# Patient Record
Sex: MALE | Race: BLACK OR AFRICAN AMERICAN | NOT HISPANIC OR LATINO | Employment: OTHER | URBAN - METROPOLITAN AREA
[De-identification: names, ages, dates, MRNs, and addresses within clinical notes are randomized per-mention and may not be internally consistent; named-entity substitution may affect disease eponyms.]

---

## 2018-01-02 ENCOUNTER — OFFICE VISIT CONVERTED (OUTPATIENT)
Dept: SURGERY | Facility: CLINIC | Age: 63
End: 2018-01-02
Attending: UROLOGY

## 2018-01-12 ENCOUNTER — OFFICE VISIT CONVERTED (OUTPATIENT)
Dept: SURGERY | Facility: CLINIC | Age: 63
End: 2018-01-12
Attending: UROLOGY

## 2018-01-30 ENCOUNTER — OFFICE VISIT CONVERTED (OUTPATIENT)
Dept: FAMILY MEDICINE CLINIC | Facility: CLINIC | Age: 63
End: 2018-01-30
Attending: FAMILY MEDICINE

## 2018-02-05 ENCOUNTER — OFFICE VISIT CONVERTED (OUTPATIENT)
Dept: SURGERY | Facility: CLINIC | Age: 63
End: 2018-02-05
Attending: UROLOGY

## 2018-04-10 ENCOUNTER — OFFICE VISIT CONVERTED (OUTPATIENT)
Dept: SURGERY | Facility: CLINIC | Age: 63
End: 2018-04-10
Attending: UROLOGY

## 2018-04-10 ENCOUNTER — CONVERSION ENCOUNTER (OUTPATIENT)
Dept: SURGERY | Facility: CLINIC | Age: 63
End: 2018-04-10

## 2018-07-10 ENCOUNTER — OFFICE VISIT CONVERTED (OUTPATIENT)
Dept: SURGERY | Facility: CLINIC | Age: 63
End: 2018-07-10
Attending: UROLOGY

## 2018-10-29 ENCOUNTER — CONVERSION ENCOUNTER (OUTPATIENT)
Dept: SURGERY | Facility: CLINIC | Age: 63
End: 2018-10-29

## 2018-10-29 ENCOUNTER — OFFICE VISIT CONVERTED (OUTPATIENT)
Dept: SURGERY | Facility: CLINIC | Age: 63
End: 2018-10-29
Attending: UROLOGY

## 2019-01-31 ENCOUNTER — HOSPITAL ENCOUNTER (OUTPATIENT)
Dept: OTHER | Facility: HOSPITAL | Age: 64
Discharge: HOME | End: 2019-01-31

## 2019-01-31 LAB — PSA SERPL-MCNC: 0.4 NG/ML (ref 0–4)

## 2019-02-05 ENCOUNTER — OFFICE VISIT CONVERTED (OUTPATIENT)
Dept: SURGERY | Facility: CLINIC | Age: 64
End: 2019-02-05
Attending: UROLOGY

## 2019-05-06 ENCOUNTER — HOSPITAL ENCOUNTER (OUTPATIENT)
Dept: OTHER | Facility: HOSPITAL | Age: 64
Discharge: HOME | End: 2019-05-06

## 2019-05-06 LAB — PSA SERPL-MCNC: 0.35 NG/ML (ref 0–4)

## 2019-05-07 ENCOUNTER — OFFICE VISIT CONVERTED (OUTPATIENT)
Dept: SURGERY | Facility: CLINIC | Age: 64
End: 2019-05-07
Attending: UROLOGY

## 2019-05-16 ENCOUNTER — OFFICE VISIT CONVERTED (OUTPATIENT)
Dept: FAMILY MEDICINE CLINIC | Facility: CLINIC | Age: 64
End: 2019-05-16
Attending: NURSE PRACTITIONER

## 2019-05-16 ENCOUNTER — HOSPITAL ENCOUNTER (OUTPATIENT)
Dept: CARDIOLOGY | Facility: HOSPITAL | Age: 64
Discharge: HOME | End: 2019-05-16
Attending: FAMILY MEDICINE

## 2019-05-16 LAB
ALBUMIN SERPL-MCNC: 3.8 G/DL (ref 3.5–5)
ALBUMIN/GLOB SERPL: 1 {RATIO} (ref 1.4–2.6)
ALP SERPL-CCNC: 63 U/L (ref 56–155)
ALT SERPL-CCNC: 10 U/L (ref 10–40)
ANION GAP SERPL CALC-SCNC: 16 MMOL/L (ref 8–19)
AST SERPL-CCNC: 21 U/L (ref 15–50)
BASOPHILS # BLD AUTO: 0.05 10*3/UL (ref 0–0.2)
BASOPHILS NFR BLD AUTO: 0.9 % (ref 0–3)
BILIRUB SERPL-MCNC: 0.22 MG/DL (ref 0.2–1.3)
BUN SERPL-MCNC: 10 MG/DL (ref 5–25)
BUN/CREAT SERPL: 11 {RATIO} (ref 6–20)
CALCIUM SERPL-MCNC: 8.9 MG/DL (ref 8.7–10.4)
CHLORIDE SERPL-SCNC: 103 MMOL/L (ref 99–111)
CHOLEST SERPL-MCNC: 137 MG/DL (ref 107–200)
CHOLEST/HDLC SERPL: 2 {RATIO} (ref 3–6)
CONV ABS IMM GRAN: 0.01 10*3/UL (ref 0–0.2)
CONV CO2: 24 MMOL/L (ref 22–32)
CONV IMMATURE GRAN: 0.2 % (ref 0–1.8)
CONV TOTAL PROTEIN: 7.6 G/DL (ref 6.3–8.2)
CREAT UR-MCNC: 0.89 MG/DL (ref 0.7–1.2)
DEPRECATED RDW RBC AUTO: 45.3 FL (ref 35.1–43.9)
EOSINOPHIL # BLD AUTO: 0.19 10*3/UL (ref 0–0.7)
EOSINOPHIL # BLD AUTO: 3.4 % (ref 0–7)
ERYTHROCYTE [DISTWIDTH] IN BLOOD BY AUTOMATED COUNT: 20 % (ref 11.6–14.4)
FOLATE SERPL-MCNC: 9.4 NG/ML (ref 4.8–20)
GFR SERPLBLD BASED ON 1.73 SQ M-ARVRAT: >60 ML/MIN/{1.73_M2}
GLOBULIN UR ELPH-MCNC: 3.8 G/DL (ref 2–3.5)
GLUCOSE SERPL-MCNC: 78 MG/DL (ref 70–99)
HBA1C MFR BLD: 8.2 G/DL (ref 14–18)
HCT VFR BLD AUTO: 27.1 % (ref 42–52)
HCYS SERPL-SCNC: 16.2 UMOL/L (ref 3.7–13.9)
HDLC SERPL-MCNC: 70 MG/DL (ref 40–60)
LDLC SERPL CALC-MCNC: 50 MG/DL (ref 70–100)
LYMPHOCYTES # BLD AUTO: 2.11 10*3/UL (ref 1–5)
MCH RBC QN AUTO: 19.4 PG (ref 27–31)
MCHC RBC AUTO-ENTMCNC: 30.3 G/DL (ref 33–37)
MCV RBC AUTO: 64.1 FL (ref 80–96)
MONOCYTES # BLD AUTO: 0.53 10*3/UL (ref 0.2–1.2)
MONOCYTES NFR BLD AUTO: 9.5 % (ref 3–10)
NEUTROPHILS # BLD AUTO: 2.67 10*3/UL (ref 2–8)
NEUTROPHILS NFR BLD AUTO: 48.1 % (ref 30–85)
NRBC CBCN: 0 % (ref 0–0.7)
OSMOLALITY SERPL CALC.SUM OF ELEC: 286 MOSM/KG (ref 273–304)
PLATELET # BLD AUTO: 442 10*3/UL (ref 130–400)
PMV BLD AUTO: 8.9 FL (ref 9.4–12.4)
POTASSIUM SERPL-SCNC: 4 MMOL/L (ref 3.5–5.3)
RBC # BLD AUTO: 4.23 10*6/UL (ref 4.7–6.1)
SODIUM SERPL-SCNC: 139 MMOL/L (ref 135–147)
TRIGL SERPL-MCNC: 87 MG/DL (ref 40–150)
TSH SERPL-ACNC: 1.35 M[IU]/L (ref 0.27–4.2)
VARIANT LYMPHS NFR BLD MANUAL: 37.9 % (ref 20–45)
VIT B12 SERPL-MCNC: 412 PG/ML (ref 211–911)
VLDLC SERPL-MCNC: 17 MG/DL (ref 5–37)
WBC # BLD AUTO: 5.56 10*3/UL (ref 4.8–10.8)

## 2019-05-18 LAB
CONV HEPATITIS C TEST INFO: NORMAL
FERRITIN SERPL-MCNC: 10 NG/ML (ref 30–300)
HCV RNA SERPL NAA+PROBE-ACNC: NORMAL IU/ML
HCV RNA SERPL NAA+PROBE-LOG IU: 4.42 LOG10 IU/ML
IRON SATN MFR SERPL: 4 % (ref 20–55)
IRON SERPL-MCNC: 18 UG/DL (ref 70–180)
TIBC SERPL-MCNC: 489 UG/DL (ref 245–450)
TRANSFERRIN SERPL-MCNC: 342 MG/DL (ref 215–365)

## 2019-05-21 LAB — METHYLMALONATE SERPL-SCNC: 223 NMOL/L (ref 0–378)

## 2019-06-06 ENCOUNTER — HOSPITAL ENCOUNTER (OUTPATIENT)
Dept: FAMILY MEDICINE CLINIC | Facility: CLINIC | Age: 64
Discharge: HOME | End: 2019-06-06
Attending: FAMILY MEDICINE

## 2019-06-06 LAB
BASOPHILS # BLD AUTO: 0.04 10*3/UL (ref 0–0.2)
BASOPHILS NFR BLD AUTO: 0.9 % (ref 0–3)
CONV ABS IMM GRAN: 0.02 10*3/UL (ref 0–0.2)
CONV IMMATURE GRAN: 0.4 % (ref 0–1.8)
DEPRECATED RDW RBC AUTO: 48.5 FL (ref 35.1–43.9)
EOSINOPHIL # BLD AUTO: 0.13 10*3/UL (ref 0–0.7)
EOSINOPHIL # BLD AUTO: 2.9 % (ref 0–7)
ERYTHROCYTE [DISTWIDTH] IN BLOOD BY AUTOMATED COUNT: 21.9 % (ref 11.6–14.4)
HBA1C MFR BLD: 8.5 G/DL (ref 14–18)
HCT VFR BLD AUTO: 28.4 % (ref 42–52)
LYMPHOCYTES # BLD AUTO: 1.52 10*3/UL (ref 1–5)
MCH RBC QN AUTO: 19.4 PG (ref 27–31)
MCHC RBC AUTO-ENTMCNC: 29.9 G/DL (ref 33–37)
MCV RBC AUTO: 64.7 FL (ref 80–96)
MONOCYTES # BLD AUTO: 0.53 10*3/UL (ref 0.2–1.2)
MONOCYTES NFR BLD AUTO: 11.7 % (ref 3–10)
NEUTROPHILS # BLD AUTO: 2.3 10*3/UL (ref 2–8)
NEUTROPHILS NFR BLD AUTO: 50.6 % (ref 30–85)
NRBC CBCN: 0 % (ref 0–0.7)
PLATELET # BLD AUTO: 383 10*3/UL (ref 130–400)
PMV BLD AUTO: 9.6 FL (ref 9.4–12.4)
RBC # BLD AUTO: 4.39 10*6/UL (ref 4.7–6.1)
VARIANT LYMPHS NFR BLD MANUAL: 33.5 % (ref 20–45)
WBC # BLD AUTO: 4.54 10*3/UL (ref 4.8–10.8)

## 2019-06-07 ENCOUNTER — OFFICE VISIT CONVERTED (OUTPATIENT)
Dept: ONCOLOGY | Facility: HOSPITAL | Age: 64
End: 2019-06-07
Attending: INTERNAL MEDICINE

## 2019-06-07 ENCOUNTER — HOSPITAL ENCOUNTER (OUTPATIENT)
Dept: ONCOLOGY | Facility: HOSPITAL | Age: 64
Discharge: HOME | End: 2019-06-07
Attending: INTERNAL MEDICINE

## 2019-07-12 ENCOUNTER — HOSPITAL ENCOUNTER (OUTPATIENT)
Dept: GASTROENTEROLOGY | Facility: HOSPITAL | Age: 64
Discharge: HOME | End: 2019-07-12
Attending: NURSE PRACTITIONER

## 2019-07-12 ENCOUNTER — OFFICE VISIT CONVERTED (OUTPATIENT)
Dept: ONCOLOGY | Facility: HOSPITAL | Age: 64
End: 2019-07-12
Attending: NURSE PRACTITIONER

## 2019-07-19 ENCOUNTER — HOSPITAL ENCOUNTER (OUTPATIENT)
Dept: OTHER | Facility: HOSPITAL | Age: 64
Discharge: HOME | End: 2019-07-19
Attending: NURSE PRACTITIONER

## 2019-07-19 LAB
ALBUMIN SERPL-MCNC: 3.7 G/DL (ref 3.5–5)
ALBUMIN/GLOB SERPL: 1 {RATIO} (ref 1.4–2.6)
ALP SERPL-CCNC: 54 U/L (ref 56–155)
ALT SERPL-CCNC: 17 U/L (ref 10–40)
ANION GAP SERPL CALC-SCNC: 20 MMOL/L (ref 8–19)
AST SERPL-CCNC: 35 U/L (ref 15–50)
BASOPHILS # BLD AUTO: 0.02 10*3/UL (ref 0–0.2)
BASOPHILS NFR BLD AUTO: 0.5 % (ref 0–3)
BILIRUB SERPL-MCNC: 0.22 MG/DL (ref 0.2–1.3)
BUN SERPL-MCNC: 6 MG/DL (ref 5–25)
BUN/CREAT SERPL: 7 {RATIO} (ref 6–20)
CALCIUM SERPL-MCNC: 9.1 MG/DL (ref 8.7–10.4)
CHLORIDE SERPL-SCNC: 104 MMOL/L (ref 99–111)
CONV ABS IMM GRAN: 0.01 10*3/UL (ref 0–0.2)
CONV CO2: 23 MMOL/L (ref 22–32)
CONV IMMATURE GRAN: 0.2 % (ref 0–1.8)
CONV TOTAL PROTEIN: 7.5 G/DL (ref 6.3–8.2)
CREAT UR-MCNC: 0.9 MG/DL (ref 0.7–1.2)
DEPRECATED RDW RBC AUTO: 53.9 FL (ref 35.1–43.9)
EOSINOPHIL # BLD AUTO: 0.1 10*3/UL (ref 0–0.7)
EOSINOPHIL # BLD AUTO: 2.4 % (ref 0–7)
ERYTHROCYTE [DISTWIDTH] IN BLOOD BY AUTOMATED COUNT: 23.9 % (ref 11.6–14.4)
ETHANOL BLD-MCNC: <10 MG/DL
GFR SERPLBLD BASED ON 1.73 SQ M-ARVRAT: >60 ML/MIN/{1.73_M2}
GLOBULIN UR ELPH-MCNC: 3.8 G/DL (ref 2–3.5)
GLUCOSE SERPL-MCNC: 90 MG/DL (ref 70–99)
HBA1C MFR BLD: 9.5 G/DL (ref 14–18)
HCT VFR BLD AUTO: 30.5 % (ref 42–52)
INR PPP: 1.03 (ref 2–3)
LYMPHOCYTES # BLD AUTO: 1.6 10*3/UL (ref 1–5)
MCH RBC QN AUTO: 20.7 PG (ref 27–31)
MCHC RBC AUTO-ENTMCNC: 31.1 G/DL (ref 33–37)
MCV RBC AUTO: 66.6 FL (ref 80–96)
MONOCYTES # BLD AUTO: 0.51 10*3/UL (ref 0.2–1.2)
MONOCYTES NFR BLD AUTO: 12.4 % (ref 3–10)
NEUTROPHILS # BLD AUTO: 1.88 10*3/UL (ref 2–8)
NEUTROPHILS NFR BLD AUTO: 45.7 % (ref 30–85)
NRBC CBCN: 0 % (ref 0–0.7)
OSMOLALITY SERPL CALC.SUM OF ELEC: 291 MOSM/KG (ref 273–304)
PLATELET # BLD AUTO: 520 10*3/UL (ref 130–400)
PMV BLD AUTO: 8.8 FL (ref 9.4–12.4)
POTASSIUM SERPL-SCNC: 4.6 MMOL/L (ref 3.5–5.3)
PROTHROMBIN TIME: 10.6 S (ref 9.4–12)
RBC # BLD AUTO: 4.58 10*6/UL (ref 4.7–6.1)
SODIUM SERPL-SCNC: 142 MMOL/L (ref 135–147)
VARIANT LYMPHS NFR BLD MANUAL: 38.8 % (ref 20–45)
WBC # BLD AUTO: 4.12 10*3/UL (ref 4.8–10.8)

## 2019-07-20 ENCOUNTER — HOSPITAL ENCOUNTER (OUTPATIENT)
Dept: ULTRASOUND IMAGING | Facility: HOSPITAL | Age: 64
Discharge: HOME | End: 2019-07-20
Attending: NURSE PRACTITIONER

## 2019-07-31 ENCOUNTER — HOSPITAL ENCOUNTER (OUTPATIENT)
Dept: GASTROENTEROLOGY | Facility: HOSPITAL | Age: 64
Setting detail: HOSPITAL OUTPATIENT SURGERY
Discharge: HOME | End: 2019-07-31
Attending: INTERNAL MEDICINE

## 2019-08-05 ENCOUNTER — HOSPITAL ENCOUNTER (OUTPATIENT)
Dept: OTHER | Facility: HOSPITAL | Age: 64
Discharge: HOME | End: 2019-08-05

## 2019-08-05 LAB — PSA SERPL-MCNC: 0.46 NG/ML (ref 0–4)

## 2019-08-06 ENCOUNTER — OFFICE VISIT CONVERTED (OUTPATIENT)
Dept: SURGERY | Facility: CLINIC | Age: 64
End: 2019-08-06
Attending: UROLOGY

## 2019-08-23 ENCOUNTER — OFFICE VISIT CONVERTED (OUTPATIENT)
Dept: ONCOLOGY | Facility: HOSPITAL | Age: 64
End: 2019-08-23
Attending: NURSE PRACTITIONER

## 2019-08-23 ENCOUNTER — HOSPITAL ENCOUNTER (OUTPATIENT)
Dept: GASTROENTEROLOGY | Facility: HOSPITAL | Age: 64
Discharge: HOME | End: 2019-08-23
Attending: NURSE PRACTITIONER

## 2019-08-23 LAB
ALBUMIN SERPL-MCNC: 3.9 G/DL (ref 3.5–5)
ALBUMIN/GLOB SERPL: 1.1 {RATIO} (ref 1.4–2.6)
ALP SERPL-CCNC: 57 U/L (ref 56–155)
ALT SERPL-CCNC: 15 U/L (ref 10–40)
ANION GAP SERPL CALC-SCNC: 18 MMOL/L (ref 8–19)
AST SERPL-CCNC: 31 U/L (ref 15–50)
BASOPHILS # BLD AUTO: 0.03 10*3/UL (ref 0–0.2)
BASOPHILS NFR BLD AUTO: 0.7 % (ref 0–3)
BILIRUB SERPL-MCNC: 0.18 MG/DL (ref 0.2–1.3)
BUN SERPL-MCNC: 9 MG/DL (ref 5–25)
BUN/CREAT SERPL: 10 {RATIO} (ref 6–20)
CALCIUM SERPL-MCNC: 8.7 MG/DL (ref 8.7–10.4)
CHLORIDE SERPL-SCNC: 104 MMOL/L (ref 99–111)
CONV ABS IMM GRAN: 0.01 10*3/UL (ref 0–0.2)
CONV CO2: 21 MMOL/L (ref 22–32)
CONV IMMATURE GRAN: 0.2 % (ref 0–1.8)
CONV TOTAL PROTEIN: 7.5 G/DL (ref 6.3–8.2)
CREAT UR-MCNC: 0.93 MG/DL (ref 0.7–1.2)
DEPRECATED RDW RBC AUTO: 57.5 FL (ref 35.1–43.9)
EOSINOPHIL # BLD AUTO: 0.11 10*3/UL (ref 0–0.7)
EOSINOPHIL # BLD AUTO: 2.6 % (ref 0–7)
ERYTHROCYTE [DISTWIDTH] IN BLOOD BY AUTOMATED COUNT: 23.4 % (ref 11.6–14.4)
GFR SERPLBLD BASED ON 1.73 SQ M-ARVRAT: >60 ML/MIN/{1.73_M2}
GLOBULIN UR ELPH-MCNC: 3.6 G/DL (ref 2–3.5)
GLUCOSE SERPL-MCNC: 97 MG/DL (ref 70–99)
HCT VFR BLD AUTO: 31.5 % (ref 42–52)
HGB BLD-MCNC: 9.9 G/DL (ref 14–18)
INR PPP: 0.97 (ref 2–3)
LYMPHOCYTES # BLD AUTO: 1.77 10*3/UL (ref 1–5)
LYMPHOCYTES NFR BLD AUTO: 41.2 % (ref 20–45)
MCH RBC QN AUTO: 22 PG (ref 27–31)
MCHC RBC AUTO-ENTMCNC: 31.4 G/DL (ref 33–37)
MCV RBC AUTO: 70 FL (ref 80–96)
MONOCYTES # BLD AUTO: 0.33 10*3/UL (ref 0.2–1.2)
MONOCYTES NFR BLD AUTO: 7.7 % (ref 3–10)
NEUTROPHILS # BLD AUTO: 2.05 10*3/UL (ref 2–8)
NEUTROPHILS NFR BLD AUTO: 47.6 % (ref 30–85)
NRBC CBCN: 0 % (ref 0–0.7)
OSMOLALITY SERPL CALC.SUM OF ELEC: 287 MOSM/KG (ref 273–304)
PLATELET # BLD AUTO: 439 10*3/UL (ref 130–400)
PMV BLD AUTO: 8.6 FL (ref 9.4–12.4)
POTASSIUM SERPL-SCNC: 4.4 MMOL/L (ref 3.5–5.3)
PROTHROMBIN TIME: 10.1 S (ref 9.4–12)
RBC # BLD AUTO: 4.5 10*6/UL (ref 4.7–6.1)
SODIUM SERPL-SCNC: 139 MMOL/L (ref 135–147)
WBC # BLD AUTO: 4.3 10*3/UL (ref 4.8–10.8)

## 2019-08-25 LAB
CONV HEPATITIS C TEST INFO: NORMAL
HCV RNA SERPL NAA+PROBE-ACNC: 8870 IU/ML
HCV RNA SERPL NAA+PROBE-LOG IU: 3.95 LOG10 IU/ML

## 2019-09-03 LAB
CONV HEPATITIS C GENOTYPE NOTE: NORMAL
HCV GENTYP SERPL NAA+PROBE: NORMAL

## 2019-09-24 ENCOUNTER — HOSPITAL ENCOUNTER (OUTPATIENT)
Dept: GASTROENTEROLOGY | Facility: HOSPITAL | Age: 64
Setting detail: HOSPITAL OUTPATIENT SURGERY
Discharge: HOME | End: 2019-09-24
Attending: INTERNAL MEDICINE

## 2019-10-01 ENCOUNTER — HOSPITAL ENCOUNTER (OUTPATIENT)
Dept: FAMILY MEDICINE CLINIC | Facility: CLINIC | Age: 64
Discharge: HOME | End: 2019-10-01
Attending: FAMILY MEDICINE

## 2019-10-01 ENCOUNTER — OFFICE VISIT CONVERTED (OUTPATIENT)
Dept: FAMILY MEDICINE CLINIC | Facility: CLINIC | Age: 64
End: 2019-10-01
Attending: FAMILY MEDICINE

## 2019-10-01 LAB
BASOPHILS # BLD AUTO: 0.02 10*3/UL (ref 0–0.2)
BASOPHILS NFR BLD AUTO: 0.3 % (ref 0–3)
CONV ABS IMM GRAN: 0.03 10*3/UL (ref 0–0.2)
CONV IMMATURE GRAN: 0.5 % (ref 0–1.8)
DEPRECATED RDW RBC AUTO: 58 FL (ref 35.1–43.9)
EOSINOPHIL # BLD AUTO: 0.17 10*3/UL (ref 0–0.7)
EOSINOPHIL # BLD AUTO: 2.7 % (ref 0–7)
ERYTHROCYTE [DISTWIDTH] IN BLOOD BY AUTOMATED COUNT: 22.3 % (ref 11.6–14.4)
HCT VFR BLD AUTO: 32 % (ref 42–52)
HGB BLD-MCNC: 10 G/DL (ref 14–18)
LYMPHOCYTES # BLD AUTO: 2.22 10*3/UL (ref 1–5)
LYMPHOCYTES NFR BLD AUTO: 35 % (ref 20–45)
MCH RBC QN AUTO: 22.9 PG (ref 27–31)
MCHC RBC AUTO-ENTMCNC: 31.3 G/DL (ref 33–37)
MCV RBC AUTO: 73.2 FL (ref 80–96)
MONOCYTES # BLD AUTO: 0.63 10*3/UL (ref 0.2–1.2)
MONOCYTES NFR BLD AUTO: 9.9 % (ref 3–10)
NEUTROPHILS # BLD AUTO: 3.27 10*3/UL (ref 2–8)
NEUTROPHILS NFR BLD AUTO: 51.6 % (ref 30–85)
NRBC CBCN: 0 % (ref 0–0.7)
PLATELET # BLD AUTO: 478 10*3/UL (ref 130–400)
PMV BLD AUTO: 9.5 FL (ref 9.4–12.4)
RBC # BLD AUTO: 4.37 10*6/UL (ref 4.7–6.1)
WBC # BLD AUTO: 6.34 10*3/UL (ref 4.8–10.8)

## 2019-10-22 ENCOUNTER — OFFICE VISIT CONVERTED (OUTPATIENT)
Dept: GASTROENTEROLOGY | Facility: CLINIC | Age: 64
End: 2019-10-22
Attending: INTERNAL MEDICINE

## 2019-11-08 ENCOUNTER — HOSPITAL ENCOUNTER (OUTPATIENT)
Dept: OTHER | Facility: HOSPITAL | Age: 64
Discharge: HOME | End: 2019-11-08

## 2019-11-08 LAB — PSA SERPL-MCNC: 2.55 NG/ML (ref 0–4)

## 2019-11-12 ENCOUNTER — CONVERSION ENCOUNTER (OUTPATIENT)
Dept: SURGERY | Facility: CLINIC | Age: 64
End: 2019-11-12

## 2019-11-12 ENCOUNTER — OFFICE VISIT CONVERTED (OUTPATIENT)
Dept: SURGERY | Facility: CLINIC | Age: 64
End: 2019-11-12
Attending: UROLOGY

## 2019-11-22 ENCOUNTER — HOSPITAL ENCOUNTER (OUTPATIENT)
Dept: CT IMAGING | Facility: HOSPITAL | Age: 64
Discharge: HOME | End: 2019-11-22

## 2019-11-22 LAB
CREAT BLD-MCNC: 1.1 MG/DL (ref 0.6–1.4)
GFR SERPLBLD BASED ON 1.73 SQ M-ARVRAT: >60 ML/MIN/{1.73_M2}

## 2019-11-26 ENCOUNTER — OFFICE VISIT CONVERTED (OUTPATIENT)
Dept: SURGERY | Facility: CLINIC | Age: 64
End: 2019-11-26
Attending: UROLOGY

## 2019-12-05 ENCOUNTER — HOSPITAL ENCOUNTER (OUTPATIENT)
Dept: FAMILY MEDICINE CLINIC | Facility: CLINIC | Age: 64
Discharge: HOME | End: 2019-12-05
Attending: FAMILY MEDICINE

## 2019-12-05 LAB
BASOPHILS # BLD AUTO: 0.02 10*3/UL (ref 0–0.2)
BASOPHILS NFR BLD AUTO: 0.4 % (ref 0–3)
CONV ABS IMM GRAN: 0.02 10*3/UL (ref 0–0.2)
CONV IMMATURE GRAN: 0.4 % (ref 0–1.8)
DEPRECATED RDW RBC AUTO: 58.3 FL (ref 35.1–43.9)
EOSINOPHIL # BLD AUTO: 0.12 10*3/UL (ref 0–0.7)
EOSINOPHIL # BLD AUTO: 2.6 % (ref 0–7)
ERYTHROCYTE [DISTWIDTH] IN BLOOD BY AUTOMATED COUNT: 20.3 % (ref 11.6–14.4)
HCT VFR BLD AUTO: 34.6 % (ref 42–52)
HGB BLD-MCNC: 11.1 G/DL (ref 14–18)
IRON SATN MFR SERPL: 24 % (ref 20–55)
IRON SERPL-MCNC: 108 UG/DL (ref 70–180)
LYMPHOCYTES # BLD AUTO: 1.98 10*3/UL (ref 1–5)
LYMPHOCYTES NFR BLD AUTO: 42.2 % (ref 20–45)
MCH RBC QN AUTO: 25.3 PG (ref 27–31)
MCHC RBC AUTO-ENTMCNC: 32.1 G/DL (ref 33–37)
MCV RBC AUTO: 78.8 FL (ref 80–96)
MONOCYTES # BLD AUTO: 0.49 10*3/UL (ref 0.2–1.2)
MONOCYTES NFR BLD AUTO: 10.4 % (ref 3–10)
NEUTROPHILS # BLD AUTO: 2.06 10*3/UL (ref 2–8)
NEUTROPHILS NFR BLD AUTO: 44 % (ref 30–85)
NRBC CBCN: 0 % (ref 0–0.7)
PLATELET # BLD AUTO: 367 10*3/UL (ref 130–400)
PMV BLD AUTO: 9.5 FL (ref 9.4–12.4)
RBC # BLD AUTO: 4.39 10*6/UL (ref 4.7–6.1)
TIBC SERPL-MCNC: 453 UG/DL (ref 245–450)
TRANSFERRIN SERPL-MCNC: 317 MG/DL (ref 215–365)
WBC # BLD AUTO: 4.69 10*3/UL (ref 4.8–10.8)

## 2020-01-20 ENCOUNTER — HOSPITAL ENCOUNTER (OUTPATIENT)
Dept: OTHER | Facility: HOSPITAL | Age: 65
Discharge: HOME | End: 2020-01-20
Attending: NURSE PRACTITIONER

## 2020-01-20 LAB
ALBUMIN SERPL-MCNC: 3.9 G/DL (ref 3.5–5)
ALBUMIN/GLOB SERPL: 0.9 {RATIO} (ref 1.4–2.6)
ALP SERPL-CCNC: 59 U/L (ref 56–155)
ALT SERPL-CCNC: 7 U/L (ref 10–40)
ANION GAP SERPL CALC-SCNC: 20 MMOL/L (ref 8–19)
AST SERPL-CCNC: 18 U/L (ref 15–50)
BASOPHILS # BLD AUTO: 0.04 10*3/UL (ref 0–0.2)
BASOPHILS NFR BLD AUTO: 0.7 % (ref 0–3)
BILIRUB SERPL-MCNC: 0.26 MG/DL (ref 0.2–1.3)
BUN SERPL-MCNC: 9 MG/DL (ref 5–25)
BUN/CREAT SERPL: 7 {RATIO} (ref 6–20)
CALCIUM SERPL-MCNC: 9.6 MG/DL (ref 8.7–10.4)
CHLORIDE SERPL-SCNC: 103 MMOL/L (ref 99–111)
CONV ABS IMM GRAN: 0.01 10*3/UL (ref 0–0.2)
CONV CO2: 21 MMOL/L (ref 22–32)
CONV IMMATURE GRAN: 0.2 % (ref 0–1.8)
CONV TOTAL PROTEIN: 8.2 G/DL (ref 6.3–8.2)
CREAT UR-MCNC: 1.22 MG/DL (ref 0.7–1.2)
DEPRECATED RDW RBC AUTO: 48.2 FL (ref 35.1–43.9)
EOSINOPHIL # BLD AUTO: 0.17 10*3/UL (ref 0–0.7)
EOSINOPHIL # BLD AUTO: 2.9 % (ref 0–7)
ERYTHROCYTE [DISTWIDTH] IN BLOOD BY AUTOMATED COUNT: 16.5 % (ref 11.6–14.4)
GFR SERPLBLD BASED ON 1.73 SQ M-ARVRAT: >60 ML/MIN/{1.73_M2}
GLOBULIN UR ELPH-MCNC: 4.3 G/DL (ref 2–3.5)
GLUCOSE SERPL-MCNC: 97 MG/DL (ref 70–99)
HCT VFR BLD AUTO: 38 % (ref 42–52)
HGB BLD-MCNC: 12.1 G/DL (ref 14–18)
INR PPP: 1.02 (ref 2–3)
LYMPHOCYTES # BLD AUTO: 2.03 10*3/UL (ref 1–5)
LYMPHOCYTES NFR BLD AUTO: 34.6 % (ref 20–45)
MCH RBC QN AUTO: 25.6 PG (ref 27–31)
MCHC RBC AUTO-ENTMCNC: 31.8 G/DL (ref 33–37)
MCV RBC AUTO: 80.5 FL (ref 80–96)
MONOCYTES # BLD AUTO: 0.52 10*3/UL (ref 0.2–1.2)
MONOCYTES NFR BLD AUTO: 8.9 % (ref 3–10)
NEUTROPHILS # BLD AUTO: 3.09 10*3/UL (ref 2–8)
NEUTROPHILS NFR BLD AUTO: 52.7 % (ref 30–85)
NRBC CBCN: 0 % (ref 0–0.7)
OSMOLALITY SERPL CALC.SUM OF ELEC: 289 MOSM/KG (ref 273–304)
PLATELET # BLD AUTO: 390 10*3/UL (ref 130–400)
PMV BLD AUTO: 9.9 FL (ref 9.4–12.4)
POTASSIUM SERPL-SCNC: 4.3 MMOL/L (ref 3.5–5.3)
PROTHROMBIN TIME: 11 S (ref 9.4–12)
RBC # BLD AUTO: 4.72 10*6/UL (ref 4.7–6.1)
SODIUM SERPL-SCNC: 140 MMOL/L (ref 135–147)
WBC # BLD AUTO: 5.86 10*3/UL (ref 4.8–10.8)

## 2020-01-22 LAB
CONV HEPATITIS C TEST INFO: NORMAL
HCV RNA SERPL NAA+PROBE-ACNC: NORMAL IU/ML

## 2020-01-24 ENCOUNTER — OFFICE VISIT CONVERTED (OUTPATIENT)
Dept: ONCOLOGY | Facility: HOSPITAL | Age: 65
End: 2020-01-24
Attending: NURSE PRACTITIONER

## 2020-01-24 ENCOUNTER — HOSPITAL ENCOUNTER (OUTPATIENT)
Dept: GASTROENTEROLOGY | Facility: HOSPITAL | Age: 65
Discharge: HOME | End: 2020-01-24
Attending: NURSE PRACTITIONER

## 2020-02-21 ENCOUNTER — HOSPITAL ENCOUNTER (OUTPATIENT)
Dept: GASTROENTEROLOGY | Facility: HOSPITAL | Age: 65
Discharge: HOME | End: 2020-02-21
Attending: NURSE PRACTITIONER

## 2020-02-21 ENCOUNTER — OFFICE VISIT CONVERTED (OUTPATIENT)
Dept: ONCOLOGY | Facility: HOSPITAL | Age: 65
End: 2020-02-21
Attending: NURSE PRACTITIONER

## 2020-02-21 LAB
ALBUMIN SERPL-MCNC: 3.9 G/DL (ref 3.5–5)
ALBUMIN/GLOB SERPL: 1.1 {RATIO} (ref 1.4–2.6)
ALP SERPL-CCNC: 52 U/L (ref 56–155)
ALT SERPL-CCNC: 6 U/L (ref 10–40)
ANION GAP SERPL CALC-SCNC: 14 MMOL/L (ref 8–19)
AST SERPL-CCNC: 17 U/L (ref 15–50)
BASOPHILS # BLD AUTO: 0.03 10*3/UL (ref 0–0.2)
BASOPHILS NFR BLD AUTO: 0.6 % (ref 0–3)
BILIRUB SERPL-MCNC: 0.3 MG/DL (ref 0.2–1.3)
BUN SERPL-MCNC: 10 MG/DL (ref 5–25)
BUN/CREAT SERPL: 11 {RATIO} (ref 6–20)
CALCIUM SERPL-MCNC: 9.1 MG/DL (ref 8.7–10.4)
CHLORIDE SERPL-SCNC: 104 MMOL/L (ref 99–111)
CONV ABS IMM GRAN: 0.01 10*3/UL (ref 0–0.2)
CONV CO2: 24 MMOL/L (ref 22–32)
CONV IMMATURE GRAN: 0.2 % (ref 0–1.8)
CONV TOTAL PROTEIN: 7.3 G/DL (ref 6.3–8.2)
CREAT UR-MCNC: 0.88 MG/DL (ref 0.7–1.2)
DEPRECATED RDW RBC AUTO: 49.2 FL (ref 35.1–43.9)
EOSINOPHIL # BLD AUTO: 0.11 10*3/UL (ref 0–0.7)
EOSINOPHIL # BLD AUTO: 2.1 % (ref 0–7)
ERYTHROCYTE [DISTWIDTH] IN BLOOD BY AUTOMATED COUNT: 16.8 % (ref 11.6–14.4)
GFR SERPLBLD BASED ON 1.73 SQ M-ARVRAT: >60 ML/MIN/{1.73_M2}
GLOBULIN UR ELPH-MCNC: 3.4 G/DL (ref 2–3.5)
GLUCOSE SERPL-MCNC: 78 MG/DL (ref 70–99)
HCT VFR BLD AUTO: 35 % (ref 42–52)
HGB BLD-MCNC: 11.6 G/DL (ref 14–18)
INR PPP: 0.99 (ref 2–3)
LYMPHOCYTES # BLD AUTO: 1.99 10*3/UL (ref 1–5)
LYMPHOCYTES NFR BLD AUTO: 37.5 % (ref 20–45)
MCH RBC QN AUTO: 26.7 PG (ref 27–31)
MCHC RBC AUTO-ENTMCNC: 33.1 G/DL (ref 33–37)
MCV RBC AUTO: 80.6 FL (ref 80–96)
MONOCYTES # BLD AUTO: 0.3 10*3/UL (ref 0.2–1.2)
MONOCYTES NFR BLD AUTO: 5.7 % (ref 3–10)
NEUTROPHILS # BLD AUTO: 2.86 10*3/UL (ref 2–8)
NEUTROPHILS NFR BLD AUTO: 53.9 % (ref 30–85)
NRBC CBCN: 0 % (ref 0–0.7)
OSMOLALITY SERPL CALC.SUM OF ELEC: 284 MOSM/KG (ref 273–304)
PLATELET # BLD AUTO: 299 10*3/UL (ref 130–400)
PMV BLD AUTO: 9.9 FL (ref 9.4–12.4)
POTASSIUM SERPL-SCNC: 4.1 MMOL/L (ref 3.5–5.3)
PROTHROMBIN TIME: 10.7 S (ref 9.4–12)
RBC # BLD AUTO: 4.34 10*6/UL (ref 4.7–6.1)
SODIUM SERPL-SCNC: 138 MMOL/L (ref 135–147)
WBC # BLD AUTO: 5.3 10*3/UL (ref 4.8–10.8)

## 2020-02-22 LAB
CONV HEPATITIS C TEST INFO: NORMAL
HCV RNA SERPL NAA+PROBE-ACNC: NORMAL IU/ML

## 2020-03-10 ENCOUNTER — HOSPITAL ENCOUNTER (OUTPATIENT)
Dept: OTHER | Facility: HOSPITAL | Age: 65
Discharge: HOME | End: 2020-03-10

## 2020-03-10 ENCOUNTER — OFFICE VISIT CONVERTED (OUTPATIENT)
Dept: UROLOGY | Facility: CLINIC | Age: 65
End: 2020-03-10
Attending: UROLOGY

## 2020-03-10 LAB — PSA SERPL-MCNC: 4.54 NG/ML (ref 0–4)

## 2020-03-20 ENCOUNTER — OFFICE VISIT CONVERTED (OUTPATIENT)
Dept: UROLOGY | Facility: CLINIC | Age: 65
End: 2020-03-20
Attending: UROLOGY

## 2020-03-24 ENCOUNTER — OFFICE VISIT CONVERTED (OUTPATIENT)
Dept: FAMILY MEDICINE CLINIC | Facility: CLINIC | Age: 65
End: 2020-03-24
Attending: NURSE PRACTITIONER

## 2020-03-24 ENCOUNTER — HOSPITAL ENCOUNTER (OUTPATIENT)
Dept: FAMILY MEDICINE CLINIC | Facility: CLINIC | Age: 65
Discharge: HOME | End: 2020-03-24
Attending: NURSE PRACTITIONER

## 2020-03-30 ENCOUNTER — TELEMEDICINE CONVERTED (OUTPATIENT)
Dept: GASTROENTEROLOGY | Facility: CLINIC | Age: 65
End: 2020-03-30
Attending: INTERNAL MEDICINE

## 2020-04-28 ENCOUNTER — TELEPHONE CONVERTED (OUTPATIENT)
Dept: FAMILY MEDICINE CLINIC | Facility: CLINIC | Age: 65
End: 2020-04-28
Attending: FAMILY MEDICINE

## 2020-06-12 ENCOUNTER — OFFICE VISIT CONVERTED (OUTPATIENT)
Dept: ONCOLOGY | Facility: HOSPITAL | Age: 65
End: 2020-06-12
Attending: NURSE PRACTITIONER

## 2020-06-19 ENCOUNTER — HOSPITAL ENCOUNTER (OUTPATIENT)
Dept: OTHER | Facility: HOSPITAL | Age: 65
Discharge: HOME | End: 2020-06-19
Attending: UROLOGY

## 2020-06-19 LAB — PSA SERPL-MCNC: 11.45 NG/ML (ref 0–4)

## 2020-06-22 ENCOUNTER — OFFICE VISIT CONVERTED (OUTPATIENT)
Dept: UROLOGY | Facility: CLINIC | Age: 65
End: 2020-06-22
Attending: UROLOGY

## 2020-06-26 ENCOUNTER — HOSPITAL ENCOUNTER (OUTPATIENT)
Dept: OTHER | Facility: HOSPITAL | Age: 65
Discharge: HOME | End: 2020-06-26
Attending: UROLOGY

## 2020-06-26 LAB
PSA SERPL-MCNC: 12.13 NG/ML (ref 0–4)
TESTOST SERPL-MCNC: 3 NG/DL (ref 193–740)

## 2020-06-30 ENCOUNTER — OFFICE VISIT CONVERTED (OUTPATIENT)
Dept: ONCOLOGY | Facility: HOSPITAL | Age: 65
End: 2020-06-30
Attending: INTERNAL MEDICINE

## 2020-06-30 ENCOUNTER — HOSPITAL ENCOUNTER (OUTPATIENT)
Dept: ONCOLOGY | Facility: HOSPITAL | Age: 65
Discharge: HOME | End: 2020-06-30
Attending: INTERNAL MEDICINE

## 2020-07-07 ENCOUNTER — HOSPITAL ENCOUNTER (OUTPATIENT)
Dept: GENERAL RADIOLOGY | Facility: HOSPITAL | Age: 65
Discharge: HOME | End: 2020-07-07
Attending: INTERNAL MEDICINE

## 2020-07-07 LAB
CREAT BLD-MCNC: 1 MG/DL (ref 0.6–1.4)
GFR SERPLBLD BASED ON 1.73 SQ M-ARVRAT: >60 ML/MIN/{1.73_M2}

## 2020-07-17 ENCOUNTER — OFFICE VISIT CONVERTED (OUTPATIENT)
Dept: ONCOLOGY | Facility: HOSPITAL | Age: 65
End: 2020-07-17
Attending: INTERNAL MEDICINE

## 2020-07-17 ENCOUNTER — HOSPITAL ENCOUNTER (OUTPATIENT)
Dept: OTHER | Facility: HOSPITAL | Age: 65
Discharge: HOME | End: 2020-07-17
Attending: INTERNAL MEDICINE

## 2020-07-28 ENCOUNTER — HOSPITAL ENCOUNTER (OUTPATIENT)
Dept: CT IMAGING | Facility: HOSPITAL | Age: 65
Discharge: HOME | End: 2020-07-28
Attending: INTERNAL MEDICINE

## 2020-08-31 ENCOUNTER — OFFICE VISIT CONVERTED (OUTPATIENT)
Dept: ONCOLOGY | Facility: HOSPITAL | Age: 65
End: 2020-08-31
Attending: INTERNAL MEDICINE

## 2020-08-31 ENCOUNTER — HOSPITAL ENCOUNTER (OUTPATIENT)
Dept: OTHER | Facility: HOSPITAL | Age: 65
Discharge: HOME | End: 2020-08-31
Attending: INTERNAL MEDICINE

## 2020-08-31 LAB
ALBUMIN SERPL-MCNC: 3.8 G/DL (ref 3.5–5)
ALBUMIN/GLOB SERPL: 1.2 {RATIO} (ref 1.4–2.6)
ALP SERPL-CCNC: 64 U/L (ref 56–155)
ALT SERPL-CCNC: 5 U/L (ref 10–40)
ANION GAP SERPL CALC-SCNC: 14 MMOL/L (ref 8–19)
AST SERPL-CCNC: 13 U/L (ref 15–50)
BASOPHILS # BLD AUTO: 0 10*3/UL (ref 0–0.2)
BASOPHILS NFR BLD AUTO: 0 % (ref 0–3)
BILIRUB SERPL-MCNC: 0.32 MG/DL (ref 0.2–1.3)
BUN SERPL-MCNC: 10 MG/DL (ref 5–25)
BUN/CREAT SERPL: 11 {RATIO} (ref 6–20)
CALCIUM SERPL-MCNC: 9.1 MG/DL (ref 8.7–10.4)
CHLORIDE SERPL-SCNC: 104 MMOL/L (ref 99–111)
CONV ABS IMM GRAN: 0.02 10*3/UL (ref 0–0.54)
CONV CO2: 27 MMOL/L (ref 22–32)
CONV EOSINOPHILS PERCENT BY MANUAL COUNT: 1.5 % (ref 0–7)
CONV IMMATURE GRAN: 0.3 % (ref 0–0.4)
CONV TOTAL PROTEIN: 7 G/DL (ref 6.3–8.2)
CREAT UR-MCNC: 0.92 MG/DL (ref 0.7–1.2)
EOSINOPHIL # BLD MANUAL: 0.11 10*3/UL (ref 0–0.7)
ERYTHROCYTE [DISTWIDTH] IN BLOOD BY AUTOMATED COUNT: 15.2 % (ref 11.5–14.5)
ERYTHROCYTE [DISTWIDTH] IN BLOOD BY AUTOMATED COUNT: 45.2 FL
GFR SERPLBLD BASED ON 1.73 SQ M-ARVRAT: >60 ML/MIN/{1.73_M2}
GLOBULIN UR ELPH-MCNC: 3.2 G/DL (ref 2–3.5)
GLUCOSE SERPL-MCNC: 93 MG/DL (ref 70–99)
HBA1C MFR BLD: 11.8 G/DL (ref 14–18)
HCT VFR BLD AUTO: 35.5 % (ref 42–52)
LYMPHOCYTES # BLD AUTO: 1.67 10*3/UL (ref 1–5)
LYMPHOCYTES NFR BLD AUTO: 23.1 % (ref 20–45)
MCH RBC QN AUTO: 26.7 PG (ref 27–31)
MCHC RBC AUTO-ENTMCNC: 33.2 G/DL (ref 33–37)
MCV RBC AUTO: 80.3 FL (ref 80–96)
MONOCYTES # BLD AUTO: 0.49 10*3/UL (ref 0.2–1.2)
MONOCYTES NFR BLD MANUAL: 6.8 % (ref 3–10)
NEUTROPHILS # BLD AUTO: 4.95 10*3/UL (ref 2–8)
NEUTROPHILS NFR BLD MANUAL: 68.3 % (ref 30–85)
OSMOLALITY SERPL CALC.SUM OF ELEC: 291 MOSM/KG (ref 273–304)
PLATELET # BLD AUTO: 434 10*3/UL (ref 130–400)
PMV BLD AUTO: 9.3 FL (ref 7.4–10.4)
POTASSIUM SERPL-SCNC: 3.8 MMOL/L (ref 3.5–5.3)
PSA SERPL-MCNC: 17.95 NG/ML (ref 0–4)
RBC MORPH BLD: 4.42 10*6/UL (ref 4.7–6.1)
SODIUM SERPL-SCNC: 141 MMOL/L (ref 135–147)
WBC # BLD AUTO: 7.24 10*3/UL (ref 4.8–10.8)

## 2020-10-01 ENCOUNTER — OFFICE VISIT CONVERTED (OUTPATIENT)
Dept: ONCOLOGY | Facility: HOSPITAL | Age: 65
End: 2020-10-01
Attending: INTERNAL MEDICINE

## 2020-11-05 ENCOUNTER — HOSPITAL ENCOUNTER (OUTPATIENT)
Dept: ONCOLOGY | Facility: HOSPITAL | Age: 65
Discharge: HOME | End: 2020-11-05
Attending: INTERNAL MEDICINE

## 2020-11-05 LAB
ALBUMIN SERPL-MCNC: 3.6 G/DL (ref 3.5–5)
ALBUMIN/GLOB SERPL: 1.1 {RATIO} (ref 1.4–2.6)
ALP SERPL-CCNC: 68 U/L (ref 56–155)
ALT SERPL-CCNC: 8 U/L (ref 10–40)
ANION GAP SERPL CALC-SCNC: 21 MMOL/L (ref 8–19)
AST SERPL-CCNC: 20 U/L (ref 15–50)
BASOPHILS # BLD AUTO: 0.02 10*3/UL (ref 0–0.2)
BASOPHILS NFR BLD AUTO: 0.3 % (ref 0–3)
BILIRUB SERPL-MCNC: 0.61 MG/DL (ref 0.2–1.3)
BUN SERPL-MCNC: 10 MG/DL (ref 5–25)
BUN/CREAT SERPL: 10 {RATIO} (ref 6–20)
CALCIUM SERPL-MCNC: 9.4 MG/DL (ref 8.7–10.4)
CHLORIDE SERPL-SCNC: 105 MMOL/L (ref 99–111)
CONV ABS IMM GRAN: 0.01 10*3/UL (ref 0–0.2)
CONV CO2: 19 MMOL/L (ref 22–32)
CONV IMMATURE GRAN: 0.2 % (ref 0–1.8)
CONV TOTAL PROTEIN: 7 G/DL (ref 6.3–8.2)
CREAT UR-MCNC: 1.05 MG/DL (ref 0.7–1.2)
DEPRECATED RDW RBC AUTO: 42.1 FL (ref 35.1–43.9)
EOSINOPHIL # BLD AUTO: 0.1 10*3/UL (ref 0–0.7)
EOSINOPHIL # BLD AUTO: 1.7 % (ref 0–7)
ERYTHROCYTE [DISTWIDTH] IN BLOOD BY AUTOMATED COUNT: 15 % (ref 11.6–14.4)
GFR SERPLBLD BASED ON 1.73 SQ M-ARVRAT: >60 ML/MIN/{1.73_M2}
GLOBULIN UR ELPH-MCNC: 3.4 G/DL (ref 2–3.5)
GLUCOSE SERPL-MCNC: 106 MG/DL (ref 70–99)
HCT VFR BLD AUTO: 34 % (ref 42–52)
HGB BLD-MCNC: 11.2 G/DL (ref 14–18)
LYMPHOCYTES # BLD AUTO: 1.45 10*3/UL (ref 1–5)
LYMPHOCYTES NFR BLD AUTO: 24.8 % (ref 20–45)
MCH RBC QN AUTO: 25.7 PG (ref 27–31)
MCHC RBC AUTO-ENTMCNC: 32.9 G/DL (ref 33–37)
MCV RBC AUTO: 78 FL (ref 80–96)
MONOCYTES # BLD AUTO: 0.46 10*3/UL (ref 0.2–1.2)
MONOCYTES NFR BLD AUTO: 7.9 % (ref 3–10)
NEUTROPHILS # BLD AUTO: 3.81 10*3/UL (ref 2–8)
NEUTROPHILS NFR BLD AUTO: 65.1 % (ref 30–85)
NRBC CBCN: 0 % (ref 0–0.7)
OSMOLALITY SERPL CALC.SUM OF ELEC: 291 MOSM/KG (ref 273–304)
PLATELET # BLD AUTO: 356 10*3/UL (ref 130–400)
PMV BLD AUTO: 10 FL (ref 9.4–12.4)
POTASSIUM SERPL-SCNC: 4.3 MMOL/L (ref 3.5–5.3)
PSA SERPL-MCNC: 7.17 NG/ML (ref 0–4)
RBC # BLD AUTO: 4.36 10*6/UL (ref 4.7–6.1)
SODIUM SERPL-SCNC: 141 MMOL/L (ref 135–147)
WBC # BLD AUTO: 5.85 10*3/UL (ref 4.8–10.8)

## 2020-11-06 ENCOUNTER — HOSPITAL ENCOUNTER (OUTPATIENT)
Dept: GASTROENTEROLOGY | Facility: HOSPITAL | Age: 65
Discharge: HOME | End: 2020-11-06
Attending: NURSE PRACTITIONER

## 2020-11-06 ENCOUNTER — OFFICE VISIT CONVERTED (OUTPATIENT)
Dept: ONCOLOGY | Facility: HOSPITAL | Age: 65
End: 2020-11-06
Attending: INTERNAL MEDICINE

## 2020-11-06 LAB
ALBUMIN SERPL-MCNC: 3.6 G/DL (ref 3.5–5)
ALBUMIN/GLOB SERPL: 1.1 {RATIO} (ref 1.4–2.6)
ALP SERPL-CCNC: 71 U/L (ref 56–155)
ALT SERPL-CCNC: 9 U/L (ref 10–40)
ANION GAP SERPL CALC-SCNC: 13 MMOL/L (ref 8–19)
AST SERPL-CCNC: 20 U/L (ref 15–50)
BASOPHILS # BLD AUTO: 0.04 10*3/UL (ref 0–0.2)
BASOPHILS NFR BLD AUTO: 0.6 % (ref 0–3)
BILIRUB SERPL-MCNC: 0.31 MG/DL (ref 0.2–1.3)
BUN SERPL-MCNC: 8 MG/DL (ref 5–25)
BUN/CREAT SERPL: 8 {RATIO} (ref 6–20)
CALCIUM SERPL-MCNC: 9.1 MG/DL (ref 8.7–10.4)
CHLORIDE SERPL-SCNC: 105 MMOL/L (ref 99–111)
CONV ABS IMM GRAN: 0.03 10*3/UL (ref 0–0.2)
CONV CO2: 25 MMOL/L (ref 22–32)
CONV IMMATURE GRAN: 0.5 % (ref 0–1.8)
CONV TOTAL PROTEIN: 6.8 G/DL (ref 6.3–8.2)
CREAT UR-MCNC: 0.96 MG/DL (ref 0.7–1.2)
DEPRECATED RDW RBC AUTO: 42.2 FL (ref 35.1–43.9)
EOSINOPHIL # BLD AUTO: 0.14 10*3/UL (ref 0–0.7)
EOSINOPHIL # BLD AUTO: 2.2 % (ref 0–7)
ERYTHROCYTE [DISTWIDTH] IN BLOOD BY AUTOMATED COUNT: 15 % (ref 11.6–14.4)
GFR SERPLBLD BASED ON 1.73 SQ M-ARVRAT: >60 ML/MIN/{1.73_M2}
GLOBULIN UR ELPH-MCNC: 3.2 G/DL (ref 2–3.5)
GLUCOSE SERPL-MCNC: 101 MG/DL (ref 70–99)
HCT VFR BLD AUTO: 33.7 % (ref 42–52)
HGB BLD-MCNC: 10.8 G/DL (ref 14–18)
INR PPP: 0.92 (ref 2–3)
LYMPHOCYTES # BLD AUTO: 1.88 10*3/UL (ref 1–5)
LYMPHOCYTES NFR BLD AUTO: 29.8 % (ref 20–45)
MCH RBC QN AUTO: 25.1 PG (ref 27–31)
MCHC RBC AUTO-ENTMCNC: 32 G/DL (ref 33–37)
MCV RBC AUTO: 78.4 FL (ref 80–96)
MONOCYTES # BLD AUTO: 0.45 10*3/UL (ref 0.2–1.2)
MONOCYTES NFR BLD AUTO: 7.1 % (ref 3–10)
NEUTROPHILS # BLD AUTO: 3.76 10*3/UL (ref 2–8)
NEUTROPHILS NFR BLD AUTO: 59.8 % (ref 30–85)
NRBC CBCN: 0 % (ref 0–0.7)
OSMOLALITY SERPL CALC.SUM OF ELEC: 286 MOSM/KG (ref 273–304)
PLATELET # BLD AUTO: 412 10*3/UL (ref 130–400)
PMV BLD AUTO: 9.1 FL (ref 9.4–12.4)
POTASSIUM SERPL-SCNC: 4 MMOL/L (ref 3.5–5.3)
PROTHROMBIN TIME: 10.1 S (ref 9.4–12)
RBC # BLD AUTO: 4.3 10*6/UL (ref 4.7–6.1)
SODIUM SERPL-SCNC: 139 MMOL/L (ref 135–147)
WBC # BLD AUTO: 6.3 10*3/UL (ref 4.8–10.8)

## 2020-11-07 LAB
CONV HEPATITIS C TEST INFO: NORMAL
HCV RNA SERPL NAA+PROBE-ACNC: NORMAL IU/ML

## 2020-11-23 ENCOUNTER — OFFICE VISIT CONVERTED (OUTPATIENT)
Dept: GASTROENTEROLOGY | Facility: CLINIC | Age: 65
End: 2020-11-23
Attending: INTERNAL MEDICINE

## 2020-11-23 ENCOUNTER — CONVERSION ENCOUNTER (OUTPATIENT)
Dept: GASTROENTEROLOGY | Facility: CLINIC | Age: 65
End: 2020-11-23

## 2020-12-11 ENCOUNTER — HOSPITAL ENCOUNTER (OUTPATIENT)
Dept: ONCOLOGY | Facility: HOSPITAL | Age: 65
Discharge: HOME | End: 2020-12-11
Attending: INTERNAL MEDICINE

## 2020-12-11 ENCOUNTER — OFFICE VISIT CONVERTED (OUTPATIENT)
Dept: ONCOLOGY | Facility: HOSPITAL | Age: 65
End: 2020-12-11
Attending: INTERNAL MEDICINE

## 2020-12-11 LAB
BASOPHILS # BLD AUTO: 0.01 10*3/UL (ref 0–0.2)
BASOPHILS NFR BLD AUTO: 0.2 % (ref 0–3)
CONV ABS IMM GRAN: 0.01 10*3/UL (ref 0–0.54)
CONV EOSINOPHILS PERCENT BY MANUAL COUNT: 1.2 % (ref 0–7)
CONV IMMATURE GRAN: 0.2 % (ref 0–0.4)
EOSINOPHIL # BLD MANUAL: 0.08 10*3/UL (ref 0–0.7)
ERYTHROCYTE [DISTWIDTH] IN BLOOD BY AUTOMATED COUNT: 16 % (ref 11.5–14.5)
ERYTHROCYTE [DISTWIDTH] IN BLOOD BY AUTOMATED COUNT: 45.2 FL
HBA1C MFR BLD: 11 G/DL (ref 14–18)
HCT VFR BLD AUTO: 33.9 % (ref 42–52)
LYMPHOCYTES # BLD AUTO: 1.43 10*3/UL (ref 1–5)
LYMPHOCYTES NFR BLD AUTO: 21.6 % (ref 20–45)
MCH RBC QN AUTO: 25.2 PG (ref 27–31)
MCHC RBC AUTO-ENTMCNC: 32.4 G/DL (ref 33–37)
MCV RBC AUTO: 77.8 FL (ref 80–96)
MONOCYTES # BLD AUTO: 0.59 10*3/UL (ref 0.2–1.2)
MONOCYTES NFR BLD MANUAL: 8.9 % (ref 3–10)
NEUTROPHILS # BLD AUTO: 4.49 10*3/UL (ref 2–8)
NEUTROPHILS NFR BLD MANUAL: 67.9 % (ref 30–85)
PLATELET # BLD AUTO: 442 10*3/UL (ref 130–400)
PMV BLD AUTO: 8.7 FL (ref 7.4–10.4)
PSA SERPL-MCNC: 6.81 NG/ML (ref 0–4)
RBC MORPH BLD: 4.36 10*6/UL (ref 4.7–6.1)
WBC # BLD AUTO: 6.61 10*3/UL (ref 4.8–10.8)

## 2021-01-21 ENCOUNTER — HOSPITAL ENCOUNTER (OUTPATIENT)
Dept: PREADMISSION TESTING | Facility: HOSPITAL | Age: 66
Discharge: HOME | End: 2021-01-21
Attending: INTERNAL MEDICINE

## 2021-01-22 LAB — SARS-COV-2 RNA SPEC QL NAA+PROBE: NOT DETECTED

## 2021-01-26 ENCOUNTER — HOSPITAL ENCOUNTER (OUTPATIENT)
Dept: GASTROENTEROLOGY | Facility: HOSPITAL | Age: 66
Setting detail: HOSPITAL OUTPATIENT SURGERY
Discharge: HOME | End: 2021-01-26
Attending: INTERNAL MEDICINE

## 2021-03-05 ENCOUNTER — HOSPITAL ENCOUNTER (OUTPATIENT)
Dept: ONCOLOGY | Facility: HOSPITAL | Age: 66
Discharge: HOME | End: 2021-03-05
Attending: INTERNAL MEDICINE

## 2021-03-05 ENCOUNTER — OFFICE VISIT CONVERTED (OUTPATIENT)
Dept: ONCOLOGY | Facility: HOSPITAL | Age: 66
End: 2021-03-05
Attending: INTERNAL MEDICINE

## 2021-03-05 LAB
ALBUMIN SERPL-MCNC: 3.6 G/DL (ref 3.5–5)
ALBUMIN/GLOB SERPL: 1.2 {RATIO} (ref 1.4–2.6)
ALP SERPL-CCNC: 56 U/L (ref 56–155)
ALT SERPL-CCNC: 6 U/L (ref 10–40)
ANION GAP SERPL CALC-SCNC: 17 MMOL/L (ref 8–19)
AST SERPL-CCNC: 14 U/L (ref 15–50)
BASOPHILS # BLD AUTO: 0.04 10*3/UL (ref 0–0.2)
BASOPHILS NFR BLD AUTO: 0.7 % (ref 0–3)
BILIRUB SERPL-MCNC: 0.21 MG/DL (ref 0.2–1.3)
BUN SERPL-MCNC: 10 MG/DL (ref 5–25)
BUN/CREAT SERPL: 11 {RATIO} (ref 6–20)
CALCIUM SERPL-MCNC: 9.4 MG/DL (ref 8.7–10.4)
CHLORIDE SERPL-SCNC: 106 MMOL/L (ref 99–111)
CONV ABS IMM GRAN: 0.05 10*3/UL (ref 0–0.2)
CONV CO2: 23 MMOL/L (ref 22–32)
CONV IMMATURE GRAN: 0.8 % (ref 0–1.8)
CONV TOTAL PROTEIN: 6.5 G/DL (ref 6.3–8.2)
CREAT UR-MCNC: 0.88 MG/DL (ref 0.7–1.2)
DEPRECATED RDW RBC AUTO: 46.4 FL (ref 35.1–43.9)
EOSINOPHIL # BLD AUTO: 0.15 10*3/UL (ref 0–0.7)
EOSINOPHIL # BLD AUTO: 2.4 % (ref 0–7)
ERYTHROCYTE [DISTWIDTH] IN BLOOD BY AUTOMATED COUNT: 17.2 % (ref 11.6–14.4)
FERRITIN SERPL-MCNC: 10 NG/ML (ref 30–300)
GFR SERPLBLD BASED ON 1.73 SQ M-ARVRAT: >60 ML/MIN/{1.73_M2}
GLOBULIN UR ELPH-MCNC: 2.9 G/DL (ref 2–3.5)
GLUCOSE SERPL-MCNC: 87 MG/DL (ref 70–99)
HCT VFR BLD AUTO: 32.2 % (ref 42–52)
HGB BLD-MCNC: 10.1 G/DL (ref 14–18)
IRON SATN MFR SERPL: 4 % (ref 20–55)
IRON SERPL-MCNC: 19 UG/DL (ref 70–180)
LYMPHOCYTES # BLD AUTO: 1.87 10*3/UL (ref 1–5)
LYMPHOCYTES NFR BLD AUTO: 30.4 % (ref 20–45)
MCH RBC QN AUTO: 23.7 PG (ref 27–31)
MCHC RBC AUTO-ENTMCNC: 31.4 G/DL (ref 33–37)
MCV RBC AUTO: 75.6 FL (ref 80–96)
MONOCYTES # BLD AUTO: 0.68 10*3/UL (ref 0.2–1.2)
MONOCYTES NFR BLD AUTO: 11.1 % (ref 3–10)
NEUTROPHILS # BLD AUTO: 3.36 10*3/UL (ref 2–8)
NEUTROPHILS NFR BLD AUTO: 54.6 % (ref 30–85)
NRBC CBCN: 0 % (ref 0–0.7)
OSMOLALITY SERPL CALC.SUM OF ELEC: 292 MOSM/KG (ref 273–304)
PLATELET # BLD AUTO: 469 10*3/UL (ref 130–400)
PMV BLD AUTO: 9.3 FL (ref 9.4–12.4)
POTASSIUM SERPL-SCNC: 4.4 MMOL/L (ref 3.5–5.3)
PSA SERPL-MCNC: 2.25 NG/ML (ref 0–4)
RBC # BLD AUTO: 4.26 10*6/UL (ref 4.7–6.1)
SODIUM SERPL-SCNC: 142 MMOL/L (ref 135–147)
TIBC SERPL-MCNC: 433 UG/DL (ref 245–450)
TRANSFERRIN SERPL-MCNC: 303 MG/DL (ref 215–365)
WBC # BLD AUTO: 6.15 10*3/UL (ref 4.8–10.8)

## 2021-03-23 ENCOUNTER — HOSPITAL ENCOUNTER (OUTPATIENT)
Dept: ONCOLOGY | Facility: HOSPITAL | Age: 66
Discharge: HOME | End: 2021-03-23
Attending: INTERNAL MEDICINE

## 2021-03-23 LAB
CALCIUM SERPL-MCNC: 9.1 MG/DL (ref 8.7–10.4)
CREAT UR-MCNC: 1.02 MG/DL (ref 0.7–1.2)
MAGNESIUM SERPL-MCNC: 2 MG/DL (ref 1.6–2.3)
PHOSPHATE SERPL-MCNC: 4 MG/DL (ref 2.4–4.5)

## 2021-03-31 ENCOUNTER — HOSPITAL ENCOUNTER (OUTPATIENT)
Dept: OTHER | Facility: HOSPITAL | Age: 66
Setting detail: RECURRING SERIES
Discharge: STILL ADMITTED OR ADMINISTRATIVE | End: 2021-04-26
Attending: INTERNAL MEDICINE

## 2021-04-20 ENCOUNTER — HOSPITAL ENCOUNTER (OUTPATIENT)
Dept: ONCOLOGY | Facility: HOSPITAL | Age: 66
Discharge: HOME | End: 2021-04-20
Attending: INTERNAL MEDICINE

## 2021-04-20 LAB
CALCIUM SERPL-MCNC: 7.3 MG/DL (ref 8.7–10.4)
CREAT UR-MCNC: 0.86 MG/DL (ref 0.7–1.2)
MAGNESIUM SERPL-MCNC: 1.87 MG/DL (ref 1.6–2.3)
PHOSPHATE SERPL-MCNC: 0.8 MG/DL (ref 2.4–4.5)

## 2021-04-23 LAB
ALBUMIN SERPL-MCNC: 3.8 G/DL (ref 3.5–5)
ALBUMIN/GLOB SERPL: 1.2 {RATIO} (ref 1.4–2.6)
ALP SERPL-CCNC: 52 U/L (ref 56–155)
ALT SERPL-CCNC: 6 U/L (ref 10–40)
ANION GAP SERPL CALC-SCNC: 14 MMOL/L (ref 8–19)
AST SERPL-CCNC: 15 U/L (ref 15–50)
BILIRUB SERPL-MCNC: 0.25 MG/DL (ref 0.2–1.3)
BUN SERPL-MCNC: 7 MG/DL (ref 5–25)
BUN/CREAT SERPL: 7 {RATIO} (ref 6–20)
CALCIUM SERPL-MCNC: 7.2 MG/DL (ref 8.7–10.4)
CHLORIDE SERPL-SCNC: 111 MMOL/L (ref 99–111)
CONV CO2: 20 MMOL/L (ref 22–32)
CONV TOTAL PROTEIN: 7.1 G/DL (ref 6.3–8.2)
CREAT UR-MCNC: 1.02 MG/DL (ref 0.7–1.2)
GFR SERPLBLD BASED ON 1.73 SQ M-ARVRAT: >60 ML/MIN/{1.73_M2}
GLOBULIN UR ELPH-MCNC: 3.3 G/DL (ref 2–3.5)
GLUCOSE SERPL-MCNC: 76 MG/DL (ref 70–99)
OSMOLALITY SERPL CALC.SUM OF ELEC: 289 MOSM/KG (ref 273–304)
PHOSPHATE SERPL-MCNC: 0.8 MG/DL (ref 2.4–4.5)
POTASSIUM SERPL-SCNC: 4.3 MMOL/L (ref 3.5–5.3)
SODIUM SERPL-SCNC: 141 MMOL/L (ref 135–147)

## 2021-04-24 LAB — CONV CALCIUM IONIZED: 4.1 MG/DL (ref 4.5–5.6)

## 2021-04-27 LAB — 1,25(OH)2D3 SERPL-MCNC: 104 PG/ML (ref 19.9–79.3)

## 2021-05-05 ENCOUNTER — HOSPITAL ENCOUNTER (OUTPATIENT)
Dept: ONCOLOGY | Facility: HOSPITAL | Age: 66
Setting detail: RECURRING SERIES
Discharge: HOME | End: 2021-05-05
Attending: INTERNAL MEDICINE

## 2021-05-05 LAB
ANION GAP SERPL CALC-SCNC: 13 MMOL/L (ref 8–19)
BUN SERPL-MCNC: 6 MG/DL (ref 5–25)
BUN/CREAT SERPL: 7 {RATIO} (ref 6–20)
CALCIUM SERPL-MCNC: 7.2 MG/DL (ref 8.7–10.4)
CALCIUM SERPL-MCNC: 7.2 MG/DL (ref 8.7–10.4)
CHLORIDE SERPL-SCNC: 112 MMOL/L (ref 99–111)
CONV CO2: 18 MMOL/L (ref 22–32)
CREAT UR-MCNC: 0.84 MG/DL (ref 0.7–1.2)
CREAT UR-MCNC: 0.88 MG/DL (ref 0.7–1.2)
GFR SERPLBLD BASED ON 1.73 SQ M-ARVRAT: >60 ML/MIN/{1.73_M2}
GLUCOSE SERPL-MCNC: 99 MG/DL (ref 70–99)
MAGNESIUM SERPL-MCNC: 1.72 MG/DL (ref 1.6–2.3)
OSMOLALITY SERPL CALC.SUM OF ELEC: 286 MOSM/KG (ref 273–304)
PHOSPHATE SERPL-MCNC: 0.8 MG/DL (ref 2.4–4.5)
POTASSIUM SERPL-SCNC: 4 MMOL/L (ref 3.5–5.3)
SODIUM SERPL-SCNC: 139 MMOL/L (ref 135–147)

## 2021-05-07 ENCOUNTER — HOSPITAL ENCOUNTER (OUTPATIENT)
Dept: OTHER | Facility: HOSPITAL | Age: 66
Discharge: HOME | End: 2021-05-07
Attending: INTERNAL MEDICINE

## 2021-05-07 LAB
ANION GAP SERPL CALC-SCNC: 13 MMOL/L (ref 8–19)
BUN SERPL-MCNC: 7 MG/DL (ref 5–25)
BUN/CREAT SERPL: 8 {RATIO} (ref 6–20)
CALCIUM SERPL-MCNC: 6.8 MG/DL (ref 8.7–10.4)
CHLORIDE SERPL-SCNC: 109 MMOL/L (ref 99–111)
CK SERPL-CCNC: 84 U/L (ref 57–374)
CONV CO2: 22 MMOL/L (ref 22–32)
CREAT UR-MCNC: 0.89 MG/DL (ref 0.7–1.2)
GFR SERPLBLD BASED ON 1.73 SQ M-ARVRAT: >60 ML/MIN/{1.73_M2}
GLUCOSE SERPL-MCNC: 79 MG/DL (ref 70–99)
OSMOLALITY SERPL CALC.SUM OF ELEC: 287 MOSM/KG (ref 273–304)
PHOSPHATE SERPL-MCNC: 2.3 MG/DL (ref 2.4–4.5)
POTASSIUM SERPL-SCNC: 4 MMOL/L (ref 3.5–5.3)
SODIUM SERPL-SCNC: 140 MMOL/L (ref 135–147)

## 2021-05-12 NOTE — PROGRESS NOTES
Quick Note      Patient Name: Semaj Medina   Patient ID: [id number]   Sex: Male   YOB: 1955    Primary Care Provider: Maryse Monae MD   Referring Provider: Tiburcio Pastrana MD    Visit Date: March 30, 2020    Provider: Nirmala Moseley MD   Location: [facility]   Location Address: [address] [place] [address], [address]  [place], [place]  [id number]   Location Phone: [phone]          History Of Present Illness  TELEHEALTH VISIT  Chief Complaint: anemia   Semaj Medina is a 64 year old /Black male who is presenting for evaluation via telehealth visit. Verbal consent obtained before beginning visit.   Provider spent 13 minutes with the patient during telehealth visit.   The following staff were present during this visit: Nirmala Moseley MD; Vivian Acevedo MA   Past Medical History/Overview of Patient Symptoms     Mr. Medina is a 64 year old male with h/o chronic HCV who presents for Telehealth f/u of anemia.  In 2019 had a large gastric polyp (hyperplastic) removed that resulted in significant post-procedure pain that required hospitalization for pain control.  Pt is being treated for his HCV [facility].  He reports he has 8 pills left of Epclusa.  He has been tolerating treatment well.  Most recently, he reports upper respiratory symptoms for the last 3 weeks.  He reports recent evaluation by his PCP.    Labs (2/21/20): WBC 5.3, Hgb 11.6, Plt 299, AST, ALT 6, alk phos 52, TB 0.3.  Labs (12/5/19): Tsat 24%.    EGD (7/31/2019): Normal esophagus, small hiatal hernia, many gastric polyps ranging in size from 5 mm to 15 mm biopsy this is hyperplastic polyp), normal duodenum.    Colonoscopy (7/31/2019): Severe pandiverticulosis, single angioectasia in the sigmoid colon treated with APC, 5 mm ascending colon polyp removed (not retrieved), grade 2 internal hemorrhoids.    EGD (9/24/2019): Normal esophagus, 3 cm pedunculated polyp with multiple lobes along  "the greater curve of the gastric body was removed in piecemeal fashion after injection of Eleview and epinephrine mixture, 9 endoclips placed, duodenitis in first portion of duodenum, normal second portion of duodenum.  Antrum biopsy with mild chronic inflammation.  Gastric polyp biopsy returned as combined hyperplastic and fundic gland features with superficial erosion.       Vitals  Date Time BP Position Site L\R Cuff Size HR RR TEMP (F) WT  HT  BMI kg/m2 BSA m2 O2 Sat HC       03/30/2020 10:32 AM         137lbs 0oz 5'  9\" 20.23 1.74               Assessment  · Anemia     285.9/D64.9  · Gastric polyps     211.1/K31.7  · AVM (arteriovenous malformation) of colon     569.84/K55.20    Problems Reconciled  Plan  · Orders  o CBC with Auto Diff [facility] (46295) - - 03/30/2020  o Physician Telephone Evaluation, 11-20 minutes (19724) - - 03/31/2020  o Iron + TIBC ser/plas (26526, 33784) - - 03/30/2020  · Medications  o Medications have been Reconciled  o Transition of Care or Provider Policy  · Instructions  o Plan Of Care:   o Anemia improving. If persists, may need to consider capsule endoscopy. Another consideration is iron loss from multiple gastric polyps. Will plan for 1 year recall from last EGD to remove more gastric polyps.  o Recall EGD 9/2020.  o Recommend repeat colonoscopy 7/2022 for colon polyp surveillance. Recommend 3 year f/u given polyp unable to be retrieved and quality of bowel prep.  · Disposition  o Follow up in 6 months.            Electronically Signed by: Nirmala Moseley MD -Author on March 31, 2020 07:59:05 AM  "

## 2021-05-12 NOTE — PROGRESS NOTES
Quick Note      Patient Name: Semaj Medina   Patient ID: [id number]   Sex: Male   YOB: 1955    Primary Care Provider: Maryse Monae MD   Referring Provider: Tiburcio Pastrana MD    Visit Date: April 28, 2020    Provider: Maryse Monae MD   Location: [facility]   Location Address: [address] [place] [address], [address]  [id number]   Location Phone: [phone]          History Of Present Illness  TELEHEALTH TELEPHONE VISIT  Chief Complaint: Foot Pain   Semaj Medina is a 64 year old /Black male who is presenting for evaluation via telehealth telephone visit. Verbal consent obtained before beginning visit.   Provider spent 25 minutes with the patient during telehealth visit.   The following staff were present during this visit: Pauline Hou   Past Medical History/Overview of Patient Symptoms     Pt reports he came in a month ago and got a virus test and never got the results.  Test was not ran due to wrong NP sawb    Pt also reports that his ankles are hurting him and when he gets up in the morning he can barely walk. Pt reports he hasn't worked for about a month.     Pt reports that his heart started beating fast and he was short of breath and his girlfriend told him that it was anxiety. Pt reports it was the first time it ever happened. Pt did not have any chest pain.           Assessment  · Osteoarthritis     715.90/M19.90  · Anxiety attack     300.01/F41.0      Plan  · Orders  o Physician Telephone Evaluation, 21-30 minutes (91201) - 715.90/M19.90, 300.01/F41.0 - 04/28/2020  · Medications  o Voltaren 1 % topical gel   SIG: apply 2 grams to the affected area(s) by topical route 4 times per day for 30 days   DISP: (1) 100 gm tube with 0 refills  Prescribed on 04/28/2020     o meloxicam 7.5 mg oral tablet   SIG: take 1 tablet (7.5 mg) by oral route once daily for 90 days   DISP: (90) tablets with 1 refills  Prescribed on 04/28/2020     o Medications have been  Reconciled  o Transition of Care or Provider Policy  · Instructions  o Plan Of Care:   o Chronic conditions reviewed and taken into consideration for today's treatment plan.  o Discussed Covid-19 precautions including, but not limited to, social distancing, avoid touching your face, and hand washing.   · Disposition  o Call or Return if symptoms worsen or persist.            Electronically Signed by: Maryse Monae MD -Author on April 28, 2020 03:13:36 PM

## 2021-05-13 NOTE — PROGRESS NOTES
Progress Note      Patient Name: Semaj Medina   Patient ID: [id number]   Sex: Male   YOB: 1955    Primary Care Provider: Maryse Monae MD   Referring Provider: Tiburcio Pastrana MD    Visit Date: June 22, 2020    Provider: Ron Lepe MD   Location: [facility]   Location Address: [address] [place] [address]  [id number]   Location Phone: [phone]          Chief Complaint  · pt here for urologic issues      History Of Present Illness     65yo AA male with high risk prostate cancer s/p RALP and XRT. He had a rising PSA and was started on ADT.   Also with some chronic intermittent gross hematuria    Doing okay, still having some hot flashes.  Mood is okay.  Some fatigue    Voiding without issue.  Not bothered.  No gross hematuria    F/u Today with a PSA    Previous    Has had intermittent gross hematuria in the last few years since radiation.  None recently.  No burning/dysuria    No cardiopulmonary history.   smoker.  Patient does not use blood thinner. Hep C    1/18 TURBT  Pathologychronic cystitis    Prostate CA    6/20   11.45  3/20   4.54    11/26/2019 22.5 Lupron injection  11/19 CT pelvis with and withoutnegative, mild bladder wall thickening.  11/19 bone scannegative    11/19  2.55  8/19  0.4 -patient did not take a shot in August.  1/19  0.4  4/18  2.89  12/17  26.3  1/17   0.2  7/16   0.7    2015  XRT prostate  2015 RALPw LND  [place] 4+3  [id number] R1  Positive pN1, positive margins at BN and SVI  3/15  79.8                     Past Medical History  Anemia, unspecified type; Anxiety; Blood disease; Colon Polyps; Depression; Diverticulitis; Forgetfulness; Hepatitis C; Night sweats; Numbness and tingling of left lower extremity; Prostate cancer         Past Surgical History  Prostate Biopsy; Robotic-assisted prostatectomy with pelvic lymph node dissection; TURBT         Medication List  Epclusa 400-100 mg oral tablet; ferrous sulfate 325 mg (65 mg iron) oral tablet;  "meloxicam 7.5 mg oral tablet; Voltaren 1 % topical gel         Allergy List  NO KNOWN DRUG ALLERGIES       Allergies Reconciled  Family Medical History  *No Known Family History         Social History  Alcohol (Never); Tobacco (Current some day); ; Working         Immunizations  Name Date Admin   Influenza          Review of Systems  · Constitutional  o Denies  o : chills  · Gastrointestinal  o Denies  o : nausea      Vitals  Date Time BP Position Site L\R Cuff Size HR RR TEMP (F) WT  HT  BMI kg/m2 BSA m2 O2 Sat        06/22/2020 02:59 PM       16  137lbs 0oz 5'  9\" 20.23 1.74           Physical Examination  · Constitutional  o Appearance  o : Well-appearing, well-developed, in no acute distress  · Respiratory  o Respiratory Effort  o : Unlabored breathing  · Cardiovascular  o Heart  o :   § Auscultation of Heart  § : Regular rate and rhythm, no murmurs  · Gastrointestinal  o Abdominal Examination  o : Nontender, nondistended, no rigidity or guarding, no hepatosplenomegaly  · Neurologic  o Mental Status Examination  o :   § Orientation  § : Grossly oriented to person, place and time, judgment and insight intact, normal mood and affect          Assessment  · Prostate cancer     185/C61  · Gross hematuria     599.71/R31.0    Problems Reconciled  Plan  · Orders  o PSA ultrasensitive DIAGNOSTIC [facility] (89981) - 185/C61 - 06/22/2020  o Total testosterone (00335) - 185/C61 - 06/22/2020  o PSA ultrasensitive DIAGNOSTIC [facility] (64278) - 185/C61 - 12/22/2020  o Lupron 45 mg [facility] (-3) - 185/C61 - 06/22/2020   Injection - Lupron 45 mg; Dose: 45mg; Site: Right Gluteus; Route: intramuscular; Date: 06/22/2020 15:27:38; Exp: 08/11/2022; Lot: [id number]; Mfg: N/A; TradeName: Lupron Dep[place] (6 Month); Location: [facility]; Administered By: Vera Gilbert RN; Comment: Pt tolerated well. 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    I will go ahead and get a total testosterone and repeat PSA.  Will  hold on further staging imaging until we get these numbers back    Refer to medical oncology    45, 6-month Lupron injection today    Follow-up in 6 months with a PSA before.  I did discuss with patient if he wants to start getting his Lupron injections through medical oncology as they will likely be prescribing other treatment for prostate cancer I have no problems with that.    Greater than 15 minutes was used in counseling and coordination of care, with greater than 51% of this in face-to-face counseling             Electronically Signed by: Ron Lepe MD -Author on June 22, 2020 04:44:32 PM

## 2021-05-13 NOTE — PROGRESS NOTES
Progress Note      Patient Name: Semaj Medina   Patient ID: [id number]   Sex: Male   YOB: 1955    Primary Care Provider: Maryse Monae MD   Referring Provider: Tiburcio Pastrana MD    Visit Date: November 23, 2020    Provider: Nirmala Moseley MD   Location: [facility]   Location Address: [address] [place] [address], [address]  [place], [place]  [id number]   Location Phone: [phone]          Chief Complaint  · Follow Up Anemia      History Of Present Illness     Mr. Medina is a 65 year old male with h/o chronic HCV who presents for f/u of anemia.  In 2019 had a large gastric polyp (hyperplastic) removed that resulted in significant post-procedure pain that required hospitalization for pain control.  Pt completed HCV treatment with Epclusa [facility].  On medication for prostate cancer per Dr. Jack; he reports associated nausea, diarrhea, and hand pain.  No melena, hematochezia.     Labs (2/21/20): WBC 5.3, Hgb 11.6, Plt 299, AST, ALT 6, alk phos 52, TB 0.3.  Labs (12/5/19): Tsat 24%.    EGD (7/31/2019): Normal esophagus, small hiatal hernia, many gastric polyps ranging in size from 5 mm to 15 mm biopsy this is hyperplastic polyp), normal duodenum.    Colonoscopy (7/31/2019): Severe pandiverticulosis, single angioectasia in the sigmoid colon treated with APC, 5 mm ascending colon polyp removed (not retrieved), grade 2 internal hemorrhoids.    EGD (9/24/2019): Normal esophagus, 3 cm pedunculated polyp with multiple lobes along the greater curve of the gastric body was removed in piecemeal fashion after injection of Eleview and epinephrine mixture, 9 endoclips placed, duodenitis in first portion of duodenum, normal second portion of duodenum.  Antrum biopsy with mild chronic inflammation.  Gastric polyp biopsy returned as combined hyperplastic and fundic gland features with superficial erosion.         Past Medical History  Anemia, unspecified type;  "Anxiety; Blood disease; Colon Polyps; Depression; Diverticulitis; Forgetfulness; Hepatitis C; Night sweats; Numbness and tingling of left lower extremity; Prostate Cancer         Past Surgical History  Prostate Biopsy; Robotic-assisted prostatectomy with pelvic lymph node dissection; TURBT         Medication List  abiraterone 250 mg oral tablet; dronabinol 5 mg oral capsule; Epclusa 400-100 mg oral tablet; ferrous sulfate 325 mg (65 mg iron) oral tablet; lorazepam 0.5 mg oral tablet; Pexeva 20 mg oral tablet; Voltaren 1 % topical gel         Allergy List  NO KNOWN DRUG ALLERGIES       Allergies Reconciled  Family Medical History  *No Known Family History         Social History  Alcohol (Never); Tobacco (Current some day); ; Working         Review of Systems  · Constitutional  o Denies  o : chills, fever  · Cardiovascular  o Denies  o : chest pain  · Respiratory  o Denies  o : shortness of breath  · Gastrointestinal  o Admits  o : see HPI   · Endocrine  o Denies  o : weight gain, weight loss      Vitals  Date Time BP Position Site L\R Cuff Size HR RR TEMP (F) WT  HT  BMI kg/m2 BSA m2 O2 Sat FR L/min FiO2 HC       11/23/2020 10:18 /78 Sitting    65 - R  97.8 139lbs 6oz 5'  9.5\" 20.29 1.76             Physical Examination  · Constitutional  o Appearance  o : Healthy-appearing, awake and alert in no acute distress  · Head and Face  o Head  o : Normocephalic with no worriesome skin lesions  · Eyes  o Vision  o :   § Visual Fields  § : eyes move symmetrical in all directions  o Sclerae  o : sclerae anicteric  · Neck  o Inspection/Palpation  o : Trachea is midline, no adenopathy  · Respiratory  o Respiratory Effort  o : Breathing is unlabored.  o Inspection of Chest  o : normal appearance  o Auscultation of Lungs  o : Chest is clear to auscultation bilaterally.  · Cardiovascular  o Heart  o :   § Auscultation of Heart  § : no murmurs, rubs, or gallops  o Peripheral Vascular System  o :   § Extremities  § : no " cyanosis, clubbing or edema;   · Gastrointestinal  o Abdominal Examination  o : Abdomen is soft, nontender to palpation, with normal active bowel sounds, no appreciable hepatosplenomegaly.          Assessment  · Anemia     285.9/D64.9  · Gastric polyps     211.1/K31.7    Problems Reconciled  Plan  · Orders  o Consent for Esophagogastrodudodenoscopy (EGD) - Possible risk/complications, benefits, and alternatives to surgical or invasive procedure have been explained to patient and/or legal guardian. -Patient has been evaluated and can tolerate anesthesia and/or sedation. Risk, benefits, and alternatives to anesthesia and sedation have been explained to patient and/or legal guardian. (66755) - - 11/23/2020  · Medications  o Medications have been Reconciled  o Transition of Care or Provider Policy  · Instructions  o Handouts provided: Pre-procedure instructions including date and time and location of procedure.  o PLAN: Proceed with procedure. Patient understands risks and benefits and is willing to proceed. Understands the risks include, but are not limited to, bleeding and/or perforation.  o Gastric polyps: due for 1 year repeat EGD. Will schedule.  o Recommend repeat colonoscopy 7/2022 for colon polyp surveillance. Recommend 3 year f/u given polyp unable to be retrieved and quality of bowel prep.   o Electronically Identified Patient Education Materials Provided Electronically  · Disposition  o Follow up after procedure.            Electronically Signed by: Nirmala Moseley MD -Author on November 23, 2020 04:03:24 PM

## 2021-05-14 VITALS
HEIGHT: 69 IN | HEART RATE: 65 BPM | DIASTOLIC BLOOD PRESSURE: 78 MMHG | BODY MASS INDEX: 20.64 KG/M2 | WEIGHT: 139.37 LBS | TEMPERATURE: 97.8 F | SYSTOLIC BLOOD PRESSURE: 158 MMHG

## 2021-05-15 VITALS — BODY MASS INDEX: 20.29 KG/M2 | WEIGHT: 137 LBS | HEIGHT: 69 IN

## 2021-05-15 VITALS — WEIGHT: 137 LBS | RESPIRATION RATE: 16 BRPM | HEIGHT: 69 IN | BODY MASS INDEX: 20.29 KG/M2

## 2021-05-15 VITALS
DIASTOLIC BLOOD PRESSURE: 64 MMHG | TEMPERATURE: 99.1 F | HEIGHT: 69 IN | HEART RATE: 64 BPM | WEIGHT: 133 LBS | OXYGEN SATURATION: 94 % | SYSTOLIC BLOOD PRESSURE: 120 MMHG | BODY MASS INDEX: 19.7 KG/M2

## 2021-05-15 VITALS — WEIGHT: 129 LBS | HEIGHT: 69 IN | BODY MASS INDEX: 19.11 KG/M2 | RESPIRATION RATE: 16 BRPM

## 2021-05-15 VITALS
DIASTOLIC BLOOD PRESSURE: 90 MMHG | WEIGHT: 129.5 LBS | BODY MASS INDEX: 19.18 KG/M2 | SYSTOLIC BLOOD PRESSURE: 137 MMHG | HEIGHT: 69 IN

## 2021-05-15 VITALS
DIASTOLIC BLOOD PRESSURE: 78 MMHG | HEART RATE: 57 BPM | BODY MASS INDEX: 19.76 KG/M2 | SYSTOLIC BLOOD PRESSURE: 152 MMHG | TEMPERATURE: 98.1 F | OXYGEN SATURATION: 100 % | WEIGHT: 133.37 LBS | HEIGHT: 69 IN

## 2021-05-15 VITALS
RESPIRATION RATE: 16 BRPM | DIASTOLIC BLOOD PRESSURE: 86 MMHG | HEIGHT: 69 IN | HEART RATE: 73 BPM | SYSTOLIC BLOOD PRESSURE: 138 MMHG | OXYGEN SATURATION: 99 % | TEMPERATURE: 97.6 F | BODY MASS INDEX: 20.34 KG/M2 | WEIGHT: 137.31 LBS

## 2021-05-15 VITALS — WEIGHT: 134 LBS | RESPIRATION RATE: 17 BRPM | BODY MASS INDEX: 19.85 KG/M2 | HEIGHT: 69 IN

## 2021-05-15 VITALS — RESPIRATION RATE: 14 BRPM | HEIGHT: 69 IN | WEIGHT: 133 LBS | BODY MASS INDEX: 19.7 KG/M2

## 2021-05-15 VITALS — RESPIRATION RATE: 16 BRPM | BODY MASS INDEX: 20.29 KG/M2 | HEIGHT: 69 IN | WEIGHT: 137 LBS

## 2021-05-15 VITALS — WEIGHT: 129.37 LBS | BODY MASS INDEX: 20.79 KG/M2 | RESPIRATION RATE: 17 BRPM | HEIGHT: 66 IN

## 2021-05-15 VITALS — WEIGHT: 135 LBS | RESPIRATION RATE: 14 BRPM | HEIGHT: 69 IN | BODY MASS INDEX: 19.99 KG/M2

## 2021-05-16 VITALS
HEART RATE: 65 BPM | TEMPERATURE: 98.7 F | RESPIRATION RATE: 16 BRPM | SYSTOLIC BLOOD PRESSURE: 158 MMHG | DIASTOLIC BLOOD PRESSURE: 101 MMHG | WEIGHT: 136 LBS | HEIGHT: 69 IN | OXYGEN SATURATION: 100 % | BODY MASS INDEX: 20.14 KG/M2

## 2021-05-16 VITALS — HEIGHT: 69 IN | RESPIRATION RATE: 14 BRPM | BODY MASS INDEX: 19.99 KG/M2 | WEIGHT: 135 LBS

## 2021-05-16 VITALS — WEIGHT: 152 LBS | RESPIRATION RATE: 12 BRPM | HEIGHT: 69 IN | BODY MASS INDEX: 22.51 KG/M2

## 2021-05-16 VITALS — HEIGHT: 69 IN | BODY MASS INDEX: 19.99 KG/M2 | RESPIRATION RATE: 12 BRPM | WEIGHT: 135 LBS

## 2021-05-16 VITALS — RESPIRATION RATE: 16 BRPM | BODY MASS INDEX: 22.51 KG/M2 | HEIGHT: 69 IN | WEIGHT: 152 LBS

## 2021-05-16 VITALS — BODY MASS INDEX: 20.14 KG/M2 | RESPIRATION RATE: 16 BRPM | HEIGHT: 69 IN | WEIGHT: 136 LBS

## 2021-05-16 VITALS — OXYGEN SATURATION: 99 % | HEIGHT: 69 IN | HEART RATE: 62 BPM | BODY MASS INDEX: 20.29 KG/M2 | WEIGHT: 137 LBS

## 2021-05-16 VITALS — RESPIRATION RATE: 16 BRPM | HEIGHT: 69 IN | WEIGHT: 135 LBS | BODY MASS INDEX: 19.99 KG/M2

## 2021-05-19 VITALS — HEIGHT: 69 IN | BODY MASS INDEX: 21.03 KG/M2 | WEIGHT: 141.98 LBS

## 2021-05-19 PROBLEM — C61 PROSTATE CANCER METASTATIC TO BONE: Status: ACTIVE | Noted: 2021-05-19

## 2021-05-19 PROBLEM — C79.51 PROSTATE CANCER METASTATIC TO BONE: Status: ACTIVE | Noted: 2021-05-19

## 2021-05-28 VITALS
DIASTOLIC BLOOD PRESSURE: 86 MMHG | SYSTOLIC BLOOD PRESSURE: 153 MMHG | WEIGHT: 130 LBS | HEIGHT: 69 IN | BODY MASS INDEX: 19.26 KG/M2

## 2021-05-28 VITALS
WEIGHT: 132.28 LBS | TEMPERATURE: 99 F | OXYGEN SATURATION: 96 % | SYSTOLIC BLOOD PRESSURE: 170 MMHG | HEIGHT: 69 IN | RESPIRATION RATE: 18 BRPM | RESPIRATION RATE: 18 BRPM | BODY MASS INDEX: 19.59 KG/M2 | BODY MASS INDEX: 19.3 KG/M2 | HEART RATE: 70 BPM | TEMPERATURE: 99.5 F | SYSTOLIC BLOOD PRESSURE: 154 MMHG | WEIGHT: 130.29 LBS | SYSTOLIC BLOOD PRESSURE: 151 MMHG | BODY MASS INDEX: 18.81 KG/M2 | HEART RATE: 71 BPM | OXYGEN SATURATION: 100 % | DIASTOLIC BLOOD PRESSURE: 101 MMHG | OXYGEN SATURATION: 99 % | WEIGHT: 126.98 LBS | RESPIRATION RATE: 16 BRPM | TEMPERATURE: 97.4 F | DIASTOLIC BLOOD PRESSURE: 93 MMHG | DIASTOLIC BLOOD PRESSURE: 95 MMHG | HEART RATE: 63 BPM

## 2021-05-28 VITALS
BODY MASS INDEX: 19.55 KG/M2 | WEIGHT: 132 LBS | BODY MASS INDEX: 19.55 KG/M2 | HEIGHT: 69 IN | WEIGHT: 132 LBS | SYSTOLIC BLOOD PRESSURE: 129 MMHG | HEIGHT: 69 IN | DIASTOLIC BLOOD PRESSURE: 89 MMHG | BODY MASS INDEX: 19.55 KG/M2 | WEIGHT: 132 LBS | DIASTOLIC BLOOD PRESSURE: 82 MMHG | HEIGHT: 69 IN | SYSTOLIC BLOOD PRESSURE: 149 MMHG | BODY MASS INDEX: 19.55 KG/M2 | RESPIRATION RATE: 14 BRPM | SYSTOLIC BLOOD PRESSURE: 159 MMHG | HEIGHT: 69 IN | WEIGHT: 132 LBS | DIASTOLIC BLOOD PRESSURE: 80 MMHG

## 2021-05-28 VITALS
RESPIRATION RATE: 18 BRPM | SYSTOLIC BLOOD PRESSURE: 160 MMHG | HEART RATE: 76 BPM | OXYGEN SATURATION: 100 % | RESPIRATION RATE: 18 BRPM | WEIGHT: 139.99 LBS | TEMPERATURE: 98.5 F | HEART RATE: 72 BPM | BODY MASS INDEX: 21.03 KG/M2 | OXYGEN SATURATION: 100 % | DIASTOLIC BLOOD PRESSURE: 97 MMHG | SYSTOLIC BLOOD PRESSURE: 140 MMHG | WEIGHT: 142.42 LBS | BODY MASS INDEX: 20.67 KG/M2 | DIASTOLIC BLOOD PRESSURE: 86 MMHG | TEMPERATURE: 97.5 F

## 2021-05-28 VITALS
DIASTOLIC BLOOD PRESSURE: 80 MMHG | HEIGHT: 68 IN | BODY MASS INDEX: 20.05 KG/M2 | WEIGHT: 132.28 LBS | TEMPERATURE: 99 F | SYSTOLIC BLOOD PRESSURE: 160 MMHG | OXYGEN SATURATION: 100 % | HEART RATE: 69 BPM

## 2021-05-28 NOTE — PROGRESS NOTES
Patient: ISRAEL DAVIS     Acct: [id number]     Report: #[id number]  UNIT #: [id number]     : 1955    Encounter Date:2019  PRIMARY CARE: CARLEEN HAWKINS  ***Signed***  --------------------------------------------------------------------------------------------------------------------  DATE: 19      Chief Complaint      HEPATITIS C W/O HEPATIC COMA            Allergies      Coded Allergies:             NO KNOWN DRUG ALLERGIES (Unverified  Allergy, Unknown, 19)            Medications      Last Reconciled on 19 16:41 by SERGEY PEREZ      Multivitamins (Multi-Vitamin) 1 Each Tablet      1 TAB PO QDAY, #30 TAB 0 Refills         Reported         19       Ferrous Sulfate (Ferrous Sulfate*) 325 Mg Tablet      325 MG PO BID, #60 TAB 0 Refills         Reported         19            Vitals      Height 5 ft 9.00 in / 175.26 cm      Weight 131 lbs 15.972 oz / 59.874 kg      BSA 1.73 m2      BMI 19.5 kg/m2      Respirations 14            Yes: Hx Bowel Surgery (COLONOSCOPY), Hx Surgeries (PROSTATE REMOVAL)      Social History:  Tobacco Use (.5 PPD), Alcohol Use (6 PACK A WEEK)      Smoking status:  Current every day smoker, Light tobacco smoker      Smoking packs/day:  0.5      Substance use:  Denies use, Marijuana      Medical History:  Yes: HX CANCER (PROSTATE); No: Hx Asthma, Hx Congestive Heart     Failu, Hx COPD, Hx Diabetes, Hx Heart Attack, Hx Hypertension, Hx Sleep Apnea,     Hx Medical Other            PREVENTION      Hx Influenza Vaccination:  Yes      Date Influenza Vaccine Given:  2018      Influenza Vaccine Declined:  No      2 or More Falls Past Year?:  No      Fall Past Year with Injury?:  No      Hx Pneumococcal Vaccination:  No      Encouraged to follow-up with:  PCP regarding preventative exams.            General:  No Fatigue, No Weight Loss      HEENT:  No Dysphagia, No Visual Changes      Respiratory:  No Cough, No Dyspnea       Cardiology:  No Chest Pain, No Palpitations      Gastrointestinal:  No Diarrhea, No Constipation      Genitourinary:  No Dysuria, No Frequency      Musculoskeletal:  No Joint Tenderness, No Joint Stiffness      Endocrine:  No Cold Intolerance, No Fatigue      Hematologic:  No Bleeding, No Bruising      Psychologic:  No Anxiety, No Depression      Neurologic:  No Confusion, No Weakness      Skin:  No Rash, No Open Wounds            Mr. Medina is a 64 y/o male who presents for f/u of chronic hepatitis C and     anemia.  Primary medical history includes prostate cancer with prostate removal.     Recent EGD/Colonoscopy - angioectasia treated with APC.  Several HP polyps in     stomach.  Scheduled for repeat to remove them.  He had labs drawn at an outside     facility and not all needed labs were obtained.            HEENT:  Atraumatic; No Scleral Icterus      Lungs:  CTAB, Breathing is unlabored      Abdomen:  Normal BS all 4 Quadrants, Soft      Cardiovascular:  Regular Rate and Rhythm; No Murmur      Constitutional:  Healthy appearing; No Acute Distress      Neurological:  Mental Status WNL, Alert+Ox3      Musculoskeletal:  Normal Bulk Strength, Normal Tone      Skin:  No Rash, No Swelling      Rectal:  Deferred            Lab Results      7/19/2019 CBC: Hemoglobin 9.5, hematocrit 30.5, platelets 520.      CMP: Alk phos 57, AST 31, ALT 15, total bilirubin 0.18.      INR-0.97.            Radiology Impressions      7/31/2019 EGD-colonoscopy-normal esophagus.  Small hiatal hernia.  Many polyps     (5-15 mm) found in the whole stomach, pathology c/w hyperplastic polyps.      Negative H.pylori.  Normal mucosa in the stomach and duodenum.  Severe     diverticulosis in the whole colon.  angioectasia in the sigmoid colon.      Hemostasis performed with APC.  5 mm polyp in the ascending colon, completely     removed but unable to retrieve.  Grade 2 internal hemorrhoids.  Recommend repeat    colonoscopy in 2 years given  quality of bowel prep.            7/20/2019 right upper quadrant ultrasound-normal hepatic echogenicity and     echotexture.  No abnormalities noted.            Tricia Stiffness Consistent with:  F0-F1      CAP Score:  Normal/Mild Liver Fat            Current Plan      Obtain labs today to determine treatment plan.      Chronic hepatitis C         Chronic hepatitis C without hepatic coma         Hepatic coma status: without hepatic coma            Notes      New Diagnostics      * CBC, Stat         Dx: Chronic hepatitis C - B18.2      * Comp Metabolic Panel, Stat         Dx: Chronic hepatitis C - B18.2      * HCV RNA QUANTITATIVE HCPCR, Stat         Dx: Chronic hepatitis C - B18.2      * PT / INR, Stat         Dx: Chronic hepatitis C - B18.2      * HEPATITIS C GENOTYPE PCR HEPCT, Stat         Dx: Chronic hepatitis C - B18.2      Patient Education Provided:  Yes      Patient Instructions:  Avoid Alcohol, Avoid Illicit Drug Use, Importance of     keeping appointments      Disposition:  To be determined based on tx plan                 Disclaimer: Converted document may not contain table formatting or lab diagrams. Please see [facility] for the authenticated document.

## 2021-05-28 NOTE — PROGRESS NOTES
Patient: ISRAEL DAVIS     Acct: [id number]     Report: #[id number]  UNIT #: [id number]     : 1955    Encounter Date:2020  PRIMARY CARE: CARLEEN HAWKINS  ***Signed***  --------------------------------------------------------------------------------------------------------------------  DATE: 20      Chief Complaint      HEPATITIS C W/O HEPATIC COMA            Allergies      Coded Allergies:             NO KNOWN DRUG ALLERGIES (Unverified  Allergy, Unknown, 19)            Medications      Last Reconciled on 20 12:29 by SERGEY PEREZ      Sofosbuvir/Velpatasvir (Epclusa 400 mg-100 mg Tablet) 1 Each Tablet      1 TAB PO QDAY, #28 TAB 2 Refills         Prov: Sergey Villatoro cpx         12/10/19       HYDROcodone-Acetaminophen 7.5-325 Mg (HYDROcodone-Acetaminophen 7.5-325 Mg) 1     Tab Tablet      1 TAB PO Q6H PRN for PAIN for 3 Days, #12 TAB 0 Refills         Prov: MARGE SKAGGS         19       Sucralfate (Carafate) 1 Gm Tab      1 GM PO ACHS, #120 TAB 0 Refills         Prov: MARGE SKAGGS         19       Pantoprazole (Protonix*) 40 Mg Tablet.      40 MG PO BIDAC, #60 TAB 0 Refills         Prov: MARGE SKAGGS         19       Multivitamins (Multi-Vitamin) 1 Each Tablet      1 TAB PO QDAY, #30 TAB 0 Refills         Reported         19       Ferrous Sulfate (Ferrous Sulfate*) 325 Mg Tablet      325 MG PO BID, #60 TAB 0 Refills         Reported         19            Vitals      Height 5 ft 9.12 in / 175.56 cm      Weight 131 lbs 15.972 oz / 59.874 kg      BSA 1.73 m2      BMI 19.4 kg/m2      Blood Pressure 129/80            Yes: Hx Bowel Surgery (COLONOSCOPY), Hx Surgeries (PROSTATE REMOVAL); No: Hx     Abdominal Surgery, Hx Appendectomy, Hx Bladder Surgery, Hx CABG, Hx     Cholecystectomy, Hx Vascular Surgery      Social History:  Tobacco Use (.5 PPD), Alcohol Use (6 PACK A WEEK)      Smoking status:  Current every day  smoker, Light tobacco smoker      Smoking packs/day:  0.5      Substance use:  Denies use, Marijuana      Medical History:  Yes: Hx Arthritis (UNDIAGNOSED BUT CURRENTLY UNDER     OBSERVATION), HX CANCER (PROSTATE CANCER ), Hx Hypertension (SLIGHTLY ELEVATED      -NO MEDICATION REQUIRED CURRENTLY ), Hx Medical Other (HEPATITIS C ); No: Hx     Asthma, Hx Congestive Heart Failu, Hx COPD, Hx Diabetes, Hx Heart Attack, Hx     Seizures, Hx Sleep Apnea            PREVENTION      Hx Influenza Vaccination:  Yes      Date Influenza Vaccine Given:  Nov 14, 2018      Influenza Vaccine Declined:  No      2 or More Falls Past Year?:  No      Fall Past Year with Injury?:  No      Hx Pneumococcal Vaccination:  No      Encouraged to follow-up with:  PCP regarding preventative exams.            General:  No Fatigue, No Weight Loss      HEENT:  No Dysphagia, No Visual Changes      Respiratory:  No Cough, No Dyspnea      Cardiology:  No Chest Pain, No Palpitations      Gastrointestinal:  No Diarrhea, No Constipation      Genitourinary:  No Dysuria, No Frequency      Musculoskeletal:  No Joint Tenderness, No Joint Stiffness      Endocrine:  No Cold Intolerance, No Fatigue      Hematologic:  No Bleeding, No Bruising      Psychologic:  No Anxiety, No Depression      Neurologic:  No Confusion, No Weakness      Skin:  No Rash, No Open Wounds            Mr. Medina is a 64-year-old male who presents for follow-up of chronic hepatitis     C.  Can treatment with Epclusa on January 3.  He states that he is doing well.      Denies any side effects.            HEENT:  Atraumatic; No Scleral Icterus      Lungs:  CTAB, Breathing is unlabored      Abdomen:  Normal BS all 4 Quadrants, Soft      Cardiovascular:  Regular Rate and Rhythm; No Murmur      Constitutional:  Healthy appearing; No Acute Distress      Neurological:  Mental Status WNL, Alert+Ox3      Musculoskeletal:  Normal Bulk Strength, Normal Tone      Skin:  No Rash, No Swelling      Rectal:   Deferred            Lab Results      1/20/2020 CBC: Hemoglobin 12.1, hematocrit 38, platelets 390.      CMP: Alk phos 59, AST 18, ALT 7, total bilirubin 0.26.  INR 1.02.      HCV quant-not detected.      8/23/2019 HCV quant 8870.  Hepatitis C genotype-unable to determine.            Tricia Stiffness Consistent with:  F0-F1      CAP Score:  Normal/Mild Liver Fat            Current Plan      Continue Epclusa for a total of 12 weeks.      Chronic hepatitis C         Chronic hepatitis C without hepatic coma         Hepatic coma status: without hepatic coma      Patient Education Provided:  Yes      Patient Instructions:  Avoid Alcohol, Avoid Illicit Drug Use, Importance of     keeping appointments      Disposition:  F/U 4 weeks            Electronically signed by Yojana Villatoro  01/24/2020 12:29       Disclaimer: Converted document may not contain table formatting or lab diagrams. Please see [employer] [facility] for the authenticated document.

## 2021-05-28 NOTE — PROGRESS NOTES
Patient: ISRAEL DAVIS     Acct: [id number]     Report: #[id number]  UNIT #: [id number]     : 1955    Encounter Date:2019  PRIMARY CARE: CARLEEN HAWKINS  ***Signed***  --------------------------------------------------------------------------------------------------------------------  DATE: 19      Chief Complaint      HEPATITIS C W/O HEPATIC COMA            Allergies      Coded Allergies:             NO KNOWN DRUG ALLERGIES (Unverified  Allergy, Unknown, 19)            Medications      Last Reconciled on 19 11:59 by SERGEY PEREZ      Multivitamins (Multi-Vitamin) 1 Each Tablet      1 TAB PO QDAY, #30 TAB 0 Refills         Reported         19       Ferrous Sulfate (Ferrous Sulfate*) 325 Mg Tablet      325 MG PO BID, #60 TAB 0 Refills         Reported         19            Vitals      Height 5 ft 9 in / 175.26 cm      Weight 132 lbs  / 59.626907 kg      BSA 1.73 m2      BMI 19.5 kg/m2      Blood Pressure 159/89            Yes: Hx Bowel Surgery (COLONOSCOPY), Hx Surgeries (PROSTATE REMOVAL)      Social History:  Tobacco Use (.5 PPD), Alcohol Use (6 PACK A WEEK)      Smoking status:  Current every day smoker, Light tobacco smoker      Smoking packs/day:  0.5      Substance use:  Denies use, Marijuana      Medical History:  Yes: HX CANCER (PROSTATE); No: Hx Asthma, Hx Congestive Heart     Failu, Hx COPD, Hx Diabetes, Hx Heart Attack, Hx Hypertension, Hx Sleep Apnea,     Hx Medical Other            PREVENTION      Hx Influenza Vaccination:  Yes      Date Influenza Vaccine Given:  2018      Influenza Vaccine Declined:  No      2 or More Falls Past Year?:  No      Fall Past Year with Injury?:  No      Hx Pneumococcal Vaccination:  No      Encouraged to follow-up with:  PCP regarding preventative exams.            General:  No Fatigue, No Weight Loss      HEENT:  No Dysphagia, No Visual Changes      Respiratory:  No Cough, No Dyspnea       Cardiology:  No Chest Pain, No Palpitations      Gastrointestinal:  No Diarrhea, No Constipation      Genitourinary:  No Dysuria, No Frequency      Musculoskeletal:  No Joint Tenderness, No Joint Stiffness      Endocrine:  No Cold Intolerance, No Fatigue      Hematologic:  No Bleeding, No Bruising      Psychologic:  No Anxiety, No Depression      Neurologic:  No Confusion, No Weakness      Skin:  No Rash, No Open Wounds            He presents for evaluation and treatment of chronic hepatitis C.  He reports     being diagnosed in 2009.  He denies previous treatment for hepatitis C     infection.  Denies previous illicit drug use, unprofessional tattoos and blood     transfusions.  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  He admits alcohol use.     He currently drinks a 12 pack of beer per week.  He denies any current right     upper quadrant pain.  Primary medical history includes prostate cancer with     prostate removal.  Recently diagnosed with iron deficiency anemia.  Previous     colonoscopy approximately 10 years ago in [place].  He reports having a few     polyps removed however report not available at time of visit.  He denies     hematochezia and melena.            HEENT:  Atraumatic; No Scleral Icterus      Lungs:  CTAB, Breathing is unlabored      Abdomen:  Normal BS all 4 Quadrants, Soft      Cardiovascular:  Regular Rate and Rhythm; No Murmur      Constitutional:  Healthy appearing; No Acute Distress      Neurological:  Mental Status WNL, Alert+Ox3      Musculoskeletal:  Normal Bulk Strength, Normal Tone      Skin:  No Rash, No Swelling      Rectal:  Deferred            Lab Results      6/6/2019 CBC: Hemoglobin 8.5, hematocrit 28.4, platelets 383.      5/16/2019 CMP: GFR greater than 60, alk phos 63, AST 21, ALT 10, total bilirubin    0.22.      Iron profile: Iron saturation 4, iron 18, ferritin 10      10/19/2015  hepatitis A IgM antibody-negative, hepatitis B surface antigen-    negative, hepatitis B core IgM antibody-negative, hepatitis C antibody-reactive.     HCV quant 11,180.      5/16/2019 HCV quant 26,200            Tricia Stiffness Consistent with:  F0-F1      CAP Score:  Normal/Mild Liver Fat            Current Plan      Obtain labs today to determine treatment plan.  Schedule for EGD/Colon to     further evaluate iron deficiency anemia.      Chronic hepatitis C         Chronic hepatitis C without hepatic coma - B18.2         Hepatic coma status: without hepatic coma            Anemia         Iron deficiency anemia due to chronic blood loss - D50.0         Anemia type: iron deficiency         Iron deficiency anemia type: chronic blood loss            Notes      New Medications      * MULTIVITAMINS (Multi-Vitamin) 1 EACH TABLET: 1 TAB PO QDAY #30      New Diagnostics      * HCV RNA QUANTITATIVE HCPCR, Stat         Dx: Chronic hepatitis C - B18.2      * HEPATITIS C GENOTYPE PCR HEPCT, Stat         Dx: Chronic hepatitis C - B18.2      * Hepatitis B Core Ant, Stat         Dx: Chronic hepatitis C - B18.2      * Alcohol Blood/Ethano, Stat         Dx: Chronic hepatitis C - B18.2      * CBC, Stat         Dx: Chronic hepatitis C - B18.2      * Comp Metabolic Panel, Stat         Dx: Chronic hepatitis C - B18.2      * Fibrosure Hcv, Stat         Dx: Chronic hepatitis C - B18.2      * Hiv 1 By Eia W/West , Stat         Dx: Chronic hepatitis C - B18.2      * Drug Screen Serum (9, Stat         Dx: Chronic hepatitis C - B18.2      * PT / INR, Stat         Dx: Chronic hepatitis C - B18.2      * US ABDOMEN LIMITED, SCHEDULED PROCEDURE         Dx: Chronic hepatitis C - B18.2      Patient Education Provided:  Yes      Patient Instructions:  Avoid Alcohol, Avoid Illicit Drug Use, Importance of     keeping appointments      Disposition:  F/U 4 weeks            Consent for Colonscopy with Possible Biopsy      Possible risks/complications,  benefits, and alternatives to surgical or invasive      procedure have been explained to patient and/or legal guardian.      Patient has been evaluated and can tolerate anesthesia and/or sedation. Risks,     benefits, and alternatives to anesthesia and sedation have been explained to     patient and/or legal guardian.            Consent for Esophagogastroduodenoscopy (EGD)      Possible risks/complications, benefits, and alternatives to surgical or invasive      procedure have been explained to patient and/or legal guardian.      Patient has been evaluated and can tolerate anesthesia and/or sedation. Risks,     benefits, and alternatives to anesthesia and sedation have been explained to     patient and/or legal guardian.                 Disclaimer: Converted document may not contain table formatting or lab diagrams. Please see [employer] [facility] for the authenticated document.

## 2021-05-28 NOTE — PROGRESS NOTES
Patient: ISRAEL DAVIS     Acct: [id number]     Report: #[id number]  UNIT #: [id number]     : 1955    Encounter Date:2020  PRIMARY CARE: CARLEEN HAWKINS  ***Signed***  --------------------------------------------------------------------------------------------------------------------  NURSE INTAKE      Visit Type      New Patient Visit            Chief Complaint      PROSTATE CANCER            Referring Provider/Copies To      Referring Provider:  Ron Lepe      Primary Care Provider:  CARLEEN HAWKINS      Copies To:   CARLEEN HAWKINS            History and Present Illness      Past Oncology Illness History      Mr. Cha is a 64-year-old -American man who was referred to me by Dr. Lepe for evaluation and treatment of prostate cancer.            Patient was initially diagnosed with prostate cancer in .  On 2015 he     underwent radical prostatectomy and LN dissection which showed a Melrose 4+3 = 7    prostate cancer.  There were several high risk features such as extraprostatic     extension, seminal vesicle invasion, positive margins at the bladder neck and     right and left seminal vesicle.  Lymph vascular invasion was indeterminate.      Perineural invasion was not commented on. Final pathology [id number] R1.  He was    treated with adjuvant radiation by Dr. Stanton.            According to records the patient likely started Lupron in 2017 when his    PSA started to rise.  The patient took a break in 2019.  He states he was    not aware he was to continue.  He restarted Lupron on 2019 after it was     noted that his PSA chago from 0.46 up to 2.55 on 2019.  Unfortunately his     PSA continued to rise after that to 4.54 on 3/10/2020.            PSA summary:      20 PSA 2.55 (restarted Lupron on )      3/10/20 PSA 4.54      20 PSA 11.45      20 PSA 12.13            CT of the pelvis on  11/20/2019 showed the previous prostatectomy but was     negative for local recurrence or metastatic disease.      Bone scan on 11/20/2019 shows a chronic focus of increased activity in the left     supraorbital region.  Metastatic disease was considered unlikely.            HPI - Oncology Interim      Patient comes in today to discuss the plan of care given that his PSA continues     to rise.  Patient seemed unaware of the significance of this rise.  He thought     he could stay on Lupron for some time longer.  I discussed the fact that his     disease is advancing quickly according to the PSA.  He also tells me that he is     feeling significant fatigue.  He also has a decreased appetite and weight loss     of 7 to 8 pounds in the last 3 months.  He feels that he has night sweats and     recognizes significant anxiety.  He is also had a cough for about 3 months that     is nonproductive.  He denies significant shortness of breath.            ECOG Performance Status      1            PAST, FAMILY   Past Medical History      Past Medical History:  Hepatitis (HCV)      Hematology/Oncology (M):  Anemia, Prostate Cancer (2015)      Genetic/Metabolic:  None            Past Surgical History      None            Social History      Marital Status:        Lives independently:  No      Other Social History:        Mother and father both had cancer the patient is unsure of what type            Tobacco Use      Tobacco status:  Current every day smoker, Light tobacco smoker      Smoking packs/day:  0.5      Quit status:  Considering quitting            Alcohol Use      Alcohol intake:  0-2 drinks per day (beer)            Substance Use      Substance use:  Denies use, Marijuana            REVIEW OF SYSTEMS      General:  Admits: Appetite Change (LOSS OF APPETITE), Fatigue, Night Sweats,     Weight Loss;          Denies: Fever, Weight Gain      Eye:  Denies Blurred Vision, Denies Corrective Lenses, Denies Diplopia,  Denies     Vision Changes      ENT:  Denies Headache, Denies Hearing Loss, Denies Hoarseness, Denies Sore     Throat      Cardiovascular:  Denies Chest Pain, Denies Palpitations      Respiratory:  Admits: Cough;          Denies: Coughing Blood, Productive Cough, Shortness of Air, Wheezing      Gastrointestinal:  Denies Bloody Stools, Denies Constipation, Denies Diarrhea,     Denies Nausea/Vomiting, Denies Problem Swallowing, Denies Unable to Control     Bowels      Genitourinary:  Denies Blood in Urine, Denies Incontinence, Denies Painful     Urination      Musculoskeletal:  Denies Back Pain, Denies Muscle Pain, Denies Painful Joints      Integumentary:  Denies Itching, Denies Lesions, Denies Rash      Neurologic:  Denies Dizziness, Denies Numbness\Tingling, Denies Seizures      Psychiatric:  Admits Anxiety; Denies Depression      Endocrine:  Denies Cold Intolerance, Denies Heat Intolerance      Hematologic/Lymphatic:  Denies Bruising, Denies Bleeding, Denies Enlarged Lymph     Nodes            VITAL SIGNS AND SCORES      Vitals      Height 5 ft 8.9 in / 175 cm      Weight 130 lbs 4.670 oz / 59.1 kg      BSA 1.72 m2      BMI 19.3 kg/m2      Temperature 97.4 F / 36.33 C - Temporal      Pulse 63      Respirations 18      Blood Pressure 151/93 Sitting, Left Arm      Pulse Oximetry 100%, ROOM AIR            Pain Score      Experiencing any pain?:  No      Pain Scale Used:  Numerical      Pain Intensity:  0            Fatigue Score      Experiencing any fatigue?:  Yes      Fatigue (0-10 scale):  4            EXAM      General: Alert, cooperative, no acute distress      Eyes: Anicteric sclera, PERRLA      HEENT: Oropharynx clear, no exudates      Respiratory: CTAB, normal respiratory effort      Abdomen: Normal active bowel sounds, no tenderness, no distention      Cardiovascular: RRR, no murmur, no peripheral edema      Skin: Normal tone, no rash, no lesions      Psychiatric: Appropriate affect, intact judgment       Neurologic: No focal sensory or motor deficits, no weakness, numbness, dizziness      Musculoskeletal: Normal muscle strength, normal muscle tone      Extremities: No clubbing, cyanosis, or deformities            PREVENTION      Hx Influenza Vaccination:  Yes      Date Influenza Vaccine Given:  Oct 1, 2019      Influenza Vaccine Declined:  No      2 or More Falls Past Year?:  No      Fall Past Year with Injury?:  No      Hx Pneumococcal Vaccination:  No      Encouraged to follow-up with:  PCP regarding preventative exams.      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      * Sucralfate (Carafate) 1 GM TAB: 1 GM PO ACHS #120      * HYDROcodone-Acetaminophen 7.5-325 Mg 1 TAB TABLET: 1 TAB PO Q6H PRN PAIN 3       Days #12      New Diagnostics      * Chest W/ Cont CT, As Soon As Possible         Dx: Chronic hepatitis C - B18.2            Plan      Castration resistant prostate cancer: Patient was initially diagnosed in 2015     with high risk prostate cancer.  He underwent radical prostatectomy but had     positive margins.  He was then treated with local radiation and  started on     Lupron.  Unfortunately the patient stopped the Lupron for a few months and late     2019.  Since that time his PSA has continued to rise despite restarting Lupron.      His most recent scans were in 2019.  I will start by getting a CT of the chest     given the patient's symptoms of persistent cough and weight loss with a history     of smoking.  We discussed treatment with daralutamide.  I will follow-up with     the patient after his CT of the chest so that we can make a final plan for     treatment.            Patient Education      Patient Education Provided:  Yes            Electronically signed by HARSHAL BERNSTEIN  07/12/2020 23:50       Disclaimer: Converted document may not contain table formatting or lab diagrams. Please see [employer] [facility] for the authenticated document.

## 2021-05-28 NOTE — PROGRESS NOTES
Patient: SEMAJ MEDINA     Acct: [id number]     Report: #[id number]  UNIT #: [id number]     : 1955    Encounter Date:2020  PRIMARY CARE: CARLEEN HAWKINS  ***Signed***  --------------------------------------------------------------------------------------------------------------------  TELEHEALTH NOTE      History of Present Illness            Chief Complaint: f/u chronic HCV.              Semaj Medina is presenting for evaluation via Telehealth visit by phone.     Verbal consent obtained before beginning visit.            Provider spent 5 minutes with the patient during telehealth visit.            The following staff were present during the visit: Vivian Acevedo MA                         Past Med History      Yes: Hx Arthritis (UNDIAGNOSED BUT CURRENTLY UNDER OBSERVATION), HX CANCER     (PROSTATE CANCER ), Hx Hypertension (SLIGHTLY ELEVATED  -NO MEDICATION REQUIRED     CURRENTLY ), Hx Medical Other (HEPATITIS C ); No: Hx Asthma, Hx Congestive Heart    Failu, Hx COPD, Hx Diabetes, Hx Heart Attack, Hx Seizures, Hx Sleep Apnea      Overview of Symptoms      Mr. Medina is a 64-year-old male who presents for follow-up of chronic hepatitis     C.  He has completed treatment with Epclusa and presents for 3 mo f/u.  He     reports that he is doing well.            Most Recent Lab Findings      2020 CBC: Hemoglobin 11.6, hematocrit 35, platelets 299.      CMP: Alk phos 52, AST 17, ALT 6, total bilirubin 0.3.      INR 0.99.      HCV quant-not detected.            Allergies/Medications      Allergies:        Coded Allergies:             NO KNOWN DRUG ALLERGIES (Unverified  Allergy, Unknown, 19)      Medications    Last Reconciled on 20 13:12 by SERGEY WHEELER APRN      HYDROcodone-Acetaminophen 7.5-325 Mg (HYDROcodone-Acetaminophen 7.5-325 Mg) 1     Tab Tablet      1 TAB PO Q6H PRN for PAIN for 3 Days, #12 TAB 0 Refills         Prov: MARGE SKAGGS         19        Sucralfate (Carafate) 1 Gm Tab      1 GM PO ACHS, #120 TAB 0 Refills         Prov: MARGE SKAGGS         9/26/19       Pantoprazole (Protonix) 40 Mg Tablet.dr      40 MG PO BIDAC, #60 TAB 0 Refills         Prov: MARGE SKAGGS         9/26/19       Multivitamins (Multi-Vitamin) 1 Each Tablet      1 TAB PO QDAY, #30 TAB 0 Refills         Reported         7/12/19       Ferrous Sulfate (Ferrous Sulfate*) 325 Mg Tablet      325 MG PO BID, #60 TAB 0 Refills         Reported         6/7/19            Plan/Instructions      Ambulatory Assessment/Plan:        Chronic hepatitis C         Chronic hepatitis C without hepatic coma - B18.2         Hepatic coma status: without hepatic coma            Notes      Discontinued Medications      * SOFOSBUVIR/VELPATASVIR (Epclusa 400 mg-100 mg Tablet) 1 EACH TABLET: 1 TAB PO       QDAY #28      New Diagnostics      * CBC, 06/12/20         Dx: Chronic hepatitis C - B18.2      * Comp Metabolic Panel, 06/12/20         Dx: Chronic hepatitis C - B18.2      * HCV RNA QUANTITATIVE HCPCR, 06/12/20         Dx: Chronic hepatitis C - B18.2      * PT / INR, 06/12/20         Dx: Chronic hepatitis C - B18.2      Plan/Instructions            * Plan Of Care: Labs to ensure SVR.  F/u 12 weeks.            * Chronic conditions reviewed and taken into consideration for today's treatment      plan.      * Patient instructed to seek medical attention urgently for new or worsening       symptoms.      * Patient was educated/instructed on their diagnosis, treatment and medications       prior to discharge from the clinic today.      Codes:  Phone Eval 5-10 min [id number]            Electronically signed by Yojana Villatoro  06/12/2020 12:20       Disclaimer: Converted document may not contain table formatting or lab diagrams. Please see [employer] [facility] for the authenticated document.

## 2021-05-28 NOTE — PROGRESS NOTES
Patient: ISRAEL DAVIS     Acct: [id number]     Report: #[id number]  UNIT #: [id number]     : 1955    Encounter Date:2020  PRIMARY CARE: CARLEEN HAWKINS  ***Signed***  --------------------------------------------------------------------------------------------------------------------  NURSE INTAKE      Visit Type      Established Patient Visit            Chief Complaint      PROSTATE CA FOLLOW UP            Referring Provider/Copies To      Referring Provider:  Ron Lepe      Primary Care Provider:  CARLEEN HAWKINS      Copies To:   CARLEEN HAWKINS            History and Present Illness      Past Oncology Illness History      Mr. Cha is a 64-year-old -American man who was referred to me by Dr. Lepe for evaluation and treatment of prostate cancer.            Patient was initially diagnosed with prostate cancer in .  On 2015 he     underwent radical prostatectomy and LN dissection which showed a [place] 4+3 = 7    prostate cancer.  There were several high risk features such as extraprostatic     extension, seminal vesicle invasion, positive margins at the bladder neck and     right and left seminal vesicle.  Lymph vascular invasion was indeterminate.      Perineural invasion was not commented on. Final pathology [id number].  He was    treated with adjuvant radiation by Dr. Stanton.            According to records the patient likely started Lupron in 2017 when his    PSA started to rise.  The patient took a break in 2019.  He states he was    not aware he was to continue.  He restarted Lupron on 2019 after it was     noted that his PSA chago from 0.46 up to 2.55 on 2019.  Unfortunately his     PSA continued to rise after that to 4.54 on 3/10/2020.            PSA summary:      20 PSA 2.55 (restarted Lupron on )      3/10/20 PSA 4.54      20 PSA 11.45      20 PSA 12.13            CT of  the pelvis on 11/20/2019 showed the previous prostatectomy but was     negative for local recurrence or metastatic disease.      Bone scan on 11/20/2019 shows a chronic focus of increased activity in the left     supraorbital region.  Metastatic disease was considered unlikely.            CT of the chest with contrast on 7/7/2024 cough and weight loss.  This     unfortunately showed multiple sclerotic thoracic vertebral body lesions     suspicious for bony metastatic disease.  The largest lesion in the T10 vertebral    body measures 2.3 cm.  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            [facility] - Oncology Interim      Patient came in today to discuss the results of his recent CT scan.      Unfortunately the scan shows that he has bony metastatic disease that is     consistent with metastatic prostate cancer.  He has no evidence of a primary     lung cancer.  He says he continues to lose weight because he has a poor     appetite.  We discussed getting a CT of the abdomen pelvis to complete staging.     He would also like a medication to help with his appetite.  He would also like     to transfer his Lupron injections over to this clinic so he does not have to go     to a separate location.  We also discussed the risks and benefits to bone     strengthening agent such as Zometa.  I would like to get him approved for that     and start when he returns to clinic.  Finally we discussed the risks and     benefits of Zytiga.  This is another medication that can further suppress     androgen activity and control the prostate cancer.  The patient is agreeable to     this.            ECOG Performance Status      1            PAST, FAMILY   Past Medical History      Past Medical History:  Hepatitis (HCV)      Hematology/Oncology (M):  Anemia, Prostate Cancer (2015)      Genetic/Metabolic:  None            Past Surgical History            PROSTATECTOMY            Family History      Family History:   No Family History            Social History      Marital Status:        Lives independently:  No      Other Social History:        Mother and father both had cancer the patient is unsure of what type            Tobacco Use      Tobacco status:  Current every day smoker, Light tobacco smoker      Smoking packs/day:  0.5      Quit status:  Considering quitting            Alcohol Use      Alcohol intake:  0-2 drinks per day (beer)            Substance Use      Substance use:  Denies use, Marijuana            REVIEW OF SYSTEMS      General:  Denies: Appetite Change, Fatigue, Fever, Night Sweats, Weight Gain,     Weight Loss      Eye:  Denies Blurred Vision, Denies Corrective Lenses, Denies Diplopia, Denies     Vision Changes      ENT:  Denies Headache, Denies Hearing Loss, Denies Hoarseness, Denies Sore     Throat      Cardiovascular:  Denies Chest Pain, Denies Palpitations      Respiratory:  Denies: Cough, Coughing Blood, Productive Cough, Shortness of Air,    Wheezing      Gastrointestinal:  Denies Bloody Stools, Denies Constipation, Denies Diarrhea,     Denies Nausea/Vomiting, Denies Problem Swallowing, Denies Unable to Control     Bowels      Genitourinary:  Denies Blood in Urine, Denies Incontinence, Denies Painful     Urination      Musculoskeletal:  Denies Back Pain, Denies Muscle Pain, Denies Painful Joints      Integumentary:  Denies Itching, Denies Lesions, Denies Rash      Neurologic:  Denies Dizziness, Denies Numbness\Tingling, Denies Seizures      Psychiatric:  Denies Anxiety, Denies Depression      Endocrine:  Denies Cold Intolerance, Denies Heat Intolerance      Hematologic/Lymphatic:  Denies Bruising, Denies Bleeding, Denies Enlarged Lymph     Nodes            VITAL SIGNS AND SCORES      Vitals      Weight 126 lbs 15.759 oz / 57.6 kg      Temperature 99 F / 37.22 C - Temporal      Pulse 70      Respirations 16      Blood Pressure 154/95 Sitting, Left Arm      Pulse Oximetry 99%, RM AIR             Pain Score      Experiencing any pain?:  No      Pain Scale Used:  Numerical      Pain Intensity:  0            Fatigue Score      Experiencing any fatigue?:  No            EXAM      General: Alert, cooperative, no acute distress, thin but well-appearing      Eyes: Anicteric sclera, PERRLA      HEENT: Oropharynx clear, no exudates      Respiratory: CTAB, normal respiratory effort      Abdomen: Normal active bowel sounds, no tenderness, no distention      Cardiovascular: RRR, no murmur, no peripheral edema      Skin: Normal tone, no rash, no lesions      Psychiatric: Appropriate affect, intact judgment      Neurologic: No focal sensory or motor deficits, no weakness, numbness, dizziness      Musculoskeletal: Normal muscle strength, normal muscle tone      Extremities: No clubbing, cyanosis, or deformities            PREVENTION      Hx Influenza Vaccination:  Yes      Date Influenza Vaccine Given:  Oct 1, 2019      Influenza Vaccine Declined:  No      2 or More Falls in Past Year?:  No      Fall Past Year with Injury?:  No      Hx Pneumococcal Vaccination:  No      Encouraged to follow-up with:  PCP regarding preventative exams.      Chart initiated by      MATTHEW FISCHER MA            ALLERGY/MEDS      Allergies      Coded Allergies:             NO KNOWN DRUG ALLERGIES (Unverified  Allergy, Unknown, 7/17/20)            Medications      Last Reconciled on 7/17/20 17:10 by HARSHAL BERNSTEIN      Dronabinol (Dronabinol) 5 Mg Capsule      5 MG PO BID, #60 CAP 3 Refills         Reported         7/17/20       (Injection)   No Conflict Check               Reported         6/30/20       Ferrous Sulfate (Ferrous Sulfate*) 325 Mg Tablet      325 MG PO BID, #60 TAB 0 Refills         Reported         6/7/19      Medications Reviewed:  No Changes made to meds            IMPRESSION/PLAN      Impression      64-year-old male with a new diagnosis of metastatic castrate resistant prostate     cancer.            Diagnosis      Prostate  cancer - C61            Notes      New Medications      * Dronabinol 5 MG CAPSULE: 5 MG PO BID #60         Instructions: 1 CAP         Dx: Prostate cancer - C61      New Diagnostics      * Abd/Pel W/ Cont CT, SCHEDULED PROCEDURE         Dx: Prostate cancer - C61            Plan      Metastatic castrate resistant prostate cancer: Patient's last dose of Lupron was     on 6/22/2020.  This was a 6-month shot and he would like to transfer these     injections over to this clinic.  He is agreeable to get a CT scan of the abdomen     pelvis to complete staging.  There is no need for a bone scan since the bone     lesion show up well on CT.  He is also agreeable to Zometa which we will start     when he follows up for his next appointment.  He also agrees to start Zytiga.      This will be mailed to him and he will start the medication as soon as it     arrives.  I will plan to follow-up with him in 6 weeks to discuss how he is     doing on the new medication and to discuss the CT results.  I will plan to get     repeat CT of the chest in 2 to 3 months and biopsy if the lesions worsen.            Weight loss: Patient is complaining of suppressed appetite and weight loss.      This is likely secondary to his underlying disease.  I will start the patient on     Marinol and consider increasing the dose or switching to mirtazapine if this is     not sufficient.            Current smoker: I encouraged the patient to stop smoking but is not ready to at     this time.            Genetics: I did not address genetic testing with the patient today.  However     based on NCCN guidelines he should have genetic testing for metastatic prostate     cancer.  I will discuss this at his follow-up appointment.            Patient Education      Patient Education Provided:  Yes            Electronically signed by HARSHAL BERNSTEIN  07/17/2020 17:10       Disclaimer: Converted document may not contain table formatting or lab diagrams. Please see  [employer] for the authenticated document.

## 2021-05-28 NOTE — PROGRESS NOTES
Patient: ISRAEL DAVIS     Acct: [id number]     Report: #[id number]  UNIT #: [id number]     : 1955    Encounter Date:2020  PRIMARY CARE: CARLEEN HAWKINS  ***Signed***  --------------------------------------------------------------------------------------------------------------------  NURSE INTAKE      Visit Type      Established Patient Visit            Chief Complaint      PROSTATE CA HERE FOR F/U            Referring Provider/Copies To      Referring Provider:  Ron Lepe      Primary Care Provider:  CARLEEN HAWKINS            History and Present Illness      Past Oncology Illness History      Mr. Cha is a 64-year-old -American man who was referred to me by Dr. Lepe for evaluation and treatment of prostate cancer.            Patient was initially diagnosed with prostate cancer in .  On 2015 he     underwent radical prostatectomy and LN dissection which showed a [place] 4+3 = 7    prostate cancer.  There were several high risk features such as extraprostatic     extension, seminal vesicle invasion, positive margins at the bladder neck and     right and left seminal vesicle.  Lymph vascular invasion was indeterminate.      Perineural invasion was not commented on. Final pathology [id number] R1.  He was    treated with adjuvant radiation by Dr. Stanton.            According to records the patient likely started Lupron in 2017 when his    PSA started to rise.  The patient took a break in 2019.  He states he was    not aware he was to continue.  He restarted Lupron on 2019 after it was     noted that his PSA chago from 0.46 up to 2.55 on 2019.  Unfortunately his     PSA continued to rise after that to 4.54 on 3/10/2020.            PSA summary:      20 PSA 2.55 (restarted Lupron on )      3/10/20 PSA 4.54      20 PSA 11.45      20 PSA 12.13            CT of the pelvis on 2019 showed the  previous prostatectomy but was     negative for local recurrence or metastatic disease.      Bone scan on 11/20/2019 shows a chronic focus of increased activity in the left     supraorbital region.  Metastatic disease was considered unlikely.            CT of the chest with contrast on 7/7/2024 cough and weight loss.  This     unfortunately showed multiple sclerotic thoracic vertebral body lesions     suspicious for bony metastatic disease.  The largest lesion in the T10 vertebral    body measures 2.3 cm.  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            Started Zytiga on 8/27/20.            HPI - Oncology Interim      The patient started Zytiga and prednisone last week. His only complaint so far     is anxiety and irritabilty. He is snapping at people he cares about.  However,     he is eating better with Marinol and wants a refill.            ECOG Performance Status      1            PAST, FAMILY   Past Medical History      Past Medical History:  Hepatitis (HCV)      Hematology/Oncology (M):  Anemia, Prostate Cancer (2015)      Genetic/Metabolic:  None            Past Surgical History            PROSTATECTOMY            Family History      Family History:  No Family History            Social History      Marital Status:        Lives independently:  No      Other Social History:        Mother and father both had cancer the patient is unsure of what type            Tobacco Use      Tobacco status:  Current every day smoker, Light tobacco smoker      Smoking packs/day:  0.5      Quit status:  Considering quitting            Alcohol Use      Alcohol intake:  0-2 drinks per day (beer)            Substance Use      Substance use:  Denies use, Marijuana            REVIEW OF SYSTEMS      General:  Denies: Appetite Change, Fatigue, Fever, Night Sweats, Weight Gain,     Weight Loss      Eye:  Denies Blurred Vision, Denies Corrective Lenses, Denies Diplopia, Denies     Vision Changes       ENT:  Denies Headache, Denies Hearing Loss, Denies Hoarseness, Denies Sore     Throat      Cardiovascular:  Denies Chest Pain, Denies Palpitations      Respiratory:  Denies: Cough, Coughing Blood, Productive Cough, Shortness of Air,    Wheezing      Gastrointestinal:  Denies Bloody Stools, Denies Constipation, Denies Diarrhea,     Denies Nausea/Vomiting, Denies Problem Swallowing, Denies Unable to Control     Bowels      Genitourinary:  Denies Blood in Urine, Denies Incontinence, Denies Painful     Urination      Musculoskeletal:  Denies Back Pain, Denies Muscle Pain, Denies Painful Joints      Integumentary:  Denies Itching, Denies Lesions, Denies Rash      Neurologic:  Denies Dizziness, Denies Numbness\Tingling, Denies Seizures      Psychiatric:  Admits Anxiety; Denies Depression      Endocrine:  Denies Cold Intolerance, Denies Heat Intolerance      Hematologic/Lymphatic:  Denies Bruising, Denies Bleeding, Denies Enlarged Lymph     Nodes            VITAL SIGNS AND SCORES      Vitals      Height 5747 ft 0.43 in / 313793 cm      Weight 132 lbs 4.416 oz / 60 kg      .17 m2      BMI 0.0 kg/m2      Temperature 99.5 F / 37.5 C - Temporal      Pulse 71      Respirations 18      Blood Pressure 170/101 Sitting, Left Arm      Pulse Oximetry 96%, ROOM AIR            Pain Score      Experiencing any pain?:  No      Pain Scale Used:  Numerical      Pain Intensity:  0            Fatigue Score      Experiencing any fatigue?:  No      Fatigue (0-10 scale):  0 (none)            EXAM      General: Alert, cooperative, no acute distress, very thin      Eyes: Anicteric sclera, PERRLA      HEENT: Oropharynx clear, no exudates      Respiratory: CTAB, normal respiratory effort      Abdomen: Normal active bowel sounds, no tenderness, no distention      Cardiovascular: RRR, no murmur, no peripheral edema      Skin: Normal tone, no rash, no lesions      Psychiatric: Appropriate affect, intact judgment      Neurologic: No focal  sensory or motor deficits, no weakness, numbness, dizziness      Musculoskeletal: Normal muscle strength, normal muscle tone      Extremities: No clubbing, cyanosis, or deformities            PREVENTION      Hx Influenza Vaccination:  Yes      Date Influenza Vaccine Given:  Oct 1, 2019      Influenza Vaccine Declined:  No      2 or More Falls in Past Year?:  No      Fall Past Year with Injury?:  No      Hx Pneumococcal Vaccination:  No      Encouraged to follow-up with:  PCP regarding preventative exams.      Chart initiated by      DWIGHT VALDEZ            ALLERGY/MEDS      Allergies      Coded Allergies:             NO KNOWN DRUG ALLERGIES (Verified  Allergy, Unknown, 7/17/20)            Medications      Last Reconciled on 9/3/20 22:30 by HARSHAL BERNSTEIN      Dronabinol (Dronabinol) 5 Mg Capsule      5 MG PO BID, #60 CAP 3 Refills         Prov: HARSHAL BERNSTEIN         8/31/20       PARoxetine HCl (PARoxetine HCl) 20 Mg Tablet      20 MG PO QDAY, #30 TAB 3 Refills         Prov: HARSHAL BERNSTEIN         8/31/20       LORazepam (LORazepam) 0.5 Mg Tablet      0.25 MG PO QDAY, #10 TAB         Prov: HARSHAL BERNSTEIN         8/31/20       Dronabinol (Dronabinol) 5 Mg Capsule      5 MG PO BID, #60 CAP 3 Refills         Reported         7/17/20       (Injection)   No Conflict Check               Reported         6/30/20       Ferrous Sulfate (Ferrous Sulfate*) 325 Mg Tablet      325 MG PO BID, #60 TAB 0 Refills         Reported         6/7/19      Medications Reviewed:  No Changes made to meds            IMPRESSION/PLAN      Impression      64-year-old man with metastatic castrate resistant prostate cancer            Diagnosis      Prostate cancer - C61            Notes      New Medications      * LORazepam 0.5 MG TABLET: 0.25 MG PO QDAY #10      * PARoxetine HCl 20 MG TABLET: 20 MG PO QDAY #30      * Dronabinol 5 MG CAPSULE: 5 MG PO BID #60         Instructions: 1 CAP      New Diagnostics      * Psa Screening, Routine          Dx: Prostate cancer - C61      * CBC With Auto Diff, Routine         Dx: Prostate cancer - C61      * CMP Comp Metabolic Panel, Routine         Dx: Prostate cancer - C61            Plan      Metastatic castrate resistant prostate cancer: Patient has been on Lupron and     then started Zytiga last week.  So far he is having difficulty with increased     anxiety and irritability due to the prednisone.  I will start him on a small     dose of Ativan, 0.25 mg 1-2 times a day to help with anxiety in the next couple     of weeks.  I will also start paroxetine.  He will follow-up with me in 4 weeks.            Malnutrition weight loss: Patient's weight is been stable and appetite is     improved since starting Marinol.  I will refill the medication today.            Patient Education      Patient Education Provided:  Yes            Electronically signed by HARSHAL BERNSTEIN  09/03/2020 22:30       Disclaimer: Converted document may not contain table formatting or lab diagrams. Please see [employer] for the authenticated document.

## 2021-05-28 NOTE — PROGRESS NOTES
Patient: ISRAEL DAVIS     Acct: [id number]     Report: #[id number]  UNIT #: [id number]     : 1955    Encounter Date:2020  PRIMARY CARE: CARLEEN HAWKINS  ***Signed***  --------------------------------------------------------------------------------------------------------------------  NURSE INTAKE      Visit Type      Established Patient Visit            Chief Complaint      PROSTATE CA            Referring Provider/Copies To      Primary Care Provider:  CARLEEN HAWKINS      Copies To:   CARLEEN HAWKINS            History and Present Illness      Past Oncology Illness History      Mr. Cha is a 65-year-old -American man who was referred to me by Dr. Lepe for evaluation and treatment of prostate cancer.            Patient was initially diagnosed with prostate cancer in .  On 2015 he     underwent radical prostatectomy and LN dissection which showed a [place] 4+3 = 7    prostate cancer.  There were several high risk features such as extraprostatic     extension, seminal vesicle invasion, positive margins at the bladder neck and     right and left seminal vesicle.  Lymph vascular invasion was indeterminate.      Perineural invasion was not commented on. Final pathology [id number] R1.  He was    treated with adjuvant radiation by Dr. Stanton.            According to records the patient likely started Lupron in 2017 when his    PSA started to rise.  The patient took a break in 2019.  He states he was    not aware he was to continue.  He restarted Lupron on 2019 after it was     noted that his PSA chago from 0.46 up to 2.55 on 2019.  Unfortunately his     PSA continued to rise after that to 4.54 on 3/10/2020.            PSA summary:      20 PSA 2.55 (restarted Lupron on )      3/10/20 PSA 4.54      20 PSA 11.45      20 PSA 12.13      2020 PSA 17.95      2020 PSA 7.17*stopped Zytiga due  to diarrhea nausea            CT of the pelvis on 11/20/2019 showed the previous prostatectomy but was     negative for local recurrence or metastatic disease.      Bone scan on 11/20/2019 shows a chronic focus of increased activity in the left     supraorbital region.  Metastatic disease was considered unlikely.            CT of the chest with contrast on 7/7/2024 cough and weight loss.  This     unfortunately showed multiple sclerotic thoracic vertebral body lesions     suspicious for bony metastatic disease.  The largest lesion in the T10 vertebral    body measures 2.3 cm.  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            Started Zytiga on 8/27/20.  Stopped in November 2020 due to diarrhea and nausea.            [facility] - Oncology Interim      Patient comes in today for follow-up.  He was previously taking Zytiga but I     decrease the dose of 500 mg a day due to hand cramping and low back pain.  Then     he says he developed severe diarrhea every day after taking the medication.  He     also had nausea.  For this reason he stopped the medication and is feeling much     better.  He says he is not good at taking medication but realizes he has to take    something since this is lifesaving.  He is agreeable try Xtandi and will follow     up with me in a month.  He also asks for previous medication he was given help     with his mood.  He said he had become quite irritable with his family but the     medication has been helping.  We also discussed the need for a bone     strengthening medication.  He is agreeable 2 Xgeva which we can start later when    it is approved.  For today he will get his Lupron injection.  His weight is     stable, he is eating well, and he has no new sites of pain.            ECOG Performance Status      1            PAST, FAMILY   Past Medical History      Past Medical History:  Hepatitis      Hematology/Oncology (M):  Anemia, Prostate Cancer      Genetic/Metabolic:   None            Past Surgical History            PROSTATECTOMY            Family History      Family History:  No Family History            Social History      Lives independently:  No      Other Social History:        Mother and father both had cancer the patient is unsure of what type            Tobacco Use      Tobacco status:  Current every day smoker, Light tobacco smoker      Smoking packs/day:  0.5            Substance Use      Substance use:  Denies use, Marijuana            REVIEW OF SYSTEMS      General:  Denies: Appetite Change, Fatigue, Fever, Night Sweats, Weight Gain,     Weight Loss      Eye:  Admits Corrective Lenses; Denies Blurred Vision, Denies Diplopia, Denies     Vision Changes      ENT:  Denies Headache, Denies Hearing Loss, Denies Hoarseness, Denies Sore     Throat      Cardiovascular:  Denies Chest Pain, Denies Palpitations      Respiratory:  Denies: Cough, Coughing Blood, Productive Cough, Shortness of Air,    Wheezing      Gastrointestinal:  Admits Diarrhea (BUT STOPPED TAKING MEDICATIONS DUE TO THIS);    Denies Bloody Stools, Denies Constipation, Denies Nausea/Vomiting, Denies     Problem Swallowing, Denies Unable to Control Bowels      Genitourinary:  Denies Blood in Urine, Denies Incontinence, Denies Painful Urina    tion      Musculoskeletal:  Denies Back Pain, Denies Muscle Pain, Denies Painful Joints      Integumentary:  Denies Itching, Denies Lesions, Denies Rash      Neurologic:  Denies Dizziness, Denies Numbness\Tingling, Denies Seizures      Psychiatric:  Denies Anxiety, Denies Depression      Endocrine:  Denies Cold Intolerance, Denies Heat Intolerance      Hematologic/Lymphatic:  Denies Bruising, Denies Bleeding, Denies Enlarged Lymph     Nodes            VITAL SIGNS AND SCORES      Vitals      Weight 139 lbs 15.874 oz / 63.5 kg      Temperature 98.5 F / 36.94 C - Temporal      Pulse 76      Respirations 18      Blood Pressure 140/86 Sitting, Left Arm      Pulse Oximetry 100%,  RM AIR            Pain Score      Experiencing any pain?:  No      Pain Scale Used:  Numerical      Pain Intensity:  0            Fatigue Score      Experiencing any fatigue?:  No      Fatigue (0-10 scale):  0 (none)            EXAM      General: Alert, cooperative, no acute distress      Eyes: Anicteric sclera, PERRLA      Respiratory: CTAB, normal respiratory effort      Abdomen: Normal active bowel sounds, no tenderness, no distention      Cardiovascular: RRR, no murmur, no peripheral edema      Skin: Normal tone, no rash, no lesions      Psychiatric: Appropriate affect, intact judgment      Neurologic: No focal sensory or motor deficits, no weakness, numbness, dizziness      Musculoskeletal: Normal muscle strength, normal muscle tone      Extremities: No clubbing, cyanosis, or deformities            PREVENTION      Hx Influenza Vaccination:  No      Date Influenza Vaccine Given:  Oct 1, 2019      Influenza Vaccine Declined:  No      2 or More Falls in Past Year?:  No      Fall Past Year with Injury?:  No      Hx Pneumococcal Vaccination:  No      Encouraged to follow-up with:  PCP regarding preventative exams.      Chart initiated by      ALEJANDRA JONES MA            ALLERGY/MEDS      Allergies      Coded Allergies:             NO KNOWN DRUG ALLERGIES (Verified  Allergy, Unknown, 12/11/20)            Medications      Last Reconciled on 12/11/20 14:31 by HARSHAL BERNSTEIN      Ferrous Sulfate (Ferrous Sulfate*) 325 Mg Tablet      325 MG PO QDAY, #30 TAB 0 Refills         Reported         12/11/20      Medications Reviewed:  Changes made to meds            IMPRESSION/PLAN      Diagnosis      Notes      New Medications      * FERROUS SULFATE (Ferrous Sulfate*) 325 MG TABLET: 325 MG PO QDAY #30      Discontinued Medications      * LORazepam 0.5 MG TABLET: 0.25 MG PO QDAY #10      * PARoxetine HCl 20 MG TABLET: 20 MG PO QDAY #30      * Dronabinol 5 MG CAPSULE: 5 MG PO BID #60         Instructions: 1 CAP            Plan       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  He was taking Zytiga which was dosed reduced due to body pain.      Then he stopped the medication due to diarrhea and nausea.  His PSA had been     improving.  We will recheck CBC, CMP, and PSA today.  I will then transition him    over 2 Xtandi.  He is already stopped his steroid medication.  He will follow-up    with me in 4 6 weeks discuss any side effects with the new medication.  He is     getting his Lupron injection today.            Bone metastases: Patient is agreeable do start Xgeva which we will do the next     appointment.            Depressed/irritable mood: We will restart the patient's paroxetine which she     states was previously helping.            Nausea: Medication induced.  We will send a prescription for Zofran.            Patient Education      Patient Education Provided:  Yes            Electronically signed by HARSHAL BERNSTEIN  12/11/2020 14:31       Disclaimer: Converted document may not contain table formatting or lab diagrams. Please see [employer] [facility] for the authenticated document.

## 2021-05-28 NOTE — PROGRESS NOTES
Patient: ISRAEL DAVIS     Acct: [id number]     Report: #[id number]  UNIT #: [id number]     : 1955    Encounter Date:2021  PRIMARY CARE: CARLEEN HAWKINS  ***Signed***  --------------------------------------------------------------------------------------------------------------------  NURSE INTAKE      Visit Type      Established Patient Visit            Chief Complaint      PROSTATE CA            Referring Provider/Copies To      Primary Care Provider:  CARLEEN HAWKINS      Copies To:   CARLEEN HAWKINS            History and Present Illness      Past Oncology Illness History      Mr. Cha is a 65-year-old -American man who was referred to me by Dr. Lepe for evaluation and treatment of prostate cancer.            Patient was initially diagnosed with prostate cancer in .  On 2015 he     underwent radical prostatectomy and LN dissection which showed a [place] 4+3 = 7    prostate cancer.  There were several high risk features such as extraprostatic     extension, seminal vesicle invasion, positive margins at the bladder neck and     right and left seminal vesicle.  Lymph vascular invasion was indeterminate.      Perineural invasion was not commented on. Final pathology [id number] R1.  He was    treated with adjuvant radiation by Dr. Stanton.            According to records the patient likely started Lupron in 2017 when his    PSA started to rise.  The patient took a break in 2019.  He states he was    not aware he was to continue.  He restarted Lupron on 2019 after it was     noted that his PSA chago from 0.46 up to 2.55 on 2019.  Unfortunately his     PSA continued to rise after that to 4.54 on 3/10/2020.            PSA summary:      20 PSA 2.55 (restarted Lupron on )      3/10/20 PSA 4.54      20 PSA 11.45      20 PSA 12.13      2020 PSA 17.95      2020 PSA 7.17*stopped Zytiga due  to diarrhea nausea            CT of the pelvis on 11/20/2019 showed the previous prostatectomy but was     negative for local recurrence or metastatic disease.      Bone scan on 11/20/2019 shows a chronic focus of increased activity in the left     supraorbital region.  Metastatic disease was considered unlikely.            CT of the chest with contrast on 7/7/2024 cough and weight loss.  This     unfortunately showed multiple sclerotic thoracic vertebral body lesions     suspicious for bony metastatic disease.  The largest lesion in the T10 vertebral    body measures 2.3 cm.  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            Started Zytiga on 8/27/20.  Stopped in November 2020 due to diarrhea and nausea.            HPI - Oncology Interim      Patient comes in today with his wife to discuss how he is doing on Xtandi.  At     the last appointment we switch from Xgeva due to side effects including severe     fatigue.  He says he continues to have nausea vomiting and diarrhea if he takes     the medication empty stomach.  However as long as he takes it with food he is     tolerating it well.  I explained to him that I reviewed his previous labs and     noted that he has microcytic anemia.  I will send labs today to evaluate his     anemia and contact him afterwards to follow-up on the plan.            ECOG Performance Status      1            PAST, FAMILY   Past Medical History      Past Medical History:  Hepatitis      Hematology/Oncology (M):  Anemia, Prostate Cancer      Genetic/Metabolic:  None            Past Surgical History            PROSTATECTOMY            Family History      Family History:  No Family History            Social History      Lives independently:  No      Other Social History:        Mother and father both had cancer the patient is unsure of what type            Tobacco Use      Tobacco status:  Current every day smoker, Light tobacco smoker      Smoking packs/day:   0.5            Substance Use      Substance use:  Denies use, Marijuana            REVIEW OF SYSTEMS      General:  Denies: Appetite Change, Fatigue, Fever, Night Sweats, Weight Gain,     Weight Loss      Eye:  Admits Corrective Lenses; Denies Blurred Vision, Denies Diplopia, Denies     Vision Changes      ENT:  Denies Headache, Denies Hearing Loss, Denies Hoarseness, Denies Sore     Throat      Cardiovascular:  Denies Chest Pain, Denies Palpitations      Respiratory:  Denies: Cough, Coughing Blood, Productive Cough, Shortness of Air,    Wheezing      Gastrointestinal:  Denies Bloody Stools, Denies Constipation, Denies Diarrhea,     Denies Nausea/Vomiting, Denies Problem Swallowing, Denies Unable to Control     Bowels      Genitourinary:  Denies Blood in Urine, Denies Incontinence, Denies Painful     Urination      Musculoskeletal:  Denies Back Pain, Denies Muscle Pain, Denies Painful Joints      Other      PAIN IN HANDS      Integumentary:  Denies Itching, Denies Lesions, Denies Rash      Neurologic:  Denies Dizziness, Denies Numbness\Tingling, Denies Seizures      Psychiatric:  Denies Anxiety, Denies Depression      Endocrine:  Denies Cold Intolerance, Denies Heat Intolerance      Hematologic/Lymphatic:  Denies Bruising, Denies Bleeding, Denies Enlarged Lymph     Nodes            VITAL SIGNS AND SCORES      Vitals      Weight 142 lbs 6.675 oz / 64.6 kg      Temperature 97.5 F / 36.39 C - Temporal      Pulse 72      Respirations 18      Blood Pressure 160/97 Sitting, Right Arm      Pulse Oximetry 100%, RM AIR            Pain Score      Experiencing any pain?:  Yes      Pain Scale Used:  Numerical      Pain Intensity:  7      Pain Comment:        VARIES DAY TO DAY, IN HANDS            Fatigue Score      Experiencing any fatigue?:  No      Fatigue (0-10 scale):  0 (none)            EXAM      General: Alert, cooperative, no acute distress, well appearing although thin      Eyes: Anicteric sclera, PERRLA       Respiratory: CTAB, normal respiratory effort      Abdomen: Normal active bowel sounds, no tenderness, no distention      Cardiovascular: RRR, no murmur, no lower extremity edema      Skin: Normal tone, no rash, no lesions      Psychiatric: Appropriate affect, intact judgment      Neurologic: No focal sensory or motor deficits, no weakness, numbness, dizziness      Musculoskeletal: Normal muscle strength and tone      Extremities: No clubbing, cyanosis, or deformities            PREVENTION      Hx Influenza Vaccination:  No      Date Influenza Vaccine Given:  Oct 1, 2019      Influenza Vaccine Declined:  Yes      2 or More Falls in Past Year?:  No      Fall Past Year with Injury?:  No      Hx Pneumococcal Vaccination:  No      Encouraged to follow-up with:  PCP regarding preventative exams.      Chart initiated by      ALEJANDRA JONES MA            ALLERGY/MEDS      Allergies      Coded Allergies:             NO KNOWN DRUG ALLERGIES (Verified  Allergy, Unknown, 3/5/21)            Medications      Last Reconciled on 3/14/21 21:03 by HARSHAL BERNSTEIN      Enzalutamide (XTANDI) 40 Mg Capsule      160 MG PO QDAY for 30 Days, #120 CAP         Reported         1/25/21       PARoxetine HCl (PARoxetine HCl) 20 Mg Tablet      20 MG PO QDAY, #30 TAB 3 Refills         Prov: HARSHAL BERNSTEIN         12/11/20       Ferrous Sulfate (Ferrous Sulfate*) 325 Mg Tablet      325 MG PO QDAY, #30 TAB 0 Refills         Reported         12/11/20      Medications Reviewed:  No Changes made to meds            IMPRESSION/PLAN      Diagnosis      Prostate CA - C61            Anemia - D64.9            Notes      New Diagnostics      * CBC, Routine         Dx: Prostate CA - C61      * Ferritin Level, Routine         Dx: Prostate CA - C61      * Iron Profile, Routine         Dx: Prostate CA - C61      * CMP Comp Metabolic Panel, Routine         Dx: Prostate CA - C61      * PSA Diagnostic, Routine         Dx: Prostate CA - C61      * CBC, 3 Months          Dx: Prostate CA - C61      * PSA Diagnostic, 3 Months         Dx: Prostate CA - C61      * CMP Comp Metabolic Panel, 3 Months         Dx: Prostate CA - C61            Plan      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  He had severe fatigue with Zytiga and switched to full dose (160mg)    Xtandi. He is tolerating it much better and his PSA is improving.  We will     recheck CBC, CMP, Mg, Phos and PSA today. On Dec 11th he had his last shot of     Lupron (6 mo). Will see him in 3 months with repeat PSA.             Bone metastases: Will start Xgeva today.            Depressed/irritable mood: Restarted paroxetine which is helping.            Nausea: Medication induced.  Continue Zofran PRN.            Iron Deficiency Anemia: Noted microcytic anemia so I checked iron studies.      Reviewed later in the day, Tsat 4% and ferritin 10.  Will call the patient and     have him start PO iron. I will follow-up in 1 month by phone to see how he is     tolerating it.  I will send him for EGD and colonscopy also at the next     appointment.            Patient Education      Patient Education Provided:  Yes            Electronically signed by HARSHAL BERNSTEIN  03/14/2021 21:03       Disclaimer: Converted document may not contain table formatting or lab diagrams. Please see [facility] for the authenticated document.

## 2021-05-28 NOTE — PROGRESS NOTES
Patient: ISRAEL DAVIS     Acct: [id number]     Report: #[id number]  UNIT #: [id number]     : 1955    Encounter Date:10/01/2020  PRIMARY CARE: CARLEEN HAWKINS  ***Signed***  --------------------------------------------------------------------------------------------------------------------  TELEHEALTH NOTE      Past Oncology Illness History      Mr. Cha is a 64-year-old -American man who was referred to me by Dr. Lepe for evaluation and treatment of prostate cancer.            Patient was initially diagnosed with prostate cancer in .  On 2015 he     underwent radical prostatectomy and LN dissection which showed a [place] 4+3 = 7    prostate cancer.  There were several high risk features such as extraprostatic     extension, seminal vesicle invasion, positive margins at the bladder neck and     right and left seminal vesicle.  Lymph vascular invasion was indeterminate.      Perineural invasion was not commented on. Final pathology [id number] R1.  He was    treated with adjuvant radiation by Dr. Stanton.            According to records the patient likely started Lupron in 2017 when his    PSA started to rise.  The patient took a break in 2019.  He states he was    not aware he was to continue.  He restarted Lupron on 2019 after it was     noted that his PSA chago from 0.46 up to 2.55 on 2019.  Unfortunately his     PSA continued to rise after that to 4.54 on 3/10/2020.            PSA summary:      20 PSA 2.55 (restarted Lupron on )      3/10/20 PSA 4.54      20 PSA 11.45      20 PSA 12.13            CT of the pelvis on 2019 showed the previous prostatectomy but was     negative for local recurrence or metastatic disease.      Bone scan on 2019 shows a chronic focus of increased activity in the left     supraorbital region.  Metastatic disease was considered unlikely.            CT of the chest with contrast  on 7/7/2024 cough and weight loss.  This     unfortunately showed multiple sclerotic thoracic vertebral body lesions     suspicious for bony metastatic disease.  The largest lesion in the T10 vertebral    body measures 2.3 cm.  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            Started Zytiga on 8/27/20.            History of Present Illness            Chief Complaint: PROSTATE CA            Semaj Medina is presenting for evaluation via Telehealth visit by phone.     Verbal consent obtained before beginning visit.            Provider spent (7) minutes with the patient during telehealth visit.            The following staff were present during the visit: (Lefty Coleman, RN)                         Overview of Symptoms      I contacted patient today regarding his Zytiga.  He says he has had pain in his     hands, feet, and low back pain since starting the medication.  He is taking     Motrin but it is not helping.  He is taking prednisone along with it and has no     other side effects.            Allergies/Medications      Allergies:        Coded Allergies:             NO KNOWN DRUG ALLERGIES (Verified  Allergy, Unknown, 8/31/20)      Medications    Last Reconciled on 10/7/20 20:33 by HARSHAL BERNSTEIN      Dronabinol (Dronabinol) 5 Mg Capsule      5 MG PO BID, #60 CAP 3 Refills         Prov: HARSHAL BERNSTEIN         8/31/20       PARoxetine HCl (PARoxetine HCl) 20 Mg Tablet      20 MG PO QDAY, #30 TAB 3 Refills         Prov: HARSHAL BERNSTEIN         8/31/20       LORazepam (LORazepam) 0.5 Mg Tablet      0.25 MG PO QDAY, #10 TAB         Prov: HARSHAL BERNSTEIN         8/31/20       Dronabinol (Dronabinol) 5 Mg Capsule      5 MG PO BID, #60 CAP 3 Refills         Reported         7/17/20       (Injection)   No Conflict Check               Reported         6/30/20       Ferrous Sulfate (Ferrous Sulfate*) 325 Mg Tablet      325 MG PO BID, #60 TAB 0 Refills         Reported         6/7/19             Plan/Instructions            * Plan Of Care: (Metastatic castrate resistant prostate cancer)      * Patient has been on Lupron and started Zytiga in mid August 2020.  He       initially difficulty with anxiety and irritability on the medication.  Now he       has developed pain in his hands, feet, and low back.  I instructed the patient      to hold the medication for 1 week then restarted at half the dose.  I will       follow-up with him in 4 weeks to see how he is tolerating the new dose.  If he      continues to have symptoms then he may switch to enzalutamide.            * Chronic conditions reviewed and taken into consideration for today's treatment      plan.      * Patient instructed to seek medical attention urgently for new or worsening       symptoms.      * Patient was educated/instructed on their diagnosis, treatment and medications       prior to discharge from the clinic today.      Codes:  Phone Eval 5-10 min [id number]            Electronically signed by HARSHAL BERNSTEIN  10/07/2020 20:33       Disclaimer: Converted document may not contain table formatting or lab diagrams. Please see [employer] [facility] for the authenticated document.

## 2021-05-28 NOTE — PROGRESS NOTES
Patient: SEMAJ DAVIS     Acct: [id number]     Report: #[id number]  UNIT #: [id number]     : 1955    Encounter Date:2019  PRIMARY CARE: EDWIGE HAWKINS  ***Signed***  --------------------------------------------------------------------------------------------------------------------  NURSE INTAKE      Visit Type      New Patient Visit            Chief Complaint      abnormal cbc            Referring Provider/Copies To      PCP Not Found in Lookup:  edwige hawkins            History and Present Illness      Past History      Semaj is a 63-year-old -American gentleman referred for evaluation of     anemia.      He complains of left lower extremity pain for the past few months and an     ultrasound was performed 2 weeks ago that was negative for DVT.  He states that     his last colonoscopy was approximately 15 years ago and showed benign polyps and    he was advised to repeat it in 5 years but has been putting it off since.  A    pparently he is scheduled for repeat colonoscopy in 2016 with Dr. Moseley     who will also be seeing him for his newly diagnosed hepatitis C status.            Semaj states that he has had loss of appetite for the past 6 months but has no     major weight loss.  He smokes weed to stimulate his appetite.  He was started on    iron sulfate twice a day 2 weeks ago and is tolerating it well by his primary     care provider.  He denies night sweats, fevers, weight loss, change in bowel or     bladder habits.            I went over his laboratory investigations from 2019 and they are as     follows:      WBC 4.54 hemoglobin 8.5 hematocrit 28.4 platelet count 383,000      HIV negative, TSH normal, -normal, homocystine 16.2 elevated, B12 412     normal, folate 9.4 normal      Ferritin 10 low, iron 18, TIBC 489 iron saturation 4% transferrin 342            Most Recent Lab Findings      Laboratory Tests      19 11:00            PAST,  FAMILY   Past Medical History      Past Medical History:  Hepatitis      Other PMH      History of prostate carcinoma diagnosed in 2015 patient is status post TURP     followed by radiation therapy and Lupron every 3 months; as PSA has been norm    alizing his urologist is suggested that he come off the Lupron and monitor PSA     levels and Lupron was discontinued in 4/2019      Patient is status post TURBT in 2018, hepatitis C, history of anxiety      Hematology/Oncology (M):  Anemia, Prostate Cancer (2015)            Past Surgical History      prostate removed            Family History      Mother and father both had cancers patient does not know what type      Patient has 9:30 siblings and 2 full sisters all of who are reportedly healthy      Patient has 2 daughters and his son were reported to be healthy            Social History      Marital Status:        Lives independently:  No            Tobacco Use      Tobacco status:  Current every day smoker, Light tobacco smoker      Smoking packs/day:  0.5      Quit status:  Considering quitting            Alcohol Use      Alcohol intake:  0-2 drinks per day (beer)            Substance Use      Substance use:  Denies use            REVIEW OF SYSTEMS      General:  Admits: Fatigue;          Denies: Appetite Change, Fever, Night Sweats, Weight Gain, Weight Loss      Eye:  Denies: Blurred Vision, Corrective Lenses, Diplopia, Eye Irritation, Eye     Pain, Eye Redness, Spots in Vision, Vision Loss      ENT:  Denies: Headache, Hearing Loss, Hoarseness, Seizures, Sinus Congestion,     Sore Throat      Cardiovascular:  Denies: Chest Pain, Edema Ankles, Edema Legs, Irregular     Heartbeat, Palpitations      Respiratory:  Denies: Coughing Blood, Productive Cough, Shortness of Air, Wheez    ing      Gastrointestinal:  Denies: Bloody Stools, Constipation, Diarrhea, Frequent     Heartburn, Nausea, Problem Swallowing, Tarry Stools, Unable to Control Bowels,     Vomiting       Genitourinary (male):  Denies: Blood in Urine, Decrease Urine Stream, Frequent     Urination, Incontinence, Painful Urination      Musculoskeletal:  Admits: Leg Cramps, Muscle Pain, Painful Joints;          Denies: Back Pain, Muscle Weakness, Swollen Joints      Integumentary:  Denies: Bleeds Easily, Bruises Easily, Hair Changes, Jaundice,     Lesions, Mole Changes, Nail Changes, Pigment Changes, Rash, Skin Discoloration      Neurologic:  Denies: Dizziness, Fainting, Numbness\Tingling, Paralysis, Seizures      Psychiatric:  Denies: Anxiety, Confused, Depression, Disoriented, Memory Loss      Endocrine:  Denies: Cold Intolerance, Diabetes, Excessive Sweating, Excessive     Thirst, Excessive Urination, Heat Intolerance, Flushing, Hyperthyroidism,     Hypothyroidism      Hematologic/Lymphatic:  Denies: Bruising, Bleeding, Enlarged Lymph Nodes,     Recurrent Infections, Transfusions            VITAL SIGNS AND SCORES      Vitals      Height 5 ft 8.31 in / 173.5 cm      Weight 132 lbs 4.416 oz / 60.0 kg      BSA 1.72 m2      BMI 19.9 kg/m2      Temperature 99.0 F / 37.22 C - Temporal      Pulse 69      Blood Pressure 160/80 Sitting, Left Arm      Pulse Oximetry 100%, room air            Pain Score      Experiencing any pain?:  Yes      Pain Scale Used:  Numerical      Pain Intensity:  2            Fatigue Score      Experiencing any fatigue?:  Yes      Fatigue (0-10 scale):  5            EXAM      Other      General appearance:  in no apparent distress, cooperative, appears stated age.      HEENT: + pallor, no icterus, oral mucosa moist      Neck: Supple, trachea central-not deviated      Lymph nodes: none palpable peripherally      Cardiovascular: S1-S2 heard, no murmurs, no rubs, no gallops.      Respiratory: Clear to auscultation bilaterally, no adventitious sounds      Abdomen/gastro: Soft, nontender, no palpable hepatosplenomegaly, bowel sounds     heard      Skin: No lesions, no rashes, no petechiae.       Extremities: No pedal edema, peripheral pulses felt, no clubbing            PREVENTION      Hx Influenza Vaccination:  Yes      Date Influenza Vaccine Given:  Sep 1, 2018      Influenza Vaccine Declined:  No      2 or More Falls Past Year?:  No      Fall Past Year with Injury?:  No      Hx Pneumococcal Vaccination:  No      Encouraged to follow-up with:  PCP regarding preventative exams.      Chart initiated by      hermelinda burgos [facility]            ALLERGY/MEDS      Allergies      Coded Allergies:             NO KNOWN DRUG ALLERGIES (Unverified  Allergy, Unknown, 6/7/19)            Medications            Ferrous Sulfate (Ferrous Sulfate*) 325 Mg Tablet      325 MG PO BID, #60 TAB 0 Refills         Reported         6/7/19            IMPRESSION/PLAN      Diagnosis      LUCRETIA (iron deficiency anemia) - D50.9            Notes      1.  Iron deficiency anemia-continue ferrous sulfate 325 mg p.o. twice daily      Follow-up with Dr. Moseley for colonoscopy to rule out underlying sinister     process in the colon.            Patient will follow-up with us in July 2019 to go over the results of what     happened with a colonoscopy and also to recheck his CBC with differential, iron     profile and ferritin.      If all is well on the next visit, patient will be discharged back to his primary    care provider.      You very much for services.  Please do not hesitate to reach out for any     clarifications.      New Medications      * Ferrous Sulfate (Ferrous Sulfate*) 325 MG TABLET: 325 MG PO BID #60      New Diagnostics      * CBC With Auto Diff, 2 Months         Dx: LUCRETIA (iron deficiency anemia) - D50.9      * Ferritin Level, 2 Months         Dx: LUCRETIA (iron deficiency anemia) - D50.9      * Iron Profile, 2 Months         Dx: LUCRETIA (iron deficiency anemia) - D50.9                 Disclaimer: Converted document may not contain table formatting or lab diagrams. Please see [email or web address] for the authenticated  document.

## 2021-05-28 NOTE — PROGRESS NOTES
Patient: ISRAEL DAVIS     Acct: [id number]     Report: #[id number]  UNIT #: [id number]     : 1955    Encounter Date:2020  PRIMARY CARE: CARLEEN HAWKINS  ***Signed***  --------------------------------------------------------------------------------------------------------------------  TELEHEALTH NOTE      Past Oncology Illness History      Mr. Cha is a 65-year-old -American man who was referred to me by Dr. Lepe for evaluation and treatment of prostate cancer.            Patient was initially diagnosed with prostate cancer in .  On 2015 he     underwent radical prostatectomy and LN dissection which showed a Zach 4+3 = 7    prostate cancer.  There were several high risk features such as extraprostatic     extension, seminal vesicle invasion, positive margins at the bladder neck and     right and left seminal vesicle.  Lymph vascular invasion was indeterminate.      Perineural invasion was not commented on. Final pathology [id number] R1.  He was    treated with adjuvant radiation by Dr. Stanton.            According to records the patient likely started Lupron in 2017 when his    PSA started to rise.  The patient took a break in 2019.  He states he was    not aware he was to continue.  He restarted Lupron on 2019 after it was     noted that his PSA chago from 0.46 up to 2.55 on 2019.  Unfortunately his     PSA continued to rise after that to 4.54 on 3/10/2020.            PSA summary:      20 PSA 2.55 (restarted Lupron on )      3/10/20 PSA 4.54      20 PSA 11.45      20 PSA 12.13      2020 PSA 17.95      2020 PSA 7.17            CT of the pelvis on 2019 showed the previous prostatectomy but was     negative for local recurrence or metastatic disease.      Bone scan on 2019 shows a chronic focus of increased activity in the left     supraorbital region.  Metastatic disease was considered  unlikely.            CT of the chest with contrast on 7/7/2024 cough and weight loss.  This     unfortunately showed multiple sclerotic thoracic vertebral body lesions     suspicious for bony metastatic disease.  The largest lesion in the T10 vertebral    body measures 2.3 cm.  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            Started Zytiga on 8/27/20.            History of Present Illness            Chief Complaint: PROSTATE CA            Semaj Medina is presenting for evaluation via Telehealth visit by phone.     Verbal consent obtained before beginning visit.            Provider spent (7) minutes with the patient during telehealth visit.            The following staff were present during the visit: (Sara Zapien, RN)                         Overview of Symptoms      I contacted the patient today to follow-up on his recent PSA test and to see how    he is doing on his Zytiga.  He took a break off the medication due to anxiety,     irritability, and pain in his feet hands and low back.  He since restarted the     medication at 500 mg a day.  He takes 2 tablets at 250 mg each which is half the    recommended dose.  However, with this lower dose he does have less side effects.     He does have some cramping in his hands and persistent low back pain but these     are tolerable.            The patient did state that he has had some issue with the medication not being     refilled.  He was unclear what the issue was.  He did recently lose his job but     currently has Medicare and Medicaid.  He states that we do have his up-to-date     insurance.  He will try to contact the pharmacy and find out what the issue is.            Most Recent Lab Findings      Laboratory Tests      11/5/20 14:06            Allergies/Medications      Allergies:        Coded Allergies:             NO KNOWN DRUG ALLERGIES (Verified  Allergy, Unknown, 8/31/20)      Medications    Last Reconciled on 11/6/20 17:03 by  HARSHAL BERNSTEIN      Dronabinol (Dronabinol) 5 Mg Capsule      5 MG PO BID, #60 CAP 3 Refills         Prov: HARSHAL BERNSTEIN         8/31/20       PARoxetine HCl (PARoxetine HCl) 20 Mg Tablet      20 MG PO QDAY, #30 TAB 3 Refills         Prov: HARSHAL BERNSTEIN         8/31/20       LORazepam (LORazepam) 0.5 Mg Tablet      0.25 MG PO QDAY, #10 TAB         Prov: HARSHAL BERNSTEIN         8/31/20       Dronabinol (Dronabinol) 5 Mg Capsule      5 MG PO BID, #60 CAP 3 Refills         Reported         7/17/20       (Injection)   No Conflict Check               Reported         6/30/20       Ferrous Sulfate (Ferrous Sulfate*) 325 Mg Tablet      325 MG PO BID, #60 TAB 0 Refills         Reported         6/7/19            Plan/Instructions            * 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  He is currently on Zytiga at 500       mg/day due to hand cramping and low back pain.  His PSA has significantly       improved.  We will discuss increasing the dose of the medication at his next       follow-up appointment.  He needs to be restarted on Lupron as he was       previously getting it at his urologist office.  I will also discuss starting       Xgeva when the patient comes in for his next visit.  We did not get the       opportunity to talk about that today but this could help to alleviate his back      pain if it is related to the underlying disease.            * Chronic conditions reviewed and taken into consideration for today's treatment      plan.      * Patient instructed to seek medical attention urgently for new or worsening       symptoms.      * Patient was educated/instructed on their diagnosis, treatment and medications       prior to discharge from the clinic today.      Codes:  Phone Eval 5-10 min [id number]            Electronically signed by HARSHAL BERNSTEIN  11/06/2020 17:03       Disclaimer: Converted document may not contain table formatting or lab  diagrams. Please see [employer] for the authenticated document.

## 2021-05-28 NOTE — PROGRESS NOTES
Patient: ISRAEL DAVIS     Acct: [id number]     Report: #[id number]  UNIT #: [id number]     : 1955    Encounter Date:2020  PRIMARY CARE: CARLEEN HAWKINS  ***Signed***  --------------------------------------------------------------------------------------------------------------------  DATE: 20      Chief Complaint      HEPATITIS C W/O HEPATIC COMA            Allergies      Coded Allergies:             NO KNOWN DRUG ALLERGIES (Unverified  Allergy, Unknown, 19)            Medications      Last Reconciled on 20 13:12 by SERGEY PEREZ      Sofosbuvir/Velpatasvir (Epclusa 400 mg-100 mg Tablet) 1 Each Tablet      1 TAB PO QDAY, #28 TAB 2 Refills         Prov: Sergey Villatoro cpx         12/10/19       HYDROcodone-Acetaminophen 7.5-325 Mg (HYDROcodone-Acetaminophen 7.5-325 Mg) 1     Tab Tablet      1 TAB PO Q6H PRN for PAIN for 3 Days, #12 TAB 0 Refills         Prov: MARGE SKAGGS         19       Sucralfate (Carafate) 1 Gm Tab      1 GM PO ACHS, #120 TAB 0 Refills         Prov: MARGE SKAGGS         19       Pantoprazole (Protonix) 40 Mg Tablet.dr      40 MG PO BIDAC, #60 TAB 0 Refills         Prov: MARGE SKAGGS         19       Multivitamins (Multi-Vitamin) 1 Each Tablet      1 TAB PO QDAY, #30 TAB 0 Refills         Reported         19       Ferrous Sulfate (Ferrous Sulfate*) 325 Mg Tablet      325 MG PO BID, #60 TAB 0 Refills         Reported         19            Vitals      Height 5 ft 9.12 in / 175.56 cm      Weight 131 lbs 15.972 oz / 59.874 kg      BSA 1.73 m2      BMI 19.4 kg/m2      Blood Pressure 149/82            Yes: Hx Bowel Surgery (COLONOSCOPY), Hx Surgeries (PROSTATE REMOVAL); No: Hx     Abdominal Surgery, Hx Appendectomy, Hx Bladder Surgery, Hx CABG, Hx     Cholecystectomy, Hx Vascular Surgery      Social History:  Tobacco Use (.5 PPD), Alcohol Use (6 PACK A WEEK)      Smoking status:  Current every day smoker,  Light tobacco smoker      Smoking packs/day:  0.5      Substance use:  Denies use, Marijuana      Medical History:  Yes: Hx Arthritis (UNDIAGNOSED BUT CURRENTLY UNDER     OBSERVATION), HX CANCER (PROSTATE CANCER ), Hx Hypertension (SLIGHTLY ELEVATED      -NO MEDICATION REQUIRED CURRENTLY ), Hx Medical Other (HEPATITIS C ); No: Hx     Asthma, Hx Congestive Heart Failu, Hx COPD, Hx Diabetes, Hx Heart Attack, Hx S    eizures, Hx Sleep Apnea            PREVENTION      Hx Influenza Vaccination:  Yes      Date Influenza Vaccine Given:  Nov 14, 2018      Influenza Vaccine Declined:  No      Hx Pneumococcal Vaccination:  No      Encouraged to follow-up with:  PCP regarding preventative exams.            General:  No Fatigue, No Weight Loss      HEENT:  No Dysphagia, No Visual Changes      Respiratory:  No Cough, No Dyspnea      Cardiology:  No Chest Pain, No Palpitations      Gastrointestinal:  No Diarrhea, No Constipation      Genitourinary:  No Dysuria, No Frequency      Musculoskeletal:  No Joint Tenderness, No Joint Stiffness      Endocrine:  No Cold Intolerance, No Fatigue      Hematologic:  No Bleeding, No Bruising      Psychologic:  No Anxiety, No Depression      Neurologic:  No Confusion, No Weakness      Skin:  No Rash, No Open Wounds            Mr. Medina is a 64-year-old male who presents for follow-up of chronic hepatitis     C.  He has completed 8 weeks of treatment with Epclusa and he reports that he is    doing well.  Denies any side effects.            HEENT:  Atraumatic; No Scleral Icterus      Lungs:  CTAB, Breathing is unlabored      Abdomen:  Normal BS all 4 Quadrants, Soft      Cardiovascular:  Regular Rate and Rhythm; No Murmur      Constitutional:  Healthy appearing; No Acute Distress      Neurological:  Mental Status WNL, Alert+Ox3      Musculoskeletal:  Normal Bulk Strength, Normal Tone      Skin:  No Rash, No Swelling      Rectal:  Deferred            Lab Results      1/20/2020 CBC: Hemoglobin 12.1,  hematocrit 38, platelets 390.      CMP: Alk phos 59, AST 18, ALT 7, total bilirubin 0.26.      INR 1.02.      HCV quant-not detected.            Tricia Stiffness Consistent with:  F0-F1      CAP Score:  Normal/Mild Liver Fat            Current Plan      Continue Epclusa for a total of 12 weeks.      Chronic hepatitis C         Chronic hepatitis C without hepatic coma         Hepatic coma status: without hepatic coma            Notes      New Diagnostics      * CBC, Stat         Dx: Chronic hepatitis C - B18.2      * Comp Metabolic Panel, Stat         Dx: Chronic hepatitis C - B18.2      * HCV RNA QUANTITATIVE HCPCR, Stat         Dx: Chronic hepatitis C - B18.2      * PT / INR, Stat         Dx: Chronic hepatitis C - B18.2      Patient Education Provided:  Yes      Patient Instructions:  Avoid Alcohol, Avoid Illicit Drug Use, Importance of     keeping appointments      Disposition:  F/U 4 weeks            Electronically signed by Yojana Villatoro  02/21/2020 13:12       Disclaimer: Converted document may not contain table formatting or lab diagrams. Please see [employer] for the authenticated document.

## 2021-05-28 NOTE — PROGRESS NOTES
Patient: ISRAEL DAVIS     Acct: [id number]     Report: #[id number]  UNIT #: [id number]     : 1955    Encounter Date:2020  PRIMARY CARE: CARLEEN HAWKINS  ***Signed***  --------------------------------------------------------------------------------------------------------------------  DATE: 20      Chief Complaint      HEPATITIS C W/O HEPATIC COMA            Allergies      Coded Allergies:             NO KNOWN DRUG ALLERGIES (Verified  Allergy, Unknown, 20)            Medications      Last Reconciled on 20 13:09 by SERGEY PEREZ      Dronabinol (Dronabinol) 5 Mg Capsule      5 MG PO BID, #60 CAP 3 Refills         Prov: HARSHAL BERNSTEIN         20       PARoxetine HCl (PARoxetine HCl) 20 Mg Tablet      20 MG PO QDAY, #30 TAB 3 Refills         Prov: HARSHAL BERNSTEIN         20       LORazepam (LORazepam) 0.5 Mg Tablet      0.25 MG PO QDAY, #10 TAB         Prov: HARSHAL BERNSTEIN         20       Dronabinol (Dronabinol) 5 Mg Capsule      5 MG PO BID, #60 CAP 3 Refills         Reported         20       (Injection)   No Conflict Check               Reported         20       Ferrous Sulfate (Ferrous Sulfate*) 325 Mg Tablet      325 MG PO BID, #60 TAB 0 Refills         Reported         19            Vitals      Height 5 ft 9 in / 175.26 cm      Weight 130 lbs  / 58.256551 kg      BSA 1.72 m2      BMI 19.2 kg/m2      Blood Pressure 153/86            Yes: Hx Bowel Surgery (COLONOSCOPY), Hx Surgeries (PROSTATE REMOVAL); No: Hx A    bdominal Surgery, Hx Appendectomy, Hx Bladder Surgery, Hx CABG, Hx     Cholecystectomy, Hx Vascular Surgery      Social History:  Tobacco Use, Alcohol Use      Smoking status:  Current every day smoker, Light tobacco smoker      Smoking packs/day:  0.5      Substance use:  Denies use, Marijuana      Medical History:  Yes: Hx Arthritis (UNDIAGNOSED BUT CURRENTLY UNDER     OBSERVATION), HX CANCER (PROSTATE CANCER ), Hx  Hepatitis (HEPATITIS C), Hx     Hypertension (SLIGHTLY ELEVATED  -NO MEDICATION REQUIRED CURRENTLY ), Hx Medical     Other (HEPATITIS C ); No: Hx Asthma, Hx Congestive Heart Failu, Hx COPD, Hx     Diabetes, Hx Heart Attack, Hx Seizures, Hx Sleep Apnea            PREVENTION      Date Influenza Vaccine Given:  Oct 1, 2019      Influenza Vaccine Declined:  No      Encouraged to follow-up with:  PCP regarding preventative exams.            General:  No Fatigue, No Weight Loss      HEENT:  No Dysphagia, No Visual Changes      Respiratory:  No Cough, No Dyspnea      Cardiology:  No Chest Pain, No Palpitations      Gastrointestinal:  No Diarrhea, No Constipation      Genitourinary:  No Dysuria, No Frequency      Musculoskeletal:  No Joint Tenderness, No Joint Stiffness      Endocrine:  No Cold Intolerance, No Fatigue      Hematologic:  No Bleeding, No Bruising      Psychologic:  No Anxiety, No Depression      Neurologic:  No Confusion, No Weakness      Skin:  No Rash, No Open Wounds            Mr. Medina is a 66 y/o male with PMH of chronic HCV with undetermined genotype.      He completed treatment with Epclusa and presents for 6 month f/u.  He reports     that he's doing well.  Denies any current complaints.            HEENT:  Atraumatic; No Scleral Icterus      Lungs:  CTAB, Breathing is unlabored      Abdomen:  Normal BS all 4 Quadrants, Soft      Cardiovascular:  Regular Rate and Rhythm; No Murmur      Constitutional:  Healthy appearing; No Acute Distress      Neurological:  Mental Status WNL, Alert+Ox3      Musculoskeletal:  Normal Bulk Strength, Normal Tone      Skin:  No Rash, No Swelling      Rectal:  Deferred            Lab Results      8/31/2020 CBC: Hemoglobin 11.8, hematocrit 35.5, platelets 434.      CMP: Creatinine 0.92, alk phos 64, AST 13, ALT 5, total bilirubin 0.32.      HCV quant-not detected.            Tricia Stiffness Consistent with:  F0-F1      CAP Score:  Normal/Mild Liver Fat            Current Plan       Labs today to ensure SVR.      Chronic hepatitis C         Chronic hepatitis C without hepatic coma         Hepatic coma status: without hepatic coma            Notes      New Diagnostics      * CBC, Stat         Dx: Chronic hepatitis C - B18.2      * Comp Metabolic Panel, Stat         Dx: Chronic hepatitis C - B18.2      * HCV RNA QUANTITATIVE HCPCR, Stat         Dx: Chronic hepatitis C - B18.2      * PT / INR, Stat         Dx: Chronic hepatitis C - B18.2      Patient Education Provided:  Yes      Patient Instructions:  Avoid Alcohol, Avoid Illicit Drug Use, Importance of     keeping appointments      Disposition:  F/U PRN            Electronically signed by Yojana Villatoro  11/06/2020 13:09       Disclaimer: Converted document may not contain table formatting or lab diagrams. Please see [employer] for the authenticated document.

## 2021-06-08 DIAGNOSIS — D64.9 ANEMIA, UNSPECIFIED TYPE: ICD-10-CM

## 2021-06-08 DIAGNOSIS — C61 PROSTATE CANCER (HCC): Primary | ICD-10-CM

## 2021-06-11 ENCOUNTER — APPOINTMENT (OUTPATIENT)
Dept: ONCOLOGY | Facility: HOSPITAL | Age: 66
End: 2021-06-11

## 2021-06-16 PROBLEM — R61 NIGHT SWEATS: Status: ACTIVE | Noted: 2021-06-16

## 2021-06-16 PROBLEM — K63.5 COLON POLYPS: Status: ACTIVE | Noted: 2021-06-16

## 2021-06-16 PROBLEM — R20.0 NUMBNESS AND TINGLING OF LEFT LOWER EXTREMITY: Status: ACTIVE | Noted: 2019-05-17

## 2021-06-16 PROBLEM — D75.9 BLOOD DISEASE: Status: ACTIVE | Noted: 2021-06-16

## 2021-06-16 PROBLEM — C61 PROSTATE CANCER: Status: ACTIVE | Noted: 2021-06-16

## 2021-06-16 PROBLEM — D64.9 ANEMIA: Status: ACTIVE | Noted: 2019-05-17

## 2021-06-16 PROBLEM — R20.2 NUMBNESS AND TINGLING OF LEFT LOWER EXTREMITY: Status: ACTIVE | Noted: 2019-05-17

## 2021-06-16 PROBLEM — F41.9 ANXIETY: Status: ACTIVE | Noted: 2021-06-16

## 2021-06-16 PROBLEM — K57.92 DIVERTICULITIS: Status: ACTIVE | Noted: 2021-06-16

## 2021-06-16 PROBLEM — B19.20 HEPATITIS C: Status: ACTIVE | Noted: 2021-06-16

## 2021-06-16 PROBLEM — R68.89 FORGETFULNESS: Status: ACTIVE | Noted: 2021-06-16

## 2021-06-16 PROBLEM — F32.A DEPRESSION: Status: ACTIVE | Noted: 2021-06-16

## 2021-06-16 RX ORDER — POTASSIUM & SODIUM PHOSPHATES POWDER PACK 280-160-250 MG 280-160-250 MG
PACK ORAL
COMMUNITY
Start: 2021-04-25 | End: 2021-09-16

## 2021-06-16 RX ORDER — SUCRALFATE 1 G/1
TABLET ORAL
COMMUNITY
End: 2021-09-16

## 2021-06-16 RX ORDER — HYDROCODONE BITARTRATE AND ACETAMINOPHEN 7.5; 325 MG/1; MG/1
TABLET ORAL
COMMUNITY
End: 2021-10-21

## 2021-06-16 RX ORDER — LEUPROLIDE ACETATE 22.5 MG
22.5 KIT INTRAMUSCULAR
COMMUNITY

## 2021-06-16 RX ORDER — ABIRATERONE ACETATE 250 MG/1
TABLET ORAL
COMMUNITY
End: 2022-01-18

## 2021-06-16 RX ORDER — ERGOCALCIFEROL 1.25 MG/1
CAPSULE ORAL
COMMUNITY
Start: 2021-03-23 | End: 2021-09-16

## 2021-06-16 RX ORDER — DRONABINOL 5 MG/1
5 CAPSULE ORAL
COMMUNITY
End: 2021-09-16

## 2021-06-16 RX ORDER — LISINOPRIL 20 MG/1
TABLET ORAL
COMMUNITY
Start: 2021-03-23 | End: 2021-10-21

## 2021-06-16 RX ORDER — VELPATASVIR AND SOFOSBUVIR 100; 400 MG/1; MG/1
TABLET, FILM COATED ORAL
COMMUNITY
End: 2021-09-16

## 2021-06-16 RX ORDER — LORAZEPAM 0.5 MG/1
TABLET ORAL
COMMUNITY
End: 2021-10-21

## 2021-06-17 ENCOUNTER — OFFICE VISIT (OUTPATIENT)
Dept: ONCOLOGY | Facility: HOSPITAL | Age: 66
End: 2021-06-17

## 2021-06-17 ENCOUNTER — CLINICAL SUPPORT (OUTPATIENT)
Dept: ONCOLOGY | Facility: HOSPITAL | Age: 66
End: 2021-06-17

## 2021-06-17 ENCOUNTER — HOSPITAL ENCOUNTER (OUTPATIENT)
Dept: ONCOLOGY | Facility: HOSPITAL | Age: 66
Setting detail: INFUSION SERIES
Discharge: HOME | End: 2021-06-17

## 2021-06-17 VITALS
HEART RATE: 60 BPM | OXYGEN SATURATION: 100 % | DIASTOLIC BLOOD PRESSURE: 84 MMHG | BODY MASS INDEX: 20.02 KG/M2 | SYSTOLIC BLOOD PRESSURE: 147 MMHG | WEIGHT: 135.58 LBS | RESPIRATION RATE: 20 BRPM | TEMPERATURE: 97.8 F

## 2021-06-17 DIAGNOSIS — C79.51 PROSTATE CANCER METASTATIC TO BONE (HCC): Primary | ICD-10-CM

## 2021-06-17 DIAGNOSIS — R31.9 HEMATURIA, UNSPECIFIED TYPE: ICD-10-CM

## 2021-06-17 DIAGNOSIS — C61 PROSTATE CANCER METASTATIC TO BONE (HCC): ICD-10-CM

## 2021-06-17 DIAGNOSIS — C61 PROSTATE CANCER METASTATIC TO BONE (HCC): Primary | ICD-10-CM

## 2021-06-17 DIAGNOSIS — C61 PROSTATE CANCER (HCC): ICD-10-CM

## 2021-06-17 DIAGNOSIS — C79.51 PROSTATE CANCER METASTATIC TO BONE (HCC): ICD-10-CM

## 2021-06-17 DIAGNOSIS — D64.9 ANEMIA, UNSPECIFIED TYPE: ICD-10-CM

## 2021-06-17 DIAGNOSIS — D50.9 IRON DEFICIENCY ANEMIA, UNSPECIFIED IRON DEFICIENCY ANEMIA TYPE: ICD-10-CM

## 2021-06-17 LAB
ALBUMIN SERPL-MCNC: 3.78 G/DL (ref 3.5–5.2)
ALBUMIN/GLOB SERPL: 1.4 G/DL
ALP SERPL-CCNC: 57 U/L (ref 39–117)
ALT SERPL W P-5'-P-CCNC: 5 U/L (ref 1–41)
ANION GAP SERPL CALCULATED.3IONS-SCNC: 7.9 MMOL/L (ref 5–15)
AST SERPL-CCNC: 13 U/L (ref 1–40)
BASOPHILS # BLD AUTO: 0.01 10*3/MM3 (ref 0–0.2)
BASOPHILS NFR BLD AUTO: 0.2 % (ref 0–1.5)
BILIRUB SERPL-MCNC: 0.3 MG/DL (ref 0–1.2)
BUN SERPL-MCNC: 11 MG/DL (ref 8–23)
BUN/CREAT SERPL: 11.8 (ref 7–25)
CALCIUM SPEC-SCNC: 8.8 MG/DL (ref 8.6–10.5)
CHLORIDE SERPL-SCNC: 104 MMOL/L (ref 98–107)
CO2 SERPL-SCNC: 25.1 MMOL/L (ref 22–29)
CREAT SERPL-MCNC: 0.93 MG/DL (ref 0.76–1.27)
DEPRECATED RDW RBC AUTO: 61.8 FL (ref 37–54)
EOSINOPHIL # BLD AUTO: 0.14 10*3/MM3 (ref 0–0.4)
EOSINOPHIL NFR BLD AUTO: 2.6 % (ref 0.3–6.2)
ERYTHROCYTE [DISTWIDTH] IN BLOOD BY AUTOMATED COUNT: 20.5 % (ref 12.3–15.4)
FERRITIN SERPL-MCNC: 151.7 NG/ML (ref 30–400)
GFR SERPL CREATININE-BSD FRML MDRD: 99 ML/MIN/1.73
GLOBULIN UR ELPH-MCNC: 2.7 GM/DL
GLUCOSE SERPL-MCNC: 95 MG/DL (ref 65–99)
HCT VFR BLD AUTO: 39.4 % (ref 37.5–51)
HGB BLD-MCNC: 13.5 G/DL (ref 13–17.7)
IMM GRANULOCYTES # BLD AUTO: 0.01 10*3/MM3 (ref 0–0.05)
IMM GRANULOCYTES NFR BLD AUTO: 0.2 % (ref 0–0.5)
IRON 24H UR-MRATE: 82 MCG/DL (ref 59–158)
IRON SATN MFR SERPL: 31 % (ref 20–50)
LYMPHOCYTES # BLD AUTO: 1.75 10*3/MM3 (ref 0.7–3.1)
LYMPHOCYTES NFR BLD AUTO: 32.8 % (ref 19.6–45.3)
MAGNESIUM SERPL-MCNC: 1.6 MG/DL (ref 1.6–2.4)
MCH RBC QN AUTO: 28.7 PG (ref 26.6–33)
MCHC RBC AUTO-ENTMCNC: 34.3 G/DL (ref 31.5–35.7)
MCV RBC AUTO: 83.8 FL (ref 79–97)
MONOCYTES # BLD AUTO: 0.32 10*3/MM3 (ref 0.1–0.9)
MONOCYTES NFR BLD AUTO: 6 % (ref 5–12)
NEUTROPHILS NFR BLD AUTO: 3.1 10*3/MM3 (ref 1.7–7)
NEUTROPHILS NFR BLD AUTO: 58.2 % (ref 42.7–76)
PHOSPHATE SERPL-MCNC: 6.1 MG/DL (ref 2.5–4.5)
PLATELET # BLD AUTO: 332 10*3/MM3 (ref 140–450)
PMV BLD AUTO: 8.7 FL (ref 6–12)
POTASSIUM SERPL-SCNC: 4.4 MMOL/L (ref 3.5–5.2)
PROT SERPL-MCNC: 6.5 G/DL (ref 6–8.5)
PSA SERPL-MCNC: 5.2 NG/ML (ref 0–4)
RBC # BLD AUTO: 4.7 10*6/MM3 (ref 4.14–5.8)
SODIUM SERPL-SCNC: 137 MMOL/L (ref 136–145)
TIBC SERPL-MCNC: 264 MCG/DL (ref 298–536)
TRANSFERRIN SERPL-MCNC: 177 MG/DL (ref 200–360)
WBC # BLD AUTO: 5.33 10*3/MM3 (ref 3.4–10.8)

## 2021-06-17 PROCEDURE — 82728 ASSAY OF FERRITIN: CPT | Performed by: INTERNAL MEDICINE

## 2021-06-17 PROCEDURE — 96402 CHEMO HORMON ANTINEOPL SQ/IM: CPT

## 2021-06-17 PROCEDURE — 83735 ASSAY OF MAGNESIUM: CPT | Performed by: INTERNAL MEDICINE

## 2021-06-17 PROCEDURE — 99214 OFFICE O/P EST MOD 30 MIN: CPT | Performed by: INTERNAL MEDICINE

## 2021-06-17 PROCEDURE — 36415 COLL VENOUS BLD VENIPUNCTURE: CPT

## 2021-06-17 PROCEDURE — 84153 ASSAY OF PSA TOTAL: CPT | Performed by: INTERNAL MEDICINE

## 2021-06-17 PROCEDURE — 85025 COMPLETE CBC W/AUTO DIFF WBC: CPT | Performed by: INTERNAL MEDICINE

## 2021-06-17 PROCEDURE — 96372 THER/PROPH/DIAG INJ SC/IM: CPT

## 2021-06-17 PROCEDURE — 80053 COMPREHEN METABOLIC PANEL: CPT | Performed by: INTERNAL MEDICINE

## 2021-06-17 PROCEDURE — 84100 ASSAY OF PHOSPHORUS: CPT | Performed by: INTERNAL MEDICINE

## 2021-06-17 PROCEDURE — G0463 HOSPITAL OUTPT CLINIC VISIT: HCPCS | Performed by: INTERNAL MEDICINE

## 2021-06-17 PROCEDURE — 84466 ASSAY OF TRANSFERRIN: CPT | Performed by: INTERNAL MEDICINE

## 2021-06-17 PROCEDURE — 25010000002 DENOSUMAB 120 MG/1.7ML SOLUTION: Performed by: INTERNAL MEDICINE

## 2021-06-17 PROCEDURE — 25010000002 LEUPROLIDE 45 MG KIT: Performed by: INTERNAL MEDICINE

## 2021-06-17 PROCEDURE — 83540 ASSAY OF IRON: CPT | Performed by: INTERNAL MEDICINE

## 2021-06-17 RX ORDER — LISINOPRIL 40 MG/1
20 TABLET ORAL DAILY
COMMUNITY
Start: 2021-06-14 | End: 2022-10-13 | Stop reason: SDUPTHER

## 2021-06-17 RX ADMIN — LEUPROLIDE ACETATE 45 MG: KIT at 15:15

## 2021-06-17 RX ADMIN — DENOSUMAB 120 MG: 120 INJECTION SUBCUTANEOUS at 15:10

## 2021-06-17 NOTE — PROGRESS NOTES
Chief Complaint  Follow-up and Prostate Cancer    Referring Provider: Natalya Jack MD PhD  PCP: Maryse Monae MD    Subjective          Oncology/Hematology History Overview Note   Mr. Cha is a 65-year-old -American man who was referred to me by Dr. Lepe for evaluation and treatment of prostate cancer.    Patient was initially diagnosed with prostate cancer in 2015.  On 6/4/2015 he underwent radical prostatectomy and LN dissection which showed a Zach 4+3 = 7 prostate cancer.  There were several high risk features such as extraprostatic extension, seminal vesicle invasion, positive margins at the bladder neck and right and left seminal vesicle.  Lymph vascular invasion was indeterminate.  Perineural invasion was not commented on. Final pathology [id number] R1.  He was treated with adjuvant radiation by Dr. Stanton.    According to records the patient likely started Lupron in December 2017 when his PSA started to rise.  The patient took a break in August 2019.  He states he was not aware he was to continue.  He restarted Lupron on 11/26/2019 after it was noted that his PSA chago from 0.46 up to 2.55 on 11/8/2019.  Unfortunately his PSA continued to rise after that to 4.54 on 3/10/2020.    PSA summary:  11/8/20 PSA 2.55 (restarted Lupron on 11/26)  8/31/2020 PSA 17.95 (started Zytiga)    CT of the chest with contrast on 7/7/2020 cough and weight loss.  This unfortunately showed multiple sclerotic thoracic vertebral body lesions suspicious for bony metastatic disease.  The largest lesion in the T10 vertebral body measures 2.3 cm.  He also has moderate to severe upper lung pulmonary emphysema.    Started Zytiga on 8/27/20.  Unable to tolerate and swithced to Xtandi in May 2021.         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  History of Present Illness   Patient comes in today for follow-up after starting Xtandi and stopping Zytiga.  He is tolerating this medication much better.  His fatigue is not nearly as severe.  He does admit to missing 2 weeks of the medication.  He went out of town and forgot the medication at home.  He restarted it this past Monday.  He is also taking oral iron.  His hemoglobin has come up from 10.1 up to 13.5 today.  He does admit to having some hematuria on a couple of occasions over the past couple days.  He has not seen his urologist in more than a year.  He did have an EGD 2 months ago to evaluate for signs of occult bleeding but this was negative.  His only new complaint is that he has some pain in his neck or upper back over the past 2 to 3 weeks.  He is unsure if he is injured it but is of course concerned that this could be progression of disease.         Review of Systems   Constitutional: Positive for fatigue. Negative for appetite change, diaphoresis, fever, unexpected weight gain and unexpected weight loss.   HENT: Negative for hearing loss, mouth sores, sore throat, swollen glands, trouble swallowing and voice change.    Eyes: Negative for blurred vision.   Respiratory: Negative for cough, shortness of breath and wheezing.    Cardiovascular: Negative for chest pain and palpitations.   Gastrointestinal: Negative for abdominal pain, blood in stool, constipation, diarrhea, nausea and vomiting.   Endocrine: Negative for cold intolerance and heat intolerance.   Genitourinary: Positive for hematuria. Negative for difficulty urinating, dysuria, frequency and urinary incontinence.   Musculoskeletal: Positive for neck pain. Negative for arthralgias, back pain and myalgias.   Skin:  Negative for rash, skin lesions and bruise.   Neurological: Negative for dizziness, seizures, weakness, numbness and headache.   Hematological: Does not bruise/bleed easily.   Psychiatric/Behavioral: Negative for depressed mood. The patient is not nervous/anxious.        Past Medical History:   Diagnosis Date   • Anemia    • Anxiety    • Blood disease    • Colon polyps    • Depression    • Diverticulitis    • Forgetfulness    • Hepatitis C    • Night sweats    • Numbness and tingling of left lower extremity    • Prostate cancer (CMS/HCC)      Past Surgical History:   Procedure Laterality Date   • PROSTATE BIOPSY     • PROSTATECTOMY      ROBOT ASSISTED W PELVIC LYMPH NODE DISSECTION   • TRANSURETHRAL RESECTION OF BLADDER TUMOR       Social History     Socioeconomic History   • Marital status:      Spouse name: Not on file   • Number of children: Not on file   • Years of education: Not on file   • Highest education level: Not on file   Tobacco Use   • Smoking status: Current Some Day Smoker   Substance and Sexual Activity   • Alcohol use: Never     History reviewed. No pertinent family history.    Objective   Physical Exam   General: Alert, cooperative, no acute distress, well appearing  Eyes: Anicteric sclera, PERRLA  Respiratory: CTAB, normal respiratory effort  Abdomen: Normal active bowel sounds, no tenderness, no distention  Cardiovascular: RRR, no murmur, no lower extremity edema  Skin: Normal tone, no rash, no lesions  Psychiatric: Appropriate affect, intact judgment  Neurologic: No focal sensory or motor deficits, no weakness, numbness, dizziness  Musculoskeletal: Normal muscle strength and tone  Extremities: No clubbing, cyanosis, or deformities      Vitals:    06/17/21 1403   BP: 147/84   Pulse: 60   Resp: 20   Temp: 97.8 °F (36.6 °C)   TempSrc: Temporal   SpO2: 100%   Weight: 61.5 kg (135 lb 9.3 oz)   PainSc: 0-No pain     ECOG score: 0         PHQ-9 Total Score: 0                  Result Review :    The following data was reviewed by: Natalya Jack MD PhD on 06/17/2021:  Lab Results   Component Value Date    HGB 13.5 06/17/2021    HCT 39.4 06/17/2021    MCV 83.8 06/17/2021     06/17/2021    WBC 5.33 06/17/2021    NEUTROABS 3.10 06/17/2021    LYMPHSABS 1.75 06/17/2021    MONOSABS 0.32 06/17/2021    EOSABS 0.14 06/17/2021    BASOSABS 0.01 06/17/2021     Lab Results   Component Value Date    GLUCOSE 95 06/17/2021    BUN 11 06/17/2021    CREATININE 0.93 06/17/2021     06/17/2021    K 4.4 06/17/2021     06/17/2021    CO2 25.1 06/17/2021    CALCIUM 8.8 06/17/2021    PROTEINTOT 6.5 06/17/2021    ALBUMIN 3.78 06/17/2021    BILITOT 0.3 06/17/2021    ALKPHOS 57 06/17/2021    AST 13 06/17/2021    ALT 5 06/17/2021          Assessment and Plan    Diagnoses and all orders for this visit:    1. 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 Hematuria, unspecified type  -     Ambulatory Referral to Urology    3. 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  He had severe fatigue with the lowest dose of Zytiga and switched to full dose (160mg) Xtandi. He is tolerating it much better and his PSA is improving.  CBC, CMP, Mg, Phos are essentially normal today.  His PSA is pending.     Neck Pain: musculoskeletal vs disease related.  Sara will contact the pt with his PSA when it results.  If it is elevated we will repeat CT and bone scan.  Otherwise, the patient will contact me if the pain worsens.          Bone metastases: Continue Xgeva today.             Depressed/irritable mood: On paroxetine which is helping.            Nausea: improved after switching Medication.  Continue Zofran PRN.            Iron Deficiency Anemia: Tolerating oral iron with improved Hgb.  EGD and colonscopy reportedly negative. Will add on iron studies to  today's labs.            Patient was given instructions and counseling regarding his condition or for health maintenance advice. Please see specific information pulled into the AVS if appropriate.     Natalya Jack MD PhD    6/17/2021

## 2021-06-18 ENCOUNTER — APPOINTMENT (OUTPATIENT)
Dept: ONCOLOGY | Facility: HOSPITAL | Age: 66
End: 2021-06-18

## 2021-06-21 ENCOUNTER — TELEPHONE (OUTPATIENT)
Dept: UROLOGY | Facility: CLINIC | Age: 66
End: 2021-06-21

## 2021-08-16 ENCOUNTER — TELEPHONE (OUTPATIENT)
Dept: ONCOLOGY | Facility: HOSPITAL | Age: 66
End: 2021-08-16

## 2021-08-16 NOTE — TELEPHONE ENCOUNTER
remberto     Caller: Semaj Medina Osborne    Relationship to patient: Self    Best call back number: [phone]    Chief complaint: PATIENT NEEDS TO RESCHEDULE APPTS. FROM 8/12/21    Type of visit: INJECTION/LAB    Requested date: Thursday/FRIDAY OF THIS WEEK    If rescheduling, when is the original appointment: 8/12/21    Additional notes: PLEASE CONTACT PATIENT TO ADVISE.  LEAVE VM IF NO ANSWER.

## 2021-08-19 ENCOUNTER — LAB (OUTPATIENT)
Dept: ONCOLOGY | Facility: HOSPITAL | Age: 66
End: 2021-08-19

## 2021-08-19 ENCOUNTER — HOSPITAL ENCOUNTER (OUTPATIENT)
Dept: ONCOLOGY | Facility: HOSPITAL | Age: 66
Setting detail: INFUSION SERIES
Discharge: HOME | End: 2021-08-19

## 2021-08-19 VITALS
RESPIRATION RATE: 18 BRPM | DIASTOLIC BLOOD PRESSURE: 89 MMHG | HEART RATE: 63 BPM | SYSTOLIC BLOOD PRESSURE: 153 MMHG | TEMPERATURE: 97.8 F

## 2021-08-19 DIAGNOSIS — C61 PROSTATE CANCER METASTATIC TO BONE (HCC): ICD-10-CM

## 2021-08-19 DIAGNOSIS — C79.51 PROSTATE CANCER METASTATIC TO BONE (HCC): ICD-10-CM

## 2021-08-19 DIAGNOSIS — C61 PROSTATE CANCER METASTATIC TO BONE (HCC): Primary | ICD-10-CM

## 2021-08-19 DIAGNOSIS — C79.51 PROSTATE CANCER METASTATIC TO BONE (HCC): Primary | ICD-10-CM

## 2021-08-19 LAB
ALBUMIN SERPL-MCNC: 3.53 G/DL (ref 3.5–5.2)
ALBUMIN/GLOB SERPL: 1.3 G/DL
ALP SERPL-CCNC: 48 U/L (ref 39–117)
ALT SERPL W P-5'-P-CCNC: 6 U/L (ref 1–41)
ANION GAP SERPL CALCULATED.3IONS-SCNC: 8.6 MMOL/L (ref 5–15)
AST SERPL-CCNC: 14 U/L (ref 1–40)
BASOPHILS # BLD AUTO: 0 10*3/MM3 (ref 0–0.2)
BASOPHILS NFR BLD AUTO: 0 % (ref 0–1.5)
BILIRUB SERPL-MCNC: 0.2 MG/DL (ref 0–1.2)
BUN SERPL-MCNC: 8 MG/DL (ref 8–23)
BUN/CREAT SERPL: 8.8 (ref 7–25)
CALCIUM SPEC-SCNC: 8.9 MG/DL (ref 8.6–10.5)
CHLORIDE SERPL-SCNC: 108 MMOL/L (ref 98–107)
CO2 SERPL-SCNC: 24.4 MMOL/L (ref 22–29)
CREAT SERPL-MCNC: 0.91 MG/DL (ref 0.76–1.27)
DEPRECATED RDW RBC AUTO: 46.7 FL (ref 37–54)
EOSINOPHIL # BLD AUTO: 0.1 10*3/MM3 (ref 0–0.4)
EOSINOPHIL NFR BLD AUTO: 2.4 % (ref 0.3–6.2)
ERYTHROCYTE [DISTWIDTH] IN BLOOD BY AUTOMATED COUNT: 14.5 % (ref 12.3–15.4)
GFR SERPL CREATININE-BSD FRML MDRD: 101 ML/MIN/1.73
GLOBULIN UR ELPH-MCNC: 2.7 GM/DL
GLUCOSE SERPL-MCNC: 95 MG/DL (ref 65–99)
HCT VFR BLD AUTO: 39.9 % (ref 37.5–51)
HGB BLD-MCNC: 13.5 G/DL (ref 13–17.7)
IMM GRANULOCYTES # BLD AUTO: 0.02 10*3/MM3 (ref 0–0.05)
IMM GRANULOCYTES NFR BLD AUTO: 0.5 % (ref 0–0.5)
LYMPHOCYTES # BLD AUTO: 1.77 10*3/MM3 (ref 0.7–3.1)
LYMPHOCYTES NFR BLD AUTO: 42.7 % (ref 19.6–45.3)
MAGNESIUM SERPL-MCNC: 1.7 MG/DL (ref 1.6–2.4)
MCH RBC QN AUTO: 29.9 PG (ref 26.6–33)
MCHC RBC AUTO-ENTMCNC: 33.8 G/DL (ref 31.5–35.7)
MCV RBC AUTO: 88.5 FL (ref 79–97)
MONOCYTES # BLD AUTO: 0.34 10*3/MM3 (ref 0.1–0.9)
MONOCYTES NFR BLD AUTO: 8.2 % (ref 5–12)
NEUTROPHILS NFR BLD AUTO: 1.92 10*3/MM3 (ref 1.7–7)
NEUTROPHILS NFR BLD AUTO: 46.2 % (ref 42.7–76)
PHOSPHATE SERPL-MCNC: 4.4 MG/DL (ref 2.5–4.5)
PLATELET # BLD AUTO: 341 10*3/MM3 (ref 140–450)
PMV BLD AUTO: 8.6 FL (ref 6–12)
POTASSIUM SERPL-SCNC: 3.8 MMOL/L (ref 3.5–5.2)
PROT SERPL-MCNC: 6.2 G/DL (ref 6–8.5)
RBC # BLD AUTO: 4.51 10*6/MM3 (ref 4.14–5.8)
SODIUM SERPL-SCNC: 141 MMOL/L (ref 136–145)
WBC # BLD AUTO: 4.15 10*3/MM3 (ref 3.4–10.8)

## 2021-08-19 PROCEDURE — 83735 ASSAY OF MAGNESIUM: CPT

## 2021-08-19 PROCEDURE — 36415 COLL VENOUS BLD VENIPUNCTURE: CPT

## 2021-08-19 PROCEDURE — 96372 THER/PROPH/DIAG INJ SC/IM: CPT

## 2021-08-19 PROCEDURE — 85025 COMPLETE CBC W/AUTO DIFF WBC: CPT

## 2021-08-19 PROCEDURE — 80053 COMPREHEN METABOLIC PANEL: CPT

## 2021-08-19 PROCEDURE — 25010000002 DENOSUMAB 120 MG/1.7ML SOLUTION: Performed by: INTERNAL MEDICINE

## 2021-08-19 PROCEDURE — 84100 ASSAY OF PHOSPHORUS: CPT

## 2021-08-19 RX ADMIN — DENOSUMAB 120 MG: 120 INJECTION SUBCUTANEOUS at 12:26

## 2021-09-16 ENCOUNTER — OFFICE VISIT (OUTPATIENT)
Dept: ONCOLOGY | Facility: HOSPITAL | Age: 66
End: 2021-09-16

## 2021-09-16 ENCOUNTER — HOSPITAL ENCOUNTER (OUTPATIENT)
Dept: ONCOLOGY | Facility: HOSPITAL | Age: 66
Setting detail: INFUSION SERIES
Discharge: HOME | End: 2021-09-16

## 2021-09-16 ENCOUNTER — LAB (OUTPATIENT)
Dept: ONCOLOGY | Facility: HOSPITAL | Age: 66
End: 2021-09-16

## 2021-09-16 VITALS
BODY MASS INDEX: 19.86 KG/M2 | HEART RATE: 65 BPM | TEMPERATURE: 97 F | DIASTOLIC BLOOD PRESSURE: 94 MMHG | RESPIRATION RATE: 16 BRPM | WEIGHT: 134.48 LBS | SYSTOLIC BLOOD PRESSURE: 135 MMHG | OXYGEN SATURATION: 100 %

## 2021-09-16 VITALS
SYSTOLIC BLOOD PRESSURE: 135 MMHG | RESPIRATION RATE: 18 BRPM | HEART RATE: 60 BPM | TEMPERATURE: 97.8 F | DIASTOLIC BLOOD PRESSURE: 83 MMHG

## 2021-09-16 DIAGNOSIS — D64.9 ANEMIA, UNSPECIFIED TYPE: ICD-10-CM

## 2021-09-16 DIAGNOSIS — C61 PROSTATE CANCER METASTATIC TO BONE (HCC): Primary | ICD-10-CM

## 2021-09-16 DIAGNOSIS — C79.51 PROSTATE CANCER METASTATIC TO BONE (HCC): Primary | ICD-10-CM

## 2021-09-16 DIAGNOSIS — C61 PROSTATE CANCER METASTATIC TO BONE (HCC): ICD-10-CM

## 2021-09-16 DIAGNOSIS — C79.51 PROSTATE CANCER METASTATIC TO BONE (HCC): ICD-10-CM

## 2021-09-16 LAB
ALBUMIN SERPL-MCNC: 3.71 G/DL (ref 3.5–5.2)
ALBUMIN/GLOB SERPL: 1.2 G/DL
ALP SERPL-CCNC: 55 U/L (ref 39–117)
ALT SERPL W P-5'-P-CCNC: 6 U/L (ref 1–41)
ANION GAP SERPL CALCULATED.3IONS-SCNC: 8.9 MMOL/L (ref 5–15)
AST SERPL-CCNC: 16 U/L (ref 1–40)
BASOPHILS # BLD AUTO: 0.01 10*3/MM3 (ref 0–0.2)
BASOPHILS NFR BLD AUTO: 0.2 % (ref 0–1.5)
BILIRUB SERPL-MCNC: 0.2 MG/DL (ref 0–1.2)
BUN SERPL-MCNC: 7 MG/DL (ref 8–23)
BUN/CREAT SERPL: 7.2 (ref 7–25)
CALCIUM SPEC-SCNC: 8.3 MG/DL (ref 8.6–10.5)
CHLORIDE SERPL-SCNC: 106 MMOL/L (ref 98–107)
CO2 SERPL-SCNC: 25.1 MMOL/L (ref 22–29)
CREAT SERPL-MCNC: 0.97 MG/DL (ref 0.76–1.27)
DEPRECATED RDW RBC AUTO: 47.4 FL (ref 37–54)
EOSINOPHIL # BLD AUTO: 0.11 10*3/MM3 (ref 0–0.4)
EOSINOPHIL NFR BLD AUTO: 1.8 % (ref 0.3–6.2)
ERYTHROCYTE [DISTWIDTH] IN BLOOD BY AUTOMATED COUNT: 14 % (ref 12.3–15.4)
GFR SERPL CREATININE-BSD FRML MDRD: 94 ML/MIN/1.73
GLOBULIN UR ELPH-MCNC: 3 GM/DL
GLUCOSE SERPL-MCNC: 101 MG/DL (ref 65–99)
HCT VFR BLD AUTO: 44.1 % (ref 37.5–51)
HGB BLD-MCNC: 15 G/DL (ref 13–17.7)
IMM GRANULOCYTES # BLD AUTO: 0.01 10*3/MM3 (ref 0–0.05)
IMM GRANULOCYTES NFR BLD AUTO: 0.2 % (ref 0–0.5)
LYMPHOCYTES # BLD AUTO: 2.27 10*3/MM3 (ref 0.7–3.1)
LYMPHOCYTES NFR BLD AUTO: 37.6 % (ref 19.6–45.3)
MAGNESIUM SERPL-MCNC: 2 MG/DL (ref 1.6–2.4)
MCH RBC QN AUTO: 31.1 PG (ref 26.6–33)
MCHC RBC AUTO-ENTMCNC: 34 G/DL (ref 31.5–35.7)
MCV RBC AUTO: 91.3 FL (ref 79–97)
MONOCYTES # BLD AUTO: 0.43 10*3/MM3 (ref 0.1–0.9)
MONOCYTES NFR BLD AUTO: 7.1 % (ref 5–12)
NEUTROPHILS NFR BLD AUTO: 3.21 10*3/MM3 (ref 1.7–7)
NEUTROPHILS NFR BLD AUTO: 53.1 % (ref 42.7–76)
PHOSPHATE SERPL-MCNC: 4.9 MG/DL (ref 2.5–4.5)
PLATELET # BLD AUTO: 355 10*3/MM3 (ref 140–450)
PMV BLD AUTO: 9 FL (ref 6–12)
POTASSIUM SERPL-SCNC: 4.4 MMOL/L (ref 3.5–5.2)
PROT SERPL-MCNC: 6.7 G/DL (ref 6–8.5)
RBC # BLD AUTO: 4.83 10*6/MM3 (ref 4.14–5.8)
SODIUM SERPL-SCNC: 140 MMOL/L (ref 136–145)
WBC # BLD AUTO: 6.04 10*3/MM3 (ref 3.4–10.8)

## 2021-09-16 PROCEDURE — 25010000002 DENOSUMAB 120 MG/1.7ML SOLUTION: Performed by: INTERNAL MEDICINE

## 2021-09-16 PROCEDURE — 99214 OFFICE O/P EST MOD 30 MIN: CPT | Performed by: INTERNAL MEDICINE

## 2021-09-16 PROCEDURE — 84153 ASSAY OF PSA TOTAL: CPT

## 2021-09-16 PROCEDURE — 84100 ASSAY OF PHOSPHORUS: CPT

## 2021-09-16 PROCEDURE — 80053 COMPREHEN METABOLIC PANEL: CPT

## 2021-09-16 PROCEDURE — 85025 COMPLETE CBC W/AUTO DIFF WBC: CPT

## 2021-09-16 PROCEDURE — 96372 THER/PROPH/DIAG INJ SC/IM: CPT

## 2021-09-16 PROCEDURE — 83735 ASSAY OF MAGNESIUM: CPT

## 2021-09-16 PROCEDURE — 36415 COLL VENOUS BLD VENIPUNCTURE: CPT

## 2021-09-16 RX ORDER — AMOXICILLIN 500 MG/1
CAPSULE ORAL
COMMUNITY
Start: 2021-09-14 | End: 2021-11-30

## 2021-09-16 RX ADMIN — DENOSUMAB 120 MG: 120 INJECTION SUBCUTANEOUS at 09:22
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
## 2021-09-17 LAB — PSA SERPL-MCNC: 10.2 NG/ML (ref 0–4)

## 2021-09-30 DIAGNOSIS — C61 PROSTATE CANCER (HCC): Primary | ICD-10-CM

## 2021-10-13 ENCOUNTER — HOSPITAL ENCOUNTER (OUTPATIENT)
Dept: NUCLEAR MEDICINE | Facility: HOSPITAL | Age: 66
Discharge: HOME | End: 2021-10-13

## 2021-10-13 ENCOUNTER — HOSPITAL ENCOUNTER (OUTPATIENT)
Dept: CT IMAGING | Facility: HOSPITAL | Age: 66
Discharge: HOME | End: 2021-10-13

## 2021-10-13 DIAGNOSIS — C61 PROSTATE CANCER (HCC): ICD-10-CM

## 2021-10-13 PROCEDURE — 71260 CT THORAX DX C+: CPT

## 2021-10-13 PROCEDURE — A9503 TC99M MEDRONATE: HCPCS | Performed by: INTERNAL MEDICINE

## 2021-10-13 PROCEDURE — 0 TECHNETIUM MEDRONATE KIT: Performed by: INTERNAL MEDICINE

## 2021-10-13 PROCEDURE — 74177 CT ABD & PELVIS W/CONTRAST: CPT

## 2021-10-13 PROCEDURE — 78306 BONE IMAGING WHOLE BODY: CPT

## 2021-10-13 PROCEDURE — 0 IOPAMIDOL PER 1 ML: Performed by: INTERNAL MEDICINE

## 2021-10-13 RX ORDER — TC 99M MEDRONATE 20 MG/10ML
20.7 INJECTION, POWDER, LYOPHILIZED, FOR SOLUTION INTRAVENOUS
Status: COMPLETED | OUTPATIENT
Start: 2021-10-13 | End: 2021-10-13

## 2021-10-13 RX ADMIN — TC 99M MEDRONATE 20.7 MILLICURIE: 20 INJECTION, POWDER, LYOPHILIZED, FOR SOLUTION INTRAVENOUS at 13:40

## 2021-10-13 RX ADMIN — IOPAMIDOL 100 ML: 755 INJECTION, SOLUTION INTRAVENOUS at 14:04

## 2021-10-14 ENCOUNTER — OFFICE VISIT (OUTPATIENT)
Dept: ONCOLOGY | Facility: HOSPITAL | Age: 66
End: 2021-10-14

## 2021-10-14 ENCOUNTER — LAB (OUTPATIENT)
Dept: ONCOLOGY | Facility: HOSPITAL | Age: 66
End: 2021-10-14

## 2021-10-14 ENCOUNTER — HOSPITAL ENCOUNTER (OUTPATIENT)
Dept: ONCOLOGY | Facility: HOSPITAL | Age: 66
Setting detail: INFUSION SERIES
Discharge: HOME | End: 2021-10-14

## 2021-10-14 VITALS
WEIGHT: 133.16 LBS | OXYGEN SATURATION: 100 % | BODY MASS INDEX: 19.66 KG/M2 | TEMPERATURE: 98 F | SYSTOLIC BLOOD PRESSURE: 142 MMHG | DIASTOLIC BLOOD PRESSURE: 96 MMHG | HEART RATE: 66 BPM | RESPIRATION RATE: 18 BRPM

## 2021-10-14 DIAGNOSIS — C61 PROSTATE CANCER (HCC): ICD-10-CM

## 2021-10-14 DIAGNOSIS — C61 PROSTATE CANCER METASTATIC TO BONE (HCC): ICD-10-CM

## 2021-10-14 DIAGNOSIS — C79.51 PROSTATE CANCER METASTATIC TO BONE (HCC): Primary | ICD-10-CM

## 2021-10-14 DIAGNOSIS — C61 PROSTATE CANCER METASTATIC TO BONE (HCC): Primary | ICD-10-CM

## 2021-10-14 DIAGNOSIS — C79.51 PROSTATE CANCER METASTATIC TO BONE (HCC): ICD-10-CM

## 2021-10-14 LAB
ALBUMIN SERPL-MCNC: 3.46 G/DL (ref 3.5–5.2)
ALBUMIN/GLOB SERPL: 1.3 G/DL
ALP SERPL-CCNC: 48 U/L (ref 39–117)
ALT SERPL W P-5'-P-CCNC: 5 U/L (ref 1–41)
ANION GAP SERPL CALCULATED.3IONS-SCNC: 7.6 MMOL/L (ref 5–15)
AST SERPL-CCNC: 13 U/L (ref 1–40)
BASOPHILS # BLD AUTO: 0.02 10*3/MM3 (ref 0–0.2)
BASOPHILS NFR BLD AUTO: 0.4 % (ref 0–1.5)
BILIRUB SERPL-MCNC: 0.3 MG/DL (ref 0–1.2)
BUN SERPL-MCNC: 7 MG/DL (ref 8–23)
BUN/CREAT SERPL: 7.5 (ref 7–25)
CALCIUM SPEC-SCNC: 9.2 MG/DL (ref 8.6–10.5)
CHLORIDE SERPL-SCNC: 107 MMOL/L (ref 98–107)
CO2 SERPL-SCNC: 23.4 MMOL/L (ref 22–29)
CREAT SERPL-MCNC: 0.93 MG/DL (ref 0.76–1.27)
DEPRECATED RDW RBC AUTO: 43.8 FL (ref 37–54)
EOSINOPHIL # BLD AUTO: 0.11 10*3/MM3 (ref 0–0.4)
EOSINOPHIL NFR BLD AUTO: 2.2 % (ref 0.3–6.2)
ERYTHROCYTE [DISTWIDTH] IN BLOOD BY AUTOMATED COUNT: 13.4 % (ref 12.3–15.4)
GFR SERPL CREATININE-BSD FRML MDRD: 99 ML/MIN/1.73
GLOBULIN UR ELPH-MCNC: 2.7 GM/DL
GLUCOSE SERPL-MCNC: 105 MG/DL (ref 65–99)
HCT VFR BLD AUTO: 41.1 % (ref 37.5–51)
HGB BLD-MCNC: 14.1 G/DL (ref 13–17.7)
IMM GRANULOCYTES # BLD AUTO: 0.01 10*3/MM3 (ref 0–0.05)
IMM GRANULOCYTES NFR BLD AUTO: 0.2 % (ref 0–0.5)
LYMPHOCYTES # BLD AUTO: 1.68 10*3/MM3 (ref 0.7–3.1)
LYMPHOCYTES NFR BLD AUTO: 33.7 % (ref 19.6–45.3)
MAGNESIUM SERPL-MCNC: 1.8 MG/DL (ref 1.6–2.4)
MCH RBC QN AUTO: 30.5 PG (ref 26.6–33)
MCHC RBC AUTO-ENTMCNC: 34.3 G/DL (ref 31.5–35.7)
MCV RBC AUTO: 88.8 FL (ref 79–97)
MONOCYTES # BLD AUTO: 0.37 10*3/MM3 (ref 0.1–0.9)
MONOCYTES NFR BLD AUTO: 7.4 % (ref 5–12)
NEUTROPHILS NFR BLD AUTO: 2.79 10*3/MM3 (ref 1.7–7)
NEUTROPHILS NFR BLD AUTO: 56.1 % (ref 42.7–76)
PHOSPHATE SERPL-MCNC: 4.2 MG/DL (ref 2.5–4.5)
PLATELET # BLD AUTO: 341 10*3/MM3 (ref 140–450)
PMV BLD AUTO: 9.1 FL (ref 6–12)
POTASSIUM SERPL-SCNC: 3.8 MMOL/L (ref 3.5–5.2)
PROT SERPL-MCNC: 6.2 G/DL (ref 6–8.5)
PSA SERPL-MCNC: 11.9 NG/ML (ref 0–4)
RBC # BLD AUTO: 4.63 10*6/MM3 (ref 4.14–5.8)
SODIUM SERPL-SCNC: 138 MMOL/L (ref 136–145)
WBC # BLD AUTO: 4.98 10*3/MM3 (ref 3.4–10.8)

## 2021-10-14 PROCEDURE — 80053 COMPREHEN METABOLIC PANEL: CPT

## 2021-10-14 PROCEDURE — 84403 ASSAY OF TOTAL TESTOSTERONE: CPT

## 2021-10-14 PROCEDURE — 25010000002 DENOSUMAB 120 MG/1.7ML SOLUTION: Performed by: INTERNAL MEDICINE

## 2021-10-14 PROCEDURE — 36415 COLL VENOUS BLD VENIPUNCTURE: CPT

## 2021-10-14 PROCEDURE — 96372 THER/PROPH/DIAG INJ SC/IM: CPT

## 2021-10-14 PROCEDURE — 84402 ASSAY OF FREE TESTOSTERONE: CPT

## 2021-10-14 PROCEDURE — 99215 OFFICE O/P EST HI 40 MIN: CPT | Performed by: INTERNAL MEDICINE

## 2021-10-14 PROCEDURE — 83735 ASSAY OF MAGNESIUM: CPT

## 2021-10-14 PROCEDURE — 85025 COMPLETE CBC W/AUTO DIFF WBC: CPT

## 2021-10-14 PROCEDURE — 84153 ASSAY OF PSA TOTAL: CPT

## 2021-10-14 PROCEDURE — 84100 ASSAY OF PHOSPHORUS: CPT

## 2021-10-14 RX ADMIN — DENOSUMAB 120 MG: 120 INJECTION SUBCUTANEOUS at 10:23

## 2021-10-14 NOTE — PROGRESS NOTES
Patient  Semaj Osborne [place]    Location  [facility] HEMATOLOGY & ONCOLOGY    Chief Complaint  Prostate Cancer (-F1)    Referring Provider: Natalya Jack MD PhD  PCP: Maryse Monae MD    Subjective          Oncology/Hematology History Overview Note   Mr. Cha is a 65-year-old -American man who was referred to me by Dr. Lepe for evaluation and treatment of prostate cancer.    Patient was initially diagnosed with prostate cancer in 2015.  On 6/4/2015 he underwent radical prostatectomy and LN dissection which showed a [place] 4+3 = 7 prostate cancer.  There were several high risk features such as extraprostatic extension, seminal vesicle invasion, positive margins at the bladder neck and right and left seminal vesicle.  Lymph vascular invasion was indeterminate.  Perineural invasion was not commented on. Final pathology [id number] R1.  He was treated with adjuvant radiation by Dr. Stanton.    According to records the patient likely started Lupron in December 2017 when his PSA started to rise.  The patient took a break in August 2019.  He states he was not aware he was to continue.  He restarted Lupron on 11/26/2019 after it was noted that his PSA chago from 0.46 up to 2.55 on 11/8/2019.  Unfortunately his PSA continued to rise after that to 4.54 on 3/10/2020.    PSA summary:  11/8/19 PSA 2.55 (restarted Lupron on 11/26)  8/31/2020 PSA 17.95 (started Zytiga)  Started Xtandi in May 2021 due to intolerance of Zytiga even at the lowest dose       Prostate cancer metastatic to bone (HCC)   12/11/2020 -  Chemotherapy    OP SUPPORTIVE Leuprolide 45 mg Q6M     3/23/2021 -  Chemotherapy    OP SUPPORTIVE Denosumab (Xgeva) Q28D     4/15/2021 -  Chemotherapy    OP PROSTATE Enzalutamide     5/19/2021 Initial Diagnosis    Prostate cancer metastatic to bone (CMS/HCC)         History of Present Illness  Patient comes in today for follow-up on his recent scans.  CT of the chest, abdomen, and pelvis  and bone scan confirmed progression of a T10 vertebral body lesion.  I reviewed the images myself and confirmed that this is consistent with the site of his reported pain. There was no size comparison on the newest image read and I cannot pull up the past images to directly compare. It is unclear if this is a sign of castration resistance or simply a lesion that developed when he was on an adequate dose of Zytiga due to side effects.  Now the patient is tolerating Xtandi.  He is interested in referral to Dr. Falcon in radiation oncology.  He previously saw Dr. Stanton but would not want radiation in [place].     CT Chest With Contrast Diagnostic    Result Date: 10/13/2021  PROCEDURE: CT CHEST W CONTRAST DIAGNOSTIC  COMPARISON:  [facility], CT, CHEST W/ CONTRAST, 7/07/2020, 15:09.  [facility], CT, ABDOMEN/PELVIS WITH CONTRAST, 7/28/2020, 10:13. INDICATIONS: restaging scan/hx. of prostate ca. 2014/elevated PSA  TECHNIQUE: After obtaining the patient's consent, CT images were obtained with non-ionic intravenous contrast material.   PROTOCOL:   Standard imaging protocol performed    RADIATION:   DLP: 82.8mGy*cm   Automated exposure control was utilized to minimize radiation dose. CONTRAST: 100cc Isovue 370 I.V. LABS:   eGFR: >60ml/min/1.73m2  FINDINGS:  Today's study is compared previous CT of the chest performed on 7/7/2020.  Today's study reveals no evidence of mass or adenopathy in the base the neck or in the periclavicular soft tissues or in the axillary soft tissues.  No evidence of mass or adenopathy in the mediastinum the right or left pulmonary felicia.  No evidence of thrombus or embolus in the right or left pulmonary artery branches.  There is mild dilatation of the ascending thoracic aorta.  No evidence of hiatal hernia.  Evidence of infiltrate or effusion or pneumothorax or mass or metastatic disease in the right or left lungs.  There diffuse increased markings consistent  with chronic lung disease and emphysema.  There are several focal areas of abnormal increased bone density scattered throughout the thoracic spine worse at the T10 level where there is a large sclerotic lesion that is increased in size since previous study and is consistent with blastic metastatic bone disease.  CONCLUSION:  1. Enlarging sclerotic bone lesion in the T10 thoracic vertebral body is consistent with blastic metastatic bone disease. 2. No evidence of thrombus or embolus in the right or left pulmonary artery branches. 3. Chronic lung disease and emphysema. 4. No evidence of pneumonia or pleural effusion or pneumothorax or mass or metastatic disease in the right or left lungs.      TAMIKO FLORES MD       Electronically Signed and Approved By: TAMIKO FLORES MD on 10/13/2021 at 14:39             NM Bone Scan Whole Body    Result Date: 10/13/2021  PROCEDURE: NM BONE SCAN WHOLE BODY  COMPARISON: [facility], CT, CT CHEST W CONTRAST DIAGNOSTIC, 10/13/2021, 13:59.  [facility], NM, BONE SCAN, 11/22/2019, 15:51.  INDICATIONS: restaging scan  TECHNIQUE: After obtaining the patient's consent, Technetium 99m MDP was injected intravenously. Images were obtained approximately two hours later.   RADIONUCLIDE:         20.7 mci    Tc99m MDP - I.V.  FINDINGS:  Today's study is compared to CT of the chest that included the thoracic spine performed on 10/13/2021 and is compared to previous whole body nuclear bone scan imaging study performed on 11/22/2019..  Today's study reveals that there is a new focal area of abnormal increased nuclear bone activity in the approximately T10 thoracic vertebral body that is consistent with a blastic sclerotic metastatic bone lesion.  Normal symmetric nuclear activity is seen in the right left kidneys.  Radial active urine is seen normally in the urinary bladder.  CONCLUSION:  1. Findings consistent with a new blastic sclerotic metastatic bone lesion  in the approximately T10 thoracic vertebral body new since previous whole body nuclear bone scan imaging study performed on 11/22/2020     TAMIKO FLORES MD       Electronically Signed and Approved By: TAMIKO FLORES MD on 10/13/2021 at 16:52             CT Abdomen Pelvis With Contrast    Result Date: 10/13/2021  PROCEDURE: CT ABDOMEN PELVIS W CONTRAST  COMPARISON: [facility], CT, ABDOMEN/PELVIS WITH CONTRAST, 7/28/2020, 10:13.  INDICATIONS: restaging scan/ELEVATED PSA/HX. OF PROSTATE CA. 2014  TECHNIQUE: After obtaining the patient's consent, CT images were created with non-ionic intravenous contrast material.   PROTOCOL:   Standard imaging protocol performed    RADIATION:   DLP: 219.4mGy*cm   Automated exposure control was utilized to minimize radiation dose. CONTRAST: 100cc Isovue 370 I.V. LABS:   eGFR: >60ml/min/1.73m2  FINDINGS:  Again seen is a hemangioma within the caudate lobe of the liver.  Within the inferior right lobe of the liver there are 2 additional hemangiomas which are unchanged.  Small, 8 mm area of enhancement is seen more anteriorly within the right lobe of the liver also unchanged, likely an additional hemangioma.  No suspicious liver lesion identified.  Pancreas and spleen are within normal limits.  Bilateral adrenal glands appear normal.  Kidneys appear within normal limits bilaterally.  There is no evidence of hydronephrosis.  There is no abdominal or retroperitoneal adenopathy.  The upper GI tract is within normal limits.  Gallbladder appears normal.  Pelvis:  Urinary bladder is again noted to be diffusely thickened which may be secondary to treatment related change or chronic bladder outlet obstruction.  Patient appears to be status post prostatectomy.  No pelvic or inguinal adenopathy identified.  No free intraperitoneal fluid.  There are multiple colonic diverticula.  No evidence of diverticulitis.  The appendix is normal.  There is no free intraperitoneal  fluid.  There is a stable 7 mm sclerotic area within the left iliac bone, most likely a bone island.  There are additional sclerotic areas more inferiorly within the left iliac bone which are also unchanged no definite new lytic or sclerotic bony lesion identified.  CONCLUSION:  1. Status post prostatectomy 2. Marked thickening of the urinary bladder unchanged from prior study which may be related to chronic bladder outlet obstruction or treatment related change 3. No definite metastatic disease seen within the abdomen or pelvis.     SYLVIA SALGADO MD       Electronically Signed and Approved By: SYLVIA SALGADO MD on 10/13/2021 at 15:31                 Review of Systems   Constitutional: Positive for fatigue. Negative for appetite change, diaphoresis, fever, unexpected weight gain and unexpected weight loss.   HENT: Negative for hearing loss, sore throat and voice change.    Eyes: Negative for blurred vision, double vision, pain, redness and visual disturbance.   Respiratory: Negative for cough, shortness of breath and wheezing.    Cardiovascular: Negative for chest pain, palpitations and leg swelling.   Endocrine: Negative for cold intolerance, heat intolerance, polydipsia and polyuria.   Genitourinary: Negative for decreased urine volume, difficulty urinating, frequency and urinary incontinence.   Musculoskeletal: Positive for back pain. Negative for arthralgias, joint swelling and myalgias.   Skin: Negative for color change, rash, skin lesions and bruise.   Neurological: Negative for dizziness, seizures, numbness and headache.   Hematological: Negative for adenopathy. Does not bruise/bleed easily.   Psychiatric/Behavioral: Negative for depressed mood. The patient is not nervous/anxious.        Past Medical History:   Diagnosis Date   • Anemia    • Anxiety    • Blood disease    • Colon polyps    • Depression    • Diverticulitis    • Forgetfulness    • Hepatitis C    • Night sweats    • Numbness and tingling of left  lower extremity    • Prostate cancer (HCC)      Past Surgical History:   Procedure Laterality Date   • PROSTATE BIOPSY     • PROSTATECTOMY      ROBOT ASSISTED W PELVIC LYMPH NODE DISSECTION   • TRANSURETHRAL RESECTION OF BLADDER TUMOR       Social History     Socioeconomic History   • Marital status:    Tobacco Use   • Smoking status: Current Some Day Smoker   Substance and Sexual Activity   • Alcohol use: Never     History reviewed. No pertinent family history.    Objective   Physical Exam  General: Alert, cooperative, no acute distress  Eyes: Anicteric sclera, PERRLA  Respiratory: normal respiratory effort  Cardiovascular: no lower extremity edema  Skin: Normal tone, no rash, no lesions  Psychiatric: Appropriate affect, intact judgment  Neurologic: No focal sensory or motor deficits, normal cognition   Musculoskeletal: Normal muscle strength and tone  Extremities: No clubbing, cyanosis, or deformities    Vitals:    10/14/21 0923   BP: 142/96   Pulse: 66   Resp: 18   Temp: 98 °F (36.7 °C)   SpO2: 100%   Weight: 60.4 kg (133 lb 2.5 oz)   PainSc: 0-No pain     ECOG score: 0         PHQ-9 Total Score:         Result Review :   The following data was reviewed by: Natalya Jack MD PhD on 10/14/2021:  Lab Results   Component Value Date    HGB 14.1 10/14/2021    HCT 41.1 10/14/2021    MCV 88.8 10/14/2021     10/14/2021    WBC 4.98 10/14/2021    NEUTROABS 2.79 10/14/2021    LYMPHSABS 1.68 10/14/2021    MONOSABS 0.37 10/14/2021    EOSABS 0.11 10/14/2021    BASOSABS 0.02 10/14/2021     Lab Results   Component Value Date    GLUCOSE 105 (H) 10/14/2021    BUN 7 (L) 10/14/2021    CREATININE 0.93 10/14/2021     10/14/2021    K 3.8 10/14/2021     10/14/2021    CO2 23.4 10/14/2021    CALCIUM 9.2 10/14/2021    PROTEINTOT 6.2 10/14/2021    ALBUMIN 3.46 (L) 10/14/2021    BILITOT 0.3 10/14/2021    ALKPHOS 48 10/14/2021    AST 13 10/14/2021    ALT 5 10/14/2021          Assessment and Plan    Diagnoses and  all orders for this visit:    1. Prostate cancer metastatic to bone (HCC) (Primary)  -     Ambulatory Referral to Radiation Oncology    2. Prostate cancer (HCC)      The patient does have evidence of progression of disease on recent images but the only site is at T10 where he is experiencing pain.  I will refer him to radiation oncology for palliative radiation and continue the current regimen.  This lesion may have progressed while the patient was on a sub-therapeutic dose of the prior treatment and it will take time to see if he continues to benefit from androgen deprivation. I reviewed his CBC and CMP today and there is no evidence of treatment toxicity.  His PSA does continue to rise.   I will f/u with him in 2 months with repeat CBC, CMP, and PSA.  I will also plan to send [facility] testing for other treatment options when needed. We can also discuss genetics testing at the next visit also.      Patient was given instructions and counseling regarding his condition or for health maintenance advice. Please see specific information pulled into the AVS if appropriate.     Natalya Jack MD PhD    10/14/2021

## 2021-10-18 LAB
TESTOST FREE SERPL-MCNC: 0.9 PG/ML (ref 6.6–18.1)
TESTOST SERPL-MCNC: 6 NG/DL (ref 264–916)

## 2021-10-21 ENCOUNTER — CONSULT (OUTPATIENT)
Dept: RADIATION ONCOLOGY | Facility: HOSPITAL | Age: 66
End: 2021-10-21

## 2021-10-21 VITALS
WEIGHT: 133.7 LBS | HEART RATE: 62 BPM | HEIGHT: 69 IN | DIASTOLIC BLOOD PRESSURE: 91 MMHG | TEMPERATURE: 98.2 F | BODY MASS INDEX: 19.8 KG/M2 | RESPIRATION RATE: 16 BRPM | OXYGEN SATURATION: 100 % | SYSTOLIC BLOOD PRESSURE: 145 MMHG

## 2021-10-21 DIAGNOSIS — C79.51 PROSTATE CANCER METASTATIC TO BONE (HCC): Primary | ICD-10-CM

## 2021-10-21 DIAGNOSIS — C61 PROSTATE CANCER METASTATIC TO BONE (HCC): Primary | ICD-10-CM

## 2021-10-21 PROCEDURE — 99205 OFFICE O/P NEW HI 60 MIN: CPT | Performed by: RADIOLOGY

## 2021-10-21 PROCEDURE — G0463 HOSPITAL OUTPT CLINIC VISIT: HCPCS

## 2021-10-21 RX ORDER — HYDROCODONE BITARTRATE AND ACETAMINOPHEN 7.5; 325 MG/1; MG/1
1 TABLET ORAL EVERY 6 HOURS PRN
Qty: 56 TABLET | Refills: 0 | Status: SHIPPED | OUTPATIENT
Start: 2021-10-21 | End: 2021-11-04

## 2021-10-21 NOTE — PROGRESS NOTES
New Patient Office Visit      Encounter Date: 10/21/2021   Patient Name: Semaj Medina  YOB: 1955   Medical Record Number: [id number]   Primary Diagnosis: Prostate cancer metastatic to bone (HCC) [C61, C79.51]   Cancer Staging: Cancer Staging  No matching staging information was found for the patient.    Chief Complaint:    Chief Complaint   Patient presents with   • Consult     Prostate cancer w/bone metastasis       History of Present Illness: Semaj Medina is a 65 y.o. male who is here for new consult in radiation oncology due to a site of metastatic disease from his prostate cancer.  He was originally diagnosed in 2015 and his prostate cancer was managed surgically.  He was found to have pT3b pN0 4+3 disease.  He had positive margins at the bladder neck.  He received adjuvant radiation therapy with Dr. Stanton [facility].  In 2017, his PSA began to rise and he was started on Lupron.  He took a brief holiday.  In 2020 he was started on Zytiga.  He tolerated this poorly and his dose was reduced multiple times.  More recently he was switched to Xtandi and is now under the care of Dr. Natalya Jack.     His PSA has been trending upward in recent months.  6/2021 - 5.2 ng/ml  9/2021 - 10.2 ng/ml  10/2021 - 11.9 ng/ml    He had a bone scan and CT imaging.  The bone scan identified a T10 lesion.  CT imaging of the abdomen and pelvis did not reveal any pelvic recurrence/lymphadenopathy and no evidence of additional metastatic disease.  CT imaging of the chest was negative for metastases with the exception of the known T10 bone lesion that had increased in size compared to a prior CT from July 2020.     Today, he reports discomfort in his lower thoracic spine that interferes with his ability to verbally perform his activities of daily living.  He has difficulty sleeping due to this pain.  He has a few tablets of narcotic pain medication at home and uses them  sparingly in order to get relief.  He denies issues with strength or sensation in his upper or lower extremities.  He has stable issues with incontinence following his prostatectomy and radiation and wears a pad for protection.  He has no acute worsening of his urinary control and has fecal continence.       Subjective      Prior Radiation History: yes  Pacemaker or ICD: no  Pregnant or Nursing: no    Review of Systems: Review of Systems   Constitutional: Positive for appetite change (decreased appetite) and fatigue.   HENT: Negative for trouble swallowing.    Eyes: Positive for visual disturbance (scheduled for cataract surgery in the next week).   Respiratory: Positive for cough (pt reports r/t hx of emphysema) and shortness of breath (with exertion).    Cardiovascular: Negative for leg swelling.   Gastrointestinal: Negative for constipation, diarrhea and nausea.   Genitourinary: Negative for difficulty urinating, dysuria, frequency and urgency.   Musculoskeletal: Positive for back pain (intermittent). Negative for arthralgias and gait problem.   Neurological: Negative for dizziness and headaches.   Psychiatric/Behavioral: Positive for sleep disturbance (occasional, pt r/t stress or caffeine intake later in the evening).       Past Medical History:   Past Medical History:   Diagnosis Date   • Anemia    • Anxiety    • Blood disease    • Colon polyps    • Depression    • Diverticulitis    • Forgetfulness    • Hepatitis C    • Night sweats    • Numbness and tingling of left lower extremity    • Prostate cancer (HCC)        Past Surgical History:   Past Surgical History:   Procedure Laterality Date   • PROSTATE BIOPSY     • PROSTATECTOMY      ROBOT ASSISTED W PELVIC LYMPH NODE DISSECTION   • TRANSURETHRAL RESECTION OF BLADDER TUMOR         Family History:   Family History   Problem Relation Age of Onset   • Cancer Mother    • Cancer Father        Social History:   Social History     Socioeconomic History   • Marital  status:    Tobacco Use   • Smoking status: Current Every Day Smoker     Packs/day: 0.50     Start date: 10/21/1970   • Smokeless tobacco: Never Used   Vaping Use   • Vaping Use: Never used   Substance and Sexual Activity   • Alcohol use: Yes     Alcohol/week: 6.0 standard drinks     Types: 6 Cans of beer per week   • Drug use: Never   • Sexual activity: Defer       Medications:     Current Outpatient Medications:   •  abiraterone acetate (ZYTIGA) 250 MG chemo tablet, abiraterone 250 mg oral tablet take 4 tablets (1,000 mg) by oral route once daily with water on an empty stomach at least 1 hour before or 2 hours after food   Active, Disp: , Rfl:   •  amoxicillin (AMOXIL) 500 MG capsule, TAKE ONE CAPSULE BY MOUTH EVERY 8 HOURS UNTIL ALL TAKEN, Disp: , Rfl:   •  leuprolide (Lupron [place], 3-Month,) 22.5 MG injection, 22.5 mg., Disp: , Rfl:   •  lisinopril (PRINIVIL,ZESTRIL) 40 MG tablet, 20 mg Daily., Disp: , Rfl:   •  HYDROcodone-acetaminophen (NORCO) 7.5-325 MG per tablet, Take 1 tablet by mouth Every 6 (Six) Hours As Needed for Moderate Pain  for up to 14 days., Disp: 56 tablet, Rfl: 0    Allergies:   No Known Allergies    Measures:   Pain: (on a scale of 0-10)   Pain Score    10/21/21 0918   PainSc: 0-No pain   He is currently comfortable.  When his pain flares, he reports it to be unbearable.  I have called him in a prescription for [place] to use as needed.     Advanced Care Plan: N Advanced Care Planning was discussed. The patient does not have a living will documented in the medical record and declined to perform one today.  KPS/Quality of Life: 80 - Restricted Physical Activity  ECOG: (2) Ambulatory & Capable of Self Care, Unable to Carry Out Work Activity, Up & About Greater Than 50% of Waking Hours  Depression Screenin Patient screened positive for depression based on a PHQ-9 score of 2 on 10/21/2021. Follow-up recommendations include: Elevated PHQ score reflective of acute illness, not  "depression.      Objective     Physical Exam:   Vital Signs:   Vitals:    10/21/21 0918   BP: 145/91   Pulse: 62   Resp: 16   Temp: 98.2 °F (36.8 °C)   TempSrc: Temporal   SpO2: 100%   Weight: 60.6 kg (133 lb 11.2 oz)   Height: 175.3 cm (69\")   PainSc: 0-No pain     Body mass index is 19.74 kg/m².     Constitutional: The patient is a well-developed, well-nourished male  in no acute distress.  Alert and oriented ×3.  General: NAD, sitting comfortably  Eye: EOMI, anicteric sclerae  HENT: NC/AT, MMM  Neck: No JVD or cervical lymphadenopathy  Respiratory: Symmetric expansion, nonlabored respiration  Cardiovascular: Regular rate and rhythm.  No murmurs, rubs, or gallops are appreciated.  Abdomen: nontender, nondistended.   Musculoskeletal: No obvious joint deformities, range of motion intact in all 4 extremities.  Tenderness to palpation at T10.   Neuro: Alert oriented x3, cranial nerves III through XII are grossly intact, with no focal neurological deficits noted on exam.  Psych: Mood and affect appropriate    Results:   PSA, CT imaging, bone scan -reviewed with findings summarized in HPI above    Assessment / Plan      Assessment: Semaj Medina is a pleasant 65 y.o. male with a known diagnosis of metastatic prostate cancer with a site of disease in the T10 vertebral body.  Despite being on Xtandi, his PSA continues to climb.  A suspicious lesion was identified on bone scan and CT at the T10 vertebral body and he is experiencing pain at the site.   He is here to discuss radiation therapy.    He has a prior radiation history, specifically adjuvant radiation following prostatectomy at the time of his original diagnosis.  We will review these records and make sure that future treatment is done in a safe manner.     Plan:   Diagnoses and all orders for this visit:    1. Prostate cancer metastatic to bone (HCC) (Primary)  We spoke about palliative radiation today.  He is interested in proceeding.    I would like to " further characterize his lesion on an MRI to assist with selection of radiation technique, volume, and dosing.  I have ordered the MRI and we will see him back upon completion.  We will obtain consent for radiation planning and treatment at that time.    His pain is interfering with his activities of daily living and his ability to sleep at night.  I have prescribed him a narcotic pain medication to take as needed for his cancer related pain.  I would expect his pain to improve following his treatment course.       -     HYDROcodone-acetaminophen (NORCO) 7.5-325 MG per tablet; Take 1 tablet by mouth Every 6 (Six) Hours As Needed for Moderate Pain  for up to 14 days.  Dispense: 56 tablet; Refill: 0  -     MRI Thoracic Spine With & Without Contrast; Future         Follow Up:  Return in about 1 week (around 10/28/2021) for Please follow up after MRI spine. .      Time:   I spent 65 minutes on this encounter today, 10/21/21. Activities that took place during this time include: preparing to see the patient, obtaining history, reviewing separately obtained history, performing a medically appropriate examination and evaluation, counseling and educating the patient, documenting clinical information in the health record, and coordinating care for this patient.     Sincerely,        Jessica Arita MD  Radiation Oncology  [facility]    This document has been signed by Jessica Arita MD on October 21, 2021 10:14 EDT             Patient Information:   NOTICE TO PATIENTS-HEALTH RESULTS RELEASE    We believe in information transparency, and we believe you deserve to see your information as soon as it is available.    Lab Results    We release ALL notes and results to you promptly.    Therefore, you may see some results even before we do. Please give us 2 business days to review and let you know our thoughts.  We look at every result. We will contact you with any results that concern us.  Some results may show abnormal,  but are not clinically important. An example is “MCHC” and “MCV”.   Other results (like “TASHA”) are really challenging to interpret, and require reviewing other results and other information from your chart. Sometimes these are best discussed in person.    Imaging    CT scan, MRI, and PET reports can show new or re-occurring cancer  These reports can contain words that are hard to understand  These reports can also show unexpected results.  We ALWAYS plan to review these results with you and decide on a plan together. We prefer to do this in person, by video visit, or phone.  When possible, we will discuss the possible results with you BEFORE getting the test, and the next steps we would take with each result.  Some patients prefer to see their results online as soon as possible. Because of possible “bad news,” other patients may feel more comfortable waiting to discuss results when their provider is available at their next video visit or in-person visit or by phone. As the patient, you can choose when to view your result

## 2021-10-25 ENCOUNTER — HOSPITAL ENCOUNTER (OUTPATIENT)
Dept: MRI IMAGING | Facility: HOSPITAL | Age: 66
End: 2021-10-25

## 2021-10-26 ENCOUNTER — HOSPITAL ENCOUNTER (OUTPATIENT)
Dept: MRI IMAGING | Facility: HOSPITAL | Age: 66
Discharge: HOME | End: 2021-10-26
Admitting: RADIOLOGY

## 2021-10-26 DIAGNOSIS — C61 PROSTATE CANCER METASTATIC TO BONE (HCC): ICD-10-CM

## 2021-10-26 DIAGNOSIS — C79.51 PROSTATE CANCER METASTATIC TO BONE (HCC): ICD-10-CM

## 2021-10-26 PROCEDURE — 0 GADOBENATE DIMEGLUMINE 529 MG/ML SOLUTION: Performed by: RADIOLOGY

## 2021-10-26 PROCEDURE — A9577 INJ MULTIHANCE: HCPCS | Performed by: RADIOLOGY

## 2021-10-26 PROCEDURE — 72157 MRI CHEST SPINE W/O & W/DYE: CPT

## 2021-10-26 RX ADMIN — GADOBENATE DIMEGLUMINE 12 ML: 529 INJECTION, SOLUTION INTRAVENOUS at 11:01

## 2021-10-28 ENCOUNTER — OFFICE VISIT (OUTPATIENT)
Dept: RADIATION ONCOLOGY | Facility: HOSPITAL | Age: 66
End: 2021-10-28

## 2021-10-28 VITALS
TEMPERATURE: 98.7 F | RESPIRATION RATE: 16 BRPM | WEIGHT: 130.07 LBS | DIASTOLIC BLOOD PRESSURE: 109 MMHG | BODY MASS INDEX: 19.21 KG/M2 | OXYGEN SATURATION: 97 % | HEART RATE: 77 BPM | SYSTOLIC BLOOD PRESSURE: 160 MMHG

## 2021-10-28 DIAGNOSIS — C79.51 PROSTATE CANCER METASTATIC TO BONE (HCC): Primary | ICD-10-CM

## 2021-10-28 DIAGNOSIS — C61 PROSTATE CANCER METASTATIC TO BONE (HCC): Primary | ICD-10-CM

## 2021-10-28 PROCEDURE — G0463 HOSPITAL OUTPT CLINIC VISIT: HCPCS

## 2021-10-28 PROCEDURE — 99215 OFFICE O/P EST HI 40 MIN: CPT | Performed by: RADIOLOGY

## 2021-10-28 NOTE — PROGRESS NOTES
Follow Up Office Visit      Encounter Date: 10/28/2021   Patient Name: Semaj Medina  YOB: 1955   Medical Record Number: [id number]   Primary Diagnosis: Prostate cancer metastatic to bone (HCC) [C61, C79.51]   Cancer Stagin      Chief Complaint:    Chief Complaint   Patient presents with   • Follow-up   • Prostate Cancer     w/ bone mets       Oncologic History: Semaj Medina is a 66 y.o. male here for follow up regarding a site of metastatic disease from his prostate cancer.  He was originally diagnosed in  and his prostate cancer was managed surgically.  He was found to have pT3b pN0 4+3 disease.  He had positive margins at the bladder neck.  He received adjuvant radiation therapy with Dr. Stanton [facility].  In , his PSA began to rise and he was started on Lupron.  He took a brief holiday.  In  he was started on Zytiga.  He tolerated this poorly and his dose was reduced multiple times.  More recently he was switched to Xtandi and is now under the care of Dr. Natalya Jack.      His PSA has been trending upward in recent months.  2021 - 5.2 ng/ml  2021 - 10.2 ng/ml  10/2021 - 11.9 ng/ml     He had a bone scan and CT imaging.  The bone scan identified a T10 lesion.  CT imaging of the abdomen and pelvis did not reveal any pelvic recurrence/lymphadenopathy and no evidence of additional metastatic disease.  CT imaging of the chest was negative for metastases with the exception of the known T10 bone lesion that had increased in size compared to a prior CT from 2020.     Interval History: I ordered an MRI to further characterize his T10 lesion to assist with radiation treatment planning decisions.  He had the scan and is here to discuss results.  He has ongoing discomfort in his lower thoracic spine that interferes with activities of daily living.  Prescribed narcotic pain medications for him to take after his last visit.  These  help with his pain.  He is currently taking them 3 times daily.  He denies issues with strength or sensation in upper or lower extremities.  He has stable incontinence.  He has no acute worsening of his urinary control.  He has fecal continence.  He has no new unexplained pains.     Prior Radiation: adjuvant prostate radiation 2015 ([place])        Subjective      Review of Systems: Review of Systems   Constitutional: Positive for fatigue (mild per pt.). Negative for appetite change.   HENT: Negative for trouble swallowing.    Eyes: Negative for visual disturbance.   Respiratory: Positive for shortness of breath (with exertion). Negative for cough.    Cardiovascular: Negative for leg swelling.   Gastrointestinal: Negative for constipation and diarrhea.   Genitourinary: Negative for difficulty urinating, dysuria, frequency, hematuria and urgency.   Musculoskeletal: Positive for back pain. Negative for gait problem.   Neurological: Negative for dizziness and headaches.   Psychiatric/Behavioral: Negative for sleep disturbance.       The following portions of the patient's history were reviewed and updated as appropriate: allergies, current medications, past family history, past medical history, past social history, past surgical history and problem list.    Measures:   Pain: (on a scale of 0-10)   Pain Score    10/28/21 1112   PainSc:   6   PainLoc: Back     Semaj Medina reports a pain score of 6.  Given his pain assessment as noted, treatment options were discussed and the following options were decided upon as a follow-up plan to address the patient's pain: continuation of current treatment plan for pain.    Advanced Care Plan: N Advanced Care Planning was discussed. The patient does not have a living will documented in the medical record and declined to perform one today.  KPS/Quality of Life: 80 - Restricted Physical Activity  ECOG: (1) Restricted in physically strenuous activity, ambulatory and able to do  work of light nature  Depression Screening:   Patient screened positive for depression based on a PHQ-9 score of 2 on 10/21/2021. Follow-up recommendations include: Elevated PHQ score reflective of acute illness, not depression.        Objective     Physical Exam:   Vital Signs:   Vitals:    10/28/21 1112   BP: (!) 160/109   Pulse: 77   Resp: 16   Temp: 98.7 °F (37.1 °C)   TempSrc: Temporal   SpO2: 97%   Weight: 59 kg (130 lb 1.1 oz)   PainSc:   6   PainLoc: Back     Body mass index is 19.21 kg/m².     Constitutional: No acute distress, sitting comfortably  Eye: EOMI, anicteric sclerae  HENT: NC/AT, MMM   Respiratory: Symmetric expansion, nonlabored respiration  MSK: ROM intact in all four extremities, no obvious deformities. Point tenderness in lower thoracic spine. Non tender to palpation in mid thoracic spine.  Neuro: Alert, oriented x3, CN3-12 grossly intact.   Psych: Appropriate mood and affect.    Results:   Imaging: Images were reviewed personally and pertinent findings are detailed below.   MRI Thoracic Spine 10/21/21 - T10 vertebral body metastatic deposit. Small sclerotic foci concerning for metastatic disease in T5. No impending cord compression.          Assessment / Plan        Assessment/Plan:     Diagnoses and all orders for this visit:    1. Prostate cancer metastatic to bone (HCC) (Primary)        Semaj Medina is a pleasant 66 y.o. male with Semaj Medina is a pleasant 65 y.o. male with a known diagnosis of metastatic prostate cancer.  Despite being on Xtandi, his PSA continues to climb.  A suspicious lesion was identified on bone scan and CT at the T10 vertebral body and he is experiencing pain at the site.   This lesion was characterized on MRI. His MRI also identified a sclerotic site concerning for disease in T5, though no correlate was seen on his recent bone scan. We will perform a CT simulation next week to address the T10 lesion. He is experiencing pain at T10 and palliation is  reasonable. He signed a consent today.     We will monitor the T5 sclerotic site for the time being. If he develops pain at this site, or if PSA remains persistently elevated on his current regimen despite management of the T10 lesion, we will discuss targeting it with treatment.     Follow Up:   Return in about 1 week (around 11/4/2021) for CT SIM next visit.        Time:   I spent 40 minutes on this encounter today, 10/28/21. Activities that took place during this time include:   - preparing to see the patient  - performing a medically appropriate examination and evaluation  - counseling and educating the patient  - documenting clinical information in the health record      Sincerely,        Jessica Arita MD  Radiation Oncology  [facility]    This document has been signed by Jessica Arita MD on October 28, 2021 13:14 EDT           NOTICE TO PATIENTS-HEALTH RESULTS RELEASE    We believe in information transparency, and we believe you deserve to see your information as soon as it is available.    Lab Results    We release ALL notes and results to you promptly.    Therefore, you may see some results even before we do. Please give us 2 business days to review and let you know our thoughts.  We look at every result. We will contact you with any results that concern us.  Some results may show abnormal, but are not clinically important. An example is “MCHC” and “MCV”.   Other results (like “TASHA”) are really challenging to interpret, and require reviewing other results and other information from your chart. Sometimes these are best discussed in person.  Imaging    CT scan, MRI, and PET reports can show new or re-occurring cancer  These reports can contain words that are hard to understand  These reports can also show unexpected results.  We ALWAYS plan to review these results with you and decide on a plan together. We prefer to do this in person, by video visit, or phone.  When possible, we will discuss the  possible results with you BEFORE getting the test, and the next steps we would take with each result.  Some patients prefer to see their results online as soon as possible. Because of possible “bad news,” other patients may feel more comfortable waiting to discuss results when their provider is available at their next video visit or in-person visit or by phone. As the patient, you can choose when to view your result

## 2021-11-01 ENCOUNTER — HOSPITAL ENCOUNTER (OUTPATIENT)
Dept: RADIATION ONCOLOGY | Facility: HOSPITAL | Age: 66
Setting detail: RADIATION/ONCOLOGY SERIES
End: 2021-11-01

## 2021-11-04 ENCOUNTER — HOSPITAL ENCOUNTER (OUTPATIENT)
Dept: RADIATION ONCOLOGY | Facility: HOSPITAL | Age: 66
Setting detail: RADIATION/ONCOLOGY SERIES
Discharge: HOME | End: 2021-11-04

## 2021-11-04 DIAGNOSIS — C79.51 SECONDARY MALIGNANT NEOPLASM OF BONE (HCC): ICD-10-CM

## 2021-11-04 PROCEDURE — 77334 RADIATION TREATMENT AID(S): CPT | Performed by: RADIOLOGY

## 2021-11-04 PROCEDURE — 77290 THER RAD SIMULAJ FIELD CPLX: CPT | Performed by: RADIOLOGY

## 2021-11-04 PROCEDURE — 77263 THER RADIOLOGY TX PLNG CPLX: CPT | Performed by: RADIOLOGY

## 2021-11-06 DIAGNOSIS — C79.51 SECONDARY MALIGNANT NEOPLASM OF BONE (HCC): Primary | ICD-10-CM

## 2021-11-06 RX ORDER — HYDROCODONE BITARTRATE AND ACETAMINOPHEN 7.5; 325 MG/1; MG/1
1 TABLET ORAL EVERY 6 HOURS PRN
Qty: 112 TABLET | Refills: 0 | Status: SHIPPED | OUTPATIENT
Start: 2021-11-06 | End: 2021-12-04

## 2021-11-10 PROCEDURE — 77295 3-D RADIOTHERAPY PLAN: CPT | Performed by: RADIOLOGY

## 2021-11-10 PROCEDURE — 77300 RADIATION THERAPY DOSE PLAN: CPT | Performed by: RADIOLOGY

## 2021-11-10 PROCEDURE — 77334 RADIATION TREATMENT AID(S): CPT | Performed by: RADIOLOGY

## 2021-11-11 ENCOUNTER — LAB (OUTPATIENT)
Dept: ONCOLOGY | Facility: HOSPITAL | Age: 66
End: 2021-11-11

## 2021-11-11 ENCOUNTER — HOSPITAL ENCOUNTER (OUTPATIENT)
Dept: ONCOLOGY | Facility: HOSPITAL | Age: 66
Setting detail: INFUSION SERIES
Discharge: HOME | End: 2021-11-11

## 2021-11-11 VITALS
TEMPERATURE: 98.7 F | HEART RATE: 67 BPM | RESPIRATION RATE: 16 BRPM | OXYGEN SATURATION: 98 % | DIASTOLIC BLOOD PRESSURE: 88 MMHG | SYSTOLIC BLOOD PRESSURE: 162 MMHG

## 2021-11-11 DIAGNOSIS — C61 PROSTATE CANCER METASTATIC TO BONE (HCC): Primary | ICD-10-CM

## 2021-11-11 DIAGNOSIS — C79.51 PROSTATE CANCER METASTATIC TO BONE (HCC): Primary | ICD-10-CM

## 2021-11-11 LAB
ALBUMIN SERPL-MCNC: 3.65 G/DL (ref 3.5–5.2)
ALBUMIN/GLOB SERPL: 1.3 G/DL
ALP SERPL-CCNC: 57 U/L (ref 39–117)
ALT SERPL W P-5'-P-CCNC: 5 U/L (ref 1–41)
ANION GAP SERPL CALCULATED.3IONS-SCNC: 7.7 MMOL/L (ref 5–15)
AST SERPL-CCNC: 13 U/L (ref 1–40)
BASOPHILS # BLD AUTO: 0.03 10*3/MM3 (ref 0–0.2)
BASOPHILS NFR BLD AUTO: 0.4 % (ref 0–1.5)
BILIRUB SERPL-MCNC: 0.4 MG/DL (ref 0–1.2)
BUN SERPL-MCNC: 9 MG/DL (ref 8–23)
BUN/CREAT SERPL: 9.2 (ref 7–25)
CALCIUM SPEC-SCNC: 8.8 MG/DL (ref 8.6–10.5)
CHLORIDE SERPL-SCNC: 105 MMOL/L (ref 98–107)
CO2 SERPL-SCNC: 26.3 MMOL/L (ref 22–29)
CREAT SERPL-MCNC: 0.98 MG/DL (ref 0.76–1.27)
DEPRECATED RDW RBC AUTO: 41.4 FL (ref 37–54)
EOSINOPHIL # BLD AUTO: 0.11 10*3/MM3 (ref 0–0.4)
EOSINOPHIL NFR BLD AUTO: 1.3 % (ref 0.3–6.2)
ERYTHROCYTE [DISTWIDTH] IN BLOOD BY AUTOMATED COUNT: 13.2 % (ref 12.3–15.4)
GFR SERPL CREATININE-BSD FRML MDRD: 93 ML/MIN/1.73
GLOBULIN UR ELPH-MCNC: 2.9 GM/DL
GLUCOSE SERPL-MCNC: 112 MG/DL (ref 65–99)
HCT VFR BLD AUTO: 43.4 % (ref 37.5–51)
HGB BLD-MCNC: 15.1 G/DL (ref 13–17.7)
IMM GRANULOCYTES # BLD AUTO: 0.03 10*3/MM3 (ref 0–0.05)
IMM GRANULOCYTES NFR BLD AUTO: 0.4 % (ref 0–0.5)
LYMPHOCYTES # BLD AUTO: 2.54 10*3/MM3 (ref 0.7–3.1)
LYMPHOCYTES NFR BLD AUTO: 30.5 % (ref 19.6–45.3)
MAGNESIUM SERPL-MCNC: 1.8 MG/DL (ref 1.6–2.4)
MCH RBC QN AUTO: 30.2 PG (ref 26.6–33)
MCHC RBC AUTO-ENTMCNC: 34.8 G/DL (ref 31.5–35.7)
MCV RBC AUTO: 86.8 FL (ref 79–97)
MONOCYTES # BLD AUTO: 0.6 10*3/MM3 (ref 0.1–0.9)
MONOCYTES NFR BLD AUTO: 7.2 % (ref 5–12)
NEUTROPHILS NFR BLD AUTO: 5.02 10*3/MM3 (ref 1.7–7)
NEUTROPHILS NFR BLD AUTO: 60.2 % (ref 42.7–76)
NRBC BLD AUTO-RTO: 0 /100 WBC (ref 0–0.2)
PHOSPHATE SERPL-MCNC: 3.5 MG/DL (ref 2.5–4.5)
PLATELET # BLD AUTO: 363 10*3/MM3 (ref 140–450)
PMV BLD AUTO: 8.9 FL (ref 6–12)
POTASSIUM SERPL-SCNC: 3.6 MMOL/L (ref 3.5–5.2)
PROT SERPL-MCNC: 6.5 G/DL (ref 6–8.5)
RBC # BLD AUTO: 5 10*6/MM3 (ref 4.14–5.8)
SODIUM SERPL-SCNC: 139 MMOL/L (ref 136–145)
WBC # BLD AUTO: 8.33 10*3/MM3 (ref 3.4–10.8)

## 2021-11-11 PROCEDURE — 25010000002 DENOSUMAB 120 MG/1.7ML SOLUTION: Performed by: INTERNAL MEDICINE

## 2021-11-11 PROCEDURE — 80053 COMPREHEN METABOLIC PANEL: CPT

## 2021-11-11 PROCEDURE — 36415 COLL VENOUS BLD VENIPUNCTURE: CPT

## 2021-11-11 PROCEDURE — 85025 COMPLETE CBC W/AUTO DIFF WBC: CPT

## 2021-11-11 PROCEDURE — 96372 THER/PROPH/DIAG INJ SC/IM: CPT

## 2021-11-11 PROCEDURE — 83735 ASSAY OF MAGNESIUM: CPT

## 2021-11-11 PROCEDURE — 84100 ASSAY OF PHOSPHORUS: CPT

## 2021-11-11 RX ADMIN — DENOSUMAB 120 MG: 120 INJECTION SUBCUTANEOUS at 11:02

## 2021-11-23 ENCOUNTER — HOSPITAL ENCOUNTER (OUTPATIENT)
Dept: RADIATION ONCOLOGY | Facility: HOSPITAL | Age: 66
Discharge: HOME | End: 2021-11-23

## 2021-11-23 PROCEDURE — 77427 RADIATION TX MANAGEMENT X5: CPT | Performed by: RADIOLOGY

## 2021-11-23 PROCEDURE — 77280 THER RAD SIMULAJ FIELD SMPL: CPT | Performed by: RADIOLOGY

## 2021-11-23 PROCEDURE — 77412 RADIATION TX DELIVERY LVL 3: CPT | Performed by: RADIOLOGY

## 2021-11-24 ENCOUNTER — HOSPITAL ENCOUNTER (OUTPATIENT)
Dept: RADIATION ONCOLOGY | Facility: HOSPITAL | Age: 66
Discharge: HOME | End: 2021-11-24

## 2021-11-24 PROCEDURE — G6002 STEREOSCOPIC X-RAY GUIDANCE: HCPCS | Performed by: RADIOLOGY

## 2021-11-24 PROCEDURE — 77412 RADIATION TX DELIVERY LVL 3: CPT | Performed by: RADIOLOGY

## 2021-11-24 PROCEDURE — 77387 GUIDANCE FOR RADJ TX DLVR: CPT | Performed by: RADIOLOGY

## 2021-11-29 ENCOUNTER — DOCUMENTATION (OUTPATIENT)
Dept: RADIATION ONCOLOGY | Facility: HOSPITAL | Age: 66
End: 2021-11-29

## 2021-11-29 ENCOUNTER — HOSPITAL ENCOUNTER (OUTPATIENT)
Dept: RADIATION ONCOLOGY | Facility: HOSPITAL | Age: 66
Discharge: HOME | End: 2021-11-29

## 2021-11-29 PROCEDURE — G6002 STEREOSCOPIC X-RAY GUIDANCE: HCPCS | Performed by: RADIOLOGY

## 2021-11-29 PROCEDURE — 77412 RADIATION TX DELIVERY LVL 3: CPT | Performed by: RADIOLOGY

## 2021-11-29 PROCEDURE — 77387 GUIDANCE FOR RADJ TX DLVR: CPT | Performed by: RADIOLOGY

## 2021-11-29 NOTE — PROGRESS NOTES
Diagnosis: Metastatic cancer    Reason for referral: Rounding    Comments: OSW and OSW student met with pt in [facility] during first week of tx/rounding. Pt presented in a pleasant mood. Introduced myself and discussed OSW role/psychosocial services available. Discussed opportunities for gas, transportation and financial assistance. Pt shared that he is not interested in assistance at this time as he provides his own transportation to and from appointments. OSW student also discussed opportunities for connection to support services (speaking with another pt for support, support groups, counseling etc.). Pt indicated that he is not interested in support services at this time as he receives great support from his significant other as well as other family and friends. Discussed with pt opportunites for assistance with home health care and pt indicated that he is not interested in any home health assistance at this time. Pt resides in [place] with his significant other and has [employer] Medicare and [employer]. Provided pt with OSW business card and encouraged OSW and OSW student support remains available. Pt expressed gratitude.       Services/Referrals Provided: No needs identified at this time.

## 2021-11-30 ENCOUNTER — HOSPITAL ENCOUNTER (OUTPATIENT)
Dept: RADIATION ONCOLOGY | Facility: HOSPITAL | Age: 66
Discharge: HOME | End: 2021-11-30

## 2021-11-30 VITALS
HEART RATE: 82 BPM | RESPIRATION RATE: 16 BRPM | DIASTOLIC BLOOD PRESSURE: 96 MMHG | TEMPERATURE: 98.8 F | WEIGHT: 128.53 LBS | BODY MASS INDEX: 18.98 KG/M2 | SYSTOLIC BLOOD PRESSURE: 147 MMHG | OXYGEN SATURATION: 100 %

## 2021-11-30 DIAGNOSIS — C79.51 SECONDARY MALIGNANT NEOPLASM OF BONE (HCC): Primary | ICD-10-CM

## 2021-11-30 PROCEDURE — G6002 STEREOSCOPIC X-RAY GUIDANCE: HCPCS | Performed by: RADIOLOGY

## 2021-11-30 PROCEDURE — 77427 RADIATION TX MANAGEMENT X5: CPT | Performed by: RADIOLOGY

## 2021-11-30 PROCEDURE — 77412 RADIATION TX DELIVERY LVL 3: CPT | Performed by: RADIOLOGY

## 2021-11-30 PROCEDURE — 77387 GUIDANCE FOR RADJ TX DLVR: CPT | Performed by: RADIOLOGY

## 2021-11-30 NOTE — PROGRESS NOTES
On Treatment Note      Encounter Date: 2021   Patient Name: Semaj Medina  YOB: 1955   Medical Record Number: [id number]     Primary Diagnosis: Secondary Malignancy of Bone (from prostate cancer)  Cancer Stagin   Treatment Site: Thoracic spine  Prescribed dose: 30 Gy/10 fractions  Completed dose: 12 Gy/ 4 fractions  Date of First Treatment: 21      Tolerance: tolerating well    Radiation related toxicities and management plan: fatigue  continue narcotic medication for cancer related pain    Issues raised by patient or treatment team: none    Subjective      Review of Systems: Review of Systems   Constitutional: Positive for appetite change (reports decrease in appetite) and fatigue.   HENT: Negative for sore throat and trouble swallowing.    Respiratory: Positive for cough (more so at night, productive of tan sputum) and shortness of breath.    Cardiovascular: Negative for leg swelling.   Gastrointestinal: Negative for constipation, diarrhea and nausea.   Genitourinary: Negative for difficulty urinating.   Musculoskeletal: Positive for back pain (right side, mid to lower back).   Neurological: Negative for dizziness, weakness and headaches.   Psychiatric/Behavioral: Negative for sleep disturbance.       The following portions of the patient's history were reviewed and updated as appropriate: allergies, current medications, past family history, past medical history, past social history, past surgical history and problem list.    Measures:   Pain: (on a scale of 0-10)   Pain Score    21 1319   PainSc:   6     Semaj Medina reports a pain score of 6.  Given his pain assessment as noted, treatment options were discussed and the following options were decided upon as a follow-up plan to address the patient's pain: continuation of current treatment plan for pain.    Advanced Care Plan:ACP discussion was declined by the patient. Patient does  not have an advance directive, declines further assistance.    KPS/Quality of Life: 80 - Restricted Physical Activity  ECOG: (2) Ambulatory and capable of self care, unable to carry out work activity, up and about > 50% or waking hours  Depression Screening:   Patient screened positive for depression based on a PHQ-9 score of 2 on 10/21/2021. Follow-up recommendations include: Elevated PHQ score reflective of acute illness, not depression.        Objective     Physical Exam:   Vital Signs:   Vitals:    11/30/21 1319   BP: 147/96   Pulse: 82   Resp: 16   Temp: 98.8 °F (37.1 °C)   SpO2: 100%      Body mass index is 18.98 kg/m².   Wt Readings from Last 3 Encounters:   11/30/21 58.3 kg (128 lb 8.5 oz)   10/28/21 59 kg (130 lb 1.1 oz)   10/21/21 60.6 kg (133 lb 11.2 oz)       General: NAD, sitting comfortably  Eye: EOMI, anicteric sclerae  Respiratory: Symmetric expansion, nonlabored respiration  Neuro: Alert oriented x3, cranial nerves III through XII are grossly intact.  Psych: Mood and affect appropriate      Assessment / Plan      Plan:   I reviewed technical aspects of the radiation therapy treatment administration including dose delivery and daily treatment parameters to verify that these meet the specifications from my clinical treatment planning note. I reviewed the treatment setup notes. I reviewed and approved images obtained for “image guidance” with setup and treatment.     We will continue radiation therapy as prescribed.        Jessica Arita MD  Radiation Oncology  [facility]    This document has been signed by Jessica Arita MD on November 30, 2021 15:15 EST

## 2021-12-01 ENCOUNTER — HOSPITAL ENCOUNTER (OUTPATIENT)
Dept: RADIATION ONCOLOGY | Facility: HOSPITAL | Age: 66
Setting detail: RADIATION/ONCOLOGY SERIES
End: 2021-12-01

## 2021-12-01 ENCOUNTER — HOSPITAL ENCOUNTER (OUTPATIENT)
Dept: RADIATION ONCOLOGY | Facility: HOSPITAL | Age: 66
Discharge: HOME | End: 2021-12-01

## 2021-12-01 PROCEDURE — 77412 RADIATION TX DELIVERY LVL 3: CPT | Performed by: RADIOLOGY

## 2021-12-01 PROCEDURE — 77336 RADIATION PHYSICS CONSULT: CPT | Performed by: RADIOLOGY

## 2021-12-01 PROCEDURE — G6002 STEREOSCOPIC X-RAY GUIDANCE: HCPCS | Performed by: RADIOLOGY

## 2021-12-01 PROCEDURE — 77387 GUIDANCE FOR RADJ TX DLVR: CPT | Performed by: RADIOLOGY

## 2021-12-02 ENCOUNTER — HOSPITAL ENCOUNTER (OUTPATIENT)
Dept: RADIATION ONCOLOGY | Facility: HOSPITAL | Age: 66
Discharge: HOME | End: 2021-12-02

## 2021-12-02 VITALS — WEIGHT: 124.12 LBS | BODY MASS INDEX: 18.33 KG/M2

## 2021-12-02 PROCEDURE — G6002 STEREOSCOPIC X-RAY GUIDANCE: HCPCS | Performed by: RADIOLOGY

## 2021-12-02 PROCEDURE — 77387 GUIDANCE FOR RADJ TX DLVR: CPT | Performed by: RADIOLOGY

## 2021-12-02 PROCEDURE — 77412 RADIATION TX DELIVERY LVL 3: CPT | Performed by: RADIOLOGY

## 2021-12-02 NOTE — PROGRESS NOTES
Outpatient Nutrition Oncology Assessment    Patient Name: Semaj Medina  YOB: 1955  MRN: [id number]  Assessment Date: 12/2/2021    CLINICAL NUTRITION ASSESSMENT    Dx:  Prostate Ca with metastasis to the bone (T-spine)      Type of Cancer Treatment  XRT to T-spine  Xtandi     CLINICAL NUTRITION ASSESSMENT      Reason for Assessment  Assessment, Reduced oral intake     H&P:    Past Medical History:   Diagnosis Date   • Anemia    • Anxiety    • Blood disease    • Colon polyps    • Depression    • Diverticulitis    • Forgetfulness    • Hepatitis C    • Night sweats    • Numbness and tingling of left lower extremity    • Prostate cancer (HCC)         Current Problems:   Patient Active Problem List   Diagnosis Code   • Prostate cancer metastatic to bone (HCC) C61, C79.51   • Anemia D64.9   • Anxiety F41.9   • Blood disease D75.9   • Colon polyps K63.5   • Depression F32.A   • Diverticulitis K57.92   • Forgetfulness R68.89   • Hepatitis C B19.20   • Night sweats R61   • Numbness and tingling of left lower extremity R20.0, R20.2   • Prostate cancer (HCC) C61   • Secondary malignant neoplasm of bone (HCC) C79.51         Anthropometrics     Row Name 12/02/21 1332          Anthropometrics    Weight 56.3 kg (124 lb 1.9 oz)                 BMI kg/m2   Body mass index is 18.33 kg/m².    Weight Hx  Wt Readings from Last 30 Encounters:   12/02/21 1332 56.3 kg (124 lb 1.9 oz)   11/30/21 1319 58.3 kg (128 lb 8.5 oz)   10/28/21 1112 59 kg (130 lb 1.1 oz)   10/21/21 0918 60.6 kg (133 lb 11.2 oz)   10/14/21 0923 60.4 kg (133 lb 2.5 oz)   09/16/21 0833 61 kg (134 lb 7.7 oz)   06/17/21 1403 61.5 kg (135 lb 9.3 oz)   05/19/21 1155 64.4 kg (141 lb 15.6 oz)   03/05/21 0923 64.6 kg (142 lb 6.7 oz)   12/11/20 1346 63.5 kg (139 lb 15.9 oz)   11/23/20 0000 63.2 kg (139 lb 6 oz)   11/06/20 1220 59 kg (130 lb)   08/31/20 0846 60 kg (132 lb 4.4 oz)   07/17/20 1318 57.6 kg (126 lb 15.8 oz)   06/30/20 1345 59.1 kg (130 lb 4.7 oz)    06/22/20 0000 62.1 kg (137 lb)   03/30/20 0000 62.1 kg (137 lb)   03/24/20 0000 60.5 kg (133 lb 6 oz)   03/20/20 0000 58.7 kg (129 lb 6 oz)   03/10/20 0000 60.8 kg (134 lb)   02/21/20 1200 59.9 kg (132 lb)   01/24/20 1214 59.9 kg (132 lb)   11/26/19 0000 60.3 kg (133 lb)   11/12/19 0000 58.5 kg (129 lb)   10/22/19 0000 58.7 kg (129 lb 8 oz)   10/01/19 0000 60.3 kg (133 lb)   08/23/19 1345 59.9 kg (132 lb)   08/06/19 0000 62.1 kg (137 lb)   07/12/19 1055 59.9 kg (132 lb)   06/07/19 1335 60 kg (132 lb 4.4 oz)         Estimated/Assessed Needs     Row Name 12/02/21 1332          Calculation Measurements    Weight Used For Calculations 56.3 kg (124 lb 1.9 oz)            Estimated/Assessed Needs    Additional Documentation KCAL/KG (Group); Protein Requirements (Group); Fluid Requirements (Group)            KCAL/KG    KCAL/KG 40 Kcal/Kg (kcal)     40 Kcal/Kg (kcal) 2252            Protein Requirements    Weight Used For Protein Calculations 56.3 kg (124 lb 1.9 oz)     Est Protein Requirement Amount (gms/kg) 2.0 gm protein     Estimated Protein Requirements (gms/day) 112.6            Fluid Requirements    Fluid Requirements (mL/day) 1689  30 ml/kg     RDA Method (mL) 1689                  Labs/Medications        Pertinent Labs Reviewed.         Invalid input(s): TEODORO ELIAS      Coronavirus (COVID-19)   Date Value Ref Range Status   01/21/2021 NOT DETECTED NA Final     Comment:     The SARS-CoV-2 assay is a real-time, RT-PCR test intended  for the qualitative detection of nucleic acid from the  SARS-CoV-2 in respiratory specimens from individuals,  testing performed at [facility] reference lab.       No results found for: HGBA1C      Pertinent Medications HYDROcodone-acetaminophen, abiraterone acetate, leuprolide, and lisinopril     Physical Findings        Malnutrition Severity Assessment     Row Name 12/02/21 1333          Malnutrition Severity Assessment    Malnutrition Type Chronic Disease - Related  Malnutrition            Insufficient Energy Intake     Insufficient Energy Intake Findings Moderate     Insufficient Energy Intake  <75% of est. energy requirement for > or equal to 1 month            Unintentional Weight Loss     Unintentional Weight Loss Findings Moderate     Unintentional Weight Loss  Weight loss greater than 10% in six months            Muscle Loss    Loss of Muscle Mass Findings Mild  temporal region            Fat Loss    Subcutaneous Fat Loss Findings Mild  orbital region            Criteria Met (Must meet criteria for severity in at least 2 of these categories: M Wasting, Fat Loss, Fluid, Secondary Signs, Wt. Status, Intake)    Patient meets criteria for  Moderate (non-severe) Malnutrition                  Current Nutrition Orders & Evaluation of Intake       Oral Nutrition     Current PO Diet 1-2 meals per day (inconsistent pattern)   Supplement Ensure Plus or Boost Plus recommended 1-2 times per day     Nutrition Diagnosis        Nutrition Dx Problem 1 Moderate malnutrition related to increased nutrient needs due to catabolic disease as evidenced by physiological causes increasing nutrient needs., hypermetabolic state., muscle wasting., fat loss., inadequate energy intake., decreased appetite. and patient report.       Nutrition Intervention       RD Action Nutrition counseling provided  Written materials given:  High Calorie, High Protein Diet  Samples of Boost Plus & Ensure Enlive given; coupons given     Monitor/Evaluation       Monitor Per oncology nutrition protocol.     Comments:    Pt receiving XRT to T-spine region (bone mets).  He has been taking Xtandi for 3-4 months and has not noticed a change in appetite or any specific nutrition-related concerns with this medication.  He has lost a total of 11% body wt in 6 months, and more recently 5% in the past month (moderate-severe malnutrition).  He reports early satiety & decreased appetite that he has noticed worsening more with  radiation tx specifically.  Pt reports he may go all day and not eat, then realize this and eat a small meal or snack in the afternoon/evening.  His intake is inconsistent.  Reviewed ideas for high kcal/high protein foods and milkshakes.  Encouraged pt to set the alarm on his phone for every 2-3 hours to remind him to eat, even a small meal or snack.  Encouraged adequate fluids.  Pt reports he used to drink Ensure Plus when he went through cancer treatment years ago, but has not done this for a while.  Provided a sample of Boost Plus & Ensure Enlive (no Ensure Plus available).  Provided coupons as well.  Encouraged pt to consume at least 1-2 Ensure Enlive/Plus or Boost Plus daily, even if spaced out to 2-4 oz. In frequent/small amounts.  Provided pt with a list of high kcal/high protein foods.  Encouraged pt to discuss appetite concerns with oncologist.  He plans to do this on his next f/u.    Electronically signed by:  Natalya Miller RD  12/02/21 13:34 EST

## 2021-12-03 ENCOUNTER — HOSPITAL ENCOUNTER (OUTPATIENT)
Dept: RADIATION ONCOLOGY | Facility: HOSPITAL | Age: 66
Discharge: HOME | End: 2021-12-03

## 2021-12-03 PROCEDURE — 77387 GUIDANCE FOR RADJ TX DLVR: CPT | Performed by: RADIOLOGY

## 2021-12-03 PROCEDURE — G6002 STEREOSCOPIC X-RAY GUIDANCE: HCPCS | Performed by: RADIOLOGY

## 2021-12-03 PROCEDURE — 77412 RADIATION TX DELIVERY LVL 3: CPT | Performed by: RADIOLOGY

## 2021-12-07 ENCOUNTER — HOSPITAL ENCOUNTER (OUTPATIENT)
Dept: RADIATION ONCOLOGY | Facility: HOSPITAL | Age: 66
Discharge: HOME | End: 2021-12-07

## 2021-12-07 VITALS
SYSTOLIC BLOOD PRESSURE: 147 MMHG | RESPIRATION RATE: 16 BRPM | BODY MASS INDEX: 18.88 KG/M2 | TEMPERATURE: 98.2 F | HEART RATE: 65 BPM | OXYGEN SATURATION: 98 % | WEIGHT: 127.87 LBS | DIASTOLIC BLOOD PRESSURE: 93 MMHG

## 2021-12-07 DIAGNOSIS — C79.51 SECONDARY MALIGNANT NEOPLASM OF BONE (HCC): Primary | ICD-10-CM

## 2021-12-07 DIAGNOSIS — C61 PROSTATE CANCER METASTATIC TO BONE (HCC): Primary | ICD-10-CM

## 2021-12-07 DIAGNOSIS — C79.51 PROSTATE CANCER METASTATIC TO BONE (HCC): Primary | ICD-10-CM

## 2021-12-07 PROCEDURE — G6002 STEREOSCOPIC X-RAY GUIDANCE: HCPCS | Performed by: RADIOLOGY

## 2021-12-07 PROCEDURE — 77387 GUIDANCE FOR RADJ TX DLVR: CPT | Performed by: RADIOLOGY

## 2021-12-07 PROCEDURE — 77412 RADIATION TX DELIVERY LVL 3: CPT | Performed by: RADIOLOGY

## 2021-12-07 RX ORDER — HYDROCODONE BITARTRATE AND ACETAMINOPHEN 10; 325 MG/1; MG/1
1 TABLET ORAL EVERY 6 HOURS PRN
Qty: 120 TABLET | Refills: 0 | Status: SHIPPED | OUTPATIENT
Start: 2021-12-07 | End: 2022-01-06

## 2021-12-07 NOTE — PROGRESS NOTES
On Treatment Note      Encounter Date: 2021   Patient Name: Semaj Medina  YOB: 1955   Medical Record Number: [id number]     Primary Diagnosis:[place] malignant neoplasm of bone  Cancer Stagin    Treatment Site: Thoracic spine  Prescribed dose: 30 Gy/10 fractions  Completed dose: 24 Gy/ 8 fractions  Date of First Treatment: 21     Tolerance: tolerating well - improvement in pain     Radiation related toxicities and management plan: fatigue  continue narcotic medication for cancer related pain     Issues raised by patient or treatment team: refilled narcotic pain medication    Subjective      Review of Systems: improvement in back pain, no difficulty swallowing, no pain with swallowing, fatigue    The following portions of the patient's history were reviewed and updated as appropriate: allergies, current medications, past family history, past medical history, past social history, past surgical history and problem list.    Measures:   Pain: (on a scale of 0-10)   Pain Score    21 1329   PainSc:   6       Semaj Medina reports a pain score of 6.  Given his pain assessment as noted, treatment options were discussed and the following options were decided upon as a follow-up plan to address the patient's pain: prescription for opiod analgesics.      Advanced Care Plan:ACP discussion was declined by the patient. Patient does not have an advance directive, declines further assistance.     KPS/Quality of Life: 80 - Restricted Physical Activity  ECOG: (2) Ambulatory and capable of self care, unable to carry out work activity, up and about > 50% or waking hours  Depression Screening:   Patient screened positive for depression based on a PHQ-9 score of 2 on 10/21/2021. Follow-up recommendations include: Elevated PHQ score reflective of acute illness, not depression.       Objective     Physical Exam:   Vital Signs:   Vitals:    21 1329    BP: 147/93   Pulse: 65   Resp: 16   Temp: 98.2 °F (36.8 °C)   SpO2: 98%      Body mass index is 18.88 kg/m².   Wt Readings from Last 3 Encounters:   12/07/21 58 kg (127 lb 13.9 oz)   12/02/21 56.3 kg (124 lb 1.9 oz)   11/30/21 58.3 kg (128 lb 8.5 oz)       General: NAD, sitting comfortably  Eye: EOMI, anicteric sclerae  Respiratory: Symmetric expansion, nonlabored respiration  Neuro: Alert oriented x3, cranial nerves III through XII are grossly intact.  Psych: Mood and affect appropriate      Assessment / Plan      Plan:   I reviewed technical aspects of the radiation therapy treatment administration including dose delivery and daily treatment parameters to verify that these meet the specifications from my clinical treatment planning note. I reviewed the treatment setup notes. I reviewed and approved images obtained for “image guidance” with setup and treatment.     We will continue radiation therapy as prescribed.        Jessica Arita MD  Radiation Oncology  [facility]    This document has been signed by Jessica Arita MD on December 7, 2021 14:53 EST

## 2021-12-08 ENCOUNTER — HOSPITAL ENCOUNTER (OUTPATIENT)
Dept: RADIATION ONCOLOGY | Facility: HOSPITAL | Age: 66
Discharge: HOME | End: 2021-12-08

## 2021-12-08 PROCEDURE — 77387 GUIDANCE FOR RADJ TX DLVR: CPT | Performed by: RADIOLOGY

## 2021-12-08 PROCEDURE — G6002 STEREOSCOPIC X-RAY GUIDANCE: HCPCS | Performed by: RADIOLOGY

## 2021-12-08 PROCEDURE — 77412 RADIATION TX DELIVERY LVL 3: CPT | Performed by: RADIOLOGY

## 2021-12-09 ENCOUNTER — HOSPITAL ENCOUNTER (OUTPATIENT)
Dept: RADIATION ONCOLOGY | Facility: HOSPITAL | Age: 66
Discharge: HOME | End: 2021-12-09

## 2021-12-09 PROCEDURE — 77387 GUIDANCE FOR RADJ TX DLVR: CPT | Performed by: RADIOLOGY

## 2021-12-09 PROCEDURE — 77336 RADIATION PHYSICS CONSULT: CPT | Performed by: RADIOLOGY

## 2021-12-09 PROCEDURE — G6002 STEREOSCOPIC X-RAY GUIDANCE: HCPCS | Performed by: RADIOLOGY

## 2021-12-09 PROCEDURE — 77412 RADIATION TX DELIVERY LVL 3: CPT | Performed by: RADIOLOGY

## 2021-12-17 ENCOUNTER — HOSPITAL ENCOUNTER (OUTPATIENT)
Dept: ONCOLOGY | Facility: HOSPITAL | Age: 66
Setting detail: INFUSION SERIES
Discharge: HOME | End: 2021-12-17

## 2021-12-17 ENCOUNTER — LAB (OUTPATIENT)
Dept: ONCOLOGY | Facility: HOSPITAL | Age: 66
End: 2021-12-17

## 2021-12-17 ENCOUNTER — OFFICE VISIT (OUTPATIENT)
Dept: ONCOLOGY | Facility: HOSPITAL | Age: 66
End: 2021-12-17

## 2021-12-17 VITALS
RESPIRATION RATE: 16 BRPM | HEART RATE: 70 BPM | DIASTOLIC BLOOD PRESSURE: 84 MMHG | SYSTOLIC BLOOD PRESSURE: 140 MMHG | OXYGEN SATURATION: 98 % | WEIGHT: 130.07 LBS | TEMPERATURE: 98.6 F | BODY MASS INDEX: 19.21 KG/M2

## 2021-12-17 DIAGNOSIS — D64.9 ANEMIA, UNSPECIFIED TYPE: ICD-10-CM

## 2021-12-17 DIAGNOSIS — C79.51 SECONDARY MALIGNANT NEOPLASM OF BONE (HCC): ICD-10-CM

## 2021-12-17 DIAGNOSIS — C61 PROSTATE CANCER METASTATIC TO BONE (HCC): Primary | ICD-10-CM

## 2021-12-17 DIAGNOSIS — C79.51 PROSTATE CANCER METASTATIC TO BONE (HCC): ICD-10-CM

## 2021-12-17 DIAGNOSIS — C79.51 PROSTATE CANCER METASTATIC TO BONE (HCC): Primary | ICD-10-CM

## 2021-12-17 DIAGNOSIS — C61 PROSTATE CANCER METASTATIC TO BONE (HCC): ICD-10-CM

## 2021-12-17 LAB
ALBUMIN SERPL-MCNC: 3.3 G/DL (ref 3.5–5.2)
ALBUMIN/GLOB SERPL: 1.2 G/DL
ALP SERPL-CCNC: 43 U/L (ref 39–117)
ALT SERPL W P-5'-P-CCNC: 6 U/L (ref 1–41)
ANION GAP SERPL CALCULATED.3IONS-SCNC: 8.1 MMOL/L (ref 5–15)
AST SERPL-CCNC: 12 U/L (ref 1–40)
BASOPHILS # BLD AUTO: 0.02 10*3/MM3 (ref 0–0.2)
BASOPHILS NFR BLD AUTO: 0.4 % (ref 0–1.5)
BILIRUB SERPL-MCNC: 0.2 MG/DL (ref 0–1.2)
BUN SERPL-MCNC: 9 MG/DL (ref 8–23)
BUN/CREAT SERPL: 8.9 (ref 7–25)
CALCIUM SPEC-SCNC: 9.1 MG/DL (ref 8.6–10.5)
CHLORIDE SERPL-SCNC: 109 MMOL/L (ref 98–107)
CO2 SERPL-SCNC: 22.9 MMOL/L (ref 22–29)
CREAT SERPL-MCNC: 1.01 MG/DL (ref 0.76–1.27)
DEPRECATED RDW RBC AUTO: 44.4 FL (ref 37–54)
EOSINOPHIL # BLD AUTO: 0.1 10*3/MM3 (ref 0–0.4)
EOSINOPHIL NFR BLD AUTO: 2.2 % (ref 0.3–6.2)
ERYTHROCYTE [DISTWIDTH] IN BLOOD BY AUTOMATED COUNT: 13.4 % (ref 12.3–15.4)
FERRITIN SERPL-MCNC: 120.1 NG/ML (ref 30–400)
GFR SERPL CREATININE-BSD FRML MDRD: 90 ML/MIN/1.73
GLOBULIN UR ELPH-MCNC: 2.7 GM/DL
GLUCOSE SERPL-MCNC: 100 MG/DL (ref 65–99)
HCT VFR BLD AUTO: 39.6 % (ref 37.5–51)
HGB BLD-MCNC: 13.4 G/DL (ref 13–17.7)
IMM GRANULOCYTES # BLD AUTO: 0.01 10*3/MM3 (ref 0–0.05)
IMM GRANULOCYTES NFR BLD AUTO: 0.2 % (ref 0–0.5)
IRON 24H UR-MRATE: 50 MCG/DL (ref 59–158)
IRON SATN MFR SERPL: 22 % (ref 20–50)
LYMPHOCYTES # BLD AUTO: 0.98 10*3/MM3 (ref 0.7–3.1)
LYMPHOCYTES NFR BLD AUTO: 21.4 % (ref 19.6–45.3)
MAGNESIUM SERPL-MCNC: 1.8 MG/DL (ref 1.6–2.4)
MCH RBC QN AUTO: 30.1 PG (ref 26.6–33)
MCHC RBC AUTO-ENTMCNC: 33.8 G/DL (ref 31.5–35.7)
MCV RBC AUTO: 89 FL (ref 79–97)
MONOCYTES # BLD AUTO: 0.31 10*3/MM3 (ref 0.1–0.9)
MONOCYTES NFR BLD AUTO: 6.8 % (ref 5–12)
NEUTROPHILS NFR BLD AUTO: 3.15 10*3/MM3 (ref 1.7–7)
NEUTROPHILS NFR BLD AUTO: 69 % (ref 42.7–76)
PHOSPHATE SERPL-MCNC: 3.8 MG/DL (ref 2.5–4.5)
PLATELET # BLD AUTO: 304 10*3/MM3 (ref 140–450)
PMV BLD AUTO: 8.8 FL (ref 6–12)
POTASSIUM SERPL-SCNC: 3.7 MMOL/L (ref 3.5–5.2)
PROT SERPL-MCNC: 6 G/DL (ref 6–8.5)
PSA SERPL-MCNC: 14.2 NG/ML (ref 0–4)
RBC # BLD AUTO: 4.45 10*6/MM3 (ref 4.14–5.8)
SODIUM SERPL-SCNC: 140 MMOL/L (ref 136–145)
TIBC SERPL-MCNC: 232 MCG/DL (ref 298–536)
TRANSFERRIN SERPL-MCNC: 156 MG/DL (ref 200–360)
WBC NRBC COR # BLD: 4.57 10*3/MM3 (ref 3.4–10.8)

## 2021-12-17 PROCEDURE — 80053 COMPREHEN METABOLIC PANEL: CPT

## 2021-12-17 PROCEDURE — 84153 ASSAY OF PSA TOTAL: CPT

## 2021-12-17 PROCEDURE — 84100 ASSAY OF PHOSPHORUS: CPT

## 2021-12-17 PROCEDURE — 96372 THER/PROPH/DIAG INJ SC/IM: CPT

## 2021-12-17 PROCEDURE — 85025 COMPLETE CBC W/AUTO DIFF WBC: CPT

## 2021-12-17 PROCEDURE — 25010000002 LEUPROLIDE 45 MG KIT: Performed by: INTERNAL MEDICINE

## 2021-12-17 PROCEDURE — 83540 ASSAY OF IRON: CPT

## 2021-12-17 PROCEDURE — 82728 ASSAY OF FERRITIN: CPT

## 2021-12-17 PROCEDURE — G0463 HOSPITAL OUTPT CLINIC VISIT: HCPCS | Performed by: NURSE PRACTITIONER

## 2021-12-17 PROCEDURE — 36415 COLL VENOUS BLD VENIPUNCTURE: CPT

## 2021-12-17 PROCEDURE — 96402 CHEMO HORMON ANTINEOPL SQ/IM: CPT

## 2021-12-17 PROCEDURE — 84466 ASSAY OF TRANSFERRIN: CPT

## 2021-12-17 PROCEDURE — 99213 OFFICE O/P EST LOW 20 MIN: CPT | Performed by: NURSE PRACTITIONER

## 2021-12-17 PROCEDURE — 83735 ASSAY OF MAGNESIUM: CPT

## 2021-12-17 PROCEDURE — 25010000002 DENOSUMAB 120 MG/1.7ML SOLUTION: Performed by: NURSE PRACTITIONER

## 2021-12-17 RX ADMIN — DENOSUMAB 120 MG: 120 INJECTION SUBCUTANEOUS at 11:28

## 2021-12-17 RX ADMIN — LEUPROLIDE ACETATE 45 MG: KIT at 11:28

## 2021-12-17 NOTE — PROGRESS NOTES
Chief Complaint  Bone Mets (-fu)    Maryse Monae MD Coffie, Ramona N, MD Subjective Thomas Dylon Medina presents to [facility] for prostrate cancer treatment.     History of Present Illness   Mr. Cha presents for follow up today for his prostrate cancer treatment.  He is receiving Lupron every 6 months for which he is due for today. He is receiving monthly Xgeva today as well. He takes Zytiga daily. Recently saw radiation oncology for T10 lesion which was radiated. He reports pain to this area is about the same as it was.     Labs today: CBC and CMP are acceptable for treatment.     Cancer Staging  No matching staging information was found for the patient.     Treatment intent: palliative    Oncology/Hematology History Overview Note   Mr. Cha is a 65-year-old -American man who was referred to me by Dr. Lepe for evaluation and treatment of prostate cancer.    Patient was initially diagnosed with prostate cancer in 2015.  On 6/4/2015 he underwent radical prostatectomy and LN dissection which showed a Zach 4+3 = 7 prostate cancer.  There were several high risk features such as extraprostatic extension, seminal vesicle invasion, positive margins at the bladder neck and right and left seminal vesicle.  Lymph vascular invasion was indeterminate.  Perineural invasion was not commented on. Final pathology [id number] R1.  He was treated with adjuvant radiation by Dr. Stanton.    According to records the patient likely started Lupron in December 2017 when his PSA started to rise.  The patient took a break in August 2019.  He states he was not aware he was to continue.  He restarted Lupron on 11/26/2019 after it was noted that his PSA chago from 0.46 up to 2.55 on 11/8/2019.  Unfortunately his PSA continued to rise after that to 4.54 on 3/10/2020.    PSA summary:  11/8/19 PSA 2.55 (restarted Lupron on 11/26)  8/31/2020 PSA 17.95 (started  Zytiga)  Started Xtandi in May 2021 due to intolerance of Zytiga even at the lowest dose       Prostate cancer metastatic to bone (HCC)   12/11/2020 -  Chemotherapy    OP SUPPORTIVE Leuprolide 45 mg Q6M     3/23/2021 -  Chemotherapy    OP SUPPORTIVE Denosumab (Xgeva) Q28D     4/15/2021 -  Chemotherapy    OP PROSTATE Enzalutamide     5/19/2021 Initial Diagnosis    Prostate cancer metastatic to bone (CMS/HCC)     Secondary malignant neoplasm of bone (HCC)   11/4/2021 Initial Diagnosis    Secondary malignant neoplasm of bone (HCC)     11/17/2021 -  Radiation    RADIATION THERAPY Treatment Details (Noted on 11/4/2021)  Site: Bilateral Spine - Thoracic  Technique: 3D CRT  Goal: No goal specified  Planned Treatment Start Date: 11/17/2021         Review of Systems   Constitutional: Positive for fatigue. Negative for appetite change, diaphoresis, fever, unexpected weight gain and unexpected weight loss.   HENT: Negative for hearing loss, mouth sores, sore throat, swollen glands, trouble swallowing and voice change.    Eyes: Negative for blurred vision.   Respiratory: Negative for cough, shortness of breath and wheezing.    Cardiovascular: Negative for chest pain and palpitations.   Gastrointestinal: Negative for abdominal pain, blood in stool, constipation, diarrhea, nausea and vomiting.   Endocrine: Negative for cold intolerance and heat intolerance.   Genitourinary: Negative for difficulty urinating, dysuria, frequency, hematuria and urinary incontinence.   Musculoskeletal: Positive for back pain. Negative for arthralgias and myalgias.   Skin: Negative for rash, skin lesions and bruise.   Neurological: Negative for dizziness, seizures, weakness, numbness and headache.   Hematological: Does not bruise/bleed easily.   Psychiatric/Behavioral: Negative for depressed mood. The patient is not nervous/anxious.    All other systems reviewed and are negative.      Current Outpatient Medications on File Prior to Visit   Medication  Sig Dispense Refill   • abiraterone acetate (ZYTIGA) 250 MG chemo tablet abiraterone 250 mg oral tablet take 4 tablets (1,000 mg) by oral route once daily with water on an empty stomach at least 1 hour before or 2 hours after food   Active     • HYDROcodone-acetaminophen (NORCO)  MG per tablet Take 1 tablet by mouth Every 6 (Six) Hours As Needed for Moderate Pain  for up to 30 days. 120 tablet 0   • leuprolide (Lupron Depot, 3-Month,) 22.5 MG injection 22.5 mg.     • lisinopril (PRINIVIL,ZESTRIL) 40 MG tablet 20 mg Daily.       No current facility-administered medications on file prior to visit.       No Known Allergies  Past Medical History:   Diagnosis Date   • Anemia    • Anxiety    • Blood disease    • Colon polyps    • Depression    • Diverticulitis    • Forgetfulness    • Hepatitis C    • Night sweats    • Numbness and tingling of left lower extremity    • Prostate cancer (HCC)      Past Surgical History:   Procedure Laterality Date   • PROSTATE BIOPSY     • PROSTATECTOMY      ROBOT ASSISTED W PELVIC LYMPH NODE DISSECTION   • TRANSURETHRAL RESECTION OF BLADDER TUMOR       Social History     Socioeconomic History   • Marital status:    Tobacco Use   • Smoking status: Current Every Day Smoker     Packs/day: 0.50     Start date: 10/21/1970   • Smokeless tobacco: Never Used   Vaping Use   • Vaping Use: Never used   Substance and Sexual Activity   • Alcohol use: Yes     Alcohol/week: 6.0 standard drinks     Types: 6 Cans of beer per week   • Drug use: Never   • Sexual activity: Defer     Family History   Problem Relation Age of Onset   • Cancer Mother    • Cancer Father      Immunization History   Administered Date(s) Administered   • COVID-19 (PFIZER) 03/07/2021, 04/04/2021, 12/14/2021   • Flu Vaccine Quad PF >36MO 11/13/2018   • Flu Vaccine Split Quad 10/21/2019       Objective   Physical Exam  Vitals and nursing note reviewed.   Constitutional:       Appearance: Normal appearance. He is normal  weight.   HENT:      Mouth/Throat:      Mouth: Mucous membranes are moist.   Eyes:      Pupils: Pupils are equal, round, and reactive to light.   Cardiovascular:      Rate and Rhythm: Normal rate and regular rhythm.      Pulses: Normal pulses.      Heart sounds: Normal heart sounds. No murmur heard.      Pulmonary:      Effort: Pulmonary effort is normal. No respiratory distress.      Breath sounds: Normal breath sounds. No wheezing, rhonchi or rales.   Abdominal:      General: Bowel sounds are normal.      Palpations: Abdomen is soft.   Musculoskeletal:         General: Normal range of motion.      Cervical back: Normal range of motion.   Skin:     General: Skin is warm and dry.      Capillary Refill: Capillary refill takes less than 2 seconds.   Neurological:      General: No focal deficit present.      Mental Status: He is alert and oriented to person, place, and time. Mental status is at baseline.   Psychiatric:         Mood and Affect: Mood normal.         Behavior: Behavior normal.         Thought Content: Thought content normal.         Judgment: Judgment normal.         Vitals:    12/17/21 1050   BP: 140/84   Pulse: 70   Resp: 16   Temp: 98.6 °F (37 °C)   SpO2: 98%   Weight: 59 kg (130 lb 1.1 oz)   PainSc:   5   PainLoc: Back  Comment: middle     ECOG score: 0         ECOG: (0) Fully Active - Able to Carry On All Pre-disease Performance Without Restriction  Fall Risk Assessment was completed, and patient is at low risk for falls.  PHQ-9 Total Score: 0       The patient is  experiencing fatigue. Fatigue score: 4    PT/OT Functional Screening: PT fx screen: No needs identified  Speech Functional Screening: Speech fx screen: No needs identified  Rehab to be ordered: Rehab to be ordered: No needs identified        Result Review :   The following data was reviewed by: CHRIS Aj on 12/17/2021:  Lab Results   Component Value Date    HGB 13.4 12/17/2021    HCT 39.6 12/17/2021    MCV 89.0 12/17/2021      12/17/2021    WBC 4.57 12/17/2021    NEUTROABS 3.15 12/17/2021    LYMPHSABS 0.98 12/17/2021    MONOSABS 0.31 12/17/2021    EOSABS 0.10 12/17/2021    BASOSABS 0.02 12/17/2021     Lab Results   Component Value Date    GLUCOSE 100 (H) 12/17/2021    BUN 9 12/17/2021    CREATININE 1.01 12/17/2021     12/17/2021    K 3.7 12/17/2021     (H) 12/17/2021    CO2 22.9 12/17/2021    CALCIUM 9.1 12/17/2021    PROTEINTOT 6.0 12/17/2021    ALBUMIN 3.30 (L) 12/17/2021    BILITOT 0.2 12/17/2021    ALKPHOS 43 12/17/2021    AST 12 12/17/2021    ALT 6 12/17/2021          Assessment and Plan    Diagnoses and all orders for this visit:    1. Prostate cancer metastatic to bone (HCC) (Primary)  -     PSA; Future    2. Secondary malignant neoplasm of bone (HCC)    Labs reviewed. OK for monthly Xgeva injection today. OK for Lupron injection today and every 6 months. He is to continue the Zytiga. Denies any CP, SOA, heart palpitations as well.     PSA level is pending.       Patient Follow Up: He will return in 1 month for Xgeva treatment. He can see Dr. Jack in 2 months.     Patient was given instructions and counseling regarding his condition or for health maintenance advice. Please see specific information pulled into the AVS if appropriate.     Kendra Oscar, APRN    12/17/2021

## 2022-01-13 ENCOUNTER — TELEPHONE (OUTPATIENT)
Dept: ONCOLOGY | Facility: HOSPITAL | Age: 67
End: 2022-01-13

## 2022-01-13 ENCOUNTER — OFFICE VISIT (OUTPATIENT)
Dept: RADIATION ONCOLOGY | Facility: HOSPITAL | Age: 67
End: 2022-01-13

## 2022-01-13 VITALS
HEART RATE: 68 BPM | RESPIRATION RATE: 16 BRPM | SYSTOLIC BLOOD PRESSURE: 139 MMHG | DIASTOLIC BLOOD PRESSURE: 91 MMHG | TEMPERATURE: 98.2 F | OXYGEN SATURATION: 93 %

## 2022-01-13 DIAGNOSIS — C79.51 SECONDARY MALIGNANT NEOPLASM OF BONE: ICD-10-CM

## 2022-01-13 DIAGNOSIS — C61 PROSTATE CANCER METASTATIC TO BONE: Primary | ICD-10-CM

## 2022-01-13 DIAGNOSIS — C79.51 PROSTATE CANCER METASTATIC TO BONE: Primary | ICD-10-CM

## 2022-01-13 PROCEDURE — 84153 ASSAY OF PSA TOTAL: CPT | Performed by: RADIOLOGY

## 2022-01-13 PROCEDURE — 99212-NC PR NO CHARGE CBC OFFICE OUTPATIENT VISIT 10 MINUTES: Performed by: RADIOLOGY

## 2022-01-13 PROCEDURE — G0463 HOSPITAL OUTPT CLINIC VISIT: HCPCS | Performed by: RADIOLOGY

## 2022-01-13 PROCEDURE — 36415 COLL VENOUS BLD VENIPUNCTURE: CPT | Performed by: RADIOLOGY

## 2022-01-13 RX ORDER — HYDROCODONE BITARTRATE AND ACETAMINOPHEN 10; 325 MG/1; MG/1
1 TABLET ORAL EVERY 4 HOURS PRN
COMMUNITY
End: 2022-01-17 | Stop reason: SDUPTHER

## 2022-01-13 RX ORDER — HYDROCODONE BITARTRATE AND ACETAMINOPHEN 10; 325 MG/1; MG/1
1 TABLET ORAL EVERY 6 HOURS PRN
Qty: 120 TABLET | Refills: 0 | Status: SHIPPED | OUTPATIENT
Start: 2022-01-13 | End: 2022-01-17 | Stop reason: SDUPTHER

## 2022-01-13 NOTE — TELEPHONE ENCOUNTER
Caller: Semaj Medina Osborne    Relationship: Self    Best call back number: [phone]    What is the best time to reach you: ASAP    Who are you requesting to speak with (clinical staff, provider,  specific staff member):     Do you know the name of the person who called:     What was the call regarding: PT WANTS TO R/S 01/14    Do you require a callback: YES

## 2022-01-13 NOTE — PROGRESS NOTES
Follow Up Office Visit      Encounter Date: 2022   Patient Name: Semaj Medina  YOB: 1955   Medical Record Number: [id number]   Primary Diagnosis: Prostate cancer metastatic to bone (HCC) [C61, C79.51]   Cancer Stagin      Chief Complaint:    Chief Complaint   Patient presents with   • Follow-up       Oncologic History: Semaj Medina is a 66 y.o. male here for follow up regarding his metastatic prostate cancer.     He was originally diagnosed in  and his prostate cancer was managed surgically.  He was found to have pT3b pN0 4+3 disease.  He had positive margins at the bladder neck.  He received adjuvant radiation therapy with Dr. Stanton [facility].  In , his PSA began to rise and he was started on Lupron.  He took a brief holiday.  In  he was started on Zytiga.  He tolerated this poorly and his dose was reduced multiple times.     His PSA had been trending upward in recent months.  2021 - 5.2 ng/ml  2021 - 10.2 ng/ml  10/2021 - 11.9 ng/ml     He had a bone scan and CT imaging.  The bone scan identified a T10 lesion.  CT imaging of the abdomen and pelvis did not reveal any pelvic recurrence/lymphadenopathy and no evidence of additional metastatic disease.  CT imaging of the chest was negative for metastases with the exception of the known T10 bone lesion that had increased in size compared to a prior CT from 2020. I ordered an MRI to further characterize his T10 lesion to assist with radiation treatment planning decisions. This incidentally identified an area of  concern at T5.     We treated the T10 lesion with palliative intent as he was experiencing bone pain. He finished one month ago. He continues to have ongoing pain at this site and feels like it is controlled with narcotic pain medication.   He has no new pain.     He has been getting Lupron and Xgeva through Dr. Jack's clinic.     Prior Radiation:  adjuvant  prostate radiation 2015 ([facility]), T9-11 30 Gy/10 fractions, completed 12/9/21.         Subjective      Review of Systems: Review of Systems   Constitutional: Positive for appetite change (decreased) and fatigue.   Respiratory: Negative for cough and shortness of breath.    Gastrointestinal: Negative for constipation, diarrhea and nausea.   Genitourinary: Negative for difficulty urinating, dysuria, frequency and urgency.        Brayan x 1   Musculoskeletal: Positive for back pain.   Neurological: Negative for dizziness and headaches.       The following portions of the patient's history were reviewed and updated as appropriate: allergies, current medications, past family history, past medical history, past social history, past surgical history and problem list.    Measures:   Pain: (on a scale of 0-10)   Pain Score    01/13/22 1146   PainSc:   6   PainLoc: Back     Semaj Medina reports a pain score of 6.  Given his pain assessment as noted, treatment options were discussed and the following options were decided upon as a follow-up plan to address the patient's pain: continuation of current treatment plan for pain.    Advanced Care Plan: N Advance Care Planning   ACP discussion was declined by the patient. Patient does not have an advance directive, declines further assistance.    KPS/Quality of Life: 80 - Restricted Physical Activity  ECOG: (1) Restricted in physically strenuous activity, ambulatory and able to do work of light nature  Depression Screening:   PHQ-9 score of 0 on 12/17/2021.       Objective     Physical Exam:   Vital Signs:   Vitals:    01/13/22 1146   BP: 139/91   Pulse: 68   Resp: 16   Temp: 98.2 °F (36.8 °C)   TempSrc: Temporal   SpO2: 93%   PainSc:   6   PainLoc: Back     There is no height or weight on file to calculate BMI.     Constitutional: No acute distress, sitting comfortably  Eye: EOMI, anicteric sclerae  HENT: NC/AT, MMM   Respiratory: Symmetric expansion, nonlabored respiration  MSK:  ROM intact in all four extremities, no obvious deformities  Neuro: Alert, oriented x3, CN3-12 grossly intact.   Psych: Appropriate mood and affect.            Assessment / Plan        Assessment/Plan:     Semaj Medina is a pleasant 66 y.o. male with a known diagnosis of metastatic prostate cancer.  Despite being on Xtandi/Lupron, his PSA continues to climb.  A suspicious lesion was identified on bone scan and CT at the T10 vertebral body and he is experiencing pain at the site. This lesion was characterized on MRI. His MRI also identified a sclerotic site concerning for disease in T5, though no correlate was seen on his recent bone scan. We treated his T10 lesion with palliative intent to 30 Gy/10 fractions and completed on 12/9. Unfortunately he has not had much improvement in pain. If needed, we could consider retreatment in the future. I will refill his narcotic medication.   A PSA from 12/17/21 returned higher than his value on 10/14/21. We will check another value today. If it continues to trend upwards, I expressed concern to the patient about the possibility of additional active disease and development of castrate resistance. I will discuss this with Dr. Jack.   I will see him back on an as needed basis if additional sites require palliation.      Diagnoses and all orders for this visit:    1. Prostate cancer metastatic to bone (HCC) (Primary)  -     PSA Diagnostic    2. Secondary malignant neoplasm of bone (HCC)  -     PSA Diagnostic        Follow Up:   No follow-ups on file.        Time:   I spent 25 minutes on this encounter today, 01/13/22. Activities that took place during this time include:   - preparing to see the patient  - obtaining and reviewing separately obtained history  - performing a medically appropriate examination and evaluation  - counseling and educating the patient  - documenting clinical information in the health record      Sincerely,        Jessica Arita MD  Radiation  Oncology  [facility]    This document has been signed by Jessica Arita MD on January 13, 2022 13:04 EST           NOTICE TO PATIENTS-HEALTH RESULTS RELEASE    We believe in information transparency, and we believe you deserve to see your information as soon as it is available.    Lab Results    • We release ALL notes and results to you promptly.    • Therefore, you may see some results even before we do. Please give us 2 business days to review and let you know our thoughts.  • We look at every result. We will contact you with any results that concern us.  • Some results may show abnormal, but are not clinically important. An example is “MCHC” and “MCV”.   Other results (like “TASHA”) are really challenging to interpret, and require reviewing other results and other information from your chart. Sometimes these are best discussed in person.  Imaging    CT scan, MRI, and PET reports can show new or re-occurring cancer  • These reports can contain words that are hard to understand  • These reports can also show unexpected results.  • We ALWAYS plan to review these results with you and decide on a plan together. We prefer to do this in person, by video visit, or phone.  • When possible, we will discuss the possible results with you BEFORE getting the test, and the next steps we would take with each result.  • Some patients prefer to see their results online as soon as possible. Because of possible “bad news,” other patients may feel more comfortable waiting to discuss results when their provider is available at their next video visit or in-person visit or by phone. As the patient, you can choose when to view your result

## 2022-01-14 ENCOUNTER — APPOINTMENT (OUTPATIENT)
Dept: ONCOLOGY | Facility: HOSPITAL | Age: 67
End: 2022-01-14

## 2022-01-14 ENCOUNTER — HOSPITAL ENCOUNTER (OUTPATIENT)
Dept: ONCOLOGY | Facility: HOSPITAL | Age: 67
Discharge: HOME | End: 2022-01-14

## 2022-01-14 DIAGNOSIS — C61 PROSTATE CANCER METASTATIC TO BONE: Primary | ICD-10-CM

## 2022-01-14 DIAGNOSIS — C79.51 PROSTATE CANCER METASTATIC TO BONE: Primary | ICD-10-CM

## 2022-01-14 LAB — PSA SERPL-MCNC: 5.47 NG/ML (ref 0–4)

## 2022-01-17 DIAGNOSIS — C79.51 PROSTATE CANCER METASTATIC TO BONE: ICD-10-CM

## 2022-01-17 DIAGNOSIS — C61 PROSTATE CANCER METASTATIC TO BONE: ICD-10-CM

## 2022-01-17 RX ORDER — HYDROCODONE BITARTRATE AND ACETAMINOPHEN 10; 325 MG/1; MG/1
1 TABLET ORAL EVERY 6 HOURS PRN
Qty: 120 TABLET | Refills: 0 | Status: SHIPPED | OUTPATIENT
Start: 2022-01-17 | End: 2022-02-21 | Stop reason: SDUPTHER

## 2022-01-18 ENCOUNTER — HOSPITAL ENCOUNTER (OUTPATIENT)
Dept: ONCOLOGY | Facility: HOSPITAL | Age: 67
Setting detail: INFUSION SERIES
Discharge: HOME | End: 2022-01-18

## 2022-01-18 ENCOUNTER — LAB (OUTPATIENT)
Dept: ONCOLOGY | Facility: HOSPITAL | Age: 67
End: 2022-01-18

## 2022-01-18 ENCOUNTER — OFFICE VISIT (OUTPATIENT)
Dept: ONCOLOGY | Facility: HOSPITAL | Age: 67
End: 2022-01-18

## 2022-01-18 VITALS
OXYGEN SATURATION: 99 % | RESPIRATION RATE: 18 BRPM | SYSTOLIC BLOOD PRESSURE: 132 MMHG | WEIGHT: 130.51 LBS | HEART RATE: 80 BPM | DIASTOLIC BLOOD PRESSURE: 99 MMHG | BODY MASS INDEX: 19.27 KG/M2 | TEMPERATURE: 97 F

## 2022-01-18 DIAGNOSIS — C79.51 PROSTATE CANCER METASTATIC TO BONE: Primary | ICD-10-CM

## 2022-01-18 DIAGNOSIS — C79.51 PROSTATE CANCER METASTATIC TO BONE: ICD-10-CM

## 2022-01-18 DIAGNOSIS — C61 PROSTATE CANCER METASTATIC TO BONE: ICD-10-CM

## 2022-01-18 DIAGNOSIS — C61 PROSTATE CANCER METASTATIC TO BONE: Primary | ICD-10-CM

## 2022-01-18 LAB
ALBUMIN SERPL-MCNC: 3.54 G/DL (ref 3.5–5.2)
ALBUMIN/GLOB SERPL: 1.4 G/DL
ALP SERPL-CCNC: 43 U/L (ref 39–117)
ALT SERPL W P-5'-P-CCNC: 4 U/L (ref 1–41)
ANION GAP SERPL CALCULATED.3IONS-SCNC: 6.7 MMOL/L (ref 5–15)
AST SERPL-CCNC: 12 U/L (ref 1–40)
BASOPHILS # BLD AUTO: 0.01 10*3/MM3 (ref 0–0.2)
BASOPHILS NFR BLD AUTO: 0.2 % (ref 0–1.5)
BILIRUB SERPL-MCNC: 0.2 MG/DL (ref 0–1.2)
BUN SERPL-MCNC: 9 MG/DL (ref 8–23)
BUN/CREAT SERPL: 10.7 (ref 7–25)
CALCIUM SPEC-SCNC: 8.1 MG/DL (ref 8.6–10.5)
CHLORIDE SERPL-SCNC: 110 MMOL/L (ref 98–107)
CO2 SERPL-SCNC: 25.3 MMOL/L (ref 22–29)
CREAT SERPL-MCNC: 0.84 MG/DL (ref 0.76–1.27)
DEPRECATED RDW RBC AUTO: 46.7 FL (ref 37–54)
EOSINOPHIL # BLD AUTO: 0.09 10*3/MM3 (ref 0–0.4)
EOSINOPHIL NFR BLD AUTO: 2 % (ref 0.3–6.2)
ERYTHROCYTE [DISTWIDTH] IN BLOOD BY AUTOMATED COUNT: 14.2 % (ref 12.3–15.4)
GFR SERPL CREATININE-BSD FRML MDRD: 111 ML/MIN/1.73
GLOBULIN UR ELPH-MCNC: 2.5 GM/DL
GLUCOSE SERPL-MCNC: 92 MG/DL (ref 65–99)
HCT VFR BLD AUTO: 40.4 % (ref 37.5–51)
HGB BLD-MCNC: 13.4 G/DL (ref 13–17.7)
IMM GRANULOCYTES # BLD AUTO: 0.01 10*3/MM3 (ref 0–0.05)
IMM GRANULOCYTES NFR BLD AUTO: 0.2 % (ref 0–0.5)
LYMPHOCYTES # BLD AUTO: 1.34 10*3/MM3 (ref 0.7–3.1)
LYMPHOCYTES NFR BLD AUTO: 30 % (ref 19.6–45.3)
MAGNESIUM SERPL-MCNC: 2 MG/DL (ref 1.6–2.4)
MCH RBC QN AUTO: 29.6 PG (ref 26.6–33)
MCHC RBC AUTO-ENTMCNC: 33.2 G/DL (ref 31.5–35.7)
MCV RBC AUTO: 89.4 FL (ref 79–97)
MONOCYTES # BLD AUTO: 0.4 10*3/MM3 (ref 0.1–0.9)
MONOCYTES NFR BLD AUTO: 9 % (ref 5–12)
NEUTROPHILS NFR BLD AUTO: 2.61 10*3/MM3 (ref 1.7–7)
NEUTROPHILS NFR BLD AUTO: 58.6 % (ref 42.7–76)
PHOSPHATE SERPL-MCNC: 3 MG/DL (ref 2.5–4.5)
PLATELET # BLD AUTO: 312 10*3/MM3 (ref 140–450)
PMV BLD AUTO: 8.3 FL (ref 6–12)
POTASSIUM SERPL-SCNC: 4 MMOL/L (ref 3.5–5.2)
PROT SERPL-MCNC: 6 G/DL (ref 6–8.5)
RBC # BLD AUTO: 4.52 10*6/MM3 (ref 4.14–5.8)
SODIUM SERPL-SCNC: 142 MMOL/L (ref 136–145)
WBC NRBC COR # BLD: 4.46 10*3/MM3 (ref 3.4–10.8)

## 2022-01-18 PROCEDURE — 83735 ASSAY OF MAGNESIUM: CPT

## 2022-01-18 PROCEDURE — 84100 ASSAY OF PHOSPHORUS: CPT

## 2022-01-18 PROCEDURE — 25010000002 DENOSUMAB 120 MG/1.7ML SOLUTION: Performed by: NURSE PRACTITIONER

## 2022-01-18 PROCEDURE — 85025 COMPLETE CBC W/AUTO DIFF WBC: CPT

## 2022-01-18 PROCEDURE — 36415 COLL VENOUS BLD VENIPUNCTURE: CPT

## 2022-01-18 PROCEDURE — 80053 COMPREHEN METABOLIC PANEL: CPT

## 2022-01-18 PROCEDURE — 96372 THER/PROPH/DIAG INJ SC/IM: CPT

## 2022-01-18 PROCEDURE — 99214 OFFICE O/P EST MOD 30 MIN: CPT | Performed by: INTERNAL MEDICINE

## 2022-01-18 RX ORDER — ONDANSETRON HYDROCHLORIDE 8 MG/1
8 TABLET, FILM COATED ORAL EVERY 8 HOURS PRN
Qty: 30 TABLET | Refills: 3 | Status: SHIPPED | OUTPATIENT
Start: 2022-01-18

## 2022-01-18 RX ADMIN — DENOSUMAB 120 MG: 120 INJECTION SUBCUTANEOUS at 12:19

## 2022-01-18 NOTE — PROGRESS NOTES
Patient  Semaj Osborne [place]    Location  [facility] HEMATOLOGY & ONCOLOGY    Chief Complaint  Prostate Cancer (-F2)    Referring Provider: Maryse Monae MD  PCP: Maryse Monae MD    Subjective          Oncology/Hematology History Overview Note   Mr. Cha is a 65-year-old -American man who was referred to me by Dr. Lepe for evaluation and treatment of prostate cancer.    Patient was initially diagnosed with prostate cancer in 2015.  On 6/4/2015 he underwent radical prostatectomy and LN dissection which showed a Zach 4+3 = 7 prostate cancer.  There were several high risk features such as extraprostatic extension, seminal vesicle invasion, positive margins at the bladder neck and right and left seminal vesicle.  Lymph vascular invasion was indeterminate.  Perineural invasion was not commented on. Final pathology [id number] R1.  He was treated with adjuvant radiation by Dr. Stanton.    According to records the patient likely started Lupron in December 2017 when his PSA started to rise.  The patient took a break in August 2019.  He states he was not aware he was to continue.  He restarted Lupron on 11/26/2019 after it was noted that his PSA chago from 0.46 up to 2.55 on 11/8/2019.  Unfortunately his PSA continued to rise after that to 4.54 on 3/10/2020.    PSA summary:  11/8/19 PSA 2.55 (restarted Lupron on 11/26)  8/31/2020 PSA 17.95 (started Zytiga)   Started Zytiga on 8/27/20.  Stopped in November 2020 due to diarrhea and nausea.     Started Xtandi in May 2021 due to intolerance of Zytiga even at the lowest dose       Prostate cancer metastatic to bone (HCC)   12/11/2020 -  Chemotherapy    OP SUPPORTIVE Leuprolide 45 mg Q6M     3/23/2021 -  Chemotherapy    OP SUPPORTIVE Denosumab (Xgeva) Q28D     4/15/2021 -  Chemotherapy    OP PROSTATE Enzalutamide     5/19/2021 Initial Diagnosis    Prostate cancer metastatic to bone (CMS/HCC)     Prostate cancer (HCC)   6/16/2021 Initial  Diagnosis    Prostate cancer (HCC)     Secondary malignant neoplasm of bone (HCC)   11/4/2021 Initial Diagnosis    Secondary malignant neoplasm of bone (HCC)     11/17/2021 -  Radiation    RADIATION THERAPY Treatment Details (Noted on 11/4/2021)  Site: Bilateral Spine - Thoracic  Technique: 3D CRT  Goal: No goal specified  Planned Treatment Start Date: 11/17/2021         History of Present Illness  Patient comes in today for follow-up after radiation.  Fortunately his PSA has dropped from 14.2 down to 5.5 on 1/13/2021.  His pain has significantly improved as well.  He rates his pain currently as a 5 out of 10.  Sometimes it gets as high as 7 out of 10.  He is also undergone cataract surgery and can see much better.  He did experience some nausea with Xtandi and decrease the dose from 4 pills a day down to 3 pills a day.    Review of Systems   Constitutional: Positive for fatigue. Negative for appetite change, diaphoresis, fever, unexpected weight gain and unexpected weight loss.   HENT: Negative for hearing loss, sore throat and voice change.    Eyes: Negative for blurred vision, double vision, pain, redness and visual disturbance.   Respiratory: Negative for cough, shortness of breath and wheezing.    Cardiovascular: Negative for chest pain, palpitations and leg swelling.   Endocrine: Negative for cold intolerance, heat intolerance, polydipsia and polyuria.   Genitourinary: Negative for decreased urine volume, difficulty urinating, frequency and urinary incontinence.   Musculoskeletal: Positive for back pain (Lower back pain ). Negative for arthralgias, joint swelling and myalgias.   Skin: Negative for color change, rash, skin lesions and wound.   Neurological: Negative for dizziness, seizures, numbness and headache.   Hematological: Negative for adenopathy. Does not bruise/bleed easily.   Psychiatric/Behavioral: Negative for depressed mood. The patient is not nervous/anxious.    All other systems reviewed and are  negative.      Past Medical History:   Diagnosis Date   • Anemia    • Anxiety    • Blood disease    • Colon polyps    • Depression    • Diverticulitis    • Forgetfulness    • Hepatitis C    • Night sweats    • Numbness and tingling of left lower extremity    • Prostate cancer (HCC)      Past Surgical History:   Procedure Laterality Date   • PROSTATE BIOPSY     • PROSTATECTOMY      ROBOT ASSISTED W PELVIC LYMPH NODE DISSECTION   • TRANSURETHRAL RESECTION OF BLADDER TUMOR       Social History     Socioeconomic History   • Marital status:    Tobacco Use   • Smoking status: Current Every Day Smoker     Packs/day: 0.50     Start date: 10/21/1970   • Smokeless tobacco: Never Used   Vaping Use   • Vaping Use: Never used   Substance and Sexual Activity   • Alcohol use: Yes     Alcohol/week: 6.0 standard drinks     Types: 6 Cans of beer per week   • Drug use: Never   • Sexual activity: Defer     Family History   Problem Relation Age of Onset   • Cancer Mother    • Cancer Father        Objective   Physical Exam  General: Alert, cooperative, no acute distress, thin but well-appearing  Eyes: Anicteric sclera, PERRLA  Respiratory: normal respiratory effort  Cardiovascular: no lower extremity edema  Skin: Normal tone, no rash, no lesions  Psychiatric: Appropriate affect, intact judgment  Neurologic: No focal sensory or motor deficits, normal cognition   Musculoskeletal: Normal muscle strength and tone  Extremities: No clubbing, cyanosis, or deformities    Vitals:    01/18/22 1055   BP: 132/99   Pulse: 80   Resp: 18   Temp: 97 °F (36.1 °C)   SpO2: 99%   Weight: 59.2 kg (130 lb 8.2 oz)   PainSc:   3   PainLoc: Back     ECOG score: 0         PHQ-9 Total Score:         Result Review :   The following data was reviewed by: Natalya Jack MD PhD on 01/18/2022:  Lab Results   Component Value Date    HGB 13.4 01/18/2022    HCT 40.4 01/18/2022    MCV 89.4 01/18/2022     01/18/2022    WBC 4.46 01/18/2022    NEUTROABS 2.61  01/18/2022    LYMPHSABS 1.34 01/18/2022    MONOSABS 0.40 01/18/2022    EOSABS 0.09 01/18/2022    BASOSABS 0.01 01/18/2022     Lab Results   Component Value Date    GLUCOSE 92 01/18/2022    BUN 9 01/18/2022    CREATININE 0.84 01/18/2022     01/18/2022    K 4.0 01/18/2022     (H) 01/18/2022    CO2 25.3 01/18/2022    CALCIUM 8.1 (L) 01/18/2022    PROTEINTOT 6.0 01/18/2022    ALBUMIN 3.54 01/18/2022    BILITOT 0.2 01/18/2022    ALKPHOS 43 01/18/2022    AST 12 01/18/2022    ALT 4 01/18/2022          Assessment and Plan    Diagnoses and all orders for this visit:    1. Prostate cancer metastatic to bone (HCC)  -     ondansetron (ZOFRAN) 8 MG tablet; Take 1 tablet by mouth Every 8 (Eight) Hours As Needed for Nausea or Vomiting.  Dispense: 30 tablet; Refill: 3  -     CBC & Differential; Future  -     Comprehensive Metabolic Panel; Future  -     PSA DIAGNOSTIC; Future    Metastatic prostate cancer: Patient is currently on Xtandi.  I encouraged him to increase back to the full dose and I will send a prescription for Zofran which she can take 1 hour before the medication to avoid nausea.  I will follow-up with him in 2 months for repeat toxicity check.    Cancer related pain: Improved with radiation.  Continues hydrocodone as prescribed by Dr. Falcon as needed.      Patient was given instructions and counseling regarding his condition or for health maintenance advice. Please see specific information pulled into the AVS if appropriate.     Natalya Jack MD PhD    1/19/2022

## 2022-02-18 ENCOUNTER — TELEPHONE (OUTPATIENT)
Dept: ONCOLOGY | Facility: HOSPITAL | Age: 67
End: 2022-02-18

## 2022-02-18 NOTE — TELEPHONE ENCOUNTER
Caller: Semaj Medina Osborne    Relationship to patient: Self    Best call back number: [phone]    Chief complaint: PATIENT NEEDS TO RESCHEDULE, HUB UNABLE TO DUE TO PATIENT BEING IN ACTIVE TREATMENT.     Type of visit: LAB AND INJECTION    Requested date: ANY DAY, AFTERNOON APPOINTMENTS     If rescheduling, when is the original appointment: 2-15-22

## 2022-02-21 DIAGNOSIS — C79.51 PROSTATE CANCER METASTATIC TO BONE: ICD-10-CM

## 2022-02-21 DIAGNOSIS — C61 PROSTATE CANCER METASTATIC TO BONE: ICD-10-CM

## 2022-02-21 RX ORDER — HYDROCODONE BITARTRATE AND ACETAMINOPHEN 10; 325 MG/1; MG/1
1 TABLET ORAL EVERY 6 HOURS PRN
Qty: 120 TABLET | Refills: 0 | Status: SHIPPED | OUTPATIENT
Start: 2022-02-21 | End: 2022-03-18 | Stop reason: SDUPTHER

## 2022-02-21 NOTE — TELEPHONE ENCOUNTER
pt called and states the he needs a refill for Hydrocodone. Dr Falcon was the prescribing MD. pt states that he does not see Dr Falcon anymore and needs Dr Jack to refill his Hydrocodone. pt uses [facility] in [place]. Please review and sign.

## 2022-02-23 ENCOUNTER — LAB (OUTPATIENT)
Dept: ONCOLOGY | Facility: HOSPITAL | Age: 67
End: 2022-02-23

## 2022-02-23 ENCOUNTER — HOSPITAL ENCOUNTER (OUTPATIENT)
Dept: ONCOLOGY | Facility: HOSPITAL | Age: 67
Setting detail: INFUSION SERIES
Discharge: HOME | End: 2022-02-23

## 2022-02-23 VITALS
DIASTOLIC BLOOD PRESSURE: 92 MMHG | SYSTOLIC BLOOD PRESSURE: 165 MMHG | TEMPERATURE: 97.8 F | HEART RATE: 59 BPM | RESPIRATION RATE: 20 BRPM

## 2022-02-23 DIAGNOSIS — C79.51 PROSTATE CANCER METASTATIC TO BONE: ICD-10-CM

## 2022-02-23 DIAGNOSIS — C61 PROSTATE CANCER METASTATIC TO BONE: Primary | ICD-10-CM

## 2022-02-23 DIAGNOSIS — C61 PROSTATE CANCER METASTATIC TO BONE: ICD-10-CM

## 2022-02-23 DIAGNOSIS — C79.51 PROSTATE CANCER METASTATIC TO BONE: Primary | ICD-10-CM

## 2022-02-23 LAB
ALBUMIN SERPL-MCNC: 3.76 G/DL (ref 3.5–5.2)
ALBUMIN/GLOB SERPL: 1.3 G/DL
ALP SERPL-CCNC: 53 U/L (ref 39–117)
ALT SERPL W P-5'-P-CCNC: 6 U/L (ref 1–41)
ANION GAP SERPL CALCULATED.3IONS-SCNC: 5 MMOL/L (ref 5–15)
AST SERPL-CCNC: 14 U/L (ref 1–40)
BASOPHILS # BLD AUTO: 0.01 10*3/MM3 (ref 0–0.2)
BASOPHILS NFR BLD AUTO: 0.2 % (ref 0–1.5)
BILIRUB SERPL-MCNC: 0.3 MG/DL (ref 0–1.2)
BUN SERPL-MCNC: 8 MG/DL (ref 8–23)
BUN/CREAT SERPL: 8.5 (ref 7–25)
CALCIUM SPEC-SCNC: 8.4 MG/DL (ref 8.6–10.5)
CHLORIDE SERPL-SCNC: 106 MMOL/L (ref 98–107)
CO2 SERPL-SCNC: 27 MMOL/L (ref 22–29)
CREAT SERPL-MCNC: 0.94 MG/DL (ref 0.76–1.27)
DEPRECATED RDW RBC AUTO: 48.1 FL (ref 37–54)
EOSINOPHIL # BLD AUTO: 0.1 10*3/MM3 (ref 0–0.4)
EOSINOPHIL NFR BLD AUTO: 2.1 % (ref 0.3–6.2)
ERYTHROCYTE [DISTWIDTH] IN BLOOD BY AUTOMATED COUNT: 14.4 % (ref 12.3–15.4)
GFR SERPL CREATININE-BSD FRML MDRD: 97 ML/MIN/1.73
GLOBULIN UR ELPH-MCNC: 2.9 GM/DL
GLUCOSE SERPL-MCNC: 102 MG/DL (ref 65–99)
HCT VFR BLD AUTO: 42.5 % (ref 37.5–51)
HGB BLD-MCNC: 14.3 G/DL (ref 13–17.7)
IMM GRANULOCYTES # BLD AUTO: 0.01 10*3/MM3 (ref 0–0.05)
IMM GRANULOCYTES NFR BLD AUTO: 0.2 % (ref 0–0.5)
LYMPHOCYTES # BLD AUTO: 1.57 10*3/MM3 (ref 0.7–3.1)
LYMPHOCYTES NFR BLD AUTO: 33.3 % (ref 19.6–45.3)
MAGNESIUM SERPL-MCNC: 1.7 MG/DL (ref 1.6–2.4)
MCH RBC QN AUTO: 30.1 PG (ref 26.6–33)
MCHC RBC AUTO-ENTMCNC: 33.6 G/DL (ref 31.5–35.7)
MCV RBC AUTO: 89.5 FL (ref 79–97)
MONOCYTES # BLD AUTO: 0.38 10*3/MM3 (ref 0.1–0.9)
MONOCYTES NFR BLD AUTO: 8.1 % (ref 5–12)
NEUTROPHILS NFR BLD AUTO: 2.65 10*3/MM3 (ref 1.7–7)
NEUTROPHILS NFR BLD AUTO: 56.1 % (ref 42.7–76)
PHOSPHATE SERPL-MCNC: 3.5 MG/DL (ref 2.5–4.5)
PLATELET # BLD AUTO: 307 10*3/MM3 (ref 140–450)
PMV BLD AUTO: 8.8 FL (ref 6–12)
POTASSIUM SERPL-SCNC: 3.7 MMOL/L (ref 3.5–5.2)
PROT SERPL-MCNC: 6.7 G/DL (ref 6–8.5)
RBC # BLD AUTO: 4.75 10*6/MM3 (ref 4.14–5.8)
SODIUM SERPL-SCNC: 138 MMOL/L (ref 136–145)
WBC NRBC COR # BLD: 4.72 10*3/MM3 (ref 3.4–10.8)

## 2022-02-23 PROCEDURE — 80053 COMPREHEN METABOLIC PANEL: CPT

## 2022-02-23 PROCEDURE — 84100 ASSAY OF PHOSPHORUS: CPT

## 2022-02-23 PROCEDURE — 25010000002 DENOSUMAB 120 MG/1.7ML SOLUTION: Performed by: INTERNAL MEDICINE

## 2022-02-23 PROCEDURE — 36415 COLL VENOUS BLD VENIPUNCTURE: CPT

## 2022-02-23 PROCEDURE — 85025 COMPLETE CBC W/AUTO DIFF WBC: CPT

## 2022-02-23 PROCEDURE — 96372 THER/PROPH/DIAG INJ SC/IM: CPT

## 2022-02-23 PROCEDURE — 83735 ASSAY OF MAGNESIUM: CPT

## 2022-02-23 RX ADMIN — DENOSUMAB 120 MG: 120 INJECTION SUBCUTANEOUS at 11:04

## 2022-03-18 ENCOUNTER — LAB (OUTPATIENT)
Dept: ONCOLOGY | Facility: HOSPITAL | Age: 67
End: 2022-03-18

## 2022-03-18 ENCOUNTER — OFFICE VISIT (OUTPATIENT)
Dept: ONCOLOGY | Facility: HOSPITAL | Age: 67
End: 2022-03-18

## 2022-03-18 VITALS
DIASTOLIC BLOOD PRESSURE: 76 MMHG | BODY MASS INDEX: 18.26 KG/M2 | RESPIRATION RATE: 18 BRPM | SYSTOLIC BLOOD PRESSURE: 133 MMHG | TEMPERATURE: 97.1 F | WEIGHT: 123.68 LBS | HEART RATE: 60 BPM | OXYGEN SATURATION: 100 %

## 2022-03-18 DIAGNOSIS — C79.51 PROSTATE CANCER METASTATIC TO BONE: ICD-10-CM

## 2022-03-18 DIAGNOSIS — C61 PROSTATE CANCER METASTATIC TO BONE: Primary | ICD-10-CM

## 2022-03-18 DIAGNOSIS — C61 PROSTATE CANCER METASTATIC TO BONE: ICD-10-CM

## 2022-03-18 DIAGNOSIS — G89.3 CANCER RELATED PAIN: ICD-10-CM

## 2022-03-18 DIAGNOSIS — C79.51 PROSTATE CANCER METASTATIC TO BONE: Primary | ICD-10-CM

## 2022-03-18 DIAGNOSIS — C79.51 SECONDARY MALIGNANT NEOPLASM OF BONE: ICD-10-CM

## 2022-03-18 LAB
ALBUMIN SERPL-MCNC: 3.53 G/DL (ref 3.5–5.2)
ALBUMIN/GLOB SERPL: 1.3 G/DL
ALP SERPL-CCNC: 54 U/L (ref 39–117)
ALT SERPL W P-5'-P-CCNC: 6 U/L (ref 1–41)
ANION GAP SERPL CALCULATED.3IONS-SCNC: 4.9 MMOL/L (ref 5–15)
AST SERPL-CCNC: 14 U/L (ref 1–40)
BASOPHILS # BLD AUTO: 0.02 10*3/MM3 (ref 0–0.2)
BASOPHILS NFR BLD AUTO: 0.4 % (ref 0–1.5)
BILIRUB SERPL-MCNC: 0.2 MG/DL (ref 0–1.2)
BUN SERPL-MCNC: 12 MG/DL (ref 8–23)
BUN/CREAT SERPL: 12.5 (ref 7–25)
CALCIUM SPEC-SCNC: 8.6 MG/DL (ref 8.6–10.5)
CHLORIDE SERPL-SCNC: 107 MMOL/L (ref 98–107)
CO2 SERPL-SCNC: 27.1 MMOL/L (ref 22–29)
CREAT SERPL-MCNC: 0.96 MG/DL (ref 0.76–1.27)
DEPRECATED RDW RBC AUTO: 49.3 FL (ref 37–54)
EGFRCR SERPLBLD CKD-EPI 2021: 87.2 ML/MIN/1.73
EOSINOPHIL # BLD AUTO: 0.13 10*3/MM3 (ref 0–0.4)
EOSINOPHIL NFR BLD AUTO: 2.5 % (ref 0.3–6.2)
ERYTHROCYTE [DISTWIDTH] IN BLOOD BY AUTOMATED COUNT: 14.8 % (ref 12.3–15.4)
GLOBULIN UR ELPH-MCNC: 2.8 GM/DL
GLUCOSE SERPL-MCNC: 102 MG/DL (ref 65–99)
HCT VFR BLD AUTO: 39.8 % (ref 37.5–51)
HGB BLD-MCNC: 13.6 G/DL (ref 13–17.7)
IMM GRANULOCYTES # BLD AUTO: 0.01 10*3/MM3 (ref 0–0.05)
IMM GRANULOCYTES NFR BLD AUTO: 0.2 % (ref 0–0.5)
LYMPHOCYTES # BLD AUTO: 2.08 10*3/MM3 (ref 0.7–3.1)
LYMPHOCYTES NFR BLD AUTO: 40.2 % (ref 19.6–45.3)
MAGNESIUM SERPL-MCNC: 1.9 MG/DL (ref 1.6–2.4)
MCH RBC QN AUTO: 30.8 PG (ref 26.6–33)
MCHC RBC AUTO-ENTMCNC: 34.2 G/DL (ref 31.5–35.7)
MCV RBC AUTO: 90 FL (ref 79–97)
MONOCYTES # BLD AUTO: 0.42 10*3/MM3 (ref 0.1–0.9)
MONOCYTES NFR BLD AUTO: 8.1 % (ref 5–12)
NEUTROPHILS NFR BLD AUTO: 2.52 10*3/MM3 (ref 1.7–7)
NEUTROPHILS NFR BLD AUTO: 48.6 % (ref 42.7–76)
PLATELET # BLD AUTO: 282 10*3/MM3 (ref 140–450)
PMV BLD AUTO: 8.8 FL (ref 6–12)
POTASSIUM SERPL-SCNC: 3.5 MMOL/L (ref 3.5–5.2)
PROT SERPL-MCNC: 6.3 G/DL (ref 6–8.5)
PSA SERPL-MCNC: 1.15 NG/ML (ref 0–4)
RBC # BLD AUTO: 4.42 10*6/MM3 (ref 4.14–5.8)
SODIUM SERPL-SCNC: 139 MMOL/L (ref 136–145)
WBC NRBC COR # BLD: 5.18 10*3/MM3 (ref 3.4–10.8)

## 2022-03-18 PROCEDURE — 85025 COMPLETE CBC W/AUTO DIFF WBC: CPT

## 2022-03-18 PROCEDURE — 84153 ASSAY OF PSA TOTAL: CPT

## 2022-03-18 PROCEDURE — 36415 COLL VENOUS BLD VENIPUNCTURE: CPT

## 2022-03-18 PROCEDURE — 99214 OFFICE O/P EST MOD 30 MIN: CPT | Performed by: INTERNAL MEDICINE

## 2022-03-18 PROCEDURE — G0463 HOSPITAL OUTPT CLINIC VISIT: HCPCS | Performed by: INTERNAL MEDICINE

## 2022-03-18 PROCEDURE — 83735 ASSAY OF MAGNESIUM: CPT

## 2022-03-18 PROCEDURE — 80053 COMPREHEN METABOLIC PANEL: CPT

## 2022-03-18 RX ORDER — HYDROCODONE BITARTRATE AND ACETAMINOPHEN 10; 325 MG/1; MG/1
1 TABLET ORAL EVERY 6 HOURS PRN
Qty: 120 TABLET | Refills: 0 | Status: SHIPPED | OUTPATIENT
Start: 2022-03-18 | End: 2022-04-20 | Stop reason: SDUPTHER

## 2022-03-22 DIAGNOSIS — C61 PROSTATE CANCER METASTATIC TO BONE: Primary | ICD-10-CM

## 2022-03-22 DIAGNOSIS — C79.51 PROSTATE CANCER METASTATIC TO BONE: Primary | ICD-10-CM

## 2022-03-23 ENCOUNTER — LAB (OUTPATIENT)
Dept: ONCOLOGY | Facility: HOSPITAL | Age: 67
End: 2022-03-23

## 2022-03-23 ENCOUNTER — TELEPHONE (OUTPATIENT)
Dept: ONCOLOGY | Facility: OTHER | Age: 67
End: 2022-03-23

## 2022-03-23 ENCOUNTER — HOSPITAL ENCOUNTER (OUTPATIENT)
Dept: ONCOLOGY | Facility: HOSPITAL | Age: 67
Setting detail: INFUSION SERIES
Discharge: HOME | End: 2022-03-23

## 2022-03-23 VITALS
OXYGEN SATURATION: 100 % | HEART RATE: 56 BPM | SYSTOLIC BLOOD PRESSURE: 162 MMHG | RESPIRATION RATE: 17 BRPM | TEMPERATURE: 97.8 F | DIASTOLIC BLOOD PRESSURE: 94 MMHG

## 2022-03-23 DIAGNOSIS — C61 PROSTATE CANCER METASTATIC TO BONE: ICD-10-CM

## 2022-03-23 DIAGNOSIS — C79.51 SECONDARY MALIGNANT NEOPLASM OF BONE: Primary | ICD-10-CM

## 2022-03-23 DIAGNOSIS — C79.51 PROSTATE CANCER METASTATIC TO BONE: ICD-10-CM

## 2022-03-23 DIAGNOSIS — C79.51 PROSTATE CANCER METASTATIC TO BONE: Primary | ICD-10-CM

## 2022-03-23 DIAGNOSIS — C61 PROSTATE CANCER METASTATIC TO BONE: Primary | ICD-10-CM

## 2022-03-23 PROCEDURE — 96372 THER/PROPH/DIAG INJ SC/IM: CPT

## 2022-03-23 PROCEDURE — 25010000002 DENOSUMAB 120 MG/1.7ML SOLUTION: Performed by: INTERNAL MEDICINE

## 2022-03-23 RX ADMIN — DENOSUMAB 120 MG: 120 INJECTION SUBCUTANEOUS at 10:36

## 2022-03-23 NOTE — TELEPHONE ENCOUNTER
PT STATED HE WAS RUNNING ABOUT 45 MINS LATE FOR HIS APPT, CALLED  TO SEE IF PT NEEDED TO R/S. TOLD HIM HE COULD STILL COME IN. INFORMED PT WHO V/U.

## 2022-04-20 ENCOUNTER — HOSPITAL ENCOUNTER (OUTPATIENT)
Dept: ONCOLOGY | Facility: HOSPITAL | Age: 67
Setting detail: INFUSION SERIES
Discharge: HOME | End: 2022-04-20

## 2022-04-20 ENCOUNTER — LAB (OUTPATIENT)
Dept: ONCOLOGY | Facility: HOSPITAL | Age: 67
End: 2022-04-20

## 2022-04-20 VITALS
SYSTOLIC BLOOD PRESSURE: 152 MMHG | RESPIRATION RATE: 20 BRPM | OXYGEN SATURATION: 97 % | HEART RATE: 65 BPM | TEMPERATURE: 98.4 F | DIASTOLIC BLOOD PRESSURE: 91 MMHG

## 2022-04-20 DIAGNOSIS — C79.51 PROSTATE CANCER METASTATIC TO BONE: ICD-10-CM

## 2022-04-20 DIAGNOSIS — C79.51 PROSTATE CANCER METASTATIC TO BONE: Primary | ICD-10-CM

## 2022-04-20 DIAGNOSIS — C61 PROSTATE CANCER METASTATIC TO BONE: Primary | ICD-10-CM

## 2022-04-20 DIAGNOSIS — C61 PROSTATE CANCER METASTATIC TO BONE: ICD-10-CM

## 2022-04-20 LAB
ALBUMIN SERPL-MCNC: 3.52 G/DL (ref 3.5–5.2)
ALBUMIN/GLOB SERPL: 1.3 G/DL
ALP SERPL-CCNC: 57 U/L (ref 39–117)
ALT SERPL W P-5'-P-CCNC: 8 U/L (ref 1–41)
ANION GAP SERPL CALCULATED.3IONS-SCNC: 9 MMOL/L (ref 5–15)
AST SERPL-CCNC: 16 U/L (ref 1–40)
BASOPHILS # BLD AUTO: 0.01 10*3/MM3 (ref 0–0.2)
BASOPHILS NFR BLD AUTO: 0.2 % (ref 0–1.5)
BILIRUB SERPL-MCNC: 0.4 MG/DL (ref 0–1.2)
BUN SERPL-MCNC: 7 MG/DL (ref 8–23)
BUN/CREAT SERPL: 7.5 (ref 7–25)
CALCIUM SPEC-SCNC: 8 MG/DL (ref 8.6–10.5)
CHLORIDE SERPL-SCNC: 106 MMOL/L (ref 98–107)
CO2 SERPL-SCNC: 25 MMOL/L (ref 22–29)
CREAT SERPL-MCNC: 0.93 MG/DL (ref 0.76–1.27)
DEPRECATED RDW RBC AUTO: 48 FL (ref 37–54)
EGFRCR SERPLBLD CKD-EPI 2021: 90.6 ML/MIN/1.73
EOSINOPHIL # BLD AUTO: 0.12 10*3/MM3 (ref 0–0.4)
EOSINOPHIL NFR BLD AUTO: 2.2 % (ref 0.3–6.2)
ERYTHROCYTE [DISTWIDTH] IN BLOOD BY AUTOMATED COUNT: 14.2 % (ref 12.3–15.4)
GLOBULIN UR ELPH-MCNC: 2.7 GM/DL
GLUCOSE SERPL-MCNC: 108 MG/DL (ref 65–99)
HCT VFR BLD AUTO: 40.8 % (ref 37.5–51)
HGB BLD-MCNC: 13.9 G/DL (ref 13–17.7)
IMM GRANULOCYTES # BLD AUTO: 0 10*3/MM3 (ref 0–0.05)
IMM GRANULOCYTES NFR BLD AUTO: 0 % (ref 0–0.5)
LYMPHOCYTES # BLD AUTO: 1.78 10*3/MM3 (ref 0.7–3.1)
LYMPHOCYTES NFR BLD AUTO: 32.7 % (ref 19.6–45.3)
MAGNESIUM SERPL-MCNC: 1.7 MG/DL (ref 1.6–2.4)
MCH RBC QN AUTO: 30.9 PG (ref 26.6–33)
MCHC RBC AUTO-ENTMCNC: 34.1 G/DL (ref 31.5–35.7)
MCV RBC AUTO: 90.7 FL (ref 79–97)
MONOCYTES # BLD AUTO: 0.31 10*3/MM3 (ref 0.1–0.9)
MONOCYTES NFR BLD AUTO: 5.7 % (ref 5–12)
NEUTROPHILS NFR BLD AUTO: 3.22 10*3/MM3 (ref 1.7–7)
NEUTROPHILS NFR BLD AUTO: 59.2 % (ref 42.7–76)
PHOSPHATE SERPL-MCNC: 3 MG/DL (ref 2.5–4.5)
PLATELET # BLD AUTO: 275 10*3/MM3 (ref 140–450)
PMV BLD AUTO: 9.1 FL (ref 6–12)
POTASSIUM SERPL-SCNC: 3.8 MMOL/L (ref 3.5–5.2)
PROT SERPL-MCNC: 6.2 G/DL (ref 6–8.5)
RBC # BLD AUTO: 4.5 10*6/MM3 (ref 4.14–5.8)
SODIUM SERPL-SCNC: 140 MMOL/L (ref 136–145)
WBC NRBC COR # BLD: 5.44 10*3/MM3 (ref 3.4–10.8)

## 2022-04-20 PROCEDURE — 36415 COLL VENOUS BLD VENIPUNCTURE: CPT

## 2022-04-20 PROCEDURE — 85025 COMPLETE CBC W/AUTO DIFF WBC: CPT

## 2022-04-20 PROCEDURE — 84100 ASSAY OF PHOSPHORUS: CPT

## 2022-04-20 PROCEDURE — 83735 ASSAY OF MAGNESIUM: CPT

## 2022-04-20 PROCEDURE — 80053 COMPREHEN METABOLIC PANEL: CPT

## 2022-04-20 PROCEDURE — 96372 THER/PROPH/DIAG INJ SC/IM: CPT

## 2022-04-20 PROCEDURE — 25010000002 DENOSUMAB 120 MG/1.7ML SOLUTION: Performed by: NURSE PRACTITIONER

## 2022-04-20 RX ORDER — HYDROCODONE BITARTRATE AND ACETAMINOPHEN 10; 325 MG/1; MG/1
1 TABLET ORAL EVERY 6 HOURS PRN
Qty: 120 TABLET | Refills: 0 | Status: SHIPPED | OUTPATIENT
Start: 2022-04-20 | End: 2022-05-23 | Stop reason: SDUPTHER

## 2022-04-20 RX ADMIN — DENOSUMAB 120 MG: 120 INJECTION SUBCUTANEOUS at 11:12

## 2022-04-26 PROBLEM — G89.3 CANCER RELATED PAIN: Status: ACTIVE | Noted: 2022-04-26

## 2022-05-20 ENCOUNTER — TELEPHONE (OUTPATIENT)
Dept: ONCOLOGY | Facility: HOSPITAL | Age: 67
End: 2022-05-20

## 2022-05-20 DIAGNOSIS — C79.51 PROSTATE CANCER METASTATIC TO BONE: ICD-10-CM

## 2022-05-20 DIAGNOSIS — C61 PROSTATE CANCER METASTATIC TO BONE: ICD-10-CM

## 2022-05-20 NOTE — TELEPHONE ENCOUNTER
Caller: Semaj Medina [place]    Relationship: Self    Best call back number: [phone]    Requested Prescriptions:   HYDROCODONE  MG     Pharmacy where request should be sent:    [id number] - [place], [place] - [address] [facility] AT [facility] OF [address] & [place] - [phone] [facility] [phone] FX    Does the patient have less than a 3 day supply:  [x] Yes  [] No    Nena TRUONG Rep   05/20/22 11:44 EDT

## 2022-05-23 NOTE — TELEPHONE ENCOUNTER
Caller: Semaj Medina Osborne    Relationship: Self    Best call back number: [phone]    Requested Prescriptions:   Requested Prescriptions     Pending Prescriptions Disp Refills   • HYDROcodone-acetaminophen (NORCO)  MG per tablet 120 tablet 0     Sig: Take 1 tablet by mouth Every 6 (Six) Hours As Needed for Moderate Pain .        Pharmacy where request should be sent: [facility] #[id number] - [place], [place] - [address] [facility] [address] [facility] [address] [facility] & [place] - [phone] [facility] [phone] FX     Additional details provided by patient: PATIENT IS OUT    Does the patient have less than a 3 day supply:  [x] Yes  [] No    Nena Johnson Rep   05/23/22 12:43 EDT

## 2022-05-24 RX ORDER — HYDROCODONE BITARTRATE AND ACETAMINOPHEN 10; 325 MG/1; MG/1
1 TABLET ORAL EVERY 6 HOURS PRN
Qty: 120 TABLET | Refills: 0 | Status: SHIPPED | OUTPATIENT
Start: 2022-05-24 | End: 2022-06-17 | Stop reason: SDUPTHER

## 2022-05-24 NOTE — TELEPHONE ENCOUNTER
pt called back after hours yesterday stating that his pain med has not been refilled yet. pt states that he requested a refill last Friday. pt requesting refill for Hydrocodone. pt uses [facility] in [place]. Please review and sign script.

## 2022-06-17 ENCOUNTER — HOSPITAL ENCOUNTER (OUTPATIENT)
Dept: ONCOLOGY | Facility: HOSPITAL | Age: 67
Setting detail: INFUSION SERIES
Discharge: HOME | End: 2022-06-17

## 2022-06-17 ENCOUNTER — OFFICE VISIT (OUTPATIENT)
Dept: ONCOLOGY | Facility: HOSPITAL | Age: 67
End: 2022-06-17

## 2022-06-17 ENCOUNTER — LAB (OUTPATIENT)
Dept: ONCOLOGY | Facility: HOSPITAL | Age: 67
End: 2022-06-17

## 2022-06-17 VITALS
RESPIRATION RATE: 16 BRPM | OXYGEN SATURATION: 98 % | TEMPERATURE: 97.5 F | DIASTOLIC BLOOD PRESSURE: 91 MMHG | BODY MASS INDEX: 18.88 KG/M2 | WEIGHT: 127.87 LBS | SYSTOLIC BLOOD PRESSURE: 136 MMHG | HEART RATE: 58 BPM

## 2022-06-17 DIAGNOSIS — C79.51 PROSTATE CANCER METASTATIC TO BONE: Primary | ICD-10-CM

## 2022-06-17 DIAGNOSIS — C61 PROSTATE CANCER METASTATIC TO BONE: ICD-10-CM

## 2022-06-17 DIAGNOSIS — C79.51 PROSTATE CANCER METASTATIC TO BONE: ICD-10-CM

## 2022-06-17 DIAGNOSIS — C61 PROSTATE CANCER METASTATIC TO BONE: Primary | ICD-10-CM

## 2022-06-17 LAB
ALBUMIN SERPL-MCNC: 3.47 G/DL (ref 3.5–5.2)
ALBUMIN/GLOB SERPL: 1.3 G/DL
ALP SERPL-CCNC: 45 U/L (ref 39–117)
ALT SERPL W P-5'-P-CCNC: 5 U/L (ref 1–41)
ANION GAP SERPL CALCULATED.3IONS-SCNC: 6.1 MMOL/L (ref 5–15)
AST SERPL-CCNC: 13 U/L (ref 1–40)
BASOPHILS # BLD AUTO: 0.01 10*3/MM3 (ref 0–0.2)
BASOPHILS NFR BLD AUTO: 0.2 % (ref 0–1.5)
BILIRUB SERPL-MCNC: 0.3 MG/DL (ref 0–1.2)
BUN SERPL-MCNC: 8 MG/DL (ref 8–23)
BUN/CREAT SERPL: 8.1 (ref 7–25)
CALCIUM SPEC-SCNC: 8.5 MG/DL (ref 8.6–10.5)
CHLORIDE SERPL-SCNC: 109 MMOL/L (ref 98–107)
CO2 SERPL-SCNC: 23.9 MMOL/L (ref 22–29)
CREAT SERPL-MCNC: 0.99 MG/DL (ref 0.76–1.27)
DEPRECATED RDW RBC AUTO: 45 FL (ref 37–54)
EGFRCR SERPLBLD CKD-EPI 2021: 84 ML/MIN/1.73
EOSINOPHIL # BLD AUTO: 0.21 10*3/MM3 (ref 0–0.4)
EOSINOPHIL NFR BLD AUTO: 3.9 % (ref 0.3–6.2)
ERYTHROCYTE [DISTWIDTH] IN BLOOD BY AUTOMATED COUNT: 13.6 % (ref 12.3–15.4)
GLOBULIN UR ELPH-MCNC: 2.7 GM/DL
GLUCOSE SERPL-MCNC: 93 MG/DL (ref 65–99)
HCT VFR BLD AUTO: 38.9 % (ref 37.5–51)
HGB BLD-MCNC: 13.4 G/DL (ref 13–17.7)
IMM GRANULOCYTES # BLD AUTO: 0.01 10*3/MM3 (ref 0–0.05)
IMM GRANULOCYTES NFR BLD AUTO: 0.2 % (ref 0–0.5)
LYMPHOCYTES # BLD AUTO: 1.85 10*3/MM3 (ref 0.7–3.1)
LYMPHOCYTES NFR BLD AUTO: 34.8 % (ref 19.6–45.3)
MAGNESIUM SERPL-MCNC: 1.8 MG/DL (ref 1.6–2.4)
MCH RBC QN AUTO: 30.7 PG (ref 26.6–33)
MCHC RBC AUTO-ENTMCNC: 34.4 G/DL (ref 31.5–35.7)
MCV RBC AUTO: 89.2 FL (ref 79–97)
MONOCYTES # BLD AUTO: 0.36 10*3/MM3 (ref 0.1–0.9)
MONOCYTES NFR BLD AUTO: 6.8 % (ref 5–12)
NEUTROPHILS NFR BLD AUTO: 2.88 10*3/MM3 (ref 1.7–7)
NEUTROPHILS NFR BLD AUTO: 54.1 % (ref 42.7–76)
PHOSPHATE SERPL-MCNC: 4.3 MG/DL (ref 2.5–4.5)
PLATELET # BLD AUTO: 342 10*3/MM3 (ref 140–450)
PMV BLD AUTO: 8.7 FL (ref 6–12)
POTASSIUM SERPL-SCNC: 3.7 MMOL/L (ref 3.5–5.2)
PROT SERPL-MCNC: 6.2 G/DL (ref 6–8.5)
PSA SERPL-MCNC: 0.59 NG/ML (ref 0–4)
RBC # BLD AUTO: 4.36 10*6/MM3 (ref 4.14–5.8)
SODIUM SERPL-SCNC: 139 MMOL/L (ref 136–145)
WBC NRBC COR # BLD: 5.32 10*3/MM3 (ref 3.4–10.8)

## 2022-06-17 PROCEDURE — 96372 THER/PROPH/DIAG INJ SC/IM: CPT

## 2022-06-17 PROCEDURE — 83735 ASSAY OF MAGNESIUM: CPT

## 2022-06-17 PROCEDURE — 99215 OFFICE O/P EST HI 40 MIN: CPT | Performed by: INTERNAL MEDICINE

## 2022-06-17 PROCEDURE — 25010000002 DENOSUMAB 120 MG/1.7ML SOLUTION: Performed by: INTERNAL MEDICINE

## 2022-06-17 PROCEDURE — 80053 COMPREHEN METABOLIC PANEL: CPT

## 2022-06-17 PROCEDURE — 85025 COMPLETE CBC W/AUTO DIFF WBC: CPT

## 2022-06-17 PROCEDURE — 25010000002 LEUPROLIDE 45 MG KIT: Performed by: NURSE PRACTITIONER

## 2022-06-17 PROCEDURE — 84100 ASSAY OF PHOSPHORUS: CPT

## 2022-06-17 PROCEDURE — 84153 ASSAY OF PSA TOTAL: CPT

## 2022-06-17 PROCEDURE — 36415 COLL VENOUS BLD VENIPUNCTURE: CPT

## 2022-06-17 PROCEDURE — 96402 CHEMO HORMON ANTINEOPL SQ/IM: CPT

## 2022-06-17 RX ORDER — HYDROCODONE BITARTRATE AND ACETAMINOPHEN 10; 325 MG/1; MG/1
1 TABLET ORAL EVERY 4 HOURS PRN
Qty: 120 TABLET | Refills: 0 | Status: SHIPPED | OUTPATIENT
Start: 2022-06-20 | End: 2022-07-19 | Stop reason: SDUPTHER

## 2022-06-17 RX ORDER — ERGOCALCIFEROL 1.25 MG/1
CAPSULE ORAL
COMMUNITY
Start: 2022-05-04

## 2022-06-17 RX ADMIN — DENOSUMAB 120 MG: 120 INJECTION SUBCUTANEOUS at 10:57

## 2022-06-17 RX ADMIN — LEUPROLIDE ACETATE 45 MG: KIT at 11:02

## 2022-06-17 NOTE — PROGRESS NOTES
Patient  Semaj Osborne [place]    Location  [facility] HEMATOLOGY & ONCOLOGY    Chief Complaint  Prostate Cancer    Referring Provider: Natalya Jack MD PhD  PCP: Maryse Monae MD    Subjective          Oncology/Hematology History Overview Note   Mr. Cha is a 65-year-old -American man who was referred to me by Dr. Lepe for evaluation and treatment of prostate cancer.    Patient was initially diagnosed with prostate cancer in 2015.  On 6/4/2015 he underwent radical prostatectomy and LN dissection which showed a [place] 4+3 = 7 prostate cancer.  There were several high risk features such as extraprostatic extension, seminal vesicle invasion, positive margins at the bladder neck and right and left seminal vesicle.  Lymph vascular invasion was indeterminate.  Perineural invasion was not commented on. Final pathology [id number] R1.  He was treated with adjuvant radiation by Dr. Stanton.    According to records the patient likely started Lupron in December 2017 when his PSA started to rise.  The patient took a break in August 2019.  He states he was not aware he was to continue.  He restarted Lupron on 11/26/2019 after it was noted that his PSA chago from 0.46 up to 2.55 on 11/8/2019.  Unfortunately his PSA continued to rise after that to 4.54 on 3/10/2020.    PSA summary:  11/8/19 PSA 2.55 (restarted Lupron on 11/26)  8/31/2020 PSA 17.95 (started Zytiga)   Started Zytiga on 8/27/20.  Stopped in November 2020 due to diarrhea and nausea.     Started Xtandi in May 2021 due to intolerance of Zytiga even at the lowest dose       Prostate cancer metastatic to bone (HCC)   12/11/2020 -  Chemotherapy    OP SUPPORTIVE Leuprolide 45 mg Q6M     3/23/2021 -  Chemotherapy    OP SUPPORTIVE Denosumab (Xgeva) Q28D     4/15/2021 -  Chemotherapy    OP PROSTATE Enzalutamide     5/19/2021 Initial Diagnosis    Prostate cancer metastatic to bone (CMS/HCC)     Prostate cancer (HCC)   6/16/2021 Initial  Diagnosis    Prostate cancer (HCC)     Secondary malignant neoplasm of bone (HCC)   11/4/2021 Initial Diagnosis    Secondary malignant neoplasm of bone (HCC)     11/17/2021 -  Radiation    RADIATION THERAPY Treatment Details (Noted on 11/4/2021)  Site: Bilateral Spine - Thoracic  Technique: 3D CRT  Goal: No goal specified  Planned Treatment Start Date: 11/17/2021         HPI  Patient comes in today for follow-up while on Xtandi.  He is tolerating medication well.  He said his pain is usually about a 5 out of 10 in his back.  It has not gotten significantly worse.  When the pain gets a little higher he takes pain medication.  He admits to taking it about 4-5 times a day.    Review of Systems   Constitutional: Negative for appetite change, diaphoresis, fatigue, fever, unexpected weight gain and unexpected weight loss.   HENT: Negative for hearing loss, mouth sores, sore throat, swollen glands, trouble swallowing and voice change.    Eyes: Negative for blurred vision.   Respiratory: Negative for cough, shortness of breath and wheezing.    Cardiovascular: Negative for chest pain and palpitations.   Gastrointestinal: Negative for abdominal pain, blood in stool, constipation, diarrhea, nausea and vomiting.   Endocrine: Negative for cold intolerance and heat intolerance.   Genitourinary: Negative for difficulty urinating, dysuria, frequency, hematuria and urinary incontinence.   Musculoskeletal: Negative for arthralgias, back pain and myalgias.   Skin: Negative for rash, skin lesions and wound.   Neurological: Negative for dizziness, seizures, weakness, numbness and headache.   Hematological: Does not bruise/bleed easily.   Psychiatric/Behavioral: Negative for depressed mood. The patient is not nervous/anxious.    All other systems reviewed and are negative.      Past Medical History:   Diagnosis Date   • Anemia    • Anxiety    • Blood disease    • Colon polyps    • Depression    • Diverticulitis    • Forgetfulness    •  Hepatitis C    • Night sweats    • Numbness and tingling of left lower extremity    • Prostate cancer (HCC)      Past Surgical History:   Procedure Laterality Date   • PROSTATE BIOPSY     • PROSTATECTOMY      ROBOT ASSISTED W PELVIC LYMPH NODE DISSECTION   • TRANSURETHRAL RESECTION OF BLADDER TUMOR       Social History     Socioeconomic History   • Marital status:    Tobacco Use   • Smoking status: Current Every Day Smoker     Packs/day: 0.50     Start date: 10/21/1970   • Smokeless tobacco: Never Used   Vaping Use   • Vaping Use: Never used   Substance and Sexual Activity   • Alcohol use: Yes     Alcohol/week: 6.0 standard drinks     Types: 6 Cans of beer per week   • Drug use: Never   • Sexual activity: Defer     Family History   Problem Relation Age of Onset   • Cancer Mother    • Cancer Father        Objective   Physical Exam  General: Alert, cooperative, no acute distress  Eyes: Anicteric sclera, PERRLA  Respiratory: normal respiratory effort  Cardiovascular: no lower extremity edema  Skin: Normal tone, no rash, no lesions  Psychiatric: Appropriate affect, intact judgment  Neurologic: No focal sensory or motor deficits, normal cognition   Musculoskeletal: Normal muscle strength and tone  Extremities: No clubbing, cyanosis, or deformities    There were no vitals filed for this visit.  ECOG score: 0         PHQ-9 Total Score:         Result Review :   The following data was reviewed by: Arlin Hobson MA on 06/17/2022:  Lab Results   Component Value Date    HGB 13.9 04/20/2022    HCT 40.8 04/20/2022    MCV 90.7 04/20/2022     04/20/2022    WBC 5.44 04/20/2022    NEUTROABS 3.22 04/20/2022    LYMPHSABS 1.78 04/20/2022    MONOSABS 0.31 04/20/2022    EOSABS 0.12 04/20/2022    BASOSABS 0.01 04/20/2022     Lab Results   Component Value Date    GLUCOSE 108 (H) 04/20/2022    BUN 7 (L) 04/20/2022    CREATININE 0.93 04/20/2022     04/20/2022    K 3.8 04/20/2022     04/20/2022    CO2 25.0  04/20/2022    CALCIUM 8.0 (L) 04/20/2022    PROTEINTOT 6.2 04/20/2022    ALBUMIN 3.52 04/20/2022    BILITOT 0.4 04/20/2022    ALKPHOS 57 04/20/2022    AST 16 04/20/2022    ALT 8 04/20/2022          Assessment and Plan    There are no diagnoses linked to this encounter.        Prostate cancer with metastasis to bone: Patient is currently on Xtandi and tolerating it well.  I reviewed his CBC and CMP and there is no evidence of significant toxicity.  His PSA resulted after clinic visit and is significantly improved at 0.593.  Patient will follow up with me in 3 months for repeat toxicity check.    Bone metastases: The pt started Xgeva in March 2021. Magnesium and Phos are normal but his total calcium remains low at 8.5.  This has been a chronic problem.  Based on a randomized trial (European J of Cancer 142 (2021) 132-140) dosing of bone agents including denosumab or zolendronate every 12 weeks is adequate. I will discuss with pharmacy and transition the patient to this regimen.     Cancer-related pain: I will refill the patient's hydrocodone medication now up to 5 times a day.    I spent 45 minutes caring for Semaj on this date of service. This time includes time spent by me in the following activities: preparing for the visit, reviewing tests, performing a medically appropriate examination and/or evaluation, counseling and educating the patient/family/caregiver, ordering medications, tests, or procedures and documenting information in the medical record    Patient was given instructions and counseling regarding his condition or for health maintenance advice. Please see specific information pulled into the AVS if appropriate.

## 2022-07-05 ENCOUNTER — TELEPHONE (OUTPATIENT)
Dept: FAMILY MEDICINE CLINIC | Facility: CLINIC | Age: 67
End: 2022-07-05

## 2022-07-05 NOTE — TELEPHONE ENCOUNTER
Spoke with pt about the covid pill and also talked to [place] and asked him how long after having covid is the pill for covid no longer going to work. He said 5 days after first symptoms started. Pt had not been seen since 2019

## 2022-07-05 NOTE — TELEPHONE ENCOUNTER
Caller: Semaj Medina [place]    Relationship: Self    Best call back number: [phone]    What orders are you requesting (i.e. lab or imaging): LABS    In what timeframe would the patient need to come in: ASAP    Where will you receive your lab/imaging services: [facility]    Additional notes: PATIENT WAS TESTED POSITIVE FOR COVID AND WAS TOLD THAT HE NEEDED LABS DONE FOR HIS PROVIDER TO PRESCRIBE MEDICATION. HE WOULD LIKE A CALL WHEN THE ORDERS ARE PLACED.

## 2022-07-15 ENCOUNTER — APPOINTMENT (OUTPATIENT)
Dept: ONCOLOGY | Facility: HOSPITAL | Age: 67
End: 2022-07-15

## 2022-07-19 ENCOUNTER — HOSPITAL ENCOUNTER (OUTPATIENT)
Dept: ONCOLOGY | Facility: HOSPITAL | Age: 67
Setting detail: INFUSION SERIES
Discharge: HOME | End: 2022-07-19

## 2022-07-19 ENCOUNTER — LAB (OUTPATIENT)
Dept: ONCOLOGY | Facility: HOSPITAL | Age: 67
End: 2022-07-19

## 2022-07-19 DIAGNOSIS — C61 PROSTATE CANCER METASTATIC TO BONE: ICD-10-CM

## 2022-07-19 DIAGNOSIS — C79.51 PROSTATE CANCER METASTATIC TO BONE: ICD-10-CM

## 2022-07-19 DIAGNOSIS — C61 PROSTATE CANCER METASTATIC TO BONE: Primary | ICD-10-CM

## 2022-07-19 DIAGNOSIS — C79.51 PROSTATE CANCER METASTATIC TO BONE: Primary | ICD-10-CM

## 2022-07-19 LAB
ALBUMIN SERPL-MCNC: 3.24 G/DL (ref 3.5–5.2)
ALBUMIN/GLOB SERPL: 1.1 G/DL
ALP SERPL-CCNC: 59 U/L (ref 39–117)
ALT SERPL W P-5'-P-CCNC: 5 U/L (ref 1–41)
ANION GAP SERPL CALCULATED.3IONS-SCNC: 4.5 MMOL/L (ref 5–15)
AST SERPL-CCNC: 12 U/L (ref 1–40)
BASOPHILS # BLD AUTO: 0.01 10*3/MM3 (ref 0–0.2)
BASOPHILS NFR BLD AUTO: 0.2 % (ref 0–1.5)
BILIRUB SERPL-MCNC: 0.5 MG/DL (ref 0–1.2)
BUN SERPL-MCNC: 6 MG/DL (ref 8–23)
BUN/CREAT SERPL: 6.2 (ref 7–25)
CALCIUM SPEC-SCNC: 8.1 MG/DL (ref 8.6–10.5)
CHLORIDE SERPL-SCNC: 109 MMOL/L (ref 98–107)
CO2 SERPL-SCNC: 28.5 MMOL/L (ref 22–29)
CREAT SERPL-MCNC: 0.97 MG/DL (ref 0.76–1.27)
DEPRECATED RDW RBC AUTO: 45.1 FL (ref 37–54)
EGFRCR SERPLBLD CKD-EPI 2021: 86.1 ML/MIN/1.73
EOSINOPHIL # BLD AUTO: 0.1 10*3/MM3 (ref 0–0.4)
EOSINOPHIL NFR BLD AUTO: 1.6 % (ref 0.3–6.2)
ERYTHROCYTE [DISTWIDTH] IN BLOOD BY AUTOMATED COUNT: 13.5 % (ref 12.3–15.4)
GLOBULIN UR ELPH-MCNC: 2.9 GM/DL
GLUCOSE SERPL-MCNC: 87 MG/DL (ref 65–99)
HCT VFR BLD AUTO: 39.4 % (ref 37.5–51)
HGB BLD-MCNC: 13.4 G/DL (ref 13–17.7)
IMM GRANULOCYTES # BLD AUTO: 0.02 10*3/MM3 (ref 0–0.05)
IMM GRANULOCYTES NFR BLD AUTO: 0.3 % (ref 0–0.5)
LYMPHOCYTES # BLD AUTO: 1.81 10*3/MM3 (ref 0.7–3.1)
LYMPHOCYTES NFR BLD AUTO: 28.6 % (ref 19.6–45.3)
MAGNESIUM SERPL-MCNC: 1.6 MG/DL (ref 1.6–2.4)
MCH RBC QN AUTO: 30.2 PG (ref 26.6–33)
MCHC RBC AUTO-ENTMCNC: 34 G/DL (ref 31.5–35.7)
MCV RBC AUTO: 88.7 FL (ref 79–97)
MONOCYTES # BLD AUTO: 0.41 10*3/MM3 (ref 0.1–0.9)
MONOCYTES NFR BLD AUTO: 6.5 % (ref 5–12)
NEUTROPHILS NFR BLD AUTO: 3.97 10*3/MM3 (ref 1.7–7)
NEUTROPHILS NFR BLD AUTO: 62.8 % (ref 42.7–76)
PHOSPHATE SERPL-MCNC: 3 MG/DL (ref 2.5–4.5)
PLATELET # BLD AUTO: 372 10*3/MM3 (ref 140–450)
PMV BLD AUTO: 9 FL (ref 6–12)
POTASSIUM SERPL-SCNC: 3.6 MMOL/L (ref 3.5–5.2)
PROT SERPL-MCNC: 6.1 G/DL (ref 6–8.5)
RBC # BLD AUTO: 4.44 10*6/MM3 (ref 4.14–5.8)
SODIUM SERPL-SCNC: 142 MMOL/L (ref 136–145)
WBC NRBC COR # BLD: 6.32 10*3/MM3 (ref 3.4–10.8)

## 2022-07-19 PROCEDURE — G0463 HOSPITAL OUTPT CLINIC VISIT: HCPCS

## 2022-07-19 PROCEDURE — 84100 ASSAY OF PHOSPHORUS: CPT

## 2022-07-19 PROCEDURE — 85025 COMPLETE CBC W/AUTO DIFF WBC: CPT

## 2022-07-19 PROCEDURE — 83735 ASSAY OF MAGNESIUM: CPT

## 2022-07-19 PROCEDURE — 80053 COMPREHEN METABOLIC PANEL: CPT

## 2022-07-19 PROCEDURE — 36415 COLL VENOUS BLD VENIPUNCTURE: CPT

## 2022-07-19 RX ORDER — HYDROCODONE BITARTRATE AND ACETAMINOPHEN 10; 325 MG/1; MG/1
1 TABLET ORAL EVERY 4 HOURS PRN
Qty: 120 TABLET | Refills: 0 | Status: SHIPPED | OUTPATIENT
Start: 2022-07-19 | End: 2022-08-18 | Stop reason: SDUPTHER

## 2022-07-19 NOTE — CODE DOCUMENTATION
Mr. Medina here for Xgeva injection today.  His calcium is 8.1.  Per Dr. Jack, we will hold his Xgeva today due to low calcium.  Pt will return on 8/16/2022 as scheduled for next injection.  Educated pt on continuing to take his calcium and vitamin D supplements, he v/u.  Pt also requesting a refill on his Hydrocodone.  Message sent to Sara Zapien RN regarding refill of Hydrocodone.

## 2022-08-12 ENCOUNTER — HOSPITAL ENCOUNTER (OUTPATIENT)
Dept: ONCOLOGY | Facility: HOSPITAL | Age: 67
Setting detail: INFUSION SERIES
Discharge: HOME | End: 2022-08-12

## 2022-08-12 DIAGNOSIS — C79.51 PROSTATE CANCER METASTATIC TO BONE: Primary | ICD-10-CM

## 2022-08-12 DIAGNOSIS — C61 PROSTATE CANCER METASTATIC TO BONE: Primary | ICD-10-CM

## 2022-08-16 ENCOUNTER — LAB (OUTPATIENT)
Dept: ONCOLOGY | Facility: HOSPITAL | Age: 67
End: 2022-08-16

## 2022-08-16 ENCOUNTER — HOSPITAL ENCOUNTER (OUTPATIENT)
Dept: ONCOLOGY | Facility: HOSPITAL | Age: 67
Setting detail: INFUSION SERIES
Discharge: HOME | End: 2022-08-16

## 2022-08-16 VITALS — TEMPERATURE: 97.6 F | OXYGEN SATURATION: 98 % | RESPIRATION RATE: 17 BRPM | HEART RATE: 63 BPM

## 2022-08-16 DIAGNOSIS — C79.51 PROSTATE CANCER METASTATIC TO BONE: Primary | ICD-10-CM

## 2022-08-16 DIAGNOSIS — C61 PROSTATE CANCER METASTATIC TO BONE: Primary | ICD-10-CM

## 2022-08-16 LAB
ALBUMIN SERPL-MCNC: 3.13 G/DL (ref 3.5–5.2)
ALBUMIN/GLOB SERPL: 1.1 G/DL
ALP SERPL-CCNC: 59 U/L (ref 39–117)
ALT SERPL W P-5'-P-CCNC: 3 U/L (ref 1–41)
ANION GAP SERPL CALCULATED.3IONS-SCNC: 10.9 MMOL/L (ref 5–15)
AST SERPL-CCNC: 12 U/L (ref 1–40)
BASOPHILS # BLD AUTO: 0.02 10*3/MM3 (ref 0–0.2)
BASOPHILS NFR BLD AUTO: 0.4 % (ref 0–1.5)
BILIRUB SERPL-MCNC: 0.3 MG/DL (ref 0–1.2)
BUN SERPL-MCNC: 9 MG/DL (ref 8–23)
BUN/CREAT SERPL: 9 (ref 7–25)
CALCIUM SPEC-SCNC: 8.3 MG/DL (ref 8.6–10.5)
CHLORIDE SERPL-SCNC: 108 MMOL/L (ref 98–107)
CO2 SERPL-SCNC: 23.1 MMOL/L (ref 22–29)
CREAT SERPL-MCNC: 1 MG/DL (ref 0.76–1.27)
DEPRECATED RDW RBC AUTO: 49.1 FL (ref 37–54)
EGFRCR SERPLBLD CKD-EPI 2021: 83 ML/MIN/1.73
EOSINOPHIL # BLD AUTO: 0.12 10*3/MM3 (ref 0–0.4)
EOSINOPHIL NFR BLD AUTO: 2.6 % (ref 0.3–6.2)
ERYTHROCYTE [DISTWIDTH] IN BLOOD BY AUTOMATED COUNT: 15 % (ref 12.3–15.4)
GLOBULIN UR ELPH-MCNC: 2.8 GM/DL
GLUCOSE SERPL-MCNC: 83 MG/DL (ref 65–99)
HCT VFR BLD AUTO: 42 % (ref 37.5–51)
HGB BLD-MCNC: 13.9 G/DL (ref 13–17.7)
IMM GRANULOCYTES # BLD AUTO: 0.02 10*3/MM3 (ref 0–0.05)
IMM GRANULOCYTES NFR BLD AUTO: 0.4 % (ref 0–0.5)
LYMPHOCYTES # BLD AUTO: 1.39 10*3/MM3 (ref 0.7–3.1)
LYMPHOCYTES NFR BLD AUTO: 30.6 % (ref 19.6–45.3)
MAGNESIUM SERPL-MCNC: 1.7 MG/DL (ref 1.6–2.4)
MCH RBC QN AUTO: 29.6 PG (ref 26.6–33)
MCHC RBC AUTO-ENTMCNC: 33.1 G/DL (ref 31.5–35.7)
MCV RBC AUTO: 89.6 FL (ref 79–97)
MONOCYTES # BLD AUTO: 0.35 10*3/MM3 (ref 0.1–0.9)
MONOCYTES NFR BLD AUTO: 7.7 % (ref 5–12)
NEUTROPHILS NFR BLD AUTO: 2.64 10*3/MM3 (ref 1.7–7)
NEUTROPHILS NFR BLD AUTO: 58.3 % (ref 42.7–76)
PHOSPHATE SERPL-MCNC: 6 MG/DL (ref 2.5–4.5)
PLATELET # BLD AUTO: 377 10*3/MM3 (ref 140–450)
PMV BLD AUTO: 8.6 FL (ref 6–12)
POTASSIUM SERPL-SCNC: 4 MMOL/L (ref 3.5–5.2)
PROT SERPL-MCNC: 5.9 G/DL (ref 6–8.5)
RBC # BLD AUTO: 4.69 10*6/MM3 (ref 4.14–5.8)
SODIUM SERPL-SCNC: 142 MMOL/L (ref 136–145)
WBC NRBC COR # BLD: 4.54 10*3/MM3 (ref 3.4–10.8)

## 2022-08-16 PROCEDURE — 96372 THER/PROPH/DIAG INJ SC/IM: CPT

## 2022-08-16 PROCEDURE — 84100 ASSAY OF PHOSPHORUS: CPT

## 2022-08-16 PROCEDURE — 25010000002 DENOSUMAB 120 MG/1.7ML SOLUTION: Performed by: NURSE PRACTITIONER

## 2022-08-16 PROCEDURE — 80053 COMPREHEN METABOLIC PANEL: CPT

## 2022-08-16 PROCEDURE — 83735 ASSAY OF MAGNESIUM: CPT

## 2022-08-16 PROCEDURE — 36415 COLL VENOUS BLD VENIPUNCTURE: CPT

## 2022-08-16 PROCEDURE — 85025 COMPLETE CBC W/AUTO DIFF WBC: CPT

## 2022-08-16 RX ORDER — FOLIC ACID 1 MG/1
TABLET ORAL
COMMUNITY
Start: 2022-07-26

## 2022-08-16 RX ORDER — ALBUTEROL SULFATE 90 UG/1
AEROSOL, METERED RESPIRATORY (INHALATION)
COMMUNITY
Start: 2022-07-04

## 2022-08-16 RX ORDER — NICOTINE POLACRILEX 4 MG/1
LOZENGE ORAL
COMMUNITY
Start: 2022-07-26

## 2022-08-16 RX ADMIN — DENOSUMAB 120 MG: 120 INJECTION SUBCUTANEOUS at 11:52

## 2022-08-17 ENCOUNTER — TELEPHONE (OUTPATIENT)
Dept: ONCOLOGY | Facility: HOSPITAL | Age: 67
End: 2022-08-17

## 2022-08-17 NOTE — TELEPHONE ENCOUNTER
Left message for patient about his lab results. Instructed to take a calcium supplement or tums daily.

## 2022-08-18 DIAGNOSIS — C79.51 PROSTATE CANCER METASTATIC TO BONE: ICD-10-CM

## 2022-08-18 DIAGNOSIS — C61 PROSTATE CANCER METASTATIC TO BONE: ICD-10-CM

## 2022-08-18 RX ORDER — HYDROCODONE BITARTRATE AND ACETAMINOPHEN 10; 325 MG/1; MG/1
1 TABLET ORAL EVERY 4 HOURS PRN
Qty: 120 TABLET | Refills: 0 | Status: SHIPPED | OUTPATIENT
Start: 2022-08-18 | End: 2022-09-13 | Stop reason: SDUPTHER

## 2022-08-18 NOTE — TELEPHONE ENCOUNTER
Caller: Semaj Medina [place]    Relationship: Self    Best call back number: [phone]    Requested Prescriptions:   Requested Prescriptions     Pending Prescriptions Disp Refills   • HYDROcodone-acetaminophen (NORCO)  MG per tablet 120 tablet 0     Sig: Take 1 tablet by mouth Every 4 (Four) Hours As Needed for Moderate Pain .        Pharmacy where request should be sent: [facility] #[id number] - [place], [place] - [address] [facility] AT [facility] OF [address] & [place] - [phone] [facility] [phone] FX     Does the patient have less than a 3 day supply:  [x] Yes  [] No    Nena Jin Rep   08/18/22 09:14 EDT

## 2022-08-22 ENCOUNTER — OFFICE VISIT (OUTPATIENT)
Dept: FAMILY MEDICINE CLINIC | Facility: CLINIC | Age: 67
End: 2022-08-22

## 2022-08-22 VITALS
HEIGHT: 69 IN | TEMPERATURE: 98.1 F | WEIGHT: 127.6 LBS | BODY MASS INDEX: 18.9 KG/M2 | HEART RATE: 66 BPM | DIASTOLIC BLOOD PRESSURE: 90 MMHG | SYSTOLIC BLOOD PRESSURE: 148 MMHG | OXYGEN SATURATION: 99 %

## 2022-08-22 DIAGNOSIS — C61 PROSTATE CANCER: Primary | ICD-10-CM

## 2022-08-22 DIAGNOSIS — I10 PRIMARY HYPERTENSION: ICD-10-CM

## 2022-08-22 PROCEDURE — 99214 OFFICE O/P EST MOD 30 MIN: CPT | Performed by: FAMILY MEDICINE

## 2022-09-13 ENCOUNTER — OFFICE VISIT (OUTPATIENT)
Dept: ONCOLOGY | Facility: HOSPITAL | Age: 67
End: 2022-09-13

## 2022-09-13 ENCOUNTER — LAB (OUTPATIENT)
Dept: ONCOLOGY | Facility: HOSPITAL | Age: 67
End: 2022-09-13

## 2022-09-13 ENCOUNTER — HOSPITAL ENCOUNTER (OUTPATIENT)
Dept: ONCOLOGY | Facility: HOSPITAL | Age: 67
Setting detail: INFUSION SERIES
Discharge: HOME | End: 2022-09-13

## 2022-09-13 VITALS
TEMPERATURE: 98.5 F | RESPIRATION RATE: 18 BRPM | HEART RATE: 63 BPM | BODY MASS INDEX: 18.88 KG/M2 | SYSTOLIC BLOOD PRESSURE: 124 MMHG | WEIGHT: 127.87 LBS | OXYGEN SATURATION: 99 % | DIASTOLIC BLOOD PRESSURE: 95 MMHG

## 2022-09-13 DIAGNOSIS — C61 PROSTATE CANCER METASTATIC TO BONE: Primary | ICD-10-CM

## 2022-09-13 DIAGNOSIS — C79.51 PROSTATE CANCER METASTATIC TO BONE: Primary | ICD-10-CM

## 2022-09-13 DIAGNOSIS — C61 PROSTATE CANCER: ICD-10-CM

## 2022-09-13 DIAGNOSIS — G89.3 CANCER ASSOCIATED PAIN: ICD-10-CM

## 2022-09-13 LAB
ALBUMIN SERPL-MCNC: 3.3 G/DL (ref 3.5–5.2)
ALBUMIN/GLOB SERPL: 1 G/DL
ALP SERPL-CCNC: 57 U/L (ref 39–117)
ALT SERPL W P-5'-P-CCNC: 6 U/L (ref 1–41)
ANION GAP SERPL CALCULATED.3IONS-SCNC: 8.9 MMOL/L (ref 5–15)
AST SERPL-CCNC: 15 U/L (ref 1–40)
BASOPHILS # BLD AUTO: 0.02 10*3/MM3 (ref 0–0.2)
BASOPHILS NFR BLD AUTO: 0.4 % (ref 0–1.5)
BILIRUB SERPL-MCNC: 0.5 MG/DL (ref 0–1.2)
BUN SERPL-MCNC: 9 MG/DL (ref 8–23)
BUN/CREAT SERPL: 7.9 (ref 7–25)
CALCIUM SPEC-SCNC: 8.6 MG/DL (ref 8.6–10.5)
CHLORIDE SERPL-SCNC: 107 MMOL/L (ref 98–107)
CO2 SERPL-SCNC: 26.1 MMOL/L (ref 22–29)
CREAT SERPL-MCNC: 1.14 MG/DL (ref 0.76–1.27)
DEPRECATED RDW RBC AUTO: 45.7 FL (ref 37–54)
EGFRCR SERPLBLD CKD-EPI 2021: 70.9 ML/MIN/1.73
EOSINOPHIL # BLD AUTO: 0.11 10*3/MM3 (ref 0–0.4)
EOSINOPHIL NFR BLD AUTO: 2.1 % (ref 0.3–6.2)
ERYTHROCYTE [DISTWIDTH] IN BLOOD BY AUTOMATED COUNT: 14.3 % (ref 12.3–15.4)
GLOBULIN UR ELPH-MCNC: 3.2 GM/DL
GLUCOSE SERPL-MCNC: 96 MG/DL (ref 65–99)
HCT VFR BLD AUTO: 43.2 % (ref 37.5–51)
HGB BLD-MCNC: 14.6 G/DL (ref 13–17.7)
IMM GRANULOCYTES # BLD AUTO: 0.01 10*3/MM3 (ref 0–0.05)
IMM GRANULOCYTES NFR BLD AUTO: 0.2 % (ref 0–0.5)
LYMPHOCYTES # BLD AUTO: 1.4 10*3/MM3 (ref 0.7–3.1)
LYMPHOCYTES NFR BLD AUTO: 26.3 % (ref 19.6–45.3)
MAGNESIUM SERPL-MCNC: 1.7 MG/DL (ref 1.6–2.4)
MCH RBC QN AUTO: 30 PG (ref 26.6–33)
MCHC RBC AUTO-ENTMCNC: 33.8 G/DL (ref 31.5–35.7)
MCV RBC AUTO: 88.7 FL (ref 79–97)
MONOCYTES # BLD AUTO: 0.43 10*3/MM3 (ref 0.1–0.9)
MONOCYTES NFR BLD AUTO: 8.1 % (ref 5–12)
NEUTROPHILS NFR BLD AUTO: 3.36 10*3/MM3 (ref 1.7–7)
NEUTROPHILS NFR BLD AUTO: 62.9 % (ref 42.7–76)
NRBC BLD AUTO-RTO: 0 /100 WBC (ref 0–0.2)
PHOSPHATE SERPL-MCNC: 4.5 MG/DL (ref 2.5–4.5)
PLATELET # BLD AUTO: 348 10*3/MM3 (ref 140–450)
PMV BLD AUTO: 9.1 FL (ref 6–12)
POTASSIUM SERPL-SCNC: 4.1 MMOL/L (ref 3.5–5.2)
PROT SERPL-MCNC: 6.5 G/DL (ref 6–8.5)
PSA SERPL-MCNC: 0.7 NG/ML (ref 0–4)
RBC # BLD AUTO: 4.87 10*6/MM3 (ref 4.14–5.8)
SODIUM SERPL-SCNC: 142 MMOL/L (ref 136–145)
WBC NRBC COR # BLD: 5.33 10*3/MM3 (ref 3.4–10.8)

## 2022-09-13 PROCEDURE — 84100 ASSAY OF PHOSPHORUS: CPT

## 2022-09-13 PROCEDURE — G0463 HOSPITAL OUTPT CLINIC VISIT: HCPCS | Performed by: INTERNAL MEDICINE

## 2022-09-13 PROCEDURE — 25010000002 DENOSUMAB 120 MG/1.7ML SOLUTION: Performed by: NURSE PRACTITIONER

## 2022-09-13 PROCEDURE — 96372 THER/PROPH/DIAG INJ SC/IM: CPT

## 2022-09-13 PROCEDURE — 99214 OFFICE O/P EST MOD 30 MIN: CPT | Performed by: INTERNAL MEDICINE

## 2022-09-13 PROCEDURE — 36415 COLL VENOUS BLD VENIPUNCTURE: CPT

## 2022-09-13 PROCEDURE — 84153 ASSAY OF PSA TOTAL: CPT

## 2022-09-13 PROCEDURE — 85025 COMPLETE CBC W/AUTO DIFF WBC: CPT

## 2022-09-13 PROCEDURE — 83735 ASSAY OF MAGNESIUM: CPT

## 2022-09-13 PROCEDURE — 80053 COMPREHEN METABOLIC PANEL: CPT

## 2022-09-13 RX ORDER — HYDROCODONE BITARTRATE AND ACETAMINOPHEN 10; 325 MG/1; MG/1
1 TABLET ORAL EVERY 4 HOURS PRN
Qty: 120 TABLET | Refills: 0 | Status: SHIPPED | OUTPATIENT
Start: 2022-09-13 | End: 2022-10-03 | Stop reason: SDUPTHER

## 2022-09-13 RX ORDER — DRONABINOL 5 MG/1
5 CAPSULE ORAL
Qty: 60 CAPSULE | Refills: 3 | Status: SHIPPED | OUTPATIENT
Start: 2022-09-13 | End: 2022-12-20 | Stop reason: SDUPTHER

## 2022-09-13 RX ADMIN — DENOSUMAB 120 MG: 120 INJECTION SUBCUTANEOUS at 12:11

## 2022-09-13 NOTE — PROGRESS NOTES
Patient  Semaj Osborne [place]    Location  [facility] HEMATOLOGY & ONCOLOGY    Chief Complaint  Prostate Cancer    Referring Provider: Maryse Monae MD  PCP: Maryse Monae MD    Subjective          Oncology/Hematology History Overview Note   Mr. Cha is a 65-year-old -American man who was referred to me by Dr. Lepe for evaluation and treatment of prostate cancer.    Patient was initially diagnosed with prostate cancer in 2015.  On 6/4/2015 he underwent radical prostatectomy and LN dissection which showed a [place] 4+3 = 7 prostate cancer.  There were several high risk features such as extraprostatic extension, seminal vesicle invasion, positive margins at the bladder neck and right and left seminal vesicle.  Lymph vascular invasion was indeterminate.  Perineural invasion was not commented on. Final pathology [id number] R1.  He was treated with adjuvant radiation by Dr. Stanton.    According to records the patient likely started Lupron in December 2017 when his PSA started to rise.  The patient took a break in August 2019.  He states he was not aware he was to continue.  He restarted Lupron on 11/26/2019 after it was noted that his PSA chago from 0.46 up to 2.55 on 11/8/2019.  Unfortunately his PSA continued to rise after that to 4.54 on 3/10/2020.    PSA summary:  11/8/19 PSA 2.55 (restarted Lupron on 11/26)  8/31/2020 PSA 17.95 (started Zytiga)   Started Zytiga on 8/27/20.  Stopped in November 2020 due to diarrhea and nausea.     Started Xtandi in May 2021 due to intolerance of Zytiga even at the lowest dose       Prostate cancer metastatic to bone (HCC)   12/11/2020 -  Chemotherapy    OP SUPPORTIVE Leuprolide 45 mg Q6M     3/23/2021 -  Chemotherapy    OP SUPPORTIVE Denosumab (Xgeva) Q28D     4/15/2021 -  Chemotherapy    OP PROSTATE Enzalutamide     5/19/2021 Initial Diagnosis    Prostate cancer metastatic to bone (CMS/HCC)     Prostate cancer (HCC)   6/16/2021 Initial Diagnosis     Prostate cancer (HCC)     Secondary malignant neoplasm of bone (HCC)   11/4/2021 Initial Diagnosis    Secondary malignant neoplasm of bone (HCC)     11/17/2021 -  Radiation    RADIATION THERAPY Treatment Details (Noted on 11/4/2021)  Site: Bilateral Spine - Thoracic  Technique: 3D CRT  Goal: No goal specified  Planned Treatment Start Date: 11/17/2021         HPI  Patient comes in today for toxicity check.  He is tolerating enzalutamide well.  He is very thin and says that he has poor appetite.  His weight is 127 but he would like to put on a few more pounds.  Also noted that he has a low albumin.  The patient admits that he does not have great nutrition right now.  He is interested in an appetite stimulating medication.    He has had some chronic low back pain.  He also has worsening left upper back pain.  He has to take a look at a spot on his back which is consistent with a lipoma.    Review of Systems   Constitutional: Negative for appetite change, diaphoresis, fatigue, fever, unexpected weight gain and unexpected weight loss.   HENT: Negative for hearing loss, sore throat and voice change.    Eyes: Negative for blurred vision, double vision, pain, redness and visual disturbance.   Respiratory: Negative for cough, shortness of breath and wheezing.    Cardiovascular: Negative for chest pain, palpitations and leg swelling.   Endocrine: Negative for cold intolerance, heat intolerance, polydipsia and polyuria.   Genitourinary: Negative for decreased urine volume, difficulty urinating, frequency and urinary incontinence.   Musculoskeletal: Positive for back pain and joint swelling (CARISSA knee pain in bones). Negative for arthralgias and myalgias.   Skin: Negative for color change, rash, skin lesions and wound.   Neurological: Negative for dizziness, seizures, numbness and headache.   Hematological: Negative for adenopathy. Does not bruise/bleed easily.   Psychiatric/Behavioral: Negative for depressed mood. The patient  is not nervous/anxious.        Past Medical History:   Diagnosis Date   • Anemia    • Anxiety    • Blood disease    • Colon polyps    • Depression    • Diverticulitis    • Forgetfulness    • Hepatitis C    • Night sweats    • Numbness and tingling of left lower extremity    • Prostate cancer (HCC)      Past Surgical History:   Procedure Laterality Date   • PROSTATE BIOPSY     • PROSTATECTOMY      ROBOT ASSISTED W PELVIC LYMPH NODE DISSECTION   • TRANSURETHRAL RESECTION OF BLADDER TUMOR       Social History     Socioeconomic History   • Marital status:    Tobacco Use   • Smoking status: Current Every Day Smoker     Packs/day: 0.25     Types: Cigarettes     Start date: 10/21/1970   • Smokeless tobacco: Never Used   Vaping Use   • Vaping Use: Never used   Substance and Sexual Activity   • Alcohol use: Yes     Alcohol/week: 6.0 standard drinks     Types: 6 Cans of beer per week   • Drug use: Never   • Sexual activity: Defer     Family History   Problem Relation Age of Onset   • Cancer Mother    • Cancer Father        Objective   Physical Exam  General: Alert, cooperative, no acute distress  Eyes: Anicteric sclera, PERRLA  Respiratory: normal respiratory effort  Cardiovascular: no lower extremity edema  Skin: Normal tone, no rash, benign lipoma left of the spine in the upper back  Psychiatric: Appropriate affect, intact judgment  Neurologic: No focal sensory or motor deficits, normal cognition   Musculoskeletal: Normal muscle strength and tone  Extremities: No clubbing, cyanosis, or deformities    Vitals:    09/13/22 1133   BP: 124/95   Pulse: 63   Resp: 18   Temp: 98.5 °F (36.9 °C)   SpO2: 99%   Weight: 58 kg (127 lb 13.9 oz)   PainSc:   5   PainLoc: Back     ECOG score: 0         PHQ-9 Total Score:         Result Review :   The following data was reviewed by: Natalya Jack MD PhD on 09/13/2022:  Lab Results   Component Value Date    HGB 14.6 09/13/2022    HCT 43.2 09/13/2022    MCV 88.7 09/13/2022      09/13/2022    WBC 5.33 09/13/2022    NEUTROABS 3.36 09/13/2022    LYMPHSABS 1.40 09/13/2022    MONOSABS 0.43 09/13/2022    EOSABS 0.11 09/13/2022    BASOSABS 0.02 09/13/2022     Lab Results   Component Value Date    GLUCOSE 96 09/13/2022    BUN 9 09/13/2022    CREATININE 1.14 09/13/2022     09/13/2022    K 4.1 09/13/2022     09/13/2022    CO2 26.1 09/13/2022    CALCIUM 8.6 09/13/2022    PROTEINTOT 6.5 09/13/2022    ALBUMIN 3.30 (L) 09/13/2022    BILITOT 0.5 09/13/2022    ALKPHOS 57 09/13/2022    AST 15 09/13/2022    ALT 6 09/13/2022          Assessment and Plan    Diagnoses and all orders for this visit:    1. Prostate cancer metastatic to bone (HCC) (Primary)  -     dronabinol (Marinol) 5 MG capsule; Take 1 capsule by mouth 2 (Two) Times a Day Before Meals.  Dispense: 60 capsule; Refill: 3  -     HYDROcodone-acetaminophen (NORCO)  MG per tablet; Take 1 tablet by mouth Every 4 (Four) Hours As Needed for Moderate Pain.  Dispense: 120 tablet; Refill: 0    2. Prostate cancer (HCC)    3. Cancer associated pain  -     dronabinol (Marinol) 5 MG capsule; Take 1 capsule by mouth 2 (Two) Times a Day Before Meals.  Dispense: 60 capsule; Refill: 3        Prostate cancer with metastasis to bone: Patient is currently on Xtandi and tolerating it well.  I reviewed his CBC and CMP and there is no evidence of significant toxicity.  His PSA resulted after clinic visit and is stable at 0.697.  Patient will follow up with me in 3 months for repeat toxicity check.    Bone metastases: he will transitions to q3mo Xgeva.    Lipoma: Left side of thoracic spine.  The patient is not interested in surgery referral right now but will consider it.  He does have some pain in the area.      Cancer-related pain: I will refill the patient's hydrocodone medication now up to 5 times a day.      Patient was given instructions and counseling regarding his condition or for health maintenance advice. Please see specific information  pulled into the AVS if appropriate.

## 2022-09-22 PROBLEM — I10 PRIMARY HYPERTENSION: Status: ACTIVE | Noted: 2022-09-22

## 2022-09-28 ENCOUNTER — PREP FOR SURGERY (OUTPATIENT)
Dept: OTHER | Facility: HOSPITAL | Age: 67
End: 2022-09-28

## 2022-09-28 ENCOUNTER — CLINICAL SUPPORT (OUTPATIENT)
Dept: GASTROENTEROLOGY | Facility: CLINIC | Age: 67
End: 2022-09-28

## 2022-09-28 DIAGNOSIS — Z12.11 COLON CANCER SCREENING: Primary | ICD-10-CM

## 2022-09-28 NOTE — PROGRESS NOTES
Semaj Medina  REASON FOR CALL screening for colonoscopy   SENT IN PREP Natalie   Past Medical History:   Diagnosis Date   • Anemia    • Anxiety    • Blood disease    • Colon polyps    • Depression    • Diverticulitis    • Forgetfulness    • Hepatitis C    • Night sweats    • Numbness and tingling of left lower extremity    • Prostate cancer (HCC)      No Known Allergies  Past Surgical History:   Procedure Laterality Date   • COLONOSCOPY     • PROSTATE BIOPSY     • PROSTATECTOMY      ROBOT ASSISTED W PELVIC LYMPH NODE DISSECTION   • TRANSURETHRAL RESECTION OF BLADDER TUMOR       Social History     Socioeconomic History   • Marital status:    Tobacco Use   • Smoking status: Current Every Day Smoker     Packs/day: 0.25     Types: Cigarettes     Start date: 10/21/1970   • Smokeless tobacco: Never Used   Vaping Use   • Vaping Use: Never used   Substance and Sexual Activity   • Alcohol use: Yes     Alcohol/week: 6.0 standard drinks     Types: 6 Cans of beer per week   • Drug use: Never   • Sexual activity: Defer     Family History   Problem Relation Age of Onset   • Cancer Mother    • Cancer Father        Current Outpatient Medications:   •  albuterol sulfate  (90 Base) MCG/ACT inhaler, INHALE 2 INHALATIONS BY MOUTH NOW AS DIRECTED, Disp: , Rfl:   •  enzalutamide (XTANDI) 40 MG chemo capsule, Take 40 mg by mouth 4 (Four) Times a Day., Disp: , Rfl:   •  ergocalciferol (ERGOCALCIFEROL) 1.25 MG (37636 UT) capsule, , Disp: , Rfl:   •  folic acid (FOLVITE) 1 MG tablet, , Disp: , Rfl:   •  HYDROcodone-acetaminophen (NORCO)  MG per tablet, Take 1 tablet by mouth Every 4 (Four) Hours As Needed for Moderate Pain., Disp: 120 tablet, Rfl: 0  •  leuprolide (Lupron [place], 3-Month,) 22.5 MG injection, 22.5 mg., Disp: , Rfl:   •  lisinopril (PRINIVIL,ZESTRIL) 40 MG tablet, 20 mg Daily., Disp: , Rfl:   •  Nicotine Mini 4 MG lozenge, , Disp: , Rfl:   •  ondansetron (ZOFRAN) 8 MG tablet, Take 1 tablet by mouth  Every 8 (Eight) Hours As Needed for Nausea or Vomiting., Disp: 30 tablet, Rfl: 3  •  dronabinol (Marinol) 5 MG capsule, Take 1 capsule by mouth 2 (Two) Times a Day Before Meals., Disp: 60 capsule, Rfl: 3

## 2022-10-03 DIAGNOSIS — C61 PROSTATE CANCER METASTATIC TO BONE: ICD-10-CM

## 2022-10-03 DIAGNOSIS — C79.51 PROSTATE CANCER METASTATIC TO BONE: ICD-10-CM

## 2022-10-03 RX ORDER — HYDROCODONE BITARTRATE AND ACETAMINOPHEN 10; 325 MG/1; MG/1
1 TABLET ORAL EVERY 4 HOURS PRN
Qty: 120 TABLET | Refills: 0 | Status: SHIPPED | OUTPATIENT
Start: 2022-10-03 | End: 2022-10-24 | Stop reason: SDUPTHER

## 2022-10-03 NOTE — TELEPHONE ENCOUNTER
Caller: Semaj Medina [place]    Relationship: Self    Best call back number: [phone]    Requested Prescriptions:   Requested Prescriptions     Pending Prescriptions Disp Refills   • HYDROcodone-acetaminophen (NORCO)  MG per tablet 120 tablet 0     Sig: Take 1 tablet by mouth Every 4 (Four) Hours As Needed for Moderate Pain.        Pharmacy where request should be sent: [facility] #[id number] - [place], [place] - [address] [facility] AT [facility] OF [address] & [place] - [phone] [facility] [phone] FX       Does the patient have less than a 3 day supply:    [x] Yes  [] No

## 2022-10-07 ENCOUNTER — HOSPITAL ENCOUNTER (OUTPATIENT)
Dept: ONCOLOGY | Facility: HOSPITAL | Age: 67
Setting detail: INFUSION SERIES
Discharge: HOME | End: 2022-10-07

## 2022-10-07 DIAGNOSIS — C79.51 PROSTATE CANCER METASTATIC TO BONE: Primary | ICD-10-CM

## 2022-10-07 DIAGNOSIS — C61 PROSTATE CANCER METASTATIC TO BONE: Primary | ICD-10-CM

## 2022-10-11 ENCOUNTER — HOSPITAL ENCOUNTER (OUTPATIENT)
Dept: ONCOLOGY | Facility: HOSPITAL | Age: 67
Setting detail: INFUSION SERIES
Discharge: HOME | End: 2022-10-11

## 2022-10-11 ENCOUNTER — LAB (OUTPATIENT)
Dept: ONCOLOGY | Facility: HOSPITAL | Age: 67
End: 2022-10-11

## 2022-10-11 VITALS
SYSTOLIC BLOOD PRESSURE: 134 MMHG | TEMPERATURE: 97.1 F | RESPIRATION RATE: 17 BRPM | DIASTOLIC BLOOD PRESSURE: 85 MMHG | OXYGEN SATURATION: 98 % | HEART RATE: 61 BPM

## 2022-10-11 DIAGNOSIS — C79.51 PROSTATE CANCER METASTATIC TO BONE: ICD-10-CM

## 2022-10-11 DIAGNOSIS — C61 PROSTATE CANCER METASTATIC TO BONE: ICD-10-CM

## 2022-10-11 DIAGNOSIS — C79.51 PROSTATE CANCER METASTATIC TO BONE: Primary | ICD-10-CM

## 2022-10-11 DIAGNOSIS — C61 PROSTATE CANCER METASTATIC TO BONE: Primary | ICD-10-CM

## 2022-10-11 LAB
ALBUMIN SERPL-MCNC: 3.38 G/DL (ref 3.5–5.2)
ALBUMIN/GLOB SERPL: 1.4 G/DL
ALP SERPL-CCNC: 59 U/L (ref 39–117)
ALT SERPL W P-5'-P-CCNC: 4 U/L (ref 1–41)
ANION GAP SERPL CALCULATED.3IONS-SCNC: 5.1 MMOL/L (ref 5–15)
AST SERPL-CCNC: 14 U/L (ref 1–40)
BASOPHILS # BLD AUTO: 0 10*3/MM3 (ref 0–0.2)
BASOPHILS NFR BLD AUTO: 0 % (ref 0–1.5)
BILIRUB SERPL-MCNC: 0.3 MG/DL (ref 0–1.2)
BUN SERPL-MCNC: 9 MG/DL (ref 8–23)
BUN/CREAT SERPL: 7.9 (ref 7–25)
CALCIUM SPEC-SCNC: 8.7 MG/DL (ref 8.6–10.5)
CHLORIDE SERPL-SCNC: 107 MMOL/L (ref 98–107)
CO2 SERPL-SCNC: 27.9 MMOL/L (ref 22–29)
CREAT SERPL-MCNC: 1.14 MG/DL (ref 0.76–1.27)
DEPRECATED RDW RBC AUTO: 45.4 FL (ref 37–54)
EGFRCR SERPLBLD CKD-EPI 2021: 70.9 ML/MIN/1.73
EOSINOPHIL # BLD AUTO: 0.1 10*3/MM3 (ref 0–0.4)
EOSINOPHIL NFR BLD AUTO: 2 % (ref 0.3–6.2)
ERYTHROCYTE [DISTWIDTH] IN BLOOD BY AUTOMATED COUNT: 13.9 % (ref 12.3–15.4)
GLOBULIN UR ELPH-MCNC: 2.4 GM/DL
GLUCOSE SERPL-MCNC: 83 MG/DL (ref 65–99)
HCT VFR BLD AUTO: 46 % (ref 37.5–51)
HGB BLD-MCNC: 15.4 G/DL (ref 13–17.7)
IMM GRANULOCYTES # BLD AUTO: 0.01 10*3/MM3 (ref 0–0.05)
IMM GRANULOCYTES NFR BLD AUTO: 0.2 % (ref 0–0.5)
LYMPHOCYTES # BLD AUTO: 1.96 10*3/MM3 (ref 0.7–3.1)
LYMPHOCYTES NFR BLD AUTO: 39.6 % (ref 19.6–45.3)
MAGNESIUM SERPL-MCNC: 1.9 MG/DL (ref 1.6–2.4)
MCH RBC QN AUTO: 29.6 PG (ref 26.6–33)
MCHC RBC AUTO-ENTMCNC: 33.5 G/DL (ref 31.5–35.7)
MCV RBC AUTO: 88.5 FL (ref 79–97)
MONOCYTES # BLD AUTO: 0.4 10*3/MM3 (ref 0.1–0.9)
MONOCYTES NFR BLD AUTO: 8.1 % (ref 5–12)
NEUTROPHILS NFR BLD AUTO: 2.48 10*3/MM3 (ref 1.7–7)
NEUTROPHILS NFR BLD AUTO: 50.1 % (ref 42.7–76)
PHOSPHATE SERPL-MCNC: 4.2 MG/DL (ref 2.5–4.5)
PLATELET # BLD AUTO: 310 10*3/MM3 (ref 140–450)
PMV BLD AUTO: 9.2 FL (ref 6–12)
POTASSIUM SERPL-SCNC: 4.4 MMOL/L (ref 3.5–5.2)
PROT SERPL-MCNC: 5.8 G/DL (ref 6–8.5)
RBC # BLD AUTO: 5.2 10*6/MM3 (ref 4.14–5.8)
SODIUM SERPL-SCNC: 140 MMOL/L (ref 136–145)
WBC NRBC COR # BLD: 4.95 10*3/MM3 (ref 3.4–10.8)

## 2022-10-11 PROCEDURE — 25010000002 DENOSUMAB 120 MG/1.7ML SOLUTION: Performed by: NURSE PRACTITIONER

## 2022-10-11 PROCEDURE — 80053 COMPREHEN METABOLIC PANEL: CPT

## 2022-10-11 PROCEDURE — 85025 COMPLETE CBC W/AUTO DIFF WBC: CPT

## 2022-10-11 PROCEDURE — 83735 ASSAY OF MAGNESIUM: CPT

## 2022-10-11 PROCEDURE — 36415 COLL VENOUS BLD VENIPUNCTURE: CPT

## 2022-10-11 PROCEDURE — 96372 THER/PROPH/DIAG INJ SC/IM: CPT

## 2022-10-11 PROCEDURE — 84100 ASSAY OF PHOSPHORUS: CPT

## 2022-10-11 RX ADMIN — DENOSUMAB 120 MG: 120 INJECTION SUBCUTANEOUS at 11:59

## 2022-10-13 ENCOUNTER — OFFICE VISIT (OUTPATIENT)
Dept: FAMILY MEDICINE CLINIC | Facility: CLINIC | Age: 67
End: 2022-10-13

## 2022-10-13 VITALS
HEART RATE: 66 BPM | HEIGHT: 69 IN | DIASTOLIC BLOOD PRESSURE: 88 MMHG | SYSTOLIC BLOOD PRESSURE: 148 MMHG | RESPIRATION RATE: 14 BRPM | OXYGEN SATURATION: 98 % | TEMPERATURE: 97.7 F | WEIGHT: 123 LBS | BODY MASS INDEX: 18.22 KG/M2

## 2022-10-13 DIAGNOSIS — I10 PRIMARY HYPERTENSION: Primary | ICD-10-CM

## 2022-10-13 PROCEDURE — 99213 OFFICE O/P EST LOW 20 MIN: CPT | Performed by: FAMILY MEDICINE

## 2022-10-13 RX ORDER — LISINOPRIL 30 MG/1
30 TABLET ORAL DAILY
Qty: 90 TABLET | Refills: 0 | Status: SHIPPED | OUTPATIENT
Start: 2022-10-13 | End: 2023-02-03

## 2022-10-24 DIAGNOSIS — C79.51 PROSTATE CANCER METASTATIC TO BONE: ICD-10-CM

## 2022-10-24 DIAGNOSIS — C61 PROSTATE CANCER METASTATIC TO BONE: ICD-10-CM

## 2022-10-24 NOTE — TELEPHONE ENCOUNTER
Caller: Adam Semaj Osborne    Relationship: Self    Best call back number: [phone]    Requested Prescriptions:   Requested Prescriptions     Pending Prescriptions Disp Refills   • HYDROcodone-acetaminophen (NORCO)  MG per tablet 120 tablet 0     Sig: Take 1 tablet by mouth Every 4 (Four) Hours As Needed for Moderate Pain.        Pharmacy where request should be sent: [facility] #[id number] - [place], [place] - [address] [facility] AT [facility] OF [address]/[facility] & [place] - [phone] [facility] [phone] FX     Additional details provided by patient:     Does the patient have less than a 3 day supply:  [x] Yes  [] No

## 2022-10-25 RX ORDER — HYDROCODONE BITARTRATE AND ACETAMINOPHEN 10; 325 MG/1; MG/1
1 TABLET ORAL EVERY 4 HOURS PRN
Qty: 120 TABLET | Refills: 0 | Status: SHIPPED | OUTPATIENT
Start: 2022-10-25 | End: 2022-11-21 | Stop reason: SDUPTHER

## 2022-10-25 NOTE — TELEPHONE ENCOUNTER
PT  CALLED ON REFILL STILL HASNT BEEN FILLED, SPOKE WITH MARILYNN SHE WAS PUTTING A NOTE ON DR. BERNSTEIN DESK TO REACH TO THE PT BY THE END OF THE DAY

## 2022-11-08 ENCOUNTER — LAB (OUTPATIENT)
Dept: ONCOLOGY | Facility: HOSPITAL | Age: 67
End: 2022-11-08

## 2022-11-08 ENCOUNTER — HOSPITAL ENCOUNTER (OUTPATIENT)
Dept: ONCOLOGY | Facility: HOSPITAL | Age: 67
Setting detail: INFUSION SERIES
Discharge: HOME | End: 2022-11-08

## 2022-11-08 DIAGNOSIS — C61 PROSTATE CANCER METASTATIC TO BONE: Primary | ICD-10-CM

## 2022-11-08 DIAGNOSIS — C79.51 PROSTATE CANCER METASTATIC TO BONE: Primary | ICD-10-CM

## 2022-11-08 LAB
ALBUMIN SERPL-MCNC: 3.82 G/DL (ref 3.5–5.2)
ALBUMIN/GLOB SERPL: 1.3 G/DL
ALP SERPL-CCNC: 55 U/L (ref 39–117)
ALT SERPL W P-5'-P-CCNC: 4 U/L (ref 1–41)
ANION GAP SERPL CALCULATED.3IONS-SCNC: 5.1 MMOL/L (ref 5–15)
AST SERPL-CCNC: 12 U/L (ref 1–40)
BASOPHILS # BLD AUTO: 0.01 10*3/MM3 (ref 0–0.2)
BASOPHILS NFR BLD AUTO: 0.2 % (ref 0–1.5)
BILIRUB SERPL-MCNC: 0.6 MG/DL (ref 0–1.2)
BUN SERPL-MCNC: 13 MG/DL (ref 8–23)
BUN/CREAT SERPL: 13.3 (ref 7–25)
CALCIUM SPEC-SCNC: 9 MG/DL (ref 8.6–10.5)
CHLORIDE SERPL-SCNC: 108 MMOL/L (ref 98–107)
CO2 SERPL-SCNC: 29.9 MMOL/L (ref 22–29)
CREAT SERPL-MCNC: 0.98 MG/DL (ref 0.76–1.27)
DEPRECATED RDW RBC AUTO: 43.7 FL (ref 37–54)
EGFRCR SERPLBLD CKD-EPI 2021: 84.5 ML/MIN/1.73
EOSINOPHIL # BLD AUTO: 0.1 10*3/MM3 (ref 0–0.4)
EOSINOPHIL NFR BLD AUTO: 1.6 % (ref 0.3–6.2)
ERYTHROCYTE [DISTWIDTH] IN BLOOD BY AUTOMATED COUNT: 13.7 % (ref 12.3–15.4)
GLOBULIN UR ELPH-MCNC: 3 GM/DL
GLUCOSE SERPL-MCNC: 110 MG/DL (ref 65–99)
HCT VFR BLD AUTO: 45.8 % (ref 37.5–51)
HGB BLD-MCNC: 15.6 G/DL (ref 13–17.7)
IMM GRANULOCYTES # BLD AUTO: 0.01 10*3/MM3 (ref 0–0.05)
IMM GRANULOCYTES NFR BLD AUTO: 0.2 % (ref 0–0.5)
LYMPHOCYTES # BLD AUTO: 2.13 10*3/MM3 (ref 0.7–3.1)
LYMPHOCYTES NFR BLD AUTO: 34.5 % (ref 19.6–45.3)
MAGNESIUM SERPL-MCNC: 1.9 MG/DL (ref 1.6–2.4)
MCH RBC QN AUTO: 29.4 PG (ref 26.6–33)
MCHC RBC AUTO-ENTMCNC: 34.1 G/DL (ref 31.5–35.7)
MCV RBC AUTO: 86.4 FL (ref 79–97)
MONOCYTES # BLD AUTO: 0.51 10*3/MM3 (ref 0.1–0.9)
MONOCYTES NFR BLD AUTO: 8.3 % (ref 5–12)
NEUTROPHILS NFR BLD AUTO: 3.41 10*3/MM3 (ref 1.7–7)
NEUTROPHILS NFR BLD AUTO: 55.2 % (ref 42.7–76)
PHOSPHATE SERPL-MCNC: 3.7 MG/DL (ref 2.5–4.5)
PLATELET # BLD AUTO: 326 10*3/MM3 (ref 140–450)
PMV BLD AUTO: 8.8 FL (ref 6–12)
POTASSIUM SERPL-SCNC: 3.9 MMOL/L (ref 3.5–5.2)
PROT SERPL-MCNC: 6.8 G/DL (ref 6–8.5)
RBC # BLD AUTO: 5.3 10*6/MM3 (ref 4.14–5.8)
SODIUM SERPL-SCNC: 143 MMOL/L (ref 136–145)
WBC NRBC COR # BLD: 6.17 10*3/MM3 (ref 3.4–10.8)

## 2022-11-08 PROCEDURE — 84100 ASSAY OF PHOSPHORUS: CPT

## 2022-11-08 PROCEDURE — 25010000002 DENOSUMAB 120 MG/1.7ML SOLUTION: Performed by: INTERNAL MEDICINE

## 2022-11-08 PROCEDURE — 80053 COMPREHEN METABOLIC PANEL: CPT

## 2022-11-08 PROCEDURE — 96372 THER/PROPH/DIAG INJ SC/IM: CPT

## 2022-11-08 PROCEDURE — 36415 COLL VENOUS BLD VENIPUNCTURE: CPT

## 2022-11-08 PROCEDURE — 85025 COMPLETE CBC W/AUTO DIFF WBC: CPT

## 2022-11-08 PROCEDURE — 83735 ASSAY OF MAGNESIUM: CPT

## 2022-11-08 RX ADMIN — DENOSUMAB 120 MG: 120 INJECTION SUBCUTANEOUS at 11:02

## 2022-11-15 ENCOUNTER — TELEPHONE (OUTPATIENT)
Dept: ONCOLOGY | Facility: HOSPITAL | Age: 67
End: 2022-11-15

## 2022-11-21 DIAGNOSIS — C79.51 PROSTATE CANCER METASTATIC TO BONE: ICD-10-CM

## 2022-11-21 DIAGNOSIS — C61 PROSTATE CANCER METASTATIC TO BONE: ICD-10-CM

## 2022-11-21 NOTE — TELEPHONE ENCOUNTER
Caller: Semaj Medina Osborne    Relationship: Self    Best call back number: [phone]    Requested Prescriptions:   Requested Prescriptions     Pending Prescriptions Disp Refills   • HYDROcodone-acetaminophen (NORCO)  MG per tablet 120 tablet 0     Sig: Take 1 tablet by mouth Every 4 (Four) Hours As Needed for Moderate Pain.        Pharmacy where request should be sent: [facility] #[id number] - [place], [place] - [address] [facility] AT [facility] OF [address]/[facility] & [place] - [phone] [facility] [phone] FX     Additional details provided by patient: PATIENT IS COMPLETELY OUT.    Does the patient have less than a 3 day supply:  [x] Yes  [] No    Nena Quinones Rep   11/21/22 11:55 EST

## 2022-11-22 ENCOUNTER — TELEPHONE (OUTPATIENT)
Dept: FAMILY MEDICINE CLINIC | Facility: CLINIC | Age: 67
End: 2022-11-22

## 2022-11-22 NOTE — TELEPHONE ENCOUNTER
Caller: Semaj Medina [employer]    Relationship: Self    Best call back number: [phone]    What was the call regarding: SEMAJ CALLED FOR AN UPDATE ON THIS REQUEST. HE STATES THAT HE HAS BEEN TAKING 2 PILLS 3X A DAY INSTEAD OF ONE EVERY FOUR HOURS. HE IS NEEDING THIS ASAP.    Do you require a callback: YES

## 2022-11-23 RX ORDER — HYDROCODONE BITARTRATE AND ACETAMINOPHEN 10; 325 MG/1; MG/1
1 TABLET ORAL EVERY 4 HOURS PRN
Qty: 120 TABLET | Refills: 0 | Status: SHIPPED | OUTPATIENT
Start: 2022-11-23 | End: 2022-12-20 | Stop reason: SDUPTHER

## 2022-11-23 NOTE — TELEPHONE ENCOUNTER
Caller: Semaj Medina Osborne    Relationship: Self    Best call back number: [phone]    What is the best time to reach you: ANY    Who are you requesting to speak with (clinical staff, provider,  specific staff member): CLINICAL STAFF    What was the call regarding: PT CALLED BACK TO CHECK ON THE STATUS OF HIS REFILL. STATES HE IS COMPLETELY OUT OF THE MEDICATION AND NEEDS THE RX SENT TO HIS PHARMACY AS SOON AS POSSIBLE. ASKS THAT SOMEONE CALL HIM TO LET HIM KNOW IT HAS BEEN SENT    Do you require a callback: YES

## 2022-12-06 ENCOUNTER — LAB (OUTPATIENT)
Dept: ONCOLOGY | Facility: HOSPITAL | Age: 67
End: 2022-12-06

## 2022-12-06 ENCOUNTER — HOSPITAL ENCOUNTER (OUTPATIENT)
Dept: ONCOLOGY | Facility: HOSPITAL | Age: 67
Setting detail: INFUSION SERIES
Discharge: HOME | End: 2022-12-06

## 2022-12-06 DIAGNOSIS — C79.51 PROSTATE CANCER METASTATIC TO BONE: Primary | ICD-10-CM

## 2022-12-06 DIAGNOSIS — C61 PROSTATE CANCER METASTATIC TO BONE: Primary | ICD-10-CM

## 2022-12-06 LAB
ALBUMIN SERPL-MCNC: 3.59 G/DL (ref 3.5–5.2)
ALBUMIN/GLOB SERPL: 1.2 G/DL
ALP SERPL-CCNC: 49 U/L (ref 39–117)
ALT SERPL W P-5'-P-CCNC: 5 U/L (ref 1–41)
ANION GAP SERPL CALCULATED.3IONS-SCNC: 2.5 MMOL/L (ref 5–15)
AST SERPL-CCNC: 11 U/L (ref 1–40)
BASOPHILS # BLD AUTO: 0 10*3/MM3 (ref 0–0.2)
BASOPHILS NFR BLD AUTO: 0 % (ref 0–1.5)
BILIRUB SERPL-MCNC: 0.3 MG/DL (ref 0–1.2)
BUN SERPL-MCNC: 7 MG/DL (ref 8–23)
BUN/CREAT SERPL: 7.6 (ref 7–25)
CALCIUM SPEC-SCNC: 8.1 MG/DL (ref 8.6–10.5)
CHLORIDE SERPL-SCNC: 105 MMOL/L (ref 98–107)
CO2 SERPL-SCNC: 26.5 MMOL/L (ref 22–29)
CREAT SERPL-MCNC: 0.92 MG/DL (ref 0.76–1.27)
DEPRECATED RDW RBC AUTO: 46.1 FL (ref 37–54)
EGFRCR SERPLBLD CKD-EPI 2021: 91.2 ML/MIN/1.73
EOSINOPHIL # BLD AUTO: 0.06 10*3/MM3 (ref 0–0.4)
EOSINOPHIL NFR BLD AUTO: 1.2 % (ref 0.3–6.2)
ERYTHROCYTE [DISTWIDTH] IN BLOOD BY AUTOMATED COUNT: 14.6 % (ref 12.3–15.4)
GLOBULIN UR ELPH-MCNC: 2.9 GM/DL
GLUCOSE SERPL-MCNC: 115 MG/DL (ref 65–99)
HCT VFR BLD AUTO: 44.9 % (ref 37.5–51)
HGB BLD-MCNC: 15.4 G/DL (ref 13–17.7)
IMM GRANULOCYTES # BLD AUTO: 0.01 10*3/MM3 (ref 0–0.05)
IMM GRANULOCYTES NFR BLD AUTO: 0.2 % (ref 0–0.5)
LYMPHOCYTES # BLD AUTO: 1.84 10*3/MM3 (ref 0.7–3.1)
LYMPHOCYTES NFR BLD AUTO: 36.8 % (ref 19.6–45.3)
MAGNESIUM SERPL-MCNC: 1.8 MG/DL (ref 1.6–2.4)
MCH RBC QN AUTO: 29.4 PG (ref 26.6–33)
MCHC RBC AUTO-ENTMCNC: 34.3 G/DL (ref 31.5–35.7)
MCV RBC AUTO: 85.9 FL (ref 79–97)
MONOCYTES # BLD AUTO: 0.36 10*3/MM3 (ref 0.1–0.9)
MONOCYTES NFR BLD AUTO: 7.2 % (ref 5–12)
NEUTROPHILS NFR BLD AUTO: 2.73 10*3/MM3 (ref 1.7–7)
NEUTROPHILS NFR BLD AUTO: 54.6 % (ref 42.7–76)
PHOSPHATE SERPL-MCNC: 2.2 MG/DL (ref 2.5–4.5)
PLATELET # BLD AUTO: 341 10*3/MM3 (ref 140–450)
PMV BLD AUTO: 8.8 FL (ref 6–12)
POTASSIUM SERPL-SCNC: 3.9 MMOL/L (ref 3.5–5.2)
PROT SERPL-MCNC: 6.5 G/DL (ref 6–8.5)
PSA SERPL-MCNC: 1.34 NG/ML (ref 0–4)
RBC # BLD AUTO: 5.23 10*6/MM3 (ref 4.14–5.8)
SODIUM SERPL-SCNC: 134 MMOL/L (ref 136–145)
WBC NRBC COR # BLD: 5 10*3/MM3 (ref 3.4–10.8)

## 2022-12-06 PROCEDURE — 84100 ASSAY OF PHOSPHORUS: CPT

## 2022-12-06 PROCEDURE — 85025 COMPLETE CBC W/AUTO DIFF WBC: CPT

## 2022-12-06 PROCEDURE — 83735 ASSAY OF MAGNESIUM: CPT

## 2022-12-06 PROCEDURE — 80053 COMPREHEN METABOLIC PANEL: CPT

## 2022-12-06 PROCEDURE — 36415 COLL VENOUS BLD VENIPUNCTURE: CPT

## 2022-12-06 PROCEDURE — 84153 ASSAY OF PSA TOTAL: CPT

## 2022-12-16 ENCOUNTER — HOSPITAL ENCOUNTER (OUTPATIENT)
Dept: ONCOLOGY | Facility: HOSPITAL | Age: 67
Setting detail: INFUSION SERIES
Discharge: HOME | End: 2022-12-16

## 2022-12-16 ENCOUNTER — OFFICE VISIT (OUTPATIENT)
Dept: ONCOLOGY | Facility: HOSPITAL | Age: 67
End: 2022-12-16
Payer: MEDICARE

## 2022-12-16 VITALS
RESPIRATION RATE: 16 BRPM | WEIGHT: 122.8 LBS | BODY MASS INDEX: 18.19 KG/M2 | OXYGEN SATURATION: 100 % | HEART RATE: 70 BPM | HEIGHT: 69 IN | DIASTOLIC BLOOD PRESSURE: 91 MMHG | SYSTOLIC BLOOD PRESSURE: 136 MMHG | TEMPERATURE: 98 F

## 2022-12-16 DIAGNOSIS — C79.51 PROSTATE CANCER METASTATIC TO BONE: Primary | ICD-10-CM

## 2022-12-16 DIAGNOSIS — C61 PROSTATE CANCER METASTATIC TO BONE: Primary | ICD-10-CM

## 2022-12-16 DIAGNOSIS — C61 PROSTATE CANCER: ICD-10-CM

## 2022-12-16 PROCEDURE — 25010000002 LEUPROLIDE 45 MG KIT: Performed by: INTERNAL MEDICINE

## 2022-12-16 PROCEDURE — 96402 CHEMO HORMON ANTINEOPL SQ/IM: CPT

## 2022-12-16 PROCEDURE — 99214 OFFICE O/P EST MOD 30 MIN: CPT | Performed by: INTERNAL MEDICINE

## 2022-12-16 PROCEDURE — G0463 HOSPITAL OUTPT CLINIC VISIT: HCPCS | Performed by: INTERNAL MEDICINE

## 2022-12-16 RX ADMIN — LEUPROLIDE ACETATE 45 MG: KIT at 11:01

## 2022-12-16 NOTE — PROGRESS NOTES
Patient  Semaj Osborne [place]    Location  [facility] HEMATOLOGY & ONCOLOGY    Chief Complaint  Prostate Cancer    Referring Provider: Natalya Jack MD PhD  PCP: Maryse Monae MD    Subjective          Oncology/Hematology History Overview Note   Mr. Cha is a 65-year-old -American man who was referred to me by Dr. Lepe for evaluation and treatment of prostate cancer.    Patient was initially diagnosed with prostate cancer in 2015.  On 6/4/2015 he underwent radical prostatectomy and LN dissection which showed a [place] 4+3 = 7 prostate cancer.  There were several high risk features such as extraprostatic extension, seminal vesicle invasion, positive margins at the bladder neck and right and left seminal vesicle.  Lymph vascular invasion was indeterminate.  Perineural invasion was not commented on. Final pathology [id number] R1.  He was treated with adjuvant radiation by Dr. Stanton.    According to records the patient likely started Lupron in December 2017 when his PSA started to rise.  The patient took a break in August 2019.  He states he was not aware he was to continue.  He restarted Lupron on 11/26/2019 after it was noted that his PSA chago from 0.46 up to 2.55 on 11/8/2019.  Unfortunately his PSA continued to rise after that to 4.54 on 3/10/2020.    PSA summary:  11/8/19 PSA 2.55 (restarted Lupron on 11/26)  8/31/2020 PSA 17.95 (started Zytiga)   Started Zytiga on 8/27/20.  Stopped in November 2020 due to diarrhea and nausea.     Started Xtandi in May 2021 due to intolerance of Zytiga even at the lowest dose       Prostate cancer metastatic to bone (HCC)   12/11/2020 -  Chemotherapy    OP SUPPORTIVE Leuprolide 45 mg Q6M     3/23/2021 -  Chemotherapy    OP SUPPORTIVE Denosumab (Xgeva) Q28D     4/15/2021 -  Chemotherapy    OP PROSTATE Enzalutamide     5/19/2021 Initial Diagnosis    Prostate cancer metastatic to bone (CMS/HCC)     Prostate cancer (HCC)   6/16/2021 Initial  Diagnosis    Prostate cancer (HCC)     Secondary malignant neoplasm of bone (HCC)   11/4/2021 Initial Diagnosis    Secondary malignant neoplasm of bone (HCC)     11/17/2021 -  Radiation    RADIATION THERAPY Treatment Details (Noted on 11/4/2021)  Site: Bilateral Spine - Thoracic  Technique: 3D CRT  Goal: No goal specified  Planned Treatment Start Date: 11/17/2021         HPI  Patient comes in today for follow-up of prostate cancer.  His PSA is 1.34 and was previously 0.697.  We discussed the fact that he has lost weight.  He has had teeth removed about 3 weeks ago.  He does recognize that his appetite is an issue.  He says he never got the prescription for Marinol from his pharmacy.  He is willing to try an alternative appetite stimulant medicine.    Review of Systems   Constitutional: Negative for appetite change, diaphoresis, fatigue, fever, unexpected weight gain and unexpected weight loss.   HENT: Negative for hearing loss, mouth sores, sore throat, swollen glands, trouble swallowing and voice change.    Eyes: Negative for blurred vision.   Respiratory: Negative for cough, shortness of breath and wheezing.    Cardiovascular: Negative for chest pain and palpitations.   Gastrointestinal: Negative for abdominal pain, blood in stool, constipation, diarrhea, nausea and vomiting.   Endocrine: Negative for cold intolerance and heat intolerance.   Genitourinary: Negative for difficulty urinating, dysuria, frequency, hematuria and urinary incontinence.   Musculoskeletal: Positive for back pain (4/10) and joint swelling (4/10 CARISSA knee). Negative for arthralgias and myalgias.   Skin: Negative for rash, skin lesions and wound.   Neurological: Negative for dizziness, seizures, weakness, numbness and headache.   Hematological: Does not bruise/bleed easily.   Psychiatric/Behavioral: Negative for depressed mood. The patient is not nervous/anxious.    All other systems reviewed and are negative.      Past Medical History:  "  Diagnosis Date   • Anemia    • Anxiety    • Blood disease    • Colon polyps    • Depression    • Diverticulitis    • Forgetfulness    • Hepatitis C    • Night sweats    • Numbness and tingling of left lower extremity    • Prostate cancer (HCC)      Past Surgical History:   Procedure Laterality Date   • COLONOSCOPY     • PROSTATE BIOPSY     • PROSTATECTOMY      ROBOT ASSISTED W PELVIC LYMPH NODE DISSECTION   • TRANSURETHRAL RESECTION OF BLADDER TUMOR       Social History     Socioeconomic History   • Marital status:    Tobacco Use   • Smoking status: Every Day     Packs/day: 0.25     Years: 42.00     Pack years: 10.50     Types: Cigarettes     Start date: 10/21/1970   • Smokeless tobacco: Never   Vaping Use   • Vaping Use: Never used   Substance and Sexual Activity   • Alcohol use: Yes     Alcohol/week: 6.0 standard drinks     Types: 6 Cans of beer per week   • Drug use: Never   • Sexual activity: Defer     Family History   Problem Relation Age of Onset   • Cancer Mother    • Cancer Father        Objective   Physical Exam  General: Alert, cooperative, no acute distress  Eyes: Anicteric sclera, PERRLA  Respiratory: normal respiratory effort  Cardiovascular: no lower extremity edema  Skin: Normal tone, no rash, no lesions  Psychiatric: Appropriate affect, intact judgment  Neurologic: No focal sensory or motor deficits, normal cognition   Musculoskeletal: Normal muscle strength and tone  Extremities: No clubbing, cyanosis, or deformities    Vitals:    12/16/22 1011   BP: 136/91   Pulse: 70   Resp: 16   Temp: 98 °F (36.7 °C)   SpO2: 100%   Weight: 55.7 kg (122 lb 12.7 oz)   Height: 175.3 cm (69.02\")   PainSc:   4   PainLoc: Back     ECOG score: 0         PHQ-9 Total Score:         Result Review :   The following data was reviewed by: Natalya Jack MD PhD on 12/16/2022:  Lab Results   Component Value Date    HGB 15.4 12/06/2022    HCT 44.9 12/06/2022    MCV 85.9 12/06/2022     12/06/2022    WBC 5.00 " 12/06/2022    NEUTROABS 2.73 12/06/2022    LYMPHSABS 1.84 12/06/2022    MONOSABS 0.36 12/06/2022    EOSABS 0.06 12/06/2022    BASOSABS 0.00 12/06/2022     Lab Results   Component Value Date    GLUCOSE 115 (H) 12/06/2022    BUN 7 (L) 12/06/2022    CREATININE 0.92 12/06/2022     (L) 12/06/2022    K 3.9 12/06/2022     12/06/2022    CO2 26.5 12/06/2022    CALCIUM 8.1 (L) 12/06/2022    PROTEINTOT 6.5 12/06/2022    ALBUMIN 3.59 12/06/2022    BILITOT 0.3 12/06/2022    ALKPHOS 49 12/06/2022    AST 11 12/06/2022    ALT 5 12/06/2022          Assessment and Plan    Diagnoses and all orders for this visit:    1. Prostate cancer (HCC)  -     CBC & Differential; Future  -     Comprehensive Metabolic Panel; Future  -     PSA DIAGNOSTIC; Future        Prostate cancer with metastasis to bone: Patient is currently on Xtandi and tolerating it well.  I reviewed his CBC and CMP and there is no evidence of significant toxicity.  His PSA is slightly increased at 1.34.  Patient will follow up with me in 2 months for repeat labs and toxicity check.  If his PSA continues to rise I will send him for CT and bone scan.    Bone metastases: q3mo Xgeva.  We will hold Xgeva today due to total calcium-8.1.  Recommend calcium and vitamin D daily.    Lipoma: Left side of thoracic spine.  The patient is not interested in surgery referral right now but will consider it.  He does have some pain in the area.    Poor appetite/weight loss: I will send a prescription for mirtazapine since he apparently cannot get Marinol from his pharmacy.          Cancer-related pain: I will refill the patient's hydrocodone medication now up to 5 times a day.      Patient was given instructions and counseling regarding his condition or for health maintenance advice. Please see specific information pulled into the AVS if appropriate.

## 2022-12-20 ENCOUNTER — TELEPHONE (OUTPATIENT)
Dept: ONCOLOGY | Facility: HOSPITAL | Age: 67
End: 2022-12-20

## 2022-12-20 DIAGNOSIS — C61 PROSTATE CANCER METASTATIC TO BONE: ICD-10-CM

## 2022-12-20 DIAGNOSIS — C79.51 PROSTATE CANCER METASTATIC TO BONE: ICD-10-CM

## 2022-12-20 DIAGNOSIS — G89.3 CANCER ASSOCIATED PAIN: ICD-10-CM

## 2022-12-20 RX ORDER — HYDROCODONE BITARTRATE AND ACETAMINOPHEN 10; 325 MG/1; MG/1
1 TABLET ORAL EVERY 4 HOURS PRN
Qty: 120 TABLET | Refills: 0 | Status: SHIPPED | OUTPATIENT
Start: 2022-12-20 | End: 2023-01-19 | Stop reason: SDUPTHER

## 2022-12-20 RX ORDER — DRONABINOL 5 MG/1
5 CAPSULE ORAL
Qty: 60 CAPSULE | Refills: 3 | Status: SHIPPED | OUTPATIENT
Start: 2022-12-20

## 2022-12-20 NOTE — TELEPHONE ENCOUNTER
Caller: Semaj Medina Osborne    Relationship: Self    Best call back number: [phone]    Requested Prescriptions:   Requested Prescriptions     Pending Prescriptions Disp Refills   • HYDROcodone-acetaminophen (NORCO)  MG per tablet 120 tablet 0     Sig: Take 1 tablet by mouth Every 4 (Four) Hours As Needed for Moderate Pain.   • dronabinol (Marinol) 5 MG capsule 60 capsule 3     Sig: Take 1 capsule by mouth 2 (Two) Times a Day Before Meals.        Pharmacy where request should be sent: [facility] #[id number] - [place], [place] - [address] [facility] AT [facility] OF [address]/[facility] & [place] - [phone] [facility] [phone] FX     Additional details provided by patient: PATIENT HAS ONE DAY    Does the patient have less than a 3 day supply:  [x] Yes  [] No    Would you like a call back once the refill request has been completed: [x] Yes [] No    If the office needs to give you a call back, can they leave a voicemail: [x] Yes [] No    Nena Johnson Rep   12/20/22 09:20 EST

## 2022-12-20 NOTE — TELEPHONE ENCOUNTER
Caller: Semaj Medina Osborne    Relationship: Self    Best call back number: [phone]    What is the best time to reach you: ASAP    Who are you requesting to speak with (clinical staff, provider,  specific staff member): DR. BERNSTEIN'S NURSE    Do you know the name of the person who called:  SEMAJ    What was the call regarding: PATIENT IS CALLING BACK TO MAKE SURE MEDICATIONS ARE E-SCRIBED TODAY, AS THE MARINOL WAS TO BE E-SCRIBED LAST WEEK.    Do you require a callback: YES, PLEASE

## 2023-01-19 DIAGNOSIS — C79.51 PROSTATE CANCER METASTATIC TO BONE: ICD-10-CM

## 2023-01-19 DIAGNOSIS — C61 PROSTATE CANCER METASTATIC TO BONE: ICD-10-CM

## 2023-01-19 NOTE — TELEPHONE ENCOUNTER
Caller: Semaj Medina Osborne    Relationship: Self    Best call back number: [phone]    Requested Prescriptions:   Requested Prescriptions     Pending Prescriptions Disp Refills   • HYDROcodone-acetaminophen (NORCO)  MG per tablet 120 tablet 0     Sig: Take 1 tablet by mouth Every 4 (Four) Hours As Needed for Moderate Pain.        Pharmacy where request should be sent: [facility] #[id number] - [place], [place] - [address] [facility] AT [facility] OF [address]/[facility] & [place] - [phone] [facility] [phone] FX       Does the patient have less than a 3 day supply:  [x] Yes  [] No    Would you like a call back once the refill request has been completed: [x] Yes [] No    If the office needs to give you a call back, can they leave a voicemail: [x] Yes [] No

## 2023-01-20 RX ORDER — HYDROCODONE BITARTRATE AND ACETAMINOPHEN 10; 325 MG/1; MG/1
1 TABLET ORAL EVERY 4 HOURS PRN
Qty: 120 TABLET | Refills: 0 | Status: SHIPPED | OUTPATIENT
Start: 2023-01-20 | End: 2023-02-17 | Stop reason: SDUPTHER

## 2023-02-03 ENCOUNTER — TELEPHONE (OUTPATIENT)
Dept: GASTROENTEROLOGY | Facility: CLINIC | Age: 68
End: 2023-02-03
Payer: MEDICARE

## 2023-02-03 ENCOUNTER — OFFICE VISIT (OUTPATIENT)
Dept: FAMILY MEDICINE CLINIC | Facility: CLINIC | Age: 68
End: 2023-02-03
Payer: MEDICARE

## 2023-02-03 VITALS
OXYGEN SATURATION: 99 % | SYSTOLIC BLOOD PRESSURE: 138 MMHG | HEART RATE: 66 BPM | WEIGHT: 124 LBS | DIASTOLIC BLOOD PRESSURE: 90 MMHG | RESPIRATION RATE: 14 BRPM | HEIGHT: 70 IN | TEMPERATURE: 97.8 F | BODY MASS INDEX: 17.75 KG/M2

## 2023-02-03 DIAGNOSIS — Z00.00 ENCOUNTER FOR SUBSEQUENT ANNUAL WELLNESS VISIT (AWV) IN MEDICARE PATIENT: Primary | ICD-10-CM

## 2023-02-03 PROCEDURE — 1160F RVW MEDS BY RX/DR IN RCRD: CPT | Performed by: FAMILY MEDICINE

## 2023-02-03 PROCEDURE — 1126F AMNT PAIN NOTED NONE PRSNT: CPT | Performed by: FAMILY MEDICINE

## 2023-02-03 PROCEDURE — 1159F MED LIST DOCD IN RCRD: CPT | Performed by: FAMILY MEDICINE

## 2023-02-03 PROCEDURE — 1170F FXNL STATUS ASSESSED: CPT | Performed by: FAMILY MEDICINE

## 2023-02-03 PROCEDURE — G0439 PPPS, SUBSEQ VISIT: HCPCS | Performed by: FAMILY MEDICINE

## 2023-02-03 PROCEDURE — 1125F AMNT PAIN NOTED PAIN PRSNT: CPT | Performed by: FAMILY MEDICINE

## 2023-02-03 NOTE — PROGRESS NOTES
The ABCs of the Annual Wellness Visit  Subsequent Medicare Wellness Visit    Semaj Medina is a 67 y.o. male who presents for a Subsequent Medicare Wellness Visit.    The following portions of the patient's history were reviewed and   updated as appropriate: allergies, current medications, past family history, past medical history, past social history, past surgical history and problem list.    Compared to one year ago, the patient feels his physical   health is the same.    Compared to one year ago, the patient feels his mental   health is the same    Pt reports that he has problems seeing out of his right eye.  Pt is seen  by Anny.  Pt reports that he had an injection in his right eye about 1 week ago but he has not noticed an improvement yet.  Pt also had oral surgery and had all of his teeth removed except for 2 in the top.  Pt reports that it is interfering with is appetite and eating.  Pt reports he got a medication     Recent Hospitalizations:  He was not admitted to the hospital during the last year.       Current Medical Providers:  Patient Care Team:  Maryse Monae MD as PCP - General (Family Medicine)  Natalya Jack MD PhD as Consulting Physician (Hematology and Oncology)  Kendra Oscar APRN as Nurse Practitioner (Nurse Practitioner)    Outpatient Medications Prior to Visit   Medication Sig Dispense Refill   • diclofenac (VOLTAREN) 0.1 % ophthalmic solution INSTILL 1 DROP IN RIGHT EYE THREE TIMES DAILY     • dronabinol (Marinol) 5 MG capsule Take 1 capsule by mouth 2 (Two) Times a Day Before Meals. 60 capsule 3   • enzalutamide (XTANDI) 40 MG chemo capsule Take 40 mg by mouth 4 (Four) Times a Day.     • ergocalciferol (ERGOCALCIFEROL) 1.25 MG (14958 UT) capsule      • folic acid (FOLVITE) 1 MG tablet      • HYDROcodone-acetaminophen (NORCO)  MG per tablet Take 1 tablet by mouth Every 4 (Four) Hours As Needed for Moderate Pain. 120 tablet 0   • leuprolide (Lupron  Depot, 3-Month,) 22.5 MG injection 22.5 mg.     • lisinopril (PRINIVIL,ZESTRIL) 30 MG tablet Take 1 tablet by mouth Daily for 90 days. 90 tablet 0   • ondansetron (ZOFRAN) 8 MG tablet Take 1 tablet by mouth Every 8 (Eight) Hours As Needed for Nausea or Vomiting. 30 tablet 3   • polyethylene glycol (GoLYTELY) 236 g solution Starting at noon on day prior to procedure, drink 8 ounces every 30 minutes until all gone or stools are clear. May add flavor packet. 4000 mL 0   • albuterol sulfate  (90 Base) MCG/ACT inhaler INHALE 2 INHALATIONS BY MOUTH NOW AS DIRECTED     • Nicotine Mini 4 MG lozenge        No facility-administered medications prior to visit.       Opioid medication/s are on active medication list.  and I have evaluated his active treatment plan and pain score trends (see table).  Vitals:    02/03/23 1448   PainSc: 0-No pain     I have reviewed the chart for potential of high risk medication and harmful drug interactions in the elderly.            Aspirin is not on active medication list.  Aspirin use is not indicated based on review of current medical condition/s. Risk of harm outweighs potential benefits.  .    Patient Active Problem List   Diagnosis   • Prostate cancer metastatic to bone (HCC)   • Anemia   • Anxiety   • Blood disease   • Colon polyps   • Depression   • Diverticulitis   • Forgetfulness   • Hepatitis C   • Night sweats   • Numbness and tingling of left lower extremity   • Prostate cancer (HCC)   • Secondary malignant neoplasm of bone (HCC)   • Cancer related pain   • Primary hypertension   • Colon cancer screening     Advance Care Planning  Advance Directive is not on file.  ACP discussion was held with the patient during this visit. Patient has an advance directive (not in EMR), copy requested.     Objective    Vitals:    02/03/23 1448   BP: 138/90   BP Location: Right arm   Patient Position: Sitting   Pulse: 66   Resp: 14   Temp: 97.8 °F (36.6 °C)   SpO2: 99%   Weight: 56.2 kg (124  "lb)   Height: 176.5 cm (69.5\")   PainSc: 0-No pain     Estimated body mass index is 18.05 kg/m² as calculated from the following:    Height as of this encounter: 176.5 cm (69.5\").    Weight as of this encounter: 56.2 kg (124 lb).    BMI is below normal parameters (malnutrition). Recommendations: treating the underlying disease process    Physical Exam  Vitals reviewed.   Constitutional:       Appearance: Normal appearance.   HENT:      Head: Normocephalic.      Right Ear: Tympanic membrane normal.      Left Ear: Tympanic membrane normal.      Nose: Nose normal.      Mouth/Throat:      Mouth: Mucous membranes are moist.   Eyes:      Extraocular Movements: Extraocular movements intact.      Conjunctiva/sclera: Conjunctivae normal.      Pupils: Pupils are equal, round, and reactive to light.   Cardiovascular:      Rate and Rhythm: Normal rate and regular rhythm.      Pulses: Normal pulses.      Heart sounds: Normal heart sounds.   Pulmonary:      Effort: Pulmonary effort is normal.      Breath sounds: Normal breath sounds.   Abdominal:      General: Bowel sounds are normal.      Palpations: Abdomen is soft.   Musculoskeletal:         General: Normal range of motion.      Cervical back: Normal range of motion.   Skin:     General: Skin is warm and dry.   Neurological:      General: No focal deficit present.      Mental Status: He is alert and oriented to person, place, and time.   Psychiatric:         Mood and Affect: Mood normal.         Behavior: Behavior normal.         Does the patient have evidence of cognitive impairment?   No            HEALTH RISK ASSESSMENT    Smoking Status:  Social History     Tobacco Use   Smoking Status Every Day   • Packs/day: 0.25   • Years: 42.00   • Pack years: 10.50   • Types: Cigarettes   • Start date: 10/21/1970   Smokeless Tobacco Never     Alcohol Consumption:  Social History     Substance and Sexual Activity   Alcohol Use Yes   • Alcohol/week: 6.0 standard drinks   • Types: 6 Cans " of beer per week     Fall Risk Screen:    RENUKA Fall Risk Assessment was completed, and patient is at LOW risk for falls.Assessment completed on:2/3/2023    Depression Screening:  PHQ-2/PHQ-9 Depression Screening 2/3/2023   Little Interest or Pleasure in Doing Things 0-->not at all   Feeling Down, Depressed or Hopeless 0-->not at all   PHQ-9: Brief Depression Severity Measure Score 0       Health Habits and Functional and Cognitive Screening:  Functional & Cognitive Status 2/3/2023   Do you have difficulty preparing food and eating? No   Do you have difficulty bathing yourself, getting dressed or grooming yourself? No   Do you have difficulty using the toilet? No   Do you have difficulty moving around from place to place? No   Do you have trouble with steps or getting out of a bed or a chair? No   Current Diet Other   Dental Exam Up to date   Eye Exam Up to date   Exercise (times per week) 2 times per week   Current Exercises Include Walking   Do you need help using the phone?  No   Are you deaf or do you have serious difficulty hearing?  No   Do you need help with transportation? No   Do you need help shopping? No   Do you need help preparing meals?  No   Do you need help with housework?  No   Do you need help with laundry? No   Do you need help taking your medications? No   Do you need help managing money? No   Do you ever drive or ride in a car without wearing a seat belt? No   Have you felt unusual stress, anger or loneliness in the last month? No   Who do you live with? Other   If you need help, do you have trouble finding someone available to you? No   Have you been bothered in the last four weeks by sexual problems? No   Do you have difficulty concentrating, remembering or making decisions? No       Age-appropriate Screening Schedule:  Refer to the list below for future screening recommendations based on patient's age, sex and/or medical conditions. Orders for these recommended tests are listed in the plan  section. The patient has been provided with a written plan.    Health Maintenance   Topic Date Due   • ZOSTER VACCINE (2 of 2) 10/18/2022   • TDAP/TD VACCINES (2 - Td or Tdap) 08/23/2032   • INFLUENZA VACCINE  Completed                CMS Preventative Services Quick Reference  Risk Factors Identified During Encounter:    Chronic Pain: pt is being prescribed pain medication by Heme/Onc    The above risks/problems have been discussed with the patient.  Pertinent information has been shared with the patient in the After Visit Summary.    There are no diagnoses linked to this encounter.    Follow Up:   Next Medicare Wellness visit to be scheduled in 1 year.      An After Visit Summary and PPPS were made available to the patient.

## 2023-02-15 ENCOUNTER — TELEPHONE (OUTPATIENT)
Dept: GASTROENTEROLOGY | Facility: CLINIC | Age: 68
End: 2023-02-15
Payer: MEDICARE

## 2023-02-15 NOTE — TELEPHONE ENCOUNTER
Attempted to contact pt to reschedule procedure. Left a vm requesting a return call. Pt will need to be rescheduled for a nurse/ma visit.

## 2023-02-17 ENCOUNTER — LAB (OUTPATIENT)
Dept: ONCOLOGY | Facility: HOSPITAL | Age: 68
End: 2023-02-17
Payer: MEDICARE

## 2023-02-17 ENCOUNTER — OFFICE VISIT (OUTPATIENT)
Dept: ONCOLOGY | Facility: HOSPITAL | Age: 68
End: 2023-02-17
Payer: MEDICARE

## 2023-02-17 VITALS
RESPIRATION RATE: 16 BRPM | SYSTOLIC BLOOD PRESSURE: 163 MMHG | OXYGEN SATURATION: 100 % | DIASTOLIC BLOOD PRESSURE: 84 MMHG | TEMPERATURE: 97.1 F | BODY MASS INDEX: 18.48 KG/M2 | WEIGHT: 124.78 LBS | HEART RATE: 63 BPM | HEIGHT: 69 IN

## 2023-02-17 DIAGNOSIS — C61 PROSTATE CANCER: ICD-10-CM

## 2023-02-17 DIAGNOSIS — C61 PROSTATE CANCER METASTATIC TO BONE: ICD-10-CM

## 2023-02-17 DIAGNOSIS — C79.51 PROSTATE CANCER METASTATIC TO BONE: ICD-10-CM

## 2023-02-17 LAB
ALBUMIN SERPL-MCNC: 3.4 G/DL (ref 3.5–5.2)
ALBUMIN/GLOB SERPL: 1.2 G/DL
ALP SERPL-CCNC: 59 U/L (ref 39–117)
ALT SERPL W P-5'-P-CCNC: 6 U/L (ref 1–41)
ANION GAP SERPL CALCULATED.3IONS-SCNC: 6.8 MMOL/L (ref 5–15)
AST SERPL-CCNC: 14 U/L (ref 1–40)
BASOPHILS # BLD AUTO: 0.01 10*3/MM3 (ref 0–0.2)
BASOPHILS NFR BLD AUTO: 0.2 % (ref 0–1.5)
BILIRUB SERPL-MCNC: 0.4 MG/DL (ref 0–1.2)
BUN SERPL-MCNC: 7 MG/DL (ref 8–23)
BUN/CREAT SERPL: 8.6 (ref 7–25)
CALCIUM SPEC-SCNC: 8.6 MG/DL (ref 8.6–10.5)
CHLORIDE SERPL-SCNC: 106 MMOL/L (ref 98–107)
CO2 SERPL-SCNC: 28.2 MMOL/L (ref 22–29)
CREAT SERPL-MCNC: 0.81 MG/DL (ref 0.76–1.27)
DEPRECATED RDW RBC AUTO: 46.6 FL (ref 37–54)
EGFRCR SERPLBLD CKD-EPI 2021: 96.6 ML/MIN/1.73
EOSINOPHIL # BLD AUTO: 0.13 10*3/MM3 (ref 0–0.4)
EOSINOPHIL NFR BLD AUTO: 2 % (ref 0.3–6.2)
ERYTHROCYTE [DISTWIDTH] IN BLOOD BY AUTOMATED COUNT: 14.3 % (ref 12.3–15.4)
GLOBULIN UR ELPH-MCNC: 2.8 GM/DL
GLUCOSE SERPL-MCNC: 96 MG/DL (ref 65–99)
HCT VFR BLD AUTO: 42.3 % (ref 37.5–51)
HGB BLD-MCNC: 14.3 G/DL (ref 13–17.7)
IMM GRANULOCYTES # BLD AUTO: 0.01 10*3/MM3 (ref 0–0.05)
IMM GRANULOCYTES NFR BLD AUTO: 0.2 % (ref 0–0.5)
LYMPHOCYTES # BLD AUTO: 3.2 10*3/MM3 (ref 0.7–3.1)
LYMPHOCYTES NFR BLD AUTO: 48 % (ref 19.6–45.3)
MCH RBC QN AUTO: 29.9 PG (ref 26.6–33)
MCHC RBC AUTO-ENTMCNC: 33.8 G/DL (ref 31.5–35.7)
MCV RBC AUTO: 88.3 FL (ref 79–97)
MONOCYTES # BLD AUTO: 0.35 10*3/MM3 (ref 0.1–0.9)
MONOCYTES NFR BLD AUTO: 5.3 % (ref 5–12)
NEUTROPHILS NFR BLD AUTO: 2.96 10*3/MM3 (ref 1.7–7)
NEUTROPHILS NFR BLD AUTO: 44.3 % (ref 42.7–76)
PLATELET # BLD AUTO: 344 10*3/MM3 (ref 140–450)
PMV BLD AUTO: 9 FL (ref 6–12)
POTASSIUM SERPL-SCNC: 3.2 MMOL/L (ref 3.5–5.2)
PROT SERPL-MCNC: 6.2 G/DL (ref 6–8.5)
PSA SERPL-MCNC: 1.9 NG/ML (ref 0–4)
RBC # BLD AUTO: 4.79 10*6/MM3 (ref 4.14–5.8)
SODIUM SERPL-SCNC: 141 MMOL/L (ref 136–145)
WBC NRBC COR # BLD: 6.66 10*3/MM3 (ref 3.4–10.8)

## 2023-02-17 PROCEDURE — 99214 OFFICE O/P EST MOD 30 MIN: CPT | Performed by: INTERNAL MEDICINE

## 2023-02-17 PROCEDURE — 80053 COMPREHEN METABOLIC PANEL: CPT

## 2023-02-17 PROCEDURE — 85025 COMPLETE CBC W/AUTO DIFF WBC: CPT

## 2023-02-17 PROCEDURE — 36415 COLL VENOUS BLD VENIPUNCTURE: CPT

## 2023-02-17 PROCEDURE — 84153 ASSAY OF PSA TOTAL: CPT

## 2023-02-17 PROCEDURE — G0463 HOSPITAL OUTPT CLINIC VISIT: HCPCS | Performed by: INTERNAL MEDICINE

## 2023-02-17 RX ORDER — HYDROCODONE BITARTRATE AND ACETAMINOPHEN 10; 325 MG/1; MG/1
1 TABLET ORAL EVERY 4 HOURS PRN
Qty: 120 TABLET | Refills: 0 | Status: SHIPPED | OUTPATIENT
Start: 2023-02-17 | End: 2023-03-20 | Stop reason: SDUPTHER

## 2023-02-17 NOTE — PROGRESS NOTES
Patient  Semaj Osborne [place]    Location  [facility] HEMATOLOGY & ONCOLOGY    Chief Complaint  Prostate Cancer    Referring Provider: Maryse Monae MD  PCP: Maryse Monae MD    Subjective          Oncology/Hematology History Overview Note   Mr. Cha is a 65-year-old -American man who was referred to me by Dr. Lepe for evaluation and treatment of prostate cancer.    Patient was initially diagnosed with prostate cancer in 2015.  On 6/4/2015 he underwent radical prostatectomy and LN dissection which showed a Left Hand 4+3 = 7 prostate cancer.  There were several high risk features such as extraprostatic extension, seminal vesicle invasion, positive margins at the bladder neck and right and left seminal vesicle.  Lymph vascular invasion was indeterminate.  Perineural invasion was not commented on. Final pathology [id number] R1.  He was treated with adjuvant radiation by Dr. Stanton.    According to records the patient likely started Lupron in December 2017 when his PSA started to rise.  The patient took a break in August 2019.  He states he was not aware he was to continue.  He restarted Lupron on 11/26/2019 after it was noted that his PSA chago from 0.46 up to 2.55 on 11/8/2019.  Unfortunately his PSA continued to rise after that to 4.54 on 3/10/2020.    PSA summary:  11/8/19 PSA 2.55 (restarted Lupron on 11/26)  8/31/2020 PSA 17.95 (started Zytiga)   Started Zytiga on 8/27/20.  Stopped in November 2020 due to diarrhea and nausea.     Started Xtandi in May 2021 due to intolerance of Zytiga even at the lowest dose       Prostate cancer metastatic to bone (HCC)   12/11/2020 -  Chemotherapy    OP SUPPORTIVE Leuprolide 45 mg Q6M     3/23/2021 -  Chemotherapy    OP SUPPORTIVE Denosumab (Xgeva) Q28D     4/15/2021 -  Chemotherapy    OP PROSTATE Enzalutamide     5/19/2021 Initial Diagnosis    Prostate cancer metastatic to bone (CMS/HCC)     Prostate cancer (HCC)   6/16/2021 Initial Diagnosis     Prostate cancer (HCC)     Secondary malignant neoplasm of bone (HCC)   11/4/2021 Initial Diagnosis    Secondary malignant neoplasm of bone (HCC)     11/17/2021 -  Radiation    RADIATION THERAPY Treatment Details (Noted on 11/4/2021)  Site: Bilateral Spine - Thoracic  Technique: 3D CRT  Goal: No goal specified  Planned Treatment Start Date: 11/17/2021         HPI  Patient comes in today for follow-up.  He is having increasing pain in his right hip.  His most painful with pressure to the lateral side.  He does not have a history of bursitis or any other acute problems.    Review of Systems   Constitutional: Negative for appetite change, diaphoresis, fatigue, fever, unexpected weight gain and unexpected weight loss.   HENT: Negative for hearing loss, mouth sores, sore throat, swollen glands, trouble swallowing and voice change.    Eyes: Negative for blurred vision.   Respiratory: Negative for cough, shortness of breath and wheezing.    Cardiovascular: Negative for chest pain and palpitations.   Gastrointestinal: Negative for abdominal pain, blood in stool, constipation, diarrhea, nausea and vomiting.   Endocrine: Negative for cold intolerance and heat intolerance.   Genitourinary: Negative for difficulty urinating, dysuria, frequency, hematuria and urinary incontinence.   Musculoskeletal: Positive for back pain (6/10) and joint swelling (RT hip pain 6/10). Negative for arthralgias and myalgias.   Skin: Negative for rash, skin lesions and wound.   Neurological: Negative for dizziness, seizures, weakness, numbness and headache.   Hematological: Does not bruise/bleed easily.   Psychiatric/Behavioral: Negative for depressed mood. The patient is not nervous/anxious.        Past Medical History:   Diagnosis Date   • Anemia    • Anxiety    • Blood disease    • Colon polyps    • Depression    • Diverticulitis    • Forgetfulness    • Hepatitis C    • Night sweats    • Numbness and tingling of left lower extremity    • Prostate  "cancer (HCC)      Past Surgical History:   Procedure Laterality Date   • COLONOSCOPY     • PROSTATE BIOPSY     • PROSTATECTOMY      ROBOT ASSISTED W PELVIC LYMPH NODE DISSECTION   • TRANSURETHRAL RESECTION OF BLADDER TUMOR       Social History     Socioeconomic History   • Marital status:    Tobacco Use   • Smoking status: Every Day     Packs/day: 0.25     Years: 42.00     Pack years: 10.50     Types: Cigarettes     Start date: 10/21/1970   • Smokeless tobacco: Never   Vaping Use   • Vaping Use: Never used   Substance and Sexual Activity   • Alcohol use: Yes     Alcohol/week: 6.0 standard drinks     Types: 6 Cans of beer per week   • Drug use: Never   • Sexual activity: Defer     Family History   Problem Relation Age of Onset   • Cancer Mother    • Cancer Father        Objective   Physical Exam  General: Alert, cooperative, no acute distress, thin but generally well-appearing  Eyes: Anicteric sclera, PERRLA  Respiratory: normal respiratory effort  Cardiovascular: no lower extremity edema  Skin: Normal tone, no rash, no lesions  Psychiatric: Appropriate affect, intact judgment  Neurologic: No focal sensory or motor deficits, normal cognition   Musculoskeletal: Normal muscle strength and tone, pain with palpation to the lateral side of the right hip  Extremities: No clubbing, cyanosis, or deformities    Vitals:    02/17/23 0931   BP: 163/84   Pulse: 63   Resp: 16   Temp: 97.1 °F (36.2 °C)   SpO2: 100%   Weight: 56.6 kg (124 lb 12.5 oz)   Height: 176.5 cm (69.49\")   PainSc:   6   PainLoc: Back  Comment: BACK AND rt HIP     ECOG score: 0         PHQ-9 Total Score:         Result Review :   The following data was reviewed by: Natalya Jack MD PhD on 02/17/2023:  Lab Results   Component Value Date    HGB 14.3 02/17/2023    HCT 42.3 02/17/2023    MCV 88.3 02/17/2023     02/17/2023    WBC 6.66 02/17/2023    NEUTROABS 2.96 02/17/2023    LYMPHSABS 3.20 (H) 02/17/2023    MONOSABS 0.35 02/17/2023    EOSABS " 0.13 02/17/2023    BASOSABS 0.01 02/17/2023     Lab Results   Component Value Date    GLUCOSE 96 02/17/2023    BUN 7 (L) 02/17/2023    CREATININE 0.81 02/17/2023     02/17/2023    K 3.2 (L) 02/17/2023     02/17/2023    CO2 28.2 02/17/2023    CALCIUM 8.6 02/17/2023    PROTEINTOT 6.2 02/17/2023    ALBUMIN 3.4 (L) 02/17/2023    BILITOT 0.4 02/17/2023    ALKPHOS 59 02/17/2023    AST 14 02/17/2023    ALT 6 02/17/2023          Assessment and Plan    Diagnoses and all orders for this visit:    1. Prostate cancer metastatic to bone (HCC)  -     HYDROcodone-acetaminophen (NORCO)  MG per tablet; Take 1 tablet by mouth Every 4 (Four) Hours As Needed for Moderate Pain.  Dispense: 120 tablet; Refill: 0        Prostate cancer with metastasis to bone: Patient is currently on Xtandi and tolerating it well.  I reviewed his CBC and CMP and there is no evidence of significant toxicity.  His PSA is slightly increased from 1.34 to 1.9 in 2 months. That is concerning since he has a doubling time of 4 months. I will send him for a CT CAP with contrast and bone scan.    Bone metastases: q3mo Xgeva.  We will hold Xgeva today due to total calcium-8.1.  Recommend calcium and vitamin D daily.    Right lateral Hip Pain:  The pain is consistent with bursitis.  However, given his history of prostate cancer to the bones, it is also concerning for bone metastasis.  I will get the above imaging.     Patient was given instructions and counseling regarding his condition or for health maintenance advice. Please see specific information pulled into the AVS if appropriate.

## 2023-03-20 DIAGNOSIS — C79.51 PROSTATE CANCER METASTATIC TO BONE: ICD-10-CM

## 2023-03-20 DIAGNOSIS — C61 PROSTATE CANCER METASTATIC TO BONE: ICD-10-CM

## 2023-03-20 RX ORDER — HYDROCODONE BITARTRATE AND ACETAMINOPHEN 10; 325 MG/1; MG/1
1 TABLET ORAL EVERY 4 HOURS PRN
Qty: 120 TABLET | Refills: 0 | Status: SHIPPED | OUTPATIENT
Start: 2023-03-20

## 2023-03-20 NOTE — TELEPHONE ENCOUNTER
Caller: Semaj Medina Osborne    Relationship: Self    Best call back number: [phone]    Requested Prescriptions:   Requested Prescriptions     Pending Prescriptions Disp Refills   • HYDROcodone-acetaminophen (NORCO)  MG per tablet 120 tablet 0     Sig: Take 1 tablet by mouth Every 4 (Four) Hours As Needed for Moderate Pain.        Pharmacy where request should be sent:    [id number] - [place], [place] - [address] [facility] AT [facility] OF [address]/[facility] & [place] - [phone] [facility] [phone]   [phone]    Does the patient have less than a 3 day supply:  [x] Yes  [] No    Would you like a call back once the refill request has been completed: [x] Yes [] No    If the office needs to give you a call back, can they leave a voicemail: [x] Yes [] No    Juliana Santana   03/20/23 08:22 EDT

## 2023-03-23 NOTE — TELEPHONE ENCOUNTER
Advised patient prescription is not due for refill yet. Will request refill next week when it is due.   
Caller: Semaj Medina Osborne    Relationship: Self    Best call back number: [phone]    Requested Prescriptions:   HYDROCODONE  MG  Pharmacy where request should be sent:    [id number] - [place], [place] - [address] [facility] AT [facility] OF [address] & [place] - [phone] [facility] [phone] FX  P: [phone]      Does the patient have less than a 3 day supply:  [x] Yes  [] No    Nena Marcum Rep   11/15/22 09:49 EST           
present

## 2023-04-11 ENCOUNTER — TELEPHONE (OUTPATIENT)
Dept: ONCOLOGY | Facility: HOSPITAL | Age: 68
End: 2023-04-11

## 2023-04-17 ENCOUNTER — HOSPITAL ENCOUNTER (OUTPATIENT)
Dept: NUCLEAR MEDICINE | Facility: HOSPITAL | Age: 68
Discharge: HOME | End: 2023-04-17
Payer: MEDICARE

## 2023-04-17 ENCOUNTER — HOSPITAL ENCOUNTER (OUTPATIENT)
Dept: CT IMAGING | Facility: HOSPITAL | Age: 68
Discharge: HOME | End: 2023-04-17
Payer: MEDICARE

## 2023-04-17 DIAGNOSIS — C61 PROSTATE CANCER METASTATIC TO BONE: ICD-10-CM

## 2023-04-17 DIAGNOSIS — C79.51 PROSTATE CANCER METASTATIC TO BONE: ICD-10-CM

## 2023-04-17 LAB
CREAT BLDA-MCNC: 1.1 MG/DL
EGFRCR SERPLBLD CKD-EPI 2021: 73.6 ML/MIN/1.73

## 2023-04-17 PROCEDURE — 71260 CT THORAX DX C+: CPT

## 2023-04-17 PROCEDURE — 78306 BONE IMAGING WHOLE BODY: CPT

## 2023-04-17 PROCEDURE — 74177 CT ABD & PELVIS W/CONTRAST: CPT

## 2023-04-17 PROCEDURE — 82565 ASSAY OF CREATININE: CPT

## 2023-04-17 PROCEDURE — 25510000001 IOPAMIDOL PER 1 ML: Performed by: INTERNAL MEDICINE

## 2023-04-17 PROCEDURE — 0 TECHNETIUM MEDRONATE KIT: Performed by: INTERNAL MEDICINE

## 2023-04-17 PROCEDURE — A9503 TC99M MEDRONATE: HCPCS | Performed by: INTERNAL MEDICINE

## 2023-04-17 RX ORDER — TC 99M MEDRONATE 20 MG/10ML
21.5 INJECTION, POWDER, LYOPHILIZED, FOR SOLUTION INTRAVENOUS
Status: COMPLETED | OUTPATIENT
Start: 2023-04-17 | End: 2023-04-17

## 2023-04-17 RX ADMIN — IOPAMIDOL 100 ML: 755 INJECTION, SOLUTION INTRAVENOUS at 09:56

## 2023-04-17 RX ADMIN — TC 99M MEDRONATE 21.5 MILLICURIE: 20 INJECTION, POWDER, LYOPHILIZED, FOR SOLUTION INTRAVENOUS at 09:30

## 2023-04-18 ENCOUNTER — OFFICE VISIT (OUTPATIENT)
Dept: ONCOLOGY | Facility: HOSPITAL | Age: 68
End: 2023-04-18
Payer: MEDICARE

## 2023-04-18 ENCOUNTER — LAB (OUTPATIENT)
Dept: ONCOLOGY | Facility: HOSPITAL | Age: 68
End: 2023-04-18
Payer: MEDICARE

## 2023-04-18 VITALS
HEART RATE: 60 BPM | RESPIRATION RATE: 16 BRPM | TEMPERATURE: 98.2 F | BODY MASS INDEX: 18.61 KG/M2 | WEIGHT: 125.66 LBS | OXYGEN SATURATION: 100 % | HEIGHT: 69 IN | DIASTOLIC BLOOD PRESSURE: 88 MMHG | SYSTOLIC BLOOD PRESSURE: 154 MMHG

## 2023-04-18 DIAGNOSIS — C79.51 PROSTATE CANCER METASTATIC TO BONE: ICD-10-CM

## 2023-04-18 DIAGNOSIS — C79.51 PROSTATE CANCER METASTATIC TO BONE: Primary | ICD-10-CM

## 2023-04-18 DIAGNOSIS — C61 PROSTATE CANCER METASTATIC TO BONE: ICD-10-CM

## 2023-04-18 DIAGNOSIS — C61 PROSTATE CANCER METASTATIC TO BONE: Primary | ICD-10-CM

## 2023-04-18 LAB
ALBUMIN SERPL-MCNC: 3.7 G/DL (ref 3.5–5.2)
ALBUMIN/GLOB SERPL: 1.4 G/DL
ALP SERPL-CCNC: 63 U/L (ref 39–117)
ALT SERPL W P-5'-P-CCNC: 6 U/L (ref 1–41)
ANION GAP SERPL CALCULATED.3IONS-SCNC: 7.2 MMOL/L (ref 5–15)
AST SERPL-CCNC: 13 U/L (ref 1–40)
BASOPHILS # BLD AUTO: 0.03 10*3/MM3 (ref 0–0.2)
BASOPHILS NFR BLD AUTO: 0.5 % (ref 0–1.5)
BILIRUB SERPL-MCNC: <0.2 MG/DL (ref 0–1.2)
BUN SERPL-MCNC: 9 MG/DL (ref 8–23)
BUN/CREAT SERPL: 9.3 (ref 7–25)
CALCIUM SPEC-SCNC: 9.7 MG/DL (ref 8.6–10.5)
CHLORIDE SERPL-SCNC: 105 MMOL/L (ref 98–107)
CO2 SERPL-SCNC: 27.8 MMOL/L (ref 22–29)
CREAT SERPL-MCNC: 0.97 MG/DL (ref 0.76–1.27)
DEPRECATED RDW RBC AUTO: 43.2 FL (ref 37–54)
EGFRCR SERPLBLD CKD-EPI 2021: 85.6 ML/MIN/1.73
EOSINOPHIL # BLD AUTO: 0.17 10*3/MM3 (ref 0–0.4)
EOSINOPHIL NFR BLD AUTO: 2.8 % (ref 0.3–6.2)
ERYTHROCYTE [DISTWIDTH] IN BLOOD BY AUTOMATED COUNT: 13.8 % (ref 12.3–15.4)
GLOBULIN UR ELPH-MCNC: 2.7 GM/DL
GLUCOSE SERPL-MCNC: 91 MG/DL (ref 65–99)
HCT VFR BLD AUTO: 42 % (ref 37.5–51)
HGB BLD-MCNC: 14.6 G/DL (ref 13–17.7)
IMM GRANULOCYTES # BLD AUTO: 0.02 10*3/MM3 (ref 0–0.05)
IMM GRANULOCYTES NFR BLD AUTO: 0.3 % (ref 0–0.5)
LYMPHOCYTES # BLD AUTO: 1.69 10*3/MM3 (ref 0.7–3.1)
LYMPHOCYTES NFR BLD AUTO: 28.1 % (ref 19.6–45.3)
MCH RBC QN AUTO: 29.7 PG (ref 26.6–33)
MCHC RBC AUTO-ENTMCNC: 34.8 G/DL (ref 31.5–35.7)
MCV RBC AUTO: 85.5 FL (ref 79–97)
MONOCYTES # BLD AUTO: 0.4 10*3/MM3 (ref 0.1–0.9)
MONOCYTES NFR BLD AUTO: 6.6 % (ref 5–12)
NEUTROPHILS NFR BLD AUTO: 3.71 10*3/MM3 (ref 1.7–7)
NEUTROPHILS NFR BLD AUTO: 61.7 % (ref 42.7–76)
PLATELET # BLD AUTO: 341 10*3/MM3 (ref 140–450)
PMV BLD AUTO: 9.2 FL (ref 6–12)
POTASSIUM SERPL-SCNC: 3.8 MMOL/L (ref 3.5–5.2)
PROT SERPL-MCNC: 6.4 G/DL (ref 6–8.5)
PSA SERPL-MCNC: 2.85 NG/ML (ref 0–4)
RBC # BLD AUTO: 4.91 10*6/MM3 (ref 4.14–5.8)
SODIUM SERPL-SCNC: 140 MMOL/L (ref 136–145)
WBC NRBC COR # BLD: 6.02 10*3/MM3 (ref 3.4–10.8)

## 2023-04-18 PROCEDURE — G0463 HOSPITAL OUTPT CLINIC VISIT: HCPCS | Performed by: INTERNAL MEDICINE

## 2023-04-18 PROCEDURE — 84153 ASSAY OF PSA TOTAL: CPT

## 2023-04-18 PROCEDURE — 85025 COMPLETE CBC W/AUTO DIFF WBC: CPT

## 2023-04-18 PROCEDURE — 36415 COLL VENOUS BLD VENIPUNCTURE: CPT

## 2023-04-18 PROCEDURE — 80053 COMPREHEN METABOLIC PANEL: CPT

## 2023-04-18 RX ORDER — IBUPROFEN 800 MG/1
TABLET ORAL
COMMUNITY
Start: 2023-03-01

## 2023-04-18 NOTE — PROGRESS NOTES
Patient  Semaj Osborne Cyril    Location  [facility] HEMATOLOGY & ONCOLOGY    Chief Complaint  Prostate cancer metastatic to bone    Referring Provider: No Known Provider  PCP: Maryse Monae MD    Subjective     {Problem List  Visit Diagnosis   Encounters  Notes  Medications  Labs  Result Review Imaging  Media :23}     Oncology/Hematology History Overview Note   Mr. Cha is a 65-year-old -American man who was referred to me by Dr. Lepe for evaluation and treatment of prostate cancer.    Patient was initially diagnosed with prostate cancer in 2015.  On 6/4/2015 he underwent radical prostatectomy and LN dissection which showed a Zach 4+3 = 7 prostate cancer.  There were several high risk features such as extraprostatic extension, seminal vesicle invasion, positive margins at the bladder neck and right and left seminal vesicle.  Lymph vascular invasion was indeterminate.  Perineural invasion was not commented on. Final pathology [id number] R1.  He was treated with adjuvant radiation by Dr. Stanton.    According to records the patient likely started Lupron in December 2017 when his PSA started to rise.  The patient took a break in August 2019.  He states he was not aware he was to continue.  He restarted Lupron on 11/26/2019 after it was noted that his PSA chago from 0.46 up to 2.55 on 11/8/2019.  Unfortunately his PSA continued to rise after that to 4.54 on 3/10/2020.    PSA summary:  11/8/19 PSA 2.55 (restarted Lupron on 11/26)  8/31/2020 PSA 17.95 (started Zytiga)   Started Zytiga on 8/27/20.  Stopped in November 2020 due to diarrhea and nausea.     Started Xtandi in May 2021 due to intolerance of Zytiga even at the lowest dose       Prostate cancer metastatic to bone   12/11/2020 -  Chemotherapy    OP SUPPORTIVE Leuprolide 45 mg Q6M     3/23/2021 -  Chemotherapy    OP SUPPORTIVE Denosumab (Xgeva) Q28D     4/15/2021 -  Chemotherapy    OP PROSTATE Enzalutamide     5/19/2021  Initial Diagnosis    Prostate cancer metastatic to bone (CMS/HCC)     Prostate cancer   6/16/2021 Initial Diagnosis    Prostate cancer (HCC)     Secondary malignant neoplasm of bone   11/4/2021 Initial Diagnosis    Secondary malignant neoplasm of bone (HCC)     11/17/2021 -  Radiation    RADIATION THERAPY Treatment Details (Noted on 11/4/2021)  Site: Bilateral Spine - Thoracic  Technique: 3D CRT  Goal: No goal specified  Planned Treatment Start Date: 11/17/2021         HPI  ***    Review of Systems   Constitutional: Negative for appetite change, diaphoresis, fatigue, fever, unexpected weight gain and unexpected weight loss.   HENT: Negative for hearing loss, mouth sores, sore throat, swollen glands, trouble swallowing and voice change.    Eyes: Negative for blurred vision.   Respiratory: Negative for cough, shortness of breath and wheezing.    Cardiovascular: Negative for chest pain and palpitations.   Gastrointestinal: Negative for abdominal pain, blood in stool, constipation, diarrhea, nausea and vomiting.   Endocrine: Negative for cold intolerance and heat intolerance.   Genitourinary: Negative for difficulty urinating, dysuria, frequency, hematuria and urinary incontinence.   Musculoskeletal: Positive for arthralgias (RT hip 4/10) and back pain (4/10). Negative for joint swelling and myalgias.   Skin: Negative for rash, skin lesions and wound.   Neurological: Negative for dizziness, seizures, weakness, numbness and headache.   Hematological: Does not bruise/bleed easily.   Psychiatric/Behavioral: Negative for depressed mood. The patient is not nervous/anxious.    All other systems reviewed and are negative.      Past Medical History:   Diagnosis Date   • Anemia    • Anxiety    • Blood disease    • Colon polyps    • Depression    • Diverticulitis    • Forgetfulness    • Hepatitis C    • Night sweats    • Numbness and tingling of left lower extremity    • Prostate cancer      Past Surgical History:   Procedure  Laterality Date   • COLONOSCOPY     • PROSTATE BIOPSY     • PROSTATECTOMY      ROBOT ASSISTED W PELVIC LYMPH NODE DISSECTION   • TRANSURETHRAL RESECTION OF BLADDER TUMOR       Social History     Socioeconomic History   • Marital status:    Tobacco Use   • Smoking status: Every Day     Packs/day: 0.25     Years: 42.00     Pack years: 10.50     Types: Cigarettes     Start date: 10/21/1970   • Smokeless tobacco: Never   Vaping Use   • Vaping Use: Never used   Substance and Sexual Activity   • Alcohol use: Yes     Alcohol/week: 6.0 standard drinks     Types: 6 Cans of beer per week   • Drug use: Never   • Sexual activity: Defer     Family History   Problem Relation Age of Onset   • Cancer Mother    • Cancer Father        Objective   Physical Exam  ***    There were no vitals filed for this visit.            PHQ-9 Total Score:         Result Review :{Labs  Result Review  Imaging  Med Tab  Media  Procedures :23}   The following data was reviewed by: Tara Aldrich MA on 04/18/2023:  Lab Results   Component Value Date    HGB 14.3 02/17/2023    HCT 42.3 02/17/2023    MCV 88.3 02/17/2023     02/17/2023    WBC 6.66 02/17/2023    NEUTROABS 2.96 02/17/2023    LYMPHSABS 3.20 (H) 02/17/2023    MONOSABS 0.35 02/17/2023    EOSABS 0.13 02/17/2023    BASOSABS 0.01 02/17/2023     Lab Results   Component Value Date    GLUCOSE 96 02/17/2023    BUN 7 (L) 02/17/2023    CREATININE 1.10 04/17/2023     02/17/2023    K 3.2 (L) 02/17/2023     02/17/2023    CO2 28.2 02/17/2023    CALCIUM 8.6 02/17/2023    PROTEINTOT 6.2 02/17/2023    ALBUMIN 3.4 (L) 02/17/2023    BILITOT 0.4 02/17/2023    ALKPHOS 59 02/17/2023    AST 14 02/17/2023    ALT 6 02/17/2023          Assessment and Plan {CC Problem List  Visit Diagnosis   ROS  Review (Popup)  Health Maintenance  Quality  BestPractice  Medications  SmartSets  SnapShot Encounters  Media :23}   Diagnoses and all orders for this visit:    1. Prostate cancer  "metastatic to bone (Primary)  -     CT chest w contrast; Future  -     CT abdomen pelvis w contrast; Future  -     CBC & Differential; Future  -     Comprehensive Metabolic Panel; Future  -     PSA DIAGNOSTIC; Future  -     NM Bone Scan Whole Body; Future  -     PSA; Future    Other orders  -     leuprolide (LUPRON) injection 45 mg          Patient was given instructions and counseling regarding his condition or for health maintenance advice. Please see specific information pulled into the AVS if appropriate.       Transcribed from ambient dictation for Natalya Jack MD PhD by Tara Aldrich MA.  04/18/23   09:47 EDT    {JOHANNE Provider Statement:79630::\"Patient or patient representative verbalized consent to the visit recording.\",\"I have personally performed the services described in this document as transcribed by the above individual, and it is both accurate and complete.\"}      " Xgeva with daily calcium and vitamin D.    Cancer-related pain: I am referring the patient back to Dr. Arita and radiation oncology.  I will also refill his pain medication as needed.      Patient was given instructions and counseling regarding his condition or for health maintenance advice. Please see specific information pulled into the AVS if appropriate.

## 2023-04-19 RX ORDER — HYDROCODONE BITARTRATE AND ACETAMINOPHEN 10; 325 MG/1; MG/1
1 TABLET ORAL EVERY 4 HOURS PRN
Qty: 120 TABLET | Refills: 0 | Status: SHIPPED | OUTPATIENT
Start: 2023-04-19 | End: 2023-05-17 | Stop reason: SDUPTHER

## 2023-04-25 ENCOUNTER — OFFICE VISIT (OUTPATIENT)
Dept: RADIATION ONCOLOGY | Facility: HOSPITAL | Age: 68
End: 2023-04-25
Payer: MEDICARE

## 2023-04-25 VITALS
RESPIRATION RATE: 16 BRPM | BODY MASS INDEX: 18.39 KG/M2 | OXYGEN SATURATION: 100 % | WEIGHT: 126.32 LBS | DIASTOLIC BLOOD PRESSURE: 88 MMHG | HEART RATE: 74 BPM | SYSTOLIC BLOOD PRESSURE: 146 MMHG | TEMPERATURE: 98.3 F

## 2023-04-25 DIAGNOSIS — C79.51 PROSTATE CANCER METASTATIC TO BONE: ICD-10-CM

## 2023-04-25 DIAGNOSIS — C61 PROSTATE CANCER METASTATIC TO BONE: ICD-10-CM

## 2023-04-25 PROCEDURE — G0463 HOSPITAL OUTPT CLINIC VISIT: HCPCS | Performed by: RADIOLOGY

## 2023-04-25 RX ORDER — THIAMINE HCL 100 MG
1 TABLET ORAL DAILY
COMMUNITY

## 2023-04-25 NOTE — PROGRESS NOTES
Follow Up Office Visit      Encounter Date: 04/25/2023   Patient Name: Semaj Medina  YOB: 1955   Medical Record Number: [id number]   Primary Diagnosis: No primary diagnosis found.   Cancer Staging: Cancer Staging   No matching staging information was found for the patient.              Cancer Staging   No matching staging information was found for the patient.        Chief Complaint:    Chief Complaint   Patient presents with   • Follow-up   • Prostate Cancer       Oncologic History: Semaj Medina is a 67 y.o. male here for follow up regarding his metastatic prostate cancer.      He was originally diagnosed in 2015 and his prostate cancer was managed surgically.  He was found to have pT3b pN0 4+3 disease.  He had positive margins at the bladder neck.  He received adjuvant radiation therapy with Dr. Stanton [facility].  In 2017, his PSA began to rise and he was started on Lupron.  He took a brief holiday.  In 2020 he was started on Zytiga.  He tolerated this poorly and his dose was reduced multiple times.   Restaging imaging in 2021 demonstrated a T0 lesion, which causing pain that limited his function. This was treated palliatively with radiation - 30 Gy/10 fractions, 12/2021    Interval History: He has been getting Lupron and Xtandi  PSA has recently been increasing   6/2022- .593  9/2022 - .697  12/6/22 - 1.3  2/7/23- 1.9   4/18/23 - 2.8     Persistent pain at T10 lesion but controlled with medication and not limiting function  Pain in right hip, pt reports stable/chronic  No other pain    Prior Radiation: adjuvant prostate radiation 2015 ([facility]), T9-11 30 Gy/10 fractions, completed 12/9/21.       Subjective      Review of Systems: Review of Systems   Constitutional: Positive for appetite change (Comes and goes, improving) and fatigue (4/10). Negative for unexpected weight change.   HENT: Positive for hearing loss (Hearing aides  ). Negative for  sore throat and trouble swallowing.    Eyes: Positive for visual disturbance (Glasses, poor vision in right eye, ongoing).   Respiratory: Negative for cough and shortness of breath.    Cardiovascular: Negative for chest pain, palpitations and leg swelling.        Patient states he will occasionally have tachycardia     Gastrointestinal: Negative for blood in stool, constipation, diarrhea, nausea and vomiting.   Genitourinary: Negative for difficulty urinating, dysuria, frequency, hematuria and urgency.        Noc x1  Normal stream  No leakage     Musculoskeletal: Positive for arthralgias (Right hip pain, ongoing for several months.) and back pain (Lower back, ongoing). Negative for gait problem and joint swelling.   Skin: Negative for color change and rash.   Neurological: Positive for headaches (Occasionally, usually resolves on own. ). Negative for dizziness and weakness.   Psychiatric/Behavioral: Negative for sleep disturbance.       The following portions of the patient's history were reviewed and updated as appropriate: allergies, current medications, past family history, past medical history, past social history, past surgical history and problem list.    Measures:   Pain: (on a scale of 0-10)   Pain Score    04/25/23 1344   PainSc:   5   PainLoc: Comment: low back and hip       Advanced Care Plan: N  Advance Care Planning   ACP discussion was declined by the patient. Patient does not have an advance directive, declines further assistance.       KPS/Quality of Life: 80 - Restricted Physical Activity  ECOG: (1) Restricted in physically strenuous activity, ambulatory and able to do work of light nature  Depression Screening:   Patient screened positive for depression based on a PHQ-9 score of 4 on 4/25/2023. Follow-up recommendations include: Elevated PHQ score reflective of acute illness, not depression.        Objective     Physical Exam:   Vital Signs:   Vitals:    04/25/23 1344   BP: 146/88  Comment: pt has  high bp, didnt take meds today, obtained manually   Pulse: 74   Resp: 16   Temp: 98.3 °F (36.8 °C)   TempSrc: Temporal   SpO2: 100%   Weight: 57.3 kg (126 lb 5.2 oz)   PainSc:   5   PainLoc: Comment: low back and hip     Body mass index is 18.39 kg/m².     Constitutional: No acute distress, sitting comfortably  Eye: EOMI, anicteric sclerae  HENT: NC/AT, MMM   Respiratory: Symmetric expansion, nonlabored respiration  MSK: ROM intact in all four extremities, no obvious deformities  Neuro: Alert, oriented x3, CN3-12 grossly intact.   Psych: Appropriate mood and affect.    Results:   Imaging:     Bone Scan 4/17/23  1. Stable abnormal uptake within the T10 vertebral body consistent with known metastatic disease.  2. Patchy uptake in the cervical spine that is most likely degenerative in origin.    CT Chest 4/17/23  FINDINGS:          Large sclerotic lesion in the T10 vertebral body appears stable.  Smaller sclerotic lesions in the   T5 vertebral body/pedicle, T8 vertebral body, left first rib, right second rib appear stable.    There is a new sclerotic lesion in the right T8 pedicle, as well as new tiny sclerotic lesions in   T11 vertebral body (sagittal image 106) and T12 vertebral body (sagittal image 99).     Mildly enlarged mediastinal lymph node adjacent to the descending thoracic aorta on axial image 113   is stable in size at 10 x 8 mm, previously 10 x 9 mm. No hilar or axillary lymphadenopathy is seen.    4 cm aneurysmal dilatation of the ascending thoracic aorta is stable.  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 There is no pleural or pericardial effusion.  Advanced   centrilobular emphysematous changes are upper lobe predominant.  No suspicious lung nodules are   seen.    CT Abd/Pelvis 4/17/23  IMPRESSION:  1. Stable sclerotic lesions in the left iliac bone.  No new metastatic disease is identified in the   abdomen or pelvis.  2. Stable diffuse wall thickening of the urinary  bladder.  3. New eccentric mural thrombus in the lower thoracic aorta and abdominal aorta.         Assessment / Plan        Assessment/Plan:     Diagnoses and all orders for this visit:    1. Prostate cancer metastatic to bone      Semaj Medina is a pleasant 67 y.o. male with a known history of metastatic prostate cancer, who has most recently been on lupron and enzalutamide with PSA continuing to rise. Systemic therapy is managed by Dr. Jack, medical oncology.  Given concern about his inability to tolerate next line systemic agents, she has referred him to discuss radiation treatment, specifically radiopharmaceuticals. He has a PSMA - PET scheduled next week. I will see him back upon completion to review results and ensure he is a candidate for treatment    Follow Up:   Return in about 2 weeks (around 5/9/2023) for Office Visit.        Time:   I spent 35 minutes on this encounter today, 04/27/23. Activities that took place during this time include:   - preparing to see the patient  - obtaining and reviewing separately obtained history  - performing a medically appropriate examination and evaluation  - counseling and educating the patient  - ordering medications/tests/procedures  - communicating with other healthcare providers  - documenting clinical information in the health record  - coordinating care for this patient.     Sincerely,        Jessica Arita MD  Radiation Oncology  [facility]    This document has been signed by Jessica Arita MD on April 27, 2023 08:38 EDT           NOTICE TO PATIENTS  At [facility], we believe that sharing information builds trust and better relationships. You are receiving this note because you recently visited [facility]. It is possible you will see health information before a provider has talked with you about it. This kind of information can be easy to misunderstand. To help you fully understand what it means for your health, we urge you to discuss this  note with your provider.

## 2023-05-01 DIAGNOSIS — C61 PROSTATE CANCER METASTATIC TO BONE: Primary | ICD-10-CM

## 2023-05-01 DIAGNOSIS — C79.51 PROSTATE CANCER METASTATIC TO BONE: Primary | ICD-10-CM

## 2023-05-01 DIAGNOSIS — R97.21 INCREASING PROSTATE SPECIFIC ANTIGEN (PSA) LEVEL AFTER TREATMENT FOR MALIGNANT NEOPLASM OF PROSTATE: ICD-10-CM

## 2023-05-02 ENCOUNTER — HOSPITAL ENCOUNTER (OUTPATIENT)
Dept: PET IMAGING | Facility: HOSPITAL | Age: 68
Discharge: HOME | End: 2023-05-02
Payer: MEDICARE

## 2023-05-02 DIAGNOSIS — C79.51 PROSTATE CANCER METASTATIC TO BONE: ICD-10-CM

## 2023-05-02 DIAGNOSIS — R97.21 INCREASING PROSTATE SPECIFIC ANTIGEN (PSA) LEVEL AFTER TREATMENT FOR MALIGNANT NEOPLASM OF PROSTATE: ICD-10-CM

## 2023-05-02 DIAGNOSIS — C61 PROSTATE CANCER METASTATIC TO BONE: ICD-10-CM

## 2023-05-02 PROCEDURE — A9595 PIFLUFOLASTAT F 18 9 MCI SOLUTION PREFILLED SYRINGE: HCPCS | Performed by: INTERNAL MEDICINE

## 2023-05-02 PROCEDURE — 0 PIFLUFOLASTAT F 18 9 MCI SOLUTION PREFILLED SYRINGE: Performed by: INTERNAL MEDICINE

## 2023-05-02 PROCEDURE — 78815 PET IMAGE W/CT SKULL-THIGH: CPT

## 2023-05-02 RX ADMIN — PIFLUFOLASTAT F-18 1 DOSE: 80 INJECTION INTRAVENOUS at 13:18

## 2023-05-04 ENCOUNTER — OFFICE VISIT (OUTPATIENT)
Dept: ONCOLOGY | Facility: HOSPITAL | Age: 68
End: 2023-05-04
Payer: MEDICARE

## 2023-05-04 VITALS
HEIGHT: 69 IN | SYSTOLIC BLOOD PRESSURE: 153 MMHG | HEART RATE: 66 BPM | RESPIRATION RATE: 16 BRPM | BODY MASS INDEX: 18.71 KG/M2 | TEMPERATURE: 98.2 F | OXYGEN SATURATION: 100 % | WEIGHT: 126.32 LBS | DIASTOLIC BLOOD PRESSURE: 89 MMHG

## 2023-05-04 DIAGNOSIS — C61 PROSTATE CANCER: ICD-10-CM

## 2023-05-04 PROCEDURE — G0463 HOSPITAL OUTPT CLINIC VISIT: HCPCS | Performed by: INTERNAL MEDICINE

## 2023-05-04 NOTE — PROGRESS NOTES
Patient  Semaj Osborne [place]    Location  [facility] HEMATOLOGY & ONCOLOGY    Chief Complaint  Prostate Cancer    Referring Provider: Maryse Monae MD  PCP: Maryse Monae MD    Subjective          Oncology/Hematology History Overview Note   Mr. Cha is a 65-year-old -American man who was referred to me by Dr. Lepe for evaluation and treatment of prostate cancer.    Patient was initially diagnosed with prostate cancer in 2015.  On 6/4/2015 he underwent radical prostatectomy and LN dissection which showed a [place] 4+3 = 7 prostate cancer.  There were several high risk features such as extraprostatic extension, seminal vesicle invasion, positive margins at the bladder neck and right and left seminal vesicle.  Lymph vascular invasion was indeterminate.  Perineural invasion was not commented on. Final pathology [id number] R1.  He was treated with adjuvant radiation by Dr. Stanton.    According to records the patient likely started Lupron in December 2017 when his PSA started to rise.  The patient took a break in August 2019.  He states he was not aware he was to continue.  He restarted Lupron on 11/26/2019 after it was noted that his PSA chago from 0.46 up to 2.55 on 11/8/2019.  Unfortunately his PSA continued to rise after that to 4.54 on 3/10/2020.    PSA summary:  11/8/19 PSA 2.55 (restarted Lupron on 11/26)  8/31/2020 PSA 17.95 (started Zytiga)   Started Zytiga on 8/27/20.  Stopped in November 2020 due to diarrhea and nausea.     Started Xtandi in May 2021 due to intolerance of Zytiga even at the lowest dose       Prostate cancer metastatic to bone   12/11/2020 -  Chemotherapy    OP SUPPORTIVE Leuprolide 45 mg Q6M     3/23/2021 -  Chemotherapy    OP SUPPORTIVE Denosumab (Xgeva) Q28D     4/15/2021 -  Chemotherapy    OP PROSTATE Enzalutamide     5/19/2021 Initial Diagnosis    Prostate cancer metastatic to bone (CMS/HCC)     Prostate cancer   6/16/2021 Initial Diagnosis    Prostate  cancer (HCC)     Secondary malignant neoplasm of bone   11/4/2021 Initial Diagnosis    Secondary malignant neoplasm of bone (HCC)     11/17/2021 -  Radiation    RADIATION THERAPY Treatment Details (Noted on 11/4/2021)  Site: Bilateral Spine - Thoracic  Technique: 3D CRT  Goal: No goal specified  Planned Treatment Start Date: 11/17/2021         History of Present Illness  Patient comes in today for follow-up after PSMA PET.  He complains of some dull aching pain in his right posterior hip.  Most significantly he has sharp shooting pains in his left hip or lower back that occur a few times a day but only last for a few seconds.  He also has a chronic low back pain in his spine which she has had for a long time and is unchanged.  Other than the pain complaints he has no new issues or concerns.    NM PET/CT Skull Base to Mid Thigh    Result Date: 5/3/2023  F-18 PYLARIFY PET/CT OF THE NECK, CHEST, ABDOMEN AND PELVIS  HISTORY: Prostate cancer status post prostatectomy with rising PSA  TECHNIQUE:   8.8 mCi of F-18 piflufolastat was injected. Approximately 60-90 minutes later, PET images of the neck, chest, abdomen and pelvis were obtained. CT images were acquired for attenuation correction and anatomic localization.  PSA level: 2.9 ng/ml April 2023  COMPARISON: CT chest, abdomen and pelvis and skeletal scintigraphy 04/17/2023  FINDINGS: Evaluation of the head and neck is mildly suboptimal due to misregistration artifact. The thyroid, submandibular and parotid glands demonstrate roughly symmetric uptake. Thoracic adenopathy demonstrating intense uptake is present with index nodes as below: *  Supra clavicular node measuring 0.4 cm in short axis dimension with a max SUV of 8.1. *  Posterior mediastinal node 1 posterior aspect of the midesophagus measuring 0.6 cm in short axis measurement with a max SUV of 1  No pulmonary consolidation, pleural effusion or pneumothorax. Sub-6 mm pulmonary nodules present within the left base.   Intensely avid abdominal adenopathy is present with index nodes as below: *   Node along the left aspect of the superior mesenteric artery origin measuring 0.6 cm in short axis dimension with a max SUV of 6.5. *  Gastrohepatic node measuring 0.8 cm in short axis dimension with max SUV 35  There is colonic diverticulosis. No focal suspicious uptake is present within the liver, gallbladder, pancreas, adrenal glands, spleen or kidneys. A few scattered indeterminate hypodense lesions are present throughout the liver measuring up to 2 cm within the right hepatic lobe, better seen on recent multiphase contrast CT abdomen 04/17/2023. There is no hydronephrosis. No free intraperitoneal air is seen. Prostate is surgically absent. No focal area of increased uptake separate from presumed urinary excretion is seen within the operative bed.  For the purposes of this dictation, last well-formed disc space will be referred to as L5-S1. Focal sclerotic lesion within the T10 vertebral body demonstrating radiotracer uptake demonstrates a max SUV of 3.5. Asymmetric sclerosis is present within the superior lateral aspect of the left orbit which does not demonstrate uptake significantly above that of the right orbit with a max SUV of 1.1. Asymmetric mixed lytic and blastic lesion within the left ilium demonstrates a max SUV of 1.8, mildly above that of the right ilium.      1.  Radiotracer avid supra clavicular, thoracic and abdominal adenopathy and sclerotic lesion within T10 vertebral body likely representing metastatic disease. 2.  Indeterminate lesion within the left ilium demonstrating mild uptake. Findings can be further evaluated with MRI if clinically indicated. 3.  Additionally, there is asymmetric sclerosis within the superior lateral aspect the left orbit which does not demonstrate significant uptake above that of the right orbit. Findings are nonspecific with differential considerations including but not limited to  metastatic disease, fibrous dysplasia versus Paget's disease. Findings can be further evaluated with dedicated thin section CT of the face to better characterize if clinically indicated. 4.  Sub-6 mm nodule within the left lower lobe. Follow-up chest CT in 3 months is recommended to ensure stability. 5.  Scattered indeterminate hypodense lesions throughout the liver which do not demonstrate radiotracer uptake above that of the adjacent liver, best best visualized on recent multiphase CT abdomen and pelvis 04/17/2023. Please refer to this dictation for further information. 6.  Other findings as above.  This report was finalized on 5/3/2023 9:25 AM by Dr. Oseas Navarrete M.D.          Review of Systems   Constitutional: Negative for appetite change, diaphoresis, fatigue, fever, unexpected weight gain and unexpected weight loss.   HENT: Negative for hearing loss, sore throat and voice change.    Eyes: Negative for blurred vision, double vision, pain, redness and visual disturbance.   Respiratory: Negative for cough, shortness of breath and wheezing.    Cardiovascular: Negative for chest pain, palpitations and leg swelling.   Endocrine: Negative for cold intolerance, heat intolerance, polydipsia and polyuria.   Genitourinary: Negative for decreased urine volume, difficulty urinating, frequency and urinary incontinence.   Musculoskeletal: Positive for arthralgias (RT hip 4/10) and back pain. Negative for joint swelling and myalgias.   Skin: Negative for color change, rash, skin lesions and wound.   Neurological: Negative for dizziness, seizures, numbness and headache.   Hematological: Negative for adenopathy. Does not bruise/bleed easily.   Psychiatric/Behavioral: Negative for depressed mood. The patient is not nervous/anxious.    All other systems reviewed and are negative.      Past Medical History:   Diagnosis Date    Anemia     Anxiety     Blood disease     Colon polyps     Depression     Diverticulitis      "Forgetfulness     Hepatitis C     Hypertension     Night sweats     Numbness and tingling of left lower extremity     Prostate cancer      Past Surgical History:   Procedure Laterality Date    COLONOSCOPY      PROSTATE BIOPSY      PROSTATECTOMY      ROBOT ASSISTED W PELVIC LYMPH NODE DISSECTION    TRANSURETHRAL RESECTION OF BLADDER TUMOR       Social History     Socioeconomic History    Marital status:    Tobacco Use    Smoking status: Every Day     Packs/day: 0.25     Years: 42.00     Pack years: 10.50     Types: Cigarettes     Start date: 10/21/1970    Smokeless tobacco: Never   Vaping Use    Vaping Use: Never used   Substance and Sexual Activity    Alcohol use: Yes     Alcohol/week: 6.0 standard drinks     Types: 6 Cans of beer per week     Comment: 12 pack a week of beer    Drug use: Never    Sexual activity: Defer     Family History   Problem Relation Age of Onset    Cancer Mother     Cancer Father        Objective   Physical Exam  General: Alert, cooperative, no acute distress  Eyes: Anicteric sclera, PERRLA  Respiratory: normal respiratory effort  Cardiovascular: no lower extremity edema  Skin: Normal tone, no rash, no lesions  Psychiatric: Appropriate affect, intact judgment  Neurologic: No focal sensory or motor deficits, normal cognition   Musculoskeletal: Normal muscle strength and tone  Extremities: No clubbing, cyanosis, or deformities    Vitals:    05/04/23 1511   BP: 153/89   Pulse: 66   Resp: 16   Temp: 98.2 °F (36.8 °C)   SpO2: 100%   Weight: 57.3 kg (126 lb 5.2 oz)   Height: 176.5 cm (69.49\")   PainSc:   4   PainLoc: Back  Comment: back and RT hip     ECOG score: 0         PHQ-9 Total Score:         Result Review :   The following data was reviewed by: Natalya Jack MD PhD on 05/04/2023:  Lab Results   Component Value Date    HGB 14.6 04/18/2023    HCT 42.0 04/18/2023    MCV 85.5 04/18/2023     04/18/2023    WBC 6.02 04/18/2023    NEUTROABS 3.71 04/18/2023    LYMPHSABS 1.69 " 04/18/2023    MONOSABS 0.40 04/18/2023    EOSABS 0.17 04/18/2023    BASOSABS 0.03 04/18/2023     Lab Results   Component Value Date    GLUCOSE 91 04/18/2023    BUN 9 04/18/2023    CREATININE 0.97 04/18/2023     04/18/2023    K 3.8 04/18/2023     04/18/2023    CO2 27.8 04/18/2023    CALCIUM 9.7 04/18/2023    PROTEINTOT 6.4 04/18/2023    ALBUMIN 3.7 04/18/2023    BILITOT <0.2 04/18/2023    ALKPHOS 63 04/18/2023    AST 13 04/18/2023    ALT 6 04/18/2023     Lab Results   Component Value Date    IRON 50 (L) 12/17/2021    LABIRON 22 12/17/2021    TRANSFERRIN 156 (L) 12/17/2021    TIBC 232 (L) 12/17/2021     Lab Results   Component Value Date    FERRITIN 120.10 12/17/2021    [id number] 412 05/16/2019    FOLATE 9.4 05/16/2019     Lab Results   Component Value Date    PSA 2.850 04/18/2023          Assessment and Plan    Diagnoses and all orders for this visit:    1. Prostate cancer        Prostate cancer with metastasis to bone: Patient has evidence of disease progression in the bones and is symptomatic.  This is a life threatening condition if left untreated.  I recommend he stop Xtandi although he was having no toxicity. The next treatment for him may be chemotherapy with Docetaxel but I will refer him to Dr. Arita for possible radiation therapy if that is an option.  I will f/u in 1 month.     Bone metastases: q3mo Xgeva.  We will hold Xgeva today due to total calcium-8.1.  Recommend calcium and vitamin D daily.      Patient was given instructions and counseling regarding his condition or for health maintenance advice. Please see specific information pulled into the AVS if appropriate.     Natalya Jack MD PhD    5/4/2023

## 2023-05-11 ENCOUNTER — OFFICE VISIT (OUTPATIENT)
Dept: RADIATION ONCOLOGY | Facility: HOSPITAL | Age: 68
End: 2023-05-11
Payer: MEDICARE

## 2023-05-11 VITALS
BODY MASS INDEX: 17.82 KG/M2 | RESPIRATION RATE: 16 BRPM | HEART RATE: 85 BPM | DIASTOLIC BLOOD PRESSURE: 99 MMHG | OXYGEN SATURATION: 98 % | SYSTOLIC BLOOD PRESSURE: 131 MMHG | TEMPERATURE: 98.1 F | WEIGHT: 122.36 LBS

## 2023-05-11 DIAGNOSIS — C79.51 PROSTATE CANCER METASTATIC TO BONE: ICD-10-CM

## 2023-05-11 DIAGNOSIS — C61 PROSTATE CANCER METASTATIC TO BONE: ICD-10-CM

## 2023-05-11 PROCEDURE — G0463 HOSPITAL OUTPT CLINIC VISIT: HCPCS | Performed by: RADIOLOGY

## 2023-05-11 NOTE — PROGRESS NOTES
Follow Up Office Visit      Encounter Date: 05/11/2023   Patient Name: Semaj Medina  YOB: 1955   Medical Record Number: [id number]   Primary Diagnosis: No primary diagnosis found.   Cancer Staging: Cancer Staging   No matching staging information was found for the patient.              Cancer Staging   No matching staging information was found for the patient.        Chief Complaint:    Chief Complaint   Patient presents with   • Follow-up       Oncologic History: Semaj Medina is a 67 y.o. male here for follow up regarding his metastatic prostate cancer.      He was originally diagnosed in 2015 and his prostate cancer was managed surgically.  He was found to have pT3b pN0 4+3 disease.  He had positive margins at the bladder neck.  He received adjuvant radiation therapy with Dr. Stanton [facility].  In 2017, his PSA began to rise and he was started on Lupron.  He took a brief holiday.  In 2020 he was started on Zytiga.  He tolerated this poorly and his dose was reduced multiple times.   Restaging imaging in 2021 demonstrated a T0 lesion, which causing pain that limited his function. This was treated palliatively with radiation - 30 Gy/10 fractions, 12/2021     Interval History: He has been getting Lupron and Xtandi  PSA has recently been increasing   6/2022- .593  9/2022 - .697  12/6/22 - 1.3  2/7/23- 1.9   4/18/23 - 2.8      Persistent pain at T10 lesion but controlled with medication and not limiting function  New onset pain at right costochondral rib margin. Right hip pain stable/chronic, new onset left hip shooting pains (occasional)    Prior Radiation: adjuvant prostate radiation 2015 ([facility]), T9-11 30 Gy/10 fractions, completed 12/9/21.     Subjective      Review of Systems: Review of Systems   Constitutional: Positive for appetite change (States that he doesn't have an appetite, spoke to patient about boost/ensure, samples given.), fatigue  (6/10) and unexpected weight change (  Down 4lbs since consult.).   HENT: Positive for rhinorrhea.    Respiratory: Positive for cough (Newer, productive with clear secretions.). Negative for shortness of breath.    Cardiovascular: Negative for chest pain, palpitations and leg swelling.   Gastrointestinal: Negative for blood in stool, constipation, diarrhea, nausea and vomiting.   Genitourinary: Positive for frequency. Negative for difficulty urinating, dysuria, hematuria and urgency.        Noc x2-3, recently worsened.  Weak stream, recently worsened  Occasional MATEO       Musculoskeletal: Positive for back pain (Ongoing) and myalgias (New right side/rib pain, 8/10). Negative for arthralgias and joint swelling.   Skin: Negative for color change and rash.   Neurological: Positive for headaches (Occasional, takes ibuprofen as needed.). Negative for dizziness.   Psychiatric/Behavioral: Positive for sleep disturbance (Wakes throughout the night due to nocturia.).       The following portions of the patient's history were reviewed and updated as appropriate: allergies, current medications, past family history, past medical history, past social history, past surgical history and problem list.    Measures:   Pain: (on a scale of 0-10)   Pain Score    05/11/23 1321   PainSc:   8     Semaj Medina reports a pain score of 8.  Given his pain assessment as noted, treatment options were discussed and the following options were decided upon as a follow-up plan to address the patient's pain: continuation of current treatment plan for pain.    Advanced Care Plan: N Advance Care Planning   ACP discussion was declined by the patient. Patient does not have an advance directive, declines further assistance.       KPS/Quality of Life: 80 - Restricted Physical Activity  ECOG: (1) Restricted in physically strenuous activity, ambulatory and able to do work of light nature  Depression Screening:   Patient screened positive for depression  based on a PHQ-9 score of 12 on 5/11/2023. Follow-up recommendations include: Elevated PHQ score reflective of acute illness, not depression.        Objective     Physical Exam:   Vital Signs:   Vitals:    05/11/23 1321   BP: 131/99   Pulse: 85   Resp: 16   Temp: 98.1 °F (36.7 °C)   TempSrc: Oral   SpO2: 98%   Weight: 55.5 kg (122 lb 5.7 oz)   PainSc:   8     Body mass index is 17.82 kg/m².     Constitutional: No acute distress, sitting comfortably  Eye: EOMI, anicteric sclerae  HENT: NC/AT, MMM   Respiratory: Symmetric expansion, nonlabored respiration  MSK: ROM intact in all four extremities, no obvious deformities  Neuro: Alert, oriented x3, CN3-12 grossly intact.   Psych: Appropriate mood and affect.    Results:   Imaging: Images were reviewed personally and pertinent findings are detailed below.     PSMA PET 5/2/23  FINDINGS:   Evaluation of the head and neck is mildly suboptimal due to  misregistration artifact. The thyroid, submandibular and parotid glands  demonstrate roughly symmetric uptake. Thoracic adenopathy demonstrating  intense uptake is present with index nodes as below:  *  Supra clavicular node measuring 0.4 cm in short axis dimension with a  max SUV of 8.1.  *  Posterior mediastinal node 1 posterior aspect of the midesophagus  measuring 0.6 cm in short axis measurement with a max SUV of 1      No pulmonary consolidation, pleural effusion or pneumothorax. Sub-6 mm  pulmonary nodules present within the left base.     Intensely avid abdominal adenopathy is present with index nodes as  below:  *   Node along the left aspect of the superior mesenteric artery origin  measuring 0.6 cm in short axis dimension with a max SUV of 6.5.  *  Gastrohepatic node measuring 0.8 cm in short axis dimension with max  SUV 35     There is colonic diverticulosis. No focal suspicious uptake is present  within the liver, gallbladder, pancreas, adrenal glands, spleen or  kidneys. A few scattered indeterminate hypodense  lesions are present  throughout the liver measuring up to 2 cm within the right hepatic lobe,  better seen on recent multiphase contrast CT abdomen 04/17/2023. There  is no hydronephrosis. No free intraperitoneal air is seen. Prostate is  surgically absent. No focal area of increased uptake separate from  presumed urinary excretion is seen within the operative bed.     For the purposes of this dictation, last well-formed disc space will be  referred to as L5-S1. Focal sclerotic lesion within the T10 vertebral  body demonstrating radiotracer uptake demonstrates a max SUV of 3.5.  Asymmetric sclerosis is present within the superior lateral aspect of  the left orbit which does not demonstrate uptake significantly above  that of the right orbit with a max SUV of 1.1. Asymmetric mixed lytic  and blastic lesion within the left ilium demonstrates a max SUV of 1.8,  mildly above that of the right ilium.     IMPRESSION:  1.  Radiotracer avid supra clavicular, thoracic and abdominal adenopathy  and sclerotic lesion within T10 vertebral body likely representing  metastatic disease.  2.  Indeterminate lesion within the left ilium demonstrating mild  uptake. Findings can be further evaluated with MRI if clinically  indicated.  3.  Additionally, there is asymmetric sclerosis within the superior  lateral aspect the left orbit which does not demonstrate significant  uptake above that of the right orbit. Findings are nonspecific with  differential considerations including but not limited to metastatic  disease, fibrous dysplasia versus Paget's disease. Findings can be  further evaluated with dedicated thin section CT of the face to better  characterize if clinically indicated.  4.  Sub-6 mm nodule within the left lower lobe. Follow-up chest CT in 3  months is recommended to ensure stability.  5.  Scattered indeterminate hypodense lesions throughout the liver which  do not demonstrate radiotracer uptake above that of the adjacent  liver,  best best visualized on recent multiphase CT abdomen and pelvis  04/17/2023. Please refer to this dictation for further information.  6.  Other findings as above.     This report was finalized on 5/3/2023 9:25 AM by Dr. Oseas Navarrete M.D.          Assessment / Plan        Assessment/Plan:     Diagnoses and all orders for this visit:    1. Prostate cancer metastatic to bone        Semaj Medina is a pleasant 67 y.o. male with  a known history of metastatic prostate cancer, who has most recently been on lupron and enzalutamide with PSA continuing to rise. Systemic therapy is managed by Dr. Jack, medical oncology.  Unfortunately his PSMA PET demonstrated metastatic involvement throughout the neck, thorax, abdomen/pelvis. Dr. Jack and I discussed his case. We recommend that he start chemotherapy first. If he progresses through docetaxel, he may be a candidate for radiopharmaceutical treatment with [id number] radiopharmaceutical treatment. I will see him back in 3 months or as needed before that time.     Follow Up:   No follow-ups on file.        Time:   I spent 35 minutes on this encounter today, 05/11/23. Activities that took place during this time include:   - preparing to see the patient  - obtaining and reviewing separately obtained history  - performing a medically appropriate examination and evaluation  - counseling and educating the patient  - ordering medications/tests/procedures  - communicating with other healthcare providers  - documenting clinical information in the health record  - coordinating care for this patient.     Sincerely,        Jesisca Arita MD  Radiation Oncology  [facility]    This document has been signed by Isacc Moscoso RN on May 11, 2023 13:25 EDT           NOTICE TO PATIENTS  At [facility], we believe that sharing information builds trust and better relationships. You are receiving this note because you recently visited [facility]. It is possible  you will see health information before a provider has talked with you about it. This kind of information can be easy to misunderstand. To help you fully understand what it means for your health, we urge you to discuss this note with your provider.

## 2023-05-12 ENCOUNTER — TELEPHONE (OUTPATIENT)
Dept: FAMILY MEDICINE CLINIC | Facility: CLINIC | Age: 68
End: 2023-05-12
Payer: MEDICARE

## 2023-05-12 NOTE — TELEPHONE ENCOUNTER
Patient is experiencing sharp pain in lower right abdomin since Sunday. Seems to be getting worse. I advised patient to go to Urgent Care or ER.

## 2023-05-17 ENCOUNTER — OFFICE VISIT (OUTPATIENT)
Dept: ONCOLOGY | Facility: HOSPITAL | Age: 68
End: 2023-05-17
Payer: MEDICARE

## 2023-05-17 VITALS
BODY MASS INDEX: 18.16 KG/M2 | HEART RATE: 66 BPM | RESPIRATION RATE: 16 BRPM | DIASTOLIC BLOOD PRESSURE: 86 MMHG | HEIGHT: 69 IN | OXYGEN SATURATION: 99 % | TEMPERATURE: 97.1 F | SYSTOLIC BLOOD PRESSURE: 164 MMHG | WEIGHT: 122.58 LBS

## 2023-05-17 DIAGNOSIS — G89.3 CANCER RELATED PAIN: ICD-10-CM

## 2023-05-17 DIAGNOSIS — C61 PROSTATE CANCER METASTATIC TO BONE: Primary | ICD-10-CM

## 2023-05-17 DIAGNOSIS — C79.51 PROSTATE CANCER METASTATIC TO BONE: Primary | ICD-10-CM

## 2023-05-17 PROCEDURE — G0463 HOSPITAL OUTPT CLINIC VISIT: HCPCS | Performed by: INTERNAL MEDICINE

## 2023-05-17 RX ORDER — HYDROCODONE BITARTRATE AND ACETAMINOPHEN 10; 325 MG/1; MG/1
1 TABLET ORAL EVERY 4 HOURS PRN
Qty: 150 TABLET | Refills: 0 | Status: SHIPPED | OUTPATIENT
Start: 2023-05-17

## 2023-05-17 RX ORDER — DEXAMETHASONE 4 MG/1
TABLET ORAL
Qty: 72 TABLET | Refills: 0 | Status: SHIPPED | OUTPATIENT
Start: 2023-05-17

## 2023-05-17 RX ORDER — ONDANSETRON HYDROCHLORIDE 8 MG/1
8 TABLET, FILM COATED ORAL 3 TIMES DAILY PRN
Qty: 30 TABLET | Refills: 5 | Status: CANCELLED | OUTPATIENT
Start: 2023-05-23

## 2023-05-17 NOTE — PROGRESS NOTES
Patient  Semaj Medina    Location  [facility] HEMATOLOGY & ONCOLOGY    Chief Complaint  Prostate cancer metastatic to bone    Referring Provider: Natalya Jack MD PhD  PCP: Maryse Monae MD    Subjective          Oncology/Hematology History Overview Note     Metastatic Castration resistant prostate cancer:    Patient was initially diagnosed with prostate cancer in 2015.  On 6/4/2015 he underwent radical prostatectomy and LN dissection which showed a Zach 4+3 = 7 prostate cancer.  There were several high risk features such as extraprostatic extension, seminal vesicle invasion, positive margins at the bladder neck and right and left seminal vesicle.  Lymph vascular invasion was indeterminate.  Perineural invasion was not commented on. Final pathology [id number] R1.  He was treated with adjuvant radiation by Dr. Stanton.    According to records the patient likely started Lupron in December 2017 when his PSA started to rise.  The patient took a break in August 2019.  He states he was not aware he was to continue.  He restarted Lupron on 11/26/2019 after it was noted that his PSA chago from 0.46 up to 2.55 on 11/8/2019.  Unfortunately his PSA continued to rise after that to 4.54 on 3/10/2020.    - restarted Lupron on 11/26/20  - Started Zytiga on 8/27/20.  8/31/2020 PSA 17.95     - Started Xtandi in May 2021 due to intolerance of Zytiga even at the lowest dose  - PSA rising in April 2023 to 2.85, doubling time 3.4 months  - started Docetaxel May 2023    PSMA PET 5/2/23:   1.  Radiotracer avid supra clavicular, thoracic and abdominal adenopathy and sclerotic lesion within T10 vertebral body likely representing metastatic disease.  2.  Indeterminate lesion within the left ilium demonstrating mild uptake.   3.  Asymmetric sclerosis within the superior lateral aspect the left orbit which does not demonstrate significant uptake above that of the right orbit. Findings are nonspecific  with  differential considerations including but not limited to metastatic disease, fibrous dysplasia versus Paget's disease.   4.  Sub-6 mm nodule within the left lower lobe.  5.  Scattered indeterminate hypodense lesions throughout the liver which do not demonstrate radiotracer uptake above that of the adjacent liver, best best visualized on recent multiphase CT abdomen and pelvis 04/17/2023. Please refer to this dictation for further information.       Prostate cancer metastatic to bone   12/11/2020 -  Chemotherapy    OP SUPPORTIVE Leuprolide 45 mg Q6M     3/23/2021 -  Chemotherapy    OP SUPPORTIVE Denosumab (Xgeva) Q28D     4/15/2021 - 5/13/2021 Chemotherapy    OP PROSTATE Enzalutamide     5/19/2021 Initial Diagnosis    Prostate cancer metastatic to bone (CMS/HCC)     5/24/2023 Biopsy    OP PROSTATE DOCEtaxel  Plan Provider: Natalya Jack MD PhD  Treatment goal: Palliative  Line of treatment: [No plan line of treatment]     Prostate cancer   6/16/2021 Initial Diagnosis    Prostate cancer (HCC)     Secondary malignant neoplasm of bone   11/4/2021 Initial Diagnosis    Secondary malignant neoplasm of bone (HCC)     11/17/2021 -  Radiation    RADIATION THERAPY Treatment Details (Noted on 11/4/2021)  Site: Bilateral Spine - Thoracic  Technique: 3D CRT  Goal: No goal specified  Planned Treatment Start Date: 11/17/2021         History of Present Illness  Patient comes in today for follow-up.  He saw Dr. Arita who recommended chemotherapy prior to other forms of treatment such as Xofigo.  The patient understands this plan.  I discussed the risks and benefits of chemotherapy with docetaxel.    At this time the patient's only complaint is his persistent back pain and some new abdominal pain that he thinks may be related to gas pain.  He said he is still having normal bowel movements 1-2 times a day.  He takes 4 to 5 tablets of pain medication a day and does not use a stool softener.  However, he is certain that he  does not have constipation.    Review of Systems   Constitutional: Positive for fatigue (5/10). Negative for appetite change, diaphoresis, fever, unexpected weight gain and unexpected weight loss.   HENT: Negative for hearing loss, mouth sores, sore throat, swollen glands, trouble swallowing and voice change.    Eyes: Negative for blurred vision.   Respiratory: Negative for cough, shortness of breath and wheezing.    Cardiovascular: Negative for chest pain and palpitations.   Gastrointestinal: Positive for abdominal pain (5/10). Negative for blood in stool, constipation, diarrhea, nausea and vomiting.   Endocrine: Negative for cold intolerance and heat intolerance.   Genitourinary: Negative for difficulty urinating, dysuria, frequency, hematuria and urinary incontinence.   Musculoskeletal: Positive for back pain (5/10). Negative for arthralgias and myalgias.   Skin: Negative for rash, skin lesions and wound.   Neurological: Negative for dizziness, seizures, weakness, numbness and headache.   Hematological: Does not bruise/bleed easily.   Psychiatric/Behavioral: Negative for depressed mood. The patient is not nervous/anxious.    All other systems reviewed and are negative.      Past Medical History:   Diagnosis Date   • Anemia    • Anxiety    • Blood disease    • Colon polyps    • Depression    • Diverticulitis    • Forgetfulness    • Hepatitis C    • Hypertension    • Night sweats    • Numbness and tingling of left lower extremity    • Prostate cancer      Past Surgical History:   Procedure Laterality Date   • COLONOSCOPY     • PROSTATE BIOPSY     • PROSTATECTOMY      ROBOT ASSISTED W PELVIC LYMPH NODE DISSECTION   • TRANSURETHRAL RESECTION OF BLADDER TUMOR       Social History     Socioeconomic History   • Marital status:    Tobacco Use   • Smoking status: Every Day     Packs/day: 0.25     Years: 42.00     Pack years: 10.50     Types: Cigarettes     Start date: 10/21/1970   • Smokeless tobacco: Never  "  Vaping Use   • Vaping Use: Never used   Substance and Sexual Activity   • Alcohol use: Yes     Alcohol/week: 6.0 standard drinks     Types: 6 Cans of beer per week     Comment: 12 pack a week of beer   • Drug use: Never   • Sexual activity: Defer     Family History   Problem Relation Age of Onset   • Cancer Mother    • Cancer Father        Objective   Physical Exam  General: Alert, cooperative, no acute distress  Eyes: Anicteric sclera, PERRLA  Respiratory: normal respiratory effort  Cardiovascular: no lower extremity edema  Skin: Normal tone, no rash, no lesions  Psychiatric: Appropriate affect, intact judgment  Neurologic: No focal sensory or motor deficits, normal cognition   Musculoskeletal: Normal muscle strength and tone  Extremities: No clubbing, cyanosis, or deformities    Vitals:    05/17/23 1507   BP: 164/86   Pulse: 66   Resp: 16   Temp: 97.1 °F (36.2 °C)   SpO2: 99%   Weight: 55.6 kg (122 lb 9.2 oz)   Height: 176.5 cm (69.49\")   PainSc:   5   PainLoc: Back     ECOG score: 0         PHQ-9 Total Score:         Result Review :   The following data was reviewed by: Natalya Jack MD PhD on 05/17/2023:  Lab Results   Component Value Date    HGB 14.6 04/18/2023    HCT 42.0 04/18/2023    MCV 85.5 04/18/2023     04/18/2023    WBC 6.02 04/18/2023    NEUTROABS 3.71 04/18/2023    LYMPHSABS 1.69 04/18/2023    MONOSABS 0.40 04/18/2023    EOSABS 0.17 04/18/2023    BASOSABS 0.03 04/18/2023     Lab Results   Component Value Date    GLUCOSE 91 04/18/2023    BUN 9 04/18/2023    CREATININE 0.97 04/18/2023     04/18/2023    K 3.8 04/18/2023     04/18/2023    CO2 27.8 04/18/2023    CALCIUM 9.7 04/18/2023    PROTEINTOT 6.4 04/18/2023    ALBUMIN 3.7 04/18/2023    BILITOT <0.2 04/18/2023    ALKPHOS 63 04/18/2023    AST 13 04/18/2023    ALT 6 04/18/2023     Lab Results   Component Value Date    IRON 50 (L) 12/17/2021    LABIRON 22 12/17/2021    TRANSFERRIN 156 (L) 12/17/2021    TIBC 232 (L) 12/17/2021 "     Lab Results   Component Value Date    FERRITIN 120.10 12/17/2021    MYHZYMNI27 412 05/16/2019    FOLATE 9.4 05/16/2019     Lab Results   Component Value Date    PSA 2.850 04/18/2023          Assessment and Plan    Diagnoses and all orders for this visit:    1. Prostate cancer metastatic to bone (Primary)  -     Ambulatory Referral to General Surgery  -     dexamethasone (DECADRON) 4 MG tablet; Take 2 tablets oral twice a day for 3 consecutive days beginning the day before chemotherapy and continue for 6 doses.  Dispense: 72 tablet; Refill: 0  -     HYDROcodone-acetaminophen (NORCO)  MG per tablet; Take 1 tablet by mouth Every 4 (Four) Hours As Needed for Moderate Pain.  Dispense: 150 tablet; Refill: 0    Other orders  -     Cancel: ondansetron (ZOFRAN) 8 MG tablet; Take 1 tablet by mouth 3 (Three) Times a Day As Needed for Nausea or Vomiting.  Dispense: 30 tablet; Refill: 5          Prostate cancer with metastasis to bone: The patient will need port placement prior to starting chemotherapy with docetaxel.  We will plan for a total of 6 cycles. I discussed the risk and benefits of treatment.  I will follow up with him prior to C2 unless he needs to be seen sooner for any concerns.  He may be a candidate for Xofigo as the next line of treatment.     Bone metastases: q3mo Xgeva. Recommend calcium and vitamin D daily.    Cancer related pain: I will refill hydrocodone today and increase the number of pills since he takes in up to 5 times daily. He is not interested in a longer active pain medication at this time.     I spent 43 minutes caring for Semaj on this date of service. This time includes time spent by me in the following activities: preparing for the visit, reviewing tests, performing a medically appropriate examination and/or evaluation, counseling and educating the patient/family/caregiver, ordering medications, tests, or procedures, referring and communicating with other health care professionals  and documenting information in the medical record      Patient was given instructions and counseling regarding his condition or for health maintenance advice. Please see specific information pulled into the AVS if appropriate.     Natalya Jack MD PhD    5/17/2023

## 2023-05-18 ENCOUNTER — TELEPHONE (OUTPATIENT)
Dept: SURGERY | Facility: CLINIC | Age: 68
End: 2023-05-18
Payer: MEDICARE

## 2023-05-18 DIAGNOSIS — C79.51 PROSTATE CANCER METASTATIC TO BONE: Primary | ICD-10-CM

## 2023-05-18 DIAGNOSIS — C61 PROSTATE CANCER METASTATIC TO BONE: Primary | ICD-10-CM

## 2023-05-18 RX ORDER — LISINOPRIL 40 MG/1
40 TABLET ORAL DAILY
COMMUNITY

## 2023-05-18 NOTE — PRE-PROCEDURE INSTRUCTIONS
PATIENT INSTRUCTED TO BE:    - NPO AFTER MIDNIGHT EXCEPT CAN HAVE CLEAR LIQUIDS 2 HOURS PRIOR TO SURGERY ARRIVAL TIME     - TO HOLD ALL VITAMINS, SUPPLEMENTS, NSAIDS FOR ONE WEEK PRIOR TO THEIR SURGICAL PROCEDURE    - INSTRUCTED PT TO USE SURGICAL SOAP 1 TIME THE NIGHT PRIOR TO SURGERY OR THE AM OF SURGERY.   USE SOAP FROM NECK TO TOES AVOID THEIR FACE, HAIR, AND PRIVATE PARTS. INSTRUCTED NO LOTIONS, JEWELRY, PIERCINGS, OR DEODORANT DAY OF SURGERY    - IF DIABETIC, CHECK BLOOD GLUCOSE IF LESS THAN 70 CALL PREOP AREA -AM OF SURGERY     - INSTRUCTED TO TAKE THE FOLLOWING MEDICATIONS THE DAY OF SURGERY:       INHALERS PRN, DECADRON, MARINOL, NORCO, ZOFRAN PRN    PATIENT VERBALIZED UNDERSTANDING

## 2023-05-18 NOTE — TELEPHONE ENCOUNTER
Patient was jenna for sx over the phone. His office visit on 5-22 has been canceled. Sx is jenna for 5-22. I told him you would call him before Monday.       Med List:  Hydrocodone-Acetaminophen 10-325mg  Lisinopril 30mg  Calcium-Vit D3 315-6.25mg  Ibuprofen 800mg - PRN  Zofran 8mg - PRN

## 2023-05-19 ENCOUNTER — PREP FOR SURGERY (OUTPATIENT)
Dept: OTHER | Facility: HOSPITAL | Age: 68
End: 2023-05-19
Payer: MEDICARE

## 2023-05-19 DIAGNOSIS — C61 PROSTATE CANCER METASTATIC TO BONE: Primary | ICD-10-CM

## 2023-05-19 DIAGNOSIS — C79.51 PROSTATE CANCER METASTATIC TO BONE: Primary | ICD-10-CM

## 2023-05-19 RX ORDER — CEFAZOLIN SODIUM 2 G/100ML
2 INJECTION, SOLUTION INTRAVENOUS
Status: CANCELLED | OUTPATIENT
Start: 2023-05-19

## 2023-05-19 NOTE — H&P
Chief Complaint:  No chief complaint on file.    Primary Care Provider: Maryse Monae MD    Referring Provider:  Dr. Jack    History of Present Illness  Semaj Medina is a 67 y.o. male referred to have a portacatheter placed.  The patient has prostate cancer metastatic to bone.  He is going to receive intravenous therapy and needs to have a portacatheter placed.    Allergies: Patient has no known allergies.    Home Medicines:  Hydrocodone-Acetaminophen 10-325mg  Lisinopril 30mg  Calcium-Vit D3 315-6.25mg  Ibuprofen 800mg - PRN  Zofran 8mg - PRN    Past Medical History:   • Anemia    NO PROBLEMS   • Anxiety   • Blood disease   • Colon polyps   • Depression   • Diverticulitis   • Forgetfulness   • Hepatitis C    RESOLVED   • Hypertension   • Night sweats   • Numbness and tingling of left lower extremity   • Prostate cancer        Past Surgical History:   • COLONOSCOPY   • PROSTATE BIOPSY   • PROSTATECTOMY    ROBOT ASSISTED W PELVIC LYMPH NODE DISSECTION   • TRANSURETHRAL RESECTION OF BLADDER TUMOR       Family History:   Family History   Problem Relation Age of Onset   • Cancer Mother    • Cancer Father         Social History:  Social History     Tobacco Use   • Smoking status: Every Day     Packs/day: 0.25     Years: 42.00     Pack years: 10.50     Types: Cigarettes     Start date: 10/21/1970   • Smokeless tobacco: Never   • Tobacco comments:     INSTRUCTED NO SMOKING 24 HR PRIOR TO SURGERY    Substance Use Topics   • Alcohol use: Yes     Alcohol/week: 6.0 standard drinks     Types: 6 Cans of beer per week     Comment: 12 pack a week of beer       Objective     Vital Signs:  There were no vitals taken for this visit.  • Respiratory:  breathing not labored, respiratory effort appears normal  • Cardiovascular:  heart regular rate  • Abdomen:  nondistended    • Musculoskeletal: moving all extremities symmetrically and purposefully  • Neurologic:  no obvious motor or sensory deficits, alert & oriented, speech  clear  • Psychiatric:  judgment and insight intact      Assessment:  Metastatic prostate cancer    Plan:  Port-a-catheter placement    Discussion: Indications, options, risks, benefits, and expected outcomes of planned surgery were discussed with the patient and he agrees to proceed.    Waldemar Iraheta MD  05/19/2023    Electronically signed by Waldemar Iraheta MD, 05/19/23, 6:08 PM EDT.

## 2023-05-19 NOTE — H&P (VIEW-ONLY)
Chief Complaint:  No chief complaint on file.    Primary Care Provider: Maryse Monae MD    Referring Provider:  Dr. Jakc    History of Present Illness  Semaj Medina is a 67 y.o. male referred to have a portacatheter placed.  The patient has prostate cancer metastatic to bone.  He is going to receive intravenous therapy and needs to have a portacatheter placed.    Allergies: Patient has no known allergies.    Home Medicines:  Hydrocodone-Acetaminophen 10-325mg  Lisinopril 30mg  Calcium-Vit D3 315-6.25mg  Ibuprofen 800mg - PRN  Zofran 8mg - PRN    Past Medical History:   • Anemia    NO PROBLEMS   • Anxiety   • Blood disease   • Colon polyps   • Depression   • Diverticulitis   • Forgetfulness   • Hepatitis C    RESOLVED   • Hypertension   • Night sweats   • Numbness and tingling of left lower extremity   • Prostate cancer        Past Surgical History:   • COLONOSCOPY   • PROSTATE BIOPSY   • PROSTATECTOMY    ROBOT ASSISTED W PELVIC LYMPH NODE DISSECTION   • TRANSURETHRAL RESECTION OF BLADDER TUMOR       Family History:   Family History   Problem Relation Age of Onset   • Cancer Mother    • Cancer Father         Social History:  Social History     Tobacco Use   • Smoking status: Every Day     Packs/day: 0.25     Years: 42.00     Pack years: 10.50     Types: Cigarettes     Start date: 10/21/1970   • Smokeless tobacco: Never   • Tobacco comments:     INSTRUCTED NO SMOKING 24 HR PRIOR TO SURGERY    Substance Use Topics   • Alcohol use: Yes     Alcohol/week: 6.0 standard drinks     Types: 6 Cans of beer per week     Comment: 12 pack a week of beer       Objective     Vital Signs:  There were no vitals taken for this visit.  • Respiratory:  breathing not labored, respiratory effort appears normal  • Cardiovascular:  heart regular rate  • Abdomen:  nondistended    • Musculoskeletal: moving all extremities symmetrically and purposefully  • Neurologic:  no obvious motor or sensory deficits, alert & oriented, speech  clear  • Psychiatric:  judgment and insight intact      Assessment:  Metastatic prostate cancer    Plan:  Port-a-catheter placement    Discussion: Indications, options, risks, benefits, and expected outcomes of planned surgery were discussed with the patient and he agrees to proceed.    Waldemar Iraheta MD  05/19/2023    Electronically signed by Waldemar Iraheta MD, 05/19/23, 6:08 PM EDT.

## 2023-05-22 ENCOUNTER — APPOINTMENT (OUTPATIENT)
Dept: GENERAL RADIOLOGY | Facility: HOSPITAL | Age: 68
End: 2023-05-22
Payer: MEDICARE

## 2023-05-22 ENCOUNTER — ANESTHESIA (OUTPATIENT)
Dept: PERIOP | Facility: HOSPITAL | Age: 68
End: 2023-05-22
Payer: MEDICARE

## 2023-05-22 ENCOUNTER — ANESTHESIA EVENT (OUTPATIENT)
Dept: PERIOP | Facility: HOSPITAL | Age: 68
End: 2023-05-22
Payer: MEDICARE

## 2023-05-22 ENCOUNTER — HOSPITAL ENCOUNTER (OUTPATIENT)
Facility: HOSPITAL | Age: 68
Setting detail: HOSPITAL OUTPATIENT SURGERY
Discharge: HOME | End: 2023-05-22
Attending: SURGERY | Admitting: SURGERY
Payer: MEDICARE

## 2023-05-22 VITALS
HEART RATE: 56 BPM | BODY MASS INDEX: 18.02 KG/M2 | OXYGEN SATURATION: 98 % | SYSTOLIC BLOOD PRESSURE: 167 MMHG | HEIGHT: 70 IN | WEIGHT: 125.88 LBS | TEMPERATURE: 97.8 F | RESPIRATION RATE: 16 BRPM | DIASTOLIC BLOOD PRESSURE: 79 MMHG

## 2023-05-22 DIAGNOSIS — Z12.11 COLON CANCER SCREENING: Primary | ICD-10-CM

## 2023-05-22 DIAGNOSIS — C61 PROSTATE CANCER METASTATIC TO BONE: ICD-10-CM

## 2023-05-22 DIAGNOSIS — C79.51 PROSTATE CANCER METASTATIC TO BONE: ICD-10-CM

## 2023-05-22 PROCEDURE — 76937 US GUIDE VASCULAR ACCESS: CPT | Performed by: SURGERY

## 2023-05-22 PROCEDURE — 36561 INSERT TUNNELED CV CATH: CPT | Performed by: SURGERY

## 2023-05-22 PROCEDURE — 25010000002 CEFAZOLIN IN DEXTROSE 2-4 GM/100ML-% SOLUTION: Performed by: SURGERY

## 2023-05-22 PROCEDURE — 25010000002 MIDAZOLAM PER 1MG: Performed by: ANESTHESIOLOGY

## 2023-05-22 PROCEDURE — 25010000002 HEPARIN (PORCINE) PER 1000 UNITS: Performed by: SURGERY

## 2023-05-22 PROCEDURE — 71045 X-RAY EXAM CHEST 1 VIEW: CPT

## 2023-05-22 PROCEDURE — 77001 FLUOROGUIDE FOR VEIN DEVICE: CPT | Performed by: SURGERY

## 2023-05-22 PROCEDURE — 76000 FLUOROSCOPY <1 HR PHYS/QHP: CPT

## 2023-05-22 PROCEDURE — C1788 PORT, INDWELLING, IMP: HCPCS | Performed by: SURGERY

## 2023-05-22 PROCEDURE — 25010000002 PROPOFOL 10 MG/ML EMULSION: Performed by: NURSE ANESTHETIST, CERTIFIED REGISTERED

## 2023-05-22 DEVICE — PRT INTRO VASC/INTERV VORTEX FILL/HL DETACH/POLYURET/CATH 8F: Type: IMPLANTABLE DEVICE | Site: INTERNAL JUGULAR | Status: FUNCTIONAL

## 2023-05-22 RX ORDER — HYDROCODONE BITARTRATE AND ACETAMINOPHEN 5; 325 MG/1; MG/1
1 TABLET ORAL EVERY 6 HOURS PRN
Qty: 5 TABLET | Refills: 0 | Status: SHIPPED | OUTPATIENT
Start: 2023-05-22 | End: 2023-05-22 | Stop reason: HOSPADM

## 2023-05-22 RX ORDER — PROMETHAZINE HYDROCHLORIDE 12.5 MG/1
25 TABLET ORAL ONCE AS NEEDED
Status: DISCONTINUED | OUTPATIENT
Start: 2023-05-22 | End: 2023-05-22 | Stop reason: HOSPADM

## 2023-05-22 RX ORDER — OXYCODONE HYDROCHLORIDE 5 MG/1
5 TABLET ORAL
Status: DISCONTINUED | OUTPATIENT
Start: 2023-05-22 | End: 2023-05-22 | Stop reason: HOSPADM

## 2023-05-22 RX ORDER — SODIUM CHLORIDE, SODIUM LACTATE, POTASSIUM CHLORIDE, CALCIUM CHLORIDE 600; 310; 30; 20 MG/100ML; MG/100ML; MG/100ML; MG/100ML
9 INJECTION, SOLUTION INTRAVENOUS CONTINUOUS PRN
Status: DISCONTINUED | OUTPATIENT
Start: 2023-05-22 | End: 2023-05-22 | Stop reason: HOSPADM

## 2023-05-22 RX ORDER — PROPOFOL 10 MG/ML
VIAL (ML) INTRAVENOUS AS NEEDED
Status: DISCONTINUED | OUTPATIENT
Start: 2023-05-22 | End: 2023-05-22 | Stop reason: SURG

## 2023-05-22 RX ORDER — DEXMEDETOMIDINE HYDROCHLORIDE 100 UG/ML
INJECTION, SOLUTION INTRAVENOUS AS NEEDED
Status: DISCONTINUED | OUTPATIENT
Start: 2023-05-22 | End: 2023-05-22 | Stop reason: SURG

## 2023-05-22 RX ORDER — HYDROCODONE BITARTRATE AND ACETAMINOPHEN 5; 325 MG/1; MG/1
1 TABLET ORAL EVERY 6 HOURS PRN
Qty: 5 TABLET | Refills: 0 | Status: SHIPPED | OUTPATIENT
Start: 2023-05-22

## 2023-05-22 RX ORDER — PROMETHAZINE HYDROCHLORIDE 25 MG/1
25 SUPPOSITORY RECTAL ONCE AS NEEDED
Status: DISCONTINUED | OUTPATIENT
Start: 2023-05-22 | End: 2023-05-22 | Stop reason: HOSPADM

## 2023-05-22 RX ORDER — BUPIVACAINE HYDROCHLORIDE 2.5 MG/ML
INJECTION, SOLUTION EPIDURAL; INFILTRATION; INTRACAUDAL AS NEEDED
Status: DISCONTINUED | OUTPATIENT
Start: 2023-05-22 | End: 2023-05-22 | Stop reason: HOSPADM

## 2023-05-22 RX ORDER — ONDANSETRON 2 MG/ML
4 INJECTION INTRAMUSCULAR; INTRAVENOUS ONCE AS NEEDED
Status: DISCONTINUED | OUTPATIENT
Start: 2023-05-22 | End: 2023-05-22 | Stop reason: HOSPADM

## 2023-05-22 RX ORDER — ACETAMINOPHEN 500 MG
1000 TABLET ORAL ONCE
Status: COMPLETED | OUTPATIENT
Start: 2023-05-22 | End: 2023-05-22

## 2023-05-22 RX ORDER — KETAMINE HCL IN NACL, ISO-OSM 100MG/10ML
SYRINGE (ML) INJECTION AS NEEDED
Status: DISCONTINUED | OUTPATIENT
Start: 2023-05-22 | End: 2023-05-22 | Stop reason: SURG

## 2023-05-22 RX ORDER — CEFAZOLIN SODIUM 2 G/100ML
2 INJECTION, SOLUTION INTRAVENOUS
Status: COMPLETED | OUTPATIENT
Start: 2023-05-22 | End: 2023-05-22

## 2023-05-22 RX ORDER — MIDAZOLAM HYDROCHLORIDE 2 MG/2ML
2 INJECTION, SOLUTION INTRAMUSCULAR; INTRAVENOUS ONCE
Status: COMPLETED | OUTPATIENT
Start: 2023-05-22 | End: 2023-05-22

## 2023-05-22 RX ORDER — MEPERIDINE HYDROCHLORIDE 25 MG/ML
12.5 INJECTION INTRAMUSCULAR; INTRAVENOUS; SUBCUTANEOUS
Status: DISCONTINUED | OUTPATIENT
Start: 2023-05-22 | End: 2023-05-22 | Stop reason: HOSPADM

## 2023-05-22 RX ORDER — LIDOCAINE HYDROCHLORIDE 20 MG/ML
INJECTION, SOLUTION EPIDURAL; INFILTRATION; INTRACAUDAL; PERINEURAL AS NEEDED
Status: DISCONTINUED | OUTPATIENT
Start: 2023-05-22 | End: 2023-05-22 | Stop reason: SURG

## 2023-05-22 RX ADMIN — ACETAMINOPHEN 1000 MG: 500 TABLET ORAL at 09:30

## 2023-05-22 RX ADMIN — MIDAZOLAM HYDROCHLORIDE 2 MG: 1 INJECTION, SOLUTION INTRAMUSCULAR; INTRAVENOUS at 09:30

## 2023-05-22 RX ADMIN — CEFAZOLIN SODIUM 2 G: 2 INJECTION, SOLUTION INTRAVENOUS at 09:41

## 2023-05-22 RX ADMIN — SODIUM CHLORIDE, POTASSIUM CHLORIDE, SODIUM LACTATE AND CALCIUM CHLORIDE 9 ML/HR: 600; 310; 30; 20 INJECTION, SOLUTION INTRAVENOUS at 09:30

## 2023-05-22 RX ADMIN — DEXMEDETOMIDINE HYDROCHLORIDE 10 MCG: 100 INJECTION, SOLUTION, CONCENTRATE INTRAVENOUS at 09:45

## 2023-05-22 RX ADMIN — Medication 20 MG: at 09:39

## 2023-05-22 RX ADMIN — LIDOCAINE HYDROCHLORIDE 60 MG: 20 INJECTION, SOLUTION EPIDURAL; INFILTRATION; INTRACAUDAL; PERINEURAL at 09:39

## 2023-05-22 RX ADMIN — PROPOFOL 125 MCG/KG/MIN: 10 INJECTION, EMULSION INTRAVENOUS at 09:39

## 2023-05-22 RX ADMIN — PROPOFOL 100 MG: 10 INJECTION, EMULSION INTRAVENOUS at 09:39

## 2023-05-22 NOTE — OP NOTE
INSERTION VENOUS ACCESS DEVICE  Procedure Report    Patient Name:  Semaj Medina  YOB: 1955    Date of Surgery:  5/22/2023     Pre-op Diagnosis:   Prostate cancer metastatic to bone [C61, C79.51]    Pre-Op Diagnosis Codes:     * Prostate cancer metastatic to bone [C61, C79.51]       Post-op Diagnosis:   Post-Op Diagnosis Codes:     * Prostate cancer metastatic to bone [C61, C79.51]    Procedure:  Port-a-catheter placement (CPT 46008)    Staff:  Surgeon(s):  Waldemar Iraheta MD    Anesthesia: General    Estimated Blood Loss: Minimal    Complications:  None    Drains:  None    Packing:  None    Implants:    Implant Name Type Inv. Item Serial No.  Lot No. LRB No. Used Action   PRT INTRO VASC/INTERV VORTEX FILL/HL DETACH/POLYURET/CATH 8F - [id number] Implant PRT INTRO VASC/INTERV VORTEX FILL/HL DETACH/POLYURET/CATH 8F  [employer] [id number] Right 1 Implanted     Specimen: None    Indications:  See my preop H&P note.     Findings:  Angiodynamics Smartport placed via right internal jugular vein.     Description of Procedure: Patient was taken to the operating room and placed supine on the operating table.  Timeout verification was performed. MAC anesthesia was administered.  Patient was prepped and draped in usual fashion.  Using ultrasound, the right internal jugular vein was identified but it was very diminutive in size and I was unable to access that after 2 passes of the needle.  I moved to the right subclavian vein and used a empty needle attached to a syringe and made 2 passes under the clavicle was not able to access the vein.  The patient was then positioned in steep headdown position and then accessed with a needle.  Ultrasound was used again to identify the right internal jugular vein and this time I was able to access the vein after one pass of the needle.  A guidewire was placed through the needle and the needle was withdrawn.  Fluoroscopy was used to confirm the guidewire was  positioned appropriately in the superior vena cava.  A subcutaneous pocket was created at the right upper chest to accommodate the port.  The inner dilator was placed inside of the tear-away sheath and both were placed over the guidewire into the right internal jugular vein.  The inner guidewire and dilator were removed.  The catheter was placed through the tear-away sheath and the sheath was withdrawn.  The tunneler trocar was used to tunnel the catheter to the subcutaneous pocket.  Then under direct visualization with fluoroscopy, the catheter was pulled back until the tip was positioned appropriately in the superior vena cava.  The catheter was cut to the appropriate length and the locking cap was placed onto the catheter.   The catheter was connected to the port, the locking cap was secured, and the port was placed within the subcutaneous pocket.  I accessed the port with a Ortiz needle.  I could easily aspirate venous blood.  The port was then flushed with heparinized solution.  The wound was closed appropriately with buried absorbable suture followed by appropriate dressings.  No complications.  Sponge needle and instrument counts were correct.  Patient was transported to the recovery area in stable condition.      Waldemar Iraheta MD     Date: 5/22/2023  Time: 10:18 EDT    Electronically signed by Waldemar Iraheta MD, 05/22/23, 10:18 AM EDT.

## 2023-05-22 NOTE — ANESTHESIA POSTPROCEDURE EVALUATION
Patient: Semaj Medina    Procedure Summary     Date: 05/22/23 Room / Location: [facility]  /  [facility]    Anesthesia Start: 0936 Anesthesia Stop: 1018    Procedure: INSERTION VENOUS ACCESS PORT (Right) Diagnosis:       Prostate cancer metastatic to bone      (Prostate cancer metastatic to bone [C61, C79.51])    Surgeons: Waldemar Iraheta MD Provider: Jose Juan Buenrostro MD    Anesthesia Type: general ASA Status: 2          Anesthesia Type: general    Vitals  Vitals Value Taken Time   /79 05/22/23 1106   Temp 36.6 °C (97.8 °F) 05/22/23 1020   Pulse 56 05/22/23 1100   Resp 16 05/22/23 1100   SpO2 98 % 05/22/23 1100           Post Anesthesia Care and Evaluation    Patient location during evaluation: bedside  Patient participation: complete - patient participated  Level of consciousness: awake  Pain management: adequate    Airway patency: patent  PONV Status: none  Cardiovascular status: acceptable, stable and hypertensive  Respiratory status: acceptable  Hydration status: acceptable    Comments: An Anesthesiologist personally participated in the most demanding procedures (including induction and emergence if applicable) in the anesthesia plan, monitored the course of anesthesia administration at frequent intervals and remained physically present and available for immediate diagnosis and treatment of emergencies.        Pt slightly hypertensive into 160's at postop instructed pt to take ace inhibitor once arrives home

## 2023-05-22 NOTE — DISCHARGE INSTRUCTIONS
Dr. Iraheta's Instructions          DIET  Resume your regular diet.     ACTIVITY  Do not lift anything greater than 15 pounds with the arm on the same side the port was placed for one week.  After one week, you have no activity restrictions and may gradually increase your activities using common sense.     WOUND CARE & SHOWERING/BATHING  Your incisions are covered with a plastic type material that will flake off on its own in the next few weeks.  If the plastic type material has not flaked off on its own by 2 weeks after your surgery then use tweezers to pull it off.  You have sutures in your incisions but they are placed below the level of the skin and they will slowly dissolve (they do not need to be removed).  The skin around your incisions will likely have some bruising.  This is normal.  You can shower beginning tomorrow but wait two weeks after your surgery before taking any tub baths.     HOME MEDICATIONS  Resume all of your normal home medications.     PAIN CONTROL  You will receive a narcotic pain medicine prescription before you are discharged home.  Be sure to take the narcotic pain medication with some food so as not to upset your stomach.  Do not drive while you are taking the prescription pain medication.  Take Tylenol or Motrin for mild pain.     BOWEL MOVEMENTS  It is not unusual for narcotic pain medications to cause constipation.  Also, the medications and anesthesia you received for your surgery can have a constipating effect.  To help avoid constipation, drink at least four eight-ounce glasses of water each day and use over-the-counter laxatives/stool softeners (dulcolax, milk of magnesia, senokot, etc.).  I recommend drinking the aforementioned amount of water daily and taking 30 ml of milk of magnesia two times each day while you are using the prescribed narcotic pain medication.  If your bowel movements become too loose or too frequent, then simply stop following these recommendations unless  you start to feel constipated.     FOLLOW-UP VISIT & QUESTIONS/CONCERNS  Call Dr. Nelson office at [phone] and schedule a follow-up appointment for about two to three weeks after your surgery date.  However, if you are doing fine and don´t have any concerns then simply cancel the follow-up appointment.  Should you have any questions or concerns, please call Dr. Nelson office.              DISCHARGE INSTRUCTIONS  INSERTION OF   PORT-A-CATH      For your surgery you had:  General anesthesia (you may have a sore throat for the first 24 hours)  IV sedation  Local anesthesia  Monitored Anesthesia Care  You received a medicated patch for nausea prevention today (behind your ear). It is recommended that you remove it 24-48 hours post-operatively. It must be removed within 72 hours.   You received an anesthesia medication today that can cause hormonal forms of birth control to be ineffective. You should use a different form of birth control (to prevent pregnancy) for 7 days.   You may experience dizziness, drowsiness, or light-headedness for several hours following surgery/procedure.  Do not stay alone today or tonight.  Limit your activity for 24 hours.  You should not drive, operate machinery, drink alcohol, or sign legally binding documents for 24 hours or while you are taking pain medication.  Resume your diet slowly.  Follow whatever special dietary instructions you may have been given by your doctor.  Last dose of pain medication was given at:   .  NOTIFY YOUR DOCTOR IF YOU EXPERIENCE ANY OF THE FOLLOWING:  Temperature greater than 101 degrees Fahrenheit  Shaking Chills  Redness or excessive drainage from incision  Nausea, vomiting and/opr pain that is not controlled by prescribed medications  Increase in bleeding or bleeding that is excessive  Unable to urinate in 6 hours after surgery  If unable to reach your doctor, please go to the closest Emergency Room INCISION CARE  You may remove your dressing on/in   .  Wash your hands and cleanse the incision daily with   Alcohol  Soap and Water  You may shower or bathe   .  Apply an ice pack intermittently for 20 minutes for a total of 24-48 hours.  Do not apply directly to the skin.       Port should be flushed/heparinized once a month (even when not being used).  Keep Port-A-Cath ID Card in your purse of wallet.  Medications per physician instructions as indicated on Discharge Medication Information Sheet.  You should see    for follow-up care  on   .                                                Phone number:       SPECIAL INSTRUCTIONS:

## 2023-06-01 RX ORDER — FAMOTIDINE 10 MG/ML
20 INJECTION, SOLUTION INTRAVENOUS AS NEEDED
Status: CANCELLED | OUTPATIENT
Start: 2023-06-02

## 2023-06-01 RX ORDER — DIPHENHYDRAMINE HYDROCHLORIDE 50 MG/ML
50 INJECTION INTRAMUSCULAR; INTRAVENOUS AS NEEDED
Status: CANCELLED | OUTPATIENT
Start: 2023-06-02

## 2023-06-02 ENCOUNTER — HOSPITAL ENCOUNTER (OUTPATIENT)
Dept: ONCOLOGY | Facility: HOSPITAL | Age: 68
Discharge: HOME | End: 2023-06-02

## 2023-06-02 VITALS
OXYGEN SATURATION: 100 % | HEIGHT: 68 IN | HEART RATE: 72 BPM | TEMPERATURE: 97.1 F | WEIGHT: 126.1 LBS | BODY MASS INDEX: 19.11 KG/M2 | DIASTOLIC BLOOD PRESSURE: 93 MMHG | RESPIRATION RATE: 18 BRPM | SYSTOLIC BLOOD PRESSURE: 152 MMHG

## 2023-06-02 DIAGNOSIS — C61 PROSTATE CANCER: Primary | ICD-10-CM

## 2023-06-02 DIAGNOSIS — C79.51 PROSTATE CANCER METASTATIC TO BONE: ICD-10-CM

## 2023-06-02 DIAGNOSIS — C61 PROSTATE CANCER METASTATIC TO BONE: ICD-10-CM

## 2023-06-02 LAB
ALBUMIN SERPL-MCNC: 3.4 G/DL (ref 3.5–5.2)
ALBUMIN/GLOB SERPL: 1.1 G/DL
ALP SERPL-CCNC: 68 U/L (ref 39–117)
ALT SERPL W P-5'-P-CCNC: 6 U/L (ref 1–41)
ANION GAP SERPL CALCULATED.3IONS-SCNC: 11.1 MMOL/L (ref 5–15)
AST SERPL-CCNC: 13 U/L (ref 1–40)
BASOPHILS # BLD AUTO: 0.01 10*3/MM3 (ref 0–0.2)
BASOPHILS NFR BLD AUTO: 0.1 % (ref 0–1.5)
BILIRUB SERPL-MCNC: 0.2 MG/DL (ref 0–1.2)
BUN SERPL-MCNC: 11 MG/DL (ref 8–23)
BUN/CREAT SERPL: 11.1 (ref 7–25)
CALCIUM SPEC-SCNC: 9 MG/DL (ref 8.6–10.5)
CHLORIDE SERPL-SCNC: 104 MMOL/L (ref 98–107)
CO2 SERPL-SCNC: 22.9 MMOL/L (ref 22–29)
CREAT SERPL-MCNC: 0.99 MG/DL (ref 0.76–1.27)
DEPRECATED RDW RBC AUTO: 43.8 FL (ref 37–54)
EGFRCR SERPLBLD CKD-EPI 2021: 83.5 ML/MIN/1.73
EOSINOPHIL # BLD AUTO: 0 10*3/MM3 (ref 0–0.4)
EOSINOPHIL NFR BLD AUTO: 0 % (ref 0.3–6.2)
ERYTHROCYTE [DISTWIDTH] IN BLOOD BY AUTOMATED COUNT: 14.4 % (ref 12.3–15.4)
GLOBULIN UR ELPH-MCNC: 3.2 GM/DL
GLUCOSE SERPL-MCNC: 272 MG/DL (ref 65–99)
HCT VFR BLD AUTO: 39.8 % (ref 37.5–51)
HGB BLD-MCNC: 13.7 G/DL (ref 13–17.7)
IMM GRANULOCYTES # BLD AUTO: 0.04 10*3/MM3 (ref 0–0.05)
IMM GRANULOCYTES NFR BLD AUTO: 0.6 % (ref 0–0.5)
LYMPHOCYTES # BLD AUTO: 1.08 10*3/MM3 (ref 0.7–3.1)
LYMPHOCYTES NFR BLD AUTO: 15.6 % (ref 19.6–45.3)
MCH RBC QN AUTO: 28.8 PG (ref 26.6–33)
MCHC RBC AUTO-ENTMCNC: 34.4 G/DL (ref 31.5–35.7)
MCV RBC AUTO: 83.6 FL (ref 79–97)
MONOCYTES # BLD AUTO: 0.09 10*3/MM3 (ref 0.1–0.9)
MONOCYTES NFR BLD AUTO: 1.3 % (ref 5–12)
NEUTROPHILS NFR BLD AUTO: 5.71 10*3/MM3 (ref 1.7–7)
NEUTROPHILS NFR BLD AUTO: 82.4 % (ref 42.7–76)
NRBC BLD AUTO-RTO: 0 /100 WBC (ref 0–0.2)
PLATELET # BLD AUTO: 337 10*3/MM3 (ref 140–450)
PMV BLD AUTO: 9.4 FL (ref 6–12)
POTASSIUM SERPL-SCNC: 4 MMOL/L (ref 3.5–5.2)
PROT SERPL-MCNC: 6.6 G/DL (ref 6–8.5)
RBC # BLD AUTO: 4.76 10*6/MM3 (ref 4.14–5.8)
SODIUM SERPL-SCNC: 138 MMOL/L (ref 136–145)
WBC NRBC COR # BLD: 6.93 10*3/MM3 (ref 3.4–10.8)

## 2023-06-02 PROCEDURE — 85025 COMPLETE CBC W/AUTO DIFF WBC: CPT | Performed by: INTERNAL MEDICINE

## 2023-06-02 PROCEDURE — 80053 COMPREHEN METABOLIC PANEL: CPT | Performed by: INTERNAL MEDICINE

## 2023-06-02 PROCEDURE — 25010000002 HEPARIN LOCK FLUSH PER 10 UNITS: Performed by: INTERNAL MEDICINE

## 2023-06-02 PROCEDURE — 25010000002 DOCETAXEL 20 MG/ML SOLUTION 8 ML VIAL: Performed by: INTERNAL MEDICINE

## 2023-06-02 PROCEDURE — 96413 CHEMO IV INFUSION 1 HR: CPT

## 2023-06-02 RX ORDER — SODIUM CHLORIDE 0.9 % (FLUSH) 0.9 %
20 SYRINGE (ML) INJECTION AS NEEDED
OUTPATIENT
Start: 2023-06-02

## 2023-06-02 RX ORDER — SODIUM CHLORIDE 9 MG/ML
250 INJECTION, SOLUTION INTRAVENOUS ONCE
Status: COMPLETED | OUTPATIENT
Start: 2023-06-02 | End: 2023-06-02

## 2023-06-02 RX ORDER — HEPARIN SODIUM (PORCINE) LOCK FLUSH IV SOLN 100 UNIT/ML 100 UNIT/ML
500 SOLUTION INTRAVENOUS AS NEEDED
Status: DISCONTINUED | OUTPATIENT
Start: 2023-06-02 | End: 2023-06-04 | Stop reason: HOSPADM

## 2023-06-02 RX ORDER — HEPARIN SODIUM (PORCINE) LOCK FLUSH IV SOLN 100 UNIT/ML 100 UNIT/ML
500 SOLUTION INTRAVENOUS AS NEEDED
OUTPATIENT
Start: 2023-06-02

## 2023-06-02 RX ORDER — SODIUM CHLORIDE 0.9 % (FLUSH) 0.9 %
20 SYRINGE (ML) INJECTION AS NEEDED
Status: DISCONTINUED | OUTPATIENT
Start: 2023-06-02 | End: 2023-06-04 | Stop reason: HOSPADM

## 2023-06-02 RX ADMIN — Medication 20 ML: at 11:40

## 2023-06-02 RX ADMIN — SODIUM CHLORIDE 250 ML: 9 INJECTION, SOLUTION INTRAVENOUS at 10:12

## 2023-06-02 RX ADMIN — DOCETAXEL 120 MG: 20 INJECTION, SOLUTION, CONCENTRATE INTRAVENOUS at 10:31

## 2023-06-02 RX ADMIN — HEPARIN SODIUM (PORCINE) LOCK FLUSH IV SOLN 100 UNIT/ML 500 UNITS: 100 SOLUTION at 11:40

## 2023-06-09 ENCOUNTER — OFFICE VISIT (OUTPATIENT)
Dept: SURGERY | Facility: CLINIC | Age: 68
End: 2023-06-09
Payer: MEDICARE

## 2023-06-09 VITALS — RESPIRATION RATE: 16 BRPM | HEIGHT: 68 IN | BODY MASS INDEX: 18.67 KG/M2 | WEIGHT: 123.2 LBS

## 2023-06-09 DIAGNOSIS — Z09 ENCOUNTER FOR FOLLOW-UP: Primary | ICD-10-CM

## 2023-06-09 PROCEDURE — 99024 POSTOP FOLLOW-UP VISIT: CPT | Performed by: SURGERY

## 2023-06-09 NOTE — PROGRESS NOTES
Patient is here for follow-up after port placement.  No issues.  Port site looks fine.  Patient can see me PRN.

## 2023-06-12 RX ORDER — LISINOPRIL 40 MG/1
40 TABLET ORAL DAILY
Qty: 90 TABLET | Refills: 0 | Status: SHIPPED | OUTPATIENT
Start: 2023-06-12

## 2023-06-12 RX ORDER — LISINOPRIL 40 MG/1
40 TABLET ORAL DAILY
Qty: 90 TABLET | Refills: 0 | Status: SHIPPED | OUTPATIENT
Start: 2023-06-12 | End: 2023-06-12 | Stop reason: SDUPTHER

## 2023-06-12 NOTE — TELEPHONE ENCOUNTER
Caller: [id number] - [place], [place] - [address] AT [address] & [place] - [phone] [facility] [phone] FX    Relationship: Pharmacy    Best call back number: [phone]    Requested Prescriptions:   Requested Prescriptions     Pending Prescriptions Disp Refills    lisinopril (PRINIVIL,ZESTRIL) 40 MG tablet       Sig: Take 1 tablet by mouth Daily.        Pharmacy where request should be sent: [id number] - [place], [place] - [address] AT [facility] OF [address] & [place] - [phone] [facility] [phone] FX     Last office visit with prescribing clinician: 2/3/2023   Last telemedicine visit with prescribing clinician: Visit date not found   Next office visit with prescribing clinician: 8/3/2023       Does the patient have less than a 3 day supply:  [x] Yes  [] No    Would you like a call back once the refill request has been completed: [] Yes [x] No    If the office needs to give you a call back, can they leave a voicemail: [] Yes [x] No    Nena Jackson   06/12/23 08:20 EDT

## 2023-06-21 ENCOUNTER — OFFICE VISIT (OUTPATIENT)
Dept: ONCOLOGY | Facility: HOSPITAL | Age: 68
End: 2023-06-21
Payer: MEDICARE

## 2023-06-21 VITALS
OXYGEN SATURATION: 100 % | WEIGHT: 124.56 LBS | TEMPERATURE: 98 F | SYSTOLIC BLOOD PRESSURE: 152 MMHG | BODY MASS INDEX: 18.88 KG/M2 | HEIGHT: 68 IN | DIASTOLIC BLOOD PRESSURE: 84 MMHG | RESPIRATION RATE: 18 BRPM | HEART RATE: 61 BPM

## 2023-06-21 DIAGNOSIS — C61 PROSTATE CANCER METASTATIC TO BONE: ICD-10-CM

## 2023-06-21 DIAGNOSIS — C61 PROSTATE CANCER: Primary | ICD-10-CM

## 2023-06-21 DIAGNOSIS — C79.51 PROSTATE CANCER METASTATIC TO BONE: ICD-10-CM

## 2023-06-21 PROCEDURE — 99214 OFFICE O/P EST MOD 30 MIN: CPT | Performed by: INTERNAL MEDICINE

## 2023-06-21 PROCEDURE — 3079F DIAST BP 80-89 MM HG: CPT | Performed by: INTERNAL MEDICINE

## 2023-06-21 PROCEDURE — 1125F AMNT PAIN NOTED PAIN PRSNT: CPT | Performed by: INTERNAL MEDICINE

## 2023-06-21 PROCEDURE — G0463 HOSPITAL OUTPT CLINIC VISIT: HCPCS | Performed by: INTERNAL MEDICINE

## 2023-06-21 PROCEDURE — 3077F SYST BP >= 140 MM HG: CPT | Performed by: INTERNAL MEDICINE

## 2023-06-21 RX ORDER — HYDROCODONE BITARTRATE AND ACETAMINOPHEN 10; 325 MG/1; MG/1
1 TABLET ORAL EVERY 4 HOURS PRN
Qty: 150 TABLET | Refills: 0 | Status: CANCELLED | OUTPATIENT
Start: 2023-06-21

## 2023-06-21 NOTE — PROGRESS NOTES
Patient  Semaj Medina    Location  [facility] HEMATOLOGY & ONCOLOGY    Chief Complaint  Prostate cancer metastatic to bone    Referring Provider: Natalya Jack MD PhD  PCP: Maryse Monae MD    Subjective          Oncology/Hematology History Overview Note     Metastatic Castration resistant prostate cancer:    Patient was initially diagnosed with prostate cancer in 2015.  On 6/4/2015 he underwent radical prostatectomy and LN dissection which showed a Zach 4+3 = 7 prostate cancer.  There were several high risk features such as extraprostatic extension, seminal vesicle invasion, positive margins at the bladder neck and right and left seminal vesicle.  Lymph vascular invasion was indeterminate.  Perineural invasion was not commented on. Final pathology [id number] R1.  He was treated with adjuvant radiation by Dr. Stanton.    According to records the patient likely started Lupron in December 2017 when his PSA started to rise.  The patient took a break in August 2019.  He states he was not aware he was to continue.  He restarted Lupron on 11/26/2019 after it was noted that his PSA chago from 0.46 up to 2.55 on 11/8/2019.  Unfortunately his PSA continued to rise after that to 4.54 on 3/10/2020.    - restarted Lupron on 11/26/20  - Started Zytiga on 8/27/20.  8/31/2020 PSA 17.95     - Started Xtandi in May 2021 due to intolerance of Zytiga even at the lowest dose  - PSA rising in April 2023 to 2.85, doubling time 3.4 months  - started Docetaxel May 2023    [facility] PET 5/2/23:   1.  Radiotracer avid supra clavicular, thoracic and abdominal adenopathy and sclerotic lesion within T10 vertebral body likely representing metastatic disease.  2.  Indeterminate lesion within the left ilium demonstrating mild uptake.   3.  Asymmetric sclerosis within the superior lateral aspect the left orbit which does not demonstrate significant uptake above that of the right orbit. Findings are nonspecific  with  differential considerations including but not limited to metastatic disease, fibrous dysplasia versus Paget's disease.   4.  Sub-6 mm nodule within the left lower lobe.  5.  Scattered indeterminate hypodense lesions throughout the liver which do not demonstrate radiotracer uptake above that of the adjacent liver, best best visualized on recent multiphase CT abdomen and pelvis 04/17/2023. Please refer to this dictation for further information.       Prostate cancer metastatic to bone   12/11/2020 -  Chemotherapy    OP SUPPORTIVE Leuprolide 45 mg Q6M       3/23/2021 -  Chemotherapy    OP SUPPORTIVE Denosumab (Xgeva) Q28D       4/15/2021 - 5/13/2021 Chemotherapy    OP PROSTATE Enzalutamide       5/19/2021 Initial Diagnosis    Prostate cancer metastatic to bone (CMS/HCC)     6/2/2023 Biopsy    OP PROSTATE DOCEtaxel  Plan Provider: Natalya Jack MD PhD  Treatment goal: Palliative  Line of treatment: [No plan line of treatment]     Prostate cancer   6/16/2021 Initial Diagnosis    Prostate cancer (HCC)     6/2/2023 -  Chemotherapy    OP CENTRAL VENOUS ACCESS DEVICE ACCESS, CARE, AND MAINTENANCE (CVAD)       Secondary malignant neoplasm of bone   11/4/2021 Initial Diagnosis    Secondary malignant neoplasm of bone (HCC)     11/17/2021 -  Radiation    RADIATION THERAPY Treatment Details (Noted on 11/4/2021)  Site: Bilateral Spine - Thoracic  Technique: 3D CRT  Goal: No goal specified  Planned Treatment Start Date: 11/17/2021         History of Present Illness  Patient comes in today for follow-up and toxicity check prior to his second cycle of chemotherapy.  He said he tolerated cycle 1 very well.  He has some pain and stiffness in his neck but no new concerns or complaints.  He does request a refill on his pain medication.    Review of Systems   Constitutional:  Negative for appetite change, diaphoresis, fatigue, fever, unexpected weight gain and unexpected weight loss.   HENT:  Negative for hearing loss, mouth  sores, sore throat, swollen glands, trouble swallowing and voice change.    Eyes:  Negative for blurred vision.   Respiratory:  Negative for cough, shortness of breath and wheezing.    Cardiovascular:  Negative for chest pain and palpitations.   Gastrointestinal:  Negative for abdominal pain, blood in stool, constipation, diarrhea, nausea and vomiting.   Endocrine: Negative for cold intolerance and heat intolerance.   Genitourinary:  Negative for difficulty urinating, dysuria, frequency, hematuria and urinary incontinence.   Musculoskeletal:  Positive for back pain (Chronic) and neck pain (NEW 5/10). Negative for arthralgias and myalgias.   Skin:  Negative for rash, skin lesions and wound.   Neurological:  Negative for dizziness, seizures, weakness, numbness and headache.   Hematological:  Does not bruise/bleed easily.   Psychiatric/Behavioral:  Negative for depressed mood. The patient is not nervous/anxious.    All other systems reviewed and are negative.    Past Medical History:   Diagnosis Date    Anemia     NO PROBLEMS    Anxiety     Blood disease     Colon polyps     Depression     Diverticulitis     Forgetfulness     Hepatitis C     RESOLVED    Hypertension     Night sweats     Numbness and tingling of left lower extremity     Prostate cancer      Past Surgical History:   Procedure Laterality Date    COLONOSCOPY      PROSTATE BIOPSY      PROSTATECTOMY      ROBOT ASSISTED W PELVIC LYMPH NODE DISSECTION    TRANSURETHRAL RESECTION OF BLADDER TUMOR      VENOUS ACCESS DEVICE (PORT) INSERTION Right 5/22/2023    Procedure: INSERTION VENOUS ACCESS PORT;  Surgeon: Waldemar Iraheta MD;  [facility]: [facility];  Service: General;  Laterality: Right;     Social History     Socioeconomic History    Marital status:    Tobacco Use    Smoking status: Every Day     Packs/day: 0.25     Years: 42.00     Pack years: 10.50     Types: Cigarettes     Start date: 10/21/1970     Passive exposure: Past    Smokeless tobacco:  "Never    Tobacco comments:     INSTRUCTED NO SMOKING 24 HR PRIOR TO SURGERY      LAST SMOKED AT 200AM 05-22-23   Vaping Use    Vaping Use: Never used   Substance and Sexual Activity    Alcohol use: Yes     Alcohol/week: 6.0 standard drinks     Types: 6 Cans of beer per week     Comment: 12 pack a week of beer    Drug use: Never    Sexual activity: Defer     Family History   Problem Relation Age of Onset    Cancer Mother     Cancer Father        Objective   Physical Exam  General: Alert, cooperative, no acute distress, but thin and chronically ill-appearing  Eyes: Anicteric sclera, PERRLA  Respiratory: normal respiratory effort  Cardiovascular: no lower extremity edema  Skin: Normal tone, no rash, no lesions  Psychiatric: Appropriate affect, intact judgment  Neurologic: No focal sensory or motor deficits, normal cognition   Musculoskeletal: Normal muscle strength and tone  Extremities: No clubbing, cyanosis, or deformities    Vitals:    06/21/23 1056   BP: 152/84   Pulse: 61   Resp: 18   Temp: 98 øF (36.7 øC)   SpO2: 100%   Weight: 56.5 kg (124 lb 9 oz)   Height: 172 cm (67.72\")   PainSc:   5   PainLoc: Neck     ECOG score: 0         PHQ-9 Total Score:         Result Review :   The following data was reviewed by: Natalya Jack MD PhD on 06/21/2023:  Lab Results   Component Value Date    HGB 12.0 (L) 08/03/2023    HCT 35.6 (L) 08/03/2023    MCV 85.0 08/03/2023     08/03/2023    WBC 4.95 08/03/2023    NEUTROABS 2.59 08/03/2023    LYMPHSABS 1.71 08/03/2023    MONOSABS 0.57 08/03/2023    EOSABS 0.02 08/03/2023    BASOSABS 0.03 08/03/2023     Lab Results   Component Value Date    GLUCOSE 98 08/03/2023    BUN 5 (L) 08/03/2023    CREATININE 0.76 08/03/2023     08/03/2023    K 3.8 08/03/2023     (H) 08/03/2023    CO2 22.6 08/03/2023    CALCIUM 7.8 (L) 08/03/2023    PROTEINTOT 5.5 (L) 08/03/2023    ALBUMIN 3.1 (L) 08/03/2023    BILITOT 0.3 08/03/2023    ALKPHOS 51 08/03/2023    AST 11 08/03/2023    ALT " 5 08/03/2023     Lab Results   Component Value Date    IRON 50 (L) 12/17/2021    LABIRON 22 12/17/2021    TRANSFERRIN 156 (L) 12/17/2021    TIBC 232 (L) 12/17/2021     Lab Results   Component Value Date    FERRITIN 120.10 12/17/2021    [id number] 412 05/16/2019    FOLATE 9.4 05/16/2019     Lab Results   Component Value Date    PSA 4.830 (H) 08/03/2023          Assessment and Plan    Diagnoses and all orders for this visit:    1. Prostate cancer [C61 (ICD-10-CM)] (Primary)    2. Prostate cancer metastatic to bone      Prostate cancer with metastasis to bone: Patient comes in today for toxicity check prior to cycle 2 of chemotherapy with docetaxel.  He is tolerating very well.  He has no significant toxicity on CBC or CMP.  He will proceed with treatment as planned and follow-up with me prior to the next cycle.  He may be a candidate for Xofigo as the next line of treatment.     Bone metastases: q3mo Xgeva. Recommend calcium and vitamin D daily.  We will refill his pain medication.          Patient was given instructions and counseling regarding his condition or for health maintenance advice. Please see specific information pulled into the AVS if appropriate.     Natalya Jack MD PhD    8/7/2023

## 2023-07-06 ENCOUNTER — TELEPHONE (OUTPATIENT)
Dept: ONCOLOGY | Facility: HOSPITAL | Age: 68
End: 2023-07-06

## 2023-07-06 NOTE — TELEPHONE ENCOUNTER
LM for patient:  Dentist office is closed until 7/10.   We need clarification if it's ok for tooth extraction after patient received Xgeva injection.  We will write the letter after we obtain this clarification.

## 2023-07-06 NOTE — TELEPHONE ENCOUNTER
Caller: Semaj Medina Osborne    Relationship: Self    Best call back number: [phone]     What is the best time to reach you: ANYTIME    Who are you requesting to speak with (clinical staff, provider,  specific staff member): CLINICAL    What was the call regarding: PT HAS AN UPCOMING APPT ON THURSDAY 07/13/23, BUT THE PT WILL SEE THE DENTIST THE DAY BEFORE. PT IS GOING TO HAVE MULTIPLE EXTRACTIONS ON WEDNESDAY AND THURSDAY HE WILL COME IN FOR HIS TREATMENT AND WOULD LIKE TO MAKE SURE THAT WILL BE OKAY.     PT SPOKE WITH DENTIST AND THEY STATE THAT THEY HAVE NOT RECEIVED THE CLEARANCE FOR THAT PROCEDURE YET.     PT CALLED ON THE 06/28/23 ABOUT THIS.     Is it okay if the provider responds through [facility]: NO - PLEASE CALL PT BACK TO LET HIM KNOW WHAT NEEDS TO BE DONE.

## 2023-08-03 ENCOUNTER — HOSPITAL ENCOUNTER (OUTPATIENT)
Dept: ONCOLOGY | Facility: HOSPITAL | Age: 68
Discharge: HOME | End: 2023-08-03
Admitting: INTERNAL MEDICINE
Payer: MEDICARE

## 2023-08-03 ENCOUNTER — OFFICE VISIT (OUTPATIENT)
Dept: FAMILY MEDICINE CLINIC | Facility: CLINIC | Age: 68
End: 2023-08-03
Payer: MEDICARE

## 2023-08-03 ENCOUNTER — OFFICE VISIT (OUTPATIENT)
Dept: ONCOLOGY | Facility: HOSPITAL | Age: 68
End: 2023-08-03
Payer: MEDICARE

## 2023-08-03 VITALS
BODY MASS INDEX: 19.06 KG/M2 | DIASTOLIC BLOOD PRESSURE: 94 MMHG | OXYGEN SATURATION: 100 % | HEIGHT: 68 IN | RESPIRATION RATE: 18 BRPM | TEMPERATURE: 97.4 F | HEART RATE: 68 BPM | SYSTOLIC BLOOD PRESSURE: 156 MMHG

## 2023-08-03 VITALS
SYSTOLIC BLOOD PRESSURE: 142 MMHG | HEIGHT: 70 IN | RESPIRATION RATE: 14 BRPM | TEMPERATURE: 97.9 F | DIASTOLIC BLOOD PRESSURE: 82 MMHG | WEIGHT: 124 LBS | BODY MASS INDEX: 17.75 KG/M2 | OXYGEN SATURATION: 99 % | HEART RATE: 77 BPM

## 2023-08-03 DIAGNOSIS — C61 PROSTATE CANCER: ICD-10-CM

## 2023-08-03 DIAGNOSIS — C61 PROSTATE CANCER: Primary | ICD-10-CM

## 2023-08-03 DIAGNOSIS — C61 PROSTATE CANCER METASTATIC TO BONE: Primary | ICD-10-CM

## 2023-08-03 DIAGNOSIS — C79.51 PROSTATE CANCER METASTATIC TO BONE: Primary | ICD-10-CM

## 2023-08-03 DIAGNOSIS — I10 PRIMARY HYPERTENSION: Primary | ICD-10-CM

## 2023-08-03 DIAGNOSIS — K13.70 UNSPECIFIED LESIONS OF ORAL MUCOSA: ICD-10-CM

## 2023-08-03 LAB
ALBUMIN SERPL-MCNC: 3.1 G/DL (ref 3.5–5.2)
ALBUMIN/GLOB SERPL: 1.3 G/DL
ALP SERPL-CCNC: 51 U/L (ref 39–117)
ALT SERPL W P-5'-P-CCNC: 5 U/L (ref 1–41)
ANION GAP SERPL CALCULATED.3IONS-SCNC: 8.4 MMOL/L (ref 5–15)
AST SERPL-CCNC: 11 U/L (ref 1–40)
BASOPHILS # BLD AUTO: 0.03 10*3/MM3 (ref 0–0.2)
BASOPHILS NFR BLD AUTO: 0.6 % (ref 0–1.5)
BILIRUB SERPL-MCNC: 0.3 MG/DL (ref 0–1.2)
BUN SERPL-MCNC: 5 MG/DL (ref 8–23)
BUN/CREAT SERPL: 6.6 (ref 7–25)
CALCIUM SPEC-SCNC: 7.8 MG/DL (ref 8.6–10.5)
CHLORIDE SERPL-SCNC: 112 MMOL/L (ref 98–107)
CO2 SERPL-SCNC: 22.6 MMOL/L (ref 22–29)
CREAT SERPL-MCNC: 0.76 MG/DL (ref 0.76–1.27)
DEPRECATED RDW RBC AUTO: 56.5 FL (ref 37–54)
EGFRCR SERPLBLD CKD-EPI 2021: 98.5 ML/MIN/1.73
EOSINOPHIL # BLD AUTO: 0.02 10*3/MM3 (ref 0–0.4)
EOSINOPHIL NFR BLD AUTO: 0.4 % (ref 0.3–6.2)
ERYTHROCYTE [DISTWIDTH] IN BLOOD BY AUTOMATED COUNT: 18.6 % (ref 12.3–15.4)
GLOBULIN UR ELPH-MCNC: 2.4 GM/DL
GLUCOSE SERPL-MCNC: 98 MG/DL (ref 65–99)
HCT VFR BLD AUTO: 35.6 % (ref 37.5–51)
HGB BLD-MCNC: 12 G/DL (ref 13–17.7)
IMM GRANULOCYTES # BLD AUTO: 0.03 10*3/MM3 (ref 0–0.05)
IMM GRANULOCYTES NFR BLD AUTO: 0.6 % (ref 0–0.5)
LYMPHOCYTES # BLD AUTO: 1.71 10*3/MM3 (ref 0.7–3.1)
LYMPHOCYTES NFR BLD AUTO: 34.5 % (ref 19.6–45.3)
MCH RBC QN AUTO: 28.6 PG (ref 26.6–33)
MCHC RBC AUTO-ENTMCNC: 33.7 G/DL (ref 31.5–35.7)
MCV RBC AUTO: 85 FL (ref 79–97)
MONOCYTES # BLD AUTO: 0.57 10*3/MM3 (ref 0.1–0.9)
MONOCYTES NFR BLD AUTO: 11.5 % (ref 5–12)
NEUTROPHILS NFR BLD AUTO: 2.59 10*3/MM3 (ref 1.7–7)
NEUTROPHILS NFR BLD AUTO: 52.4 % (ref 42.7–76)
NRBC BLD AUTO-RTO: 0 /100 WBC (ref 0–0.2)
PLATELET # BLD AUTO: 401 10*3/MM3 (ref 140–450)
PMV BLD AUTO: 9.4 FL (ref 6–12)
POTASSIUM SERPL-SCNC: 3.8 MMOL/L (ref 3.5–5.2)
PROT SERPL-MCNC: 5.5 G/DL (ref 6–8.5)
PSA SERPL-MCNC: 4.83 NG/ML (ref 0–4)
RBC # BLD AUTO: 4.19 10*6/MM3 (ref 4.14–5.8)
SODIUM SERPL-SCNC: 143 MMOL/L (ref 136–145)
WBC NRBC COR # BLD: 4.95 10*3/MM3 (ref 3.4–10.8)

## 2023-08-03 PROCEDURE — G0463 HOSPITAL OUTPT CLINIC VISIT: HCPCS | Performed by: INTERNAL MEDICINE

## 2023-08-03 PROCEDURE — 84153 ASSAY OF PSA TOTAL: CPT | Performed by: INTERNAL MEDICINE

## 2023-08-03 PROCEDURE — 3079F DIAST BP 80-89 MM HG: CPT | Performed by: FAMILY MEDICINE

## 2023-08-03 PROCEDURE — 36591 DRAW BLOOD OFF VENOUS DEVICE: CPT

## 2023-08-03 PROCEDURE — 80053 COMPREHEN METABOLIC PANEL: CPT | Performed by: INTERNAL MEDICINE

## 2023-08-03 PROCEDURE — 99214 OFFICE O/P EST MOD 30 MIN: CPT | Performed by: FAMILY MEDICINE

## 2023-08-03 PROCEDURE — 96523 IRRIG DRUG DELIVERY DEVICE: CPT

## 2023-08-03 PROCEDURE — 3077F SYST BP >= 140 MM HG: CPT | Performed by: FAMILY MEDICINE

## 2023-08-03 PROCEDURE — 85025 COMPLETE CBC W/AUTO DIFF WBC: CPT | Performed by: INTERNAL MEDICINE

## 2023-08-03 PROCEDURE — 25010000002 HEPARIN LOCK FLUSH PER 10 UNITS: Performed by: INTERNAL MEDICINE

## 2023-08-03 RX ORDER — HEPARIN SODIUM (PORCINE) LOCK FLUSH IV SOLN 100 UNIT/ML 100 UNIT/ML
500 SOLUTION INTRAVENOUS AS NEEDED
Status: DISCONTINUED | OUTPATIENT
Start: 2023-08-03 | End: 2023-08-04 | Stop reason: HOSPADM

## 2023-08-03 RX ORDER — AMOXICILLIN 500 MG/1
CAPSULE ORAL
COMMUNITY
Start: 2023-07-13

## 2023-08-03 RX ORDER — DIPHENHYDRAMINE HYDROCHLORIDE 50 MG/ML
50 INJECTION INTRAMUSCULAR; INTRAVENOUS AS NEEDED
Status: CANCELLED | OUTPATIENT
Start: 2023-08-04

## 2023-08-03 RX ORDER — SODIUM CHLORIDE 0.9 % (FLUSH) 0.9 %
20 SYRINGE (ML) INJECTION AS NEEDED
Status: CANCELLED | OUTPATIENT
Start: 2023-08-03

## 2023-08-03 RX ORDER — SODIUM CHLORIDE 0.9 % (FLUSH) 0.9 %
20 SYRINGE (ML) INJECTION AS NEEDED
Status: DISCONTINUED | OUTPATIENT
Start: 2023-08-03 | End: 2023-08-04 | Stop reason: HOSPADM

## 2023-08-03 RX ORDER — SODIUM CHLORIDE 9 MG/ML
250 INJECTION, SOLUTION INTRAVENOUS ONCE
Status: CANCELLED | OUTPATIENT
Start: 2023-08-04

## 2023-08-03 RX ORDER — FAMOTIDINE 10 MG/ML
20 INJECTION, SOLUTION INTRAVENOUS AS NEEDED
Status: CANCELLED | OUTPATIENT
Start: 2023-08-04

## 2023-08-03 RX ORDER — HEPARIN SODIUM (PORCINE) LOCK FLUSH IV SOLN 100 UNIT/ML 100 UNIT/ML
500 SOLUTION INTRAVENOUS AS NEEDED
Status: CANCELLED | OUTPATIENT
Start: 2023-08-03

## 2023-08-03 RX ADMIN — HEPARIN SODIUM (PORCINE) LOCK FLUSH IV SOLN 100 UNIT/ML 500 UNITS: 100 SOLUTION at 10:26

## 2023-08-03 RX ADMIN — Medication 20 ML: at 10:25

## 2023-08-03 NOTE — PROGRESS NOTES
Patient  Semaj Medina    Location  [facility] HEMATOLOGY & ONCOLOGY    Chief Complaint  Prostate Cancer    Referring Provider: Natalya Jack MD PhD  PCP: Maryse Monae MD    Subjective          Oncology/Hematology History Overview Note     Metastatic Castration resistant prostate cancer:    Patient was initially diagnosed with prostate cancer in 2015.  On 6/4/2015 he underwent radical prostatectomy and LN dissection which showed a Zach 4+3 = 7 prostate cancer.  There were several high risk features such as extraprostatic extension, seminal vesicle invasion, positive margins at the bladder neck and right and left seminal vesicle.  Lymph vascular invasion was indeterminate.  Perineural invasion was not commented on. Final pathology [id number] R1.  He was treated with adjuvant radiation by Dr. Stanton.    According to records the patient likely started Lupron in December 2017 when his PSA started to rise.  The patient took a break in August 2019.  He states he was not aware he was to continue.  He restarted Lupron on 11/26/2019 after it was noted that his PSA chago from 0.46 up to 2.55 on 11/8/2019.  Unfortunately his PSA continued to rise after that to 4.54 on 3/10/2020.    - restarted Lupron on 11/26/20  - Started Zytiga on 8/27/20.  8/31/2020 PSA 17.95     - Started Xtandi in May 2021 due to intolerance of Zytiga even at the lowest dose  - PSA rising in April 2023 to 2.85, doubling time 3.4 months  - started Docetaxel May 2023    PSMA PET 5/2/23:   1.  Radiotracer avid supra clavicular, thoracic and abdominal adenopathy and sclerotic lesion within T10 vertebral body likely representing metastatic disease.  2.  Indeterminate lesion within the left ilium demonstrating mild uptake.   3.  Asymmetric sclerosis within the superior lateral aspect the left orbit which does not demonstrate significant uptake above that of the right orbit. Findings are nonspecific with  differential  considerations including but not limited to metastatic disease, fibrous dysplasia versus Paget's disease.   4.  Sub-6 mm nodule within the left lower lobe.  5.  Scattered indeterminate hypodense lesions throughout the liver which do not demonstrate radiotracer uptake above that of the adjacent liver, best best visualized on recent multiphase CT abdomen and pelvis 04/17/2023. Please refer to this dictation for further information.       Prostate cancer metastatic to bone   12/11/2020 -  Chemotherapy    OP SUPPORTIVE Leuprolide 45 mg Q6M       3/23/2021 -  Chemotherapy    OP SUPPORTIVE Denosumab (Xgeva) Q28D       4/15/2021 - 5/13/2021 Chemotherapy    OP PROSTATE Enzalutamide       5/19/2021 Initial Diagnosis    Prostate cancer metastatic to bone (CMS/HCC)     6/2/2023 Biopsy    OP PROSTATE DOCEtaxel  Plan Provider: Natalya Jack MD PhD  Treatment goal: Palliative  Line of treatment: [No plan line of treatment]     Prostate cancer   6/16/2021 Initial Diagnosis    Prostate cancer (HCC)     6/2/2023 -  Chemotherapy    OP CENTRAL VENOUS ACCESS DEVICE ACCESS, CARE, AND MAINTENANCE (CVAD)       Secondary malignant neoplasm of bone   11/4/2021 Initial Diagnosis    Secondary malignant neoplasm of bone (HCC)     11/17/2021 -  Radiation    RADIATION THERAPY Treatment Details (Noted on 11/4/2021)  Site: Bilateral Spine - Thoracic  Technique: 3D CRT  Goal: No goal specified  Planned Treatment Start Date: 11/17/2021         Prostate Cancer  Associated symptoms include fatigue (6/10). Pertinent negatives include no abdominal pain, arthralgias, chest pain, coughing, diaphoresis, fever, myalgias, nausea, numbness, rash, sore throat, swollen glands, vomiting or weakness.     Patient comes in today for toxicity check prior to cycle #4 of chemotherapy.  He complains of very little appetite resulting in weight loss.  This is also complicated by the fact that he does not have any teeth following dental work.  He does plan to get  dentures but needs to have more teeth out before then.    Labs show the patient's PSA has increased from 2.85 up to 5.58 last month.  I discussed the significance of this with him and recommended imaging.    Review of Systems   Constitutional:  Positive for fatigue (6/10). Negative for appetite change, diaphoresis, fever, unexpected weight gain and unexpected weight loss.   HENT:  Negative for hearing loss, mouth sores, sore throat, swollen glands, trouble swallowing and voice change.    Eyes:  Negative for blurred vision.   Respiratory:  Negative for cough, shortness of breath and wheezing.    Cardiovascular:  Negative for chest pain and palpitations.   Gastrointestinal:  Negative for abdominal pain, blood in stool, constipation, diarrhea, nausea and vomiting.   Endocrine: Negative for cold intolerance and heat intolerance.   Genitourinary:  Negative for difficulty urinating, dysuria, frequency, hematuria and urinary incontinence.   Musculoskeletal:  Positive for back pain (6/10). Negative for arthralgias and myalgias.   Skin:  Negative for rash, skin lesions and wound.   Neurological:  Negative for dizziness, seizures, weakness, numbness and headache.   Hematological:  Does not bruise/bleed easily.   Psychiatric/Behavioral:  Negative for depressed mood. The patient is not nervous/anxious.    All other systems reviewed and are negative.    Past Medical History:   Diagnosis Date    Anemia     NO PROBLEMS    Anxiety     Blood disease     Colon polyps     Depression     Diverticulitis     Forgetfulness     Hepatitis C     RESOLVED    Hypertension     Night sweats     Numbness and tingling of left lower extremity     Prostate cancer      Past Surgical History:   Procedure Laterality Date    COLONOSCOPY      PROSTATE BIOPSY      PROSTATECTOMY      ROBOT ASSISTED W PELVIC LYMPH NODE DISSECTION    TRANSURETHRAL RESECTION OF BLADDER TUMOR      VENOUS ACCESS DEVICE (PORT) INSERTION Right 5/22/2023    Procedure: INSERTION  "VENOUS ACCESS PORT;  Surgeon: Waldemar Iraheta MD;  Location: [facility];  Service: General;  Laterality: Right;     Social History     Socioeconomic History    Marital status:    Tobacco Use    Smoking status: Every Day     Packs/day: 0.25     Years: 42.00     Pack years: 10.50     Types: Cigarettes     Start date: 10/21/1970     Passive exposure: Past    Smokeless tobacco: Never    Tobacco comments:     INSTRUCTED NO SMOKING 24 HR PRIOR TO SURGERY      LAST SMOKED AT 200AM 05-22-23   Vaping Use    Vaping Use: Never used   Substance and Sexual Activity    Alcohol use: Yes     Alcohol/week: 6.0 standard drinks     Types: 6 Cans of beer per week     Comment: 12 pack a week of beer    Drug use: Never    Sexual activity: Defer     Family History   Problem Relation Age of Onset    Cancer Mother     Cancer Father        Objective   Physical Exam  General: Alert, cooperative, no acute distress but cachectic  Eyes: Anicteric sclera, PERRLA  Respiratory: normal respiratory effort  Cardiovascular: no lower extremity edema  Skin: Normal tone, no rash, no lesions  Psychiatric: Appropriate affect, intact judgment  Neurologic: No focal sensory or motor deficits, normal cognition   Musculoskeletal: Normal muscle strength and tone  Extremities: No clubbing, cyanosis, or deformities    Vitals:    08/03/23 1125   BP: 156/94   Pulse: 68   Resp: 18   Temp: 97.4 °F (36.3 °C)   SpO2: 100%   Height: 172 cm (67.72\")   PainSc:   6   PainLoc: Back     ECOG score: 0         PHQ-9 Total Score:         Result Review :   The following data was reviewed by: Natalya Jack MD PhD on 08/03/2023:  Lab Results   Component Value Date    HGB 11.1 (L) 09/14/2023    HCT 34.0 (L) 09/14/2023    MCV 87.2 09/14/2023     09/14/2023    WBC 4.82 09/14/2023    NEUTROABS 2.59 09/14/2023    LYMPHSABS 1.58 09/14/2023    MONOSABS 0.56 09/14/2023    EOSABS 0.03 09/14/2023    BASOSABS 0.01 09/14/2023     Lab Results   Component Value Date    " GLUCOSE 118 (H) 09/14/2023    BUN 8 09/14/2023    CREATININE 0.84 09/14/2023     09/14/2023    K 3.6 09/14/2023     (H) 09/14/2023    CO2 22.7 09/14/2023    CALCIUM 8.1 (L) 09/14/2023    PROTEINTOT 5.4 (L) 09/14/2023    ALBUMIN 3.2 (L) 09/14/2023    BILITOT 0.2 09/14/2023    ALKPHOS 48 09/14/2023    AST 12 09/14/2023    ALT 4 09/14/2023     Lab Results   Component Value Date    IRON 50 (L) 12/17/2021    LABIRON 22 12/17/2021    TRANSFERRIN 156 (L) 12/17/2021    TIBC 232 (L) 12/17/2021     Lab Results   Component Value Date    FERRITIN 120.10 12/17/2021    [id number] 412 05/16/2019    FOLATE 9.4 05/16/2019     Lab Results   Component Value Date    PSA 4.570 (H) 08/24/2023          Assessment and Plan    Diagnoses and all orders for this visit:    1. Prostate cancer [C61 (ICD-10-CM)] (Primary)  -     PSA DIAGNOSTIC; Future  -     CT chest w contrast; Future  -     CT abdomen pelvis w contrast; Future  -     NM Bone Scan Whole Body; Future    Other orders  -     Cancel: sodium chloride 0.9 % infusion 250 mL  -     Cancel: DOCEtaxel 125 mg in sodium chloride 0.9 % 256.3 mL chemo IVPB  -     Cancel: Hydrocortisone Sod Suc (PF) (Solu-CORTEF) injection 100 mg  -     Cancel: diphenhydrAMINE (BENADRYL) injection 50 mg  -     Cancel: famotidine (PEPCID) injection 20 mg        Prostate cancer with metastasis to bone: Patient comes in today for toxicity check prior to cycle 4 of chemotherapy with docetaxel.  He is tolerating treatment very well.  He has no significant toxicity on CBC or CMP.  However, his PSA did increase.  He will proceed with treatment as planned and follow-up with me prior to the next cycle.  Before that visit I will plan to get restaging CT CAP with contrast and bone scan.  He may be a candidate for Xofigo as the next line of treatment.     Bone metastases: q3mo Xgeva is on hold while the patient awaits further dental work including tooth extraction.      Patient was given instructions and  counseling regarding his condition or for health maintenance advice. Please see specific information pulled into the AVS if appropriate.     Natalya Jack MD PhD    9/14/2023

## 2023-08-03 NOTE — PROGRESS NOTES
Chief Complaint  Chief Complaint   Patient presents with    Hypertension       HPI:  Semaj Medina presents to [facility]    The patient's blood pressure is mildly elevated today, 08/03/2023, at 142/82 mmHg.    The patient is overdue to redo his colonoscopy. His last colonoscopy was performed by Dr. Nirmala Moseley on 07/31/2019. He was found to have some polyps in his colon.    Dr. Natalya Jack did some laboratory tests on 07/13/2023, which showed that his hemoglobin went up. It also showed that he has good kidney function. He states that he has appetite problem since he had a dental procedure done as well as he is undergoing chemotherapy.    The patient reports that he has been having sores in his mouth since starting chemotherapy and undergoing a dental procedure. He was initially under the impression that the mouth sores were caused by the dental procedure. Dr. Natalya Jack had informed him that chemotherapy can cause mouth sores.    Procedures     Past History:  Medical History: has a past medical history of Anemia, Anxiety, Blood disease, Colon polyps, Depression, Diverticulitis, Forgetfulness, Hepatitis C, Hypertension, Night sweats, Numbness and tingling of left lower extremity, and Prostate cancer.   Surgical History: has a past surgical history that includes Prostate biopsy; Prostatectomy; Transurethral resection of bladder tumor; Colonoscopy; and Venous Access Device (Port) (Right, 5/22/2023).   Family History: family history includes Cancer in his father and mother.   Social History: reports that he has been smoking cigarettes. He started smoking about 52 years ago. He has a 10.50 pack-year smoking history. He has been exposed to tobacco smoke. He has never used smokeless tobacco. He reports current alcohol use of about 6.0 standard drinks per week. He reports that he does not use drugs.  Immunization History   Administered Date(s) Administered    COVID-19  (PFIZER) Purple Cap Monovalent 03/07/2021, 04/04/2021, 12/14/2021    Flu Vaccine Quad PF >36MO 11/13/2018    Flu Vaccine Split Quad 10/21/2019    Fluzone >6mos 11/13/2018    Influenza, Unspecified 08/22/2022    Pneumococcal Conjugate 20-Valent (PCV20) 07/25/2022    Shingrix 08/23/2022, 02/06/2023    Tdap 08/23/2022         Allergies: Patient has no known allergies.     Medications:  Current Outpatient Medications on File Prior to Visit   Medication Sig Dispense Refill    albuterol sulfate  (90 Base) MCG/ACT inhaler       Calcium Citrate-Vitamin D3 (CITRACAL) 315-6.25 MG-MCG tablet tablet Take 1 tablet by mouth Daily.      dexamethasone (DECADRON) 4 MG tablet Take 2 tablets oral twice a day for 3 consecutive days beginning the day before chemotherapy and continue for 6 doses. 72 tablet 0    diclofenac (VOLTAREN) 0.1 % ophthalmic solution INSTILL 1 DROP IN RIGHT EYE THREE TIMES DAILY      dronabinol (Marinol) 5 MG capsule Take 1 capsule by mouth 2 (Two) Times a Day Before Meals. 60 capsule 3    ergocalciferol (ERGOCALCIFEROL) 1.25 MG (74922 UT) capsule       folic acid (FOLVITE) 1 MG tablet       HYDROcodone-acetaminophen (NORCO)  MG per tablet Take 1 tablet by mouth Every 4 (Four) Hours As Needed for Moderate Pain. 150 tablet 0    ibuprofen (ADVIL,MOTRIN) 800 MG tablet TAKE 1 TABLET BY MOUTH EVERY 6 - 8 HOURS AS NEEDED FOR PAIN      leuprolide (Lupron Depot, 3-Month,) 22.5 MG injection 22.5 mg.      lisinopril (PRINIVIL,ZESTRIL) 40 MG tablet Take 1 tablet by mouth Daily. 90 tablet 0    Nicotine Mini 4 MG lozenge       ondansetron (ZOFRAN) 8 MG tablet Take 1 tablet by mouth Every 8 (Eight) Hours As Needed for Nausea or Vomiting. 30 tablet 3     No current facility-administered medications on file prior to visit.        Health Maintenance Due   Topic Date Due    COLORECTAL CANCER SCREENING  07/01/2022       Vital Signs:   Vitals:    08/03/23 1431   BP: 142/82   Pulse: 77   Resp: 14   Temp: 97.9 øF (36.6  "øC)   SpO2: 99%   Weight: 56.2 kg (124 lb)   Height: 176.5 cm (69.5\")      Body mass index is 18.05 kg/mý.     ROS:  Review of Systems   Constitutional:  Negative for fatigue and fever.   HENT:  Negative for congestion and ear pain.    Eyes:  Negative for blurred vision and discharge.   Respiratory:  Negative for cough and shortness of breath.    Cardiovascular:  Negative for chest pain, palpitations and leg swelling.   Gastrointestinal:  Negative for abdominal distention, abdominal pain, constipation and diarrhea.   Genitourinary:  Negative for difficulty urinating and dysuria.   Musculoskeletal:  Negative for back pain and gait problem.   Skin:  Negative for rash and skin lesions.   Neurological:  Negative for headache, memory problem and confusion.   Psychiatric/Behavioral:  Negative for behavioral problems and depressed mood.         Physical Exam  Vitals reviewed.   Constitutional:       Appearance: Normal appearance.   HENT:      Head: Normocephalic.      Right Ear: Tympanic membrane normal.      Left Ear: Tympanic membrane normal.      Nose: Nose normal.      Mouth/Throat:      Mouth: Mucous membranes are moist.   Eyes:      Extraocular Movements: Extraocular movements intact.      Conjunctiva/sclera: Conjunctivae normal.      Pupils: Pupils are equal, round, and reactive to light.   Cardiovascular:      Rate and Rhythm: Normal rate and regular rhythm.      Pulses: Normal pulses.      Heart sounds: Normal heart sounds.   Pulmonary:      Effort: Pulmonary effort is normal.      Breath sounds: Normal breath sounds.   Abdominal:      General: Bowel sounds are normal.      Palpations: Abdomen is soft.   Musculoskeletal:         General: Normal range of motion.      Cervical back: Normal range of motion.   Skin:     General: Skin is warm and dry.   Neurological:      General: No focal deficit present.      Mental Status: He is alert and oriented to person, place, and time.   Psychiatric:         Mood and Affect: " Mood normal.         Behavior: Behavior normal.        Result Review   OPTICAL PRESCRIPTIONS - SCAN - MAGIC MOUTH WASH , 8/3/2023 (08/03/2023)      Diagnoses and all orders for this visit:    1. Primary hypertension (Primary)    2. Unspecified lesions of oral mucosa      Mouth sores  - The patient is advised the use of Magic Mouthwash to swish and swash that can provide numbing feeling and decrease the soreness in his mouth.    Health maintenance  - The patient will be ordered a colonoscopy.  - The patient is advised to keep a good healthy diet and incorporate a good amount of high protein.  - The patient is advised to consume fish, as it is high in protein.  - The patient is advised to consume High-Protein Ensure, which is good for his muscles and his immune system for him to be stronger.  - The patient is advised that his kidneys are tolerating his chemotherapy.    Smoking Cessation:    Semaj Medina  reports that he has been smoking cigarettes. He started smoking about 52 years ago. He has a 10.50 pack-year smoking history. He has been exposed to tobacco smoke. He has never used smokeless tobacco.          Follow Up   Return in about 3 months (around 11/3/2023).  Patient was given instructions and counseling regarding his condition or for health maintenance advice. Please see specific information pulled into the AVS if appropriate.       Maryse Monae MD      Transcribed from ambient dictation for Maryse Monae MD by Agatha Mcduffie.  08/03/23   20:15 EDT    Patient or patient representative verbalized consent to the visit recording.  I have personally performed the services described in this document as transcribed by the above individual, and it is both accurate and complete.

## 2023-08-04 ENCOUNTER — HOSPITAL ENCOUNTER (OUTPATIENT)
Dept: ONCOLOGY | Facility: HOSPITAL | Age: 68
Discharge: HOME | End: 2023-08-04
Payer: MEDICARE

## 2023-08-04 VITALS
TEMPERATURE: 97.9 F | OXYGEN SATURATION: 99 % | WEIGHT: 125 LBS | HEART RATE: 73 BPM | RESPIRATION RATE: 18 BRPM | BODY MASS INDEX: 18.51 KG/M2 | SYSTOLIC BLOOD PRESSURE: 145 MMHG | DIASTOLIC BLOOD PRESSURE: 79 MMHG | HEIGHT: 69 IN

## 2023-08-04 DIAGNOSIS — C61 PROSTATE CANCER METASTATIC TO BONE: Primary | ICD-10-CM

## 2023-08-04 DIAGNOSIS — C79.51 PROSTATE CANCER METASTATIC TO BONE: Primary | ICD-10-CM

## 2023-08-04 DIAGNOSIS — C61 PROSTATE CANCER: ICD-10-CM

## 2023-08-04 PROCEDURE — 25010000002 DOCETAXEL 20 MG/ML SOLUTION 8 ML VIAL: Performed by: INTERNAL MEDICINE

## 2023-08-04 PROCEDURE — 25010000002 HEPARIN LOCK FLUSH PER 10 UNITS: Performed by: INTERNAL MEDICINE

## 2023-08-04 PROCEDURE — 96413 CHEMO IV INFUSION 1 HR: CPT

## 2023-08-04 RX ORDER — SODIUM CHLORIDE 9 MG/ML
250 INJECTION, SOLUTION INTRAVENOUS ONCE
Status: COMPLETED | OUTPATIENT
Start: 2023-08-04 | End: 2023-08-04

## 2023-08-04 RX ORDER — SODIUM CHLORIDE 0.9 % (FLUSH) 0.9 %
20 SYRINGE (ML) INJECTION AS NEEDED
Status: DISCONTINUED | OUTPATIENT
Start: 2023-08-04 | End: 2023-08-05 | Stop reason: HOSPADM

## 2023-08-04 RX ORDER — HEPARIN SODIUM (PORCINE) LOCK FLUSH IV SOLN 100 UNIT/ML 100 UNIT/ML
500 SOLUTION INTRAVENOUS AS NEEDED
OUTPATIENT
Start: 2023-08-04

## 2023-08-04 RX ORDER — HEPARIN SODIUM (PORCINE) LOCK FLUSH IV SOLN 100 UNIT/ML 100 UNIT/ML
500 SOLUTION INTRAVENOUS AS NEEDED
Status: DISCONTINUED | OUTPATIENT
Start: 2023-08-04 | End: 2023-08-05 | Stop reason: HOSPADM

## 2023-08-04 RX ORDER — SODIUM CHLORIDE 0.9 % (FLUSH) 0.9 %
20 SYRINGE (ML) INJECTION AS NEEDED
OUTPATIENT
Start: 2023-08-04

## 2023-08-04 RX ADMIN — HEPARIN SODIUM (PORCINE) LOCK FLUSH IV SOLN 100 UNIT/ML 500 UNITS: 100 SOLUTION at 11:20

## 2023-08-04 RX ADMIN — SODIUM CHLORIDE 250 ML: 9 INJECTION, SOLUTION INTRAVENOUS at 09:53

## 2023-08-04 RX ADMIN — DOCETAXEL 120 MG: 20 INJECTION, SOLUTION, CONCENTRATE INTRAVENOUS at 10:08

## 2023-08-04 RX ADMIN — Medication 20 ML: at 11:19

## 2023-08-10 ENCOUNTER — TELEPHONE (OUTPATIENT)
Dept: FAMILY MEDICINE CLINIC | Facility: CLINIC | Age: 68
End: 2023-08-10

## 2023-08-10 NOTE — TELEPHONE ENCOUNTER
Caller: Semaj Medina Osborne    Relationship: Self    Best call back number: [phone]     What is the best time to reach you: ANYTIME     Who are you requesting to speak with (clinical staff, provider,  specific staff member): CLINICAL         What was the call regarding: PATIENT IS CALLING RETURNING A PHONE CALL TO THE OFFICE, RECEIVED A MISSED CALL FROM THE OFFICE PLEASE ADVISE.

## 2023-08-14 ENCOUNTER — HOSPITAL ENCOUNTER (OUTPATIENT)
Dept: CT IMAGING | Facility: HOSPITAL | Age: 68
Discharge: HOME | End: 2023-08-14
Admitting: INTERNAL MEDICINE
Payer: MEDICARE

## 2023-08-14 ENCOUNTER — HOSPITAL ENCOUNTER (OUTPATIENT)
Dept: NUCLEAR MEDICINE | Facility: HOSPITAL | Age: 68
Discharge: HOME | End: 2023-08-14
Payer: MEDICARE

## 2023-08-14 DIAGNOSIS — C61 PROSTATE CANCER: ICD-10-CM

## 2023-08-14 PROCEDURE — 25510000001 IOPAMIDOL PER 1 ML: Performed by: INTERNAL MEDICINE

## 2023-08-14 PROCEDURE — 71260 CT THORAX DX C+: CPT

## 2023-08-14 PROCEDURE — 78306 BONE IMAGING WHOLE BODY: CPT

## 2023-08-14 PROCEDURE — 0 TECHNETIUM MEDRONATE KIT: Performed by: INTERNAL MEDICINE

## 2023-08-14 PROCEDURE — A9503 TC99M MEDRONATE: HCPCS | Performed by: INTERNAL MEDICINE

## 2023-08-14 PROCEDURE — 74177 CT ABD & PELVIS W/CONTRAST: CPT

## 2023-08-14 RX ORDER — TC 99M MEDRONATE 20 MG/10ML
22.5 INJECTION, POWDER, LYOPHILIZED, FOR SOLUTION INTRAVENOUS
Status: COMPLETED | OUTPATIENT
Start: 2023-08-14 | End: 2023-08-14

## 2023-08-14 RX ADMIN — TC 99M MEDRONATE 22.5 MILLICURIE: 20 INJECTION, POWDER, LYOPHILIZED, FOR SOLUTION INTRAVENOUS at 09:15

## 2023-08-14 RX ADMIN — IOPAMIDOL 100 ML: 755 INJECTION, SOLUTION INTRAVENOUS at 09:59

## 2023-08-16 ENCOUNTER — DOCUMENTATION (OUTPATIENT)
Dept: NUTRITION | Facility: HOSPITAL | Age: 68
End: 2023-08-16
Payer: MEDICARE

## 2023-08-16 ENCOUNTER — OFFICE VISIT (OUTPATIENT)
Dept: RADIATION ONCOLOGY | Facility: HOSPITAL | Age: 68
End: 2023-08-16
Payer: MEDICARE

## 2023-08-16 VITALS
SYSTOLIC BLOOD PRESSURE: 124 MMHG | TEMPERATURE: 98.9 F | BODY MASS INDEX: 17.37 KG/M2 | DIASTOLIC BLOOD PRESSURE: 78 MMHG | HEART RATE: 84 BPM | WEIGHT: 119.27 LBS | OXYGEN SATURATION: 99 %

## 2023-08-16 DIAGNOSIS — C79.51 SECONDARY MALIGNANT NEOPLASM OF BONE: Primary | ICD-10-CM

## 2023-08-16 PROCEDURE — G0463 HOSPITAL OUTPT CLINIC VISIT: HCPCS | Performed by: RADIOLOGY

## 2023-08-16 NOTE — PROGRESS NOTES
Outpatient Nutrition Oncology Assessment    Patient Name: Semaj Medina  YOB: 1955  MRN: [id number]  Assessment Date: 8/16/2023    CLINICAL NUTRITION ASSESSMENT    Dx:  prostate Ca with bone mets      Type of Cancer Treatment Leuprolide, Xgeva, Docetaxel   S/P XRT [facility] thoracic spine         Reason for Assessment  Physician consult         Current Problems:   Patient Active Problem List   Diagnosis Code    Prostate cancer metastatic to bone C61, C79.51    Anemia D64.9    Anxiety F41.9    Blood disease D75.9    Colon polyps K63.5    Depression F32.A    Diverticulitis K57.92    Forgetfulness R68.89    Hepatitis C B19.20    Night sweats R61    Numbness and tingling of left lower extremity R20.0, R20.2    Prostate cancer C61    Secondary malignant neoplasm of bone C79.51    Cancer related pain G89.3    Primary hypertension I10    Colon cancer screening Z12.11         Anthropometrics       Row Name 08/16/23 1450          Anthropometrics    Weight for Calculation 54.1 kg (119 lb 4.3 oz)                         Weight Hx  Wt Readings from Last 30 Encounters:   08/16/23 1407 54.1 kg (119 lb 4.3 oz)   08/04/23 0939 56.7 kg (125 lb)   08/03/23 1431 56.2 kg (124 lb)   07/14/23 0927 56.4 kg (124 lb 5.4 oz)   06/23/23 0942 57.7 kg (127 lb 3.3 oz)   06/21/23 1056 56.5 kg (124 lb 9 oz)   06/09/23 1343 55.9 kg (123 lb 3.2 oz)   06/02/23 0845 57.2 kg (126 lb 1.7 oz)   05/22/23 0846 57.1 kg (125 lb 14.1 oz)   05/18/23 1410 55.3 kg (122 lb)   05/17/23 1507 55.6 kg (122 lb 9.2 oz)   05/11/23 1321 55.5 kg (122 lb 5.7 oz)   05/04/23 1511 57.3 kg (126 lb 5.2 oz)   04/25/23 1344 57.3 kg (126 lb 5.2 oz)   04/18/23 1005 57 kg (125 lb 10.6 oz)   02/17/23 0931 56.6 kg (124 lb 12.5 oz)   02/03/23 1448 56.2 kg (124 lb)   12/16/22 1011 55.7 kg (122 lb 12.7 oz)   10/13/22 1336 55.8 kg (123 lb)   09/13/22 1133 58 kg (127 lb 13.9 oz)   08/22/22 1333 57.9 kg (127 lb 9.6 oz)   07/04/22 1618 62.6 kg (138 lb)   06/17/22 0929 58 kg  (127 lb 13.9 oz)   03/18/22 0941 56.1 kg (123 lb 10.9 oz)   01/18/22 1055 59.2 kg (130 lb 8.2 oz)   12/17/21 1050 59 kg (130 lb 1.1 oz)   12/07/21 1329 58 kg (127 lb 13.9 oz)   12/02/21 1332 56.3 kg (124 lb 1.9 oz)   11/30/21 1319 58.3 kg (128 lb 8.5 oz)   10/28/21 1112 59 kg (130 lb 1.1 oz)   10/21/21 0918 60.6 kg (133 lb 11.2 oz)         Estimated/Assessed Needs - Anthropometrics       Row Name 08/16/23 1450          Anthropometrics    Weight for Calculation 54.1 kg (119 lb 4.3 oz)        Estimated/Assessed Needs    Additional Documentation KCAL/KG (Group);Protein Requirements (Group);Fluid Requirements (Group)        KCAL/KG    KCAL/KG 35 Kcal/Kg (kcal)     35 Kcal/Kg (kcal) 1893.5        Protein Requirements    Weight Used For Protein Calculations 54.1 kg (119 lb 4.3 oz)     Est Protein Requirement Amount (gms/kg) 1.5 gm protein     Estimated Protein Requirements (gms/day) 81.15        Fluid Requirements    Fluid Requirements (mL/day) 1894     RDA Method (mL) 1894                      Labs/Medications        Pertinent Labs Reviewed.       Pertinent Medications Calcium Citrate-Vitamin D3, HYDROcodone-acetaminophen, NON FORMULARY, albuterol sulfate HFA, amoxicillin, dexAMETHasone, diclofenac, dronabinol, ergocalciferol, folic acid, ibuprofen, leuprolide, lisinopril, nicotine polacrilex, and ondansetron     Physical Findings        Malnutrition Severity Assessment       Row Name 08/16/23 1450          Malnutrition Severity Assessment    Malnutrition Type Chronic Disease - Related Malnutrition        Insufficient Energy Intake     Insufficient Energy Intake Findings Moderate     Insufficient Energy Intake  <75% of est. energy requirement for > or equal to 1 month        Unintentional Weight Loss     Unintentional Weight Loss Findings Moderate  4% loss in 1 month        Criteria Met (Must meet criteria for severity in at least 2 of these categories: M Wasting, Fat Loss, Fluid, Secondary Signs, Wt. Status, Intake)     Patient meets criteria for  Moderate (non-severe) Malnutrition                      Current Nutrition Orders & Evaluation of Intake       Oral Nutrition     Current PO Diet Very soft / moist foods (soups, puddings, applesauce, grits)   Supplement Ensure Complete:  1 or 2 X day     Nutrition Diagnosis        Nutrition Dx Problem 1 Moderate malnutrition related to decreased ability to consume sufficient energy as evidenced by physiological causes increasing nutrient needs., hypermetabolic state., inadequate energy intake., patient report., and 4% wt decline in 1 month due to chewing concerns & mucositis.       Nutrition Intervention       RD Action Nutrition assessment (review of medical record & pt interview): discussion regarding recent wt changes, nutrition-related concerns, current eating patterns & schedule    Discussion:  Soft / moist food ideas reviewed (pt given handout 7/14/23)  Tips for maximizing nutrition  Suggestions on ONS  Reviewed MNT for oral care, including importance of using the homemade mouth rinse for future prevention of mucositis    Provided:  Samples of Ensure complete & coupons  Handout:  Oral Care     Monitor/Evaluation       Monitor Per oncology nutrition protocol.     Comments:    Nutrition referral due to significant wt decline / decreased oral intake.  Pt reports he has continued to struggle eating after a dental extraction procedure (mid-July 2023).  Pt spoke to Yvrose, Oncology RD, on 7/14/23 via phone.  Please refer to RD documentation from 7/24/23.    Pt has continued to experience issues eating and also developed mucositis with Docetaxel.  He can only eat very soft & moist foods.  His PCP gave Rx for medication to tx mucositis, however was not Kylee's Magic mouthwash.  Pt has f/u with Oncologist, but not until later this month.  Encouraged pt to call and speak to Triage nurse regarding mucositis and perhaps can have medication called in.  He is trying to consume Ensure Complete but  notes it is expensive- therefore requested coupons and / or samples- provided both.pt to have dentures fitted but not for a couple of month- and he questions if can proceed w/ current mucositis.      Electronically signed by:  Natalya Miller RD  08/16/23 14:51 EDT

## 2023-08-16 NOTE — PROGRESS NOTES
Follow Up Office Visit      Encounter Date: 08/16/2023   Patient Name: Semaj Medina  YOB: 1955   Medical Record Number: [id number]   Primary Diagnosis: No primary diagnosis found.   Cancer Staging: Cancer Staging   No matching staging information was found for the patient.              Cancer Staging   No matching staging information was found for the patient.        Chief Complaint:    Chief Complaint   Patient presents with    Follow-up       Oncologic History: Semaj Medina is a 67 y.o. male here for follow up regarding his metastatic prostate cancer.      He was originally diagnosed in 2015 and his prostate cancer was managed surgically.  He was found to have pT3b pN0 4+3 disease.  He had positive margins at the bladder neck.  He received adjuvant radiation therapy with Dr. Stanton [facility].  In 2017, his PSA began to rise and he was started on Lupron.  He took a brief holiday.  In 2020 he was started on Zytiga.  He tolerated this poorly and his dose was reduced multiple times.   Restaging imaging in 2021 demonstrated a T0 lesion, which causing pain that limited his function. This was treated palliatively with radiation - 30 Gy/10 fractions, 12/2021    He was transitioned to lupron/xtandi   PSA was increasing and PSMA 5/2023 demonstrated metastatic disease throughout the chest, abdomen, and pelvis.  He was started on docetaxel. He is here for f/u        Prior Radiation: adjuvant prostate radiation 2015 (Dr. Stanton), T9-11 30 Gy/10 fractions, completed 12/9/21.     Subjective      Review of Systems: Review of Systems   Constitutional:  Positive for appetite change (States that he doesn't have an appetite, spoke to patient about boost/ensure, samples given.), fatigue (6/10) and unexpected weight change (Down 6 lb since 8/4/23. Pt unable to eat due to sores in mouth from chemo).   HENT:  Negative for rhinorrhea.    Respiratory:  Positive for  cough (Newer, productive with clear secretions.). Negative for shortness of breath.    Cardiovascular:  Negative for chest pain, palpitations and leg swelling.   Gastrointestinal:  Negative for blood in stool, constipation, diarrhea, nausea and vomiting.   Genitourinary:  Negative for difficulty urinating, dysuria, frequency, hematuria and urgency.        Noc x2-3, recently worsened.  Weak stream, recently worsened  Occasional MATEO       Musculoskeletal:  Positive for back pain (Ongoing) and myalgias (New right side/rib pain, 8/10). Negative for arthralgias and joint swelling.   Skin:  Negative for color change and rash.   Neurological:  Positive for headaches (Occasional, takes ibuprofen as needed.). Negative for dizziness.   Psychiatric/Behavioral:  Positive for sleep disturbance (Mocturia improved, now x 1).      The following portions of the patient's history were reviewed and updated as appropriate: allergies, current medications, past family history, past medical history, past social history, past surgical history and problem list.    Measures:   Pain: (on a scale of 0-10)   Pain Score    08/16/23 1407   PainSc:   6   PainLoc: Mouth  Comment: Sores related to chemo     Semaj Medina reports a pain score of 6.  Given his pain assessment as noted, treatment options were discussed and the following options were decided upon as a follow-up plan to address the patient's pain: continuation of current treatment plan for pain.    Advanced Care Plan: N Advance Care Planning   ACP discussion was declined by the patient. Patient does not have an advance directive, declines further assistance.       KPS/Quality of Life: 80 - Restricted Physical Activity  ECOG: (1) Restricted in physically strenuous activity, ambulatory and able to do work of light nature  Depression Screening:   Patient screened positive for depression based on a PHQ-9 score of 12 on 8/16/2023. Follow-up recommendations include: Elevated PHQ score  reflective of acute illness, not depression.        Objective     Physical Exam:   Vital Signs:   Vitals:    08/16/23 1407   BP: 124/78   Pulse: 84   Temp: 98.9 øF (37.2 øC)   TempSrc: Temporal   SpO2: 99%   Weight: 54.1 kg (119 lb 4.3 oz)   PainSc:   6   PainLoc: Mouth  Comment: Sores related to chemo     Body mass index is 17.37 kg/mý.     Constitutional: No acute distress, sitting comfortably  Eye: EOMI, anicteric sclerae  HENT: NC/AT, MMM   Respiratory: Symmetric expansion, nonlabored respiration  MSK: ROM intact in all four extremities, no obvious deformities  Neuro: Alert, oriented x3, CN3-12 grossly intact.   Psych: Appropriate mood and affect.    Results:   Imaging: Images were reviewed personally and pertinent findings are detailed below.   CT Chest 8/14/23    FINDINGS:          There are no suspicious pulmonary nodules or masses to indicate pulmonary metastatic disease.    There are moderate emphysematous changes.  There are no layering pleural effusions.  The heart size   is normal.  There are atherosclerotic vascular calcifications without definite involvement of the   coronary arteries.  There is fusiform ectasia of the ascending thoracic aorta measuring 4.0 cm in   diameter.  The proximal descending thoracic aorta is ectatic measuring 3.7 cm in diameter.  There   is a small hiatal hernia.  There is a right-sided port in place.  There are tiny hypodense nodules   in the left lobe of the thyroid gland which are unchanged.  There are osteosclerotic metastatic   lesions with the most prominent lesion located in the T10 vertebral body.     IMPRESSION:                 1. No evidence of pulmonary metastatic disease.  2. Moderate emphysema.  3. Stable ectasia of the thoracic aorta.  4. Stable osteosclerotic metastatic disease.  5. Additional findings as noted above.     CT Abd/Pelvis 8/14/23  The prostate gland and seminal vesicles are surgically absent.  The urinary bladder wall is   thickened and unchanged  from the previous study.  There are enhancing masses in the liver   consistent with cavernous hemangiomas.  The spleen, adrenal glands, and pancreas are normal.  There   are bilateral renal cortical cysts.  The stomach is abnormal.  There is suggestion of polypoid   masses.  I would recommend further evaluation with an EGD.  The appendix is normal.  There is   colonic diverticulosis located mainly in the descending and sigmoid colon without evidence of acute   diverticulitis.  There are no enlarged lymph nodes.  There are extensive atherosclerotic vascular   calcifications in the abdomen and pelvis.  There is mural thrombus within the abdominal aorta.    There are stable sclerotic lesions in the left iliac bone.  This is consistent with osseous   metastatic disease.  There are no new sclerotic lesions when compared to the previous exam.     IMPRESSION:                 1. Stable sclerotic lesion in the left iliac bone consistent with osseous metastatic disease.  2. There is suggestion of polypoid masses within the stomach.  I would recommend further evaluation   with an EGD.  3. Status post prostatectomy and resection of the seminal vesicles.  4. Persistent urinary bladder wall thickening.  5. Cavernous hemangiomas within the liver.  6. Additional incidental findings as noted above.     Bone Scan 8/14/23    1. Increased uptake in the right aspect of the T10 vertebral body.  It is less intense than the   previous study and consistent with the known sclerotic metastatic lesion on CT imaging.  2. There were additional sclerotic lesions on the today CT exam with no corresponding increased   radiotracer uptake indicating healed metastasis.     PSA   7/2023 - 5.5 ng/ml  8/2023 - 4.8 ng/ml    Assessment / Plan        Assessment/Plan:     There are no diagnoses linked to this encounter.      Semaj Medina is a pleasant 67 y.o. male with a diagnosis of prostate cancer managed surgically in 2015, with adjuvant RT at that  time (Dr. Stanton). He later developed metastatic disease and has had palliative RT to vertebral metastases. He was on lupron and enzalutamide with PSA continuing to rise.   Unfortunately his PSMA PET demonstrated metastatic involvement throughout the neck, thorax, abdomen/pelvis. Dr. Jack and I discussed his case and she has started him on docetaxel. Fortunately he is tolerating his docetaxel well and disease appears controlled radiographically. His PSA has declined in the last month.   If he progresses through docetaxel and is not a candidate for further systemic treatment, he may be a candidate for radiopharmaceutical treatment with [id number]. He will return to clinic in 3 months or as needed before that time.     Follow Up:   Return in about 3 months (around 11/16/2023) for Office Visit.        Time:   I spent 35 minutes on this encounter today, 08/17/23. Activities that took place during this time include:   - preparing to see the patient  - obtaining and reviewing separately obtained history  - performing a medically appropriate examination and evaluation  - counseling and educating the patient  - ordering medications/tests/procedures  - communicating with other healthcare providers  - documenting clinical information in the health record  - coordinating care for this patient.     Sincerely,        Jessica Arita MD  Radiation Oncology  [facility]    This document has been signed by Jessica Arita MD on August 17, 2023 10:55 EDT           NOTICE TO PATIENTS  At [facility], we believe that sharing information builds trust and better relationships. You are receiving this note because you recently visited [facility]. It is possible you will see health information before a provider has talked with you about it. This kind of information can be easy to misunderstand. To help you fully understand what it means for your health, we urge you to discuss this note with your provider.

## 2023-08-22 DIAGNOSIS — C79.51 PROSTATE CANCER METASTATIC TO BONE: ICD-10-CM

## 2023-08-22 DIAGNOSIS — C61 PROSTATE CANCER METASTATIC TO BONE: ICD-10-CM

## 2023-08-22 RX ORDER — HYDROCODONE BITARTRATE AND ACETAMINOPHEN 10; 325 MG/1; MG/1
1 TABLET ORAL EVERY 4 HOURS PRN
Qty: 150 TABLET | Refills: 0 | Status: SHIPPED | OUTPATIENT
Start: 2023-08-22

## 2023-08-22 NOTE — TELEPHONE ENCOUNTER
Caller: Semaj Medina Osborne    Relationship: Self    Best call back number: [phone]     Requested Prescriptions:   Requested Prescriptions     Pending Prescriptions Disp Refills    HYDROcodone-acetaminophen (NORCO)  MG per tablet 150 tablet 0     Sig: Take 1 tablet by mouth Every 4 (Four) Hours As Needed for Moderate Pain.        Pharmacy where request should be sent: [id number] - [place], [place] - [address] [facility] OF [address]/[facility] [place] & [place] - [phone] [facility] [phone] FX     Last office visit with prescribing clinician: 8/3/2023   Last telemedicine visit with prescribing clinician: Visit date not found   Next office visit with prescribing clinician: 8/24/2023     Additional details provided by patient:     Does the patient have less than a 3 day supply:  [x] Yes  [] No    Would you like a call back once the refill request has been completed: [] Yes [x] No    If the office needs to give you a call back, can they leave a voicemail: [] Yes [x] No

## 2023-08-24 ENCOUNTER — OFFICE VISIT (OUTPATIENT)
Dept: ONCOLOGY | Facility: HOSPITAL | Age: 68
End: 2023-08-24
Payer: MEDICARE

## 2023-08-24 ENCOUNTER — HOSPITAL ENCOUNTER (OUTPATIENT)
Dept: ONCOLOGY | Facility: HOSPITAL | Age: 68
Discharge: HOME | End: 2023-08-24
Admitting: INTERNAL MEDICINE
Payer: MEDICARE

## 2023-08-24 VITALS
RESPIRATION RATE: 18 BRPM | OXYGEN SATURATION: 99 % | WEIGHT: 124.34 LBS | HEART RATE: 75 BPM | BODY MASS INDEX: 18.42 KG/M2 | DIASTOLIC BLOOD PRESSURE: 81 MMHG | TEMPERATURE: 97.1 F | HEIGHT: 69 IN | SYSTOLIC BLOOD PRESSURE: 152 MMHG

## 2023-08-24 DIAGNOSIS — C79.51 PROSTATE CANCER METASTATIC TO BONE: Primary | ICD-10-CM

## 2023-08-24 DIAGNOSIS — C61 PROSTATE CANCER METASTATIC TO BONE: Primary | ICD-10-CM

## 2023-08-24 DIAGNOSIS — C79.51 PROSTATE CANCER METASTATIC TO BONE: ICD-10-CM

## 2023-08-24 DIAGNOSIS — C61 PROSTATE CANCER METASTATIC TO BONE: ICD-10-CM

## 2023-08-24 DIAGNOSIS — C61 PROSTATE CANCER: ICD-10-CM

## 2023-08-24 LAB
ALBUMIN SERPL-MCNC: 3.1 G/DL (ref 3.5–5.2)
ALBUMIN/GLOB SERPL: 1.5 G/DL
ALP SERPL-CCNC: 41 U/L (ref 39–117)
ALT SERPL W P-5'-P-CCNC: 4 U/L (ref 1–41)
ANION GAP SERPL CALCULATED.3IONS-SCNC: 8.2 MMOL/L (ref 5–15)
AST SERPL-CCNC: 12 U/L (ref 1–40)
BASOPHILS # BLD AUTO: 0.01 10*3/MM3 (ref 0–0.2)
BASOPHILS NFR BLD AUTO: 0.2 % (ref 0–1.5)
BILIRUB SERPL-MCNC: 0.2 MG/DL (ref 0–1.2)
BUN SERPL-MCNC: 6 MG/DL (ref 8–23)
BUN/CREAT SERPL: 8.2 (ref 7–25)
CALCIUM SPEC-SCNC: 7.9 MG/DL (ref 8.6–10.5)
CHLORIDE SERPL-SCNC: 110 MMOL/L (ref 98–107)
CO2 SERPL-SCNC: 27.8 MMOL/L (ref 22–29)
CREAT SERPL-MCNC: 0.73 MG/DL (ref 0.76–1.27)
DEPRECATED RDW RBC AUTO: 55.1 FL (ref 37–54)
EGFRCR SERPLBLD CKD-EPI 2021: 99.7 ML/MIN/1.73
EOSINOPHIL # BLD AUTO: 0.09 10*3/MM3 (ref 0–0.4)
EOSINOPHIL NFR BLD AUTO: 1.5 % (ref 0.3–6.2)
ERYTHROCYTE [DISTWIDTH] IN BLOOD BY AUTOMATED COUNT: 17.7 % (ref 12.3–15.4)
GLOBULIN UR ELPH-MCNC: 2.1 GM/DL
GLUCOSE SERPL-MCNC: 105 MG/DL (ref 65–99)
HCT VFR BLD AUTO: 30.1 % (ref 37.5–51)
HGB BLD-MCNC: 9.8 G/DL (ref 13–17.7)
IMM GRANULOCYTES # BLD AUTO: 0.06 10*3/MM3 (ref 0–0.05)
IMM GRANULOCYTES NFR BLD AUTO: 1 % (ref 0–0.5)
LYMPHOCYTES # BLD AUTO: 1.72 10*3/MM3 (ref 0.7–3.1)
LYMPHOCYTES NFR BLD AUTO: 29.1 % (ref 19.6–45.3)
MCH RBC QN AUTO: 28 PG (ref 26.6–33)
MCHC RBC AUTO-ENTMCNC: 32.6 G/DL (ref 31.5–35.7)
MCV RBC AUTO: 86 FL (ref 79–97)
MONOCYTES # BLD AUTO: 0.56 10*3/MM3 (ref 0.1–0.9)
MONOCYTES NFR BLD AUTO: 9.5 % (ref 5–12)
NEUTROPHILS NFR BLD AUTO: 3.47 10*3/MM3 (ref 1.7–7)
NEUTROPHILS NFR BLD AUTO: 58.7 % (ref 42.7–76)
PLATELET # BLD AUTO: 348 10*3/MM3 (ref 140–450)
PMV BLD AUTO: 9.1 FL (ref 6–12)
POTASSIUM SERPL-SCNC: 3.8 MMOL/L (ref 3.5–5.2)
PROT SERPL-MCNC: 5.2 G/DL (ref 6–8.5)
PSA SERPL-MCNC: 4.57 NG/ML (ref 0–4)
RBC # BLD AUTO: 3.5 10*6/MM3 (ref 4.14–5.8)
SODIUM SERPL-SCNC: 146 MMOL/L (ref 136–145)
WBC NRBC COR # BLD: 5.91 10*3/MM3 (ref 3.4–10.8)

## 2023-08-24 PROCEDURE — 84153 ASSAY OF PSA TOTAL: CPT | Performed by: INTERNAL MEDICINE

## 2023-08-24 PROCEDURE — G0463 HOSPITAL OUTPT CLINIC VISIT: HCPCS | Performed by: INTERNAL MEDICINE

## 2023-08-24 PROCEDURE — 85025 COMPLETE CBC W/AUTO DIFF WBC: CPT | Performed by: INTERNAL MEDICINE

## 2023-08-24 PROCEDURE — 36591 DRAW BLOOD OFF VENOUS DEVICE: CPT

## 2023-08-24 PROCEDURE — 80053 COMPREHEN METABOLIC PANEL: CPT | Performed by: INTERNAL MEDICINE

## 2023-08-24 PROCEDURE — 25010000002 HEPARIN LOCK FLUSH PER 10 UNITS: Performed by: INTERNAL MEDICINE

## 2023-08-24 RX ORDER — HEPARIN SODIUM (PORCINE) LOCK FLUSH IV SOLN 100 UNIT/ML 100 UNIT/ML
500 SOLUTION INTRAVENOUS AS NEEDED
Status: DISCONTINUED | OUTPATIENT
Start: 2023-08-24 | End: 2023-08-25 | Stop reason: HOSPADM

## 2023-08-24 RX ORDER — SODIUM CHLORIDE 0.9 % (FLUSH) 0.9 %
20 SYRINGE (ML) INJECTION AS NEEDED
Status: CANCELLED | OUTPATIENT
Start: 2023-08-24

## 2023-08-24 RX ORDER — LIDOCAINE HYDROCHLORIDE 20 MG/ML
SOLUTION OROPHARYNGEAL
COMMUNITY
Start: 2023-08-03

## 2023-08-24 RX ORDER — SODIUM CHLORIDE 0.9 % (FLUSH) 0.9 %
20 SYRINGE (ML) INJECTION AS NEEDED
Status: DISCONTINUED | OUTPATIENT
Start: 2023-08-24 | End: 2023-08-25 | Stop reason: HOSPADM

## 2023-08-24 RX ORDER — HEPARIN SODIUM (PORCINE) LOCK FLUSH IV SOLN 100 UNIT/ML 100 UNIT/ML
500 SOLUTION INTRAVENOUS AS NEEDED
Status: CANCELLED | OUTPATIENT
Start: 2023-08-24

## 2023-08-24 RX ADMIN — Medication 20 ML: at 09:09

## 2023-08-24 RX ADMIN — HEPARIN SODIUM (PORCINE) LOCK FLUSH IV SOLN 100 UNIT/ML 500 UNITS: 100 SOLUTION at 09:08

## 2023-08-24 NOTE — PROGRESS NOTES
Patient  Semaj Medina    Location  [facility] HEMATOLOGY & ONCOLOGY    Chief Complaint  Prostate Cancer    Referring Provider: Natalya Jack MD PhD  PCP: Maryse Monae MD    Subjective          Oncology/Hematology History Overview Note     Metastatic Castration resistant prostate cancer:    Patient was initially diagnosed with prostate cancer in 2015.  On 6/4/2015 he underwent radical prostatectomy and LN dissection which showed a Zach 4+3 = 7 prostate cancer.  There were several high risk features such as extraprostatic extension, seminal vesicle invasion, positive margins at the bladder neck and right and left seminal vesicle.  Lymph vascular invasion was indeterminate.  Perineural invasion was not commented on. Final pathology [id number] R1.  He was treated with adjuvant radiation by Dr. Stanton.    According to records the patient likely started Lupron in December 2017 when his PSA started to rise.  The patient took a break in August 2019.  He states he was not aware he was to continue.  He restarted Lupron on 11/26/2019 after it was noted that his PSA chago from 0.46 up to 2.55 on 11/8/2019.  Unfortunately his PSA continued to rise after that to 4.54 on 3/10/2020.    - restarted Lupron on 11/26/20  - Started Zytiga on 8/27/20.  8/31/2020 PSA 17.95     - Started Xtandi in May 2021 due to intolerance of Zytiga even at the lowest dose  - PSA rising in April 2023 to 2.85, doubling time 3.4 months  - started Docetaxel May 2023    PSMA PET 5/2/23:   1.  Radiotracer avid supra clavicular, thoracic and abdominal adenopathy and sclerotic lesion within T10 vertebral body likely representing metastatic disease.  2.  Indeterminate lesion within the left ilium demonstrating mild uptake.   3.  Asymmetric sclerosis within the superior lateral aspect the left orbit which does not demonstrate significant uptake above that of the right orbit. Findings are nonspecific with  differential  considerations including but not limited to metastatic disease, fibrous dysplasia versus Paget's disease.   4.  Sub-6 mm nodule within the left lower lobe.  5.  Scattered indeterminate hypodense lesions throughout the liver which do not demonstrate radiotracer uptake above that of the adjacent liver, best best visualized on recent multiphase CT abdomen and pelvis 04/17/2023. Please refer to this dictation for further information.       Prostate cancer metastatic to bone   12/11/2020 -  Chemotherapy    OP SUPPORTIVE Leuprolide 45 mg Q6M       3/23/2021 -  Chemotherapy    OP SUPPORTIVE Denosumab (Xgeva) Q28D       4/15/2021 - 5/13/2021 Chemotherapy    OP PROSTATE Enzalutamide       5/19/2021 Initial Diagnosis    Prostate cancer metastatic to bone (CMS/HCC)     6/2/2023 Biopsy    OP PROSTATE DOCEtaxel  Plan Provider: Natalya Jack MD PhD  Treatment goal: Palliative  Line of treatment: [No plan line of treatment]     Prostate cancer   6/16/2021 Initial Diagnosis    Prostate cancer (HCC)     6/2/2023 -  Chemotherapy    OP CENTRAL VENOUS ACCESS DEVICE ACCESS, CARE, AND MAINTENANCE (CVAD)       Secondary malignant neoplasm of bone   11/4/2021 Initial Diagnosis    Secondary malignant neoplasm of bone (HCC)     11/17/2021 -  Radiation    RADIATION THERAPY Treatment Details (Noted on 11/4/2021)  Site: Bilateral Spine - Thoracic  Technique: 3D CRT  Goal: No goal specified  Planned Treatment Start Date: 11/17/2021         History of Present Illness  Patient comes in today for toxicity check prior to his next cycle of treatment.  Overall he is tolerating treatment fairly well.  His hemoglobin has decreased further and is now 9.8.  On 8/14/2023 he had repeat imaging including CT CAP with contrast and bone scan.  This appears to show stable disease in the bones and some unexplained polypoid masses in the stomach.    Review of Systems   Constitutional:  Negative for appetite change, diaphoresis, fatigue, fever, unexpected  weight gain and unexpected weight loss.   HENT:  Negative for hearing loss, mouth sores, sore throat, swollen glands, trouble swallowing and voice change.    Eyes:  Negative for blurred vision.   Respiratory:  Negative for cough, shortness of breath and wheezing.    Cardiovascular:  Negative for chest pain and palpitations.   Gastrointestinal:  Negative for abdominal pain, blood in stool, constipation, diarrhea, nausea and vomiting.   Endocrine: Negative for cold intolerance and heat intolerance.   Genitourinary:  Negative for difficulty urinating, dysuria, frequency, hematuria and urinary incontinence.   Musculoskeletal:  Positive for back pain. Negative for arthralgias and myalgias.   Skin:  Negative for rash, skin lesions and wound.   Neurological:  Negative for dizziness, seizures, weakness, numbness and headache.   Hematological:  Does not bruise/bleed easily.   Psychiatric/Behavioral:  Negative for depressed mood. The patient is not nervous/anxious.    All other systems reviewed and are negative.    Past Medical History:   Diagnosis Date    Anemia     NO PROBLEMS    Anxiety     Blood disease     Colon polyps     Depression     Diverticulitis     Forgetfulness     Hepatitis C     RESOLVED    Hypertension     Night sweats     Numbness and tingling of left lower extremity     Prostate cancer      Past Surgical History:   Procedure Laterality Date    COLONOSCOPY      PROSTATE BIOPSY      PROSTATECTOMY      ROBOT ASSISTED W PELVIC LYMPH NODE DISSECTION    TRANSURETHRAL RESECTION OF BLADDER TUMOR      VENOUS ACCESS DEVICE (PORT) INSERTION Right 5/22/2023    Procedure: INSERTION VENOUS ACCESS PORT;  Surgeon: Waldemar Iraheta MD;  [facility]: [facility];  Service: General;  Laterality: Right;     Social History     Socioeconomic History    Marital status:    Tobacco Use    Smoking status: Every Day     Packs/day: 0.25     Years: 42.00     Pack years: 10.50     Types: Cigarettes     Start date: 10/21/1970      "Passive exposure: Past    Smokeless tobacco: Never    Tobacco comments:     INSTRUCTED NO SMOKING 24 HR PRIOR TO SURGERY      LAST SMOKED AT 200AM 05-22-23   Vaping Use    Vaping Use: Never used   Substance and Sexual Activity    Alcohol use: Yes     Alcohol/week: 6.0 standard drinks     Types: 6 Cans of beer per week     Comment: 12 pack a week of beer    Drug use: Never    Sexual activity: Defer     Family History   Problem Relation Age of Onset    Cancer Mother     Cancer Father        Objective   Physical Exam  General: Alert, cooperative, no acute distress  Eyes: Anicteric sclera, PERRLA  Respiratory: normal respiratory effort  Cardiovascular: no lower extremity edema  Skin: Normal tone, no rash, no lesions  Psychiatric: Appropriate affect, intact judgment  Neurologic: No focal sensory or motor deficits, normal cognition   Musculoskeletal: Normal muscle strength and tone  Extremities: No clubbing, cyanosis, or deformities    Vitals:    08/24/23 1017   BP: 152/81   Pulse: 75   Resp: 18   Temp: 97.1 °F (36.2 °C)   SpO2: 99%   Weight: 56.4 kg (124 lb 5.4 oz)   Height: 176.5 cm (69.49\")   PainSc:   5   PainLoc: Back     ECOG score: 0         PHQ-9 Total Score:         Result Review :   The following data was reviewed by: Natalya Jack MD PhD on 08/24/2023:  Lab Results   Component Value Date    HGB 9.8 (L) 08/24/2023    HCT 30.1 (L) 08/24/2023    MCV 86.0 08/24/2023     08/24/2023    WBC 5.91 08/24/2023    NEUTROABS 3.47 08/24/2023    LYMPHSABS 1.72 08/24/2023    MONOSABS 0.56 08/24/2023    EOSABS 0.09 08/24/2023    BASOSABS 0.01 08/24/2023     Lab Results   Component Value Date    GLUCOSE 105 (H) 08/24/2023    BUN 6 (L) 08/24/2023    CREATININE 0.73 (L) 08/24/2023     (H) 08/24/2023    K 3.8 08/24/2023     (H) 08/24/2023    CO2 27.8 08/24/2023    CALCIUM 7.9 (L) 08/24/2023    PROTEINTOT 5.2 (L) 08/24/2023    ALBUMIN 3.1 (L) 08/24/2023    BILITOT 0.2 08/24/2023    ALKPHOS 41 08/24/2023    AST " 12 08/24/2023    ALT 4 08/24/2023     Lab Results   Component Value Date    IRON 50 (L) 12/17/2021    LABIRON 22 12/17/2021    TRANSFERRIN 156 (L) 12/17/2021    TIBC 232 (L) 12/17/2021     Lab Results   Component Value Date    FERRITIN 120.10 12/17/2021    [id number] 412 05/16/2019    FOLATE 9.4 05/16/2019     Lab Results   Component Value Date    PSA 4.570 (H) 08/24/2023          Assessment and Plan    Diagnoses and all orders for this visit:    1. Prostate cancer metastatic to bone [C61, C79.51 (ICD-10-CM)] (Primary)  -     PSA DIAGNOSTIC; Future        Prostate cancer with metastasis to bone: Patient comes in today for toxicity check prior to cycle 5 of chemotherapy with docetaxel.  He is tolerating very well.  Restaging scans show no evidence of disease progression.  He has no significant toxicity on CBC or CMP with the exception of moderate anemia.  He will proceed with treatment as planned and follow-up with another provider for toxicity check prior to cycle #6.  He may be a candidate for Xofigo as the next line of treatment but this treatment is not yet widely available.    Bone metastases: q3mo Xgeva. Recommend calcium and vitamin D daily.  We will refill his pain medication.    Patient was given instructions and counseling regarding his condition or for health maintenance advice. Please see specific information pulled into the AVS if appropriate.     Natalya Jack MD PhD    9/13/2023

## 2023-08-24 NOTE — ADDENDUM NOTE
Encounter addended by: Fátima Lincoln RN on: 8/24/2023 9:27 AM   Actions taken: Charge Capture section accepted

## 2023-08-25 ENCOUNTER — HOSPITAL ENCOUNTER (OUTPATIENT)
Dept: ONCOLOGY | Facility: HOSPITAL | Age: 68
Discharge: HOME | End: 2023-08-25
Payer: MEDICARE

## 2023-08-25 VITALS
RESPIRATION RATE: 18 BRPM | HEART RATE: 72 BPM | TEMPERATURE: 98.1 F | BODY MASS INDEX: 19.13 KG/M2 | SYSTOLIC BLOOD PRESSURE: 149 MMHG | OXYGEN SATURATION: 97 % | HEIGHT: 69 IN | WEIGHT: 129.19 LBS | DIASTOLIC BLOOD PRESSURE: 75 MMHG

## 2023-08-25 DIAGNOSIS — C61 PROSTATE CANCER: ICD-10-CM

## 2023-08-25 DIAGNOSIS — C61 PROSTATE CANCER METASTATIC TO BONE: Primary | ICD-10-CM

## 2023-08-25 DIAGNOSIS — C79.51 PROSTATE CANCER METASTATIC TO BONE: Primary | ICD-10-CM

## 2023-08-25 PROCEDURE — 25010000002 HEPARIN LOCK FLUSH PER 10 UNITS: Performed by: INTERNAL MEDICINE

## 2023-08-25 PROCEDURE — 25010000002 DOCETAXEL 20 MG/ML SOLUTION 8 ML VIAL: Performed by: INTERNAL MEDICINE

## 2023-08-25 PROCEDURE — 96413 CHEMO IV INFUSION 1 HR: CPT

## 2023-08-25 RX ORDER — FAMOTIDINE 10 MG/ML
20 INJECTION, SOLUTION INTRAVENOUS AS NEEDED
Status: CANCELLED | OUTPATIENT
Start: 2023-08-25

## 2023-08-25 RX ORDER — SODIUM CHLORIDE 9 MG/ML
250 INJECTION, SOLUTION INTRAVENOUS ONCE
Status: COMPLETED | OUTPATIENT
Start: 2023-08-25 | End: 2023-08-25

## 2023-08-25 RX ORDER — DIPHENHYDRAMINE HYDROCHLORIDE 50 MG/ML
50 INJECTION INTRAMUSCULAR; INTRAVENOUS AS NEEDED
Status: CANCELLED | OUTPATIENT
Start: 2023-08-25

## 2023-08-25 RX ORDER — HEPARIN SODIUM (PORCINE) LOCK FLUSH IV SOLN 100 UNIT/ML 100 UNIT/ML
500 SOLUTION INTRAVENOUS AS NEEDED
OUTPATIENT
Start: 2023-08-25

## 2023-08-25 RX ORDER — SODIUM CHLORIDE 0.9 % (FLUSH) 0.9 %
20 SYRINGE (ML) INJECTION AS NEEDED
Status: DISCONTINUED | OUTPATIENT
Start: 2023-08-25 | End: 2023-08-26 | Stop reason: HOSPADM

## 2023-08-25 RX ORDER — SODIUM CHLORIDE 0.9 % (FLUSH) 0.9 %
20 SYRINGE (ML) INJECTION AS NEEDED
OUTPATIENT
Start: 2023-08-25

## 2023-08-25 RX ORDER — HEPARIN SODIUM (PORCINE) LOCK FLUSH IV SOLN 100 UNIT/ML 100 UNIT/ML
500 SOLUTION INTRAVENOUS AS NEEDED
Status: DISCONTINUED | OUTPATIENT
Start: 2023-08-25 | End: 2023-08-26 | Stop reason: HOSPADM

## 2023-08-25 RX ADMIN — DOCETAXEL 125 MG: 20 INJECTION, SOLUTION, CONCENTRATE INTRAVENOUS at 10:48

## 2023-08-25 RX ADMIN — Medication 20 ML: at 12:00

## 2023-08-25 RX ADMIN — HEPARIN SODIUM (PORCINE) LOCK FLUSH IV SOLN 100 UNIT/ML 500 UNITS: 100 SOLUTION at 12:00

## 2023-08-25 RX ADMIN — SODIUM CHLORIDE 250 ML: 9 INJECTION, SOLUTION INTRAVENOUS at 10:47

## 2023-09-08 NOTE — ANESTHESIA PREPROCEDURE EVALUATION
Anesthesia Evaluation     Patient summary reviewed and Nursing notes reviewed   no history of anesthetic complications:  NPO Solid Status: > 8 hours  NPO Liquid Status: > 2 hours           Airway   Mallampati: I  TM distance: >3 FB  Neck ROM: full  No difficulty expected  Dental      Pulmonary - negative pulmonary ROS and normal exam    breath sounds clear to auscultation  Cardiovascular - normal exam  Exercise tolerance: good (4-7 METS)    Rhythm: regular  Rate: normal    (+) hypertension,       Neuro/Psych  (+) numbness, psychiatric history Anxiety and Depression,    GI/Hepatic/Renal/Endo    (+)   hepatitis C, liver disease,     Musculoskeletal (-) negative ROS    Abdominal    Substance History - negative use     OB/GYN negative ob/gyn ROS         Other   blood dyscrasia,   history of cancer active    ROS/Med Hx Other: PAT Nursing Notes unavailable.     Prostate cancer              Latest Reference Range & Units 04/18/23 10:55   Glucose 65 - 99 mg/dL 91   Sodium 136 - 145 mmol/L 140   Potassium 3.5 - 5.2 mmol/L 3.8   CO2 22.0 - 29.0 mmol/L 27.8   Chloride 98 - 107 mmol/L 105   Anion Gap 5.0 - 15.0 mmol/L 7.2   Creatinine 0.76 - 1.27 mg/dL 0.97   BUN 8 - 23 mg/dL 9   BUN/Creatinine Ratio 7.0 - 25.0  9.3   Calcium 8.6 - 10.5 mg/dL 9.7   eGFR >60.0 mL/min/1.73 85.6   Alkaline Phosphatase 39 - 117 U/L 63   Total Protein 6.0 - 8.5 g/dL 6.4   ALT (SGPT) 1 - 41 U/L 6   AST (SGOT) 1 - 40 U/L 13   Total Bilirubin 0.0 - 1.2 mg/dL <0.2   Albumin 3.5 - 5.2 g/dL 3.7   Globulin gm/dL 2.7   A/G Ratio g/dL 1.4        Latest Reference Range & Units 04/18/23 10:55   Hemoglobin 13.0 - 17.7 g/dL 14.6   Hematocrit 37.5 - 51.0 % 42.0                Anesthesia Plan    ASA 2     general     (Patient understands anesthesia not responsible for dental damage.)  intravenous induction     Anesthetic plan, risks, benefits, and alternatives have been provided, discussed and informed consent has been obtained with: patient.    Use of blood  products discussed with patient .   Plan discussed with CRNA.        CODE STATUS:        No

## 2023-09-12 RX ORDER — FAMOTIDINE 10 MG/ML
20 INJECTION, SOLUTION INTRAVENOUS AS NEEDED
Status: CANCELLED | OUTPATIENT
Start: 2023-09-15

## 2023-09-12 RX ORDER — DIPHENHYDRAMINE HYDROCHLORIDE 50 MG/ML
50 INJECTION INTRAMUSCULAR; INTRAVENOUS AS NEEDED
Status: CANCELLED | OUTPATIENT
Start: 2023-09-15

## 2023-09-14 ENCOUNTER — HOSPITAL ENCOUNTER (OUTPATIENT)
Dept: ONCOLOGY | Facility: HOSPITAL | Age: 68
Discharge: HOME | End: 2023-09-14
Admitting: INTERNAL MEDICINE
Payer: MEDICARE

## 2023-09-14 ENCOUNTER — OFFICE VISIT (OUTPATIENT)
Dept: ONCOLOGY | Facility: HOSPITAL | Age: 68
End: 2023-09-14
Payer: MEDICARE

## 2023-09-14 VITALS
SYSTOLIC BLOOD PRESSURE: 147 MMHG | OXYGEN SATURATION: 100 % | DIASTOLIC BLOOD PRESSURE: 78 MMHG | RESPIRATION RATE: 18 BRPM | BODY MASS INDEX: 18.71 KG/M2 | HEART RATE: 63 BPM | TEMPERATURE: 97 F | HEIGHT: 69 IN | WEIGHT: 126.32 LBS

## 2023-09-14 DIAGNOSIS — C79.51 PROSTATE CANCER METASTATIC TO BONE: ICD-10-CM

## 2023-09-14 DIAGNOSIS — C61 PROSTATE CANCER: Primary | ICD-10-CM

## 2023-09-14 DIAGNOSIS — C61 PROSTATE CANCER METASTATIC TO BONE: ICD-10-CM

## 2023-09-14 LAB
ALBUMIN SERPL-MCNC: 3.2 G/DL (ref 3.5–5.2)
ALBUMIN/GLOB SERPL: 1.5 G/DL
ALP SERPL-CCNC: 48 U/L (ref 39–117)
ALT SERPL W P-5'-P-CCNC: 4 U/L (ref 1–41)
ANION GAP SERPL CALCULATED.3IONS-SCNC: 6.3 MMOL/L (ref 5–15)
AST SERPL-CCNC: 12 U/L (ref 1–40)
BASOPHILS # BLD AUTO: 0.01 10*3/MM3 (ref 0–0.2)
BASOPHILS NFR BLD AUTO: 0.2 % (ref 0–1.5)
BILIRUB SERPL-MCNC: 0.2 MG/DL (ref 0–1.2)
BUN SERPL-MCNC: 8 MG/DL (ref 8–23)
BUN/CREAT SERPL: 9.5 (ref 7–25)
CALCIUM SPEC-SCNC: 8.1 MG/DL (ref 8.6–10.5)
CHLORIDE SERPL-SCNC: 110 MMOL/L (ref 98–107)
CO2 SERPL-SCNC: 22.7 MMOL/L (ref 22–29)
CREAT SERPL-MCNC: 0.84 MG/DL (ref 0.76–1.27)
DEPRECATED RDW RBC AUTO: 58.1 FL (ref 37–54)
EGFRCR SERPLBLD CKD-EPI 2021: 95.6 ML/MIN/1.73
EOSINOPHIL # BLD AUTO: 0.03 10*3/MM3 (ref 0–0.4)
EOSINOPHIL NFR BLD AUTO: 0.6 % (ref 0.3–6.2)
ERYTHROCYTE [DISTWIDTH] IN BLOOD BY AUTOMATED COUNT: 18.3 % (ref 12.3–15.4)
GLOBULIN UR ELPH-MCNC: 2.2 GM/DL
GLUCOSE SERPL-MCNC: 118 MG/DL (ref 65–99)
HCT VFR BLD AUTO: 34 % (ref 37.5–51)
HGB BLD-MCNC: 11.1 G/DL (ref 13–17.7)
IMM GRANULOCYTES # BLD AUTO: 0.05 10*3/MM3 (ref 0–0.05)
IMM GRANULOCYTES NFR BLD AUTO: 1 % (ref 0–0.5)
LYMPHOCYTES # BLD AUTO: 1.58 10*3/MM3 (ref 0.7–3.1)
LYMPHOCYTES NFR BLD AUTO: 32.8 % (ref 19.6–45.3)
MAGNESIUM SERPL-MCNC: 1.8 MG/DL (ref 1.6–2.4)
MCH RBC QN AUTO: 28.5 PG (ref 26.6–33)
MCHC RBC AUTO-ENTMCNC: 32.6 G/DL (ref 31.5–35.7)
MCV RBC AUTO: 87.2 FL (ref 79–97)
MONOCYTES # BLD AUTO: 0.56 10*3/MM3 (ref 0.1–0.9)
MONOCYTES NFR BLD AUTO: 11.6 % (ref 5–12)
NEUTROPHILS NFR BLD AUTO: 2.59 10*3/MM3 (ref 1.7–7)
NEUTROPHILS NFR BLD AUTO: 53.8 % (ref 42.7–76)
PHOSPHATE SERPL-MCNC: 3.7 MG/DL (ref 2.5–4.5)
PLATELET # BLD AUTO: 328 10*3/MM3 (ref 140–450)
PMV BLD AUTO: 8.9 FL (ref 6–12)
POTASSIUM SERPL-SCNC: 3.6 MMOL/L (ref 3.5–5.2)
PROT SERPL-MCNC: 5.4 G/DL (ref 6–8.5)
RBC # BLD AUTO: 3.9 10*6/MM3 (ref 4.14–5.8)
SODIUM SERPL-SCNC: 139 MMOL/L (ref 136–145)
WBC NRBC COR # BLD: 4.82 10*3/MM3 (ref 3.4–10.8)

## 2023-09-14 PROCEDURE — 80053 COMPREHEN METABOLIC PANEL: CPT | Performed by: INTERNAL MEDICINE

## 2023-09-14 PROCEDURE — 83735 ASSAY OF MAGNESIUM: CPT | Performed by: INTERNAL MEDICINE

## 2023-09-14 PROCEDURE — 84100 ASSAY OF PHOSPHORUS: CPT | Performed by: INTERNAL MEDICINE

## 2023-09-14 PROCEDURE — 36591 DRAW BLOOD OFF VENOUS DEVICE: CPT

## 2023-09-14 PROCEDURE — 25010000002 HEPARIN LOCK FLUSH PER 10 UNITS: Performed by: INTERNAL MEDICINE

## 2023-09-14 PROCEDURE — 85025 COMPLETE CBC W/AUTO DIFF WBC: CPT | Performed by: INTERNAL MEDICINE

## 2023-09-14 RX ORDER — HYDROCODONE BITARTRATE AND ACETAMINOPHEN 10; 325 MG/1; MG/1
1 TABLET ORAL EVERY 4 HOURS PRN
Qty: 150 TABLET | Refills: 0 | Status: SHIPPED | OUTPATIENT
Start: 2023-09-14 | End: 2023-09-18 | Stop reason: SDUPTHER

## 2023-09-14 RX ORDER — LISINOPRIL 40 MG/1
40 TABLET ORAL DAILY
Qty: 90 TABLET | Refills: 0 | Status: SHIPPED | OUTPATIENT
Start: 2023-09-14

## 2023-09-14 RX ORDER — SODIUM CHLORIDE 0.9 % (FLUSH) 0.9 %
20 SYRINGE (ML) INJECTION AS NEEDED
Status: DISCONTINUED | OUTPATIENT
Start: 2023-09-14 | End: 2023-09-15 | Stop reason: HOSPADM

## 2023-09-14 RX ORDER — HEPARIN SODIUM (PORCINE) LOCK FLUSH IV SOLN 100 UNIT/ML 100 UNIT/ML
500 SOLUTION INTRAVENOUS AS NEEDED
Status: DISCONTINUED | OUTPATIENT
Start: 2023-09-14 | End: 2023-09-15 | Stop reason: HOSPADM

## 2023-09-14 RX ORDER — SODIUM CHLORIDE 0.9 % (FLUSH) 0.9 %
20 SYRINGE (ML) INJECTION AS NEEDED
Status: CANCELLED | OUTPATIENT
Start: 2023-09-14

## 2023-09-14 RX ORDER — HEPARIN SODIUM (PORCINE) LOCK FLUSH IV SOLN 100 UNIT/ML 100 UNIT/ML
500 SOLUTION INTRAVENOUS AS NEEDED
Status: CANCELLED | OUTPATIENT
Start: 2023-09-14

## 2023-09-14 RX ADMIN — Medication 20 ML: at 09:08

## 2023-09-14 RX ADMIN — HEPARIN SODIUM (PORCINE) LOCK FLUSH IV SOLN 100 UNIT/ML 500 UNITS: 100 SOLUTION at 09:08

## 2023-09-14 NOTE — PROGRESS NOTES
Patient  Semaj Medina      Location  [facility] HEMATOLOGY & ONCOLOGY    Chief Complaint  Jack Transfer Prostate cancer metastatic to bone    Referring Provider: Natalya Jack MD PhD  PCP: Maryse Monae MD    Subjective          Oncology/Hematology History Overview Note     Metastatic Castration resistant prostate cancer:    Patient was initially diagnosed with prostate cancer in 2015.  On 6/4/2015 he underwent radical prostatectomy and LN dissection which showed a Zach 4+3 = 7 prostate cancer.  There were several high risk features such as extraprostatic extension, seminal vesicle invasion, positive margins at the bladder neck and right and left seminal vesicle.  Lymph vascular invasion was indeterminate.  Perineural invasion was not commented on. Final pathology [id number] R1.  He was treated with adjuvant radiation by Dr. Stanton.    According to records the patient likely started Lupron in December 2017 when his PSA started to rise.  The patient took a break in August 2019.  He states he was not aware he was to continue.  He restarted Lupron on 11/26/2019 after it was noted that his PSA chago from 0.46 up to 2.55 on 11/8/2019.  Unfortunately his PSA continued to rise after that to 4.54 on 3/10/2020.    - restarted Lupron on 11/26/20  - Started Zytiga on 8/27/20.  8/31/2020 PSA 17.95     - Started Xtandi in May 2021 due to intolerance of Zytiga even at the lowest dose  - PSA rising in April 2023 to 2.85, doubling time 3.4 months  - started Docetaxel May 2023    PSMA PET 5/2/23:   1.  Radiotracer avid supra clavicular, thoracic and abdominal adenopathy and sclerotic lesion within T10 vertebral body likely representing metastatic disease.  2.  Indeterminate lesion within the left ilium demonstrating mild uptake.   3.  Asymmetric sclerosis within the superior lateral aspect the left orbit which does not demonstrate significant uptake above that of the right orbit. Findings are  nonspecific with  differential considerations including but not limited to metastatic disease, fibrous dysplasia versus Paget's disease.   4.  Sub-6 mm nodule within the left lower lobe.  5.  Scattered indeterminate hypodense lesions throughout the liver which do not demonstrate radiotracer uptake above that of the adjacent liver, best best visualized on recent multiphase CT abdomen and pelvis 04/17/2023. Please refer to this dictation for further information.       Prostate cancer metastatic to bone   12/11/2020 -  Chemotherapy    OP SUPPORTIVE Leuprolide 45 mg Q6M       3/23/2021 -  Chemotherapy    OP SUPPORTIVE Denosumab (Xgeva) Q28D       4/15/2021 - 5/13/2021 Chemotherapy    OP PROSTATE Enzalutamide       5/19/2021 Initial Diagnosis    Prostate cancer metastatic to bone (CMS/HCC)     6/2/2023 Biopsy    OP PROSTATE DOCEtaxel  Plan Provider: Natalya Jack MD PhD  Treatment goal: Palliative  Line of treatment: [No plan line of treatment]     Prostate cancer   6/16/2021 Initial Diagnosis    Prostate cancer (HCC)     6/2/2023 -  Chemotherapy    OP CENTRAL VENOUS ACCESS DEVICE ACCESS, CARE, AND MAINTENANCE (CVAD)       Secondary malignant neoplasm of bone   11/4/2021 Initial Diagnosis    Secondary malignant neoplasm of bone (HCC)     11/17/2021 -  Radiation    RADIATION THERAPY Treatment Details (Noted on 11/4/2021)  Site: Bilateral Spine - Thoracic  Technique: 3D CRT  Goal: No goal specified  Planned Treatment Start Date: 11/17/2021         History of Present Illness  Patient with metastatic prostate cancer, currently receiving docetaxel, as well as 5 cycles, here prior to cycle 5, Lupron 45 mg every 6 months, patient has also had Xgeva ordered, but patient with upcoming dental procedure and also underwent dental procedure approximately 1.5 months ago.  Patient reports some mouth ulcers, currently using Kylee's Magic mouthwash, reports decreased appetite secondary to chemotherapy.  Patient denies any fever,  chills, night sweats, nausea, vomiting, diarrhea.    Review of Systems   Constitutional:  Negative for appetite change, diaphoresis, fatigue, fever, unexpected weight gain and unexpected weight loss.   HENT:  Negative for hearing loss, mouth sores, sore throat, swollen glands, trouble swallowing and voice change.    Eyes:  Negative for blurred vision.   Respiratory:  Negative for cough, shortness of breath and wheezing.    Cardiovascular:  Negative for chest pain and palpitations.   Gastrointestinal:  Negative for abdominal pain, blood in stool, constipation, diarrhea, nausea and vomiting.   Endocrine: Negative for cold intolerance and heat intolerance.   Genitourinary:  Negative for difficulty urinating, dysuria, frequency, hematuria and urinary incontinence.   Musculoskeletal:  Positive for back pain. Negative for arthralgias and myalgias.   Skin:  Negative for rash, skin lesions and wound.   Neurological:  Negative for dizziness, seizures, weakness, numbness and headache.   Hematological:  Does not bruise/bleed easily.   Psychiatric/Behavioral:  Negative for depressed mood. The patient is not nervous/anxious.    All other systems reviewed and are negative.    Past Medical History:   Diagnosis Date    Anemia     NO PROBLEMS    Anxiety     Blood disease     Colon polyps     Depression     Diverticulitis     Forgetfulness     Hepatitis C     RESOLVED    Hypertension     Night sweats     Numbness and tingling of left lower extremity     Prostate cancer      Past Surgical History:   Procedure Laterality Date    COLONOSCOPY      PROSTATE BIOPSY      PROSTATECTOMY      ROBOT ASSISTED W PELVIC LYMPH NODE DISSECTION    TRANSURETHRAL RESECTION OF BLADDER TUMOR      VENOUS ACCESS DEVICE (PORT) INSERTION Right 5/22/2023    Procedure: INSERTION VENOUS ACCESS PORT;  Surgeon: Waldemar Iraheta MD;  Location: [facility];  Service: General;  Laterality: Right;     Social History     Socioeconomic History    Marital status:  "   Tobacco Use    Smoking status: Every Day     Packs/day: 0.25     Years: 42.00     Pack years: 10.50     Types: Cigarettes     Start date: 10/21/1970     Passive exposure: Past    Smokeless tobacco: Never    Tobacco comments:     INSTRUCTED NO SMOKING 24 HR PRIOR TO SURGERY      LAST SMOKED AT 200AM 05-22-23   Vaping Use    Vaping Use: Never used   Substance and Sexual Activity    Alcohol use: Yes     Alcohol/week: 6.0 standard drinks     Types: 6 Cans of beer per week     Comment: 12 pack a week of beer    Drug use: Never    Sexual activity: Defer     Family History   Problem Relation Age of Onset    Cancer Mother     Cancer Father        Objective   Physical Exam  Vitals reviewed. Exam conducted with a chaperone present.   Constitutional:       General: He is not in acute distress.     Appearance: Normal appearance.   HENT:      Head: Normocephalic and atraumatic.   Eyes:      Extraocular Movements: Extraocular movements intact.      Conjunctiva/sclera: Conjunctivae normal.   Pulmonary:      Effort: Pulmonary effort is normal.   Skin:     General: Skin is warm and dry.      Findings: No rash.   Neurological:      Mental Status: He is oriented to person, place, and time.         Vitals:    09/14/23 1020   BP: 147/78   Pulse: 63   Resp: 18   Temp: 97 °F (36.1 °C)   TempSrc: Temporal   SpO2: 100%   Weight: 57.3 kg (126 lb 5.2 oz)   Height: 176.5 cm (69.49\")   PainSc: 0-No pain     ECOG score: 0         PHQ-9 Total Score:         Result Review :   The following data was reviewed by: Joshua Dick MD on 08/24/2023:  Lab Results   Component Value Date    HGB 11.1 (L) 09/14/2023    HCT 34.0 (L) 09/14/2023    MCV 87.2 09/14/2023     09/14/2023    WBC 4.82 09/14/2023    NEUTROABS 2.59 09/14/2023    LYMPHSABS 1.58 09/14/2023    MONOSABS 0.56 09/14/2023    EOSABS 0.03 09/14/2023    BASOSABS 0.01 09/14/2023     Lab Results   Component Value Date    GLUCOSE 118 (H) 09/14/2023    BUN 8 09/14/2023    CREATININE " 0.84 09/14/2023     09/14/2023    K 3.6 09/14/2023     (H) 09/14/2023    CO2 22.7 09/14/2023    CALCIUM 8.1 (L) 09/14/2023    PROTEINTOT 5.4 (L) 09/14/2023    ALBUMIN 3.2 (L) 09/14/2023    BILITOT 0.2 09/14/2023    ALKPHOS 48 09/14/2023    AST 12 09/14/2023    ALT 4 09/14/2023     Lab Results   Component Value Date    IRON 50 (L) 12/17/2021    LABIRON 22 12/17/2021    TRANSFERRIN 156 (L) 12/17/2021    TIBC 232 (L) 12/17/2021     Lab Results   Component Value Date    FERRITIN 120.10 12/17/2021    [id number] 412 05/16/2019    FOLATE 9.4 05/16/2019     Lab Results   Component Value Date    PSA 4.570 (H) 08/24/2023          Assessment and Plan    Diagnoses and all orders for this visit:    1. Prostate cancer metastatic to bone  -     CBC & Differential; Future  -     Comprehensive Metabolic Panel; Future  -     Magnesium; Future  -     Phosphorus; Future  -     PSA DIAGNOSTIC; Future  -     HYDROcodone-acetaminophen (NORCO)  MG per tablet; Take 1 tablet by mouth Every 4 (Four) Hours As Needed for Moderate Pain.  Dispense: 150 tablet; Refill: 0      67-year-old male with metastatic prostate cancer, currently receiving docetaxel,  Cycle 6 of docetaxel today, labs reviewed plan to proceed as scheduled for 9/15/2023, Lupron Q6 months last shot on 6/23/23, last Xgeva was 6/23/23  -Given plans patient undergo dental procedure and proximal 1.5 months from now, will hold Xgeva until he is cleared by dentist to receive next infusion  -Plan patient follow-up in 1 month.  -PSA pending from today  -Please refill of his [place] for his cancer related pain    Please note that portions of this note were completed with a voice recognition program.      I spent approximately 60 minutes caring for Semaj colin which included review medical record, reviewing lab work, care coordination, and medical documentation.  Patient was given instructions and counseling regarding his condition or for health maintenance advice. Please  see specific information pulled into the AVS if appropriate.     Joshua Dick MD    9/14/2023

## 2023-09-15 ENCOUNTER — HOSPITAL ENCOUNTER (OUTPATIENT)
Dept: ONCOLOGY | Facility: HOSPITAL | Age: 68
Discharge: HOME | End: 2023-09-15
Payer: MEDICARE

## 2023-09-15 VITALS
WEIGHT: 126.98 LBS | BODY MASS INDEX: 18.49 KG/M2 | OXYGEN SATURATION: 100 % | DIASTOLIC BLOOD PRESSURE: 81 MMHG | HEART RATE: 65 BPM | SYSTOLIC BLOOD PRESSURE: 139 MMHG | TEMPERATURE: 98.2 F

## 2023-09-15 DIAGNOSIS — C61 PROSTATE CANCER: ICD-10-CM

## 2023-09-15 DIAGNOSIS — C79.51 PROSTATE CANCER METASTATIC TO BONE: Primary | ICD-10-CM

## 2023-09-15 DIAGNOSIS — C61 PROSTATE CANCER METASTATIC TO BONE: Primary | ICD-10-CM

## 2023-09-15 PROCEDURE — 96413 CHEMO IV INFUSION 1 HR: CPT

## 2023-09-15 PROCEDURE — 25010000002 DOCETAXEL 20 MG/ML SOLUTION 8 ML VIAL: Performed by: INTERNAL MEDICINE

## 2023-09-15 PROCEDURE — 25010000002 HEPARIN LOCK FLUSH PER 10 UNITS: Performed by: INTERNAL MEDICINE

## 2023-09-15 RX ORDER — SODIUM CHLORIDE 0.9 % (FLUSH) 0.9 %
20 SYRINGE (ML) INJECTION AS NEEDED
OUTPATIENT
Start: 2023-09-15

## 2023-09-15 RX ORDER — HEPARIN SODIUM (PORCINE) LOCK FLUSH IV SOLN 100 UNIT/ML 100 UNIT/ML
500 SOLUTION INTRAVENOUS AS NEEDED
OUTPATIENT
Start: 2023-09-15

## 2023-09-15 RX ORDER — SODIUM CHLORIDE 9 MG/ML
250 INJECTION, SOLUTION INTRAVENOUS ONCE
Status: COMPLETED | OUTPATIENT
Start: 2023-09-15 | End: 2023-09-15

## 2023-09-15 RX ORDER — SODIUM CHLORIDE 0.9 % (FLUSH) 0.9 %
20 SYRINGE (ML) INJECTION AS NEEDED
Status: DISCONTINUED | OUTPATIENT
Start: 2023-09-15 | End: 2023-09-16 | Stop reason: HOSPADM

## 2023-09-15 RX ORDER — HEPARIN SODIUM (PORCINE) LOCK FLUSH IV SOLN 100 UNIT/ML 100 UNIT/ML
500 SOLUTION INTRAVENOUS AS NEEDED
Status: DISCONTINUED | OUTPATIENT
Start: 2023-09-15 | End: 2023-09-16 | Stop reason: HOSPADM

## 2023-09-15 RX ADMIN — DOCETAXEL 125 MG: 20 INJECTION, SOLUTION, CONCENTRATE INTRAVENOUS at 10:35

## 2023-09-15 RX ADMIN — Medication 20 ML: at 11:45

## 2023-09-15 RX ADMIN — HEPARIN SODIUM (PORCINE) LOCK FLUSH IV SOLN 100 UNIT/ML 500 UNITS: 100 SOLUTION at 11:45

## 2023-09-15 RX ADMIN — SODIUM CHLORIDE 250 ML: 9 INJECTION, SOLUTION INTRAVENOUS at 09:53

## 2023-09-18 DIAGNOSIS — C79.51 PROSTATE CANCER METASTATIC TO BONE: ICD-10-CM

## 2023-09-18 DIAGNOSIS — C61 PROSTATE CANCER METASTATIC TO BONE: ICD-10-CM

## 2023-09-18 RX ORDER — HYDROCODONE BITARTRATE AND ACETAMINOPHEN 10; 325 MG/1; MG/1
1 TABLET ORAL EVERY 4 HOURS PRN
Qty: 150 TABLET | Refills: 0 | Status: SHIPPED | OUTPATIENT
Start: 2023-09-18

## 2023-09-18 NOTE — TELEPHONE ENCOUNTER
Caller: Semaj Medina [place]    Relationship: Self    Best call back number: [phone]    Requested Prescriptions:   Requested Prescriptions     Pending Prescriptions Disp Refills    HYDROcodone-acetaminophen (NORCO)  MG per tablet 150 tablet 0     Sig: Take 1 tablet by mouth Every 4 (Four) Hours As Needed for Moderate Pain.        Pharmacy where request should be sent: [facility], [address] - [phone] [facility] [phone]      Last office visit with prescribing clinician: 9/14/2023   Last telemedicine visit with prescribing clinician: Visit date not found   Next office visit with prescribing clinician: 10/13/2023     Additional details provided by patient: [facility] BUT THE PHARMACY DOES NOT HAVE THE MEDICATION, SEMAJ IS ASKING FOR IT TO BE SENT TO [facility]     Does the patient have less than a 3 day supply:  [x] Yes  [] No    Would you like a call back once the refill request has been completed: [x] Yes [] No    If the office needs to give you a call back, can they leave a voicemail: [x] Yes [] No    Nena Morgan Rep   09/18/23 11:09 EDT

## 2023-09-18 NOTE — TELEPHONE ENCOUNTER
Caller: Semaj Medina [place]    Relationship to patient: Self    Best call back number: .[phone]    Patient is needing: TO CHECK STATUS OF REFILL REQUEST. I LET HIM KNOW WE ARE WORKING ON GETTING THE SCRIPT SENT OVER TO THE PHARMACY BUT THE INFORMATION IS UP TO DATE TO [facility] AND IS MARKED HIGH PRIORITY. HE V/U.

## 2023-10-11 ENCOUNTER — HOSPITAL ENCOUNTER (OUTPATIENT)
Dept: ONCOLOGY | Facility: HOSPITAL | Age: 68
Discharge: HOME | End: 2023-10-11
Admitting: INTERNAL MEDICINE
Payer: MEDICARE

## 2023-10-11 DIAGNOSIS — C61 PROSTATE CANCER: Primary | ICD-10-CM

## 2023-10-11 DIAGNOSIS — C61 PROSTATE CANCER METASTATIC TO BONE: ICD-10-CM

## 2023-10-11 DIAGNOSIS — C79.51 PROSTATE CANCER METASTATIC TO BONE: ICD-10-CM

## 2023-10-11 LAB
ALBUMIN SERPL-MCNC: 3.2 G/DL (ref 3.5–5.2)
ALBUMIN/GLOB SERPL: 1.5 G/DL
ALP SERPL-CCNC: 52 U/L (ref 39–117)
ALT SERPL W P-5'-P-CCNC: 2 U/L (ref 1–41)
ANION GAP SERPL CALCULATED.3IONS-SCNC: 3.4 MMOL/L (ref 5–15)
AST SERPL-CCNC: 8 U/L (ref 1–40)
BASOPHILS # BLD AUTO: 0.02 10*3/MM3 (ref 0–0.2)
BASOPHILS NFR BLD AUTO: 0.3 % (ref 0–1.5)
BILIRUB SERPL-MCNC: 0.3 MG/DL (ref 0–1.2)
BUN SERPL-MCNC: 6 MG/DL (ref 8–23)
BUN/CREAT SERPL: 8.1 (ref 7–25)
CALCIUM SPEC-SCNC: 8.5 MG/DL (ref 8.6–10.5)
CHLORIDE SERPL-SCNC: 113 MMOL/L (ref 98–107)
CO2 SERPL-SCNC: 25.6 MMOL/L (ref 22–29)
CREAT SERPL-MCNC: 0.74 MG/DL (ref 0.76–1.27)
DEPRECATED RDW RBC AUTO: 60.2 FL (ref 37–54)
EGFRCR SERPLBLD CKD-EPI 2021: 99.3 ML/MIN/1.73
EOSINOPHIL # BLD AUTO: 0.09 10*3/MM3 (ref 0–0.4)
EOSINOPHIL NFR BLD AUTO: 1.5 % (ref 0.3–6.2)
ERYTHROCYTE [DISTWIDTH] IN BLOOD BY AUTOMATED COUNT: 18.4 % (ref 12.3–15.4)
GLOBULIN UR ELPH-MCNC: 2.2 GM/DL
GLUCOSE SERPL-MCNC: 116 MG/DL (ref 65–99)
HCT VFR BLD AUTO: 36.2 % (ref 37.5–51)
HGB BLD-MCNC: 11.7 G/DL (ref 13–17.7)
IMM GRANULOCYTES # BLD AUTO: 0.01 10*3/MM3 (ref 0–0.05)
IMM GRANULOCYTES NFR BLD AUTO: 0.2 % (ref 0–0.5)
LYMPHOCYTES # BLD AUTO: 1.57 10*3/MM3 (ref 0.7–3.1)
LYMPHOCYTES NFR BLD AUTO: 27 % (ref 19.6–45.3)
MAGNESIUM SERPL-MCNC: 1.5 MG/DL (ref 1.6–2.4)
MCH RBC QN AUTO: 28.9 PG (ref 26.6–33)
MCHC RBC AUTO-ENTMCNC: 32.3 G/DL (ref 31.5–35.7)
MCV RBC AUTO: 89.4 FL (ref 79–97)
MONOCYTES # BLD AUTO: 0.56 10*3/MM3 (ref 0.1–0.9)
MONOCYTES NFR BLD AUTO: 9.6 % (ref 5–12)
NEUTROPHILS NFR BLD AUTO: 3.56 10*3/MM3 (ref 1.7–7)
NEUTROPHILS NFR BLD AUTO: 61.4 % (ref 42.7–76)
PHOSPHATE SERPL-MCNC: 4 MG/DL (ref 2.5–4.5)
PLATELET # BLD AUTO: 292 10*3/MM3 (ref 140–450)
PMV BLD AUTO: 8.5 FL (ref 6–12)
POTASSIUM SERPL-SCNC: 3.6 MMOL/L (ref 3.5–5.2)
PROT SERPL-MCNC: 5.4 G/DL (ref 6–8.5)
PSA SERPL-MCNC: 3.18 NG/ML (ref 0–4)
RBC # BLD AUTO: 4.05 10*6/MM3 (ref 4.14–5.8)
SODIUM SERPL-SCNC: 142 MMOL/L (ref 136–145)
WBC NRBC COR # BLD: 5.81 10*3/MM3 (ref 3.4–10.8)

## 2023-10-11 PROCEDURE — 84100 ASSAY OF PHOSPHORUS: CPT | Performed by: INTERNAL MEDICINE

## 2023-10-11 PROCEDURE — 84153 ASSAY OF PSA TOTAL: CPT | Performed by: INTERNAL MEDICINE

## 2023-10-11 PROCEDURE — 83735 ASSAY OF MAGNESIUM: CPT | Performed by: INTERNAL MEDICINE

## 2023-10-11 PROCEDURE — 80053 COMPREHEN METABOLIC PANEL: CPT | Performed by: INTERNAL MEDICINE

## 2023-10-11 PROCEDURE — 85025 COMPLETE CBC W/AUTO DIFF WBC: CPT | Performed by: INTERNAL MEDICINE

## 2023-10-11 PROCEDURE — 25010000002 HEPARIN LOCK FLUSH PER 10 UNITS: Performed by: INTERNAL MEDICINE

## 2023-10-11 PROCEDURE — 36591 DRAW BLOOD OFF VENOUS DEVICE: CPT

## 2023-10-11 RX ORDER — HEPARIN SODIUM (PORCINE) LOCK FLUSH IV SOLN 100 UNIT/ML 100 UNIT/ML
500 SOLUTION INTRAVENOUS AS NEEDED
OUTPATIENT
Start: 2023-10-11

## 2023-10-11 RX ORDER — SODIUM CHLORIDE 0.9 % (FLUSH) 0.9 %
20 SYRINGE (ML) INJECTION AS NEEDED
OUTPATIENT
Start: 2023-10-11

## 2023-10-11 RX ORDER — HEPARIN SODIUM (PORCINE) LOCK FLUSH IV SOLN 100 UNIT/ML 100 UNIT/ML
500 SOLUTION INTRAVENOUS AS NEEDED
Status: DISCONTINUED | OUTPATIENT
Start: 2023-10-11 | End: 2023-10-12 | Stop reason: HOSPADM

## 2023-10-11 RX ORDER — SODIUM CHLORIDE 0.9 % (FLUSH) 0.9 %
20 SYRINGE (ML) INJECTION AS NEEDED
Status: DISCONTINUED | OUTPATIENT
Start: 2023-10-11 | End: 2023-10-12 | Stop reason: HOSPADM

## 2023-10-11 RX ADMIN — Medication 20 ML: at 11:24

## 2023-10-11 RX ADMIN — HEPARIN SODIUM (PORCINE) LOCK FLUSH IV SOLN 100 UNIT/ML 500 UNITS: 100 SOLUTION at 11:24

## 2023-10-13 ENCOUNTER — OFFICE VISIT (OUTPATIENT)
Dept: ONCOLOGY | Facility: HOSPITAL | Age: 68
End: 2023-10-13
Payer: MEDICARE

## 2023-10-13 VITALS
BODY MASS INDEX: 18.17 KG/M2 | DIASTOLIC BLOOD PRESSURE: 71 MMHG | OXYGEN SATURATION: 97 % | SYSTOLIC BLOOD PRESSURE: 144 MMHG | HEART RATE: 64 BPM | TEMPERATURE: 98.6 F | RESPIRATION RATE: 16 BRPM | WEIGHT: 124.78 LBS

## 2023-10-13 DIAGNOSIS — C79.51 PROSTATE CANCER METASTATIC TO BONE: ICD-10-CM

## 2023-10-13 DIAGNOSIS — K90.89 OTHER INTESTINAL MALABSORPTION: ICD-10-CM

## 2023-10-13 DIAGNOSIS — C61 PROSTATE CANCER METASTATIC TO BONE: ICD-10-CM

## 2023-10-13 RX ORDER — HYDROCODONE BITARTRATE AND ACETAMINOPHEN 10; 325 MG/1; MG/1
1 TABLET ORAL EVERY 4 HOURS PRN
Qty: 150 TABLET | Refills: 0 | Status: SHIPPED | OUTPATIENT
Start: 2023-10-13

## 2023-10-13 RX ORDER — MAGNESIUM OXIDE 400 MG/1
400 TABLET ORAL DAILY
Qty: 14 TABLET | Refills: 0 | Status: SHIPPED | OUTPATIENT
Start: 2023-10-13 | End: 2023-10-27

## 2023-10-13 NOTE — TELEPHONE ENCOUNTER
Caller: Semaj Medina    Relationship: Self    Best call back number: [phone]    Requested Prescriptions:   Requested Prescriptions     Pending Prescriptions Disp Refills    HYDROcodone-acetaminophen (NORCO)  MG per tablet 150 tablet 0     Sig: Take 1 tablet by mouth Every 4 (Four) Hours As Needed for Moderate Pain.      AND MAGNESIUM THAT DR AYALA STARTED HIM ON TODAY      Pharmacy where request should be sent: [facility], [address] [phone] [facility] [phone] FX     Last office visit with prescribing clinician: 10/13/2023   Last telemedicine visit with prescribing clinician: Visit date not found   Next office visit with prescribing clinician: 12/19/2023     Additional details provided by patient: NEEDS IT TO GO TO [facility]    Does the patient have less than a 3 day supply:  [x] Yes  [] No    Would you like a call back once the refill request has been completed: [x] Yes [] No    If the office needs to give you a call back, can they leave a voicemail: [x] Yes [] No    Nena Morgan Rep   10/13/23 15:54 EDT

## 2023-10-13 NOTE — PROGRESS NOTES
Patient  Semaj Medina      Location  [facility] HEMATOLOGY & ONCOLOGY    Chief Complaint  Prostate Cancer    Referring Provider: Natalya Jack MD PhD  PCP: Maryse Monae MD    Subjective          Oncology/Hematology History Overview Note     Metastatic Castration resistant prostate cancer:    Patient was initially diagnosed with prostate cancer in 2015.  On 6/4/2015 he underwent radical prostatectomy and LN dissection which showed a [place] 4+3 = 7 prostate cancer.  There were several high risk features such as extraprostatic extension, seminal vesicle invasion, positive margins at the bladder neck and right and left seminal vesicle.  Lymph vascular invasion was indeterminate.  Perineural invasion was not commented on. Final pathology [id number].  He was treated with adjuvant radiation by Dr. Stanton.    According to records the patient likely started Lupron in December 2017 when his PSA started to rise.  The patient took a break in August 2019.  He states he was not aware he was to continue.  He restarted Lupron on 11/26/2019 after it was noted that his PSA chago from 0.46 up to 2.55 on 11/8/2019.  Unfortunately his PSA continued to rise after that to 4.54 on 3/10/2020.    - restarted Lupron on 11/26/20  - Started Zytiga on 8/27/20.  8/31/2020 PSA 17.95     - Started Xtandi in May 2021 due to intolerance of Zytiga even at the lowest dose  - PSA rising in April 2023 to 2.85, doubling time 3.4 months  - started Docetaxel May 2023    PSMA PET 5/2/23:   1.  Radiotracer avid supra clavicular, thoracic and abdominal adenopathy and sclerotic lesion within T10 vertebral body likely representing metastatic disease.  2.  Indeterminate lesion within the left ilium demonstrating mild uptake.   3.  Asymmetric sclerosis within the superior lateral aspect the left orbit which does not demonstrate significant uptake above that of the right orbit. Findings are nonspecific with  differential  considerations including but not limited to metastatic disease, fibrous dysplasia versus Paget's disease.   4.  Sub-6 mm nodule within the left lower lobe.  5.  Scattered indeterminate hypodense lesions throughout the liver which do not demonstrate radiotracer uptake above that of the adjacent liver, best best visualized on recent multiphase CT abdomen and pelvis 04/17/2023. Please refer to this dictation for further information.       Prostate cancer metastatic to bone   12/11/2020 -  Chemotherapy    OP SUPPORTIVE Leuprolide 45 mg Q6M     3/23/2021 -  Chemotherapy    OP SUPPORTIVE Denosumab (Xgeva) Q28D     4/15/2021 - 5/13/2021 Chemotherapy    OP PROSTATE Enzalutamide     5/19/2021 Initial Diagnosis    Prostate cancer metastatic to bone (CMS/HCC)     6/2/2023 Biopsy    OP PROSTATE DOCEtaxel  Plan Provider: Natalya Jack MD PhD  Treatment goal: Palliative  Line of treatment: [No plan line of treatment]     Prostate cancer   6/16/2021 Initial Diagnosis    Prostate cancer (HCC)     6/2/2023 -  Chemotherapy    OP CENTRAL VENOUS ACCESS DEVICE ACCESS, CARE, AND MAINTENANCE (CVAD)     Secondary malignant neoplasm of bone   11/4/2021 Initial Diagnosis    Secondary malignant neoplasm of bone (HCC)     11/17/2021 -  Radiation    RADIATION THERAPY Treatment Details (Noted on 11/4/2021)  Site: Bilateral Spine - Thoracic  Technique: 3D CRT  Goal: No goal specified  Planned Treatment Start Date: 11/17/2021         History of Present Illness  Patient with metastatic prostate cancer, completed docetaxel (6 cycles) Lupron 45 mg every 6 months, patient has also had Xgeva ordered, but patient with upcoming dental procedure and also underwent dental procedure approximately 1.5 months ago. Thus, Xgeva has been held. Patient denies any fever, chills, night sweats, nausea, vomiting, but occasional diarrhea, which he is using Imodium.    Review of Systems   Constitutional:  Positive for fatigue (5/10). Negative for appetite change,  diaphoresis, fever, unexpected weight gain and unexpected weight loss.   HENT:  Negative for hearing loss, mouth sores, sore throat, swollen glands, trouble swallowing and voice change.    Eyes:  Negative for blurred vision.   Respiratory:  Negative for cough, shortness of breath and wheezing.    Cardiovascular:  Negative for chest pain and palpitations.   Gastrointestinal:  Negative for abdominal pain, blood in stool, constipation, diarrhea, nausea and vomiting.   Endocrine: Negative for cold intolerance and heat intolerance.   Genitourinary:  Negative for difficulty urinating, dysuria, frequency, hematuria and urinary incontinence.   Musculoskeletal:  Positive for back pain. Negative for arthralgias and myalgias.   Skin:  Negative for rash, skin lesions and wound.   Neurological:  Negative for dizziness, seizures, weakness, numbness and headache.   Hematological:  Does not bruise/bleed easily.   Psychiatric/Behavioral:  Negative for depressed mood. The patient is not nervous/anxious.    All other systems reviewed and are negative.      Past Medical History:   Diagnosis Date    Anemia     NO PROBLEMS    Anxiety     Blood disease     Colon polyps     Depression     Diverticulitis     Forgetfulness     Hepatitis C     RESOLVED    Hypertension     Night sweats     Numbness and tingling of left lower extremity     Prostate cancer      Past Surgical History:   Procedure Laterality Date    COLONOSCOPY      PROSTATE BIOPSY      PROSTATECTOMY      ROBOT ASSISTED W PELVIC LYMPH NODE DISSECTION    TRANSURETHRAL RESECTION OF BLADDER TUMOR      VENOUS ACCESS DEVICE (PORT) INSERTION Right 5/22/2023    Procedure: INSERTION VENOUS ACCESS PORT;  Surgeon: Waldemar Iraheta MD;  [facility];  Service: General;  Laterality: Right;     Social History     Socioeconomic History    Marital status:    Tobacco Use    Smoking status: Every Day     Packs/day: 0.25     Years: 42.00     Additional pack years: 0.00     Total  pack years: 10.50     Types: Cigarettes     Start date: 10/21/1970     Passive exposure: Past    Smokeless tobacco: Never    Tobacco comments:     INSTRUCTED NO SMOKING 24 HR PRIOR TO SURGERY      LAST SMOKED AT 200AM 05-22-23   Vaping Use    Vaping Use: Never used   Substance and Sexual Activity    Alcohol use: Yes     Alcohol/week: 6.0 standard drinks of alcohol     Types: 6 Cans of beer per week     Comment: 12 pack a week of beer    Drug use: Never    Sexual activity: Defer     Family History   Problem Relation Age of Onset    Cancer Mother     Cancer Father        Objective   Physical Exam  Vitals reviewed. Exam conducted with a chaperone present.   Constitutional:       General: He is not in acute distress.     Appearance: Normal appearance.   HENT:      Head: Normocephalic and atraumatic.   Eyes:      Extraocular Movements: Extraocular movements intact.      Conjunctiva/sclera: Conjunctivae normal.   Pulmonary:      Effort: Pulmonary effort is normal.   Skin:     General: Skin is warm and dry.      Findings: No rash.   Neurological:      Mental Status: He is oriented to person, place, and time.           Vitals:    10/13/23 1030   BP: 144/71   Pulse: 64   Resp: 16   Temp: 98.6 øF (37 øC)   TempSrc: Temporal   SpO2: 97%   Weight: 56.6 kg (124 lb 12.5 oz)   PainSc:   6   PainLoc: Abdomen       ECOG score: 0         PHQ-9 Total Score:         Result Review :   The following data was reviewed by: Joshua Dick MD on 08/24/2023:  Lab Results   Component Value Date    HGB 11.7 (L) 10/11/2023    HCT 36.2 (L) 10/11/2023    MCV 89.4 10/11/2023     10/11/2023    WBC 5.81 10/11/2023    NEUTROABS 3.56 10/11/2023    LYMPHSABS 1.57 10/11/2023    MONOSABS 0.56 10/11/2023    EOSABS 0.09 10/11/2023    BASOSABS 0.02 10/11/2023     Lab Results   Component Value Date    GLUCOSE 116 (H) 10/11/2023    BUN 6 (L) 10/11/2023    CREATININE 0.74 (L) 10/11/2023     10/11/2023    K 3.6 10/11/2023     (H) 10/11/2023     CO2 25.6 10/11/2023    CALCIUM 8.5 (L) 10/11/2023    PROTEINTOT 5.4 (L) 10/11/2023    ALBUMIN 3.2 (L) 10/11/2023    BILITOT 0.3 10/11/2023    ALKPHOS 52 10/11/2023    AST 8 10/11/2023    ALT 2 10/11/2023     Lab Results   Component Value Date    IRON 50 (L) 12/17/2021    LABIRON 22 12/17/2021    TRANSFERRIN 156 (L) 12/17/2021    TIBC 232 (L) 12/17/2021     Lab Results   Component Value Date    FERRITIN 120.10 12/17/2021    [id number] 412 05/16/2019    FOLATE 9.4 05/16/2019     Lab Results   Component Value Date    PSA 3.180 10/11/2023          Assessment and Plan    Diagnoses and all orders for this visit:    1. Prostate cancer metastatic to bone  -     HYDROcodone-acetaminophen (NORCO)  MG per tablet; Take 1 tablet by mouth Every 4 (Four) Hours As Needed for Moderate Pain.  Dispense: 150 tablet; Refill: 0  -     CBC & Differential; Future  -     Comprehensive Metabolic Panel; Future  -     PSA DIAGNOSTIC; Future  -     Vitamin D 25 Hydroxy; Future    2. Other intestinal malabsorption  -     Vitamin D 25 Hydroxy; Future    Other orders  -     magnesium oxide (MAG-OX) 400 MG tablet; Take 1 tablet by mouth Daily for 14 days.  Dispense: 14 tablet; Refill: 0        67-year-old male with metastatic prostate cancer, completed 6 cycles on 9/15/2023, Lupron Q6 months last shot on 6/23/23, last Xgeva was 6/23/23  -Given plans patient undergo dental procedure and proximal 1.5 months from now, will hold Xgeva until he is cleared by dentist to receive next infusion  -PSA from 10/11/23 showed PSA of 3.180 down from 4.570  -Provided refill of his [place] for his cancer related pain    Hypomagnesemia  -Mag level of 1.5  -thus ordered mag oxide 400 mg PO QD for 2 weeks    -Plan patient follow-up in 2 months with CBC, CMP, Magnesium, PSA and Vit D ordered today    Please note that portions of this note were completed with a voice recognition program.      I spent approximately 30 minutes caring for Semaj today which included  review medical record, reviewing lab work, care coordination, and medical documentation.  Patient was given instructions and counseling regarding his condition or for health maintenance advice. Please see specific information pulled into the AVS if appropriate.     Joshua Dick MD    10/13/2023    Electronically signed by Joshua Dick MD, 10/13/23, 5:05 PM EDT.

## 2023-10-18 RX ORDER — HYDROCODONE BITARTRATE AND ACETAMINOPHEN 10; 325 MG/1; MG/1
1 TABLET ORAL EVERY 4 HOURS PRN
Qty: 150 TABLET | Refills: 0 | OUTPATIENT
Start: 2023-10-18

## 2023-10-23 ENCOUNTER — HOSPITAL ENCOUNTER (OUTPATIENT)
Dept: ONCOLOGY | Facility: HOSPITAL | Age: 68
Discharge: HOME | End: 2023-10-23
Payer: MEDICARE

## 2023-11-10 DIAGNOSIS — C79.51 PROSTATE CANCER METASTATIC TO BONE: ICD-10-CM

## 2023-11-10 DIAGNOSIS — C61 PROSTATE CANCER METASTATIC TO BONE: ICD-10-CM

## 2023-11-10 NOTE — TELEPHONE ENCOUNTER
Caller: Semaj Medina    Relationship: Self    Best call back number:   [phone]    Requested Prescriptions:   Requested Prescriptions     Pending Prescriptions Disp Refills    HYDROcodone-acetaminophen (NORCO)  MG per tablet 150 tablet 0     Sig: Take 1 tablet by mouth Every 4 (Four) Hours As Needed for Moderate Pain.        Pharmacy where request should be sent: [facility], [address] - [phone]  - [phone] FX     Last office visit with prescribing clinician: 10/13/2023   Last telemedicine visit with prescribing clinician: Visit date not found   Next office visit with prescribing clinician: 12/19/2023     Additional details provided by patient: PATIENT PHARMACY IS NOT OPEN ON SATURDAY OR SUNDAY AND CLOSES AT 6 PM TODAY. PATIENT ONLY HAS ONE DAY SUPPLY    Does the patient have less than a 3 day supply:  [x] Yes  [] No    Would you like a call back once the refill request has been completed: [] Yes [x] No    If the office needs to give you a call back, can they leave a voicemail: [] Yes [x] No    Nena Johnson Rep   11/10/23 15:54 EST

## 2023-11-11 RX ORDER — HYDROCODONE BITARTRATE AND ACETAMINOPHEN 10; 325 MG/1; MG/1
1 TABLET ORAL EVERY 4 HOURS PRN
Qty: 150 TABLET | Refills: 0 | Status: SHIPPED | OUTPATIENT
Start: 2023-11-11

## 2023-11-17 ENCOUNTER — OFFICE VISIT (OUTPATIENT)
Dept: RADIATION ONCOLOGY | Facility: HOSPITAL | Age: 68
End: 2023-11-17
Payer: MEDICARE

## 2023-11-17 ENCOUNTER — TELEPHONE (OUTPATIENT)
Dept: ONCOLOGY | Facility: HOSPITAL | Age: 68
End: 2023-11-17
Payer: MEDICARE

## 2023-11-17 VITALS
WEIGHT: 121.47 LBS | DIASTOLIC BLOOD PRESSURE: 96 MMHG | TEMPERATURE: 97.7 F | RESPIRATION RATE: 16 BRPM | BODY MASS INDEX: 17.69 KG/M2 | HEART RATE: 65 BPM | SYSTOLIC BLOOD PRESSURE: 140 MMHG | OXYGEN SATURATION: 100 %

## 2023-11-17 DIAGNOSIS — Z79.818 ANDROGEN DEPRIVATION THERAPY: ICD-10-CM

## 2023-11-17 DIAGNOSIS — C79.51 SECONDARY MALIGNANT NEOPLASM OF BONE: Primary | ICD-10-CM

## 2023-11-17 DIAGNOSIS — Z92.3 PERSONAL HISTORY OF RADIATION THERAPY: ICD-10-CM

## 2023-11-17 DIAGNOSIS — C61 PROSTATE CANCER METASTATIC TO BONE: ICD-10-CM

## 2023-11-17 DIAGNOSIS — G89.3 CANCER-RELATED PAIN: ICD-10-CM

## 2023-11-17 DIAGNOSIS — Z90.79 HISTORY OF PROSTATECTOMY: ICD-10-CM

## 2023-11-17 DIAGNOSIS — C79.51 PROSTATE CANCER METASTATIC TO BONE: ICD-10-CM

## 2023-11-17 NOTE — TELEPHONE ENCOUNTER
Patient states that he has had his dental extractions, he is just waiting on his dentures. And he is not having any more procedures and is ready to be scheduled.  States his dentist is Dr. Masters at [facility].  Called [facility] and left message requesting that they fax over clearance to restart his xgeva.

## 2023-11-17 NOTE — PROGRESS NOTES
Follow Up Office Visit      Encounter Date: 11/17/2023   Patient Name: Semaj Medina  YOB: 1955   Medical Record Number: [id number]   Primary Diagnosis: Secondary malignant neoplasm of bone [C79.51]     Radiation Completion Date:  12/9/2021 T9-T11 spine    Chief Complaint:    Chief Complaint   Patient presents with    Follow-up    Prostate Cancer     Prostate cancer metastatic to bone       Oncology/Hematology History Overview Note     Metastatic Castration resistant prostate cancer:    Patient was initially diagnosed with prostate cancer in 2015.  On 6/4/2015 he underwent radical prostatectomy and LN dissection which showed a [place] 4+3 = 7 prostate cancer.  There were several high risk features such as extraprostatic extension, seminal vesicle invasion, positive margins at the bladder neck and right and left seminal vesicle.  Lymph vascular invasion was indeterminate.  Perineural invasion was not commented on. Final pathology [id number] R1.  He was treated with adjuvant radiation by Dr. Stanton.    According to records the patient likely started Lupron in December 2017 when his PSA started to rise.  The patient took a break in August 2019.  He states he was not aware he was to continue.  He restarted Lupron on 11/26/2019 after it was noted that his PSA chago from 0.46 up to 2.55 on 11/8/2019.  Unfortunately his PSA continued to rise after that to 4.54 on 3/10/2020.    - restarted Lupron on 11/26/20  - Started Zytiga on 8/27/20.  8/31/2020 PSA 17.95     - Started Xtandi in May 2021 due to intolerance of Zytiga even at the lowest dose  - PSA rising in April 2023 to 2.85, doubling time 3.4 months  - started Docetaxel May 2023    PSMA PET 5/2/23:   1.  Radiotracer avid supra clavicular, thoracic and abdominal adenopathy and sclerotic lesion within T10 vertebral body likely representing metastatic disease.  2.  Indeterminate lesion within the left ilium demonstrating mild uptake.   3.   Asymmetric sclerosis within the superior lateral aspect the left orbit which does not demonstrate significant uptake above that of the right orbit. Findings are nonspecific with  differential considerations including but not limited to metastatic disease, fibrous dysplasia versus Paget's disease.   4.  Sub-6 mm nodule within the left lower lobe.  5.  Scattered indeterminate hypodense lesions throughout the liver which do not demonstrate radiotracer uptake above that of the adjacent liver, best best visualized on recent multiphase CT abdomen and pelvis 04/17/2023. Please refer to this dictation for further information.       Prostate cancer metastatic to bone   12/11/2020 -  Chemotherapy    OP SUPPORTIVE Leuprolide 45 mg Q6M     3/23/2021 -  Chemotherapy    OP SUPPORTIVE Denosumab (Xgeva) Q28D     4/15/2021 - 5/13/2021 Chemotherapy    OP PROSTATE Enzalutamide     5/19/2021 Initial Diagnosis    Prostate cancer metastatic to bone (CMS/HCC)     6/2/2023 Biopsy    OP PROSTATE DOCEtaxel  Plan Provider: Natalya Jack MD PhD  Treatment goal: Palliative  Line of treatment: [No plan line of treatment]     Prostate cancer   6/16/2021 Initial Diagnosis    Prostate cancer (HCC)     6/2/2023 -  Chemotherapy    OP CENTRAL VENOUS ACCESS DEVICE ACCESS, CARE, AND MAINTENANCE (CVAD)     Secondary malignant neoplasm of bone   11/4/2021 Initial Diagnosis    Secondary malignant neoplasm of bone (HCC)     11/23/2021 - 12/9/2021 Radiation    Radiation OncologyTreatment Course:  Semaj Medina received 3000 cGy in 10 fractions to the T9-T11 spine.          History of Present Illness: Semaj Medina is a 68 y.o. male who returns to [facility] for routine follow-up. His recent PSA on 10/11/23 is 3.18 ng/mL. His CT CAP on 8/14/23 shows no evidence of pulmonary metastatic disease and stable osteosclerotic metastatic disease. There is a stable sclerotic lesion in the left iliac bone consistent with osseous metastatic  disease.  Bone scan on 8/14/23 shows increased uptake in the right aspect of the T10 vertebral body. It is less intense than the previous study and consistent with the known sclerotic metastatic lesion. He reports feeling well overall. He finished chemotherapy in September and states that he continues to have a decreased appetite related to chemotherapy. Mouth sores have improved. He has undergone dental work and is awaiting dentures. He denies any changes in urinary function. He occasionally has leakage which is chronic. He does complain of midline lower back pain that is a frequent and aching pain. The pain occasionally radiates down bilateral lower extremities. He is prescribed Norco 10 mg by Dr. Dick and states that this decreases his pain to a tolerable level.     Subjective      Review of Systems: Review of Systems   Constitutional:  Positive for appetite change (decreased appetite 2/2 chemo), fatigue and unexpected weight change (3 pound weight loss in the past month).   HENT:  Positive for dental problem (improving, now waiting for dentures).    Eyes:  Positive for visual disturbance (ongoing).   Respiratory: Negative.     Cardiovascular: Negative.    Gastrointestinal: Negative.    Genitourinary:  Negative for dysuria and frequency.        Occasional MATEO   Musculoskeletal:  Positive for back pain.   Skin: Negative.    Neurological: Negative.    Psychiatric/Behavioral: Negative.       The following portions of the patient's history were reviewed and updated as appropriate: allergies, current medications, past family history, past medical history, past social history, past surgical history and problem list.    Medications:     Current Outpatient Medications:     Calcium Citrate-Vitamin D3 (CITRACAL) 315-6.25 MG-MCG tablet tablet, Take 1 tablet by mouth Daily., Disp: , Rfl:     ergocalciferol (ERGOCALCIFEROL) 1.25 MG (64157 UT) capsule, , Disp: , Rfl:     folic acid (FOLVITE) 1 MG tablet, , Disp: , Rfl:      HYDROcodone-acetaminophen (NORCO)  MG per tablet, Take 1 tablet by mouth Every 4 (Four) Hours As Needed for Moderate Pain., Disp: 150 tablet, Rfl: 0    ibuprofen (ADVIL,MOTRIN) 800 MG tablet, TAKE 1 TABLET BY MOUTH EVERY 6 - 8 HOURS AS NEEDED FOR PAIN, Disp: , Rfl:     leuprolide (Lupron [place], 3-Month,) 22.5 MG injection, 22.5 mg., Disp: , Rfl:     lisinopril (PRINIVIL,ZESTRIL) 40 MG tablet, TAKE 1 TABLET BY MOUTH DAILY, Disp: 90 tablet, Rfl: 0    albuterol sulfate  (90 Base) MCG/ACT inhaler, , Disp: , Rfl:     dronabinol (Marinol) 5 MG capsule, Take 1 capsule by mouth 2 (Two) Times a Day Before Meals. (Patient not taking: Reported on 11/17/2023), Disp: 60 capsule, Rfl: 3    Lidocaine Viscous HCl (XYLOCAINE) 2 % solution, , Disp: , Rfl:     Nicotine Mini 4 MG lozenge, , Disp: , Rfl:     NON FORMULARY, LIDOCAINE 2%/M-DRYL Swish and spit, Disp: , Rfl:     ondansetron (ZOFRAN) 8 MG tablet, Take 1 tablet by mouth Every 8 (Eight) Hours As Needed for Nausea or Vomiting., Disp: 30 tablet, Rfl: 3    Allergies:   No Known Allergies    Patient Smoking History:   Social History     Tobacco Use   Smoking Status Every Day    Packs/day: 0.25    Years: 42.00    Additional pack years: 0.00    Total pack years: 10.50    Types: Cigarettes    Start date: 10/21/1970    Passive exposure: Past   Smokeless Tobacco Never   Tobacco Comments    INSTRUCTED NO SMOKING 24 HR PRIOR TO SURGERY     LAST SMOKED AT 200AM 05-22-23       Measures:  PHQ-9 Total Score: 5   Quality of Life: 90 - Limited Activity      ECOG: (1) Restricted in physically strenuous activity, ambulatory and able to do work of light nature  Pain: (on a scale of 0-10)   Pain Score    11/17/23 1144   PainSc:   6   PainLoc: Back  Comment: Lower back pain, ongoing     Semaj Medina reports a pain score of 6.  Given his pain assessment as noted, treatment options were discussed and the following options were decided upon as a follow-up plan to address the  patient's pain: continuation of current treatment plan for pain.     Objective     Physical Exam:   Vital Signs:   Vitals:    11/17/23 1144   BP: 140/96  Comment: Pt didn't take his BP meds on Thursday and hasn't taken them today   Pulse: 65   Resp: 16   Temp: 97.7 °F (36.5 °C)   TempSrc: Temporal   SpO2: 100%   Weight: 55.1 kg (121 lb 7.6 oz)   PainSc:   6   PainLoc: Back  Comment: Lower back pain, ongoing     Body mass index is 17.69 kg/m².   Wt Readings from Last 3 Encounters:   11/17/23 55.1 kg (121 lb 7.6 oz)   10/13/23 56.6 kg (124 lb 12.5 oz)   09/15/23 57.6 kg (126 lb 15.8 oz)       Physical Exam  Vitals reviewed.   Constitutional:       General: He is not in acute distress.     Appearance: Normal appearance. He is normal weight. He is not ill-appearing.   HENT:      Head: Normocephalic and atraumatic.      Mouth/Throat:      Mouth: Mucous membranes are moist.      Pharynx: Oropharynx is clear. No oropharyngeal exudate or posterior oropharyngeal erythema.   Eyes:      Conjunctiva/sclera: Conjunctivae normal.      Pupils: Pupils are equal, round, and reactive to light.   Cardiovascular:      Rate and Rhythm: Normal rate and regular rhythm.      Pulses: Normal pulses.      Heart sounds: Normal heart sounds.   Pulmonary:      Effort: Pulmonary effort is normal. No respiratory distress.      Breath sounds: Normal breath sounds.   Musculoskeletal:         General: Normal range of motion.      Cervical back: Normal range of motion.      Comments: Subjective complaints of midline lumbar pain that occasionally radiates down bilateral lower extremities   Skin:     General: Skin is warm and dry.   Neurological:      General: No focal deficit present.      Mental Status: He is alert and oriented to person, place, and time. Mental status is at baseline.   Psychiatric:         Mood and Affect: Mood normal.         Behavior: Behavior normal.       Result Review: I independently reviewed the following data. The pertinent  findings are as above in the HPI.  -- PSA DIAGNOSTIC (10/11/2023 11:30)   -- NM Bone Scan Whole Body (08/14/2023 12:18)   -- CT Abdomen Pelvis With Contrast (08/14/2023 09:58)   -- CT Chest With Contrast Diagnostic (08/14/2023 09:58)   -- NM PET/CT Skull Base to Mid Thigh (05/02/2023 14:40)     Imaging: No radiology results for the last 90 days.     Labs:   WBC   Date Value Ref Range Status   10/11/2023 5.81 3.40 - 10.80 10*3/mm3 Final   12/11/2020 6.61 4.80 - 10.80 10*3/uL Final     Hemoglobin   Date Value Ref Range Status   10/11/2023 11.7 (L) 13.0 - 17.7 g/dL Final     Hematocrit   Date Value Ref Range Status   10/11/2023 36.2 (L) 37.5 - 51.0 % Final     Platelets   Date Value Ref Range Status   10/11/2023 292 140 - 450 10*3/mm3 Final     TSH   Date Value Ref Range Status   05/16/2019 1.350 0.270 - 4.200 m[iU]/L Final      PSA   Date Value Ref Range Status   10/11/2023 3.180 0.000 - 4.000 ng/mL Final   08/24/2023 4.570 (H) 0.000 - 4.000 ng/mL Final   08/03/2023 4.830 (H) 0.000 - 4.000 ng/mL Final   06/21/2023 5.580 (H) 0.000 - 4.000 ng/mL Final   04/18/2023 2.850 0.000 - 4.000 ng/mL Final   02/17/2023 1.900 0.000 - 4.000 ng/mL Final   12/06/2022 1.340 0.000 - 4.000 ng/mL Final   09/13/2022 0.697 0.000 - 4.000 ng/mL Final   06/17/2022 0.593 0.000 - 4.000 ng/mL Final   03/18/2022 1.150 0.000 - 4.000 ng/mL Final   01/13/2022 5.470 (H) 0.000 - 4.000 ng/mL Final   12/17/2021 14.200 (H) 0.000 - 4.000 ng/mL Final   10/14/2021 11.900 (H) 0.000 - 4.000 ng/mL Final   09/16/2021 10.200 (H) 0.000 - 4.000 ng/mL Final   06/17/2021 5.200 (H) 0.000 - 4.000 ng/mL Final   03/05/2021 2.25 0.00 - 4.00 ng/mL Final   12/11/2020 6.81 (H) 0.00 - 4.00 ng/mL Final   11/05/2020 7.17 (H) 0.00 - 4.00 ng/mL Final   08/31/2020 17.95 (H) 0.00 - 4.00 ng/mL Final   06/26/2020 12.13 (H) 0.00 - 4.00 ng/mL Final   06/19/2020 11.45 (H) 0.00 - 4.00 ng/mL Final   03/10/2020 4.54 (H) 0.00 - 4.00 ng/mL Final   11/08/2019 2.55 0.00 - 4.00 ng/mL Final    08/05/2019 0.46 0.00 - 4.00 ng/mL Final   05/06/2019 0.35 0.00 - 4.00 ng/mL Final   01/31/2019 0.40 0.00 - 4.00 ng/mL Final     Assessment / Plan      Impression: Semaj Meidna is a pleasant 68 y.o. male initially with diagnosis of prostate cancer, pT3b pN0 [place] 4+3= 7, managed surgically with prostatectomy in 2015. He had positive margins at the bladder neck and received adjuvant radiotherapy at that time with Dr. Stanton. In 2017 his PSA began to rise and he was started on Lupron and in 2020 he was started on Zytiga. He tolerated Zytiga poorly and his dose was reduced multiple times. Restaging imaging in 2021 demonstrated a T10 lesion which was resulting in pain and limited function. He received palliative radiotherapy to vertebral metastases at T9-T11, completed on 12/9/2021 (30 Gy/10 fractions). In May 2023 his PSA was rising. His PSMA PET scan on 5/2/2023 demonstrated metastatic involvement throughout the neck, thorax, abdomen and pelvis. He was started on docetaxel, which he completed 6 cycles in September 2023. He continues to receive Lupron 45 mg every 6 months and is planned to start Xgeva soon. This was delayed due to recent dental procedure. His disease appears controlled radiographically on 8/14/2023 CT CAP and Bone Scan. His recent PSA on 10/11/2023 is 3.18 ng/mL and is trending down nicely. He is clinically doing well although he continues to have a decreased appetite secondary to chemotherapy. Today he has complaints of midline lumber pain that is reasonably well controlled with Norco 10/325 mg prescribed by Dr. Dick. Discussed option of continuing to monitor versus pursuing diagnostic imaging with MRI of lumbar spine for further evaluation of his back pain. Mr. Medina would like to continue to monitor for now and he will contact our office in the event that his back pain worsens.     Assessment/Plan:   Diagnoses and all orders for this visit:    1. Secondary malignant neoplasm of bone  (Primary)    2. Prostate cancer metastatic to bone    3. Personal history of radiation therapy    4. Cancer-related pain    5. Androgen deprivation therapy    6. History of prostatectomy       Follow Up:   Return for follow-up in 3 months or sooner if he develops worsening back pain. PSA being ordered by Dr. Dick.   Follow-up with Dr. Dick on 12/29/23.     Return in about 3 months (around 2/17/2024) for Office Visit.  Semaj Medina was encouraged to contact me in the interim with any questions or concerns regarding his care.        CHRIS Juarez  Radiation Oncology  [facility]    This document has been signed by CHRIS Alston on November 17, 2023 13:59 EST

## 2023-11-17 NOTE — TELEPHONE ENCOUNTER
LM for patient to call back to let us know if he had his dental procedure done and if he has been cleared by his dentist to get his Xgeva injection.

## 2023-11-17 NOTE — TELEPHONE ENCOUNTER
Left a message for the patient to call back and let us know when he had the dental work completed.

## 2023-11-20 ENCOUNTER — TELEPHONE (OUTPATIENT)
Dept: ONCOLOGY | Facility: HOSPITAL | Age: 68
End: 2023-11-20
Payer: MEDICARE

## 2023-11-20 NOTE — TELEPHONE ENCOUNTER
Received call from patient stating that he did not come or his appts today because he had a message not to get his shot until we get more info from the dentist. Patient was wanting to know if he needs to reschedule his appt.  Advised patient that he already has appt scheduled for 12/15/23 for labs and the injection on 12/19 we will keep those appts. If Dr. Mendoza nurse gets the info from the doctor they will call and get him in sooner. Patient verbalized understanding.

## 2023-11-30 ENCOUNTER — TELEPHONE (OUTPATIENT)
Dept: ONCOLOGY | Facility: HOSPITAL | Age: 68
End: 2023-11-30
Payer: MEDICARE

## 2023-12-13 DIAGNOSIS — C61 PROSTATE CANCER METASTATIC TO BONE: ICD-10-CM

## 2023-12-13 DIAGNOSIS — C79.51 PROSTATE CANCER METASTATIC TO BONE: ICD-10-CM

## 2023-12-13 RX ORDER — LISINOPRIL 40 MG/1
40 TABLET ORAL DAILY
Qty: 90 TABLET | Refills: 0 | Status: SHIPPED | OUTPATIENT
Start: 2023-12-13

## 2023-12-13 NOTE — TELEPHONE ENCOUNTER
Caller: Semaj Medina Osborne    Relationship to patient: Self    Best call back number: [phone]    Patient is needing: TO CHECK ON STATUS OF MED REFILL. HE HAS ONLY MEDICATION FOR TODAY AND TOMORROW.

## 2023-12-14 ENCOUNTER — TELEPHONE (OUTPATIENT)
Dept: ONCOLOGY | Facility: HOSPITAL | Age: 68
End: 2023-12-14
Payer: MEDICARE

## 2023-12-14 RX ORDER — HYDROCODONE BITARTRATE AND ACETAMINOPHEN 10; 325 MG/1; MG/1
1 TABLET ORAL EVERY 4 HOURS PRN
Qty: 150 TABLET | Refills: 0 | Status: SHIPPED | OUTPATIENT
Start: 2023-12-14

## 2023-12-15 ENCOUNTER — HOSPITAL ENCOUNTER (OUTPATIENT)
Dept: ONCOLOGY | Facility: HOSPITAL | Age: 68
Discharge: HOME | End: 2023-12-15
Admitting: INTERNAL MEDICINE
Payer: MEDICARE

## 2023-12-15 DIAGNOSIS — C61 PROSTATE CANCER METASTATIC TO BONE: Primary | ICD-10-CM

## 2023-12-15 DIAGNOSIS — K90.89 OTHER INTESTINAL MALABSORPTION: ICD-10-CM

## 2023-12-15 DIAGNOSIS — C61 PROSTATE CANCER: ICD-10-CM

## 2023-12-15 DIAGNOSIS — C79.51 PROSTATE CANCER METASTATIC TO BONE: Primary | ICD-10-CM

## 2023-12-15 LAB
25(OH)D3 SERPL-MCNC: 27.7 NG/ML (ref 30–100)
ALBUMIN SERPL-MCNC: 3.5 G/DL (ref 3.5–5.2)
ALBUMIN/GLOB SERPL: 1.5 G/DL
ALP SERPL-CCNC: 44 U/L (ref 39–117)
ALT SERPL W P-5'-P-CCNC: 3 U/L (ref 1–41)
ANION GAP SERPL CALCULATED.3IONS-SCNC: 5 MMOL/L (ref 5–15)
AST SERPL-CCNC: 10 U/L (ref 1–40)
BASOPHILS # BLD AUTO: 0.03 10*3/MM3 (ref 0–0.2)
BASOPHILS NFR BLD AUTO: 0.6 % (ref 0–1.5)
BILIRUB SERPL-MCNC: 0.2 MG/DL (ref 0–1.2)
BUN SERPL-MCNC: 14 MG/DL (ref 8–23)
BUN/CREAT SERPL: 15.2 (ref 7–25)
CALCIUM SPEC-SCNC: 9.4 MG/DL (ref 8.6–10.5)
CHLORIDE SERPL-SCNC: 107 MMOL/L (ref 98–107)
CO2 SERPL-SCNC: 26 MMOL/L (ref 22–29)
CREAT SERPL-MCNC: 0.92 MG/DL (ref 0.76–1.27)
DEPRECATED RDW RBC AUTO: 50.1 FL (ref 37–54)
EGFRCR SERPLBLD CKD-EPI 2021: 90.6 ML/MIN/1.73
EOSINOPHIL # BLD AUTO: 0.16 10*3/MM3 (ref 0–0.4)
EOSINOPHIL NFR BLD AUTO: 2.9 % (ref 0.3–6.2)
ERYTHROCYTE [DISTWIDTH] IN BLOOD BY AUTOMATED COUNT: 16.3 % (ref 12.3–15.4)
GLOBULIN UR ELPH-MCNC: 2.3 GM/DL
GLUCOSE SERPL-MCNC: 107 MG/DL (ref 65–99)
HCT VFR BLD AUTO: 37.5 % (ref 37.5–51)
HGB BLD-MCNC: 12.4 G/DL (ref 13–17.7)
IMM GRANULOCYTES # BLD AUTO: 0.02 10*3/MM3 (ref 0–0.05)
IMM GRANULOCYTES NFR BLD AUTO: 0.4 % (ref 0–0.5)
LYMPHOCYTES # BLD AUTO: 1.72 10*3/MM3 (ref 0.7–3.1)
LYMPHOCYTES NFR BLD AUTO: 31.6 % (ref 19.6–45.3)
MAGNESIUM SERPL-MCNC: 1.7 MG/DL (ref 1.6–2.4)
MCH RBC QN AUTO: 27.4 PG (ref 26.6–33)
MCHC RBC AUTO-ENTMCNC: 33.1 G/DL (ref 31.5–35.7)
MCV RBC AUTO: 82.8 FL (ref 79–97)
MONOCYTES # BLD AUTO: 0.37 10*3/MM3 (ref 0.1–0.9)
MONOCYTES NFR BLD AUTO: 6.8 % (ref 5–12)
NEUTROPHILS NFR BLD AUTO: 3.15 10*3/MM3 (ref 1.7–7)
NEUTROPHILS NFR BLD AUTO: 57.7 % (ref 42.7–76)
PHOSPHATE SERPL-MCNC: 4.7 MG/DL (ref 2.5–4.5)
PLATELET # BLD AUTO: 328 10*3/MM3 (ref 140–450)
PMV BLD AUTO: 8.6 FL (ref 6–12)
POTASSIUM SERPL-SCNC: 4.2 MMOL/L (ref 3.5–5.2)
PROT SERPL-MCNC: 5.8 G/DL (ref 6–8.5)
PSA SERPL-MCNC: 8.05 NG/ML (ref 0–4)
RBC # BLD AUTO: 4.53 10*6/MM3 (ref 4.14–5.8)
SODIUM SERPL-SCNC: 138 MMOL/L (ref 136–145)
WBC NRBC COR # BLD AUTO: 5.45 10*3/MM3 (ref 3.4–10.8)

## 2023-12-15 PROCEDURE — 84153 ASSAY OF PSA TOTAL: CPT | Performed by: INTERNAL MEDICINE

## 2023-12-15 PROCEDURE — 82306 VITAMIN D 25 HYDROXY: CPT | Performed by: INTERNAL MEDICINE

## 2023-12-15 PROCEDURE — 80053 COMPREHEN METABOLIC PANEL: CPT | Performed by: INTERNAL MEDICINE

## 2023-12-15 PROCEDURE — 83735 ASSAY OF MAGNESIUM: CPT | Performed by: INTERNAL MEDICINE

## 2023-12-15 PROCEDURE — 36591 DRAW BLOOD OFF VENOUS DEVICE: CPT

## 2023-12-15 PROCEDURE — 25010000002 HEPARIN LOCK FLUSH PER 10 UNITS: Performed by: INTERNAL MEDICINE

## 2023-12-15 PROCEDURE — 85025 COMPLETE CBC W/AUTO DIFF WBC: CPT | Performed by: INTERNAL MEDICINE

## 2023-12-15 PROCEDURE — 84100 ASSAY OF PHOSPHORUS: CPT | Performed by: INTERNAL MEDICINE

## 2023-12-15 RX ORDER — HEPARIN SODIUM (PORCINE) LOCK FLUSH IV SOLN 100 UNIT/ML 100 UNIT/ML
500 SOLUTION INTRAVENOUS AS NEEDED
Status: DISCONTINUED | OUTPATIENT
Start: 2023-12-15 | End: 2023-12-16 | Stop reason: HOSPADM

## 2023-12-15 RX ORDER — SODIUM CHLORIDE 0.9 % (FLUSH) 0.9 %
20 SYRINGE (ML) INJECTION AS NEEDED
OUTPATIENT
Start: 2023-12-15

## 2023-12-15 RX ORDER — SODIUM CHLORIDE 0.9 % (FLUSH) 0.9 %
20 SYRINGE (ML) INJECTION AS NEEDED
Status: DISCONTINUED | OUTPATIENT
Start: 2023-12-15 | End: 2023-12-16 | Stop reason: HOSPADM

## 2023-12-15 RX ORDER — HEPARIN SODIUM (PORCINE) LOCK FLUSH IV SOLN 100 UNIT/ML 100 UNIT/ML
500 SOLUTION INTRAVENOUS AS NEEDED
OUTPATIENT
Start: 2023-12-19

## 2023-12-15 RX ADMIN — Medication 20 ML: at 11:35

## 2023-12-15 RX ADMIN — HEPARIN SODIUM (PORCINE) LOCK FLUSH IV SOLN 100 UNIT/ML 500 UNITS: 100 SOLUTION at 11:35

## 2023-12-19 ENCOUNTER — SPECIALTY PHARMACY (OUTPATIENT)
Dept: PHARMACY | Facility: HOSPITAL | Age: 68
End: 2023-12-19
Payer: MEDICARE

## 2023-12-19 ENCOUNTER — OFFICE VISIT (OUTPATIENT)
Dept: ONCOLOGY | Facility: HOSPITAL | Age: 68
End: 2023-12-19
Payer: MEDICARE

## 2023-12-19 ENCOUNTER — HOSPITAL ENCOUNTER (OUTPATIENT)
Dept: ONCOLOGY | Facility: HOSPITAL | Age: 68
Discharge: HOME | End: 2023-12-19
Admitting: INTERNAL MEDICINE
Payer: MEDICARE

## 2023-12-19 VITALS
RESPIRATION RATE: 16 BRPM | DIASTOLIC BLOOD PRESSURE: 83 MMHG | BODY MASS INDEX: 18.07 KG/M2 | HEART RATE: 101 BPM | SYSTOLIC BLOOD PRESSURE: 134 MMHG | WEIGHT: 124.12 LBS | OXYGEN SATURATION: 93 % | TEMPERATURE: 97 F

## 2023-12-19 DIAGNOSIS — C61 PROSTATE CANCER: ICD-10-CM

## 2023-12-19 DIAGNOSIS — E55.9 VITAMIN D DEFICIENCY: ICD-10-CM

## 2023-12-19 DIAGNOSIS — C79.51 PROSTATE CANCER METASTATIC TO BONE: Primary | ICD-10-CM

## 2023-12-19 DIAGNOSIS — C61 PROSTATE CANCER METASTATIC TO BONE: Primary | ICD-10-CM

## 2023-12-19 LAB — PSA SERPL-MCNC: 9.67 NG/ML (ref 0–4)

## 2023-12-19 PROCEDURE — 96372 THER/PROPH/DIAG INJ SC/IM: CPT

## 2023-12-19 PROCEDURE — 25010000002 DENOSUMAB 120 MG/1.7ML SOLUTION: Performed by: INTERNAL MEDICINE

## 2023-12-19 PROCEDURE — 96402 CHEMO HORMON ANTINEOPL SQ/IM: CPT

## 2023-12-19 PROCEDURE — 84153 ASSAY OF PSA TOTAL: CPT | Performed by: INTERNAL MEDICINE

## 2023-12-19 PROCEDURE — 25010000002 HEPARIN LOCK FLUSH PER 10 UNITS: Performed by: INTERNAL MEDICINE

## 2023-12-19 PROCEDURE — 25010000002 LEUPROLIDE 45 MG KIT: Performed by: INTERNAL MEDICINE

## 2023-12-19 PROCEDURE — 36591 DRAW BLOOD OFF VENOUS DEVICE: CPT

## 2023-12-19 RX ORDER — SODIUM CHLORIDE 0.9 % (FLUSH) 0.9 %
20 SYRINGE (ML) INJECTION AS NEEDED
OUTPATIENT
Start: 2023-12-19

## 2023-12-19 RX ORDER — SODIUM CHLORIDE 0.9 % (FLUSH) 0.9 %
20 SYRINGE (ML) INJECTION AS NEEDED
Status: DISCONTINUED | OUTPATIENT
Start: 2023-12-19 | End: 2023-12-20 | Stop reason: HOSPADM

## 2023-12-19 RX ORDER — HEPARIN SODIUM (PORCINE) LOCK FLUSH IV SOLN 100 UNIT/ML 100 UNIT/ML
500 SOLUTION INTRAVENOUS AS NEEDED
Status: DISCONTINUED | OUTPATIENT
Start: 2023-12-19 | End: 2023-12-20 | Stop reason: HOSPADM

## 2023-12-19 RX ORDER — ERGOCALCIFEROL 1.25 MG/1
50000 CAPSULE ORAL WEEKLY
Qty: 8 CAPSULE | Refills: 0 | Status: SHIPPED | OUTPATIENT
Start: 2023-12-19

## 2023-12-19 RX ORDER — HEPARIN SODIUM (PORCINE) LOCK FLUSH IV SOLN 100 UNIT/ML 100 UNIT/ML
500 SOLUTION INTRAVENOUS AS NEEDED
OUTPATIENT
Start: 2023-12-19

## 2023-12-19 RX ORDER — MAGNESIUM OXIDE 400 MG/1
400 TABLET ORAL DAILY
Qty: 30 TABLET | Refills: 0 | Status: SHIPPED | OUTPATIENT
Start: 2023-12-19

## 2023-12-19 RX ADMIN — Medication 20 ML: at 11:40

## 2023-12-19 RX ADMIN — HEPARIN SODIUM (PORCINE) LOCK FLUSH IV SOLN 100 UNIT/ML 500 UNITS: 100 SOLUTION at 11:41

## 2023-12-19 RX ADMIN — LEUPROLIDE ACETATE 45 MG: KIT at 11:33

## 2023-12-19 RX ADMIN — DENOSUMAB 120 MG: 120 INJECTION SUBCUTANEOUS at 11:30

## 2023-12-19 NOTE — PROGRESS NOTES
Patient  Semaj Medina      Location  [facility] HEMATOLOGY & ONCOLOGY    Chief Complaint  PROSTATE CA-- 1 MO FOLLOW UP    Referring Provider: Natalya Jack MD PhD  PCP: Maryse Monae MD    Subjective          Oncology/Hematology History Overview Note     Metastatic Castration resistant prostate cancer:    Patient was initially diagnosed with prostate cancer in 2015.  On 6/4/2015 he underwent radical prostatectomy and LN dissection which showed a Zach 4+3 = 7 prostate cancer.  There were several high risk features such as extraprostatic extension, seminal vesicle invasion, positive margins at the bladder neck and right and left seminal vesicle.  Lymph vascular invasion was indeterminate.  Perineural invasion was not commented on. Final pathology [id number] R1.  He was treated with adjuvant radiation by Dr. Stanton.    According to records the patient likely started Lupron in December 2017 when his PSA started to rise.  The patient took a break in August 2019.  He states he was not aware he was to continue.  He restarted Lupron on 11/26/2019 after it was noted that his PSA chago from 0.46 up to 2.55 on 11/8/2019.  Unfortunately his PSA continued to rise after that to 4.54 on 3/10/2020.    - restarted Lupron on 11/26/20  - Started Zytiga on 8/27/20.  8/31/2020 PSA 17.95     - Started Xtandi in May 2021 due to intolerance of Zytiga even at the lowest dose  - PSA rising in April 2023 to 2.85, doubling time 3.4 months  - started Docetaxel May 2023    PSMA PET 5/2/23:   1.  Radiotracer avid supra clavicular, thoracic and abdominal adenopathy and sclerotic lesion within T10 vertebral body likely representing metastatic disease.  2.  Indeterminate lesion within the left ilium demonstrating mild uptake.   3.  Asymmetric sclerosis within the superior lateral aspect the left orbit which does not demonstrate significant uptake above that of the right orbit. Findings are nonspecific  with  differential considerations including but not limited to metastatic disease, fibrous dysplasia versus Paget's disease.   4.  Sub-6 mm nodule within the left lower lobe.  5.  Scattered indeterminate hypodense lesions throughout the liver which do not demonstrate radiotracer uptake above that of the adjacent liver, best best visualized on recent multiphase CT abdomen and pelvis 04/17/2023. Please refer to this dictation for further information.       Prostate cancer metastatic to bone   12/11/2020 -  Chemotherapy    OP SUPPORTIVE Leuprolide 45 mg Q6M     3/23/2021 -  Chemotherapy    OP SUPPORTIVE Denosumab (Xgeva) Q28D     4/15/2021 - 5/13/2021 Chemotherapy    OP PROSTATE Enzalutamide     5/19/2021 Initial Diagnosis    Prostate cancer metastatic to bone (CMS/HCC)     6/2/2023 - 9/15/2023 Biopsy    OP PROSTATE DOCEtaxel  Plan Provider: Natalya Jack MD PhD  Treatment goal: Palliative  Line of treatment: [No plan line of treatment]     12/19/2023 -  Chemotherapy    OP PROSTATE Apalutamide     Prostate cancer   6/16/2021 Initial Diagnosis    Prostate cancer (HCC)     6/2/2023 -  Chemotherapy    OP CENTRAL VENOUS ACCESS DEVICE ACCESS, CARE, AND MAINTENANCE (CVAD)     Secondary malignant neoplasm of bone   11/4/2021 Initial Diagnosis    Secondary malignant neoplasm of bone (HCC)     11/23/2021 - 12/9/2021 Radiation    Radiation OncologyTreatment Course:  Semaj Medina received 3000 cGy in 10 fractions to the T9-T11 spine.          History of Present Illness  Patient with metastatic prostate cancer, completed docetaxel (6 cycles) Lupron 45 mg every 6 months, patient has also had Xgeva ordered, but patient underwent dental procedure and Xgeva was held until he was cleared again by his dentist, pt reports he has received clearance by his dentist to restart Xgeva.  Patient denies any fever, chills, night sweats, nausea, vomiting. He reports pain in his lumbar spine region, for which pain medications have helped.      Review of Systems   Constitutional:  Positive for fatigue (5/10). Negative for appetite change, diaphoresis, fever, unexpected weight gain and unexpected weight loss.   HENT:  Negative for hearing loss, mouth sores, sore throat, swollen glands, trouble swallowing and voice change.    Eyes:  Negative for blurred vision.   Respiratory:  Negative for cough, shortness of breath and wheezing.    Cardiovascular:  Negative for chest pain and palpitations.   Gastrointestinal:  Negative for abdominal pain, blood in stool, constipation, diarrhea, nausea and vomiting.   Endocrine: Negative for cold intolerance and heat intolerance.   Genitourinary:  Negative for difficulty urinating, dysuria, frequency, hematuria and urinary incontinence.   Musculoskeletal:  Positive for back pain. Negative for arthralgias and myalgias.   Skin:  Negative for rash, skin lesions and wound.   Neurological:  Negative for dizziness, seizures, weakness, numbness and headache.   Hematological:  Does not bruise/bleed easily.   Psychiatric/Behavioral:  Negative for depressed mood. The patient is not nervous/anxious.    All other systems reviewed and are negative.      Past Medical History:   Diagnosis Date    Anemia     NO PROBLEMS    Anxiety     Blood disease     Colon polyps     Depression     Diverticulitis     Forgetfulness     Hepatitis C     RESOLVED    Hypertension     Night sweats     Numbness and tingling of left lower extremity     Prostate cancer      Past Surgical History:   Procedure Laterality Date    COLONOSCOPY      PROSTATE BIOPSY      PROSTATECTOMY      ROBOT ASSISTED W PELVIC LYMPH NODE DISSECTION    TRANSURETHRAL RESECTION OF BLADDER TUMOR      VENOUS ACCESS DEVICE (PORT) INSERTION Right 5/22/2023    Procedure: INSERTION VENOUS ACCESS PORT;  Surgeon: Waldemar Iraheta MD;  [place] [facility] [place]  Service: General;  Laterality: Right;     Social History     Socioeconomic History    Marital status:    Tobacco Use     Smoking status: Every Day     Packs/day: 0.25     Years: 42.00     Additional pack years: 0.00     Total pack years: 10.50     Types: Cigarettes     Start date: 10/21/1970     Passive exposure: Past    Smokeless tobacco: Never    Tobacco comments:     INSTRUCTED NO SMOKING 24 HR PRIOR TO SURGERY      LAST SMOKED AT 200AM 05-22-23   Vaping Use    Vaping Use: Never used   Substance and Sexual Activity    Alcohol use: Yes     Alcohol/week: 6.0 standard drinks of alcohol     Types: 6 Cans of beer per week     Comment: 12 pack a week of beer    Drug use: Never    Sexual activity: Defer     Family History   Problem Relation Age of Onset    Cancer Mother     Cancer Father        Objective   Physical Exam  Vitals reviewed. Exam conducted with a chaperone present.   Constitutional:       General: He is not in acute distress.     Appearance: Normal appearance.   HENT:      Head: Normocephalic and atraumatic.   Eyes:      Extraocular Movements: Extraocular movements intact.      Conjunctiva/sclera: Conjunctivae normal.   Pulmonary:      Effort: Pulmonary effort is normal.   Skin:     General: Skin is warm and dry.      Findings: No rash.   Neurological:      Mental Status: He is oriented to person, place, and time.           Vitals:    12/19/23 1035   BP: 134/83   Pulse: 101   Resp: 16   Temp: 97 °F (36.1 °C)   TempSrc: Temporal   SpO2: 93%   Weight: 56.3 kg (124 lb 1.9 oz)   PainSc:   4   PainLoc: Back         ECOG score: 0         PHQ-9 Total Score:         Result Review :   The following data was reviewed by: Joshua Dick MD on 08/24/2023:  Lab Results   Component Value Date    HGB 12.4 (L) 12/15/2023    HCT 37.5 12/15/2023    MCV 82.8 12/15/2023     12/15/2023    WBC 5.45 12/15/2023    NEUTROABS 3.15 12/15/2023    LYMPHSABS 1.72 12/15/2023    MONOSABS 0.37 12/15/2023    EOSABS 0.16 12/15/2023    BASOSABS 0.03 12/15/2023     Lab Results   Component Value Date    GLUCOSE 107 (H) 12/15/2023    BUN 14 12/15/2023     CREATININE 0.92 12/15/2023     12/15/2023    K 4.2 12/15/2023     12/15/2023    CO2 26.0 12/15/2023    CALCIUM 9.4 12/15/2023    PROTEINTOT 5.8 (L) 12/15/2023    ALBUMIN 3.5 12/15/2023    BILITOT 0.2 12/15/2023    ALKPHOS 44 12/15/2023    AST 10 12/15/2023    ALT 3 12/15/2023     Lab Results   Component Value Date    IRON 50 (L) 12/17/2021    LABIRON 22 12/17/2021    TRANSFERRIN 156 (L) 12/17/2021    TIBC 232 (L) 12/17/2021     Lab Results   Component Value Date    FERRITIN 120.10 12/17/2021    [id number] 412 05/16/2019    FOLATE 9.4 05/16/2019     Lab Results   Component Value Date    PSA 8.050 (H) 12/15/2023          Assessment and Plan    Diagnoses and all orders for this visit:    1. Prostate cancer metastatic to bone (Primary)  -     ergocalciferol (ERGOCALCIFEROL) 1.25 MG (01948 UT) capsule; Take 1 capsule by mouth 1 (One) Time Per Week.  Dispense: 8 capsule; Refill: 0  -     CT chest w contrast; Future  -     CT abdomen pelvis w contrast; Future  -     NM Bone Scan Whole Body; Future  -     Testosterone, Free, Total; Future  -     CBC & Differential; Future  -     Comprehensive Metabolic Panel; Future  -     PSA DIAGNOSTIC; Future  -     magnesium oxide (MAG-OX) 400 MG tablet; Take 1 tablet by mouth Daily.  Dispense: 30 tablet; Refill: 0  -     Magnesium; Future  -     Phosphorus; Future  -     Cancel: Comprehensive metabolic panel; Future  -     Cancel: CBC and Differential; Future    2. Vitamin D deficiency  -     ergocalciferol (ERGOCALCIFEROL) 1.25 MG (19345 UT) capsule; Take 1 capsule by mouth 1 (One) Time Per Week.  Dispense: 8 capsule; Refill: 0    Other orders  -     Cancel: denosumab (XGEVA) injection 120 mg          67-year-old male with metastatic prostate cancer, completed 6 cycles of docetaxel on 9/15/2023, Lupron Q6 months last shot on 12/2023, last Xgeva was 6/23/23  -Pt recently underwent dental procedure, but has been cleared to resume Xgeva today.  -will proceed with next Lupron  injection today  -PSA from 12/15/23 was 8 up from 3.1 (in 10/2023)  -Plan to rescan pt with NM bone scan, CT CAP to assess for progression of disease  -will consider referring to Rad/onc for consideration for palliative RT   -placed orders for Apalutamide, given my suspicion he is progressing   -ordered testosterone level to assess castration sensitivity level  -Provided refill of his [place] for his cancer related pain    Hypomagnesemia  -Mag level of 1.5  -thus ordered mag oxide 400 mg PO QD   -will recheck at next clinic appointment    Low Vit D  -Vit level of 27.7  -prescribed Vit D 50K IU once weekly x 8 weeks      -Plan for patient follow-up in 1 month with CBC, CMP, Magnesium, PSA  and discussion of restaging imaging ordered today.    Please note that portions of this note were completed with a voice recognition program.      I spent approximately 30 minutes caring for Semaj colin which included review medical record, reviewing lab work, care coordination, and medical documentation.  Patient was given instructions and counseling regarding his condition or for health maintenance advice. Please see specific information pulled into the AVS if appropriate.     Joshua Dick MD    12/19/2023        Electronically signed by Joshua Dick MD, 12/19/23, 2:52 PM EST.

## 2023-12-20 ENCOUNTER — SPECIALTY PHARMACY (OUTPATIENT)
Dept: PHARMACY | Facility: HOSPITAL | Age: 68
End: 2023-12-20
Payer: MEDICARE

## 2024-01-09 DIAGNOSIS — C61 PROSTATE CANCER METASTATIC TO BONE: ICD-10-CM

## 2024-01-09 DIAGNOSIS — E55.9 VITAMIN D DEFICIENCY: ICD-10-CM

## 2024-01-09 DIAGNOSIS — C79.51 PROSTATE CANCER METASTATIC TO BONE: ICD-10-CM

## 2024-01-09 RX ORDER — MAGNESIUM OXIDE 400 MG/1
400 TABLET ORAL DAILY
Qty: 30 TABLET | Refills: 0 | Status: SHIPPED | OUTPATIENT
Start: 2024-01-09 | End: 2024-01-12 | Stop reason: SDUPTHER

## 2024-01-09 RX ORDER — LISINOPRIL 40 MG/1
40 TABLET ORAL DAILY
Qty: 90 TABLET | Refills: 0 | Status: SHIPPED | OUTPATIENT
Start: 2024-01-09

## 2024-01-09 RX ORDER — ERGOCALCIFEROL 1.25 MG/1
50000 CAPSULE ORAL WEEKLY
Qty: 8 CAPSULE | Refills: 0 | Status: SHIPPED | OUTPATIENT
Start: 2024-01-09 | End: 2024-01-12 | Stop reason: SDUPTHER

## 2024-01-12 DIAGNOSIS — C79.51 PROSTATE CANCER METASTATIC TO BONE: ICD-10-CM

## 2024-01-12 DIAGNOSIS — E55.9 VITAMIN D DEFICIENCY: ICD-10-CM

## 2024-01-12 DIAGNOSIS — C61 PROSTATE CANCER METASTATIC TO BONE: ICD-10-CM

## 2024-01-12 RX ORDER — NICOTINE POLACRILEX 4 MG/1
4 LOZENGE ORAL AS NEEDED
Qty: 60 LOZENGE | Refills: 2 | Status: SHIPPED | OUTPATIENT
Start: 2024-01-12

## 2024-01-12 RX ORDER — ERGOCALCIFEROL 1.25 MG/1
50000 CAPSULE ORAL WEEKLY
Qty: 8 CAPSULE | Refills: 0 | Status: SHIPPED | OUTPATIENT
Start: 2024-01-12

## 2024-01-12 RX ORDER — HYDROCODONE BITARTRATE AND ACETAMINOPHEN 10; 325 MG/1; MG/1
1 TABLET ORAL EVERY 4 HOURS PRN
Qty: 150 TABLET | Refills: 0 | Status: SHIPPED | OUTPATIENT
Start: 2024-01-12

## 2024-01-12 RX ORDER — MAGNESIUM OXIDE 400 MG/1
400 TABLET ORAL DAILY
Qty: 30 TABLET | Refills: 0 | Status: SHIPPED | OUTPATIENT
Start: 2024-01-12

## 2024-01-12 NOTE — TELEPHONE ENCOUNTER
Caller: Semaj Medina [place]    Relationship: Self    Best call back number: [phone]    Requested Prescriptions:   Requested Prescriptions     Pending Prescriptions Disp Refills    HYDROcodone-acetaminophen (NORCO)  MG per tablet 150 tablet 0     Sig: Take 1 tablet by mouth Every 4 (Four) Hours As Needed for Moderate Pain.    magnesium oxide (MAG-OX) 400 MG tablet 30 tablet 0     Sig: Take 1 tablet by mouth Daily.    Nicotine Mini 4 MG lozenge      ergocalciferol (ERGOCALCIFEROL) 1.25 MG (26230 UT) capsule 8 capsule 0     Sig: Take 1 capsule by mouth 1 (One) Time Per Week.        Pharmacy where request should be sent:  [facility] EXCEPT FOR THE HYDROCODONE THAT ONE GOES TO THE [facility]  NEEDS TODAY HE IS OUT    Last office visit with prescribing clinician: 12/19/2023   Last telemedicine visit with prescribing clinician: Visit date not found   Next office visit with prescribing clinician: 1/19/2024     Additional details provided by patient: NA    Does the patient have less than a 3 day supply:  [x] Yes  [] No    Would you like a call back once the refill request has been completed: [x] Yes [] No    If the office needs to give you a call back, can they leave a voicemail: [x] Yes [] No    Nena Moser Rep   01/12/24 13:52 EST

## 2024-01-16 ENCOUNTER — TELEPHONE (OUTPATIENT)
Dept: ONCOLOGY | Facility: HOSPITAL | Age: 69
End: 2024-01-16
Payer: MEDICARE

## 2024-01-16 NOTE — TELEPHONE ENCOUNTER
Caller: Semaj Medina [place]    Relationship: Self    Best call back number: [phone]    What is the best time to reach you: ANY.[email or web address] VM    Who are you requesting to speak with (clinical staff, provider,  specific staff member): SCHEDULING        What was the call regarding: PATIENT SEMAJ CALLED ABOUT HIS BONE SCAN APPT THAT HE MISSED YESTERDAY. SEMAJ WANTED TO KNOW IF DR AYALA STILL WANTED HIM TO COME TO HIS APPT THIS FRIDAY. WE CALLED CENTRAL SCHEDULING TO R/S BONE SCAN APPT.     Is it okay if the provider responds through [employer]: YES

## 2024-01-23 ENCOUNTER — HOSPITAL ENCOUNTER (OUTPATIENT)
Dept: CT IMAGING | Facility: HOSPITAL | Age: 69
Discharge: HOME | End: 2024-01-23
Payer: MEDICARE

## 2024-01-23 ENCOUNTER — HOSPITAL ENCOUNTER (OUTPATIENT)
Dept: NUCLEAR MEDICINE | Facility: HOSPITAL | Age: 69
Discharge: HOME | End: 2024-01-23
Payer: MEDICARE

## 2024-01-23 DIAGNOSIS — C61 PROSTATE CANCER METASTATIC TO BONE: ICD-10-CM

## 2024-01-23 DIAGNOSIS — C79.51 PROSTATE CANCER METASTATIC TO BONE: ICD-10-CM

## 2024-01-23 LAB
CREAT BLDA-MCNC: 1.1 MG/DL (ref 0.6–1.3)
EGFRCR SERPLBLD CKD-EPI 2021: 73.1 ML/MIN/1.73

## 2024-01-23 PROCEDURE — 71260 CT THORAX DX C+: CPT

## 2024-01-23 PROCEDURE — 74177 CT ABD & PELVIS W/CONTRAST: CPT

## 2024-01-23 PROCEDURE — 25510000001 IOPAMIDOL PER 1 ML: Performed by: INTERNAL MEDICINE

## 2024-01-23 PROCEDURE — 82565 ASSAY OF CREATININE: CPT

## 2024-01-23 PROCEDURE — 0 TECHNETIUM MEDRONATE KIT: Performed by: INTERNAL MEDICINE

## 2024-01-23 PROCEDURE — A9503 TC99M MEDRONATE: HCPCS | Performed by: INTERNAL MEDICINE

## 2024-01-23 PROCEDURE — 78306 BONE IMAGING WHOLE BODY: CPT

## 2024-01-23 RX ORDER — TC 99M MEDRONATE 20 MG/10ML
21.8 INJECTION, POWDER, LYOPHILIZED, FOR SOLUTION INTRAVENOUS
Status: COMPLETED | OUTPATIENT
Start: 2024-01-23 | End: 2024-01-23

## 2024-01-23 RX ADMIN — IOPAMIDOL 100 ML: 755 INJECTION, SOLUTION INTRAVENOUS at 17:05

## 2024-01-23 RX ADMIN — TC 99M MEDRONATE 21.8 MILLICURIE: 20 INJECTION, POWDER, LYOPHILIZED, FOR SOLUTION INTRAVENOUS at 12:45

## 2024-01-25 NOTE — PROGRESS NOTES
Patient  Semaj Medina      Location  [facility]EMATOLOGY & ONCOLOGY    Chief Complaint   FOLLOW UP 1    Referring Provider: Natalya Jack MD PhD  PCP: Maryse Monae MD    Subjective          Oncology/Hematology History Overview Note     Metastatic Castration resistant prostate cancer:    Patient was initially diagnosed with prostate cancer in 2015.  On 6/4/2015 he underwent radical prostatectomy and LN dissection which showed a Zach 4+3 = 7 prostate cancer.  There were several high risk features such as extraprostatic extension, seminal vesicle invasion, positive margins at the bladder neck and right and left seminal vesicle.  Lymph vascular invasion was indeterminate.  Perineural invasion was not commented on. Final pathology [id number] R1.  He was treated with adjuvant radiation by Dr. Stanton.    According to records the patient likely started Lupron in December 2017 when his PSA started to rise.  The patient took a break in August 2019.  He states he was not aware he was to continue.  He restarted Lupron on 11/26/2019 after it was noted that his PSA chago from 0.46 up to 2.55 on 11/8/2019.  Unfortunately his PSA continued to rise after that to 4.54 on 3/10/2020.    - restarted Lupron on 11/26/20  - Started Zytiga on 8/27/20.  8/31/2020 PSA 17.95     - Started Xtandi in May 2021 due to intolerance of Zytiga even at the lowest dose  - PSA rising in April 2023 to 2.85, doubling time 3.4 months  - started Docetaxel May 2023    PSMA PET 5/2/23:   1.  Radiotracer avid supra clavicular, thoracic and abdominal adenopathy and sclerotic lesion within T10 vertebral body likely representing metastatic disease.  2.  Indeterminate lesion within the left ilium demonstrating mild uptake.   3.  Asymmetric sclerosis within the superior lateral aspect the left orbit which does not demonstrate significant uptake above that of the right orbit. Findings are nonspecific with  differential  considerations including but not limited to metastatic disease, fibrous dysplasia versus Paget's disease.   4.  Sub-6 mm nodule within the left lower lobe.  5.  Scattered indeterminate hypodense lesions throughout the liver which do not demonstrate radiotracer uptake above that of the adjacent liver, best best visualized on recent multiphase CT abdomen and pelvis 04/17/2023. Please refer to this dictation for further information.       Prostate cancer metastatic to bone   12/11/2020 -  Chemotherapy    OP SUPPORTIVE Leuprolide 45 mg Q6M     3/23/2021 -  Chemotherapy    OP SUPPORTIVE Denosumab (Xgeva) Q28D     4/15/2021 - 5/13/2021 Chemotherapy    OP PROSTATE Enzalutamide     5/19/2021 Initial Diagnosis    Prostate cancer metastatic to bone (CMS/HCC)     6/2/2023 - 9/15/2023 Biopsy    OP PROSTATE DOCEtaxel  Plan Provider: Natalya Jack MD PhD  Treatment goal: Palliative  Line of treatment: [No plan line of treatment]     12/19/2023 - 12/19/2023 Chemotherapy    OP PROSTATE Apalutamide     1/26/2024 Biopsy    OP PROSTATE Abiraterone / PredniSONE  Plan Provider: Joshua Dick MD  Treatment goal: Control  Line of treatment: [No plan line of treatment]     Prostate cancer   6/16/2021 Initial Diagnosis    Prostate cancer (HCC)     6/2/2023 -  Chemotherapy    OP CENTRAL VENOUS ACCESS DEVICE ACCESS, CARE, AND MAINTENANCE (CVAD)     Secondary malignant neoplasm of bone   11/4/2021 Initial Diagnosis    Secondary malignant neoplasm of bone (HCC)     11/23/2021 - 12/9/2021 Radiation    Radiation OncologyTreatment Course:  Semaj Medina received 3000 cGy in 10 fractions to the T9-T11 spine.          History of Present Illness  Patient with metastatic prostate cancer, completed docetaxel (6 cycles) Lupron 45 mg every 6 months, patient also receiving Xgeva (received clearance to resume Xgeva).  Patient denies any fever, chills, night sweats, nausea, vomiting. He reports pain in his lumbar spine region, for which pain  medications have helped, he is requesting refill of his pain medication today. He is here to discuss results of restaging imaging scans.    Review of Systems   Constitutional:  Positive for fatigue (5/10). Negative for appetite change, diaphoresis, fever, unexpected weight gain and unexpected weight loss.   HENT:  Negative for hearing loss, mouth sores, sore throat, swollen glands, trouble swallowing and voice change.    Eyes:  Negative for blurred vision.   Respiratory:  Negative for cough, shortness of breath and wheezing.    Cardiovascular:  Negative for chest pain and palpitations.   Gastrointestinal:  Negative for abdominal pain, blood in stool, constipation, diarrhea, nausea and vomiting.   Endocrine: Negative for cold intolerance and heat intolerance.   Genitourinary:  Negative for difficulty urinating, dysuria, frequency, hematuria and urinary incontinence.   Musculoskeletal:  Positive for back pain. Negative for arthralgias and myalgias.   Skin:  Negative for rash, skin lesions and wound.   Neurological:  Negative for dizziness, seizures, weakness, numbness and headache.   Hematological:  Does not bruise/bleed easily.   Psychiatric/Behavioral:  Negative for depressed mood. The patient is not nervous/anxious.    All other systems reviewed and are negative.      Past Medical History:   Diagnosis Date    Anemia     NO PROBLEMS    Anxiety     Blood disease     Colon polyps     Depression     Diverticulitis     Forgetfulness     Hepatitis C     RESOLVED    Hypertension     Night sweats     Numbness and tingling of left lower extremity     Prostate cancer      Past Surgical History:   Procedure Laterality Date    COLONOSCOPY      PROSTATE BIOPSY      PROSTATECTOMY      ROBOT ASSISTED W PELVIC LYMPH NODE DISSECTION    TRANSURETHRAL RESECTION OF BLADDER TUMOR      VENOUS ACCESS DEVICE (PORT) INSERTION Right 5/22/2023    Procedure: INSERTION VENOUS ACCESS PORT;  Surgeon: Waldemar Iraheta MD;  Location: [facility];  Service: General;  Laterality: Right;     Social History     Socioeconomic History    Marital status:    Tobacco Use    Smoking status: Every Day     Packs/day: 0.25     Years: 42.00     Additional pack years: 0.00     Total pack years: 10.50     Types: Cigarettes     Start date: 10/21/1970     Passive exposure: Past    Smokeless tobacco: Never    Tobacco comments:     INSTRUCTED NO SMOKING 24 HR PRIOR TO SURGERY      LAST SMOKED AT 200AM 05-22-23   Vaping Use    Vaping Use: Never used   Substance and Sexual Activity    Alcohol use: Yes     Alcohol/week: 6.0 standard drinks of alcohol     Types: 6 Cans of beer per week     Comment: 12 pack a week of beer    Drug use: Never    Sexual activity: Defer     Family History   Problem Relation Age of Onset    Cancer Mother     Cancer Father        Objective   Physical Exam  Vitals reviewed. Exam conducted with a chaperone present.   Constitutional:       General: He is not in acute distress.     Appearance: Normal appearance.   HENT:      Head: Normocephalic and atraumatic.   Eyes:      Extraocular Movements: Extraocular movements intact.      Conjunctiva/sclera: Conjunctivae normal.   Pulmonary:      Effort: Pulmonary effort is normal.   Skin:     General: Skin is warm and dry.      Findings: No rash.   Neurological:      Mental Status: He is oriented to person, place, and time.           Vitals:    01/26/24 0902   BP: 127/80   Pulse: 54   Resp: 16   Temp: 97.5 °F (36.4 °C)   TempSrc: Temporal   SpO2: 94%   Weight: 55 kg (121 lb 4.1 oz)   PainSc:   6   PainLoc: Back  Comment: neck pain           ECOG score: 0         PHQ-9 Total Score:         Result Review :   The following data was reviewed by: Joshua Dick MD on 08/24/2023:  Lab Results   Component Value Date    HGB 13.2 01/26/2024    HCT 38.8 01/26/2024    MCV 80.8 01/26/2024     01/26/2024    WBC 5.03 01/26/2024    NEUTROABS 2.84 01/26/2024    LYMPHSABS 1.69 01/26/2024    MONOSABS 0.32 01/26/2024     EOSABS 0.15 01/26/2024    BASOSABS 0.02 01/26/2024     Lab Results   Component Value Date    GLUCOSE 111 (H) 01/26/2024    BUN 12 01/26/2024    CREATININE 1.02 01/26/2024     01/26/2024    K 4.4 01/26/2024     01/26/2024    CO2 24.6 01/26/2024    CALCIUM 8.8 01/26/2024    PROTEINTOT 6.6 01/26/2024    ALBUMIN 4.0 01/26/2024    BILITOT 0.3 01/26/2024    ALKPHOS 51 01/26/2024    AST 12 01/26/2024    ALT 5 01/26/2024     Lab Results   Component Value Date    IRON 50 (L) 12/17/2021    LABIRON 22 12/17/2021    TRANSFERRIN 156 (L) 12/17/2021    TIBC 232 (L) 12/17/2021     Lab Results   Component Value Date    FERRITIN 120.10 12/17/2021    XSWLUZYI11 412 05/16/2019    FOLATE 9.4 05/16/2019     Lab Results   Component Value Date    PSA 9.670 (H) 12/19/2023          Assessment and Plan    Diagnoses and all orders for this visit:    1. Prostate cancer metastatic to bone  -     prochlorperazine (COMPAZINE) 10 MG tablet; Take 1 tablet by mouth Every 6 (Six) Hours As Needed for Nausea or Vomiting.  Dispense: 30 tablet; Refill: 5  -     ondansetron (ZOFRAN) 8 MG tablet; Take 1 tablet by mouth Every 8 (Eight) Hours As Needed for Nausea or Vomiting.  Dispense: 90 tablet; Refill: 3  -     CBC & Differential; Future  -     Comprehensive Metabolic Panel; Future  -     PSA DIAGNOSTIC; Future    Other orders  -     Cancel: denosumab (XGEVA) injection 120 mg            67-year-old male with metastatic prostate cancer, completed 6 cycles of docetaxel on 9/15/2023, Lupron Q6 months last shot on 12/2023, Pt also receiving Xgeva and he is here prior to next Xgeva infusion  -Pt recently underwent dental procedure, but has been cleared to resume Xgeva.  -next Lupron injection due on 6/18/2024  -PSA from 12/15/23 was 8 up from 3.1 (in 10/2023)  -Discussed result of NM bone scan, CT CAP from 1/24/2024 which revealed interval enlargement T10 vertebral body metastasis, no evidence of intrathoracic metastatic disease, slight worsening  appearance of osteoblastic schedule static disease, notable at T10, T8, and T5, no evidence of intra-abdominal or pelvic metastatic disease.  -Patient has undergone radiation therapy 10 fractions to thoracic spine from T9-T11 in 2021.  -Discussed plan to obtain [employer] and [employer] NGS testing to assess for actionable mutations.  -Also shared plan to reattempt treatment with Zytiga and prednisone, patient reports some nausea vomiting which we will attempt to manage with antiemetics.  -Orders placed for Zytiga and prednisone today, also provided prescriptions for Compazine and Zofran for patient to begin once he begins treatment with these medications.    -Provided refill of his [place] for his cancer related pain    Hypomagnesemia  -Mag level of 1.9  -pt prescribed mag oxide 400 mg PO QD   -will recheck at next clinic appointment    Low Vit D  -Vit level of 27.7  -prescribed Vit D 50K IU once weekly x 8 weeks      -Plan for patient follow-up in 5 weeks with CBC, CMP, Magnesium, PSA and assessment for response to Zytiga and prednisone, discussed results of [employer] and [employer] testing.    Please note that portions of this note were completed with a voice recognition program.      I spent approximately 30 minutes caring for Semaj colin which included review medical record, reviewing lab work, care coordination, and medical documentation.  Patient was given instructions and counseling regarding his condition or for health maintenance advice. Please see specific information pulled into the AVS if appropriate.     Joshua Dick MD    1/26/2024      Electronically signed by Joshua Dick MD, 01/26/24, 3:53 PM EST.

## 2024-01-26 ENCOUNTER — HOSPITAL ENCOUNTER (OUTPATIENT)
Dept: ONCOLOGY | Facility: HOSPITAL | Age: 69
Discharge: HOME | End: 2024-01-26
Payer: MEDICARE

## 2024-01-26 ENCOUNTER — OFFICE VISIT (OUTPATIENT)
Dept: ONCOLOGY | Facility: HOSPITAL | Age: 69
End: 2024-01-26
Payer: MEDICARE

## 2024-01-26 ENCOUNTER — SPECIALTY PHARMACY (OUTPATIENT)
Dept: PHARMACY | Facility: HOSPITAL | Age: 69
End: 2024-01-26
Payer: MEDICARE

## 2024-01-26 VITALS
WEIGHT: 121.25 LBS | TEMPERATURE: 97.5 F | RESPIRATION RATE: 16 BRPM | HEART RATE: 54 BPM | SYSTOLIC BLOOD PRESSURE: 127 MMHG | OXYGEN SATURATION: 94 % | DIASTOLIC BLOOD PRESSURE: 80 MMHG | BODY MASS INDEX: 17.66 KG/M2

## 2024-01-26 DIAGNOSIS — C61 PROSTATE CANCER METASTATIC TO BONE: Primary | ICD-10-CM

## 2024-01-26 DIAGNOSIS — C79.51 PROSTATE CANCER METASTATIC TO BONE: ICD-10-CM

## 2024-01-26 DIAGNOSIS — C61 PROSTATE CANCER: Primary | ICD-10-CM

## 2024-01-26 DIAGNOSIS — C61 PROSTATE CANCER METASTATIC TO BONE: ICD-10-CM

## 2024-01-26 DIAGNOSIS — C79.51 PROSTATE CANCER METASTATIC TO BONE: Primary | ICD-10-CM

## 2024-01-26 LAB
ALBUMIN SERPL-MCNC: 4 G/DL (ref 3.5–5.2)
ALBUMIN/GLOB SERPL: 1.5 G/DL
ALP SERPL-CCNC: 51 U/L (ref 39–117)
ALT SERPL W P-5'-P-CCNC: 5 U/L (ref 1–41)
ANION GAP SERPL CALCULATED.3IONS-SCNC: 7.4 MMOL/L (ref 5–15)
AST SERPL-CCNC: 12 U/L (ref 1–40)
BASOPHILS # BLD AUTO: 0.02 10*3/MM3 (ref 0–0.2)
BASOPHILS NFR BLD AUTO: 0.4 % (ref 0–1.5)
BILIRUB SERPL-MCNC: 0.3 MG/DL (ref 0–1.2)
BUN SERPL-MCNC: 12 MG/DL (ref 8–23)
BUN/CREAT SERPL: 11.8 (ref 7–25)
CALCIUM SPEC-SCNC: 8.8 MG/DL (ref 8.6–10.5)
CHLORIDE SERPL-SCNC: 107 MMOL/L (ref 98–107)
CO2 SERPL-SCNC: 24.6 MMOL/L (ref 22–29)
CREAT SERPL-MCNC: 1.02 MG/DL (ref 0.76–1.27)
DEPRECATED RDW RBC AUTO: 54.4 FL (ref 37–54)
EGFRCR SERPLBLD CKD-EPI 2021: 80.1 ML/MIN/1.73
EOSINOPHIL # BLD AUTO: 0.15 10*3/MM3 (ref 0–0.4)
EOSINOPHIL NFR BLD AUTO: 3 % (ref 0.3–6.2)
ERYTHROCYTE [DISTWIDTH] IN BLOOD BY AUTOMATED COUNT: 18.3 % (ref 12.3–15.4)
GLOBULIN UR ELPH-MCNC: 2.6 GM/DL
GLUCOSE SERPL-MCNC: 111 MG/DL (ref 65–99)
HCT VFR BLD AUTO: 38.8 % (ref 37.5–51)
HGB BLD-MCNC: 13.2 G/DL (ref 13–17.7)
IMM GRANULOCYTES # BLD AUTO: 0.01 10*3/MM3 (ref 0–0.05)
IMM GRANULOCYTES NFR BLD AUTO: 0.2 % (ref 0–0.5)
LYMPHOCYTES # BLD AUTO: 1.69 10*3/MM3 (ref 0.7–3.1)
LYMPHOCYTES NFR BLD AUTO: 33.6 % (ref 19.6–45.3)
MAGNESIUM SERPL-MCNC: 1.9 MG/DL (ref 1.6–2.4)
MCH RBC QN AUTO: 27.5 PG (ref 26.6–33)
MCHC RBC AUTO-ENTMCNC: 34 G/DL (ref 31.5–35.7)
MCV RBC AUTO: 80.8 FL (ref 79–97)
MONOCYTES # BLD AUTO: 0.32 10*3/MM3 (ref 0.1–0.9)
MONOCYTES NFR BLD AUTO: 6.4 % (ref 5–12)
NEUTROPHILS NFR BLD AUTO: 2.84 10*3/MM3 (ref 1.7–7)
NEUTROPHILS NFR BLD AUTO: 56.4 % (ref 42.7–76)
PHOSPHATE SERPL-MCNC: 3.2 MG/DL (ref 2.5–4.5)
PLATELET # BLD AUTO: 384 10*3/MM3 (ref 140–450)
PMV BLD AUTO: 8.7 FL (ref 6–12)
POTASSIUM SERPL-SCNC: 4.4 MMOL/L (ref 3.5–5.2)
PROT SERPL-MCNC: 6.6 G/DL (ref 6–8.5)
PSA SERPL-MCNC: 18.6 NG/ML (ref 0–4)
RBC # BLD AUTO: 4.8 10*6/MM3 (ref 4.14–5.8)
SODIUM SERPL-SCNC: 139 MMOL/L (ref 136–145)
WBC NRBC COR # BLD AUTO: 5.03 10*3/MM3 (ref 3.4–10.8)

## 2024-01-26 PROCEDURE — 25010000002 HEPARIN LOCK FLUSH PER 10 UNITS: Performed by: INTERNAL MEDICINE

## 2024-01-26 PROCEDURE — 84153 ASSAY OF PSA TOTAL: CPT | Performed by: INTERNAL MEDICINE

## 2024-01-26 PROCEDURE — 84100 ASSAY OF PHOSPHORUS: CPT | Performed by: INTERNAL MEDICINE

## 2024-01-26 PROCEDURE — 83735 ASSAY OF MAGNESIUM: CPT | Performed by: INTERNAL MEDICINE

## 2024-01-26 PROCEDURE — 96523 IRRIG DRUG DELIVERY DEVICE: CPT

## 2024-01-26 PROCEDURE — 25010000002 DENOSUMAB 120 MG/1.7ML SOLUTION: Performed by: INTERNAL MEDICINE

## 2024-01-26 PROCEDURE — 80053 COMPREHEN METABOLIC PANEL: CPT | Performed by: INTERNAL MEDICINE

## 2024-01-26 PROCEDURE — 85025 COMPLETE CBC W/AUTO DIFF WBC: CPT | Performed by: INTERNAL MEDICINE

## 2024-01-26 PROCEDURE — 96372 THER/PROPH/DIAG INJ SC/IM: CPT

## 2024-01-26 PROCEDURE — 36415 COLL VENOUS BLD VENIPUNCTURE: CPT

## 2024-01-26 RX ORDER — HEPARIN SODIUM (PORCINE) LOCK FLUSH IV SOLN 100 UNIT/ML 100 UNIT/ML
500 SOLUTION INTRAVENOUS AS NEEDED
OUTPATIENT
Start: 2024-01-26

## 2024-01-26 RX ORDER — PROCHLORPERAZINE MALEATE 10 MG
10 TABLET ORAL EVERY 6 HOURS PRN
Qty: 30 TABLET | Refills: 5 | Status: SHIPPED | OUTPATIENT
Start: 2024-01-26

## 2024-01-26 RX ORDER — HEPARIN SODIUM (PORCINE) LOCK FLUSH IV SOLN 100 UNIT/ML 100 UNIT/ML
500 SOLUTION INTRAVENOUS AS NEEDED
Status: DISCONTINUED | OUTPATIENT
Start: 2024-01-26 | End: 2024-01-27 | Stop reason: HOSPADM

## 2024-01-26 RX ORDER — ABIRATERONE 500 MG/1
1000 TABLET ORAL DAILY
Qty: 60 TABLET | Refills: 11 | Status: SHIPPED | OUTPATIENT
Start: 2024-01-26

## 2024-01-26 RX ORDER — PREDNISONE 5 MG/1
5 TABLET ORAL 2 TIMES DAILY
Qty: 60 TABLET | Refills: 11 | Status: SHIPPED | OUTPATIENT
Start: 2024-01-26

## 2024-01-26 RX ORDER — ONDANSETRON HYDROCHLORIDE 8 MG/1
8 TABLET, FILM COATED ORAL EVERY 8 HOURS PRN
Qty: 90 TABLET | Refills: 3 | Status: SHIPPED | OUTPATIENT
Start: 2024-01-26

## 2024-01-26 RX ORDER — ABIRATERONE 500 MG/1
1000 TABLET ORAL DAILY
Qty: 60 TABLET | Refills: 5 | Status: SHIPPED | OUTPATIENT
Start: 2024-01-26 | End: 2024-01-26 | Stop reason: SDUPTHER

## 2024-01-26 RX ORDER — SODIUM CHLORIDE 0.9 % (FLUSH) 0.9 %
20 SYRINGE (ML) INJECTION AS NEEDED
OUTPATIENT
Start: 2024-01-26

## 2024-01-26 RX ORDER — SODIUM CHLORIDE 0.9 % (FLUSH) 0.9 %
20 SYRINGE (ML) INJECTION AS NEEDED
Status: DISCONTINUED | OUTPATIENT
Start: 2024-01-26 | End: 2024-01-27 | Stop reason: HOSPADM

## 2024-01-26 RX ADMIN — DENOSUMAB 120 MG: 120 INJECTION SUBCUTANEOUS at 10:18

## 2024-01-26 RX ADMIN — Medication 20 ML: at 08:48

## 2024-01-26 RX ADMIN — HEPARIN SODIUM (PORCINE) LOCK FLUSH IV SOLN 100 UNIT/ML 500 UNITS: 100 SOLUTION at 08:48

## 2024-01-26 NOTE — PROGRESS NOTES
Oral Chemotherapy - New Referral    Received a referral from Dr. Dick    Treatment Plan: Zytiga (abiraterone acetate)+prednisone  Start date of treatment planned for: As soon as oral specialty medication is available.  Indication: metastatic CRPC  Relevant past treatments: Abiraterone (stopped due to N/V), Enzalutamide (stopped due to progression), Docetaxel, Lueprolide (continuing)  Is the therapy appropriate based on treatment guidelines and FDA labeling?: yes  Therapeutic Goals: Continue treatment until progression or intolerable toxicity  Patient can self-administer oral medications: Yes    Drug-Drug Interactions: The current medication list was reviewed and there are some relevant drug-drug interactions with the oral specialty medication that will be discussed during education, including:  Ca/Vit D and MagOx may decrease bioavailability  of prednisone  Prednisone may increase side effects of ibuprofen  Medication Allergies: The patient has NKDA  Review of Labs/Dose Adjustments: The patient's most recent labs were reviewed and are appropriate to start treatment at this dose  Patient has a listed PMH of Hepatitis C, patient liver panel is normal, but will follow and adjust dose as needed.  Patient has been on abiraterone previously and was stopped due to nausea and vomiting. Plan to reinitiate with antiemetics given as a premedication.    A prescription was released to [facility] specialty pharmacy for   Drug: Zytiga (abiraterone acetate)  Strength: 500 mg  Directions: Take 2 tablets by mouth daily  Quantity: 60  Refills: 11    Pharmacy education is not yet scheduled., CCA and consent are signed.    Alyssa Golden PharmD, [facility]  Oncology Clinical Pharmacist  1/26/2024  12:18 EST

## 2024-01-26 NOTE — PROGRESS NOTES
Oral Chemotherapy - Double Check    Drug: abiraterone  Strength: 500mg tablet  Directions: Take 2 tablets PO daily  QTY: 60  RF:11    Released to pharmacy: [facility] [place]    Completed independent double check on medication order/RX.    Dulce LeonD, [facility]  Clinical Oncology Pharmacy Specialist  Phone: [phone]    1/26/2024  12:35 EST

## 2024-01-29 ENCOUNTER — SPECIALTY PHARMACY (OUTPATIENT)
Dept: PHARMACY | Facility: HOSPITAL | Age: 69
End: 2024-01-29
Payer: MEDICARE

## 2024-01-29 NOTE — PROGRESS NOTES
Specialty Pharmacy Patient Management Program  Oncology Initial Assessment       A 68 y.o. male with metastatic CRPC was seen by an Oncology provider and enrolled in the Oncology Patient Management program offered by [facility].  An initial outreach was conducted in person, face-to-face, including assessment of therapy appropriateness and specialty medication education for Zytiga (abiraterone acetate)/prednisone. The patient was introduced to services offered by [facility], including: regular assessments, refill coordination, curbside pick-up or mail order delivery options, prior authorization maintenance, and financial assistance programs as applicable. The patient was also provided with contact information for the pharmacy team.     Regimen: Abiraterone 500mg - 2 tablets by mouth daily    Start date of oral specialty medication: As soon as oral specialty medication is available.    Relevant Past Medical History, Comorbidities, and Vaccines  Relevant medical history and concomitant health conditions were discussed with the patient. The patient's chart has been reviewed for relevant past medical history and comorbid health conditions and updated as necessary.  Vaccines are coordinated by the patient's oncologist and primary care provider.  Past Medical History:   Diagnosis Date    Anemia     NO PROBLEMS    Anxiety     Blood disease     Colon polyps     Depression     Diverticulitis     Forgetfulness     Hepatitis C     RESOLVED    Hypertension     Night sweats     Numbness and tingling of left lower extremity     Prostate cancer      Social History     Socioeconomic History    Marital status:    Tobacco Use    Smoking status: Every Day     Packs/day: 0.25     Years: 42.00     Additional pack years: 0.00     Total pack years: 10.50     Types: Cigarettes     Start date: 10/21/1970     Passive exposure: Past    Smokeless tobacco: Never    Tobacco comments:      INSTRUCTED NO SMOKING 24 HR PRIOR TO SURGERY      LAST SMOKED AT 200AM 05-22-23   Vaping Use    Vaping Use: Never used   Substance and Sexual Activity    Alcohol use: Yes     Alcohol/week: 6.0 standard drinks of alcohol     Types: 6 Cans of beer per week     Comment: 12 pack a week of beer    Drug use: Never    Sexual activity: Defer       Allergies  Known allergies and reactions were discussed with the patient. The patient's chart has been reviewed for allergy information and updated as necessary.   No Known Allergies     Current Medication List  This medication list has been reviewed with the patient and evaluated for any interactions or necessary modifications/recommendations, and updated to include all prescription medications, OTC medications, and supplements the patient is currently taking.  This list reflects what is contained in the patient's profile, which has also been marked as reviewed to communicate to other providers it is the most up to date version of the patient's current medication therapy.   Prior to Admission medications    Medication Sig Start Date End Date Taking? Authorizing Provider   abiraterone acetate (ZYTIGA) 500 MG tablet Take 2 tablets by mouth Daily. 1/26/24  Yes Joshua Dick MD   Calcium Citrate-Vitamin D3 (CITRACAL) 315-6.25 MG-MCG tablet tablet Take 1 tablet by mouth Daily.   Yes ProviderSean MD   folic acid (FOLVITE) 1 MG tablet  7/26/22  Yes ProviderSean MD   HYDROcodone-acetaminophen (NORCO)  MG per tablet Take 1 tablet by mouth Every 4 (Four) Hours As Needed for Moderate Pain. 1/12/24  Yes Joshua Dick MD   ibuprofen (ADVIL,MOTRIN) 800 MG tablet TAKE 1 TABLET BY MOUTH EVERY 6 - 8 HOURS AS NEEDED FOR PAIN 3/1/23  Yes ProviderSean MD   leuprolide (Lupron Depot, 3-Month,) 22.5 MG injection 22.5 mg.   Yes ProviderSean MD   lisinopril (PRINIVIL,ZESTRIL) 40 MG tablet Take 1 tablet by mouth Daily. 1/9/24  Yes Maryse Monae MD    magnesium oxide (MAG-OX) 400 MG tablet Take 1 tablet by mouth Daily. 1/12/24  Yes Joshua Dick MD   ondansetron (ZOFRAN) 8 MG tablet Take 1 tablet by mouth Every 8 (Eight) Hours As Needed for Nausea or Vomiting. 1/26/24  Yes Joshua Dick MD   prochlorperazine (COMPAZINE) 10 MG tablet Take 1 tablet by mouth Every 6 (Six) Hours As Needed for Nausea or Vomiting. 1/26/24  Yes Joshua Dick MD   predniSONE (DELTASONE) 5 MG tablet Take 1 tablet by mouth 2 (Two) Times a Day. 1/26/24   Joshua Dick MD   abiraterone acetate (ZYTIGA) 500 MG tablet Take 2 tablets by mouth Daily. 1/26/24 1/26/24  Joshua Dick MD   albuterol sulfate  (90 Base) MCG/ACT inhaler  7/4/22 1/29/24  ProviderSean MD   dronabinol (Marinol) 5 MG capsule Take 1 capsule by mouth 2 (Two) Times a Day Before Meals.  Patient not taking: Reported on 11/17/2023 12/20/22 1/29/24  Natalya Jack MD PhD   ergocalciferol (ERGOCALCIFEROL) 1.25 MG (85342 UT) capsule Take 1 capsule by mouth 1 (One) Time Per Week. 12/19/23 1/9/24  Joshua Dick MD   ergocalciferol (ERGOCALCIFEROL) 1.25 MG (77172 UT) capsule Take 1 capsule by mouth 1 (One) Time Per Week. 1/9/24 1/12/24  Jsohua Dick MD   ergocalciferol (ERGOCALCIFEROL) 1.25 MG (44214 UT) capsule Take 1 capsule by mouth 1 (One) Time Per Week. 1/12/24 1/29/24  Joshua Dick MD   HYDROcodone-acetaminophen (NORCO)  MG per tablet Take 1 tablet by mouth Every 4 (Four) Hours As Needed for Moderate Pain. 12/14/23 1/12/24  Joshua Dick MD   HYDROcodone-acetaminophen (NORCO)  MG per tablet Take 1 tablet by mouth Every 4 (Four) Hours As Needed for Moderate Pain. 1/12/24 1/29/24  Joshua Dick MD   Lidocaine Viscous HCl (XYLOCAINE) 2 % solution  8/3/23 1/29/24  Provider, MD Sean   lisinopril (PRINIVIL,ZESTRIL) 40 MG tablet Take 1 tablet by mouth Daily. 12/13/23 1/9/24  Maryse Monae MD   magnesium oxide (MAG-OX) 400 MG tablet Take 1 tablet by mouth Daily. 12/19/23 1/9/24   Joshua Dick MD   magnesium oxide (MAG-OX) 400 MG tablet Take 1 tablet by mouth Daily. 1/9/24 1/12/24  Joshua Dick MD   Nicotine Mini 4 MG lozenge  7/26/22 1/12/24  ProviderSean MD   Nicotine Mini 4 MG lozenge Dissolve 1 lozenge in the mouth As Needed for Smoking Cessation. 1/12/24 1/29/24  Joshua Dick MD   NON FORMULARY LIDOCAINE 2%/M-DRYL Swish and spit  1/29/24  Provider, MD Sean   ondansetron (ZOFRAN) 8 MG tablet Take 1 tablet by mouth Every 8 (Eight) Hours As Needed for Nausea or Vomiting. 1/18/22 1/26/24  Natalya Jack MD PhD       Drug Interactions  Reviewed concomitant medications, allergies, labs, comorbidities/medical history, quality of life, and immunization history.   Drug-drug interactions noted and discussed during education:  Calcium and vitam D may decrease bioavailability of prednisone. Patient was educated on sign and symptoms of adrenal insufficency  . Reminded the patient to let us know before making any changes or starting any new prescription or OTC medications so we can first assess drug interactions.  Drug-food interactions noted and discussed during education: Patient was instructed to avoid eating grapefruit and drinking grapefruit juice    Recommended Medications Assessment  Bone Health (such as calcium/vitamin D, bisphosphonate, RANKL inhibitor) - Currently Taking The patient is currently on Calcium and Vitamin D and Prolia or Xgeva (denosumab) every month      Relevant Laboratory Values  Lab Results   Component Value Date    GLUCOSE 111 (H) 01/26/2024    CALCIUM 8.8 01/26/2024     01/26/2024    K 4.4 01/26/2024    CO2 24.6 01/26/2024     01/26/2024    BUN 12 01/26/2024    CREATININE 1.02 01/26/2024    EGFRIFAFRI 97 02/23/2022    BCR 11.8 01/26/2024    ANIONGAP 7.4 01/26/2024     Lab Results   Component Value Date    WBC 5.03 01/26/2024    RBC 4.80 01/26/2024    HGB 13.2 01/26/2024    HCT 38.8 01/26/2024    MCV 80.8 01/26/2024    MCH 27.5  01/26/2024    MCHC 34.0 01/26/2024    RDW 18.3 (H) 01/26/2024    RDWSD 54.4 (H) 01/26/2024    MPV 8.7 01/26/2024     01/26/2024    NEUTRORELPCT 56.4 01/26/2024    LYMPHORELPCT 33.6 01/26/2024    MONORELPCT 6.4 01/26/2024    EOSRELPCT 3.0 01/26/2024    BASORELPCT 0.4 01/26/2024    AUTOIGPER 0.2 01/26/2024    NEUTROABS 2.84 01/26/2024    LYMPHSABS 1.69 01/26/2024    MONOSABS 0.32 01/26/2024    EOSABS 0.15 01/26/2024    BASOSABS 0.02 01/26/2024    AUTOIGNUM 0.01 01/26/2024    NRBC 0.0 08/03/2023       The above labs have been reviewed. All labs are within normal limits. No dose adjustments are needed for the oral specialty medication(s) based on the labs.    Initial Education Provided for Specialty Medication  The patient has been provided with the following education. All questions and concerns have been addressed prior to the patient receiving the medication, and the patient has verbalized understanding of the education and any materials provided.  Additional patient education shall be provided and documented upon request by the patient, provider or payer.      Provided patient with:   Chemo calendar to help improve adherence., Education sheets about the medication, 24-hour clinic phone number and my contact information and instructions to call should additional questions arise.     Medication Education Sheets Provided: (select all that apply)  Oral Specialty Medication: Zytiga (abiraterone acetate)  Steroid: Prednisone    Other Education Sheets Provided: (select all that apply)  Blood Pressure Log and CINV and edema    TOPICS COMMENTS   Storage and Handling of Oral Specialty Medication Store in the original container, in a dry location out of direct sunlight, and out of reach of children or pets. and Store at room temperature.  Discussed safe handling and what to do with any unused medication.   Administration of Oral Specialty Medication Prednisone: Take with food at the same time(s) each day., Abiraterone:  Take on an empty stomach, 1 hour(s) before and 2 hour(s) after eating., and Do not crush or chew tablets.   Adherence to Oral Specialty Regimen and Handling Missed Doses Patient is likely to have good treatment adherence; reinforced the importance of adherence. Reviewed how to address missed doses and to let us know of any missed doses.   Anemia: role of RBC, cause, s/s, ways to manage, role of transfusion Reviewed the role of RBC and the use of transfusions if hemoglobin decreases too much.  Patient to notify us if they experience shortness of breath, dizziness, or palpitations.  Also let patient know they could feel more tired than usual and to try to stay active, but rest if they need to.    Thrombocytopenia: role of platelet, cause, s/s, ways to prevent bleeding, things to avoid, when to seek help Reviewed the role of platelets in blood clotting and when to call clinic (bloody nose that bleeds for 5 mins despite pressure, a cut that won't stop bleeding despite pressure, gums that bleed excessively with brushing or flossing). Recommended using an electric razor, soft bristle toothbrush, and blowing your nose gently.    Neutropenia: role of WBC, cause, infection precautions, s/s of infection, when to call MD Reviewed the role of WBC, good infection prevention practices, and when to call the clinic (temperature 100.4F, sore throat, burning urination, etc)  COVID Vaccines:  Recieved  Flu Vaccine: Planning to receive 2023 flu vaccine   Nutrition and Appetite Changes:  importance of maintaining healthy diet & weight, ways to manage to improve intake, dietary consult, exercise regimen, electrolyte and/or blood glucose abnormalities Increased Blood Sugar: This patient's oncology therapy can increase blood sugar.  Recommended that the patient monitor blood sugar more closely and contact their primary care provider for management recommendations if blood glucose values start trending upward., Electrolyte Abnormalities:   Explained that the oncology therapy may lead to abnormalities in electrolytes, specifically: High Na, glucose, low K, Lipid Panel Abnormalities:  Explained that the oncology therapy may lead to abnormalities in lipid values, specifically: High TG   Diarrhea: causes, s/s of dehydration, ways to manage, dietary changes, when to call MD Chemotherapy : Discussed the risk of diarrhea. Instructed patient on use OTC loperamide with diarrhea onset, but emphasized the importance of calling the clinic if 4-6 episodes in 24 hours not relieved by OTC loperamide.   Constipation: causes, ways to manage, dietary changes, when to call MD Provided supplementary handout with instructions for use of docusate and other OTC therapies to manage constipation.  Instructed to call us if medications aren't working.   Nausea/Vomiting: cause, use of antiemetics, dietary changes, when to call MD Emetic risk: Low to Minimal  PRN home meds: Ondansetron Prochlorperazine  Pharmacy home meds sent to: [facility]    Instructed the patient to take a dose of the PRN medication at the first onset of nausea and if it's not working to call us for additional medications.  Also provided non-drug measures to mitigate nausea.   Pain: causes, ways to manage Chemo: Discussed muscle and joint aches/pains with chemotherapy, and recommended the use of OTC pain relief with ibuprofen or acetaminophen if needed.   Organ Toxicities: cause, s/s, need for diagnostic tests, labs, when to notify MD Discussed potential effects on organ systems, monitoring, diagnostic tests, labs, and when to notify their MD. Discussed the signs/symptoms of the following: hepatotoxicity and hypertension - recommended close monitoring of blood pressure, provided BP log, and explained how to track values   Miscellaneous Financial Issues: none at this time     Infertility and Sexuality:  causes, fertility preservation options, sexuality changes, ways to manage, importance of birth control Oral  Oncology Therapy: Reviewed safe sex practices and the importance of minimizing exposure to body fluids while on oral oncology therapy.   Home Care: how to manage bodily fluids Counseled on management of soiled linens and proper flush technique.  Discussed how to manage all the side effects at home and advised when to contact the MD office     Adherence and Self-Administration  Barriers to Patient Adherence and/or Self-Administration: none at this time  Methods for Supporting Patient Adherence and/or Self-Administration: dosing calendar  Expected duration of therapy: Until disease progression or intolerable toxicity    Goals of Therapy  Patient Goals of Therapy:   Consistently take medications as prescribed  Patient will adhere to medication regimen  Patient will report any medication side effects to healthcare provider  Clinical Goals:    Goals Addressed Today        Specialty Pharmacy General Goal      Clinical goal/therapeutic target: disease control, per the recent oncology clinic notes and labs.  Patient-identified goal of therapy: Patient will adhere to medication regimen by %              Support patient understanding of medication regimen  Ensure patient knows the pharmacy contact information  Schedule regular follow-up to monitor the treatment serious adverse events  Schedule regular follow-up to confirm medication adherence  Schedule regular follow-up to monitor disease progression or stability      Reassessment Plan & Follow-Up  Pharmacist to perform regular reassessments no more than (6) months from the previous assessment.  Care Coordinator to set up future refill outreaches, coordinate prescription delivery, and escalate clinical questions to pharmacist.  Care Coordinator to follow until patient receives oral specialty medication.     Additional Plans, Therapy Recommendations or Therapy Problems to Be Addressed: None at this time.     Attestation  I attest the patient was actively involved in and  has agreed to the above plan of care. If the prescribed therapy is at any point deemed not appropriate based on the current or future assessments, a consultation will be initiated with the patient's specialty care provider to determine the best course of action. The revised plan of therapy will be documented along with any additional patient education provided.       Alyssa Golden, PharmD, [facility]  Oncology Clinical Pharmacist  1/29/2024  11:17 EST

## 2024-01-30 ENCOUNTER — TELEPHONE (OUTPATIENT)
Dept: ONCOLOGY | Facility: HOSPITAL | Age: 69
End: 2024-01-30
Payer: MEDICARE

## 2024-01-30 NOTE — TELEPHONE ENCOUNTER
----- Message from Joshua Dick MD sent at 1/29/2024  8:45 AM EST -----  Please inform pt that PSA has risen further, have submitted orders for Zytiga and hope to start him on that medication soon. Thanks.

## 2024-01-30 NOTE — TELEPHONE ENCOUNTER
Spoke with patient, advised that his PSA has increased and that once the Zytiga is received to begin taking it. Instructed to maintain all follow up visits and to contact the office with any questions or concerns. Patient verbalized understanding of all information discussed.

## 2024-02-01 NOTE — PROGRESS NOTES
Specialty Pharmacy Patient Management Program  Oncology Refill Outreach      [facility] [place]  Zytiga rcvd: 01/31/24  Zytiga started 02/01/24     Oralia Castillo  Care Coordinator, [facility]  2/1/2024  13:04 EST

## 2024-02-08 ENCOUNTER — SPECIALTY PHARMACY (OUTPATIENT)
Dept: PHARMACY | Facility: HOSPITAL | Age: 69
End: 2024-02-08
Payer: MEDICARE

## 2024-02-08 DIAGNOSIS — C61 PROSTATE CANCER METASTATIC TO BONE: ICD-10-CM

## 2024-02-08 DIAGNOSIS — C79.51 PROSTATE CANCER METASTATIC TO BONE: ICD-10-CM

## 2024-02-08 NOTE — PROGRESS NOTES
Specialty Pharmacy Note: 1 Week Toxicity Check     Patient reports starting abiraterone and prednisone on 2/1/24. Thus far, patient denies side effects. . Thus far, patient reports missing one dose. When discussed further patient reports taking 1 tablet in the AM and 1 in the PM. Patient was instructed to take both abiraterone tablet at the same time each day. Confirmed that patient was taking prednisone twice daily. Patient reports no medication changes. Advised pt to call office if any changes. Patient expressed understanding and had no additional questions at this time.       Alyssa Golden, PharmD, [facility]  Oncology Clinical Pharmacist  2/8/2024  13:39 EST

## 2024-02-09 RX ORDER — HYDROCODONE BITARTRATE AND ACETAMINOPHEN 10; 325 MG/1; MG/1
1 TABLET ORAL EVERY 4 HOURS PRN
Qty: 150 TABLET | Refills: 0 | Status: SHIPPED | OUTPATIENT
Start: 2024-02-09

## 2024-02-19 ENCOUNTER — OFFICE VISIT (OUTPATIENT)
Dept: RADIATION ONCOLOGY | Facility: HOSPITAL | Age: 69
End: 2024-02-19
Payer: MEDICARE

## 2024-02-19 VITALS
HEART RATE: 73 BPM | BODY MASS INDEX: 17.91 KG/M2 | RESPIRATION RATE: 20 BRPM | WEIGHT: 123.02 LBS | TEMPERATURE: 98.6 F | SYSTOLIC BLOOD PRESSURE: 127 MMHG | DIASTOLIC BLOOD PRESSURE: 72 MMHG | OXYGEN SATURATION: 100 %

## 2024-02-19 DIAGNOSIS — C79.51 SECONDARY MALIGNANT NEOPLASM OF BONE: Primary | ICD-10-CM

## 2024-02-19 DIAGNOSIS — Z90.79 HISTORY OF PROSTATECTOMY: ICD-10-CM

## 2024-02-19 DIAGNOSIS — R97.21 RISING PSA FOLLOWING TREATMENT FOR MALIGNANT NEOPLASM OF PROSTATE: ICD-10-CM

## 2024-02-19 DIAGNOSIS — Z79.818 ANDROGEN DEPRIVATION THERAPY: ICD-10-CM

## 2024-02-19 DIAGNOSIS — Z92.3 PERSONAL HISTORY OF RADIATION THERAPY: ICD-10-CM

## 2024-02-19 DIAGNOSIS — G89.3 CANCER-RELATED PAIN: ICD-10-CM

## 2024-02-19 DIAGNOSIS — C79.51 PROSTATE CANCER METASTATIC TO BONE: ICD-10-CM

## 2024-02-19 DIAGNOSIS — C61 PROSTATE CANCER METASTATIC TO BONE: ICD-10-CM

## 2024-02-19 PROCEDURE — G0463 HOSPITAL OUTPT CLINIC VISIT: HCPCS | Performed by: NURSE PRACTITIONER

## 2024-02-19 NOTE — PROGRESS NOTES
Follow Up Office Visit      Encounter Date: 02/19/2024   Patient Name: Semaj Medina  YOB: 1955   Medical Record Number: [id number]   Primary Diagnosis: Secondary malignant neoplasm of bone [C79.51]     Radiation Completion Date: 12/9/2021 T9-T11 spine     Chief Complaint:    Chief Complaint   Patient presents with    Follow-up    Prostate Cancer     Prostate cancer metastatic to bone       Oncology/Hematology History Overview Note     Metastatic Castration resistant prostate cancer:    Patient was initially diagnosed with prostate cancer in 2015.  On 6/4/2015 he underwent radical prostatectomy and LN dissection which showed a [place] 4+3 = 7 prostate cancer.  There were several high risk features such as extraprostatic extension, seminal vesicle invasion, positive margins at the bladder neck and right and left seminal vesicle.  Lymph vascular invasion was indeterminate.  Perineural invasion was not commented on. Final pathology [id number] R1.  He was treated with adjuvant radiation by Dr. Stanton.    According to records the patient likely started Lupron in December 2017 when his PSA started to rise.  The patient took a break in August 2019.  He states he was not aware he was to continue.  He restarted Lupron on 11/26/2019 after it was noted that his PSA chago from 0.46 up to 2.55 on 11/8/2019.  Unfortunately his PSA continued to rise after that to 4.54 on 3/10/2020.    - restarted Lupron on 11/26/20  - Started Zytiga on 8/27/20.  8/31/2020 PSA 17.95     - Started Xtandi in May 2021 due to intolerance of Zytiga even at the lowest dose  - PSA rising in April 2023 to 2.85, doubling time 3.4 months  - started Docetaxel May 2023    PSMA PET 5/2/23:   1.  Radiotracer avid supra clavicular, thoracic and abdominal adenopathy and sclerotic lesion within T10 vertebral body likely representing metastatic disease.  2.  Indeterminate lesion within the left ilium demonstrating mild uptake.   3.   Asymmetric sclerosis within the superior lateral aspect the left orbit which does not demonstrate significant uptake above that of the right orbit. Findings are nonspecific with  differential considerations including but not limited to metastatic disease, fibrous dysplasia versus Paget's disease.   4.  Sub-6 mm nodule within the left lower lobe.  5.  Scattered indeterminate hypodense lesions throughout the liver which do not demonstrate radiotracer uptake above that of the adjacent liver, best best visualized on recent multiphase CT abdomen and pelvis 04/17/2023. Please refer to this dictation for further information.       Prostate cancer metastatic to bone   12/11/2020 -  Chemotherapy    OP SUPPORTIVE Leuprolide 45 mg Q6M     3/23/2021 -  Chemotherapy    OP SUPPORTIVE Denosumab (Xgeva) Q28D     4/15/2021 - 5/13/2021 Chemotherapy    OP PROSTATE Enzalutamide     5/19/2021 Initial Diagnosis    Prostate cancer metastatic to bone (CMS/HCC)     6/2/2023 - 9/15/2023 Biopsy    OP PROSTATE DOCEtaxel  Plan Provider: Natalya Jack MD PhD  Treatment goal: Palliative  Line of treatment: [No plan line of treatment]     12/19/2023 - 12/19/2023 Chemotherapy    OP PROSTATE Apalutamide     1/26/2024 Biopsy    OP PROSTATE Abiraterone / PredniSONE  Plan Provider: Joshua Dick MD  Treatment goal: Control  Line of treatment: [No plan line of treatment]     Prostate cancer   6/16/2021 Initial Diagnosis    Prostate cancer (HCC)     6/2/2023 -  Chemotherapy    OP CENTRAL VENOUS ACCESS DEVICE ACCESS, CARE, AND MAINTENANCE (CVAD)     Secondary malignant neoplasm of bone   11/4/2021 Initial Diagnosis    Secondary malignant neoplasm of bone (HCC)     11/23/2021 - 12/9/2021 Radiation    Radiation OncologyTreatment Course:  Semaj Medina received 3000 cGy in 10 fractions to the T9-T11 spine.          History of Present Illness: Semaj Medina is a 68 y.o. male who returns to [facility] [place] follow-up. His recent  PSA on 1/26/24 is 18.60 ng/mL. He reports feeling well overall with no new complaints or concerns. He denies any significant changes since his prior visit. Urinary function remains unchanged. He does occasionally have small-volume leakage with coughing that remains unchanged. He is now on Lupron, Zytegia and prednisone. He is no longer experiences hot flashes associated with Lupron and denies nausea or vomiting which he previously experienced with Zytiga. He continues to have lower back pain. Previously this radiated down bilateral lower extremities and he reports that [facility]. Now the pain is located to his low back and does not involve the legs. The pain is an aching pain that is worse in the mornings and relieved with his pain medication. He infrequently experiences pain in thoracic spine, stating that when he does it's a dull pain that comes and goes. Decrease in appetite but weight has maintained. He is planned to receive dentures soon and is hopeful that his appetite will improve once he is able to eat more of the foods that he enjoys. History of bilateral cataract surgery. Has decrease vision in his right eye which is ongoing and unchanged.      Subjective      Review of Systems: Review of Systems   Constitutional:  Positive for appetite change (Decreased). Negative for fatigue and unexpected weight change.   HENT:  Positive for hearing loss (Wears hearing aids.). Negative for dental problem (Improving, waiting on dentures.), sore throat and trouble swallowing.    Eyes:  Positive for visual disturbance (Cataracts removed 2 years ago, decreased vision in right eye, ongoing).   Respiratory:  Negative for cough, chest tightness, shortness of breath and wheezing.    Cardiovascular:  Negative for chest pain, palpitations and leg swelling.   Gastrointestinal:  Negative for abdominal distention, abdominal pain, anal bleeding, blood in stool, constipation, diarrhea, nausea and  vomiting.   Endocrine: Positive for heat intolerance (Rare, tolerable.).   Genitourinary:  Negative for decreased urine volume, difficulty urinating, dysuria, frequency, hematuria and urgency.        Noc x2  Normal stream  Occasional MATEO, ongoing   Musculoskeletal:  Positive for arthralgias (Pain in feet, ongoing), back pain (Worse in the morning, takes pain medications with relief.  Ongoing) and neck pain (Occasionally, primarily noted on right side.  Noted for several months.). Negative for gait problem, joint swelling and neck stiffness.   Skin:  Negative for color change and rash.   Neurological:  Positive for headaches (Occasional, ongoing). Negative for dizziness, tremors, seizures, syncope, speech difficulty and weakness.   Psychiatric/Behavioral:  Negative for confusion, decreased concentration and sleep disturbance.        The following portions of the patient's history were reviewed and updated as appropriate: allergies, current medications, past family history, past medical history, past social history, past surgical history and problem list.    Medications:     Current Outpatient Medications:     abiraterone acetate (ZYTIGA) 500 MG tablet, Take 2 tablets by mouth Daily., Disp: 60 tablet, Rfl: 11    Calcium Citrate-Vitamin D3 (CITRACAL) 315-6.25 MG-MCG tablet tablet, Take 1 tablet by mouth Daily., Disp: , Rfl:     HYDROcodone-acetaminophen (NORCO)  MG per tablet, Take 1 tablet by mouth Every 4 (Four) Hours As Needed for Moderate Pain., Disp: 150 tablet, Rfl: 0    ibuprofen (ADVIL,MOTRIN) 800 MG tablet, TAKE 1 TABLET BY MOUTH EVERY 6 - 8 HOURS AS NEEDED FOR PAIN, Disp: , Rfl:     leuprolide (Lupron [place], 3-Month,) 22.5 MG injection, 22.5 mg., Disp: , Rfl:     lisinopril (PRINIVIL,ZESTRIL) 40 MG tablet, Take 1 tablet by mouth Daily., Disp: 90 tablet, Rfl: 0    magnesium oxide (MAG-OX) 400 MG tablet, Take 1 tablet by mouth Daily., Disp: 30 tablet, Rfl: 0    ondansetron (ZOFRAN) 8 MG tablet, Take 1  tablet by mouth Every 8 (Eight) Hours As Needed for Nausea or Vomiting., Disp: 90 tablet, Rfl: 3    predniSONE (DELTASONE) 5 MG tablet, Take 1 tablet by mouth 2 (Two) Times a Day., Disp: 60 tablet, Rfl: 11    prochlorperazine (COMPAZINE) 10 MG tablet, Take 1 tablet by mouth Every 6 (Six) Hours As Needed for Nausea or Vomiting., Disp: 30 tablet, Rfl: 5    folic acid (FOLVITE) 1 MG tablet, , Disp: , Rfl:     Allergies:   No Known Allergies    Patient Smoking History:   Social History     Tobacco Use   Smoking Status Every Day    Packs/day: 0.25    Years: 42.00    Additional pack years: 0.00    Total pack years: 10.50    Types: Cigarettes    Start date: 10/21/1970    Passive exposure: Past   Smokeless Tobacco Never   Tobacco Comments    INSTRUCTED NO SMOKING 24 HR PRIOR TO SURGERY     LAST SMOKED AT 200AM 05-22-23       Measures:  PHQ-9 Total Score: 1   Quality of Life: 100 - Full Activity   ECOG score: 0  ECOG: (0) Fully active, able to carry on all predisease performance without restriction  Pain: (on a scale of 0-10)   Pain Score    02/19/24 1403   PainSc:   6   PainLoc: Back     Semaj Medina reports a pain score of 6.  Given his pain assessment as noted, treatment options were discussed and the following options were decided upon as a follow-up plan to address the patient's pain: continuation of current treatment plan for pain.     Objective     Physical Exam:   Vital Signs:   Vitals:    02/19/24 1403   BP: 127/72   Pulse: 73   Resp: 20   Temp: 98.6 °F (37 °C)   TempSrc: Temporal   SpO2: 100%   Weight: 55.8 kg (123 lb 0.3 oz)   PainSc:   6   PainLoc: Back     Body mass index is 17.91 kg/m².   Wt Readings from Last 3 Encounters:   02/19/24 55.8 kg (123 lb 0.3 oz)   01/26/24 55 kg (121 lb 4.1 oz)   12/19/23 56.3 kg (124 lb 1.9 oz)       Physical Exam  Vitals reviewed.   Constitutional:       General: He is not in acute distress.     Appearance: Normal appearance. He is normal weight. He is not ill-appearing.        HENT:      Head: Normocephalic and atraumatic.      Mouth/Throat:      Mouth: Mucous membranes are moist.      Pharynx: Oropharynx is clear. No oropharyngeal exudate or posterior oropharyngeal erythema.   Eyes:      Conjunctiva/sclera: Conjunctivae normal.      Pupils: Pupils are equal, round, and reactive to light.   Cardiovascular:      Rate and Rhythm: Normal rate and regular rhythm.      Pulses: Normal pulses.      Heart sounds: Normal heart sounds.   Pulmonary:      Effort: Pulmonary effort is normal. No respiratory distress.      Breath sounds: Normal breath sounds.   Musculoskeletal:         General: Normal range of motion.      Cervical back: Normal range of motion.   Skin:     General: Skin is warm and dry.   Neurological:      General: No focal deficit present.      Mental Status: He is alert and oriented to person, place, and time. Mental status is at baseline.   Psychiatric:         Mood and Affect: Mood normal.         Behavior: Behavior normal.       Result Review: I independently reviewed the following data.  -- PSA DIAGNOSTIC (01/26/2024 08:55)   -- SCANNED PATHOLOGY (01/30/2024)   -- SCANNED PATHOLOGY (01/30/2024)   -- CT Abdomen Pelvis With Contrast (01/23/2024 16:30)   -- CT Chest With Contrast Diagnostic (01/23/2024 16:30)   -- NM Bone Scan Whole Body (01/23/2024 14:56)     Imaging: CT Abdomen Pelvis With Contrast    Result Date: 1/24/2024     1. No evidence of intra abdominopelvic metastatic disease  2. Stable sclerotic lesions of the pelvis.  No new suspicious bone lesion  3. Multiple hepatic hemangiomas  4. Prominent mucosal fold thickening of the stomach.  Nonspecific gastritis and Menetrier disease are considered  5. Appearance of the urinary bladder suggests possible cystitis      HILLARY FERNANDEZ MD       Electronically Signed and Approved By: HILLARY FERNANDEZ MD on 1/24/2024 at 16:06             CT Chest With Contrast Diagnostic    Result Date: 1/24/2024     1. No evidence of  intrathoracic metastatic disease  2. Slight worsening appearance of osteoblastic skeletal metastatic disease, notably at T10, T8, and T5      HILLARY FERNANDEZ MD       Electronically Signed and Approved By: HILLARY FERNANDEZ MD on 1/24/2024 at 15:57             NM Bone Scan Whole Body    Result Date: 1/24/2024   Interval enlargement of T10 vertebral body metastasis     HILLARY FERNANDEZ MD       Electronically Signed and Approved By: HILLARY FERNANDEZ MD on 1/24/2024 at 15:15               Labs:   WBC   Date Value Ref Range Status   01/26/2024 5.03 3.40 - 10.80 10*3/mm3 Final   12/11/2020 6.61 4.80 - 10.80 10*3/uL Final     Hemoglobin   Date Value Ref Range Status   01/26/2024 13.2 13.0 - 17.7 g/dL Final     Hematocrit   Date Value Ref Range Status   01/26/2024 38.8 37.5 - 51.0 % Final     Platelets   Date Value Ref Range Status   01/26/2024 384 140 - 450 10*3/mm3 Final     TSH   Date Value Ref Range Status   05/16/2019 1.350 0.270 - 4.200 m[iU]/L Final      PSA   Date Value Ref Range Status   01/26/2024 18.600 (H) 0.000 - 4.000 ng/mL Final   12/19/2023 9.670 (H) 0.000 - 4.000 ng/mL Final   12/15/2023 8.050 (H) 0.000 - 4.000 ng/mL Final   10/11/2023 3.180 0.000 - 4.000 ng/mL Final   08/24/2023 4.570 (H) 0.000 - 4.000 ng/mL Final   08/03/2023 4.830 (H) 0.000 - 4.000 ng/mL Final   06/21/2023 5.580 (H) 0.000 - 4.000 ng/mL Final   04/18/2023 2.850 0.000 - 4.000 ng/mL Final   02/17/2023 1.900 0.000 - 4.000 ng/mL Final   12/06/2022 1.340 0.000 - 4.000 ng/mL Final   09/13/2022 0.697 0.000 - 4.000 ng/mL Final   06/17/2022 0.593 0.000 - 4.000 ng/mL Final   03/18/2022 1.150 0.000 - 4.000 ng/mL Final   01/13/2022 5.470 (H) 0.000 - 4.000 ng/mL Final   12/17/2021 14.200 (H) 0.000 - 4.000 ng/mL Final   10/14/2021 11.900 (H) 0.000 - 4.000 ng/mL Final   09/16/2021 10.200 (H) 0.000 - 4.000 ng/mL Final   06/17/2021 5.200 (H) 0.000 - 4.000 ng/mL Final   03/05/2021 2.25 0.00 - 4.00 ng/mL Final   12/11/2020 6.81 (H) 0.00 - 4.00 ng/mL Final    11/05/2020 7.17 (H) 0.00 - 4.00 ng/mL Final   08/31/2020 17.95 (H) 0.00 - 4.00 ng/mL Final   06/26/2020 12.13 (H) 0.00 - 4.00 ng/mL Final   06/19/2020 11.45 (H) 0.00 - 4.00 ng/mL Final   03/10/2020 4.54 (H) 0.00 - 4.00 ng/mL Final   11/08/2019 2.55 0.00 - 4.00 ng/mL Final   08/05/2019 0.46 0.00 - 4.00 ng/mL Final   05/06/2019 0.35 0.00 - 4.00 ng/mL Final   01/31/2019 0.40 0.00 - 4.00 ng/mL Final     Assessment / Plan      Impression: Semaj Medina is a pleasant 68 y.o. male with initially with diagnosis of prostate cancer, pT3b pN0 [place] 4+3= 7, managed surgically with prostatectomy in 2015. He had positive margins at the bladder neck and received adjuvant radiotherapy at that time with Dr. Stanton. In 2017 his PSA began to rise and he was started on Lupron and in 2020 he was started on Zytiga. He tolerated Zytiga poorly and his dose was reduced multiple times. Restaging imaging in 2021 demonstrated a T10 lesion which was resulting in pain and limited function. He received palliative radiotherapy to vertebral metastases at T9-T11, completed on 12/9/2021 (30 Gy/10 fractions). In May 2023 his PSA was rising. His PSMA PET scan on 5/2/2023 demonstrated metastatic involvement throughout the neck, thorax, abdomen and pelvis. He was started on docetaxel, which he completed 6 cycles in September 2023. He continues to receive Lupron 45 mg every 6 months and Xgeva. He is also now taking Zytiga and prednisone, which was started after recent imaging. NM Bone Scan 1/23/2024 demonstrate an interval enlargement of T10 vertebral body metastasis. CT CAP on 1/23/2024 demonstrates slight worsening appearance of osteoblastic skeletal metastatic disease, notably at T10, T8 and T5. No evidence of intrathoracic metastatic disease. Within the pelvis there is stable sclerotic lesions. No evidence of intra abdominopelvic metastatic disease. He has had an interval rise in PSA with level of 18.60 ng/mL on 1/26/2024. Despite these  results he is clinically doing well overall with essentially no changes in urinary function. AUA Symptom Score today of 6. He continues to experience lumbar pain without radiculopathy that is reasonably well controlled with Norco 10/325 mg prescribed by Dr. Dick. He infrequently experiences any thoracic spine pain. Discussed option of continuing to monitor versus pursuing repeat radiotherapy to thoracic spine lesions, specifically the enlarging T10 lesion. Mr. Medina would like to continue to monitor for now and he will contact our office in the event that his thoracic spine pain worsens.      Assessment/Plan:   Diagnoses and all orders for this visit:    1. Secondary malignant neoplasm of bone (Primary)    2. Prostate cancer metastatic to bone    3. Personal history of radiation therapy    4. History of prostatectomy    5. Cancer-related pain    6. Androgen deprivation therapy    7. Rising PSA following treatment for malignant neoplasm of prostate    Other orders  -     SCANNED - LABS       Follow Up:   Return for follow-up in 3 months or sooner if he develops worsening thoracic back pain. PSA being ordered by Dr. Dick.   Follow-up with Dr. Dick on 3/1/24.     No follow-ups on file.  Semaj Medina was encouraged to contact me in the interim with any questions or concerns regarding his care.        CHRIS Juarez  Radiation Oncology  [facility]    This document has been signed by CHRIS Alston on February 19, 2024 14:46 EST

## 2024-02-22 DIAGNOSIS — C61 PROSTATE CANCER METASTATIC TO BONE: ICD-10-CM

## 2024-02-22 DIAGNOSIS — C79.51 PROSTATE CANCER METASTATIC TO BONE: ICD-10-CM

## 2024-02-22 RX ORDER — PROCHLORPERAZINE MALEATE 10 MG
10 TABLET ORAL EVERY 6 HOURS PRN
Qty: 30 TABLET | Refills: 5 | Status: SHIPPED | OUTPATIENT
Start: 2024-02-22

## 2024-02-23 ENCOUNTER — HOSPITAL ENCOUNTER (OUTPATIENT)
Dept: ONCOLOGY | Facility: HOSPITAL | Age: 69
Discharge: HOME | End: 2024-02-23
Payer: MEDICARE

## 2024-02-23 DIAGNOSIS — C79.51 PROSTATE CANCER METASTATIC TO BONE: Primary | ICD-10-CM

## 2024-02-23 DIAGNOSIS — C61 PROSTATE CANCER METASTATIC TO BONE: Primary | ICD-10-CM

## 2024-02-23 DIAGNOSIS — C61 PROSTATE CANCER: ICD-10-CM

## 2024-02-23 LAB
ALBUMIN SERPL-MCNC: 3.7 G/DL (ref 3.5–5.2)
ALBUMIN/GLOB SERPL: 1.6 G/DL
ALP SERPL-CCNC: 78 U/L (ref 39–117)
ALT SERPL W P-5'-P-CCNC: 6 U/L (ref 1–41)
ANION GAP SERPL CALCULATED.3IONS-SCNC: 1.7 MMOL/L (ref 5–15)
AST SERPL-CCNC: 12 U/L (ref 1–40)
BASOPHILS # BLD AUTO: 0 10*3/MM3 (ref 0–0.2)
BASOPHILS NFR BLD AUTO: 0 % (ref 0–1.5)
BILIRUB SERPL-MCNC: 0.3 MG/DL (ref 0–1.2)
BUN SERPL-MCNC: 9 MG/DL (ref 8–23)
BUN/CREAT SERPL: 12 (ref 7–25)
CALCIUM SPEC-SCNC: 7.6 MG/DL (ref 8.6–10.5)
CHLORIDE SERPL-SCNC: 114 MMOL/L (ref 98–107)
CO2 SERPL-SCNC: 25.3 MMOL/L (ref 22–29)
CREAT SERPL-MCNC: 0.75 MG/DL (ref 0.76–1.27)
DEPRECATED RDW RBC AUTO: 61 FL (ref 37–54)
EGFRCR SERPLBLD CKD-EPI 2021: 98.3 ML/MIN/1.73
EOSINOPHIL # BLD AUTO: 0.02 10*3/MM3 (ref 0–0.4)
EOSINOPHIL NFR BLD AUTO: 0.3 % (ref 0.3–6.2)
ERYTHROCYTE [DISTWIDTH] IN BLOOD BY AUTOMATED COUNT: 19.9 % (ref 12.3–15.4)
GLOBULIN UR ELPH-MCNC: 2.3 GM/DL
GLUCOSE SERPL-MCNC: 116 MG/DL (ref 65–99)
HCT VFR BLD AUTO: 34.7 % (ref 37.5–51)
HGB BLD-MCNC: 11.6 G/DL (ref 13–17.7)
IMM GRANULOCYTES # BLD AUTO: 0.02 10*3/MM3 (ref 0–0.05)
IMM GRANULOCYTES NFR BLD AUTO: 0.3 % (ref 0–0.5)
LYMPHOCYTES # BLD AUTO: 1.44 10*3/MM3 (ref 0.7–3.1)
LYMPHOCYTES NFR BLD AUTO: 24.5 % (ref 19.6–45.3)
MAGNESIUM SERPL-MCNC: 2 MG/DL (ref 1.6–2.4)
MCH RBC QN AUTO: 27.8 PG (ref 26.6–33)
MCHC RBC AUTO-ENTMCNC: 33.4 G/DL (ref 31.5–35.7)
MCV RBC AUTO: 83 FL (ref 79–97)
MONOCYTES # BLD AUTO: 0.31 10*3/MM3 (ref 0.1–0.9)
MONOCYTES NFR BLD AUTO: 5.3 % (ref 5–12)
NEUTROPHILS NFR BLD AUTO: 4.08 10*3/MM3 (ref 1.7–7)
NEUTROPHILS NFR BLD AUTO: 69.6 % (ref 42.7–76)
PHOSPHATE SERPL-MCNC: 2.7 MG/DL (ref 2.5–4.5)
PLATELET # BLD AUTO: 361 10*3/MM3 (ref 140–450)
PMV BLD AUTO: 8.5 FL (ref 6–12)
POTASSIUM SERPL-SCNC: 4.2 MMOL/L (ref 3.5–5.2)
PROT SERPL-MCNC: 6 G/DL (ref 6–8.5)
RBC # BLD AUTO: 4.18 10*6/MM3 (ref 4.14–5.8)
SODIUM SERPL-SCNC: 141 MMOL/L (ref 136–145)
WBC NRBC COR # BLD AUTO: 5.87 10*3/MM3 (ref 3.4–10.8)

## 2024-02-23 PROCEDURE — 80053 COMPREHEN METABOLIC PANEL: CPT | Performed by: INTERNAL MEDICINE

## 2024-02-23 PROCEDURE — 36591 DRAW BLOOD OFF VENOUS DEVICE: CPT

## 2024-02-23 PROCEDURE — 84100 ASSAY OF PHOSPHORUS: CPT | Performed by: INTERNAL MEDICINE

## 2024-02-23 PROCEDURE — 83735 ASSAY OF MAGNESIUM: CPT | Performed by: INTERNAL MEDICINE

## 2024-02-23 PROCEDURE — 25010000002 HEPARIN LOCK FLUSH PER 10 UNITS: Performed by: INTERNAL MEDICINE

## 2024-02-23 PROCEDURE — 85025 COMPLETE CBC W/AUTO DIFF WBC: CPT | Performed by: INTERNAL MEDICINE

## 2024-02-23 RX ORDER — SODIUM CHLORIDE 0.9 % (FLUSH) 0.9 %
20 SYRINGE (ML) INJECTION AS NEEDED
OUTPATIENT
Start: 2024-02-23

## 2024-02-23 RX ORDER — HEPARIN SODIUM (PORCINE) LOCK FLUSH IV SOLN 100 UNIT/ML 100 UNIT/ML
500 SOLUTION INTRAVENOUS AS NEEDED
Status: DISCONTINUED | OUTPATIENT
Start: 2024-02-23 | End: 2024-02-24 | Stop reason: HOSPADM

## 2024-02-23 RX ORDER — SODIUM CHLORIDE 0.9 % (FLUSH) 0.9 %
20 SYRINGE (ML) INJECTION AS NEEDED
Status: DISCONTINUED | OUTPATIENT
Start: 2024-02-23 | End: 2024-02-24 | Stop reason: HOSPADM

## 2024-02-23 RX ORDER — HEPARIN SODIUM (PORCINE) LOCK FLUSH IV SOLN 100 UNIT/ML 100 UNIT/ML
500 SOLUTION INTRAVENOUS AS NEEDED
OUTPATIENT
Start: 2024-02-23

## 2024-02-23 RX ADMIN — Medication 20 ML: at 13:05

## 2024-02-23 RX ADMIN — HEPARIN SODIUM (PORCINE) LOCK FLUSH IV SOLN 100 UNIT/ML 500 UNITS: 100 SOLUTION at 13:05

## 2024-02-26 ENCOUNTER — SPECIALTY PHARMACY (OUTPATIENT)
Dept: PHARMACY | Facility: HOSPITAL | Age: 69
End: 2024-02-26
Payer: MEDICARE

## 2024-02-26 NOTE — PROGRESS NOTES
Specialty Pharmacy Patient Management Program  Oncology Refill Outreach      Semaj is a 68 y.o. male contacted today regarding refills of his medication(s).    Specialty medication(s) and dose(s) confirmed: Completed refill outreach. [facility] will mail abiraterone acetate (ZYTIGA) 500 MG tablet and predniSONE (DELTASONE) 5 MG tablet to patient home.     Other medications being refilled: N/A    Refill Questions      Flowsheet Row Most Recent Value   Changes to allergies? No   Changes to medications? No   New conditions or infections since last clinic visit No   Unplanned office visit, urgent care, ED, or hospital admission in the last 4 weeks  No   How does patient/caregiver feel medication is working? Very good   Financial problems or insurance changes  No   Since the previous refill, were any specialty medication doses or scheduled injections missed or delayed?  No   Does this patient require a clinical escalation to a pharmacist? No            Delivery Questions      Flowsheet Row Most Recent Value   Delivery method [employer]   Delivery address verified with patient/caregiver? Yes   Delivery address Home   Number of medications in delivery 2   Medication(s) being filled and delivered Prednisone, Abiraterone Acetate   Doses left of specialty medications 4   Copay verified? Yes  [Last fill for Zytiga $4.50 and for Prednisone $0]   Copay amount Pt consent $4.50 for Zytiga and Zero for Predinsone   Copay form of payment Credit/debit on file                   Follow-up: 21 days     Oralia Castillo  Care Coordinator, [facility]  2/26/2024  14:18 EST

## 2024-03-08 ENCOUNTER — HOSPITAL ENCOUNTER (OUTPATIENT)
Dept: ONCOLOGY | Facility: HOSPITAL | Age: 69
Discharge: HOME | End: 2024-03-08
Payer: MEDICARE

## 2024-03-08 ENCOUNTER — OFFICE VISIT (OUTPATIENT)
Dept: ONCOLOGY | Facility: HOSPITAL | Age: 69
End: 2024-03-08
Payer: MEDICARE

## 2024-03-08 VITALS
WEIGHT: 124.78 LBS | DIASTOLIC BLOOD PRESSURE: 88 MMHG | BODY MASS INDEX: 18.48 KG/M2 | RESPIRATION RATE: 18 BRPM | HEART RATE: 58 BPM | HEIGHT: 69 IN | OXYGEN SATURATION: 100 % | SYSTOLIC BLOOD PRESSURE: 142 MMHG | TEMPERATURE: 98.4 F

## 2024-03-08 DIAGNOSIS — C61 PROSTATE CANCER METASTATIC TO BONE: Primary | ICD-10-CM

## 2024-03-08 DIAGNOSIS — C79.51 PROSTATE CANCER METASTATIC TO BONE: Primary | ICD-10-CM

## 2024-03-08 DIAGNOSIS — C61 PROSTATE CANCER: ICD-10-CM

## 2024-03-08 LAB
ALBUMIN SERPL-MCNC: 3.7 G/DL (ref 3.5–5.2)
ALBUMIN/GLOB SERPL: 1.7 G/DL
ALP SERPL-CCNC: 60 U/L (ref 39–117)
ALT SERPL W P-5'-P-CCNC: 5 U/L (ref 1–41)
ANION GAP SERPL CALCULATED.3IONS-SCNC: 3.1 MMOL/L (ref 5–15)
AST SERPL-CCNC: 10 U/L (ref 1–40)
BASOPHILS # BLD AUTO: 0.01 10*3/MM3 (ref 0–0.2)
BASOPHILS NFR BLD AUTO: 0.2 % (ref 0–1.5)
BILIRUB SERPL-MCNC: 0.3 MG/DL (ref 0–1.2)
BUN SERPL-MCNC: 13 MG/DL (ref 8–23)
BUN/CREAT SERPL: 15.1 (ref 7–25)
CALCIUM SPEC-SCNC: 8.1 MG/DL (ref 8.6–10.5)
CHLORIDE SERPL-SCNC: 112 MMOL/L (ref 98–107)
CO2 SERPL-SCNC: 24.9 MMOL/L (ref 22–29)
CREAT SERPL-MCNC: 0.86 MG/DL (ref 0.76–1.27)
DEPRECATED RDW RBC AUTO: 57.3 FL (ref 37–54)
EGFRCR SERPLBLD CKD-EPI 2021: 94.3 ML/MIN/1.73
EOSINOPHIL # BLD AUTO: 0.01 10*3/MM3 (ref 0–0.4)
EOSINOPHIL NFR BLD AUTO: 0.2 % (ref 0.3–6.2)
ERYTHROCYTE [DISTWIDTH] IN BLOOD BY AUTOMATED COUNT: 18.8 % (ref 12.3–15.4)
GLOBULIN UR ELPH-MCNC: 2.2 GM/DL
GLUCOSE SERPL-MCNC: 141 MG/DL (ref 65–99)
HCT VFR BLD AUTO: 35.9 % (ref 37.5–51)
HGB BLD-MCNC: 12.1 G/DL (ref 13–17.7)
IMM GRANULOCYTES # BLD AUTO: 0.02 10*3/MM3 (ref 0–0.05)
IMM GRANULOCYTES NFR BLD AUTO: 0.4 % (ref 0–0.5)
LYMPHOCYTES # BLD AUTO: 0.87 10*3/MM3 (ref 0.7–3.1)
LYMPHOCYTES NFR BLD AUTO: 16.1 % (ref 19.6–45.3)
MAGNESIUM SERPL-MCNC: 1.7 MG/DL (ref 1.6–2.4)
MCH RBC QN AUTO: 28.1 PG (ref 26.6–33)
MCHC RBC AUTO-ENTMCNC: 33.7 G/DL (ref 31.5–35.7)
MCV RBC AUTO: 83.3 FL (ref 79–97)
MONOCYTES # BLD AUTO: 0.18 10*3/MM3 (ref 0.1–0.9)
MONOCYTES NFR BLD AUTO: 3.3 % (ref 5–12)
NEUTROPHILS NFR BLD AUTO: 4.32 10*3/MM3 (ref 1.7–7)
NEUTROPHILS NFR BLD AUTO: 79.8 % (ref 42.7–76)
PHOSPHATE SERPL-MCNC: 3.3 MG/DL (ref 2.5–4.5)
PLATELET # BLD AUTO: 328 10*3/MM3 (ref 140–450)
PMV BLD AUTO: 8.6 FL (ref 6–12)
POTASSIUM SERPL-SCNC: 4.2 MMOL/L (ref 3.5–5.2)
PROT SERPL-MCNC: 5.9 G/DL (ref 6–8.5)
PSA SERPL-MCNC: 15.2 NG/ML (ref 0–4)
RBC # BLD AUTO: 4.31 10*6/MM3 (ref 4.14–5.8)
SODIUM SERPL-SCNC: 140 MMOL/L (ref 136–145)
WBC NRBC COR # BLD AUTO: 5.41 10*3/MM3 (ref 3.4–10.8)

## 2024-03-08 PROCEDURE — 84153 ASSAY OF PSA TOTAL: CPT | Performed by: INTERNAL MEDICINE

## 2024-03-08 PROCEDURE — 25010000002 HEPARIN LOCK FLUSH PER 10 UNITS: Performed by: INTERNAL MEDICINE

## 2024-03-08 PROCEDURE — 80053 COMPREHEN METABOLIC PANEL: CPT | Performed by: INTERNAL MEDICINE

## 2024-03-08 PROCEDURE — 36591 DRAW BLOOD OFF VENOUS DEVICE: CPT

## 2024-03-08 PROCEDURE — 96372 THER/PROPH/DIAG INJ SC/IM: CPT

## 2024-03-08 PROCEDURE — 85025 COMPLETE CBC W/AUTO DIFF WBC: CPT | Performed by: INTERNAL MEDICINE

## 2024-03-08 PROCEDURE — 83735 ASSAY OF MAGNESIUM: CPT | Performed by: INTERNAL MEDICINE

## 2024-03-08 PROCEDURE — 25010000002 DENOSUMAB 120 MG/1.7ML SOLUTION: Performed by: INTERNAL MEDICINE

## 2024-03-08 PROCEDURE — 84100 ASSAY OF PHOSPHORUS: CPT | Performed by: INTERNAL MEDICINE

## 2024-03-08 RX ORDER — RED YEAST RICE 600 MG
1 CAPSULE ORAL DAILY
Qty: 30 CAPSULE | Refills: 5 | Status: SHIPPED | OUTPATIENT
Start: 2024-03-08

## 2024-03-08 RX ORDER — SODIUM CHLORIDE 0.9 % (FLUSH) 0.9 %
20 SYRINGE (ML) INJECTION AS NEEDED
Status: DISCONTINUED | OUTPATIENT
Start: 2024-03-08 | End: 2024-03-09 | Stop reason: HOSPADM

## 2024-03-08 RX ORDER — HYDROCODONE BITARTRATE AND ACETAMINOPHEN 10; 325 MG/1; MG/1
1 TABLET ORAL EVERY 4 HOURS PRN
Qty: 150 TABLET | Refills: 0 | Status: SHIPPED | OUTPATIENT
Start: 2024-03-08

## 2024-03-08 RX ORDER — SODIUM CHLORIDE 0.9 % (FLUSH) 0.9 %
20 SYRINGE (ML) INJECTION AS NEEDED
OUTPATIENT
Start: 2024-03-08

## 2024-03-08 RX ORDER — HEPARIN SODIUM (PORCINE) LOCK FLUSH IV SOLN 100 UNIT/ML 100 UNIT/ML
500 SOLUTION INTRAVENOUS AS NEEDED
Status: DISCONTINUED | OUTPATIENT
Start: 2024-03-08 | End: 2024-03-09 | Stop reason: HOSPADM

## 2024-03-08 RX ORDER — HEPARIN SODIUM (PORCINE) LOCK FLUSH IV SOLN 100 UNIT/ML 100 UNIT/ML
500 SOLUTION INTRAVENOUS AS NEEDED
OUTPATIENT
Start: 2024-03-08

## 2024-03-08 RX ADMIN — Medication 20 ML: at 09:13

## 2024-03-08 RX ADMIN — HEPARIN 500 UNITS: 100 SYRINGE at 09:13

## 2024-03-08 RX ADMIN — DENOSUMAB 120 MG: 120 INJECTION SUBCUTANEOUS at 10:23

## 2024-03-08 NOTE — PROGRESS NOTES
Patient  Semaj Medina      Location  [facility] HEMATOLOGY & ONCOLOGY    Chief Complaint  PROSTATE CA-- 5 WK FOLLOW UP    Referring Provider: Natalya Jack MD PhD  PCP: Maryse Monae MD    Subjective          Oncology/Hematology History Overview Note     Metastatic Castration resistant prostate cancer:    Patient was initially diagnosed with prostate cancer in 2015.  On 6/4/2015 he underwent radical prostatectomy and LN dissection which showed a Zach 4+3 = 7 prostate cancer.  There were several high risk features such as extraprostatic extension, seminal vesicle invasion, positive margins at the bladder neck and right and left seminal vesicle.  Lymph vascular invasion was indeterminate.  Perineural invasion was not commented on. Final pathology [id number] R1.  He was treated with adjuvant radiation by Dr. Stanton.    According to records the patient likely started Lupron in December 2017 when his PSA started to rise.  The patient took a break in August 2019.  He states he was not aware he was to continue.  He restarted Lupron on 11/26/2019 after it was noted that his PSA chago from 0.46 up to 2.55 on 11/8/2019.  Unfortunately his PSA continued to rise after that to 4.54 on 3/10/2020.    - restarted Lupron on 11/26/20  - Started Zytiga on 8/27/20.  8/31/2020 PSA 17.95     - Started Xtandi in May 2021 due to intolerance of Zytiga even at the lowest dose  - PSA rising in April 2023 to 2.85, doubling time 3.4 months  - started Docetaxel May 2023    PSMA PET 5/2/23:   1.  Radiotracer avid supra clavicular, thoracic and abdominal adenopathy and sclerotic lesion within T10 vertebral body likely representing metastatic disease.  2.  Indeterminate lesion within the left ilium demonstrating mild uptake.   3.  Asymmetric sclerosis within the superior lateral aspect the left orbit which does not demonstrate significant uptake above that of the right orbit. Findings are nonspecific  with  differential considerations including but not limited to metastatic disease, fibrous dysplasia versus Paget's disease.   4.  Sub-6 mm nodule within the left lower lobe.  5.  Scattered indeterminate hypodense lesions throughout the liver which do not demonstrate radiotracer uptake above that of the adjacent liver, best best visualized on recent multiphase CT abdomen and pelvis 04/17/2023. Please refer to this dictation for further information.       Prostate cancer metastatic to bone   12/11/2020 -  Chemotherapy    OP SUPPORTIVE Leuprolide 45 mg Q6M     3/23/2021 -  Chemotherapy    OP SUPPORTIVE Denosumab (Xgeva) Q28D     4/15/2021 - 5/13/2021 Chemotherapy    OP PROSTATE Enzalutamide     5/19/2021 Initial Diagnosis    Prostate cancer metastatic to bone (CMS/HCC)     6/2/2023 - 9/15/2023 Biopsy    OP PROSTATE DOCEtaxel  Plan Provider: Natalya Jack MD PhD  Treatment goal: Palliative  Line of treatment: [No plan line of treatment]     12/19/2023 - 12/19/2023 Chemotherapy    OP PROSTATE Apalutamide     1/26/2024 Biopsy    OP PROSTATE Abiraterone / PredniSONE  Plan Provider: Joshua Dick MD  Treatment goal: Control  Line of treatment: [No plan line of treatment]     Prostate cancer   6/16/2021 Initial Diagnosis    Prostate cancer (HCC)     6/2/2023 -  Chemotherapy    OP CENTRAL VENOUS ACCESS DEVICE ACCESS, CARE, AND MAINTENANCE (CVAD)     Secondary malignant neoplasm of bone   11/4/2021 Initial Diagnosis    Secondary malignant neoplasm of bone (HCC)     11/23/2021 - 12/9/2021 Radiation    Radiation OncologyTreatment Course:  Semaj Medina received 3000 cGy in 10 fractions to the T9-T11 spine.          History of Present Illness  Patient with metastatic prostate cancer, completed docetaxel (6 cycles), currently receiving Lupron 45 mg every 6 months, zytiga and prednisone, and patient also receiving Xgeva (received clearance to resume Xgeva after recent dental work).  Patient again denies any fever, chills,  night sweats, nausea, vomiting. He reports pain in his lumbar spine region, for which pain medications have helped, he is requesting refill of his pain medication today.     Review of Systems   Constitutional:  Positive for fatigue (5/10). Negative for appetite change, diaphoresis, fever, unexpected weight gain and unexpected weight loss.   HENT:  Negative for hearing loss, mouth sores, sore throat, swollen glands, trouble swallowing and voice change.    Eyes:  Negative for blurred vision.   Respiratory:  Negative for cough, shortness of breath and wheezing.    Cardiovascular:  Negative for chest pain and palpitations.   Gastrointestinal:  Negative for abdominal pain, blood in stool, constipation, diarrhea, nausea and vomiting.   Endocrine: Negative for cold intolerance and heat intolerance.   Genitourinary:  Negative for difficulty urinating, dysuria, frequency, hematuria and urinary incontinence.   Musculoskeletal:  Positive for back pain. Negative for arthralgias and myalgias.   Skin:  Negative for rash, skin lesions and wound.   Neurological:  Negative for dizziness, seizures, weakness, numbness and headache.   Hematological:  Does not bruise/bleed easily.   Psychiatric/Behavioral:  Negative for depressed mood. The patient is not nervous/anxious.    All other systems reviewed and are negative.      Past Medical History:   Diagnosis Date    Anemia     NO PROBLEMS    Anxiety     Blood disease     Colon polyps     Depression     Diverticulitis     Forgetfulness     Hepatitis C     RESOLVED    Hypertension     Night sweats     Numbness and tingling of left lower extremity     Prostate cancer      Past Surgical History:   Procedure Laterality Date    COLONOSCOPY      PROSTATE BIOPSY      PROSTATECTOMY      ROBOT ASSISTED W PELVIC LYMPH NODE DISSECTION    TRANSURETHRAL RESECTION OF BLADDER TUMOR      VENOUS ACCESS DEVICE (PORT) INSERTION Right 5/22/2023    Procedure: INSERTION VENOUS ACCESS PORT;  Surgeon: Esequiel  "Waldemar QUINTERO MD;  [place] [facility] [place] [facility] [place]  Service: General;  Laterality: Right;     Social History     Socioeconomic History    Marital status:    Tobacco Use    Smoking status: Every Day     Current packs/day: 0.25     Average packs/day: 0.3 packs/day for 53.4 years (13.3 ttl pk-yrs)     Types: Cigarettes     Start date: 10/21/1970     Passive exposure: Past    Smokeless tobacco: Never    Tobacco comments:     INSTRUCTED NO SMOKING 24 HR PRIOR TO SURGERY      LAST SMOKED AT 200AM 05-22-23   Vaping Use    Vaping status: Never Used   Substance and Sexual Activity    Alcohol use: Yes     Alcohol/week: 6.0 standard drinks of alcohol     Types: 6 Cans of beer per week     Comment: 12 pack a week of beer    Drug use: Never    Sexual activity: Defer     Family History   Problem Relation Age of Onset    Cancer Mother     Cancer Father        Objective   Physical Exam  Vitals reviewed. Exam conducted with a chaperone present.   Constitutional:       General: He is not in acute distress.     Appearance: Normal appearance.   HENT:      Head: Normocephalic and atraumatic.   Eyes:      Extraocular Movements: Extraocular movements intact.      Conjunctiva/sclera: Conjunctivae normal.   Pulmonary:      Effort: Pulmonary effort is normal.   Skin:     General: Skin is warm and dry.      Findings: No rash.   Neurological:      Mental Status: He is oriented to person, place, and time.           Vitals:    03/08/24 0927   BP: 142/88   Pulse: 58   Resp: 18   Temp: 98.4 °F (36.9 °C)   TempSrc: Temporal   SpO2: 100%   Weight: 56.6 kg (124 lb 12.5 oz)   Height: 175.1 cm (68.94\")   PainSc:   6   PainLoc: Back             ECOG score: 0         PHQ-9 Total Score:         Result Review :   The following data was reviewed by: Joshua Dick MD on 08/24/2023:  Lab Results   Component Value Date    HGB 12.1 (L) 03/08/2024    HCT 35.9 (L) 03/08/2024    MCV 83.3 03/08/2024     03/08/2024    WBC 5.41 03/08/2024    NEUTROABS 4.32 " 03/08/2024    LYMPHSABS 0.87 03/08/2024    MONOSABS 0.18 03/08/2024    EOSABS 0.01 03/08/2024    BASOSABS 0.01 03/08/2024     Lab Results   Component Value Date    GLUCOSE 141 (H) 03/08/2024    BUN 13 03/08/2024    CREATININE 0.86 03/08/2024     03/08/2024    K 4.2 03/08/2024     (H) 03/08/2024    CO2 24.9 03/08/2024    CALCIUM 8.1 (L) 03/08/2024    PROTEINTOT 5.9 (L) 03/08/2024    ALBUMIN 3.7 03/08/2024    BILITOT 0.3 03/08/2024    ALKPHOS 60 03/08/2024    AST 10 03/08/2024    ALT 5 03/08/2024     Lab Results   Component Value Date    IRON 50 (L) 12/17/2021    LABIRON 22 12/17/2021    TRANSFERRIN 156 (L) 12/17/2021    TIBC 232 (L) 12/17/2021     Lab Results   Component Value Date    FERRITIN 120.10 12/17/2021    [id number] 412 05/16/2019    FOLATE 9.4 05/16/2019     Lab Results   Component Value Date    PSA 15.200 (H) 03/08/2024          Assessment and Plan    Diagnoses and all orders for this visit:    1. Prostate cancer metastatic to bone (Primary)  -     PSA DIAGNOSTIC; Future  -     CT chest w contrast; Future  -     CT abdomen pelvis w contrast; Future  -     NM Bone Scan Whole Body; Future  -     Calcium Carbonate-Vitamin D (Calcium Plus Vitamin D) 500-1.25 MG-MCG capsule; Take 1 capsule by mouth Daily.  Dispense: 30 capsule; Refill: 5  -     HYDROcodone-acetaminophen (NORCO)  MG per tablet; Take 1 tablet by mouth Every 4 (Four) Hours As Needed for Moderate Pain.  Dispense: 150 tablet; Refill: 0    Other orders  -     Cancel: denosumab (XGEVA) injection 120 mg              67-year-old male with metastatic prostate cancer, completed 6 cycles of docetaxel on 9/15/2023, Lupron Q6 months last shot on 12/2023, Pt also receiving Xgeva and he is here prior to next Xgeva infusion  -Pt recently underwent dental procedure, but has been cleared to resume Xgeva.  -next Lupron injection due on 6/18/2024  -PSA from 12/15/23 was 8 up from 3.1 (in 10/2023)  -Discussed result of NM bone scan, CT CAP from  1/24/2024 which revealed interval enlargement T10 vertebral body metastasis, no evidence of intrathoracic metastatic disease, slight worsening appearance of osteoblastic schedule static disease, notable at T10, T8, and T5, no evidence of intra-abdominal or pelvic metastatic disease.  -Patient has undergone radiation therapy 10 fractions to thoracic spine from T9-T11 in 2021.  -Discussed plan to obtain [employer] and [employer] NGS testing to assess for actionable mutations.  -Also shared plan to reattempt treatment with Zytiga and prednisone, patient reports some nausea vomiting which we will attempt to manage with antiemetics.  -Orders placed for Zytiga and prednisone, also provided prescriptions for Compazine and Zofran for patient to begin once he begins treatment with these medications.  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     Hypomagnesemia  -Mag level of 1.7  -pt prescribed mag oxide 400 mg PO QD   -will recheck at next clinic appointment    Low Vit D  -Vit level of 27.7  -prescribed Vit D 50K IU once weekly x 8 weeks      -Plan for patient follow-up in 8 weeks with CBC, CMP, Magnesium, PSA and assessment for response to Zytiga and prednisone, discuss results of [employer] testing. Plan to rescan pt in 6/2024.    Please note that portions of this note were completed with a voice recognition program.      I spent approximately 30 minutes caring for Semaj today which included review medical record, reviewing lab work, care coordination, and medical documentation.  Patient was given instructions and counseling regarding his condition or for health maintenance advice.  Please see specific information pulled into the AVS if appropriate.     Joshua Dick MD    3/8/2024      Electronically signed by Joshua Dick MD, 03/08/24, 4:45 PM EST.

## 2024-03-12 ENCOUNTER — SPECIALTY PHARMACY (OUTPATIENT)
Dept: PHARMACY | Facility: HOSPITAL | Age: 69
End: 2024-03-12
Payer: MEDICARE

## 2024-03-26 ENCOUNTER — SPECIALTY PHARMACY (OUTPATIENT)
Dept: PHARMACY | Facility: HOSPITAL | Age: 69
End: 2024-03-26
Payer: MEDICARE

## 2024-03-26 NOTE — PROGRESS NOTES
Specialty Pharmacy Patient Management Program  Oncology Refill Outreach      Semaj is a 68 y.o. male contacted today regarding refills of his medication(s).    Specialty medication(s) and dose(s) confirmed: Completed refill outreach. [place] pharmacy will mail abiraterone acetate (ZYTIGA) 500 MG tablet  and predniSONE (DELTASONE) 5 MG tablet to patient home once ready.    Other medications being refilled: N/A    Refill Questions      Flowsheet Row Most Recent Value   Changes to allergies? No   Changes to medications? No   New conditions or infections since last clinic visit No   Unplanned office visit, urgent care, ED, or hospital admission in the last 4 weeks  No   How does patient/caregiver feel medication is working? Very good   Financial problems or insurance changes  No   Since the previous refill, were any specialty medication doses or scheduled injections missed or delayed?  No   Does this patient require a clinical escalation to a pharmacist? No            Delivery Questions      Flowsheet Row Most Recent Value   Delivery method [employer]   Delivery address verified with patient/caregiver? Yes   Delivery address Home   Number of medications in delivery 2   Medication(s) being filled and delivered Prednisone, Abiraterone Acetate   Doses left of specialty medications 5   Copay verified? Yes   Copay amount Pt consent $4.50 for Zytiga and Zero for Predinsone   Copay form of payment Credit/debit on file                   Follow-up: 21 days     Oralia Castillo  Care Coordinator, [facility]  3/26/2024  11:20 EDT

## 2024-04-05 ENCOUNTER — HOSPITAL ENCOUNTER (OUTPATIENT)
Dept: ONCOLOGY | Facility: HOSPITAL | Age: 69
Discharge: HOME | End: 2024-04-05
Payer: MEDICARE

## 2024-04-05 VITALS
OXYGEN SATURATION: 99 % | SYSTOLIC BLOOD PRESSURE: 158 MMHG | HEART RATE: 61 BPM | TEMPERATURE: 97.6 F | DIASTOLIC BLOOD PRESSURE: 95 MMHG

## 2024-04-05 DIAGNOSIS — C79.51 PROSTATE CANCER METASTATIC TO BONE: Primary | ICD-10-CM

## 2024-04-05 DIAGNOSIS — C61 PROSTATE CANCER METASTATIC TO BONE: Primary | ICD-10-CM

## 2024-04-05 DIAGNOSIS — C61 PROSTATE CANCER METASTATIC TO BONE: ICD-10-CM

## 2024-04-05 DIAGNOSIS — C79.51 PROSTATE CANCER METASTATIC TO BONE: ICD-10-CM

## 2024-04-05 DIAGNOSIS — C61 PROSTATE CANCER: Primary | ICD-10-CM

## 2024-04-05 LAB
ALBUMIN SERPL-MCNC: 3.8 G/DL (ref 3.5–5.2)
ALBUMIN/GLOB SERPL: 1.7 G/DL
ALP SERPL-CCNC: 49 U/L (ref 39–117)
ALT SERPL W P-5'-P-CCNC: 6 U/L (ref 1–41)
ANION GAP SERPL CALCULATED.3IONS-SCNC: 0.7 MMOL/L (ref 5–15)
AST SERPL-CCNC: 11 U/L (ref 1–40)
BASOPHILS # BLD AUTO: 0.01 10*3/MM3 (ref 0–0.2)
BASOPHILS NFR BLD AUTO: 0.1 % (ref 0–1.5)
BILIRUB SERPL-MCNC: 0.2 MG/DL (ref 0–1.2)
BUN SERPL-MCNC: 15 MG/DL (ref 8–23)
BUN/CREAT SERPL: 13.5 (ref 7–25)
CALCIUM SPEC-SCNC: 10.1 MG/DL (ref 8.6–10.5)
CHLORIDE SERPL-SCNC: 109 MMOL/L (ref 98–107)
CO2 SERPL-SCNC: 29.3 MMOL/L (ref 22–29)
CREAT SERPL-MCNC: 1.11 MG/DL (ref 0.76–1.27)
DEPRECATED RDW RBC AUTO: 51.8 FL (ref 37–54)
EGFRCR SERPLBLD CKD-EPI 2021: 72.3 ML/MIN/1.73
EOSINOPHIL # BLD AUTO: 0.03 10*3/MM3 (ref 0–0.4)
EOSINOPHIL NFR BLD AUTO: 0.4 % (ref 0.3–6.2)
ERYTHROCYTE [DISTWIDTH] IN BLOOD BY AUTOMATED COUNT: 16.4 % (ref 12.3–15.4)
GLOBULIN UR ELPH-MCNC: 2.3 GM/DL
GLUCOSE SERPL-MCNC: 123 MG/DL (ref 65–99)
HCT VFR BLD AUTO: 35.4 % (ref 37.5–51)
HGB BLD-MCNC: 11.9 G/DL (ref 13–17.7)
IMM GRANULOCYTES # BLD AUTO: 0.01 10*3/MM3 (ref 0–0.05)
IMM GRANULOCYTES NFR BLD AUTO: 0.1 % (ref 0–0.5)
LYMPHOCYTES # BLD AUTO: 1.64 10*3/MM3 (ref 0.7–3.1)
LYMPHOCYTES NFR BLD AUTO: 20.9 % (ref 19.6–45.3)
MAGNESIUM SERPL-MCNC: 1.8 MG/DL (ref 1.6–2.4)
MCH RBC QN AUTO: 28.5 PG (ref 26.6–33)
MCHC RBC AUTO-ENTMCNC: 33.6 G/DL (ref 31.5–35.7)
MCV RBC AUTO: 84.9 FL (ref 79–97)
MONOCYTES # BLD AUTO: 0.37 10*3/MM3 (ref 0.1–0.9)
MONOCYTES NFR BLD AUTO: 4.7 % (ref 5–12)
NEUTROPHILS NFR BLD AUTO: 5.77 10*3/MM3 (ref 1.7–7)
NEUTROPHILS NFR BLD AUTO: 73.8 % (ref 42.7–76)
PHOSPHATE SERPL-MCNC: 5.6 MG/DL (ref 2.5–4.5)
PLATELET # BLD AUTO: 385 10*3/MM3 (ref 140–450)
PMV BLD AUTO: 8.7 FL (ref 6–12)
POTASSIUM SERPL-SCNC: 4.8 MMOL/L (ref 3.5–5.2)
PROT SERPL-MCNC: 6.1 G/DL (ref 6–8.5)
PSA SERPL-MCNC: 16.5 NG/ML (ref 0–4)
RBC # BLD AUTO: 4.17 10*6/MM3 (ref 4.14–5.8)
SODIUM SERPL-SCNC: 139 MMOL/L (ref 136–145)
WBC NRBC COR # BLD AUTO: 7.83 10*3/MM3 (ref 3.4–10.8)

## 2024-04-05 PROCEDURE — 83735 ASSAY OF MAGNESIUM: CPT | Performed by: INTERNAL MEDICINE

## 2024-04-05 PROCEDURE — 85025 COMPLETE CBC W/AUTO DIFF WBC: CPT | Performed by: INTERNAL MEDICINE

## 2024-04-05 PROCEDURE — 84153 ASSAY OF PSA TOTAL: CPT | Performed by: INTERNAL MEDICINE

## 2024-04-05 PROCEDURE — 84100 ASSAY OF PHOSPHORUS: CPT | Performed by: INTERNAL MEDICINE

## 2024-04-05 PROCEDURE — 25010000002 HEPARIN LOCK FLUSH PER 10 UNITS: Performed by: INTERNAL MEDICINE

## 2024-04-05 PROCEDURE — 80053 COMPREHEN METABOLIC PANEL: CPT | Performed by: INTERNAL MEDICINE

## 2024-04-05 PROCEDURE — 25010000002 DENOSUMAB 120 MG/1.7ML SOLUTION: Performed by: INTERNAL MEDICINE

## 2024-04-05 PROCEDURE — 36591 DRAW BLOOD OFF VENOUS DEVICE: CPT

## 2024-04-05 PROCEDURE — 96372 THER/PROPH/DIAG INJ SC/IM: CPT

## 2024-04-05 RX ORDER — SODIUM CHLORIDE 0.9 % (FLUSH) 0.9 %
20 SYRINGE (ML) INJECTION AS NEEDED
OUTPATIENT
Start: 2024-04-05

## 2024-04-05 RX ORDER — HEPARIN SODIUM (PORCINE) LOCK FLUSH IV SOLN 100 UNIT/ML 100 UNIT/ML
500 SOLUTION INTRAVENOUS AS NEEDED
Status: DISCONTINUED | OUTPATIENT
Start: 2024-04-05 | End: 2024-04-06 | Stop reason: HOSPADM

## 2024-04-05 RX ORDER — HEPARIN SODIUM (PORCINE) LOCK FLUSH IV SOLN 100 UNIT/ML 100 UNIT/ML
500 SOLUTION INTRAVENOUS AS NEEDED
OUTPATIENT
Start: 2024-04-05

## 2024-04-05 RX ORDER — SODIUM CHLORIDE 0.9 % (FLUSH) 0.9 %
20 SYRINGE (ML) INJECTION AS NEEDED
Status: DISCONTINUED | OUTPATIENT
Start: 2024-04-05 | End: 2024-04-06 | Stop reason: HOSPADM

## 2024-04-05 RX ADMIN — HEPARIN 500 UNITS: 100 SYRINGE at 09:11

## 2024-04-05 RX ADMIN — DENOSUMAB 120 MG: 120 INJECTION SUBCUTANEOUS at 10:23

## 2024-04-05 RX ADMIN — Medication 20 ML: at 09:11

## 2024-04-08 RX ORDER — LISINOPRIL 40 MG/1
40 TABLET ORAL DAILY
Qty: 90 TABLET | Refills: 0 | Status: SHIPPED | OUTPATIENT
Start: 2024-04-08 | End: 2024-04-10 | Stop reason: SDUPTHER

## 2024-04-10 DIAGNOSIS — C79.51 PROSTATE CANCER METASTATIC TO BONE: ICD-10-CM

## 2024-04-10 DIAGNOSIS — C61 PROSTATE CANCER METASTATIC TO BONE: ICD-10-CM

## 2024-04-10 RX ORDER — LISINOPRIL 40 MG/1
40 TABLET ORAL DAILY
Qty: 90 TABLET | Refills: 0 | Status: SHIPPED | OUTPATIENT
Start: 2024-04-10

## 2024-04-12 RX ORDER — HYDROCODONE BITARTRATE AND ACETAMINOPHEN 10; 325 MG/1; MG/1
1 TABLET ORAL EVERY 4 HOURS PRN
Qty: 150 TABLET | Refills: 0 | Status: SHIPPED | OUTPATIENT
Start: 2024-04-12

## 2024-04-22 ENCOUNTER — SPECIALTY PHARMACY (OUTPATIENT)
Dept: PHARMACY | Facility: HOSPITAL | Age: 69
End: 2024-04-22
Payer: MEDICARE

## 2024-04-22 NOTE — PROGRESS NOTES
Specialty Pharmacy Patient Management Program  Oncology Refill Outreach      Semaj is a 68 y.o. male contacted today regarding refills of his medication(s).    Specialty medication(s) and dose(s) confirmed: Completed refill outreach. [place] pharmacy will mail both abiraterone acetate (ZYTIGA) 500 MG tablet and predniSONE (DELTASONE) 5 MG tablet once ready to dispense.     Other medications being refilled: N/A    Refill Questions      Flowsheet Row Most Recent Value   Changes to allergies? No   Changes to medications? No   New conditions or infections since last clinic visit No   Unplanned office visit, urgent care, ED, or hospital admission in the last 4 weeks  No   How does patient/caregiver feel medication is working? Very good   Financial problems or insurance changes  No   Since the previous refill, were any specialty medication doses or scheduled injections missed or delayed?  No   Does this patient require a clinical escalation to a pharmacist? No            Delivery Questions      Flowsheet Row Most Recent Value   Delivery method [employer]   Delivery address verified with patient/caregiver? Yes   Delivery address Home   Number of medications in delivery 2   Medication(s) being filled and delivered Prednisone, Abiraterone Acetate   Doses left of specialty medications 10   Copay verified? Yes   Copay amount Pt consent $4.50 for Zytiga and Zero for Predinsone   Copay form of payment Credit/debit on file                   Follow-up: 21 days     Oralia Castillo  Care Coordinator, [facility]  4/22/2024  10:51 EDT

## 2024-04-24 ENCOUNTER — HOSPITAL ENCOUNTER (OUTPATIENT)
Dept: CT IMAGING | Facility: HOSPITAL | Age: 69
Discharge: HOME | End: 2024-04-24
Admitting: INTERNAL MEDICINE
Payer: MEDICARE

## 2024-04-24 ENCOUNTER — HOSPITAL ENCOUNTER (OUTPATIENT)
Dept: NUCLEAR MEDICINE | Facility: HOSPITAL | Age: 69
Discharge: HOME | End: 2024-04-24
Payer: MEDICARE

## 2024-04-24 DIAGNOSIS — C61 PROSTATE CANCER METASTATIC TO BONE: ICD-10-CM

## 2024-04-24 DIAGNOSIS — C79.51 PROSTATE CANCER METASTATIC TO BONE: ICD-10-CM

## 2024-04-24 PROCEDURE — 0 TECHNETIUM MEDRONATE KIT: Performed by: INTERNAL MEDICINE

## 2024-04-24 PROCEDURE — 25510000001 IOPAMIDOL PER 1 ML: Performed by: INTERNAL MEDICINE

## 2024-04-24 PROCEDURE — 78306 BONE IMAGING WHOLE BODY: CPT

## 2024-04-24 PROCEDURE — 74177 CT ABD & PELVIS W/CONTRAST: CPT

## 2024-04-24 PROCEDURE — A9503 TC99M MEDRONATE: HCPCS | Performed by: INTERNAL MEDICINE

## 2024-04-24 PROCEDURE — 71260 CT THORAX DX C+: CPT

## 2024-04-24 RX ORDER — TC 99M MEDRONATE 20 MG/10ML
23.5 INJECTION, POWDER, LYOPHILIZED, FOR SOLUTION INTRAVENOUS
Status: COMPLETED | OUTPATIENT
Start: 2024-04-24 | End: 2024-04-24

## 2024-04-24 RX ADMIN — IOPAMIDOL 100 ML: 755 INJECTION, SOLUTION INTRAVENOUS at 11:48

## 2024-04-24 RX ADMIN — TC 99M MEDRONATE 23.5 MILLICURIE: 20 INJECTION, POWDER, LYOPHILIZED, FOR SOLUTION INTRAVENOUS at 11:24

## 2024-05-03 ENCOUNTER — OFFICE VISIT (OUTPATIENT)
Dept: ONCOLOGY | Facility: HOSPITAL | Age: 69
End: 2024-05-03
Payer: MEDICARE

## 2024-05-03 ENCOUNTER — HOSPITAL ENCOUNTER (OUTPATIENT)
Dept: ONCOLOGY | Facility: HOSPITAL | Age: 69
Discharge: HOME | End: 2024-05-03
Payer: MEDICARE

## 2024-05-03 VITALS
TEMPERATURE: 97.7 F | DIASTOLIC BLOOD PRESSURE: 81 MMHG | WEIGHT: 123.9 LBS | HEART RATE: 62 BPM | OXYGEN SATURATION: 100 % | BODY MASS INDEX: 18.35 KG/M2 | HEIGHT: 69 IN | SYSTOLIC BLOOD PRESSURE: 138 MMHG | RESPIRATION RATE: 18 BRPM

## 2024-05-03 DIAGNOSIS — C79.51 PROSTATE CANCER METASTATIC TO BONE: ICD-10-CM

## 2024-05-03 DIAGNOSIS — C61 PROSTATE CANCER: Primary | ICD-10-CM

## 2024-05-03 DIAGNOSIS — C79.51 PROSTATE CANCER METASTATIC TO BONE: Primary | ICD-10-CM

## 2024-05-03 DIAGNOSIS — C61 PROSTATE CANCER METASTATIC TO BONE: Primary | ICD-10-CM

## 2024-05-03 DIAGNOSIS — C61 PROSTATE CANCER METASTATIC TO BONE: ICD-10-CM

## 2024-05-03 LAB
ALBUMIN SERPL-MCNC: 3.7 G/DL (ref 3.5–5.2)
ALBUMIN/GLOB SERPL: 1.7 G/DL
ALP SERPL-CCNC: 44 U/L (ref 39–117)
ALT SERPL W P-5'-P-CCNC: 6 U/L (ref 1–41)
ANION GAP SERPL CALCULATED.3IONS-SCNC: 3.7 MMOL/L (ref 5–15)
AST SERPL-CCNC: 12 U/L (ref 1–40)
BASOPHILS # BLD AUTO: 0.02 10*3/MM3 (ref 0–0.2)
BASOPHILS NFR BLD AUTO: 0.3 % (ref 0–1.5)
BILIRUB SERPL-MCNC: 0.4 MG/DL (ref 0–1.2)
BUN SERPL-MCNC: 11 MG/DL (ref 8–23)
BUN/CREAT SERPL: 10.9 (ref 7–25)
CALCIUM SPEC-SCNC: 7.9 MG/DL (ref 8.6–10.5)
CHLORIDE SERPL-SCNC: 110 MMOL/L (ref 98–107)
CO2 SERPL-SCNC: 25.3 MMOL/L (ref 22–29)
CREAT SERPL-MCNC: 1.01 MG/DL (ref 0.76–1.27)
DEPRECATED RDW RBC AUTO: 49.4 FL (ref 37–54)
EGFRCR SERPLBLD CKD-EPI 2021: 81 ML/MIN/1.73
EOSINOPHIL # BLD AUTO: 0.18 10*3/MM3 (ref 0–0.4)
EOSINOPHIL NFR BLD AUTO: 2.9 % (ref 0.3–6.2)
ERYTHROCYTE [DISTWIDTH] IN BLOOD BY AUTOMATED COUNT: 15.5 % (ref 12.3–15.4)
GLOBULIN UR ELPH-MCNC: 2.2 GM/DL
GLUCOSE SERPL-MCNC: 122 MG/DL (ref 65–99)
HCT VFR BLD AUTO: 35.3 % (ref 37.5–51)
HGB BLD-MCNC: 12 G/DL (ref 13–17.7)
IMM GRANULOCYTES # BLD AUTO: 0.01 10*3/MM3 (ref 0–0.05)
IMM GRANULOCYTES NFR BLD AUTO: 0.2 % (ref 0–0.5)
LYMPHOCYTES # BLD AUTO: 2.26 10*3/MM3 (ref 0.7–3.1)
LYMPHOCYTES NFR BLD AUTO: 36.7 % (ref 19.6–45.3)
MAGNESIUM SERPL-MCNC: 1.8 MG/DL (ref 1.6–2.4)
MCH RBC QN AUTO: 29.2 PG (ref 26.6–33)
MCHC RBC AUTO-ENTMCNC: 34 G/DL (ref 31.5–35.7)
MCV RBC AUTO: 85.9 FL (ref 79–97)
MONOCYTES # BLD AUTO: 0.47 10*3/MM3 (ref 0.1–0.9)
MONOCYTES NFR BLD AUTO: 7.6 % (ref 5–12)
NEUTROPHILS NFR BLD AUTO: 3.22 10*3/MM3 (ref 1.7–7)
NEUTROPHILS NFR BLD AUTO: 52.3 % (ref 42.7–76)
PHOSPHATE SERPL-MCNC: 3.2 MG/DL (ref 2.5–4.5)
PLATELET # BLD AUTO: 351 10*3/MM3 (ref 140–450)
PMV BLD AUTO: 9.3 FL (ref 6–12)
POTASSIUM SERPL-SCNC: 3.6 MMOL/L (ref 3.5–5.2)
PROT SERPL-MCNC: 5.9 G/DL (ref 6–8.5)
PSA SERPL-MCNC: 20.1 NG/ML (ref 0–4)
RBC # BLD AUTO: 4.11 10*6/MM3 (ref 4.14–5.8)
SODIUM SERPL-SCNC: 139 MMOL/L (ref 136–145)
WBC NRBC COR # BLD AUTO: 6.16 10*3/MM3 (ref 3.4–10.8)

## 2024-05-03 PROCEDURE — 83735 ASSAY OF MAGNESIUM: CPT | Performed by: INTERNAL MEDICINE

## 2024-05-03 PROCEDURE — 84153 ASSAY OF PSA TOTAL: CPT | Performed by: INTERNAL MEDICINE

## 2024-05-03 PROCEDURE — 85025 COMPLETE CBC W/AUTO DIFF WBC: CPT | Performed by: INTERNAL MEDICINE

## 2024-05-03 PROCEDURE — 96372 THER/PROPH/DIAG INJ SC/IM: CPT

## 2024-05-03 PROCEDURE — 80053 COMPREHEN METABOLIC PANEL: CPT | Performed by: INTERNAL MEDICINE

## 2024-05-03 PROCEDURE — 36591 DRAW BLOOD OFF VENOUS DEVICE: CPT

## 2024-05-03 PROCEDURE — 25010000002 DENOSUMAB 120 MG/1.7ML SOLUTION: Performed by: INTERNAL MEDICINE

## 2024-05-03 PROCEDURE — 84100 ASSAY OF PHOSPHORUS: CPT | Performed by: INTERNAL MEDICINE

## 2024-05-03 PROCEDURE — 25010000002 HEPARIN LOCK FLUSH PER 10 UNITS: Performed by: INTERNAL MEDICINE

## 2024-05-03 RX ORDER — HEPARIN SODIUM (PORCINE) LOCK FLUSH IV SOLN 100 UNIT/ML 100 UNIT/ML
500 SOLUTION INTRAVENOUS AS NEEDED
Status: DISCONTINUED | OUTPATIENT
Start: 2024-05-03 | End: 2024-05-04 | Stop reason: HOSPADM

## 2024-05-03 RX ORDER — SODIUM CHLORIDE 0.9 % (FLUSH) 0.9 %
20 SYRINGE (ML) INJECTION AS NEEDED
Status: DISCONTINUED | OUTPATIENT
Start: 2024-05-03 | End: 2024-05-04 | Stop reason: HOSPADM

## 2024-05-03 RX ORDER — SODIUM CHLORIDE 0.9 % (FLUSH) 0.9 %
20 SYRINGE (ML) INJECTION AS NEEDED
OUTPATIENT
Start: 2024-05-03

## 2024-05-03 RX ORDER — HEPARIN SODIUM (PORCINE) LOCK FLUSH IV SOLN 100 UNIT/ML 100 UNIT/ML
500 SOLUTION INTRAVENOUS AS NEEDED
OUTPATIENT
Start: 2024-05-03

## 2024-05-03 RX ADMIN — DENOSUMAB 120 MG: 120 INJECTION SUBCUTANEOUS at 11:46

## 2024-05-03 RX ADMIN — HEPARIN 500 UNITS: 100 SYRINGE at 10:32

## 2024-05-03 RX ADMIN — Medication 20 ML: at 10:33

## 2024-05-03 NOTE — PROGRESS NOTES
Patient  Semaj Medina      Location  [facility] HEMATOLOGY & ONCOLOGY    Chief Complaint  PROSTATE CA-- 5 WK FOLLOW UP    Referring Provider: Natalya Jack MD PhD  PCP: Maryse Monae MD    Subjective          Oncology/Hematology History Overview Note     Metastatic Castration resistant prostate cancer:    Patient was initially diagnosed with prostate cancer in 2015.  On 6/4/2015 he underwent radical prostatectomy and LN dissection which showed a Zach 4+3 = 7 prostate cancer.  There were several high risk features such as extraprostatic extension, seminal vesicle invasion, positive margins at the bladder neck and right and left seminal vesicle.  Lymph vascular invasion was indeterminate.  Perineural invasion was not commented on. Final pathology [id number] R1.  He was treated with adjuvant radiation by Dr. Stanton.    According to records the patient likely started Lupron in December 2017 when his PSA started to rise.  The patient took a break in August 2019.  He states he was not aware he was to continue.  He restarted Lupron on 11/26/2019 after it was noted that his PSA chago from 0.46 up to 2.55 on 11/8/2019.  Unfortunately his PSA continued to rise after that to 4.54 on 3/10/2020.    - restarted Lupron on 11/26/20  - Started Zytiga on 8/27/20.  8/31/2020 PSA 17.95     - Started Xtandi in May 2021 due to intolerance of Zytiga even at the lowest dose  - PSA rising in April 2023 to 2.85, doubling time 3.4 months  - started Docetaxel May 2023    PSMA PET 5/2/23:   1.  Radiotracer avid supra clavicular, thoracic and abdominal adenopathy and sclerotic lesion within T10 vertebral body likely representing metastatic disease.  2.  Indeterminate lesion within the left ilium demonstrating mild uptake.   3.  Asymmetric sclerosis within the superior lateral aspect the left orbit which does not demonstrate significant uptake above that of the right orbit. Findings are nonspecific  with  differential considerations including but not limited to metastatic disease, fibrous dysplasia versus Paget's disease.   4.  Sub-6 mm nodule within the left lower lobe.  5.  Scattered indeterminate hypodense lesions throughout the liver which do not demonstrate radiotracer uptake above that of the adjacent liver, best best visualized on recent multiphase CT abdomen and pelvis 04/17/2023. Please refer to this dictation for further information.       Prostate cancer metastatic to bone   12/11/2020 -  Chemotherapy    OP SUPPORTIVE Leuprolide 45 mg Q6M     3/23/2021 -  Chemotherapy    OP SUPPORTIVE Denosumab (Xgeva) Q28D     4/15/2021 - 5/13/2021 Chemotherapy    OP PROSTATE Enzalutamide     5/19/2021 Initial Diagnosis    Prostate cancer metastatic to bone (CMS/HCC)     6/2/2023 - 9/15/2023 Biopsy    OP PROSTATE DOCEtaxel  Plan Provider: Natalya Jack MD PhD  Treatment goal: Palliative  Line of treatment: [No plan line of treatment]     12/19/2023 - 12/19/2023 Chemotherapy    OP PROSTATE Apalutamide     1/26/2024 Biopsy    OP PROSTATE Abiraterone / PredniSONE  Plan Provider: Joshua Dick MD  Treatment goal: Control  Line of treatment: [No plan line of treatment]     Prostate cancer   6/16/2021 Initial Diagnosis    Prostate cancer (HCC)     6/2/2023 -  Chemotherapy    OP CENTRAL VENOUS ACCESS DEVICE ACCESS, CARE, AND MAINTENANCE (CVAD)     Secondary malignant neoplasm of bone   11/4/2021 Initial Diagnosis    Secondary malignant neoplasm of bone (HCC)     11/23/2021 - 12/9/2021 Radiation    Radiation OncologyTreatment Course:  Semaj Medina received 3000 cGy in 10 fractions to the T9-T11 spine.          History of Present Illness  Patient with metastatic prostate cancer, completed docetaxel (6 cycles), currently receiving Lupron 45 mg every 6 months, zytiga and prednisone, and patient also receiving Xgeva (received clearance to resume Xgeva after recent dental work).  Patient again denies any fever, chills,  night sweats, nausea, vomiting. He reports pain in his lumbar spine region, for which pain medications have helped. Patient here to discuss restaging imaging scans obtained to assess response to therapy.    Review of Systems   Constitutional:  Positive for fatigue (5/10). Negative for appetite change, diaphoresis, fever, unexpected weight gain and unexpected weight loss.   HENT:  Negative for hearing loss, mouth sores, sore throat, swollen glands, trouble swallowing and voice change.    Eyes:  Negative for blurred vision.   Respiratory:  Negative for cough, shortness of breath and wheezing.    Cardiovascular:  Negative for chest pain and palpitations.   Gastrointestinal:  Negative for abdominal pain, blood in stool, constipation, diarrhea, nausea and vomiting.   Endocrine: Negative for cold intolerance and heat intolerance.   Genitourinary:  Negative for difficulty urinating, dysuria, frequency, hematuria and urinary incontinence.   Musculoskeletal:  Positive for back pain. Negative for arthralgias and myalgias.   Skin:  Negative for rash, skin lesions and wound.   Neurological:  Negative for dizziness, seizures, weakness, numbness and headache.   Hematological:  Does not bruise/bleed easily.   Psychiatric/Behavioral:  Negative for depressed mood. The patient is not nervous/anxious.    All other systems reviewed and are negative.      Past Medical History:   Diagnosis Date    Anemia     NO PROBLEMS    Anxiety     Blood disease     Colon polyps     Depression     Diverticulitis     Forgetfulness     Hepatitis C     RESOLVED    Hypertension     Night sweats     Numbness and tingling of left lower extremity     Prostate cancer      Past Surgical History:   Procedure Laterality Date    COLONOSCOPY      PROSTATE BIOPSY      PROSTATECTOMY      ROBOT ASSISTED W PELVIC LYMPH NODE DISSECTION    TRANSURETHRAL RESECTION OF BLADDER TUMOR      VENOUS ACCESS DEVICE (PORT) INSERTION Right 5/22/2023    Procedure: INSERTION VENOUS  "ACCESS PORT;  Surgeon: Waldemar Iraheta MD;  Location: [facility] [place];  Service: General;  Laterality: Right;     Social History     Socioeconomic History    Marital status:    Tobacco Use    Smoking status: Every Day     Current packs/day: 0.25     Average packs/day: 0.3 packs/day for 53.5 years (13.4 ttl pk-yrs)     Types: Cigarettes     Start date: 10/21/1970     Passive exposure: Past    Smokeless tobacco: Never    Tobacco comments:     INSTRUCTED NO SMOKING 24 HR PRIOR TO SURGERY      LAST SMOKED AT 200AM 05-22-23   Vaping Use    Vaping status: Never Used   Substance and Sexual Activity    Alcohol use: Yes     Alcohol/week: 6.0 standard drinks of alcohol     Types: 6 Cans of beer per week     Comment: 12 pack a week of beer    Drug use: Never    Sexual activity: Defer     Family History   Problem Relation Age of Onset    Cancer Mother     Cancer Father        Objective   Physical Exam  Vitals reviewed. Exam conducted with a chaperone present.   Constitutional:       General: He is not in acute distress.     Appearance: Normal appearance.   HENT:      Head: Normocephalic and atraumatic.   Eyes:      Extraocular Movements: Extraocular movements intact.      Conjunctiva/sclera: Conjunctivae normal.   Pulmonary:      Effort: Pulmonary effort is normal.   Skin:     General: Skin is warm and dry.      Findings: No rash.   Neurological:      Mental Status: He is oriented to person, place, and time.           Vitals:    05/03/24 1059   BP: 138/81   Pulse: 62   Resp: 18   Temp: 97.7 °F (36.5 °C)   TempSrc: Temporal   SpO2: 100%   Weight: 56.2 kg (123 lb 14.4 oz)   Height: 175.1 cm (68.94\")   PainSc:   7   PainLoc: Back               ECOG score: 0         PHQ-9 Total Score:         Result Review :   The following data was reviewed by: Joshua Dick MD on 08/24/2023:  Lab Results   Component Value Date    HGB 12.0 (L) 05/03/2024    HCT 35.3 (L) 05/03/2024    MCV 85.9 05/03/2024     05/03/2024    WBC 6.16 " 05/03/2024    NEUTROABS 3.22 05/03/2024    LYMPHSABS 2.26 05/03/2024    MONOSABS 0.47 05/03/2024    EOSABS 0.18 05/03/2024    BASOSABS 0.02 05/03/2024     Lab Results   Component Value Date    GLUCOSE 122 (H) 05/03/2024    BUN 11 05/03/2024    CREATININE 1.01 05/03/2024     05/03/2024    K 3.6 05/03/2024     (H) 05/03/2024    CO2 25.3 05/03/2024    CALCIUM 7.9 (L) 05/03/2024    PROTEINTOT 5.9 (L) 05/03/2024    ALBUMIN 3.7 05/03/2024    BILITOT 0.4 05/03/2024    ALKPHOS 44 05/03/2024    AST 12 05/03/2024    ALT 6 05/03/2024     Lab Results   Component Value Date    IRON 50 (L) 12/17/2021    LABIRON 22 12/17/2021    TRANSFERRIN 156 (L) 12/17/2021    TIBC 232 (L) 12/17/2021     Lab Results   Component Value Date    FERRITIN 120.10 12/17/2021    XMFOUNNT50 412 05/16/2019    FOLATE 9.4 05/16/2019     Lab Results   Component Value Date    PSA 16.500 (H) 04/05/2024          Assessment and Plan    Diagnoses and all orders for this visit:    1. Prostate cancer (Primary)  -     Ambulatory Referral to Radiation Oncology    2. Prostate cancer metastatic to bone  -     PSA DIAGNOSTIC; Future  -     Ambulatory Referral to Radiation Oncology    Other orders  -     Cancel: denosumab (XGEVA) injection 120 mg                67-year-old male with metastatic prostate cancer, completed 6 cycles of docetaxel on 9/15/2023, Lupron Q6 months, Pt also receiving Xgeva and he is here prior to next Xgeva infusion  -next Lupron injection due on 6/18/2024  -PSA from 12/15/23 was 8 up from 3.1 (in 10/2023)  -Discussed result of NM bone scan, CT CAP from 1/24/2024 which revealed interval enlargement T10 vertebral body metastasis, no evidence of intrathoracic metastatic disease, slight worsening appearance of osteoblastic schedule static disease, notable at T10, T8, and T5, no evidence of intra-abdominal or pelvic metastatic disease.  -Patient has undergone radiation therapy 10 fractions to thoracic spine from T9-T11 in 2021.  -Also  shared plan to reattempt treatment with Zytiga and prednisone, patient reports some nausea vomiting which we will attempt to manage with antiemetics.  -Orders placed for Zytiga and prednisone, also provided prescriptions for Compazine and Zofran for patient to begin once he begins treatment with these medications.  -pt began zytiga/prednisone on 2/2/2024, pt tolerating it well, no evidence of toxicity on labs, recommend he continue at current dose    -discussed results of restaging imaging from 4/24/2024 which showed stable radiotracer uptake involving sclerotic osseous metastasis of the left frontal bone above left orbit and at T10, no findings of osseous progression, CT chest abdomen pelvis revealed stable sclerotic osseous metastasis, no suspicious adenopathy, postsurgical changes of prostatectomy.    -Patient's PSA level has remained stable, PSA level from 4/5/2024 was 16.5, only slightly increased from 15.2 in March 2024.    -Given presence of continued osseous metastasis, patient with continued pain in area of metastasis, and since he has previously received radiation in this area, will refer him to [facility] medicine for sideration for meet with [place] [address].    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     Hypomagnesemia  -Mag level of 1.8  -pt prescribed mag oxide 400 mg PO QD   -will recheck at next clinic appointment    Low Vit D  -Vit level of 27.7  -prescribed Vit D 50K IU once weekly x 8 weeks      -Plan for patient follow-up in 8 weeks with CBC, CMP, Magnesium, PSA and assessment for response to Zytiga and prednisone.  And follow-up regarding referral to [facility].  Plan to rescan pt in 6/2024.    Please note that portions of this note were completed with a voice recognition  program.      I spent approximately 30 minutes caring for Semaj today which included review medical record, reviewing lab work, care coordination, and medical documentation.    Patient was given instructions and counseling regarding his condition or for health maintenance advice. Please see specific information pulled into the AVS if appropriate.     Joshua Dick MD    5/3/2024    Electronically signed by Joshua Dick MD, 05/03/24, 12:56 PM EDT.

## 2024-05-06 ENCOUNTER — TELEPHONE (OUTPATIENT)
Dept: ONCOLOGY | Facility: HOSPITAL | Age: 69
End: 2024-05-06
Payer: MEDICARE

## 2024-05-06 ENCOUNTER — SPECIALTY PHARMACY (OUTPATIENT)
Dept: PHARMACY | Facility: HOSPITAL | Age: 69
End: 2024-05-06
Payer: MEDICARE

## 2024-05-09 DIAGNOSIS — C79.51 PROSTATE CANCER METASTATIC TO BONE: ICD-10-CM

## 2024-05-09 DIAGNOSIS — C61 PROSTATE CANCER METASTATIC TO BONE: ICD-10-CM

## 2024-05-10 RX ORDER — HYDROCODONE BITARTRATE AND ACETAMINOPHEN 10; 325 MG/1; MG/1
1 TABLET ORAL EVERY 4 HOURS PRN
Qty: 180 TABLET | Refills: 0 | Status: SHIPPED | OUTPATIENT
Start: 2024-05-10

## 2024-05-20 ENCOUNTER — TELEPHONE (OUTPATIENT)
Dept: ONCOLOGY | Facility: HOSPITAL | Age: 69
End: 2024-05-20
Payer: MEDICARE

## 2024-05-20 NOTE — TELEPHONE ENCOUNTER
Caller: Semaj Medina Osborne    Relationship: Self    Best call back number: [phone]    What is the best time to reach you: ANY    Who are you requesting to speak with (clinical staff, provider,  specific staff member): N/A    What was the call regarding: PATIENT RECEIVED A CALL FROM OFFICE BUT NO VM LEFT.    Is it okay if the provider responds through MyChart: NO

## 2024-05-22 ENCOUNTER — TELEPHONE (OUTPATIENT)
Dept: ONCOLOGY | Facility: HOSPITAL | Age: 69
End: 2024-05-22

## 2024-05-22 NOTE — TELEPHONE ENCOUNTER
Caller: Semaj Medina Osborne    Relationship: Self    Best call back number: [phone]    What is the best time to reach you: ANYTIME    Who are you requesting to speak with (clinical staff, provider,  specific staff member): PHARMACY    What was the call regarding: PATIENT NEEDS FOR THE KASIA IN THE PHARMACY TO CALL HIM BACK. ITS REGARDING HIS CHEMO MED REFILL .

## 2024-05-23 ENCOUNTER — SPECIALTY PHARMACY (OUTPATIENT)
Dept: PHARMACY | Facility: HOSPITAL | Age: 69
End: 2024-05-23
Payer: MEDICARE

## 2024-05-23 NOTE — PROGRESS NOTES
Specialty Pharmacy Patient Management Program  Oncology Refill Outreach      Semaj is a 68 y.o. male contacted today regarding refills of his medication(s).    Specialty medication(s) and dose(s) confirmed: Completed refill outreach. [facility] will mail abiraterone acetate (ZYTIGA) 500 MG tablet and predniSONE (DELTASONE) 5 MG tablet once ready to dispense.    Other medications being refilled: N/A    Refill Questions      Flowsheet Row Most Recent Value   Changes to allergies? No   Changes to medications? No   New conditions or infections since last clinic visit No   Unplanned office visit, urgent care, ED, or hospital admission in the last 4 weeks  No   How does patient/caregiver feel medication is working? Good   Financial problems or insurance changes  No   Since the previous refill, were any specialty medication doses or scheduled injections missed or delayed?  No   Does this patient require a clinical escalation to a pharmacist? No            Delivery Questions      Flowsheet Row Most Recent Value   Delivery method [employer]   Delivery address verified with patient/caregiver? Yes   Number of medications in delivery 2   Medication(s) being filled and delivered Prednisone, Abiraterone Acetate   Doses left of specialty medications 5   Copay verified? Yes   Copay amount Pt consent $4.50 for Zytiga and Zero for Predinsone   Copay form of payment Credit/debit on file                   Follow-up: 21 days     Oralia Castillo  Care Coordinator, [facility]  5/23/2024  16:15 EDT

## 2024-05-24 ENCOUNTER — OFFICE VISIT (OUTPATIENT)
Dept: RADIATION ONCOLOGY | Facility: HOSPITAL | Age: 69
End: 2024-05-24
Payer: MEDICARE

## 2024-05-24 VITALS
BODY MASS INDEX: 18.66 KG/M2 | SYSTOLIC BLOOD PRESSURE: 130 MMHG | WEIGHT: 126.1 LBS | TEMPERATURE: 98.9 F | OXYGEN SATURATION: 96 % | HEART RATE: 67 BPM | RESPIRATION RATE: 18 BRPM | DIASTOLIC BLOOD PRESSURE: 86 MMHG

## 2024-05-24 DIAGNOSIS — G89.3 CANCER-RELATED PAIN: ICD-10-CM

## 2024-05-24 DIAGNOSIS — C61 PROSTATE CANCER METASTATIC TO BONE: ICD-10-CM

## 2024-05-24 DIAGNOSIS — R97.21 RISING PSA FOLLOWING TREATMENT FOR MALIGNANT NEOPLASM OF PROSTATE: ICD-10-CM

## 2024-05-24 DIAGNOSIS — Z90.79 HISTORY OF PROSTATECTOMY: ICD-10-CM

## 2024-05-24 DIAGNOSIS — Z79.818 ANDROGEN DEPRIVATION THERAPY: ICD-10-CM

## 2024-05-24 DIAGNOSIS — Z92.3 PERSONAL HISTORY OF RADIATION THERAPY: ICD-10-CM

## 2024-05-24 DIAGNOSIS — C79.51 PROSTATE CANCER METASTATIC TO BONE: ICD-10-CM

## 2024-05-24 DIAGNOSIS — C79.51 SECONDARY MALIGNANT NEOPLASM OF BONE: Primary | ICD-10-CM

## 2024-05-24 PROCEDURE — G0463 HOSPITAL OUTPT CLINIC VISIT: HCPCS | Performed by: NURSE PRACTITIONER

## 2024-05-24 NOTE — PROGRESS NOTES
Follow Up Office Visit      Encounter Date: 05/24/2024   Patient Name: Semaj Medina  YOB: 1955   Medical Record Number: [id number]   Primary Diagnosis: Secondary malignant neoplasm of bone [C79.51]     Radiation Completion Date: 12/9/2021 T9-T11 spine     Chief Complaint:    Chief Complaint   Patient presents with    Follow-up    Prostate Cancer     Prostate cancer metastatic to bone       Oncology/Hematology History Overview Note     Metastatic Castration resistant prostate cancer:    Patient was initially diagnosed with prostate cancer in 2015.  On 6/4/2015 he underwent radical prostatectomy and LN dissection which showed a [place] 4+3 = 7 prostate cancer.  There were several high risk features such as extraprostatic extension, seminal vesicle invasion, positive margins at the bladder neck and right and left seminal vesicle.  Lymph vascular invasion was indeterminate.  Perineural invasion was not commented on. Final pathology [id number] R1.  He was treated with adjuvant radiation by Dr. Stanton.    According to records the patient likely started Lupron in December 2017 when his PSA started to rise.  The patient took a break in August 2019.  He states he was not aware he was to continue.  He restarted Lupron on 11/26/2019 after it was noted that his PSA chago from 0.46 up to 2.55 on 11/8/2019.  Unfortunately his PSA continued to rise after that to 4.54 on 3/10/2020.    - restarted Lupron on 11/26/20  - Started Zytiga on 8/27/20.  8/31/2020 PSA 17.95     - Started Xtandi in May 2021 due to intolerance of Zytiga even at the lowest dose  - PSA rising in April 2023 to 2.85, doubling time 3.4 months  - started Docetaxel May 2023    PSMA PET 5/2/23:   1.  Radiotracer avid supra clavicular, thoracic and abdominal adenopathy and sclerotic lesion within T10 vertebral body likely representing metastatic disease.  2.  Indeterminate lesion within the left ilium demonstrating mild uptake.   3.   Asymmetric sclerosis within the superior lateral aspect the left orbit which does not demonstrate significant uptake above that of the right orbit. Findings are nonspecific with  differential considerations including but not limited to metastatic disease, fibrous dysplasia versus Paget's disease.   4.  Sub-6 mm nodule within the left lower lobe.  5.  Scattered indeterminate hypodense lesions throughout the liver which do not demonstrate radiotracer uptake above that of the adjacent liver, best best visualized on recent multiphase CT abdomen and pelvis 04/17/2023. Please refer to this dictation for further information.       Prostate cancer metastatic to bone   12/11/2020 -  Chemotherapy    OP SUPPORTIVE Leuprolide 45 mg Q6M     3/23/2021 -  Chemotherapy    OP SUPPORTIVE Denosumab (Xgeva) Q28D     4/15/2021 - 5/13/2021 Chemotherapy    OP PROSTATE Enzalutamide     5/19/2021 Initial Diagnosis    Prostate cancer metastatic to bone (CMS/HCC)     6/2/2023 - 9/15/2023 Biopsy    OP PROSTATE DOCEtaxel  Plan Provider: Natalya Jack MD PhD  Treatment goal: Palliative  Line of treatment: [No plan line of treatment]     12/19/2023 - 12/19/2023 Chemotherapy    OP PROSTATE Apalutamide     1/26/2024 Biopsy    OP PROSTATE Abiraterone / PredniSONE  Plan Provider: Joshua Dick MD  Treatment goal: Control  Line of treatment: [No plan line of treatment]     Prostate cancer   6/16/2021 Initial Diagnosis    Prostate cancer (HCC)     6/2/2023 -  Chemotherapy    OP CENTRAL VENOUS ACCESS DEVICE ACCESS, CARE, AND MAINTENANCE (CVAD)     Secondary malignant neoplasm of bone   11/4/2021 Initial Diagnosis    Secondary malignant neoplasm of bone (HCC)     11/23/2021 - 12/9/2021 Radiation    Radiation OncologyTreatment Course:  Semaj Medina received 3000 cGy in 10 fractions to the T9-T11 spine.          History of Present Illness: Semaj Medina is a 68 y.o. male who returns to [facility] for routine 3 month follow-up of  his metastatic prostate cancer. His recent PSA on 5/3/24 is 20.10 ng/mL. He reports feeling well overall with no new complaints or concerns. He denies any significant changes since his prior visit. Urinary function remains unchanged. He does occasionally have small-volume leakage with coughing that remains unchanged. He is on Lupron, Zytegia and prednisone. Given his rising PSA Dr. Dick referred him to [facility] for consideration of  radium 223. He reports seeing Dr. Salazar and is scheduled for PSMA PET scan next week. He continues to have lower back pain. Previously this radiated down bilateral lower extremities and he reports that [facility]. Now the pain is located to his low back and does not involve the legs. The pain is an aching pain that is worse in the mornings and relieved with his pain medication. He infrequently experiences pain in thoracic spine, stating that when he does it's a dull pain that comes and goes.      Subjective      Review of Systems: Review of Systems   Constitutional:  Positive for appetite change (Decreased). Negative for fatigue and unexpected weight change.   HENT:  Positive for hearing loss (Wears hearing aids.). Negative for dental problem (Improving, waiting on dentures.), sore throat and trouble swallowing.    Eyes:  Positive for visual disturbance (Cataracts removed 2 years ago, decreased vision in right eye, ongoing).   Respiratory:  Positive for cough (dry). Negative for shortness of breath.    Gastrointestinal:  Negative for anal bleeding, blood in stool, constipation, diarrhea, nausea and rectal pain.   Endocrine: Positive for heat intolerance (Rare, tolerable.).   Genitourinary:  Negative for decreased urine volume, difficulty urinating, dysuria, frequency, hematuria and urgency.        Noc x2  Normal stream  Occasional MATEO, ongoing   Musculoskeletal:  Positive for arthralgias (Pain in feet, ongoing), back pain (Worse in the morning, takes pain  medications with relief.  Ongoing 6/10) and neck pain (Occasionally, primarily noted on right side.  Noted for several months.). Negative for gait problem, joint swelling and neck stiffness.   Skin:  Negative for color change and rash.   Neurological:  Positive for headaches (Occasional, ongoing). Negative for dizziness and weakness.   Psychiatric/Behavioral:  Negative for agitation and sleep disturbance.        The following portions of the patient's history were reviewed and updated as appropriate: allergies, current medications, past family history, past medical history, past social history, past surgical history and problem list.    Medications:     Current Outpatient Medications:     abiraterone acetate (ZYTIGA) 500 MG tablet, Take 2 tablets by mouth Daily., Disp: 60 tablet, Rfl: 11    Calcium Carbonate-Vitamin D (Calcium Plus Vitamin D) 500-1.25 MG-MCG capsule, Take 1 capsule by mouth Daily., Disp: 30 capsule, Rfl: 5    folic acid (FOLVITE) 1 MG tablet, , Disp: , Rfl:     HYDROcodone-acetaminophen (NORCO)  MG per tablet, Take 1 tablet by mouth Every 4 (Four) Hours As Needed for Moderate Pain., Disp: 180 tablet, Rfl: 0    ibuprofen (ADVIL,MOTRIN) 800 MG tablet, TAKE 1 TABLET BY MOUTH EVERY 6 - 8 HOURS AS NEEDED FOR PAIN, Disp: , Rfl:     leuprolide (Lupron [place], 3-Month,) 22.5 MG injection, 22.5 mg., Disp: , Rfl:     lisinopril (PRINIVIL,ZESTRIL) 40 MG tablet, Take 1 tablet by mouth Daily., Disp: 90 tablet, Rfl: 0    magnesium oxide (MAG-OX) 400 MG tablet, Take 1 tablet by mouth Daily., Disp: 30 tablet, Rfl: 0    predniSONE (DELTASONE) 5 MG tablet, Take 1 tablet by mouth 2 (Two) Times a Day., Disp: 60 tablet, Rfl: 11    ondansetron (ZOFRAN) 8 MG tablet, Take 1 tablet by mouth Every 8 (Eight) Hours As Needed for Nausea or Vomiting. (Patient not taking: Reported on 5/24/2024), Disp: 90 tablet, Rfl: 3    prochlorperazine (COMPAZINE) 10 MG tablet, Take 1 tablet by mouth Every 6 (Six) Hours As Needed for  Nausea or Vomiting. (Patient not taking: Reported on 5/24/2024), Disp: 30 tablet, Rfl: 5    Allergies:   No Known Allergies    Patient Smoking History:   Social History     Tobacco Use   Smoking Status Every Day    Current packs/day: 0.25    Average packs/day: 0.3 packs/day for 53.6 years (13.4 ttl pk-yrs)    Types: Cigarettes    Start date: 10/21/1970    Passive exposure: Past   Smokeless Tobacco Never   Tobacco Comments    INSTRUCTED NO SMOKING 24 HR PRIOR TO SURGERY     LAST SMOKED AT 200AM 05-22-23       Measures:  PHQ-9 Total Score: 0   Quality of Life: 100 - Full Activity   ECOG score: 0  ECOG: (0) Fully active, able to carry on all predisease performance without restriction  Pain: (on a scale of 0-10)   Pain Score    05/24/24 1400   PainSc:   6   PainLoc: Back   Semaj Medina reports a pain score of 6.  Given his pain assessment as noted, treatment options were discussed and the following options were decided upon as a follow-up plan to address the patient's pain: continuation of current treatment plan for pain.     Objective     Physical Exam:   Vital Signs:   Vitals:    05/24/24 1400   BP: 130/86   Pulse: 67   Resp: 18   Temp: 98.9 °F (37.2 °C)   TempSrc: Temporal   SpO2: 96%   Weight: 57.2 kg (126 lb 1.7 oz)   PainSc:   6   PainLoc: Back     Body mass index is 18.66 kg/m².   Wt Readings from Last 3 Encounters:   05/24/24 57.2 kg (126 lb 1.7 oz)   05/03/24 56.2 kg (123 lb 14.4 oz)   03/08/24 56.6 kg (124 lb 12.5 oz)       Physical Exam  Vitals reviewed.   Constitutional:       General: He is not in acute distress.     Appearance: Normal appearance. He is normal weight. He is not ill-appearing.       HENT:      Head: Normocephalic and atraumatic.      Mouth/Throat:      Mouth: Mucous membranes are moist.      Pharynx: Oropharynx is clear. No oropharyngeal exudate or posterior oropharyngeal erythema.   Eyes:      Conjunctiva/sclera: Conjunctivae normal.      Pupils: Pupils are equal, round, and reactive  to light.   Cardiovascular:      Rate and Rhythm: Normal rate and regular rhythm.      Pulses: Normal pulses.      Heart sounds: Normal heart sounds.   Pulmonary:      Effort: Pulmonary effort is normal. No respiratory distress.      Breath sounds: Normal breath sounds.   Musculoskeletal:         General: Normal range of motion.      Cervical back: Normal range of motion.   Skin:     General: Skin is warm and dry.   Neurological:      General: No focal deficit present.      Mental Status: He is alert and oriented to person, place, and time. Mental status is at baseline.   Psychiatric:         Mood and Affect: Mood normal.         Behavior: Behavior normal.       Result Review: I independently reviewed the following data.  -- PSA DIAGNOSTIC (05/03/2024 10:25)   -- NM Bone Scan Whole Body (04/24/2024 13:33)   -- CT Chest With Contrast Diagnostic (04/24/2024 11:56)   -- CT Abdomen Pelvis With Contrast (04/24/2024 11:56)   -- SCANNED PATHOLOGY (01/30/2024)   -- SCANNED PATHOLOGY (01/30/2024)   -- CT Abdomen Pelvis With Contrast (01/23/2024 16:30)   -- CT Chest With Contrast Diagnostic (01/23/2024 16:30)   -- NM Bone Scan Whole Body (01/23/2024 14:56)     Imaging: NM Bone Scan Whole Body    Result Date: 4/25/2024  Stable radiotracer uptake involving sclerotic osseous metastasis at left frontal bone above left orbit and at the T10. No findings of osseous progression.    Electronically Signed By-Duarte Cox MD On:4/25/2024 3:57 PM      CT Chest With Contrast Diagnostic    Result Date: 4/25/2024  Impression: 1. Stable sclerotic osseous metastasis. 2. No suspicious adenopathy. 2. Postsurgical changes of prostatectomy. 3. Bladder wall thickening similar to prior study, correlation with urinalysis may be helpful to exclude superimposed cystitis. 4. Emphysema, aortic atherosclerosis and additional chronic findings above.   Electronically Signed By-Duarte Cox MD On:4/25/2024 3:53 PM      CT Abdomen Pelvis With  Contrast    Result Date: 4/25/2024  Impression: 1. Stable sclerotic osseous metastasis. 2. No suspicious adenopathy. 2. Postsurgical changes of prostatectomy. 3. Bladder wall thickening similar to prior study, correlation with urinalysis may be helpful to exclude superimposed cystitis. 4. Emphysema, aortic atherosclerosis and additional chronic findings above.   Electronically Signed By-Duarte Cox MD On:4/25/2024 3:53 PM        Labs:   WBC   Date Value Ref Range Status   05/03/2024 6.16 3.40 - 10.80 10*3/mm3 Final   12/11/2020 6.61 4.80 - 10.80 10*3/uL Final     Hemoglobin   Date Value Ref Range Status   05/03/2024 12.0 (L) 13.0 - 17.7 g/dL Final     Hematocrit   Date Value Ref Range Status   05/03/2024 35.3 (L) 37.5 - 51.0 % Final     Platelets   Date Value Ref Range Status   05/03/2024 351 140 - 450 10*3/mm3 Final     TSH   Date Value Ref Range Status   05/16/2019 1.350 0.270 - 4.200 m[iU]/L Final      PSA   Date Value Ref Range Status   05/03/2024 20.100 (H) 0.000 - 4.000 ng/mL Final   04/05/2024 16.500 (H) 0.000 - 4.000 ng/mL Final   03/08/2024 15.200 (H) 0.000 - 4.000 ng/mL Final   01/26/2024 18.600 (H) 0.000 - 4.000 ng/mL Final   12/19/2023 9.670 (H) 0.000 - 4.000 ng/mL Final   12/15/2023 8.050 (H) 0.000 - 4.000 ng/mL Final   10/11/2023 3.180 0.000 - 4.000 ng/mL Final   08/24/2023 4.570 (H) 0.000 - 4.000 ng/mL Final   08/03/2023 4.830 (H) 0.000 - 4.000 ng/mL Final   06/21/2023 5.580 (H) 0.000 - 4.000 ng/mL Final   04/18/2023 2.850 0.000 - 4.000 ng/mL Final   02/17/2023 1.900 0.000 - 4.000 ng/mL Final   12/06/2022 1.340 0.000 - 4.000 ng/mL Final   09/13/2022 0.697 0.000 - 4.000 ng/mL Final   06/17/2022 0.593 0.000 - 4.000 ng/mL Final   03/18/2022 1.150 0.000 - 4.000 ng/mL Final   01/13/2022 5.470 (H) 0.000 - 4.000 ng/mL Final   12/17/2021 14.200 (H) 0.000 - 4.000 ng/mL Final   10/14/2021 11.900 (H) 0.000 - 4.000 ng/mL Final   09/16/2021 10.200 (H) 0.000 - 4.000 ng/mL Final   06/17/2021 5.200 (H) 0.000 - 4.000  ng/mL Final   03/05/2021 2.25 0.00 - 4.00 ng/mL Final   12/11/2020 6.81 (H) 0.00 - 4.00 ng/mL Final   11/05/2020 7.17 (H) 0.00 - 4.00 ng/mL Final   08/31/2020 17.95 (H) 0.00 - 4.00 ng/mL Final   06/26/2020 12.13 (H) 0.00 - 4.00 ng/mL Final   06/19/2020 11.45 (H) 0.00 - 4.00 ng/mL Final   03/10/2020 4.54 (H) 0.00 - 4.00 ng/mL Final   11/08/2019 2.55 0.00 - 4.00 ng/mL Final   08/05/2019 0.46 0.00 - 4.00 ng/mL Final   05/06/2019 0.35 0.00 - 4.00 ng/mL Final   01/31/2019 0.40 0.00 - 4.00 ng/mL Final     Assessment / Plan      Impression: Semaj Medina is a pleasant 68 y.o. male with initially with diagnosis of prostate cancer, pT3b pN0 [place] 4+3= 7, managed surgically with prostatectomy in 2015. He had positive margins at the bladder neck and received adjuvant radiotherapy at that time with Dr. Stanton. In 2017 his PSA began to rise and he was started on Lupron and in 2020 he was started on Zytiga. He tolerated Zytiga poorly and his dose was reduced multiple times. Restaging imaging in 2021 demonstrated a T10 lesion which was resulting in pain and limited function. He received palliative radiotherapy to vertebral metastases at T9-T11, completed on 12/9/2021 (30 Gy/10 fractions). In May 2023 his PSA was rising. His PSMA PET scan on 5/2/2023 demonstrated metastatic involvement throughout the neck, thorax, abdomen and pelvis. He was started on docetaxel, which he completed 6 cycles in September 2023. He continues to receive Lupron 45 mg every 6 months and Xgeva. He is also now taking Zytiga and prednisone, which was started after recent imaging. NM Bone Scan 1/23/2024 demonstrate an interval enlargement of T10 vertebral body metastasis. CT CAP on 1/23/2024 demonstrates slight worsening appearance of osteoblastic skeletal metastatic disease, notably at T10, T8 and T5. Within the pelvis there is stable sclerotic lesions. Repeat CT CAP on 4/24/24 demonstrates stable sclerotic osseous metastasis. NM Bone Scan on 4/24/24  demonstrates stable radiotracer uptake involving sclerotic osseous metastasis at left frontal bone above left orbit and at the T10. No findings of osseous progression. He continues to have a rising PSA with PSA of 20.1 ng/mL on 5/3/24. Despite these results he is clinically doing well overall with essentially no changes in urinary function. AUA Symptom Score today of 2. Dr. Dick referred him to [facility] for consideration of radium 223 and patient reports seeing Dr. Salazar and is now scheduled for PSMA PET/CT scan next week and he is tentatively planned to start receiving Pluvicto per Dr. Salazar thereafter. Records not available in Care Everywhere at time of today's visit and have been requested. Discussed that we will review his PSMA PET/CT once completed. Discussed that after he has received Pluvicto we will assess his treatment response and consider radiotherapy to any residual disease if indicated.     Cancer-related pain: he continues to experience lumbar pain without radiculopathy that is reasonably well controlled with Norco 10/325 mg prescribed by Dr. Dick. He infrequently experiences any thoracic spine pain. At visit on 2/19/24 I discussed option of continuing to monitor versus pursuing repeat radiotherapy to thoracic spine lesions, specifically the enlarging T10 lesion, and he opted for continued monitoring. He will contact our office in the event that his thoracic spine pain worsens.      Assessment/Plan:   Diagnoses and all orders for this visit:    1. Secondary malignant neoplasm of bone (Primary)    2. Prostate cancer metastatic to bone    3. Personal history of radiation therapy    4. History of prostatectomy    5. Cancer-related pain    6. Androgen deprivation therapy    7. Rising PSA following treatment for malignant neoplasm of prostate      Follow Up:   Patient reports being scheduled for PSMA PET/CT next week. Clinic staff to contact Dr. Salazar's office to inquire if PET can be done in [facility].    Return for follow-up in 3 months. PSA being ordered by Dr. Dick. Anticipate he will undergo restaging scans after receiving Pluvicto with Dr. Salazar. Follow-up can be pushed out until after scans if needed.   Follow-up with Dr. Salazar on 6/3/24.  Follow-up with Dr. Dick on 6/18/24.    Return in about 3 months (around 8/24/2024) for Office Visit.  Semaj Medina was encouraged to contact me in the interim with any questions or concerns regarding his care.      CHRIS Juarez  Radiation Oncology  [facility]    This document has been signed by CHRIS Alston on May 24, 2024 15:40 EDT

## 2024-05-24 NOTE — Clinical Note
Please contact Dr. Salazar's office and inquire if PSMA PET/CT scan can be performed in [facility]. Please request records from [facility].

## 2024-05-28 ENCOUNTER — TRANSCRIBE ORDERS (OUTPATIENT)
Dept: ADMINISTRATIVE | Facility: HOSPITAL | Age: 69
End: 2024-05-28
Payer: MEDICARE

## 2024-05-28 DIAGNOSIS — C61 PROSTATE CANCER: Primary | ICD-10-CM

## 2024-05-31 ENCOUNTER — HOSPITAL ENCOUNTER (OUTPATIENT)
Dept: ONCOLOGY | Facility: HOSPITAL | Age: 69
Discharge: HOME | End: 2024-05-31
Payer: MEDICARE

## 2024-05-31 VITALS
DIASTOLIC BLOOD PRESSURE: 89 MMHG | OXYGEN SATURATION: 100 % | HEART RATE: 68 BPM | SYSTOLIC BLOOD PRESSURE: 154 MMHG | TEMPERATURE: 98.7 F | RESPIRATION RATE: 16 BRPM

## 2024-05-31 DIAGNOSIS — C79.51 PROSTATE CANCER METASTATIC TO BONE: Primary | ICD-10-CM

## 2024-05-31 DIAGNOSIS — C61 PROSTATE CANCER METASTATIC TO BONE: Primary | ICD-10-CM

## 2024-05-31 DIAGNOSIS — C61 PROSTATE CANCER: ICD-10-CM

## 2024-05-31 LAB
ALBUMIN SERPL-MCNC: 3.6 G/DL (ref 3.5–5.2)
ALBUMIN/GLOB SERPL: 1.5 G/DL
ALP SERPL-CCNC: 47 U/L (ref 39–117)
ALT SERPL W P-5'-P-CCNC: 6 U/L (ref 1–41)
ANION GAP SERPL CALCULATED.3IONS-SCNC: 1.3 MMOL/L (ref 5–15)
AST SERPL-CCNC: 13 U/L (ref 1–40)
BASOPHILS # BLD AUTO: 0.01 10*3/MM3 (ref 0–0.2)
BASOPHILS NFR BLD AUTO: 0.1 % (ref 0–1.5)
BILIRUB SERPL-MCNC: 0.3 MG/DL (ref 0–1.2)
BUN SERPL-MCNC: 11 MG/DL (ref 8–23)
BUN/CREAT SERPL: 11.1 (ref 7–25)
CALCIUM SPEC-SCNC: 9.2 MG/DL (ref 8.6–10.5)
CHLORIDE SERPL-SCNC: 112 MMOL/L (ref 98–107)
CO2 SERPL-SCNC: 24.7 MMOL/L (ref 22–29)
CREAT SERPL-MCNC: 0.99 MG/DL (ref 0.76–1.27)
DEPRECATED RDW RBC AUTO: 47.1 FL (ref 37–54)
EGFRCR SERPLBLD CKD-EPI 2021: 83 ML/MIN/1.73
EOSINOPHIL # BLD AUTO: 0.02 10*3/MM3 (ref 0–0.4)
EOSINOPHIL NFR BLD AUTO: 0.3 % (ref 0.3–6.2)
ERYTHROCYTE [DISTWIDTH] IN BLOOD BY AUTOMATED COUNT: 14.8 % (ref 12.3–15.4)
GLOBULIN UR ELPH-MCNC: 2.4 GM/DL
GLUCOSE SERPL-MCNC: 159 MG/DL (ref 65–99)
HCT VFR BLD AUTO: 33.5 % (ref 37.5–51)
HGB BLD-MCNC: 11.1 G/DL (ref 13–17.7)
IMM GRANULOCYTES # BLD AUTO: 0.01 10*3/MM3 (ref 0–0.05)
IMM GRANULOCYTES NFR BLD AUTO: 0.1 % (ref 0–0.5)
LYMPHOCYTES # BLD AUTO: 1.09 10*3/MM3 (ref 0.7–3.1)
LYMPHOCYTES NFR BLD AUTO: 15.4 % (ref 19.6–45.3)
MAGNESIUM SERPL-MCNC: 1.9 MG/DL (ref 1.6–2.4)
MCH RBC QN AUTO: 28.8 PG (ref 26.6–33)
MCHC RBC AUTO-ENTMCNC: 33.1 G/DL (ref 31.5–35.7)
MCV RBC AUTO: 86.8 FL (ref 79–97)
MONOCYTES # BLD AUTO: 0.32 10*3/MM3 (ref 0.1–0.9)
MONOCYTES NFR BLD AUTO: 4.5 % (ref 5–12)
NEUTROPHILS NFR BLD AUTO: 5.63 10*3/MM3 (ref 1.7–7)
NEUTROPHILS NFR BLD AUTO: 79.6 % (ref 42.7–76)
PHOSPHATE SERPL-MCNC: 3.6 MG/DL (ref 2.5–4.5)
PLATELET # BLD AUTO: 363 10*3/MM3 (ref 140–450)
PMV BLD AUTO: 9.2 FL (ref 6–12)
POTASSIUM SERPL-SCNC: 4.4 MMOL/L (ref 3.5–5.2)
PROT SERPL-MCNC: 6 G/DL (ref 6–8.5)
RBC # BLD AUTO: 3.86 10*6/MM3 (ref 4.14–5.8)
SODIUM SERPL-SCNC: 138 MMOL/L (ref 136–145)
WBC NRBC COR # BLD AUTO: 7.08 10*3/MM3 (ref 3.4–10.8)

## 2024-05-31 PROCEDURE — 36591 DRAW BLOOD OFF VENOUS DEVICE: CPT

## 2024-05-31 PROCEDURE — 80053 COMPREHEN METABOLIC PANEL: CPT | Performed by: INTERNAL MEDICINE

## 2024-05-31 PROCEDURE — 83735 ASSAY OF MAGNESIUM: CPT | Performed by: INTERNAL MEDICINE

## 2024-05-31 PROCEDURE — 85025 COMPLETE CBC W/AUTO DIFF WBC: CPT | Performed by: INTERNAL MEDICINE

## 2024-05-31 PROCEDURE — 25010000002 DENOSUMAB 120 MG/1.7ML SOLUTION: Performed by: NURSE PRACTITIONER

## 2024-05-31 PROCEDURE — 25010000002 HEPARIN LOCK FLUSH PER 10 UNITS: Performed by: INTERNAL MEDICINE

## 2024-05-31 PROCEDURE — 96372 THER/PROPH/DIAG INJ SC/IM: CPT

## 2024-05-31 PROCEDURE — 84100 ASSAY OF PHOSPHORUS: CPT | Performed by: INTERNAL MEDICINE

## 2024-05-31 RX ORDER — HEPARIN SODIUM (PORCINE) LOCK FLUSH IV SOLN 100 UNIT/ML 100 UNIT/ML
500 SOLUTION INTRAVENOUS AS NEEDED
Status: DISCONTINUED | OUTPATIENT
Start: 2024-05-31 | End: 2024-06-01 | Stop reason: HOSPADM

## 2024-05-31 RX ORDER — SODIUM CHLORIDE 0.9 % (FLUSH) 0.9 %
20 SYRINGE (ML) INJECTION AS NEEDED
OUTPATIENT
Start: 2024-05-31

## 2024-05-31 RX ORDER — HEPARIN SODIUM (PORCINE) LOCK FLUSH IV SOLN 100 UNIT/ML 100 UNIT/ML
500 SOLUTION INTRAVENOUS AS NEEDED
OUTPATIENT
Start: 2024-05-31

## 2024-05-31 RX ORDER — SODIUM CHLORIDE 0.9 % (FLUSH) 0.9 %
20 SYRINGE (ML) INJECTION AS NEEDED
Status: DISCONTINUED | OUTPATIENT
Start: 2024-05-31 | End: 2024-06-01 | Stop reason: HOSPADM

## 2024-05-31 RX ADMIN — HEPARIN 500 UNITS: 100 SYRINGE at 10:00

## 2024-05-31 RX ADMIN — Medication 20 ML: at 09:56

## 2024-05-31 RX ADMIN — DENOSUMAB 120 MG: 120 INJECTION SUBCUTANEOUS at 11:09

## 2024-06-10 ENCOUNTER — TELEPHONE (OUTPATIENT)
Dept: ONCOLOGY | Facility: HOSPITAL | Age: 69
End: 2024-06-10
Payer: MEDICARE

## 2024-06-10 NOTE — TELEPHONE ENCOUNTER
Called and spoke to pt.  Requested pt get Testosterone lab done prior to their appt on 6/28.  Pt states they have a pet scan and will do their lab work that same day.    PET scan is scheduled for  6/13.

## 2024-06-13 ENCOUNTER — HOSPITAL ENCOUNTER (OUTPATIENT)
Dept: PET IMAGING | Facility: HOSPITAL | Age: 69
Discharge: HOME | End: 2024-06-13
Payer: MEDICARE

## 2024-06-13 ENCOUNTER — HOSPITAL ENCOUNTER (OUTPATIENT)
Dept: ONCOLOGY | Facility: HOSPITAL | Age: 69
Discharge: HOME | End: 2024-06-13
Admitting: INTERNAL MEDICINE
Payer: MEDICARE

## 2024-06-13 DIAGNOSIS — C79.51 PROSTATE CANCER METASTATIC TO BONE: ICD-10-CM

## 2024-06-13 DIAGNOSIS — C61 PROSTATE CANCER METASTATIC TO BONE: ICD-10-CM

## 2024-06-13 DIAGNOSIS — C61 PROSTATE CANCER: Primary | ICD-10-CM

## 2024-06-13 DIAGNOSIS — C61 PROSTATE CANCER: ICD-10-CM

## 2024-06-13 LAB
ALBUMIN SERPL-MCNC: 3.5 G/DL (ref 3.5–5.2)
ALBUMIN/GLOB SERPL: 1.5 G/DL
ALP SERPL-CCNC: 44 U/L (ref 39–117)
ALT SERPL W P-5'-P-CCNC: 6 U/L (ref 1–41)
ANION GAP SERPL CALCULATED.3IONS-SCNC: 9.8 MMOL/L (ref 5–15)
AST SERPL-CCNC: 15 U/L (ref 1–40)
BASOPHILS # BLD AUTO: 0.02 10*3/MM3 (ref 0–0.2)
BASOPHILS NFR BLD AUTO: 0.4 % (ref 0–1.5)
BILIRUB SERPL-MCNC: 0.5 MG/DL (ref 0–1.2)
BUN SERPL-MCNC: 9 MG/DL (ref 8–23)
BUN/CREAT SERPL: 10.2 (ref 7–25)
CALCIUM SPEC-SCNC: 8 MG/DL (ref 8.6–10.5)
CHLORIDE SERPL-SCNC: 108 MMOL/L (ref 98–107)
CO2 SERPL-SCNC: 22.2 MMOL/L (ref 22–29)
CREAT SERPL-MCNC: 0.88 MG/DL (ref 0.76–1.27)
DEPRECATED RDW RBC AUTO: 48.6 FL (ref 37–54)
EGFRCR SERPLBLD CKD-EPI 2021: 93.7 ML/MIN/1.73
EOSINOPHIL # BLD AUTO: 0.22 10*3/MM3 (ref 0–0.4)
EOSINOPHIL NFR BLD AUTO: 4.5 % (ref 0.3–6.2)
ERYTHROCYTE [DISTWIDTH] IN BLOOD BY AUTOMATED COUNT: 15.5 % (ref 12.3–15.4)
GLOBULIN UR ELPH-MCNC: 2.3 GM/DL
GLUCOSE SERPL-MCNC: 95 MG/DL (ref 65–99)
HCT VFR BLD AUTO: 33.3 % (ref 37.5–51)
HGB BLD-MCNC: 11.2 G/DL (ref 13–17.7)
IMM GRANULOCYTES # BLD AUTO: 0.01 10*3/MM3 (ref 0–0.05)
IMM GRANULOCYTES NFR BLD AUTO: 0.2 % (ref 0–0.5)
LYMPHOCYTES # BLD AUTO: 1.68 10*3/MM3 (ref 0.7–3.1)
LYMPHOCYTES NFR BLD AUTO: 34.5 % (ref 19.6–45.3)
MAGNESIUM SERPL-MCNC: 1.7 MG/DL (ref 1.6–2.4)
MCH RBC QN AUTO: 28.8 PG (ref 26.6–33)
MCHC RBC AUTO-ENTMCNC: 33.6 G/DL (ref 31.5–35.7)
MCV RBC AUTO: 85.6 FL (ref 79–97)
MONOCYTES # BLD AUTO: 0.39 10*3/MM3 (ref 0.1–0.9)
MONOCYTES NFR BLD AUTO: 8 % (ref 5–12)
NEUTROPHILS NFR BLD AUTO: 2.55 10*3/MM3 (ref 1.7–7)
NEUTROPHILS NFR BLD AUTO: 52.4 % (ref 42.7–76)
NRBC BLD AUTO-RTO: 0 /100 WBC (ref 0–0.2)
PHOSPHATE SERPL-MCNC: 2.7 MG/DL (ref 2.5–4.5)
PLATELET # BLD AUTO: 394 10*3/MM3 (ref 140–450)
PMV BLD AUTO: 9.5 FL (ref 6–12)
POTASSIUM SERPL-SCNC: 3.8 MMOL/L (ref 3.5–5.2)
PROT SERPL-MCNC: 5.8 G/DL (ref 6–8.5)
PSA SERPL-MCNC: 16.6 NG/ML (ref 0–4)
RBC # BLD AUTO: 3.89 10*6/MM3 (ref 4.14–5.8)
SODIUM SERPL-SCNC: 140 MMOL/L (ref 136–145)
WBC NRBC COR # BLD AUTO: 4.87 10*3/MM3 (ref 3.4–10.8)

## 2024-06-13 PROCEDURE — 36591 DRAW BLOOD OFF VENOUS DEVICE: CPT

## 2024-06-13 PROCEDURE — 83735 ASSAY OF MAGNESIUM: CPT | Performed by: INTERNAL MEDICINE

## 2024-06-13 PROCEDURE — 0 PIFLUFOLASTAT F 18 9 MCI SOLUTION PREFILLED SYRINGE: Performed by: RADIOLOGY

## 2024-06-13 PROCEDURE — 85025 COMPLETE CBC W/AUTO DIFF WBC: CPT | Performed by: INTERNAL MEDICINE

## 2024-06-13 PROCEDURE — 80053 COMPREHEN METABOLIC PANEL: CPT | Performed by: INTERNAL MEDICINE

## 2024-06-13 PROCEDURE — 84100 ASSAY OF PHOSPHORUS: CPT | Performed by: INTERNAL MEDICINE

## 2024-06-13 PROCEDURE — 25010000002 HEPARIN LOCK FLUSH PER 10 UNITS: Performed by: INTERNAL MEDICINE

## 2024-06-13 PROCEDURE — 78815 PET IMAGE W/CT SKULL-THIGH: CPT

## 2024-06-13 PROCEDURE — A9595 PIFLUFOLASTAT F 18 9 MCI SOLUTION PREFILLED SYRINGE: HCPCS | Performed by: RADIOLOGY

## 2024-06-13 PROCEDURE — 84153 ASSAY OF PSA TOTAL: CPT | Performed by: INTERNAL MEDICINE

## 2024-06-13 RX ORDER — SODIUM CHLORIDE 0.9 % (FLUSH) 0.9 %
20 SYRINGE (ML) INJECTION AS NEEDED
OUTPATIENT
Start: 2024-06-13

## 2024-06-13 RX ORDER — HEPARIN SODIUM (PORCINE) LOCK FLUSH IV SOLN 100 UNIT/ML 100 UNIT/ML
500 SOLUTION INTRAVENOUS AS NEEDED
Status: DISCONTINUED | OUTPATIENT
Start: 2024-06-13 | End: 2024-06-14 | Stop reason: HOSPADM

## 2024-06-13 RX ORDER — SODIUM CHLORIDE 0.9 % (FLUSH) 0.9 %
20 SYRINGE (ML) INJECTION AS NEEDED
Status: DISCONTINUED | OUTPATIENT
Start: 2024-06-13 | End: 2024-06-14 | Stop reason: HOSPADM

## 2024-06-13 RX ORDER — HEPARIN SODIUM (PORCINE) LOCK FLUSH IV SOLN 100 UNIT/ML 100 UNIT/ML
500 SOLUTION INTRAVENOUS AS NEEDED
OUTPATIENT
Start: 2024-06-13

## 2024-06-13 RX ADMIN — HEPARIN 500 UNITS: 100 SYRINGE at 15:29

## 2024-06-13 RX ADMIN — PIFLUFOLASTAT F-18 1 DOSE: 80 INJECTION INTRAVENOUS at 14:09

## 2024-06-13 RX ADMIN — Medication 20 ML: at 15:29

## 2024-06-18 DIAGNOSIS — C61 PROSTATE CANCER METASTATIC TO BONE: ICD-10-CM

## 2024-06-18 DIAGNOSIS — C79.51 PROSTATE CANCER METASTATIC TO BONE: ICD-10-CM

## 2024-06-18 RX ORDER — HYDROCODONE BITARTRATE AND ACETAMINOPHEN 10; 325 MG/1; MG/1
1 TABLET ORAL EVERY 4 HOURS PRN
Qty: 180 TABLET | Refills: 0 | Status: SHIPPED | OUTPATIENT
Start: 2024-06-18

## 2024-06-24 ENCOUNTER — SPECIALTY PHARMACY (OUTPATIENT)
Dept: PHARMACY | Facility: HOSPITAL | Age: 69
End: 2024-06-24
Payer: MEDICARE

## 2024-06-24 NOTE — PROGRESS NOTES
Specialty Pharmacy Patient Management Program  Oncology Refill Outreach      Semaj is a 68 y.o. male contacted today regarding refills of his medication(s).    Specialty medication(s) and dose(s) confirmed: Completed refill outreach. [facility] will mail abiraterone acetate (ZYTIGA) 500 MG tablet and predniSONE (DELTASONE) 5 MG tablet once ready to dispense    Other medications being refilled: N/A    Refill Questions      Flowsheet Row Most Recent Value   Changes to allergies? No   Changes to medications? No   New conditions or infections since last clinic visit No   Unplanned office visit, urgent care, ED, or hospital admission in the last 4 weeks  No   How does patient/caregiver feel medication is working? Good   Financial problems or insurance changes  No   Since the previous refill, were any specialty medication doses or scheduled injections missed or delayed?  No   Does this patient require a clinical escalation to a pharmacist? No            Delivery Questions      Flowsheet Row Most Recent Value   Delivery method [employer]   Delivery address verified with patient/caregiver? Yes   Delivery address Home   Number of medications in delivery 2   Medication(s) being filled and delivered Prednisone, Abiraterone Acetate   Doses left of specialty medications 7 to 8 days   Copay amount zero copay   Copay form of payment No copayment ($0)                   Follow-up: 21 days     Oralia Castillo  Care Coordinator, [facility]  6/24/2024  14:51 EDT

## 2024-06-26 ENCOUNTER — HOSPITAL ENCOUNTER (OUTPATIENT)
Dept: MRI IMAGING | Facility: HOSPITAL | Age: 69
Discharge: HOME | End: 2024-06-26
Admitting: NURSE PRACTITIONER
Payer: MEDICARE

## 2024-06-26 DIAGNOSIS — C79.51 SECONDARY MALIGNANT NEOPLASM OF BONE: ICD-10-CM

## 2024-06-26 DIAGNOSIS — R94.8 ABNORMAL POSITRON EMISSION TOMOGRAPHY (PET) SCAN: ICD-10-CM

## 2024-06-26 DIAGNOSIS — C79.51 PROSTATE CANCER METASTATIC TO BONE: ICD-10-CM

## 2024-06-26 DIAGNOSIS — R97.21 RISING PSA FOLLOWING TREATMENT FOR MALIGNANT NEOPLASM OF PROSTATE: ICD-10-CM

## 2024-06-26 DIAGNOSIS — C61 PROSTATE CANCER METASTATIC TO BONE: ICD-10-CM

## 2024-06-26 PROCEDURE — 72157 MRI CHEST SPINE W/O & W/DYE: CPT

## 2024-06-26 PROCEDURE — A9577 INJ MULTIHANCE: HCPCS | Performed by: NURSE PRACTITIONER

## 2024-06-26 PROCEDURE — 0 GADOBENATE DIMEGLUMINE 529 MG/ML SOLUTION: Performed by: NURSE PRACTITIONER

## 2024-06-26 RX ORDER — HEPARIN SODIUM (PORCINE) LOCK FLUSH IV SOLN 100 UNIT/ML 100 UNIT/ML
SOLUTION INTRAVENOUS
Status: DISCONTINUED
Start: 2024-06-26 | End: 2024-06-27 | Stop reason: HOSPADM

## 2024-06-26 RX ADMIN — GADOBENATE DIMEGLUMINE 11 ML: 529 INJECTION, SOLUTION INTRAVENOUS at 14:00

## 2024-06-28 ENCOUNTER — OFFICE VISIT (OUTPATIENT)
Dept: RADIATION ONCOLOGY | Facility: HOSPITAL | Age: 69
End: 2024-06-28
Payer: MEDICARE

## 2024-06-28 ENCOUNTER — TELEPHONE (OUTPATIENT)
Dept: FAMILY MEDICINE CLINIC | Facility: CLINIC | Age: 69
End: 2024-06-28
Payer: MEDICARE

## 2024-06-28 ENCOUNTER — OFFICE VISIT (OUTPATIENT)
Dept: ONCOLOGY | Facility: HOSPITAL | Age: 69
End: 2024-06-28
Payer: MEDICARE

## 2024-06-28 ENCOUNTER — APPOINTMENT (OUTPATIENT)
Dept: ONCOLOGY | Facility: HOSPITAL | Age: 69
End: 2024-06-28
Payer: MEDICARE

## 2024-06-28 ENCOUNTER — HOSPITAL ENCOUNTER (OUTPATIENT)
Dept: ONCOLOGY | Facility: HOSPITAL | Age: 69
Discharge: HOME | End: 2024-06-28
Payer: MEDICARE

## 2024-06-28 ENCOUNTER — DOCUMENTATION (OUTPATIENT)
Dept: RADIATION ONCOLOGY | Facility: HOSPITAL | Age: 69
End: 2024-06-28
Payer: MEDICARE

## 2024-06-28 VITALS
RESPIRATION RATE: 18 BRPM | SYSTOLIC BLOOD PRESSURE: 182 MMHG | BODY MASS INDEX: 18.65 KG/M2 | DIASTOLIC BLOOD PRESSURE: 80 MMHG | HEART RATE: 58 BPM | WEIGHT: 126.1 LBS | OXYGEN SATURATION: 100 % | TEMPERATURE: 98.4 F

## 2024-06-28 VITALS
SYSTOLIC BLOOD PRESSURE: 159 MMHG | WEIGHT: 126.2 LBS | BODY MASS INDEX: 18.69 KG/M2 | OXYGEN SATURATION: 99 % | HEIGHT: 69 IN | RESPIRATION RATE: 16 BRPM | HEART RATE: 63 BPM | TEMPERATURE: 99.3 F | DIASTOLIC BLOOD PRESSURE: 101 MMHG

## 2024-06-28 DIAGNOSIS — I10 PRIMARY HYPERTENSION: ICD-10-CM

## 2024-06-28 DIAGNOSIS — G89.3 CANCER-RELATED PAIN: ICD-10-CM

## 2024-06-28 DIAGNOSIS — Z92.3 PERSONAL HISTORY OF RADIATION THERAPY: ICD-10-CM

## 2024-06-28 DIAGNOSIS — C79.51 SECONDARY MALIGNANT NEOPLASM OF BONE: Primary | ICD-10-CM

## 2024-06-28 DIAGNOSIS — R97.21 RISING PSA FOLLOWING TREATMENT FOR MALIGNANT NEOPLASM OF PROSTATE: ICD-10-CM

## 2024-06-28 DIAGNOSIS — Z90.79 HISTORY OF PROSTATECTOMY: ICD-10-CM

## 2024-06-28 DIAGNOSIS — C79.51 PROSTATE CANCER METASTATIC TO BONE: Primary | ICD-10-CM

## 2024-06-28 DIAGNOSIS — Z79.818 ANDROGEN DEPRIVATION THERAPY: ICD-10-CM

## 2024-06-28 DIAGNOSIS — G89.3 CANCER RELATED PAIN: Primary | ICD-10-CM

## 2024-06-28 DIAGNOSIS — C61 MALIGNANT NEOPLASM PROSTATE: ICD-10-CM

## 2024-06-28 DIAGNOSIS — C61 PROSTATE CANCER METASTATIC TO BONE: Primary | ICD-10-CM

## 2024-06-28 PROCEDURE — G0463 HOSPITAL OUTPT CLINIC VISIT: HCPCS | Performed by: NURSE PRACTITIONER

## 2024-06-28 PROCEDURE — 96372 THER/PROPH/DIAG INJ SC/IM: CPT

## 2024-06-28 PROCEDURE — 3077F SYST BP >= 140 MM HG: CPT | Performed by: INTERNAL MEDICINE

## 2024-06-28 PROCEDURE — 25010000002 LEUPROLIDE 45 MG KIT: Performed by: INTERNAL MEDICINE

## 2024-06-28 PROCEDURE — 25010000002 DENOSUMAB 120 MG/1.7ML SOLUTION: Performed by: INTERNAL MEDICINE

## 2024-06-28 PROCEDURE — 3080F DIAST BP >= 90 MM HG: CPT | Performed by: INTERNAL MEDICINE

## 2024-06-28 PROCEDURE — 96402 CHEMO HORMON ANTINEOPL SQ/IM: CPT

## 2024-06-28 PROCEDURE — 99214 OFFICE O/P EST MOD 30 MIN: CPT | Performed by: INTERNAL MEDICINE

## 2024-06-28 PROCEDURE — 1125F AMNT PAIN NOTED PAIN PRSNT: CPT | Performed by: INTERNAL MEDICINE

## 2024-06-28 RX ORDER — AMLODIPINE BESYLATE 5 MG/1
5 TABLET ORAL DAILY
Qty: 30 TABLET | Refills: 1 | Status: SHIPPED | OUTPATIENT
Start: 2024-06-28

## 2024-06-28 RX ORDER — OXYCODONE HCL 10 MG/1
10 TABLET, FILM COATED, EXTENDED RELEASE ORAL EVERY 12 HOURS
Qty: 60 TABLET | Refills: 0 | Status: SHIPPED | OUTPATIENT
Start: 2024-06-28

## 2024-06-28 RX ADMIN — DENOSUMAB 120 MG: 120 INJECTION SUBCUTANEOUS at 10:35

## 2024-06-28 RX ADMIN — LEUPROLIDE ACETATE 45 MG: KIT at 10:40

## 2024-06-28 NOTE — TELEPHONE ENCOUNTER
Called and spoke with patient.  Patient was advised that per Dr. Monae she sent in Amlodipine 5mg once a day to be taken along with his Lisiniopril 40mg.  Per Dr. Monae patient's blood pressure has increased secondary to his treatment therefore she will be treating his elevated BP and reevaluate it once he has completed treatment.  Patient verbally stated understanding.

## 2024-06-28 NOTE — TELEPHONE ENCOUNTER
Meghana with Radiation Oncology called to advise Dr. Monae that [facility] and also at the Radiation Oncology dept.  Patient's blood pressure was elevated in both locations.  159/110, 185/102 and 182/80.  Meghana stated that the patient advised her he was experiencing a headache.  Meghana advised the patient that he needed to contact Dr. Monae to discuss his blood pressure.  Patient stated he would do so.  Dr. Monae advised of phone call with Meghana.

## 2024-06-28 NOTE — PROGRESS NOTES
Chief Complaint/Care Team   Prostate Cancer    Maryse Monae MD Coffie, Ramona N, MD    History of Present Illness     Diagnosis: Metastatic Castration Resistant Prostate Cancer    Current Treatment: Lupron, Zytiga and Prednisone, pending initiation of Pluvicto at [facility]    Previous Treatment:   Metastatic Castration resistant prostate cancer:    Patient was initially diagnosed with prostate cancer in 2015.  On 6/4/2015 he underwent radical prostatectomy and LN dissection which showed a [place] 4+3 = 7 prostate cancer.  There were several high risk features such as extraprostatic extension, seminal vesicle invasion, positive margins at the bladder neck and right and left seminal vesicle.  Lymph vascular invasion was indeterminate.  Perineural invasion was not commented on. Final pathology [id number] R1.  He was treated with adjuvant radiation by Dr. Stanton.    According to records the patient likely started Lupron in December 2017 when his PSA started to rise.  The patient took a break in August 2019.  He states he was not aware he was to continue.  He restarted Lupron on 11/26/2019 after it was noted that his PSA chago from 0.46 up to 2.55 on 11/8/2019.  Unfortunately his PSA continued to rise after that to 4.54 on 3/10/2020.    - restarted Lupron on 11/26/20  - Started Zytiga on 8/27/20.  8/31/2020 PSA 17.95     - Started Xtandi in May 2021 due to intolerance of Zytiga even at the lowest dose  - PSA rising in April 2023 to 2.85, doubling time 3.4 months  - started Docetaxel May 2023    Semaj Medina is a 68 y.o. male who presents to [facility] HEMATOLOGY & ONCOLOGY for Metastatic Castration Resistant Prostate Cancer.    History of Present Illness    The patient was previously evaluated by Dr. Salazar, a radiation oncologist at [facility], who shared pt was not a candidate for [place] 223. However, he would be a suitable candidate for Pluvicto, he has referred him to nuclear medicine  specialist at [facility]. A consultation with a nuclear medicine specialist was scheduled for yesterday, but it was postponed due that provider having COVID-19 infection, but it has been re-scheduled for 07/09/2024. His current medication regimen includes Zytiga and prednisone.     The patient reports experiencing back and neck pain, which subsides upon administration of his pain medication. However, he has not taken his prescribed pain medication today due to concerns about driving. He typically takes his pain medication every 3 hours.       Review of Systems   Constitutional:  Positive for fatigue.   Musculoskeletal:  Positive for back pain (Neck pain).        Oncology/Hematology History Overview Note     Metastatic Castration resistant prostate cancer:    Patient was initially diagnosed with prostate cancer in 2015.  On 6/4/2015 he underwent radical prostatectomy and LN dissection which showed a [place] 4+3 = 7 prostate cancer.  There were several high risk features such as extraprostatic extension, seminal vesicle invasion, positive margins at the bladder neck and right and left seminal vesicle.  Lymph vascular invasion was indeterminate.  Perineural invasion was not commented on. Final pathology [id number] R1.  He was treated with adjuvant radiation by Dr. Stanton.    According to records the patient likely started Lupron in December 2017 when his PSA started to rise.  The patient took a break in August 2019.  He states he was not aware he was to continue.  He restarted Lupron on 11/26/2019 after it was noted that his PSA chago from 0.46 up to 2.55 on 11/8/2019.  Unfortunately his PSA continued to rise after that to 4.54 on 3/10/2020.    - restarted Lupron on 11/26/20  - Started Zytiga on 8/27/20.  8/31/2020 PSA 17.95     - Started Xtandi in May 2021 due to intolerance of Zytiga even at the lowest dose  - PSA rising in April 2023 to 2.85, doubling time 3.4 months  - started Docetaxel May 2023    PSMA PET  5/2/23:   1.  Radiotracer avid supra clavicular, thoracic and abdominal adenopathy and sclerotic lesion within T10 vertebral body likely representing metastatic disease.  2.  Indeterminate lesion within the left ilium demonstrating mild uptake.   3.  Asymmetric sclerosis within the superior lateral aspect the left orbit which does not demonstrate significant uptake above that of the right orbit. Findings are nonspecific with  differential considerations including but not limited to metastatic disease, fibrous dysplasia versus Paget's disease.   4.  Sub-6 mm nodule within the left lower lobe.  5.  Scattered indeterminate hypodense lesions throughout the liver which do not demonstrate radiotracer uptake above that of the adjacent liver, best best visualized on recent multiphase CT abdomen and pelvis 04/17/2023. Please refer to this dictation for further information.       Prostate cancer metastatic to bone   12/11/2020 -  Chemotherapy    OP SUPPORTIVE Leuprolide 45 mg Q6M     3/23/2021 -  Chemotherapy    OP SUPPORTIVE Denosumab (Xgeva) Q28D     4/15/2021 - 5/13/2021 Chemotherapy    OP PROSTATE Enzalutamide     5/19/2021 Initial Diagnosis    Prostate cancer metastatic to bone (CMS/HCC)     6/2/2023 - 9/15/2023 Biopsy    OP PROSTATE DOCEtaxel  Plan Provider: Natalya Jack MD PhD  Treatment goal: Palliative  Line of treatment: [No plan line of treatment]     12/19/2023 - 12/19/2023 Chemotherapy    OP PROSTATE Apalutamide     1/26/2024 Biopsy    OP PROSTATE Abiraterone / PredniSONE  Plan Provider: Joshua Dick MD  Treatment goal: Control  Line of treatment: [No plan line of treatment]     Prostate cancer   6/16/2021 Initial Diagnosis    Prostate cancer (HCC)     6/2/2023 -  Chemotherapy    OP CENTRAL VENOUS ACCESS DEVICE ACCESS, CARE, AND MAINTENANCE (CVAD)     Secondary malignant neoplasm of bone   11/4/2021 Initial Diagnosis    Secondary malignant neoplasm of bone (HCC)     11/23/2021 - 12/9/2021 Radiation     "Radiation OncologyTreatment Course:  Semaj Medina received 3000 cGy in 10 fractions to the T9-T11 spine.          Objective     Vitals:    06/28/24 0926   BP: (!) 159/101   Pulse: 63   Resp: 16   Temp: 99.3 °F (37.4 °C)   TempSrc: Temporal   SpO2: 99%   Weight: 57.2 kg (126 lb 3.2 oz)   Height: 175.1 cm (68.94\")   PainSc:   6   PainLoc: Back     ECOG score: 0         PHQ-9 Total Score:         Physical Exam  Vitals reviewed. Exam conducted with a chaperone present.   Constitutional:       General: He is not in acute distress.  HENT:      Head: Normocephalic and atraumatic.   Eyes:      Conjunctiva/sclera: Conjunctivae normal.      Pupils: Pupils are equal, round, and reactive to light.   Neurological:      Mental Status: He is alert and oriented to person, place, and time.         Physical Exam        Past Medical History     Past Medical History:   Diagnosis Date    Anemia     NO PROBLEMS    Anxiety     Blood disease     Colon polyps     Depression     Diverticulitis     Forgetfulness     Hepatitis C     RESOLVED    Hypertension     Night sweats     Numbness and tingling of left lower extremity     Prostate cancer      Current Outpatient Medications on File Prior to Visit   Medication Sig Dispense Refill    abiraterone acetate (ZYTIGA) 500 MG tablet Take 2 tablets by mouth Daily. 60 tablet 11    Calcium Carbonate-Vitamin D (Calcium Plus Vitamin D) 500-1.25 MG-MCG capsule Take 1 capsule by mouth Daily. 30 capsule 5    HYDROcodone-acetaminophen (NORCO)  MG per tablet Take 1 tablet by mouth Every 4 (Four) Hours As Needed for Moderate Pain. 180 tablet 0    ibuprofen (ADVIL,MOTRIN) 800 MG tablet TAKE 1 TABLET BY MOUTH EVERY 6 - 8 HOURS AS NEEDED FOR PAIN      leuprolide (Lupron [place], 3-Month,) 22.5 MG injection 22.5 mg.      lisinopril (PRINIVIL,ZESTRIL) 40 MG tablet Take 1 tablet by mouth Daily. 90 tablet 0    predniSONE (DELTASONE) 5 MG tablet Take 1 tablet by mouth 2 (Two) Times a Day. 60 tablet 11    " folic acid (FOLVITE) 1 MG tablet  (Patient not taking: Reported on 6/28/2024)      magnesium oxide (MAG-OX) 400 MG tablet Take 1 tablet by mouth Daily. (Patient not taking: Reported on 6/28/2024) 30 tablet 0    ondansetron (ZOFRAN) 8 MG tablet Take 1 tablet by mouth Every 8 (Eight) Hours As Needed for Nausea or Vomiting. (Patient not taking: Reported on 5/24/2024) 90 tablet 3    prochlorperazine (COMPAZINE) 10 MG tablet Take 1 tablet by mouth Every 6 (Six) Hours As Needed for Nausea or Vomiting. (Patient not taking: Reported on 5/24/2024) 30 tablet 5     No current facility-administered medications on file prior to visit.      No Known Allergies  Past Surgical History:   Procedure Laterality Date    COLONOSCOPY      PROSTATE BIOPSY      PROSTATECTOMY      ROBOT ASSISTED W PELVIC LYMPH NODE DISSECTION    TRANSURETHRAL RESECTION OF BLADDER TUMOR      VENOUS ACCESS DEVICE (PORT) INSERTION Right 5/22/2023    Procedure: INSERTION VENOUS ACCESS PORT;  Surgeon: Waldemar Iraheta MD;  Location: [facility];  Service: General;  Laterality: Right;     Social History     Socioeconomic History    Marital status:    Tobacco Use    Smoking status: Every Day     Current packs/day: 0.25     Average packs/day: 0.3 packs/day for 53.7 years (13.4 ttl pk-yrs)     Types: Cigarettes     Start date: 10/21/1970     Passive exposure: Past    Smokeless tobacco: Never    Tobacco comments:     INSTRUCTED NO SMOKING 24 HR PRIOR TO SURGERY      LAST SMOKED AT 200AM 05-22-23   Vaping Use    Vaping status: Never Used   Substance and Sexual Activity    Alcohol use: Yes     Alcohol/week: 6.0 standard drinks of alcohol     Types: 6 Cans of beer per week     Comment: 12 pack a week of beer    Drug use: Never    Sexual activity: Defer     Family History   Problem Relation Age of Onset    Cancer Mother     Cancer Father        Results     Result Review   The following data was reviewed by: Joshua Dick MD on 06/28/2024:  Lab Results   Component  Value Date    HGB 11.2 (L) 06/13/2024    HCT 33.3 (L) 06/13/2024    MCV 85.6 06/13/2024     06/13/2024    WBC 4.87 06/13/2024    NEUTROABS 2.55 06/13/2024    LYMPHSABS 1.68 06/13/2024    MONOSABS 0.39 06/13/2024    EOSABS 0.22 06/13/2024    BASOSABS 0.02 06/13/2024     Lab Results   Component Value Date    GLUCOSE 95 06/13/2024    BUN 9 06/13/2024    CREATININE 0.88 06/13/2024     06/13/2024    K 3.8 06/13/2024     (H) 06/13/2024    CO2 22.2 06/13/2024    CALCIUM 8.0 (L) 06/13/2024    PROTEINTOT 5.8 (L) 06/13/2024    ALBUMIN 3.5 06/13/2024    BILITOT 0.5 06/13/2024    ALKPHOS 44 06/13/2024    AST 15 06/13/2024    ALT 6 06/13/2024     Lab Results   Component Value Date    MG 1.7 06/13/2024    PHOS 2.7 06/13/2024    TSH 1.350 05/16/2019       No radiology results for the last day  MRI Thoracic Spine With & Without Contrast    Result Date: 6/26/2024  Impression: Impression: 1. New metastatic disease involving the T5, T8, and T12 vertebral bodies. There is stable metastatic lesion at the T10 level. No evidence of acute fracture. Electronically Signed: Ben Reyes MD  6/26/2024 7:24 PM EDT  Workstation ID: [id number]    NM PET/CT Skull Base to Mid Thigh    Result Date: 6/17/2024  Impression: 1. Pylarify PET/CT findings suggest progression of malignancy since the comparison PET/CT of 5/2/2023. Please refer to the body of the report for detailed description. In short, new metabolically active nodes have developed in the right supraclavicular region, within the posterior mediastinum of the chest, within the upper abdomen, and within the skeleton. Electronically Signed: Torie Pham MD  6/17/2024 10:11 AM EDT  Workstation ID: [id number]     Assessment & Plan     Diagnoses and all orders for this visit:    1. Cancer related pain (Primary)  -     oxyCODONE (oxyCONTIN) 10 MG 12 hr tablet; Take 1 tablet by mouth Every 12 (Twelve) Hours.  Dispense: 60 tablet; Refill: 0    Other orders  -     Cancel: leuprolide  (LUPRON) injection 45 mg  -     Cancel: denosumab (XGEVA) injection 120 mg         Semaj Medina is a 68 y.o. male who presents to [facility] HEMATOLOGY & ONCOLOGY for treatment of  metastatic prostate cancer, completed 6 cycles of docetaxel on 9/15/2023, Lupron Q6 months, Pt also receiving Xgeva and he is here prior to next Xgeva infusion  -next Lupron injection due on 6/18/2024  -PSA from 12/15/23 was 8 up from 3.1 (in 10/2023)  -Discussed result of NM bone scan, CT CAP from 1/24/2024 which revealed interval enlargement T10 vertebral body metastasis, no evidence of intrathoracic metastatic disease, slight worsening appearance of osteoblastic schedule static disease, notable at T10, T8, and T5, no evidence of intra-abdominal or pelvic metastatic disease.  -Patient has undergone radiation therapy 10 fractions to thoracic spine from T9-T11 in 2021.  -Also shared plan to reattempt treatment with Zytiga and prednisone, patient reports some nausea vomiting which we will attempt to manage with antiemetics.  -Orders placed for Zytiga and prednisone, also provided prescriptions for Compazine and Zofran for patient to begin once he begins treatment with these medications.  -pt began zytiga/prednisone on 2/2/2024, pt tolerating it well, no evidence of toxicity on labs, recommend he continue at current dose     -discussed results of restaging imaging from 4/24/2024 which showed stable radiotracer uptake involving sclerotic osseous metastasis of the left frontal bone above left orbit and at T10, no findings of osseous progression, CT chest abdomen pelvis revealed stable sclerotic osseous metastasis, no suspicious adenopathy, postsurgical changes of prostatectomy.          -Given presence of continued osseous metastasis, patient with continued pain in area of metastasis, and since he has previously received radiation in this area, referred him to [facility] medicine for consideration for treatment with  [place] 223 or pluvicto.  -Shared results of PSMA PET/CT scan from 6/13/2024 which revealed progression of disease, patient has been referred to nuclear medicine specialist at [facility] for consideration for Pluvicto, that appointment is scheduled 7/9/2024     -The patient's PSA levels have shown a slight decrease from 16.6 on 06/13/2024    -The continuation of Lupron, Xgeva, and Zytiga and prednisone was advised until the patient receives the Pluvicto.   -A Lupron injection and Xgeva treatment was administered today. OxyContin 10 mg was prescribed for pain management, to be taken twice daily every 12 hours.  -will continue to provide refills of his [place] for his cancer related pain, added OxyContin twice daily given lack of relief with Norco.     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      Hypomagnesemia  -pt prescribed mag oxide 400 mg PO QD   -will recheck at next clinic appointment     Low Vit D  -Vit level of 27.7  -prescribed Vit D 50K IU once weekly x 8 weeks             Please note that portions of this note were completed with a voice recognition program.      Assessment & Plan        Follow-up  A follow-up appointment is scheduled for 07/2024 with repeat labs. F/u with recommendations with Dr. Salazar, assess if pt is candidate for Pluvicto    Electronically signed by Joshua Dick MD, 06/28/24, 5:46 PM EDT.      Follow Up     I spent 30 minutes caring for Semaj on this date of service. This time includes time spent by me in the following activities:preparing for the visit, reviewing tests, obtaining and/or reviewing a separately obtained history, performing a medically appropriate examination and/or evaluation , counseling and educating the patient/family/caregiver, ordering medications, tests, or procedures, referring and communicating with other health  care professionals , documenting information in the medical record, independently interpreting results and communicating that information with the patient/family/caregiver, and care coordination    Any chemotherapy or immunotherapy or other systemic therapy treatment plan involves a high risk of complications and/or mortality of patient management.    The patient was seen and examined. Work by the provider also included review and/or ordering of lab tests, review and/or ordering of radiology tests, review and/or ordering of medicine tests, discussion with other physicians or providers, independent review of data, obtaining old records, review/summation of old records, and/or other review.    I have reviewed the family history, social history, and past medical history for this patient. Previous information and data has been reviewed and updated as needed. I have reviewed and verified the chief complaint, history, and other documentation. The patient was interviewed and examined in the clinic and the chart reviewed. The previous observations, recommendations, and conclusions were reviewed including those of other providers.     The plan was discussed with the patient and/or family. The patient was given time to ask questions and these questions were answered. At the conclusion of their visit they had no additional questions or concerns and all questions were answered to their satisfaction.    Patient was given instructions and counseling regarding his condition or for health maintenance advice. Please see specific information pulled into the AVS if appropriate.       Patient or patient representative verbalized consent for the use of Ambient Listening during the visit with  Joshua Dick MD for chart documentation. 6/28/2024  17:46 EDT

## 2024-06-28 NOTE — PROGRESS NOTES
Follow Up Office Visit      Encounter Date: 06/28/2024   Patient Name: Semaj Medina  YOB: 1955   Medical Record Number: [id number]   Primary Diagnosis: Secondary malignant neoplasm of bone [C79.51]     Radiation Completion Date: 12/9/2021 T9-T11 spine     Chief Complaint:    Chief Complaint   Patient presents with    Follow-up    Prostate Cancer     Prostate cancer metastatic to bone  Follow-up after MRI       Oncology/Hematology History Overview Note     Metastatic Castration resistant prostate cancer:    Patient was initially diagnosed with prostate cancer in 2015.  On 6/4/2015 he underwent radical prostatectomy and LN dissection which showed a Zach 4+3 = 7 prostate cancer.  There were several high risk features such as extraprostatic extension, seminal vesicle invasion, positive margins at the bladder neck and right and left seminal vesicle.  Lymph vascular invasion was indeterminate.  Perineural invasion was not commented on. Final pathology [id number].  He was treated with adjuvant radiation by Dr. Stanton.    According to records the patient likely started Lupron in December 2017 when his PSA started to rise.  The patient took a break in August 2019.  He states he was not aware he was to continue.  He restarted Lupron on 11/26/2019 after it was noted that his PSA chago from 0.46 up to 2.55 on 11/8/2019.  Unfortunately his PSA continued to rise after that to 4.54 on 3/10/2020.    - restarted Lupron on 11/26/20  - Started Zytiga on 8/27/20.  8/31/2020 PSA 17.95     - Started Xtandi in May 2021 due to intolerance of Zytiga even at the lowest dose  - PSA rising in April 2023 to 2.85, doubling time 3.4 months  - started Docetaxel May 2023    PSMA PET 5/2/23:   1.  Radiotracer avid supra clavicular, thoracic and abdominal adenopathy and sclerotic lesion within T10 vertebral body likely representing metastatic disease.  2.  Indeterminate lesion within the left ilium demonstrating mild  uptake.   3.  Asymmetric sclerosis within the superior lateral aspect the left orbit which does not demonstrate significant uptake above that of the right orbit. Findings are nonspecific with  differential considerations including but not limited to metastatic disease, fibrous dysplasia versus Paget's disease.   4.  Sub-6 mm nodule within the left lower lobe.  5.  Scattered indeterminate hypodense lesions throughout the liver which do not demonstrate radiotracer uptake above that of the adjacent liver, best best visualized on recent multiphase CT abdomen and pelvis 04/17/2023. Please refer to this dictation for further information.       Prostate cancer metastatic to bone   12/11/2020 -  Chemotherapy    OP SUPPORTIVE Leuprolide 45 mg Q6M     3/23/2021 -  Chemotherapy    OP SUPPORTIVE Denosumab (Xgeva) Q28D     4/15/2021 - 5/13/2021 Chemotherapy    OP PROSTATE Enzalutamide     5/19/2021 Initial Diagnosis    Prostate cancer metastatic to bone (CMS/HCC)     6/2/2023 - 9/15/2023 Biopsy    OP PROSTATE DOCEtaxel  Plan Provider: Natalya Jack MD PhD  Treatment goal: Palliative  Line of treatment: [No plan line of treatment]     12/19/2023 - 12/19/2023 Chemotherapy    OP PROSTATE Apalutamide     1/26/2024 Biopsy    OP PROSTATE Abiraterone / PredniSONE  Plan Provider: Joshua Dick MD  Treatment goal: Control  Line of treatment: [No plan line of treatment]     Prostate cancer   6/16/2021 Initial Diagnosis    Prostate cancer (HCC)     6/2/2023 -  Chemotherapy    OP CENTRAL VENOUS ACCESS DEVICE ACCESS, CARE, AND MAINTENANCE (CVAD)     Secondary malignant neoplasm of bone   11/4/2021 Initial Diagnosis    Secondary malignant neoplasm of bone (HCC)     11/23/2021 - 12/9/2021 Radiation    Radiation OncologyTreatment Course:  Semaj Medina received 3000 cGy in 10 fractions to the T9-T11 spine.          History of Present Illness: Semaj Medina is a 68 y.o. male who returns to [facility] for short interval  follow-up of his metastatic prostate cancer after undergoing MRI thoracic spine. He presents today to discuss results. He was scheduled this week with provider for consultation regarding stating Pluvicto, however, that provider is recovering from COVID and appointment is now scheduled for next week. He followed up with Dr. Dick this morning and reports that she is increasing his pain medication. He continues to have lower back pain. Previously this radiated down bilateral lower extremities and he reports that [facility]. Now the pain is located to his low back and does not involve the legs. The pain is an aching pain that is worse in the mornings and relieved with his pain medication. He occasionally experiences pain in thoracic spine, stating that when he does it's a dull pain that comes and goes. He reports that his pain is tolerable with pain medication.     Subjective      Review of Systems: Review of Systems   Constitutional:  Positive for appetite change (Decreased) and fatigue (4/10). Negative for unexpected weight change.   HENT:  Positive for hearing loss (Wears hearing aids.). Negative for dental problem (Improving, waiting on dentures.), sore throat and trouble swallowing.    Eyes:  Positive for visual disturbance (Cataracts removed 2 years ago, decreased vision in right eye, ongoing).   Respiratory:  Positive for cough (dry, nonproductive). Negative for shortness of breath.    Gastrointestinal:  Negative for abdominal pain, blood in stool, constipation, diarrhea and nausea.   Endocrine: Negative for heat intolerance.   Genitourinary:  Negative for decreased urine volume, difficulty urinating, dysuria, frequency, hematuria and urgency.        Noc x2  Normal stream  Occasional MATEO, ongoing   Musculoskeletal:  Positive for arthralgias (Pain in feet, ongoing), back pain (6/10) and neck pain (Bilateral, 6/10). Negative for gait problem, joint swelling and neck stiffness.    Skin:  Negative for color change and rash.   Neurological:  Positive for headaches (Occasional, ongoing). Negative for dizziness, weakness and light-headedness.   Psychiatric/Behavioral:  Negative for sleep disturbance.        The following portions of the patient's history were reviewed and updated as appropriate: allergies, current medications, past family history, past medical history, past social history, past surgical history and problem list.    Medications:     Current Outpatient Medications:     abiraterone acetate (ZYTIGA) 500 MG tablet, Take 2 tablets by mouth Daily., Disp: 60 tablet, Rfl: 11    Calcium Carbonate-Vitamin D (Calcium Plus Vitamin D) 500-1.25 MG-MCG capsule, Take 1 capsule by mouth Daily., Disp: 30 capsule, Rfl: 5    HYDROcodone-acetaminophen (NORCO)  MG per tablet, Take 1 tablet by mouth Every 4 (Four) Hours As Needed for Moderate Pain., Disp: 180 tablet, Rfl: 0    ibuprofen (ADVIL,MOTRIN) 800 MG tablet, TAKE 1 TABLET BY MOUTH EVERY 6 - 8 HOURS AS NEEDED FOR PAIN, Disp: , Rfl:     leuprolide (Lupron [place], 3-Month,) 22.5 MG injection, 22.5 mg., Disp: , Rfl:     lisinopril (PRINIVIL,ZESTRIL) 40 MG tablet, Take 1 tablet by mouth Daily., Disp: 90 tablet, Rfl: 0    predniSONE (DELTASONE) 5 MG tablet, Take 1 tablet by mouth 2 (Two) Times a Day., Disp: 60 tablet, Rfl: 11    folic acid (FOLVITE) 1 MG tablet, , Disp: , Rfl:     magnesium oxide (MAG-OX) 400 MG tablet, Take 1 tablet by mouth Daily. (Patient not taking: Reported on 6/28/2024), Disp: 30 tablet, Rfl: 0    ondansetron (ZOFRAN) 8 MG tablet, Take 1 tablet by mouth Every 8 (Eight) Hours As Needed for Nausea or Vomiting. (Patient not taking: Reported on 5/24/2024), Disp: 90 tablet, Rfl: 3    prochlorperazine (COMPAZINE) 10 MG tablet, Take 1 tablet by mouth Every 6 (Six) Hours As Needed for Nausea or Vomiting. (Patient not taking: Reported on 5/24/2024), Disp: 30 tablet, Rfl: 5  No current facility-administered medications for this  visit.    Allergies:   No Known Allergies    Patient Smoking History:   Social History     Tobacco Use   Smoking Status Every Day    Current packs/day: 0.25    Average packs/day: 0.3 packs/day for 53.7 years (13.4 ttl pk-yrs)    Types: Cigarettes    Start date: 10/21/1970    Passive exposure: Past   Smokeless Tobacco Never   Tobacco Comments    INSTRUCTED NO SMOKING 24 HR PRIOR TO SURGERY     LAST SMOKED AT 200AM 05-22-23       Measures:  PHQ-9 Total Score:     Quality of Life: 100 - Full Activity   ECOG score: 0  ECOG: (0) Fully active, able to carry on all predisease performance without restriction  Pain: (on a scale of 0-10)   Pain Score    06/28/24 1123   PainSc:   6   PainLoc: Neck  Comment: back and neck     Semaj Medina reports a pain score of 6.  Given his pain assessment as noted, treatment options were discussed and the following options were decided upon as a follow-up plan to address the patient's pain: continuation of current treatment plan for pain.     Objective     Physical Exam:   Vital Signs:   Vitals:    06/28/24 1123 06/28/24 1148   BP: (!) 185/102 (!) 182/80  Comment: manual   Pulse: 58    Resp: 18    Temp: 98.4 °F (36.9 °C)    TempSrc: Temporal    SpO2: 100%    Weight: 57.2 kg (126 lb 1.7 oz)    PainSc:   6    PainLoc: Neck  Comment: back and neck        Body mass index is 18.65 kg/m².   Wt Readings from Last 3 Encounters:   06/28/24 57.2 kg (126 lb 1.7 oz)   06/28/24 57.2 kg (126 lb 3.2 oz)   05/24/24 57.2 kg (126 lb 1.7 oz)       Physical Exam  Vitals reviewed.   Constitutional:       General: He is not in acute distress.     Appearance: Normal appearance. He is normal weight. He is not ill-appearing.       HENT:      Head: Normocephalic and atraumatic.      Mouth/Throat:      Mouth: Mucous membranes are moist.      Pharynx: Oropharynx is clear. No oropharyngeal exudate or posterior oropharyngeal erythema.   Eyes:      Conjunctiva/sclera: Conjunctivae normal.      Pupils: Pupils are  equal, round, and reactive to light.   Cardiovascular:      Rate and Rhythm: Normal rate and regular rhythm.      Pulses: Normal pulses.      Heart sounds: Normal heart sounds.   Pulmonary:      Effort: Pulmonary effort is normal. No respiratory distress.      Breath sounds: Normal breath sounds.   Musculoskeletal:         General: Normal range of motion.      Cervical back: Normal range of motion.   Skin:     General: Skin is warm and dry.   Neurological:      General: No focal deficit present.      Mental Status: He is alert and oriented to person, place, and time. Mental status is at baseline.   Psychiatric:         Mood and Affect: Mood normal.         Behavior: Behavior normal.       Result Review: I independently reviewed the following data.  -- MRI Thoracic Spine With & Without Contrast (06/26/2024 14:09)   -- NM PET/CT Skull Base to Mid Thigh (06/13/2024 15:23)   -- PSA DIAGNOSTIC (05/03/2024 10:25)   -- NM Bone Scan Whole Body (04/24/2024 13:33)   -- CT Chest With Contrast Diagnostic (04/24/2024 11:56)   -- CT Abdomen Pelvis With Contrast (04/24/2024 11:56)   -- SCANNED PATHOLOGY (01/30/2024)   -- SCANNED PATHOLOGY (01/30/2024)   -- CT Abdomen Pelvis With Contrast (01/23/2024 16:30)   -- CT Chest With Contrast Diagnostic (01/23/2024 16:30)   -- NM Bone Scan Whole Body (01/23/2024 14:56)     Imaging: MRI Thoracic Spine With & Without Contrast    Result Date: 6/26/2024  Impression: 1. New metastatic disease involving the T5, T8, and T12 vertebral bodies. There is stable metastatic lesion at the T10 level. No evidence of acute fracture. Electronically Signed: Ben Reyes MD  6/26/2024 7:24 PM EDT  Workstation ID: [id number]    NM PET/CT Skull Base to Mid Thigh    Result Date: 6/17/2024  1. Pylarify PET/CT findings suggest progression of malignancy since the comparison PET/CT of 5/2/2023. Please refer to the body of the report for detailed description. In short, new metabolically active nodes have developed in  the right supraclavicular region, within the posterior mediastinum of the chest, within the upper abdomen, and within the skeleton. Electronically Signed: Torie Pham MD  6/17/2024 10:11 AM EDT  Workstation ID: [id number]    XR Chest 2 View    Result Date: 5/28/2024  No acute cardiopulmonary process. Sclerotic changes to the thoracic vertebral bodies suggesting metastatic disease. Consider nuclear bone scan. Images reviewed, interpreted, and dictated by DO LEELA Lamb Bone Scan Whole Body    Result Date: 4/25/2024  Stable radiotracer uptake involving sclerotic osseous metastasis at left frontal bone above left orbit and at the T10. No findings of osseous progression.    Electronically Signed By-Duarte Cox MD On:4/25/2024 3:57 PM      CT Chest With Contrast Diagnostic    Result Date: 4/25/2024  Impression: 1. Stable sclerotic osseous metastasis. 2. No suspicious adenopathy. 2. Postsurgical changes of prostatectomy. 3. Bladder wall thickening similar to prior study, correlation with urinalysis may be helpful to exclude superimposed cystitis. 4. Emphysema, aortic atherosclerosis and additional chronic findings above.   Electronically Signed By-Duarte Cox MD On:4/25/2024 3:53 PM      CT Abdomen Pelvis With Contrast    Result Date: 4/25/2024  Impression: 1. Stable sclerotic osseous metastasis. 2. No suspicious adenopathy. 2. Postsurgical changes of prostatectomy. 3. Bladder wall thickening similar to prior study, correlation with urinalysis may be helpful to exclude superimposed cystitis. 4. Emphysema, aortic atherosclerosis and additional chronic findings above.   Electronically Signed By-Duarte oCx MD On:4/25/2024 3:53 PM        Labs:   WBC   Date Value Ref Range Status   06/13/2024 4.87 3.40 - 10.80 10*3/mm3 Final   12/11/2020 6.61 4.80 - 10.80 10*3/uL Final     Hemoglobin   Date Value Ref Range Status   06/13/2024 11.2 (L) 13.0 - 17.7 g/dL Final     Hematocrit   Date Value Ref Range Status   06/13/2024  33.3 (L) 37.5 - 51.0 % Final     Platelets   Date Value Ref Range Status   06/13/2024 394 140 - 450 10*3/mm3 Final     TSH   Date Value Ref Range Status   05/16/2019 1.350 0.270 - 4.200 m[iU]/L Final      PSA   Date Value Ref Range Status   06/13/2024 16.600 (H) 0.000 - 4.000 ng/mL Final   05/03/2024 20.100 (H) 0.000 - 4.000 ng/mL Final   04/05/2024 16.500 (H) 0.000 - 4.000 ng/mL Final   03/08/2024 15.200 (H) 0.000 - 4.000 ng/mL Final   01/26/2024 18.600 (H) 0.000 - 4.000 ng/mL Final   12/19/2023 9.670 (H) 0.000 - 4.000 ng/mL Final   12/15/2023 8.050 (H) 0.000 - 4.000 ng/mL Final   10/11/2023 3.180 0.000 - 4.000 ng/mL Final   08/24/2023 4.570 (H) 0.000 - 4.000 ng/mL Final   08/03/2023 4.830 (H) 0.000 - 4.000 ng/mL Final   06/21/2023 5.580 (H) 0.000 - 4.000 ng/mL Final   04/18/2023 2.850 0.000 - 4.000 ng/mL Final   02/17/2023 1.900 0.000 - 4.000 ng/mL Final   12/06/2022 1.340 0.000 - 4.000 ng/mL Final   09/13/2022 0.697 0.000 - 4.000 ng/mL Final   06/17/2022 0.593 0.000 - 4.000 ng/mL Final   03/18/2022 1.150 0.000 - 4.000 ng/mL Final   01/13/2022 5.470 (H) 0.000 - 4.000 ng/mL Final   12/17/2021 14.200 (H) 0.000 - 4.000 ng/mL Final   10/14/2021 11.900 (H) 0.000 - 4.000 ng/mL Final   09/16/2021 10.200 (H) 0.000 - 4.000 ng/mL Final   06/17/2021 5.200 (H) 0.000 - 4.000 ng/mL Final   03/05/2021 2.25 0.00 - 4.00 ng/mL Final   12/11/2020 6.81 (H) 0.00 - 4.00 ng/mL Final   11/05/2020 7.17 (H) 0.00 - 4.00 ng/mL Final   08/31/2020 17.95 (H) 0.00 - 4.00 ng/mL Final   06/26/2020 12.13 (H) 0.00 - 4.00 ng/mL Final   06/19/2020 11.45 (H) 0.00 - 4.00 ng/mL Final   03/10/2020 4.54 (H) 0.00 - 4.00 ng/mL Final   11/08/2019 2.55 0.00 - 4.00 ng/mL Final   08/05/2019 0.46 0.00 - 4.00 ng/mL Final   05/06/2019 0.35 0.00 - 4.00 ng/mL Final   01/31/2019 0.40 0.00 - 4.00 ng/mL Final     Assessment / Plan      Impression: Semaj Medina is a pleasant 68 y.o. male with initially with diagnosis of prostate cancer, pT3b pN0 [place] 4+3= 7,  managed surgically with prostatectomy in 2015. He had positive margins at the bladder neck and received adjuvant radiotherapy at that time with Dr. Stanton. In 2017 his PSA began to rise and he was started on Lupron and in 2020 he was started on Zytiga. He tolerated Zytiga poorly and his dose was reduced multiple times. Restaging imaging in 2021 demonstrated a T10 lesion which was resulting in pain and limited function. He received palliative radiotherapy to vertebral metastases at T9-T11, completed on 12/9/2021 (30 Gy/10 fractions). In May 2023 his PSA was rising. His PSMA PET scan on 5/2/2023 demonstrated metastatic involvement throughout the neck, thorax, abdomen and pelvis. He was started on docetaxel, which he completed 6 cycles in September 2023. He continues to receive Lupron 45 mg every 6 months and Xgeva. He is also now taking Zytiga and prednisone, which was started after recent imaging. NM Bone Scan 1/23/2024 demonstrate an interval enlargement of T10 vertebral body metastasis. CT CAP on 1/23/2024 demonstrates slight worsening appearance of osteoblastic skeletal metastatic disease, notably at T10, T8 and T5. Within the pelvis there is stable sclerotic lesions. Repeat CT CAP on 4/24/24 demonstrates stable sclerotic osseous metastasis. NM Bone Scan on 4/24/24 demonstrates stable radiotracer uptake involving sclerotic osseous metastasis at left frontal bone above left orbit and at the T10. No findings of osseous progression. He continues to have a rising PSA with PSA of 20.1 ng/mL on 5/3/24. Despite these results he is clinically doing well overall with essentially no changes in urinary function. AUA Symptom Score today of 2. PSMA PET/CT scan on 6/13/24 demonstrates findings of disease progression. There are metabolically active nodes in the right supraclavicular region, within the posterior mediastinum of the chest, within the upper abdomen and within the skeleton. There is a small focus of hypermetabolic  activity within L2 vertebral body corresponding to a 4 mm sclerotic focus, new since prior PET. There is a new focus of hypermetabolic activity within the T10 vertebral body located anterior to the dominant T10 sclerotic focus, concerning for new malignancy superimposed site of treated disease, SUV of 23.2. Additionally there is new 8 mm sclerotic focus of hypermetabolic activity within the T5 vertebral body, SUV of 57.3. MRI thoracic spine on 6/26/24 demonstrates new metastatic disease involving the T5, T8 and T12 vertebral bodies. There is stable metastatic lesion at T10 level. Discussed results with Dr. Falcon and radiotherapy can be considered shall he experience pain that is not adequately controlled with pain medication. At this time, his pain remains well controlled so we will hold off on radiation and continue to monitor. He is scheduled for consultation on 7/5/24 to discuss initiation of Pluvicto.     Cancer-related pain: currently well controlled with Norco 10/325 mg prescribed by Dr. Dick. He infrequently experiences any thoracic spine pain. At visit on 2/19/24 I discussed option of continuing to monitor versus pursuing repeat radiotherapy to thoracic spine lesions, specifically the enlarging T10 lesion, and he opted for continued monitoring. He will contact our office in the event that his thoracic spine pain worsens.      Hypertension: he is hypertensive today and is asymptomatic with the exception of some bilateral neck pain. Currently prescribed lisinopril per PCP. Clinic RN to notify PCP that he is hypertensive today. Patient reports ability to monitor blood pressure at home so I advised that he start checking his blood pressure two times a day and keeping a log of his readings.    Assessment/Plan:   Diagnoses and all orders for this visit:    1. Secondary malignant neoplasm of bone (Primary)    2. Malignant neoplasm prostate    3. Personal history of radiation therapy    4. History of  prostatectomy    5. Cancer-related pain    6. Androgen deprivation therapy    7. Rising PSA following treatment for malignant neoplasm of prostate    8. Primary hypertension      Follow Up:   Return for follow-up in 3 months. He will contact our office in the event that his thoracic spine pain worsens.  PSA being ordered by Dr. Dick.   Follow-up with [facility] provider to discuss initiation of Pluvicto on 7/5/24.  Follow-up with Dr. Dick on 7/26/24.    Return in about 3 months (around 9/28/2024) for Office Visit.  Semaj Medina was encouraged to contact me in the interim with any questions or concerns regarding his care.      CHRIS Juarez  Radiation Oncology  [facility]    This document has been signed by CHRIS Alston on June 28, 2024 13:19 EDT

## 2024-06-28 NOTE — TELEPHONE ENCOUNTER
Patient called to advise Dr. Monae that [facility] and his blood pressure was elevated.  Patient had been advised to contact Dr. Monae by Meghana.  Per patient he is experiencing a slight headache which started this morning.  Patient also stated that he was having some blurred vision but that he has been experiencing this for awhile as he has had issues with his eyes.  I confirmed with the patient that on his medication list it shows he is taking Lisinopril 40mg daily.  Per patient he is taking this medication as directed.  I advised the patient that I would speak with Dr. Monae and will contact him with whatever she advises he do.  Patient verbally stated understanding.

## 2024-06-28 NOTE — PROGRESS NOTES
Mr. Cha in clinic today for f/u with CHRIS Alston.  Blood pressure obtained and found to be 185/102.  Patient with c/o headache as well.  Currently patient taking 40mg of Lisinopril daily, managed by his PCP Dr. Monae.  APRN informed. Manual pressure obtained at end of visit and found to be 182/80.  Education provided to patient on hazards of having high blood pressure.  Strongly encouraged patient to call his PCP office today when he gets home to reports blood pressure readings and headaches. Patient assured that he would do so.  This RN called Dr. Monae's office and spoke with Ann ZAMAN and reported BP reading and headache.  Ann to speak with Dr. Monae and stated that they would reach out to the patient to further assess.

## 2024-07-01 ENCOUNTER — SPECIALTY PHARMACY (OUTPATIENT)
Facility: HOSPITAL | Age: 69
End: 2024-07-01
Payer: MEDICARE

## 2024-07-03 ENCOUNTER — TELEPHONE (OUTPATIENT)
Dept: ONCOLOGY | Facility: HOSPITAL | Age: 69
End: 2024-07-03
Payer: MEDICARE

## 2024-07-03 NOTE — TELEPHONE ENCOUNTER
Spoke with Jayme, advised that no new script for Norco should be being filled as it was sent on 6/18. Advised that the Oxycontin is a new script for increased bone pain related to his metastatic disease.

## 2024-07-03 NOTE — TELEPHONE ENCOUNTER
Caller: Semaj Medina Osborne    Relationship: Self    Best call back number:     [phone]     What is the best time to reach you: ANYTIME    Who are you requesting to speak with (clinical staff, provider,  specific staff member): CLINICAL    What was the call regarding: PT WAS ADVISED BY HIS PHARMACY THAT HIS INSURANCE DOES NOT COVER THE NEW PAIN MEDICATION THE DR AYALA PRESCRIBED. PT IS NEEDING TO KNOW WHAT TO DO?    Is it okay if the provider responds through [employer]: PLEASE CALL TO [facility]. OKAY TO LEAVE A MESSAGE.

## 2024-07-03 NOTE — TELEPHONE ENCOUNTER
Pharmacy Name:  [facility]    Pharmacy representative name: DELPHINE    Pharmacy representative phone number: [phone]    What medication are you calling in regards to: OXYCONTIN 10    What question does the pharmacy have: NEEDING  CLARIFICATION ON OXYCONTIN 10 AND TO [facility] THAT [facility] WAS CALLED IN LESS THAN A MONTH AGO REQUESTING CALL BACK TO [facility]    Who is the provider that prescribed the medication: DR AYALA

## 2024-07-08 RX ORDER — LISINOPRIL 40 MG/1
40 TABLET ORAL DAILY
Qty: 90 TABLET | Refills: 0 | Status: SHIPPED | OUTPATIENT
Start: 2024-07-08

## 2024-07-15 DIAGNOSIS — C61 PROSTATE CANCER METASTATIC TO BONE: ICD-10-CM

## 2024-07-15 DIAGNOSIS — C79.51 PROSTATE CANCER METASTATIC TO BONE: ICD-10-CM

## 2024-07-16 ENCOUNTER — HOSPITAL ENCOUNTER (OUTPATIENT)
Dept: ONCOLOGY | Facility: HOSPITAL | Age: 69
Discharge: HOME | End: 2024-07-16
Admitting: INTERNAL MEDICINE
Payer: MEDICARE

## 2024-07-16 ENCOUNTER — SPECIALTY PHARMACY (OUTPATIENT)
Dept: PHARMACY | Facility: HOSPITAL | Age: 69
End: 2024-07-16
Payer: MEDICARE

## 2024-07-16 DIAGNOSIS — C79.51 PROSTATE CANCER METASTATIC TO BONE: Primary | ICD-10-CM

## 2024-07-16 DIAGNOSIS — C61 PROSTATE CANCER METASTATIC TO BONE: Primary | ICD-10-CM

## 2024-07-16 DIAGNOSIS — C61 PROSTATE CANCER: ICD-10-CM

## 2024-07-16 PROCEDURE — 36591 DRAW BLOOD OFF VENOUS DEVICE: CPT

## 2024-07-16 PROCEDURE — 25010000002 HEPARIN LOCK FLUSH PER 10 UNITS: Performed by: INTERNAL MEDICINE

## 2024-07-16 RX ORDER — SODIUM CHLORIDE 0.9 % (FLUSH) 0.9 %
20 SYRINGE (ML) INJECTION AS NEEDED
Status: DISCONTINUED | OUTPATIENT
Start: 2024-07-16 | End: 2024-07-17 | Stop reason: HOSPADM

## 2024-07-16 RX ORDER — SODIUM CHLORIDE 0.9 % (FLUSH) 0.9 %
20 SYRINGE (ML) INJECTION AS NEEDED
Status: CANCELLED | OUTPATIENT
Start: 2024-07-16

## 2024-07-16 RX ORDER — HYDROCODONE BITARTRATE AND ACETAMINOPHEN 10; 325 MG/1; MG/1
1 TABLET ORAL EVERY 4 HOURS PRN
Qty: 180 TABLET | Refills: 0 | Status: SHIPPED | OUTPATIENT
Start: 2024-07-16

## 2024-07-16 RX ORDER — HEPARIN SODIUM (PORCINE) LOCK FLUSH IV SOLN 100 UNIT/ML 100 UNIT/ML
500 SOLUTION INTRAVENOUS AS NEEDED
Status: DISCONTINUED | OUTPATIENT
Start: 2024-07-16 | End: 2024-07-17 | Stop reason: HOSPADM

## 2024-07-16 RX ORDER — HEPARIN SODIUM (PORCINE) LOCK FLUSH IV SOLN 100 UNIT/ML 100 UNIT/ML
500 SOLUTION INTRAVENOUS AS NEEDED
Status: CANCELLED | OUTPATIENT
Start: 2024-07-16

## 2024-07-16 RX ADMIN — Medication 20 ML: at 15:42

## 2024-07-16 RX ADMIN — HEPARIN 500 UNITS: 100 SYRINGE at 15:43

## 2024-07-16 NOTE — PROGRESS NOTES
Specialty Pharmacy Patient Management Program  Oncology 6-Month Clinical Assessment       Semaj Medina is a 68 y.o. male with prostate cancer seen today to assess adherence and side effects.    Reason for Outreach: Routine medication check-in .    Regimen: Abiraterone 1000mg PO daily + prednisone 5mg PO BID + Lupron + Xgeva    Specialty Pharmacy Goal   Goals Addressed Today        Specialty Pharmacy General Goal      Clinical goal/therapeutic target: disease control, per the recent oncology clinic notes and labs.  Patient-identified goal of therapy: Patient will adhere to medication regimen by %              Problem List   Problem list reviewed by Malou Stinson, PharmD on 7/16/2024 at 10:43 AM  Patient Active Problem List   Diagnosis Code    Prostate cancer metastatic to bone C61, C79.51    Anemia D64.9    Anxiety F41.9    Blood disease D75.9    Colon polyps K63.5    Depression F32.A    Diverticulitis K57.92    Forgetfulness R68.89    Hepatitis C B19.20    Night sweats R61    Numbness and tingling of left lower extremity R20.0, R20.2    Prostate cancer C61    Secondary malignant neoplasm of bone C79.51    Cancer related pain G89.3    Primary hypertension I10    Colon cancer screening Z12.11       Medication Assessment for Specialty Medication(s):  Medication Assessment  Follow Up Clinical Assessment  Linked Medication(s) Assessed: Abiraterone Acetate  Therapeutic appropriateness: Appropriate  Medication tolerability: Patient reported common adverse drug reaction  Common ADR(s) experienced: Patient is having some back and neck pain but taking pain meds every 3 hours does relieve the pain - MD is aware  Medication plan: Continue therapy with normal follow-up  Quality of Life Improvement Scale: 10-Significantly better  Administration: Patient is taking every day at the same time  and on an empty stomach .   Patient can self administer medications: yes  Medication Follow-Up Plan: Next clinical assessment and  Refill coordination  Lab Review: The labs listed below have been reviewed. No dose adjustments are needed for the oral specialty medication(s) based on the labs.    Lab Results   Component Value Date    GLUCOSE 95 06/13/2024    CALCIUM 8.0 (L) 06/13/2024     06/13/2024    K 3.8 06/13/2024    CO2 22.2 06/13/2024     (H) 06/13/2024    BUN 9 06/13/2024    CREATININE 0.88 06/13/2024    EGFRIFAFRI 97 02/23/2022    BCR 10.2 06/13/2024    ANIONGAP 9.8 06/13/2024     Lab Results   Component Value Date    WBC 4.87 06/13/2024    RBC 3.89 (L) 06/13/2024    HGB 11.2 (L) 06/13/2024    HCT 33.3 (L) 06/13/2024    MCV 85.6 06/13/2024    MCH 28.8 06/13/2024    MCHC 33.6 06/13/2024    RDW 15.5 (H) 06/13/2024    RDWSD 48.6 06/13/2024    MPV 9.5 06/13/2024     06/13/2024    NEUTRORELPCT 52.4 06/13/2024    LYMPHORELPCT 34.5 06/13/2024    MONORELPCT 8.0 06/13/2024    EOSRELPCT 4.5 06/13/2024    BASORELPCT 0.4 06/13/2024    AUTOIGPER 0.2 06/13/2024    NEUTROABS 2.55 06/13/2024    LYMPHSABS 1.68 06/13/2024    MONOSABS 0.39 06/13/2024    EOSABS 0.22 06/13/2024    BASOSABS 0.02 06/13/2024    AUTOIGNUM 0.01 06/13/2024    NRBC 0.0 06/13/2024     Drug-drug interactions  Completed medication reconciliation today to assess for drug interactions. Patient denies starting or stopping any medications.    Assessed medication list for interactions, no significant drug interactions noted.   Advised patient to call the clinic if any new medications are started so we can assess for drug-drug interactions.  Drug-food interactions discussed: eating grapefruit and drinking grapefruit juice  Vaccines are coordinated by the patient's oncologist and primary care provider.    Medications   Medicines reviewed by Malou Stinson, PharmD on 7/16/2024 at 10:43 AM  Prior to Admission medications    Medication Sig Start Date End Date Taking? Authorizing Provider   abiraterone acetate (ZYTIGA) 500 MG tablet Take 2 tablets by mouth Daily. 1/26/24    Joshua Dick MD   amLODIPine (NORVASC) 5 MG tablet Take 1 tablet by mouth Daily. 6/28/24   Maryse Monae MD   Calcium Carbonate-Vitamin D (Calcium Plus Vitamin D) 500-1.25 MG-MCG capsule Take 1 capsule by mouth Daily. 3/8/24   Joshua Dick MD   folic acid (FOLVITE) 1 MG tablet  7/26/22   Sean Chong MD   HYDROcodone-acetaminophen (NORCO)  MG per tablet Take 1 tablet by mouth Every 4 (Four) Hours As Needed for Moderate Pain. 7/16/24   Joshua Dick MD   ibuprofen (ADVIL,MOTRIN) 800 MG tablet TAKE 1 TABLET BY MOUTH EVERY 6 - 8 HOURS AS NEEDED FOR PAIN 3/1/23   Sean Chong MD   leuprolide (Lupron Depot, 3-Month,) 22.5 MG injection 22.5 mg.    Sean Chong MD   lisinopril (PRINIVIL,ZESTRIL) 40 MG tablet TAKE 1 TABLET BY MOUTH DAILY 7/8/24   Maryse Monae MD   magnesium oxide (MAG-OX) 400 MG tablet Take 1 tablet by mouth Daily.  Patient not taking: Reported on 6/28/2024 1/12/24   Joshua Dick MD   ondansetron (ZOFRAN) 8 MG tablet Take 1 tablet by mouth Every 8 (Eight) Hours As Needed for Nausea or Vomiting.  Patient not taking: Reported on 5/24/2024 1/26/24   Joshua Dick MD   oxyCODONE (oxyCONTIN) 10 MG 12 hr tablet Take 1 tablet by mouth Every 12 (Twelve) Hours. 6/28/24   Joshua Dick MD   predniSONE (DELTASONE) 5 MG tablet Take 1 tablet by mouth 2 (Two) Times a Day. 1/26/24   Joshua Dick MD   prochlorperazine (COMPAZINE) 10 MG tablet Take 1 tablet by mouth Every 6 (Six) Hours As Needed for Nausea or Vomiting.  Patient not taking: Reported on 5/24/2024 2/22/24   Joshua Dick MD   HYDROcodone-acetaminophen (NORCO)  MG per tablet Take 1 tablet by mouth Every 4 (Four) Hours As Needed for Moderate Pain. 5/10/24 6/18/24  Joshua Dick MD   HYDROcodone-acetaminophen (NORCO)  MG per tablet Take 1 tablet by mouth Every 4 (Four) Hours As Needed for Moderate Pain. 6/18/24 7/15/24  Joshua Dick MD   lisinopril (PRINIVIL,ZESTRIL) 40 MG tablet Take 1 tablet  by mouth Daily. 4/10/24 7/8/24  Maryse Monae MD       Allergies  Known allergies and reactions were discussed with the patient. The patient's chart has been reviewed for allergy information and updated as necessary.   No Known Allergies      Allergies reviewed by Malou Stinson PharmD on 7/16/2024 at 10:43 AM    Hospitalizations and Urgent Care Visits Since Last Assessment:  Unplanned hospitalizations or inpatient admissions: 0  ED Visits: 0  Urgent Office Visits: 0    Adherence Assessment:  Adherence Questions  Linked Medication(s) Assessed: Abiraterone Acetate  On average, how many doses/injections does the patient miss per month?: 0  What are the identified reasons for non-adherence or missed doses? : no problems identified  What is the estimated medication adherence level?: %  Based on the patient/caregiver response and refill history, does this patient require an MTP to track adherence improvements?: no    Quality of Life Assessment:  Quality of Life Assessment  Quality of Life Improvement Scale: 10-Significantly better  -- Quality of Life: 10/10    Financial Assessment:  Medication availability/affordability: Patient has had no issues obtaining medication from pharmacy.    Attestation:  I attest the patient was actively involved in and has agreed to the above plan of care. If the prescribed therapy is at any point deemed not appropriate based on the current or future assessments, a consultation will be initiated with the patient's specialty care provider to determine the best course of action. The revised plan of therapy will be documented along with any required assessments and/or additional patient education provided.       All questions addressed and patient had no additional concerns. Patient has pharmacy contact information.    Malou Stinson PharmD, [facility]  Clinical Oncology Pharmacy Specialist  Phone: [phone]      7/16/2024  10:44 EDT

## 2024-07-17 ENCOUNTER — HOSPITAL ENCOUNTER (OUTPATIENT)
Dept: ONCOLOGY | Facility: HOSPITAL | Age: 69
Discharge: HOME | End: 2024-07-17
Admitting: INTERNAL MEDICINE
Payer: MEDICARE

## 2024-07-17 DIAGNOSIS — C61 PROSTATE CANCER: ICD-10-CM

## 2024-07-17 DIAGNOSIS — C79.51 PROSTATE CANCER METASTATIC TO BONE: Primary | ICD-10-CM

## 2024-07-17 DIAGNOSIS — C61 PROSTATE CANCER METASTATIC TO BONE: Primary | ICD-10-CM

## 2024-07-17 LAB
ALBUMIN SERPL-MCNC: 3.6 G/DL (ref 3.5–5.2)
ALBUMIN/GLOB SERPL: 1.7 G/DL
ALP SERPL-CCNC: 44 U/L (ref 39–117)
ALT SERPL W P-5'-P-CCNC: 4 U/L (ref 1–41)
ANION GAP SERPL CALCULATED.3IONS-SCNC: 2.5 MMOL/L (ref 5–15)
AST SERPL-CCNC: 14 U/L (ref 1–40)
BASOPHILS # BLD AUTO: 0.02 10*3/MM3 (ref 0–0.2)
BASOPHILS NFR BLD AUTO: 0.4 % (ref 0–1.5)
BILIRUB SERPL-MCNC: 0.3 MG/DL (ref 0–1.2)
BUN SERPL-MCNC: 9 MG/DL (ref 8–23)
BUN/CREAT SERPL: 10.7 (ref 7–25)
CALCIUM SPEC-SCNC: 8.8 MG/DL (ref 8.6–10.5)
CHLORIDE SERPL-SCNC: 110 MMOL/L (ref 98–107)
CO2 SERPL-SCNC: 25.5 MMOL/L (ref 22–29)
CREAT SERPL-MCNC: 0.84 MG/DL (ref 0.76–1.27)
DEPRECATED RDW RBC AUTO: 43.7 FL (ref 37–54)
EGFRCR SERPLBLD CKD-EPI 2021: 95 ML/MIN/1.73
EOSINOPHIL # BLD AUTO: 0.24 10*3/MM3 (ref 0–0.4)
EOSINOPHIL NFR BLD AUTO: 4.7 % (ref 0.3–6.2)
ERYTHROCYTE [DISTWIDTH] IN BLOOD BY AUTOMATED COUNT: 14.1 % (ref 12.3–15.4)
GLOBULIN UR ELPH-MCNC: 2.1 GM/DL
GLUCOSE SERPL-MCNC: 98 MG/DL (ref 65–99)
HCT VFR BLD AUTO: 34.3 % (ref 37.5–51)
HGB BLD-MCNC: 11.5 G/DL (ref 13–17.7)
IMM GRANULOCYTES # BLD AUTO: 0.01 10*3/MM3 (ref 0–0.05)
IMM GRANULOCYTES NFR BLD AUTO: 0.2 % (ref 0–0.5)
LYMPHOCYTES # BLD AUTO: 2.19 10*3/MM3 (ref 0.7–3.1)
LYMPHOCYTES NFR BLD AUTO: 43.3 % (ref 19.6–45.3)
MAGNESIUM SERPL-MCNC: 1.7 MG/DL (ref 1.6–2.4)
MCH RBC QN AUTO: 28 PG (ref 26.6–33)
MCHC RBC AUTO-ENTMCNC: 33.5 G/DL (ref 31.5–35.7)
MCV RBC AUTO: 83.7 FL (ref 79–97)
MONOCYTES # BLD AUTO: 0.35 10*3/MM3 (ref 0.1–0.9)
MONOCYTES NFR BLD AUTO: 6.9 % (ref 5–12)
NEUTROPHILS NFR BLD AUTO: 2.25 10*3/MM3 (ref 1.7–7)
NEUTROPHILS NFR BLD AUTO: 44.5 % (ref 42.7–76)
PHOSPHATE SERPL-MCNC: 3.3 MG/DL (ref 2.5–4.5)
PLATELET # BLD AUTO: 379 10*3/MM3 (ref 140–450)
PMV BLD AUTO: 8.8 FL (ref 6–12)
POTASSIUM SERPL-SCNC: 4.3 MMOL/L (ref 3.5–5.2)
PROT SERPL-MCNC: 5.7 G/DL (ref 6–8.5)
PSA SERPL-MCNC: 17.7 NG/ML (ref 0–4)
RBC # BLD AUTO: 4.1 10*6/MM3 (ref 4.14–5.8)
SODIUM SERPL-SCNC: 138 MMOL/L (ref 136–145)
WBC NRBC COR # BLD AUTO: 5.06 10*3/MM3 (ref 3.4–10.8)

## 2024-07-17 PROCEDURE — 80053 COMPREHEN METABOLIC PANEL: CPT | Performed by: INTERNAL MEDICINE

## 2024-07-17 PROCEDURE — 84100 ASSAY OF PHOSPHORUS: CPT | Performed by: INTERNAL MEDICINE

## 2024-07-17 PROCEDURE — 83735 ASSAY OF MAGNESIUM: CPT | Performed by: INTERNAL MEDICINE

## 2024-07-17 PROCEDURE — 36591 DRAW BLOOD OFF VENOUS DEVICE: CPT

## 2024-07-17 PROCEDURE — 25010000002 HEPARIN LOCK FLUSH PER 10 UNITS: Performed by: INTERNAL MEDICINE

## 2024-07-17 PROCEDURE — 84153 ASSAY OF PSA TOTAL: CPT | Performed by: INTERNAL MEDICINE

## 2024-07-17 PROCEDURE — 85025 COMPLETE CBC W/AUTO DIFF WBC: CPT | Performed by: INTERNAL MEDICINE

## 2024-07-17 RX ORDER — HEPARIN SODIUM (PORCINE) LOCK FLUSH IV SOLN 100 UNIT/ML 100 UNIT/ML
500 SOLUTION INTRAVENOUS AS NEEDED
OUTPATIENT
Start: 2024-07-17

## 2024-07-17 RX ORDER — SODIUM CHLORIDE 0.9 % (FLUSH) 0.9 %
20 SYRINGE (ML) INJECTION AS NEEDED
OUTPATIENT
Start: 2024-07-17

## 2024-07-17 RX ORDER — SODIUM CHLORIDE 0.9 % (FLUSH) 0.9 %
20 SYRINGE (ML) INJECTION AS NEEDED
Status: DISCONTINUED | OUTPATIENT
Start: 2024-07-17 | End: 2024-07-18 | Stop reason: HOSPADM

## 2024-07-17 RX ORDER — HEPARIN SODIUM (PORCINE) LOCK FLUSH IV SOLN 100 UNIT/ML 100 UNIT/ML
500 SOLUTION INTRAVENOUS AS NEEDED
Status: DISCONTINUED | OUTPATIENT
Start: 2024-07-17 | End: 2024-07-18 | Stop reason: HOSPADM

## 2024-07-17 RX ADMIN — HEPARIN 500 UNITS: 100 SYRINGE at 12:53

## 2024-07-17 RX ADMIN — Medication 20 ML: at 12:53

## 2024-07-23 ENCOUNTER — SPECIALTY PHARMACY (OUTPATIENT)
Dept: PHARMACY | Facility: HOSPITAL | Age: 69
End: 2024-07-23
Payer: MEDICARE

## 2024-07-23 NOTE — PROGRESS NOTES
Specialty Pharmacy Patient Management Program  Oncology Refill Outreach      Semaj is a 68 y.o. male contacted today regarding refills of his medication(s).    Specialty medication(s) and dose(s) confirmed:  Completed refill outreach. [facility] will mail abiraterone acetate (ZYTIGA) 500 MG tablet and predniSONE (DELTASONE) 5 MG tablet once ready to dispense.     Other medications being refilled: N/A    Refill Questions      Flowsheet Row Most Recent Value   Changes to allergies? No   Changes to medications? No   New conditions or infections since last clinic visit No   Unplanned office visit, urgent care, ED, or hospital admission in the last 4 weeks  No   How does patient/caregiver feel medication is working? Good   Financial problems or insurance changes  No   Since the previous refill, were any specialty medication doses or scheduled injections missed or delayed?  No   Does this patient require a clinical escalation to a pharmacist? No            Delivery Questions      Flowsheet Row Most Recent Value   Delivery method [employer]   Delivery address verified with patient/caregiver? Yes   Delivery address Home   Number of medications in delivery 2   Medication(s) being filled and delivered Prednisone, Abiraterone Acetate   Doses left of specialty medications 10 days   Copay verified? Yes   Copay amount zero copay   Copay form of payment No copayment ($0)   Ship Date 07/24/2024   Delivery Date 07/25/2024   Signature Required No                   Follow-up: 21 days     Oralia Castillo  Care Coordinator, [facility]  7/23/2024  15:03 EDT

## 2024-07-26 ENCOUNTER — SPECIALTY PHARMACY (OUTPATIENT)
Dept: ONCOLOGY | Facility: HOSPITAL | Age: 69
End: 2024-07-26
Payer: MEDICARE

## 2024-07-26 ENCOUNTER — OFFICE VISIT (OUTPATIENT)
Dept: ONCOLOGY | Facility: HOSPITAL | Age: 69
End: 2024-07-26
Payer: MEDICARE

## 2024-07-26 ENCOUNTER — HOSPITAL ENCOUNTER (OUTPATIENT)
Dept: ONCOLOGY | Facility: HOSPITAL | Age: 69
Discharge: HOME | End: 2024-07-26
Payer: MEDICARE

## 2024-07-26 VITALS — HEART RATE: 70 BPM | DIASTOLIC BLOOD PRESSURE: 80 MMHG | SYSTOLIC BLOOD PRESSURE: 140 MMHG

## 2024-07-26 VITALS
HEART RATE: 70 BPM | HEIGHT: 69 IN | SYSTOLIC BLOOD PRESSURE: 140 MMHG | WEIGHT: 122.2 LBS | OXYGEN SATURATION: 98 % | DIASTOLIC BLOOD PRESSURE: 80 MMHG | TEMPERATURE: 99.7 F | RESPIRATION RATE: 16 BRPM | BODY MASS INDEX: 18.1 KG/M2

## 2024-07-26 DIAGNOSIS — C61 PROSTATE CANCER METASTATIC TO BONE: Primary | ICD-10-CM

## 2024-07-26 DIAGNOSIS — G89.3 CANCER RELATED PAIN: ICD-10-CM

## 2024-07-26 DIAGNOSIS — C79.51 PROSTATE CANCER METASTATIC TO BONE: Primary | ICD-10-CM

## 2024-07-26 PROCEDURE — 25010000002 DENOSUMAB 120 MG/1.7ML SOLUTION: Performed by: INTERNAL MEDICINE

## 2024-07-26 PROCEDURE — 96372 THER/PROPH/DIAG INJ SC/IM: CPT

## 2024-07-26 RX ADMIN — DENOSUMAB 120 MG: 120 INJECTION SUBCUTANEOUS at 10:14

## 2024-07-26 NOTE — PROGRESS NOTES
Chief Complaint/Care Team   Prostate Cancer    Maryse Monae MD Coffie, Ramona N, MD    History of Present Illness     Diagnosis: Metastatic Castration Resistant Prostate Cancer    Current Treatment: Lupron, Zytiga and Prednisone, pending initiation of Pluvicto at [facility] scheduled to start on 8/1/2024    Previous Treatment:   Metastatic Castration resistant prostate cancer:    Patient was initially diagnosed with prostate cancer in 2015.  On 6/4/2015 he underwent radical prostatectomy and LN dissection which showed a [place] 4+3 = 7 prostate cancer.  There were several high risk features such as extraprostatic extension, seminal vesicle invasion, positive margins at the bladder neck and right and left seminal vesicle.  Lymph vascular invasion was indeterminate.  Perineural invasion was not commented on. Final pathology [id number] R1.  He was treated with adjuvant radiation by Dr. Stanton.    According to records the patient likely started Lupron in December 2017 when his PSA started to rise.  The patient took a break in August 2019.  He states he was not aware he was to continue.  He restarted Lupron on 11/26/2019 after it was noted that his PSA chago from 0.46 up to 2.55 on 11/8/2019.  Unfortunately his PSA continued to rise after that to 4.54 on 3/10/2020.    - restarted Lupron on 11/26/20  - Started Zytiga on 8/27/20.  8/31/2020 PSA 17.95     - Started Xtandi in May 2021 due to intolerance of Zytiga even at the lowest dose  - PSA rising in April 2023 to 2.85, doubling time 3.4 months  - started Docetaxel May 2023    Semaj Medina is a 68 y.o. male who presents to [facility] HEMATOLOGY & ONCOLOGY for Metastatic Castration Resistant Prostate Cancer.    History of Present Illness    The patient was previously evaluated by Dr. Salazar, a radiation oncologist [place] [facility] [place], who shared pt was not a candidate for [place] [address]. However, he would be a suitable candidate for Pluvicto, he has referred  him to nuclear medicine specialist at [facility]. A consultation with a nuclear medicine specialist was scheduled for yesterday, but it was postponed due that provider having COVID-19 infection, but it has been re-scheduled for 07/09/2024. His current medication regimen includes Zytiga and prednisone.      Patient reports experiencing back and neck pain, which subsides upon administration of his pain medication. He typically takes his pain medication every 3 hours. He is here prior to next Xgeva treatment.  He is scheduled to begin Pluvicto treatment on August 1, 2024. He is here prior to next Xgeva treatment.       Review of Systems   Constitutional:  Positive for fatigue.   Musculoskeletal:  Positive for back pain (Neck pain).        Oncology/Hematology History Overview Note     Metastatic Castration resistant prostate cancer:    Patient was initially diagnosed with prostate cancer in 2015.  On 6/4/2015 he underwent radical prostatectomy and LN dissection which showed a [place] 4+3 = 7 prostate cancer.  There were several high risk features such as extraprostatic extension, seminal vesicle invasion, positive margins at the bladder neck and right and left seminal vesicle.  Lymph vascular invasion was indeterminate.  Perineural invasion was not commented on. Final pathology [id number] R1.  He was treated with adjuvant radiation by Dr. Stanton.    According to records the patient likely started Lupron in December 2017 when his PSA started to rise.  The patient took a break in August 2019.  He states he was not aware he was to continue.  He restarted Lupron on 11/26/2019 after it was noted that his PSA chago from 0.46 up to 2.55 on 11/8/2019.  Unfortunately his PSA continued to rise after that to 4.54 on 3/10/2020.    - restarted Lupron on 11/26/20  - Started Zytiga on 8/27/20.  8/31/2020 PSA 17.95     - Started Xtandi in May 2021 due to intolerance of Zytiga even at the lowest dose  - PSA rising in April 2023 to  2.85, doubling time 3.4 months  - started Docetaxel May 2023    PSMA PET 5/2/23:   1.  Radiotracer avid supra clavicular, thoracic and abdominal adenopathy and sclerotic lesion within T10 vertebral body likely representing metastatic disease.  2.  Indeterminate lesion within the left ilium demonstrating mild uptake.   3.  Asymmetric sclerosis within the superior lateral aspect the left orbit which does not demonstrate significant uptake above that of the right orbit. Findings are nonspecific with  differential considerations including but not limited to metastatic disease, fibrous dysplasia versus Paget's disease.   4.  Sub-6 mm nodule within the left lower lobe.  5.  Scattered indeterminate hypodense lesions throughout the liver which do not demonstrate radiotracer uptake above that of the adjacent liver, best best visualized on recent multiphase CT abdomen and pelvis 04/17/2023. Please refer to this dictation for further information.       Prostate cancer metastatic to bone   12/11/2020 -  Chemotherapy    OP SUPPORTIVE Leuprolide 45 mg Q6M     3/23/2021 -  Chemotherapy    OP SUPPORTIVE Denosumab (Xgeva) Q28D     4/15/2021 - 5/13/2021 Chemotherapy    OP PROSTATE Enzalutamide     5/19/2021 Initial Diagnosis    Prostate cancer metastatic to bone (CMS/HCC)     6/2/2023 - 9/15/2023 Biopsy    OP PROSTATE DOCEtaxel  Plan Provider: Natalya Jack MD PhD  Treatment goal: Palliative  Line of treatment: [No plan line of treatment]     12/19/2023 - 12/19/2023 Chemotherapy    OP PROSTATE Apalutamide     1/26/2024 Biopsy    OP PROSTATE Abiraterone / PredniSONE  Plan Provider: Joshua Dick MD  Treatment goal: Control  Line of treatment: [No plan line of treatment]     Prostate cancer   6/16/2021 Initial Diagnosis    Prostate cancer (HCC)     6/2/2023 -  Chemotherapy    OP CENTRAL VENOUS ACCESS DEVICE ACCESS, CARE, AND MAINTENANCE (CVAD)     Secondary malignant neoplasm of bone   11/4/2021 Initial Diagnosis    Secondary  "malignant neoplasm of bone (HCC)     11/23/2021 - 12/9/2021 Radiation    Radiation OncologyTreatment Course:  Semaj Medina received 3000 cGy in 10 fractions to the T9-T11 spine.          Objective     Vitals:    07/26/24 0902   BP: 140/80   Pulse: 70   Resp: 16   Temp: 99.7 °F (37.6 °C)   TempSrc: Temporal   SpO2: 98%   Weight: 55.4 kg (122 lb 3.2 oz)   Height: 175 cm (68.9\")   PainSc:   4   PainLoc: Back       ECOG score: 0         PHQ-9 Total Score:         Physical Exam  Vitals reviewed. Exam conducted with a chaperone present.   Constitutional:       General: He is not in acute distress.  HENT:      Head: Normocephalic and atraumatic.   Eyes:      Conjunctiva/sclera: Conjunctivae normal.      Pupils: Pupils are equal, round, and reactive to light.   Neurological:      Mental Status: He is alert and oriented to person, place, and time.       Physical Exam        Past Medical History     Past Medical History:   Diagnosis Date    Anemia     NO PROBLEMS    Anxiety     Blood disease     Colon polyps     Depression     Diverticulitis     Forgetfulness     Hepatitis C     RESOLVED    Hypertension     Night sweats     Numbness and tingling of left lower extremity     Prostate cancer      Current Outpatient Medications on File Prior to Visit   Medication Sig Dispense Refill    abiraterone acetate (ZYTIGA) 500 MG tablet Take 2 tablets by mouth Daily. 60 tablet 11    amLODIPine (NORVASC) 5 MG tablet Take 1 tablet by mouth Daily. 30 tablet 1    Calcium Carbonate-Vitamin D (Calcium Plus Vitamin D) 500-1.25 MG-MCG capsule Take 1 capsule by mouth Daily. 30 capsule 5    HYDROcodone-acetaminophen (NORCO)  MG per tablet Take 1 tablet by mouth Every 4 (Four) Hours As Needed for Moderate Pain. 180 tablet 0    ibuprofen (ADVIL,MOTRIN) 800 MG tablet TAKE 1 TABLET BY MOUTH EVERY 6 - 8 HOURS AS NEEDED FOR PAIN      leuprolide (Lupron [place], 3-Month,) 22.5 MG injection 22.5 mg.      lisinopril (PRINIVIL,ZESTRIL) 40 MG tablet " TAKE 1 TABLET BY MOUTH DAILY 90 tablet 0    oxyCODONE (oxyCONTIN) 10 MG 12 hr tablet Take 1 tablet by mouth Every 12 (Twelve) Hours. 60 tablet 0    predniSONE (DELTASONE) 5 MG tablet Take 1 tablet by mouth 2 (Two) Times a Day. 60 tablet 11    folic acid (FOLVITE) 1 MG tablet  (Patient not taking: Reported on 6/28/2024)      magnesium oxide (MAG-OX) 400 MG tablet Take 1 tablet by mouth Daily. (Patient not taking: Reported on 6/28/2024) 30 tablet 0    ondansetron (ZOFRAN) 8 MG tablet Take 1 tablet by mouth Every 8 (Eight) Hours As Needed for Nausea or Vomiting. (Patient not taking: Reported on 5/24/2024) 90 tablet 3    prochlorperazine (COMPAZINE) 10 MG tablet Take 1 tablet by mouth Every 6 (Six) Hours As Needed for Nausea or Vomiting. (Patient not taking: Reported on 5/24/2024) 30 tablet 5     Current Facility-Administered Medications on File Prior to Visit   Medication Dose Route Frequency Provider Last Rate Last Admin    [COMPLETED] denosumab (XGEVA) injection 120 mg  120 mg Subcutaneous Once Joshua Dick MD   120 mg at 07/26/24 1014      No Known Allergies  Past Surgical History:   Procedure Laterality Date    COLONOSCOPY      PROSTATE BIOPSY      PROSTATECTOMY      ROBOT ASSISTED W PELVIC LYMPH NODE DISSECTION    TRANSURETHRAL RESECTION OF BLADDER TUMOR      VENOUS ACCESS DEVICE (PORT) INSERTION Right 5/22/2023    Procedure: INSERTION VENOUS ACCESS PORT;  Surgeon: Waldemar Iraheta MD;  [facility]: [facility] [place];  Service: General;  Laterality: Right;     Social History     Socioeconomic History    Marital status:    Tobacco Use    Smoking status: Every Day     Current packs/day: 0.25     Average packs/day: 0.3 packs/day for 53.8 years (13.4 ttl pk-yrs)     Types: Cigarettes     Start date: 10/21/1970     Passive exposure: Past    Smokeless tobacco: Never    Tobacco comments:     INSTRUCTED NO SMOKING 24 HR PRIOR TO SURGERY      LAST SMOKED AT 200AM 05-22-23   Vaping Use    Vaping status: Never Used    Substance and Sexual Activity    Alcohol use: Yes     Alcohol/week: 6.0 standard drinks of alcohol     Types: 6 Cans of beer per week     Comment: 12 pack a week of beer    Drug use: Never    Sexual activity: Defer     Family History   Problem Relation Age of Onset    Cancer Mother     Cancer Father        Results     Result Review   The following data was reviewed by: Joshua Dick MD on 06/28/2024:  Lab Results   Component Value Date    HGB 11.5 (L) 07/17/2024    HCT 34.3 (L) 07/17/2024    MCV 83.7 07/17/2024     07/17/2024    WBC 5.06 07/17/2024    NEUTROABS 2.25 07/17/2024    LYMPHSABS 2.19 07/17/2024    MONOSABS 0.35 07/17/2024    EOSABS 0.24 07/17/2024    BASOSABS 0.02 07/17/2024     Lab Results   Component Value Date    GLUCOSE 98 07/17/2024    BUN 9 07/17/2024    CREATININE 0.84 07/17/2024     07/17/2024    K 4.3 07/17/2024     (H) 07/17/2024    CO2 25.5 07/17/2024    CALCIUM 8.8 07/17/2024    PROTEINTOT 5.7 (L) 07/17/2024    ALBUMIN 3.6 07/17/2024    BILITOT 0.3 07/17/2024    ALKPHOS 44 07/17/2024    AST 14 07/17/2024    ALT 4 07/17/2024     Lab Results   Component Value Date    MG 1.7 07/17/2024    PHOS 3.3 07/17/2024    TSH 1.350 05/16/2019       No radiology results for the last day  MRI Thoracic Spine With & Without Contrast    Result Date: 6/26/2024  Impression: Impression: 1. New metastatic disease involving the T5, T8, and T12 vertebral bodies. There is stable metastatic lesion at the T10 level. No evidence of acute fracture. Electronically Signed: Ben Reyes MD  6/26/2024 7:24 PM EDT  Workstation ID: [id number]     Assessment & Plan     Diagnoses and all orders for this visit:    1. Prostate cancer metastatic to bone (Primary)    2. Cancer related pain    Other orders  -     Cancel: denosumab (XGEVA) injection 120 mg           Semaj Medina is a 68 y.o. male who presents to [facility] HEMATOLOGY & ONCOLOGY for treatment of  metastatic prostate cancer,  completed 6 cycles of docetaxel on 9/15/2023, Lupron Q6 months, Pt also receiving Xgeva and he is here prior to next Xgeva infusion  -next Lupron injection due on 6/18/2024  -PSA from 12/15/23 was 8 up from 3.1 (in 10/2023)  -Discussed result of NM bone scan, CT CAP from 1/24/2024 which revealed interval enlargement T10 vertebral body metastasis, no evidence of intrathoracic metastatic disease, slight worsening appearance of osteoblastic schedule static disease, notable at T10, T8, and T5, no evidence of intra-abdominal or pelvic metastatic disease.  -Patient has undergone radiation therapy 10 fractions to thoracic spine from T9-T11 in 2021.  -Also shared plan to reattempt treatment with Zytiga and prednisone, patient reports some nausea vomiting which we will attempt to manage with antiemetics.  -Orders placed for Zytiga and prednisone, also provided prescriptions for Compazine and Zofran for patient to begin once he begins treatment with these medications.  -pt began zytiga/prednisone on 2/2/2024, pt tolerating it well, no evidence of toxicity on labs, recommend he continue at current dose     -discussed results of restaging imaging from 4/24/2024 which showed stable radiotracer uptake involving sclerotic osseous metastasis of the left frontal bone above left orbit and at T10, no findings of osseous progression, CT chest abdomen pelvis revealed stable sclerotic osseous metastasis, no suspicious adenopathy, postsurgical changes of prostatectomy.          -Given presence of continued osseous metastasis, patient with continued pain in area of metastasis, and since he has previously received radiation in this area, referred him to [facility] school medicine for consideration for treatment with [place] [address] or Pluvicto.  -Shared results of PSMA PET/CT scan from 6/13/2024 which revealed progression of disease, patient has been referred to nuclear medicine specialist at [facility] for consideration for Pluvicto,pt scheduled to  begin Pluvicto on 8/1/2024.     -The patient's PSA levels have shown a increase to 17.7 on 7/17/2024  -Recommend pt continue of Lupron, Xgeva, and Zytiga and prednisone  -OxyContin 10 mg was prescribed for pain management, to be taken twice daily every 12 hours.  -will continue to provide refills of his [place] for his cancer related pain, added OxyContin twice daily given lack of relief with Norco.     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      Hypomagnesemia  -pt prescribed mag oxide 400 mg PO QD   -will recheck at next clinic appointment     Low Vit D  -Vit level of 27.7  -prescribed Vit D 50K IU once weekly x 8 weeks             Please note that portions of this note were completed with a voice recognition program.      Assessment & Plan        Plan for patient follow-up in 2 months with me.    Electronically signed by Joshua Dick MD, 07/26/24, 1:59 PM EDT.        Follow Up     I spent 30 minutes caring for Semaj on this date of service. This time includes time spent by me in the following activities:preparing for the visit, reviewing tests, obtaining and/or reviewing a separately obtained history, performing a medically appropriate examination and/or evaluation , counseling and educating the patient/family/caregiver, ordering medications, tests, or procedures, referring and communicating with other health care professionals , documenting information in the medical record, independently interpreting results and communicating that information with the patient/family/caregiver, and care coordination    Any chemotherapy or immunotherapy or other systemic therapy treatment plan involves a high risk of complications and/or mortality of patient management.    The patient was seen and examined. Work by the provider also included review and/or ordering of lab tests,  review and/or ordering of radiology tests, review and/or ordering of medicine tests, discussion with other physicians or providers, independent review of data, obtaining old records, review/summation of old records, and/or other review.    I have reviewed the family history, social history, and past medical history for this patient. Previous information and data has been reviewed and updated as needed. I have reviewed and verified the chief complaint, history, and other documentation. The patient was interviewed and examined in the clinic and the chart reviewed. The previous observations, recommendations, and conclusions were reviewed including those of other providers.     The plan was discussed with the patient and/or family. The patient was given time to ask questions and these questions were answered. At the conclusion of their visit they had no additional questions or concerns and all questions were answered to their satisfaction.    Patient was given instructions and counseling regarding his condition or for health maintenance advice. Please see specific information pulled into the AVS if appropriate.       Patient or patient representative verbalized consent for the use of Ambient Listening during the visit with  Joshua Dick MD for chart documentation. 7/26/2024  17:46 EDT

## 2024-07-29 DIAGNOSIS — G89.3 CANCER RELATED PAIN: ICD-10-CM

## 2024-07-30 RX ORDER — OXYCODONE HCL 10 MG/1
10 TABLET, FILM COATED, EXTENDED RELEASE ORAL EVERY 12 HOURS
Qty: 60 TABLET | Refills: 0 | Status: SHIPPED | OUTPATIENT
Start: 2024-07-30

## 2024-08-15 DIAGNOSIS — C79.51 PROSTATE CANCER METASTATIC TO BONE: ICD-10-CM

## 2024-08-15 DIAGNOSIS — C61 PROSTATE CANCER METASTATIC TO BONE: ICD-10-CM

## 2024-08-15 RX ORDER — HYDROCODONE BITARTRATE AND ACETAMINOPHEN 10; 325 MG/1; MG/1
1 TABLET ORAL EVERY 4 HOURS PRN
Qty: 180 TABLET | Refills: 0 | Status: SHIPPED | OUTPATIENT
Start: 2024-08-15

## 2024-08-16 ENCOUNTER — SPECIALTY PHARMACY (OUTPATIENT)
Dept: PHARMACY | Facility: HOSPITAL | Age: 69
End: 2024-08-16
Payer: MEDICARE

## 2024-08-16 NOTE — PROGRESS NOTES
Specialty Pharmacy Patient Management Program  Oncology Refill Outreach      eSmaj is a 68 y.o. male contacted today regarding refills of his medication(s).    Specialty medication(s) and dose(s) confirmed: Completed refill outreach. [facility] will mail abiraterone acetate (ZYTIGA) 500 MG tablet and predniSONE (DELTASONE) 5 MG tablet once ready to dispense.     Other medications being refilled: N/A    Refill Questions      Flowsheet Row Most Recent Value   Changes to allergies? No   Changes to medications? No   New conditions or infections since last clinic visit No   Unplanned office visit, urgent care, ED, or hospital admission in the last 4 weeks  No   How does patient/caregiver feel medication is working? Good   Financial problems or insurance changes  No   Since the previous refill, were any specialty medication doses or scheduled injections missed or delayed?  No   Does this patient require a clinical escalation to a pharmacist? No            Delivery Questions      Flowsheet Row Most Recent Value   Delivery method [employer]   Delivery address verified with patient/caregiver? Yes   Delivery address Home   Number of medications in delivery 2   Medication(s) being filled and delivered Prednisone, Abiraterone Acetate   Doses left of specialty medications 7   Copay verified? Yes   Copay amount zero copay   Copay form of payment No copayment ($0)   Ship Date 08/19/24   Delivery Date 08/20/24   Signature Required No                   Follow-up: 21 days     Oralia Castillo  Care Coordinator, [facility]  8/16/2024  10:20 EDT

## 2024-08-21 ENCOUNTER — TELEPHONE (OUTPATIENT)
Dept: ONCOLOGY | Facility: HOSPITAL | Age: 69
End: 2024-08-21

## 2024-08-23 ENCOUNTER — HOSPITAL ENCOUNTER (OUTPATIENT)
Dept: ONCOLOGY | Facility: HOSPITAL | Age: 69
Discharge: HOME | End: 2024-08-23
Payer: MEDICARE

## 2024-08-23 VITALS
RESPIRATION RATE: 18 BRPM | SYSTOLIC BLOOD PRESSURE: 108 MMHG | DIASTOLIC BLOOD PRESSURE: 75 MMHG | HEART RATE: 74 BPM | OXYGEN SATURATION: 100 % | TEMPERATURE: 98.5 F

## 2024-08-23 DIAGNOSIS — C79.51 PROSTATE CANCER METASTATIC TO BONE: Primary | ICD-10-CM

## 2024-08-23 DIAGNOSIS — C61 PROSTATE CANCER METASTATIC TO BONE: Primary | ICD-10-CM

## 2024-08-23 DIAGNOSIS — C61 PROSTATE CANCER: ICD-10-CM

## 2024-08-23 LAB
ALBUMIN SERPL-MCNC: 3.9 G/DL (ref 3.5–5.2)
ALBUMIN/GLOB SERPL: 1.5 G/DL
ALP SERPL-CCNC: 47 U/L (ref 39–117)
ALT SERPL W P-5'-P-CCNC: 5 U/L (ref 1–41)
ANION GAP SERPL CALCULATED.3IONS-SCNC: 6.5 MMOL/L (ref 5–15)
AST SERPL-CCNC: 12 U/L (ref 1–40)
BASOPHILS # BLD AUTO: 0.02 10*3/MM3 (ref 0–0.2)
BASOPHILS NFR BLD AUTO: 0.4 % (ref 0–1.5)
BILIRUB SERPL-MCNC: 0.5 MG/DL (ref 0–1.2)
BUN SERPL-MCNC: 16 MG/DL (ref 8–23)
BUN/CREAT SERPL: 16.8 (ref 7–25)
CALCIUM SPEC-SCNC: 9.1 MG/DL (ref 8.6–10.5)
CHLORIDE SERPL-SCNC: 108 MMOL/L (ref 98–107)
CO2 SERPL-SCNC: 24.5 MMOL/L (ref 22–29)
CREAT SERPL-MCNC: 0.95 MG/DL (ref 0.76–1.27)
DEPRECATED RDW RBC AUTO: 45.3 FL (ref 37–54)
EGFRCR SERPLBLD CKD-EPI 2021: 87.2 ML/MIN/1.73
EOSINOPHIL # BLD AUTO: 0.16 10*3/MM3 (ref 0–0.4)
EOSINOPHIL NFR BLD AUTO: 3 % (ref 0.3–6.2)
ERYTHROCYTE [DISTWIDTH] IN BLOOD BY AUTOMATED COUNT: 15.1 % (ref 12.3–15.4)
GLOBULIN UR ELPH-MCNC: 2.6 GM/DL
GLUCOSE SERPL-MCNC: 114 MG/DL (ref 65–99)
HCT VFR BLD AUTO: 36.2 % (ref 37.5–51)
HGB BLD-MCNC: 12.1 G/DL (ref 13–17.7)
IMM GRANULOCYTES # BLD AUTO: 0.01 10*3/MM3 (ref 0–0.05)
IMM GRANULOCYTES NFR BLD AUTO: 0.2 % (ref 0–0.5)
LYMPHOCYTES # BLD AUTO: 1.4 10*3/MM3 (ref 0.7–3.1)
LYMPHOCYTES NFR BLD AUTO: 26.3 % (ref 19.6–45.3)
MAGNESIUM SERPL-MCNC: 2 MG/DL (ref 1.6–2.4)
MCH RBC QN AUTO: 27.2 PG (ref 26.6–33)
MCHC RBC AUTO-ENTMCNC: 33.4 G/DL (ref 31.5–35.7)
MCV RBC AUTO: 81.3 FL (ref 79–97)
MONOCYTES # BLD AUTO: 0.47 10*3/MM3 (ref 0.1–0.9)
MONOCYTES NFR BLD AUTO: 8.8 % (ref 5–12)
NEUTROPHILS NFR BLD AUTO: 3.26 10*3/MM3 (ref 1.7–7)
NEUTROPHILS NFR BLD AUTO: 61.3 % (ref 42.7–76)
PHOSPHATE SERPL-MCNC: 4 MG/DL (ref 2.5–4.5)
PLATELET # BLD AUTO: 315 10*3/MM3 (ref 140–450)
PMV BLD AUTO: 8.6 FL (ref 6–12)
POTASSIUM SERPL-SCNC: 4.4 MMOL/L (ref 3.5–5.2)
PROT SERPL-MCNC: 6.5 G/DL (ref 6–8.5)
PSA SERPL-MCNC: 21 NG/ML (ref 0–4)
RBC # BLD AUTO: 4.45 10*6/MM3 (ref 4.14–5.8)
SODIUM SERPL-SCNC: 139 MMOL/L (ref 136–145)
WBC NRBC COR # BLD AUTO: 5.32 10*3/MM3 (ref 3.4–10.8)

## 2024-08-23 PROCEDURE — 96372 THER/PROPH/DIAG INJ SC/IM: CPT

## 2024-08-23 PROCEDURE — 80053 COMPREHEN METABOLIC PANEL: CPT | Performed by: INTERNAL MEDICINE

## 2024-08-23 PROCEDURE — 83735 ASSAY OF MAGNESIUM: CPT | Performed by: INTERNAL MEDICINE

## 2024-08-23 PROCEDURE — 84153 ASSAY OF PSA TOTAL: CPT | Performed by: INTERNAL MEDICINE

## 2024-08-23 PROCEDURE — 36591 DRAW BLOOD OFF VENOUS DEVICE: CPT

## 2024-08-23 PROCEDURE — 25010000002 DENOSUMAB 120 MG/1.7ML SOLUTION: Performed by: INTERNAL MEDICINE

## 2024-08-23 PROCEDURE — 25010000002 HEPARIN LOCK FLUSH PER 10 UNITS: Performed by: INTERNAL MEDICINE

## 2024-08-23 PROCEDURE — 84100 ASSAY OF PHOSPHORUS: CPT | Performed by: INTERNAL MEDICINE

## 2024-08-23 PROCEDURE — 85025 COMPLETE CBC W/AUTO DIFF WBC: CPT | Performed by: INTERNAL MEDICINE

## 2024-08-23 RX ORDER — HEPARIN SODIUM (PORCINE) LOCK FLUSH IV SOLN 100 UNIT/ML 100 UNIT/ML
500 SOLUTION INTRAVENOUS AS NEEDED
Status: DISCONTINUED | OUTPATIENT
Start: 2024-08-23 | End: 2024-08-24 | Stop reason: HOSPADM

## 2024-08-23 RX ORDER — SODIUM CHLORIDE 0.9 % (FLUSH) 0.9 %
20 SYRINGE (ML) INJECTION AS NEEDED
OUTPATIENT
Start: 2024-08-23

## 2024-08-23 RX ORDER — HEPARIN SODIUM (PORCINE) LOCK FLUSH IV SOLN 100 UNIT/ML 100 UNIT/ML
500 SOLUTION INTRAVENOUS AS NEEDED
OUTPATIENT
Start: 2024-08-23

## 2024-08-23 RX ORDER — SODIUM CHLORIDE 0.9 % (FLUSH) 0.9 %
20 SYRINGE (ML) INJECTION AS NEEDED
Status: DISCONTINUED | OUTPATIENT
Start: 2024-08-23 | End: 2024-08-24 | Stop reason: HOSPADM

## 2024-08-23 RX ADMIN — HEPARIN 500 UNITS: 100 SYRINGE at 13:00

## 2024-08-23 RX ADMIN — DENOSUMAB 120 MG: 120 INJECTION SUBCUTANEOUS at 14:06

## 2024-08-23 RX ADMIN — Medication 20 ML: at 13:00

## 2024-08-26 RX ORDER — AMLODIPINE BESYLATE 5 MG/1
5 TABLET ORAL DAILY
Qty: 90 TABLET | Refills: 0 | Status: SHIPPED | OUTPATIENT
Start: 2024-08-26

## 2024-09-12 DIAGNOSIS — C79.51 PROSTATE CANCER METASTATIC TO BONE: ICD-10-CM

## 2024-09-12 DIAGNOSIS — C61 PROSTATE CANCER METASTATIC TO BONE: ICD-10-CM

## 2024-09-14 RX ORDER — HYDROCODONE BITARTRATE AND ACETAMINOPHEN 10; 325 MG/1; MG/1
1 TABLET ORAL EVERY 4 HOURS PRN
Qty: 180 TABLET | Refills: 0 | Status: SHIPPED | OUTPATIENT
Start: 2024-09-14

## 2024-09-16 ENCOUNTER — SPECIALTY PHARMACY (OUTPATIENT)
Facility: HOSPITAL | Age: 69
End: 2024-09-16
Payer: MEDICARE

## 2024-09-20 ENCOUNTER — OFFICE VISIT (OUTPATIENT)
Dept: ONCOLOGY | Facility: HOSPITAL | Age: 69
End: 2024-09-20
Payer: MEDICARE

## 2024-09-20 ENCOUNTER — HOSPITAL ENCOUNTER (OUTPATIENT)
Dept: ONCOLOGY | Facility: HOSPITAL | Age: 69
Discharge: HOME | End: 2024-09-20
Payer: MEDICARE

## 2024-09-20 VITALS
TEMPERATURE: 98.1 F | BODY MASS INDEX: 17.86 KG/M2 | WEIGHT: 120.6 LBS | HEIGHT: 69 IN | RESPIRATION RATE: 16 BRPM | HEART RATE: 59 BPM | OXYGEN SATURATION: 100 % | DIASTOLIC BLOOD PRESSURE: 75 MMHG | SYSTOLIC BLOOD PRESSURE: 134 MMHG

## 2024-09-20 DIAGNOSIS — C79.51 PROSTATE CANCER METASTATIC TO BONE: Primary | ICD-10-CM

## 2024-09-20 DIAGNOSIS — C61 PROSTATE CANCER METASTATIC TO BONE: Primary | ICD-10-CM

## 2024-09-20 DIAGNOSIS — K90.89 OTHER INTESTINAL MALABSORPTION: ICD-10-CM

## 2024-09-20 DIAGNOSIS — C61 PROSTATE CANCER: ICD-10-CM

## 2024-09-20 LAB
ALBUMIN SERPL-MCNC: 3.7 G/DL (ref 3.5–5.2)
ALBUMIN/GLOB SERPL: 1.4 G/DL
ALP SERPL-CCNC: 53 U/L (ref 39–117)
ALT SERPL W P-5'-P-CCNC: 8 U/L (ref 1–41)
ANION GAP SERPL CALCULATED.3IONS-SCNC: 4.2 MMOL/L (ref 5–15)
AST SERPL-CCNC: 13 U/L (ref 1–40)
BASOPHILS # BLD AUTO: 0.02 10*3/MM3 (ref 0–0.2)
BASOPHILS NFR BLD AUTO: 0.5 % (ref 0–1.5)
BILIRUB SERPL-MCNC: 0.2 MG/DL (ref 0–1.2)
BUN SERPL-MCNC: 7 MG/DL (ref 8–23)
BUN/CREAT SERPL: 7.8 (ref 7–25)
CALCIUM SPEC-SCNC: 8.6 MG/DL (ref 8.6–10.5)
CHLORIDE SERPL-SCNC: 109 MMOL/L (ref 98–107)
CO2 SERPL-SCNC: 26.8 MMOL/L (ref 22–29)
CREAT SERPL-MCNC: 0.9 MG/DL (ref 0.76–1.27)
DEPRECATED RDW RBC AUTO: 48.7 FL (ref 37–54)
EGFRCR SERPLBLD CKD-EPI 2021: 93 ML/MIN/1.73
EOSINOPHIL # BLD AUTO: 0.09 10*3/MM3 (ref 0–0.4)
EOSINOPHIL NFR BLD AUTO: 2.5 % (ref 0.3–6.2)
ERYTHROCYTE [DISTWIDTH] IN BLOOD BY AUTOMATED COUNT: 16.6 % (ref 12.3–15.4)
GLOBULIN UR ELPH-MCNC: 2.7 GM/DL
GLUCOSE SERPL-MCNC: 116 MG/DL (ref 65–99)
HCT VFR BLD AUTO: 35 % (ref 37.5–51)
HGB BLD-MCNC: 11.6 G/DL (ref 13–17.7)
IMM GRANULOCYTES # BLD AUTO: 0.01 10*3/MM3 (ref 0–0.05)
IMM GRANULOCYTES NFR BLD AUTO: 0.3 % (ref 0–0.5)
LYMPHOCYTES # BLD AUTO: 1.22 10*3/MM3 (ref 0.7–3.1)
LYMPHOCYTES NFR BLD AUTO: 33.2 % (ref 19.6–45.3)
MAGNESIUM SERPL-MCNC: 1.7 MG/DL (ref 1.6–2.4)
MCH RBC QN AUTO: 26.9 PG (ref 26.6–33)
MCHC RBC AUTO-ENTMCNC: 33.1 G/DL (ref 31.5–35.7)
MCV RBC AUTO: 81 FL (ref 79–97)
MONOCYTES # BLD AUTO: 0.35 10*3/MM3 (ref 0.1–0.9)
MONOCYTES NFR BLD AUTO: 9.5 % (ref 5–12)
NEUTROPHILS NFR BLD AUTO: 1.98 10*3/MM3 (ref 1.7–7)
NEUTROPHILS NFR BLD AUTO: 54 % (ref 42.7–76)
PHOSPHATE SERPL-MCNC: 3.1 MG/DL (ref 2.5–4.5)
PLATELET # BLD AUTO: 323 10*3/MM3 (ref 140–450)
PMV BLD AUTO: 8.6 FL (ref 6–12)
POTASSIUM SERPL-SCNC: 3.4 MMOL/L (ref 3.5–5.2)
PROT SERPL-MCNC: 6.4 G/DL (ref 6–8.5)
PSA SERPL-MCNC: 18.9 NG/ML (ref 0–4)
RBC # BLD AUTO: 4.32 10*6/MM3 (ref 4.14–5.8)
SODIUM SERPL-SCNC: 140 MMOL/L (ref 136–145)
WBC NRBC COR # BLD AUTO: 3.67 10*3/MM3 (ref 3.4–10.8)

## 2024-09-20 PROCEDURE — 96372 THER/PROPH/DIAG INJ SC/IM: CPT

## 2024-09-20 PROCEDURE — 36591 DRAW BLOOD OFF VENOUS DEVICE: CPT

## 2024-09-20 PROCEDURE — 25010000002 HEPARIN LOCK FLUSH PER 10 UNITS: Performed by: INTERNAL MEDICINE

## 2024-09-20 PROCEDURE — 80053 COMPREHEN METABOLIC PANEL: CPT | Performed by: INTERNAL MEDICINE

## 2024-09-20 PROCEDURE — 84153 ASSAY OF PSA TOTAL: CPT | Performed by: INTERNAL MEDICINE

## 2024-09-20 PROCEDURE — 85025 COMPLETE CBC W/AUTO DIFF WBC: CPT | Performed by: INTERNAL MEDICINE

## 2024-09-20 PROCEDURE — 84100 ASSAY OF PHOSPHORUS: CPT | Performed by: INTERNAL MEDICINE

## 2024-09-20 PROCEDURE — 83735 ASSAY OF MAGNESIUM: CPT | Performed by: INTERNAL MEDICINE

## 2024-09-20 PROCEDURE — 25010000002 DENOSUMAB 120 MG/1.7ML SOLUTION: Performed by: INTERNAL MEDICINE

## 2024-09-20 RX ORDER — HEPARIN SODIUM (PORCINE) LOCK FLUSH IV SOLN 100 UNIT/ML 100 UNIT/ML
500 SOLUTION INTRAVENOUS AS NEEDED
OUTPATIENT
Start: 2024-09-20

## 2024-09-20 RX ORDER — POTASSIUM CHLORIDE 750 MG/1
20 CAPSULE, EXTENDED RELEASE ORAL DAILY
Qty: 180 CAPSULE | OUTPATIENT
Start: 2024-09-20

## 2024-09-20 RX ORDER — POTASSIUM CHLORIDE 750 MG/1
20 CAPSULE, EXTENDED RELEASE ORAL DAILY
Qty: 60 CAPSULE | Refills: 0 | Status: SHIPPED | OUTPATIENT
Start: 2024-09-20

## 2024-09-20 RX ORDER — HEPARIN SODIUM (PORCINE) LOCK FLUSH IV SOLN 100 UNIT/ML 100 UNIT/ML
500 SOLUTION INTRAVENOUS AS NEEDED
Status: DISCONTINUED | OUTPATIENT
Start: 2024-09-20 | End: 2024-09-21 | Stop reason: HOSPADM

## 2024-09-20 RX ORDER — DRONABINOL 5 MG/1
5 CAPSULE ORAL
Qty: 30 CAPSULE | Refills: 1 | Status: SHIPPED | OUTPATIENT
Start: 2024-09-20

## 2024-09-20 RX ORDER — SODIUM CHLORIDE 0.9 % (FLUSH) 0.9 %
20 SYRINGE (ML) INJECTION AS NEEDED
OUTPATIENT
Start: 2024-09-20

## 2024-09-20 RX ORDER — SODIUM CHLORIDE 0.9 % (FLUSH) 0.9 %
20 SYRINGE (ML) INJECTION AS NEEDED
Status: DISCONTINUED | OUTPATIENT
Start: 2024-09-20 | End: 2024-09-21 | Stop reason: HOSPADM

## 2024-09-20 RX ADMIN — HEPARIN 500 UNITS: 100 SYRINGE at 09:02

## 2024-09-20 RX ADMIN — Medication 20 ML: at 09:02

## 2024-09-20 RX ADMIN — DENOSUMAB 120 MG: 120 INJECTION SUBCUTANEOUS at 10:54

## 2024-09-22 ENCOUNTER — SPECIALTY PHARMACY (OUTPATIENT)
Dept: PHARMACY | Facility: HOSPITAL | Age: 69
End: 2024-09-22
Payer: MEDICARE

## 2024-10-02 ENCOUNTER — TRANSCRIBE ORDERS (OUTPATIENT)
Dept: ONCOLOGY | Facility: HOSPITAL | Age: 69
End: 2024-10-02
Payer: MEDICARE

## 2024-10-02 DIAGNOSIS — R93.3 ABNORMAL MAGNETIC RESONANCE CHOLANGIOPANCREATOGRAPHY (MRCP): Primary | ICD-10-CM

## 2024-10-02 DIAGNOSIS — C79.51 PROSTATE CANCER METASTATIC TO BONE: Primary | ICD-10-CM

## 2024-10-02 DIAGNOSIS — C61 PROSTATE CANCER METASTATIC TO BONE: Primary | ICD-10-CM

## 2024-10-10 RX ORDER — LISINOPRIL 40 MG/1
40 TABLET ORAL DAILY
Qty: 90 TABLET | Refills: 0 | Status: SHIPPED | OUTPATIENT
Start: 2024-10-10

## 2024-10-11 ENCOUNTER — HOSPITAL ENCOUNTER (OUTPATIENT)
Dept: ONCOLOGY | Facility: HOSPITAL | Age: 69
Discharge: HOME | End: 2024-10-11
Payer: MEDICARE

## 2024-10-11 DIAGNOSIS — C61 PROSTATE CANCER METASTATIC TO BONE: Primary | ICD-10-CM

## 2024-10-11 DIAGNOSIS — C79.51 PROSTATE CANCER METASTATIC TO BONE: Primary | ICD-10-CM

## 2024-10-11 DIAGNOSIS — C61 PROSTATE CANCER: ICD-10-CM

## 2024-10-11 LAB
ALBUMIN SERPL-MCNC: 3.5 G/DL (ref 3.5–5.2)
ALBUMIN/GLOB SERPL: 1.3 G/DL
ALP SERPL-CCNC: 54 U/L (ref 39–117)
ALT SERPL W P-5'-P-CCNC: 5 U/L (ref 1–41)
ANION GAP SERPL CALCULATED.3IONS-SCNC: 10.4 MMOL/L (ref 5–15)
AST SERPL-CCNC: 15 U/L (ref 1–40)
BASOPHILS # BLD AUTO: 0.02 10*3/MM3 (ref 0–0.2)
BASOPHILS NFR BLD AUTO: 0.5 % (ref 0–1.5)
BILIRUB SERPL-MCNC: 0.4 MG/DL (ref 0–1.2)
BUN SERPL-MCNC: 12 MG/DL (ref 8–23)
BUN/CREAT SERPL: 14.5 (ref 7–25)
CALCIUM SPEC-SCNC: 8.2 MG/DL (ref 8.6–10.5)
CHLORIDE SERPL-SCNC: 110 MMOL/L (ref 98–107)
CO2 SERPL-SCNC: 21.6 MMOL/L (ref 22–29)
CREAT SERPL-MCNC: 0.83 MG/DL (ref 0.76–1.27)
DEPRECATED RDW RBC AUTO: 53 FL (ref 37–54)
EGFRCR SERPLBLD CKD-EPI 2021: 95.3 ML/MIN/1.73
EOSINOPHIL # BLD AUTO: 0.17 10*3/MM3 (ref 0–0.4)
EOSINOPHIL NFR BLD AUTO: 4.1 % (ref 0.3–6.2)
ERYTHROCYTE [DISTWIDTH] IN BLOOD BY AUTOMATED COUNT: 18.1 % (ref 12.3–15.4)
GLOBULIN UR ELPH-MCNC: 2.6 GM/DL
GLUCOSE SERPL-MCNC: 89 MG/DL (ref 65–99)
HCT VFR BLD AUTO: 34.5 % (ref 37.5–51)
HGB BLD-MCNC: 11.6 G/DL (ref 13–17.7)
IMM GRANULOCYTES # BLD AUTO: 0.01 10*3/MM3 (ref 0–0.05)
IMM GRANULOCYTES NFR BLD AUTO: 0.2 % (ref 0–0.5)
LYMPHOCYTES # BLD AUTO: 1.29 10*3/MM3 (ref 0.7–3.1)
LYMPHOCYTES NFR BLD AUTO: 31 % (ref 19.6–45.3)
MAGNESIUM SERPL-MCNC: 1.7 MG/DL (ref 1.6–2.4)
MCH RBC QN AUTO: 27 PG (ref 26.6–33)
MCHC RBC AUTO-ENTMCNC: 33.6 G/DL (ref 31.5–35.7)
MCV RBC AUTO: 80.4 FL (ref 79–97)
MONOCYTES # BLD AUTO: 0.37 10*3/MM3 (ref 0.1–0.9)
MONOCYTES NFR BLD AUTO: 8.9 % (ref 5–12)
NEUTROPHILS NFR BLD AUTO: 2.3 10*3/MM3 (ref 1.7–7)
NEUTROPHILS NFR BLD AUTO: 55.3 % (ref 42.7–76)
NRBC BLD AUTO-RTO: 0 /100 WBC (ref 0–0.2)
PHOSPHATE SERPL-MCNC: 3.5 MG/DL (ref 2.5–4.5)
PLATELET # BLD AUTO: 338 10*3/MM3 (ref 140–450)
PMV BLD AUTO: 9.3 FL (ref 6–12)
POTASSIUM SERPL-SCNC: 4.1 MMOL/L (ref 3.5–5.2)
PROT SERPL-MCNC: 6.1 G/DL (ref 6–8.5)
PSA SERPL-MCNC: 20.9 NG/ML (ref 0–4)
RBC # BLD AUTO: 4.29 10*6/MM3 (ref 4.14–5.8)
SODIUM SERPL-SCNC: 142 MMOL/L (ref 136–145)
WBC NRBC COR # BLD AUTO: 4.16 10*3/MM3 (ref 3.4–10.8)

## 2024-10-11 PROCEDURE — 84100 ASSAY OF PHOSPHORUS: CPT | Performed by: INTERNAL MEDICINE

## 2024-10-11 PROCEDURE — 85025 COMPLETE CBC W/AUTO DIFF WBC: CPT | Performed by: INTERNAL MEDICINE

## 2024-10-11 PROCEDURE — 80053 COMPREHEN METABOLIC PANEL: CPT | Performed by: INTERNAL MEDICINE

## 2024-10-11 PROCEDURE — 25010000002 HEPARIN LOCK FLUSH PER 10 UNITS: Performed by: INTERNAL MEDICINE

## 2024-10-11 PROCEDURE — 84153 ASSAY OF PSA TOTAL: CPT | Performed by: INTERNAL MEDICINE

## 2024-10-11 PROCEDURE — 36591 DRAW BLOOD OFF VENOUS DEVICE: CPT

## 2024-10-11 PROCEDURE — 83735 ASSAY OF MAGNESIUM: CPT | Performed by: INTERNAL MEDICINE

## 2024-10-11 RX ORDER — SODIUM CHLORIDE 0.9 % (FLUSH) 0.9 %
20 SYRINGE (ML) INJECTION AS NEEDED
OUTPATIENT
Start: 2024-10-11

## 2024-10-11 RX ORDER — SODIUM CHLORIDE 0.9 % (FLUSH) 0.9 %
20 SYRINGE (ML) INJECTION AS NEEDED
Status: DISCONTINUED | OUTPATIENT
Start: 2024-10-11 | End: 2024-10-12 | Stop reason: HOSPADM

## 2024-10-11 RX ORDER — HEPARIN SODIUM (PORCINE) LOCK FLUSH IV SOLN 100 UNIT/ML 100 UNIT/ML
500 SOLUTION INTRAVENOUS AS NEEDED
Status: DISCONTINUED | OUTPATIENT
Start: 2024-10-11 | End: 2024-10-12 | Stop reason: HOSPADM

## 2024-10-11 RX ORDER — HEPARIN SODIUM (PORCINE) LOCK FLUSH IV SOLN 100 UNIT/ML 100 UNIT/ML
500 SOLUTION INTRAVENOUS AS NEEDED
OUTPATIENT
Start: 2024-10-11

## 2024-10-11 RX ADMIN — HEPARIN 500 UNITS: 100 SYRINGE at 14:29

## 2024-10-11 RX ADMIN — Medication 20 ML: at 14:28

## 2024-10-12 DIAGNOSIS — K90.89 OTHER INTESTINAL MALABSORPTION: ICD-10-CM

## 2024-10-13 DIAGNOSIS — C61 PROSTATE CANCER METASTATIC TO BONE: ICD-10-CM

## 2024-10-13 DIAGNOSIS — C79.51 PROSTATE CANCER METASTATIC TO BONE: ICD-10-CM

## 2024-10-14 RX ORDER — HYDROCODONE BITARTRATE AND ACETAMINOPHEN 10; 325 MG/1; MG/1
1 TABLET ORAL EVERY 4 HOURS PRN
Qty: 180 TABLET | Refills: 0 | Status: SHIPPED | OUTPATIENT
Start: 2024-10-14

## 2024-10-14 RX ORDER — POTASSIUM CHLORIDE 750 MG/1
20 CAPSULE, EXTENDED RELEASE ORAL DAILY
Qty: 180 CAPSULE | Refills: 0 | Status: SHIPPED | OUTPATIENT
Start: 2024-10-14

## 2024-10-15 ENCOUNTER — OFFICE VISIT (OUTPATIENT)
Dept: RADIATION ONCOLOGY | Facility: HOSPITAL | Age: 69
End: 2024-10-15
Payer: MEDICARE

## 2024-10-15 VITALS
WEIGHT: 119.05 LBS | SYSTOLIC BLOOD PRESSURE: 131 MMHG | BODY MASS INDEX: 17.63 KG/M2 | DIASTOLIC BLOOD PRESSURE: 84 MMHG | HEART RATE: 70 BPM | TEMPERATURE: 97.7 F | RESPIRATION RATE: 16 BRPM | OXYGEN SATURATION: 98 %

## 2024-10-15 DIAGNOSIS — C61 MALIGNANT NEOPLASM PROSTATE: ICD-10-CM

## 2024-10-15 DIAGNOSIS — Z79.818 ANDROGEN DEPRIVATION THERAPY: ICD-10-CM

## 2024-10-15 DIAGNOSIS — Z90.79 HISTORY OF PROSTATECTOMY: ICD-10-CM

## 2024-10-15 DIAGNOSIS — R97.21 RISING PSA FOLLOWING TREATMENT FOR MALIGNANT NEOPLASM OF PROSTATE: ICD-10-CM

## 2024-10-15 DIAGNOSIS — I10 PRIMARY HYPERTENSION: ICD-10-CM

## 2024-10-15 DIAGNOSIS — C79.51 SECONDARY MALIGNANT NEOPLASM OF BONE: Primary | ICD-10-CM

## 2024-10-15 DIAGNOSIS — G89.3 CANCER-RELATED PAIN: ICD-10-CM

## 2024-10-15 DIAGNOSIS — Z92.3 PERSONAL HISTORY OF RADIATION THERAPY: ICD-10-CM

## 2024-10-15 PROCEDURE — G0463 HOSPITAL OUTPT CLINIC VISIT: HCPCS | Performed by: NURSE PRACTITIONER

## 2024-10-15 NOTE — PROGRESS NOTES
Follow Up Office Visit      Encounter Date: 10/15/2024   Patient Name: Semaj Medina  YOB: 1955   Medical Record Number: [id number]   Primary Diagnosis: Secondary malignant neoplasm of bone [C79.51]     Radiation Completion Date: 12/9/2021 T9-T11 spine     Chief Complaint:    Chief Complaint   Patient presents with    Follow-up    Prostate Cancer     Prostate cancer metastatic to bone       Oncology/Hematology History Overview Note     Metastatic Castration resistant prostate cancer:    Patient was initially diagnosed with prostate cancer in 2015.  On 6/4/2015 he underwent radical prostatectomy and LN dissection which showed a [place] 4+3 = 7 prostate cancer.  There were several high risk features such as extraprostatic extension, seminal vesicle invasion, positive margins at the bladder neck and right and left seminal vesicle.  Lymph vascular invasion was indeterminate.  Perineural invasion was not commented on. Final pathology [id number] R1.  He was treated with adjuvant radiation by Dr. Stanton.    According to records the patient likely started Lupron in December 2017 when his PSA started to rise.  The patient took a break in August 2019.  He states he was not aware he was to continue.  He restarted Lupron on 11/26/2019 after it was noted that his PSA chago from 0.46 up to 2.55 on 11/8/2019.  Unfortunately his PSA continued to rise after that to 4.54 on 3/10/2020.    - restarted Lupron on 11/26/20  - Started Zytiga on 8/27/20.  8/31/2020 PSA 17.95     - Started Xtandi in May 2021 due to intolerance of Zytiga even at the lowest dose  - PSA rising in April 2023 to 2.85, doubling time 3.4 months  - started Docetaxel May 2023    PSMA PET 5/2/23:   1.  Radiotracer avid supra clavicular, thoracic and abdominal adenopathy and sclerotic lesion within T10 vertebral body likely representing metastatic disease.  2.  Indeterminate lesion within the left ilium demonstrating mild uptake.   3.   Asymmetric sclerosis within the superior lateral aspect the left orbit which does not demonstrate significant uptake above that of the right orbit. Findings are nonspecific with  differential considerations including but not limited to metastatic disease, fibrous dysplasia versus Paget's disease.   4.  Sub-6 mm nodule within the left lower lobe.  5.  Scattered indeterminate hypodense lesions throughout the liver which do not demonstrate radiotracer uptake above that of the adjacent liver, best best visualized on recent multiphase CT abdomen and pelvis 04/17/2023. Please refer to this dictation for further information.       Prostate cancer metastatic to bone   12/11/2020 -  Chemotherapy    OP SUPPORTIVE Leuprolide 45 mg Q6M     3/23/2021 -  Chemotherapy    OP SUPPORTIVE Denosumab (Xgeva) Q28D     4/15/2021 - 5/13/2021 Chemotherapy    OP PROSTATE Enzalutamide     5/19/2021 Initial Diagnosis    Prostate cancer metastatic to bone (CMS/HCC)     6/2/2023 - 9/15/2023 Biopsy    OP PROSTATE DOCEtaxel  Plan Provider: Natalya Jack MD PhD  Treatment goal: Palliative  Line of treatment: [No plan line of treatment]     12/19/2023 - 12/19/2023 Chemotherapy    OP PROSTATE Apalutamide     1/26/2024 Biopsy    OP PROSTATE Abiraterone / PredniSONE  Plan Provider: Joshua Dick MD  Treatment goal: Control  Line of treatment: [No plan line of treatment]     Prostate cancer   6/16/2021 Initial Diagnosis    Prostate cancer (HCC)     6/2/2023 -  Chemotherapy    OP CENTRAL VENOUS ACCESS DEVICE ACCESS, CARE, AND MAINTENANCE (CVAD)     Secondary malignant neoplasm of bone   11/4/2021 Initial Diagnosis    Secondary malignant neoplasm of bone (HCC)     11/23/2021 - 12/9/2021 Radiation    Radiation OncologyTreatment Course:  Semaj Medina received 3000 cGy in 10 fractions to the T9-T11 spine.          History of Present Illness: Semaj Medina is a 68 y.o. male who returns to [facility] for routine 3 month follow-up of  his metastatic prostate cancer. His PSA on 10/11/24 is 20.9 ng/mL. He reports feeling well overall with no new complaints or concerns. He received treatment #2 of Pluvicto on 9/12/24. Plan is for 6 treatments. He tolerated the first treatment well but did experience some nausea after the second treatment. He reports having antiemetics at home if needed for future treatments. He continues to receive Lupron, Xgeva, Zytiga and prednisone per Dr. Dick. Denies any change in urinary function. Occasional stress incontinence that is ongoing. Nocturia 2x/night. He continues to have lower back pain that remains unchanged. Pain is localized to his low back and does not radiate to the legs. He occasionally experiences pain in thoracic spine, stating that when he does it's a dull pain that comes and goes. He reports that his pain is tolerable with pain medication. His appetite is improving with Marinol recently started by Dr. Dick.     Subjective      Review of Systems: Review of Systems   Constitutional:  Positive for appetite change (Decreased but improving with Marinol), fatigue (4/10) and unexpected weight change (down 1lb in the last month.). Negative for chills and fever.   HENT:  Positive for hearing loss (Wears hearing aids.). Negative for sore throat and trouble swallowing.    Eyes:  Positive for visual disturbance (Cataracts removed 2 years ago, decreased vision in right eye, ongoing).   Respiratory:  Positive for cough (dry, nonproductive, ongoing) and shortness of breath (Noted with exertion, ongoing). Negative for chest tightness and wheezing.    Cardiovascular:  Negative for chest pain, palpitations and leg swelling.   Gastrointestinal:  Positive for nausea (Noted after second pluvicto treatment, resolved with antiemetics.). Negative for abdominal distention, abdominal pain, anal bleeding, blood in stool, constipation, diarrhea, rectal pain and vomiting.   Endocrine: Negative for heat intolerance.    Genitourinary:  Negative for decreased urine volume, difficulty urinating, dysuria, frequency, hematuria and urgency.        Noc x2  Normal stream  Occasional MATEO, ongoing   Musculoskeletal:  Positive for arthralgias (Pain in feet and ankles, ongoing), back pain (5/10, unchanged) and neck pain (Ongoing). Negative for gait problem, joint swelling and neck stiffness.   Skin:  Negative for color change and rash.   Neurological:  Positive for dizziness (Noted a couple days ago when he was doing yard work, subsided with rest.) and weakness (Noted a couple of days ago, was doing yard work and became weak.  Improved with resting.). Negative for headaches.   Psychiatric/Behavioral:  Negative for sleep disturbance.        The following portions of the patient's history were reviewed and updated as appropriate: allergies, current medications, past family history, past medical history, past social history, past surgical history and problem list.    Medications:     Current Outpatient Medications:     abiraterone acetate (ZYTIGA) 500 MG tablet, Take 2 tablets by mouth Daily., Disp: 60 tablet, Rfl: 11    Calcium Carbonate-Vitamin D (Calcium Plus Vitamin D) 500-1.25 MG-MCG capsule, Take 1 capsule by mouth Daily., Disp: 30 capsule, Rfl: 5    dronabinol (Marinol) 5 MG capsule, Take 1 capsule by mouth 2 (Two) Times a Day Before Meals., Disp: 30 capsule, Rfl: 1    HYDROcodone-acetaminophen (NORCO)  MG per tablet, Take 1 tablet by mouth Every 4 (Four) Hours As Needed for Moderate Pain., Disp: 180 tablet, Rfl: 0    ibuprofen (ADVIL,MOTRIN) 800 MG tablet, TAKE 1 TABLET BY MOUTH EVERY 6 - 8 HOURS AS NEEDED FOR PAIN, Disp: , Rfl:     leuprolide (Lupron [place], 3-Month,) 22.5 MG injection, 22.5 mg., Disp: , Rfl:     lisinopril (PRINIVIL,ZESTRIL) 40 MG tablet, TAKE 1 TABLET BY MOUTH DAILY, Disp: 90 tablet, Rfl: 0    naloxone (NARCAN) 4 MG/0.1ML nasal spray, CALL 911. SPR CONTENTS OF ONE SPRAYER (0.1ML) INTO ONE NOSTRIL. REPEAT IN 2-3  MIN IF SYMPTOMS OF OPIOID EMERGENCY PERSIST, ALTERNATE NOSTRILS, Disp: , Rfl:     potassium chloride (MICRO-K) 10 MEQ CR capsule, TAKE 2 CAPSULES BY MOUTH DAILY, Disp: 180 capsule, Rfl: 0    predniSONE (DELTASONE) 5 MG tablet, Take 1 tablet by mouth 2 (Two) Times a Day., Disp: 60 tablet, Rfl: 11    amLODIPine (NORVASC) 5 MG tablet, TAKE 1 TABLET BY MOUTH DAILY (Patient not taking: Reported on 10/15/2024), Disp: 90 tablet, Rfl: 0    folic acid (FOLVITE) 1 MG tablet, , Disp: , Rfl:     magnesium oxide (MAG-OX) 400 MG tablet, Take 1 tablet by mouth Daily. (Patient not taking: Reported on 10/15/2024), Disp: 30 tablet, Rfl: 0    ondansetron (ZOFRAN) 8 MG tablet, Take 1 tablet by mouth Every 8 (Eight) Hours As Needed for Nausea or Vomiting. (Patient not taking: Reported on 10/15/2024), Disp: 90 tablet, Rfl: 3    oxyCODONE (oxyCONTIN) 10 MG 12 hr tablet, Take 1 tablet by mouth Every 12 (Twelve) Hours. (Patient not taking: Reported on 10/15/2024), Disp: 60 tablet, Rfl: 0    prochlorperazine (COMPAZINE) 10 MG tablet, Take 1 tablet by mouth Every 6 (Six) Hours As Needed for Nausea or Vomiting. (Patient not taking: Reported on 10/15/2024), Disp: 30 tablet, Rfl: 5    Allergies:   No Known Allergies    Patient Smoking History:   Social History     Tobacco Use   Smoking Status Every Day    Current packs/day: 0.25    Average packs/day: 0.3 packs/day for 54.0 years (13.5 ttl pk-yrs)    Types: Cigarettes    Start date: 10/21/1970    Passive exposure: Past   Smokeless Tobacco Never       Measures:  PHQ-9 Total Score: 2  Quality of Life: 100 - Full Activity   ECOG score: 0  ECOG: (0) Fully active, able to carry on all predisease performance without restriction  Pain: (on a scale of 0-10)   Pain Score    10/15/24 1452   PainSc:   5   PainLoc: Back  Comment: ongoing   Semaj Medina reports a pain score of 5.  Given his pain assessment as noted, treatment options were discussed and the following options were decided upon as a  follow-up plan to address the patient's pain: continuation of current treatment plan for pain.     Objective     Physical Exam:   Vital Signs:   Vitals:    10/15/24 1452   BP: 131/84   Pulse: 70   Resp: 16   Temp: 97.7 °F (36.5 °C)   TempSrc: Temporal   SpO2: 98%   Weight: 54 kg (119 lb 0.8 oz)   PainSc:   5   PainLoc: Back  Comment: ongoing     Body mass index is 17.63 kg/m².   Wt Readings from Last 3 Encounters:   10/15/24 54 kg (119 lb 0.8 oz)   09/20/24 54.7 kg (120 lb 9.6 oz)   07/26/24 55.4 kg (122 lb 3.2 oz)       Physical Exam  Vitals reviewed.   Constitutional:       General: He is not in acute distress.     Appearance: Normal appearance. He is normal weight. He is not ill-appearing.       HENT:      Head: Normocephalic and atraumatic.      Mouth/Throat:      Mouth: Mucous membranes are moist.      Pharynx: Oropharynx is clear. No oropharyngeal exudate or posterior oropharyngeal erythema.   Eyes:      Conjunctiva/sclera: Conjunctivae normal.      Pupils: Pupils are equal, round, and reactive to light.   Cardiovascular:      Rate and Rhythm: Normal rate and regular rhythm.      Pulses: Normal pulses.      Heart sounds: Normal heart sounds.   Pulmonary:      Effort: Pulmonary effort is normal. No respiratory distress.      Breath sounds: Normal breath sounds.   Musculoskeletal:         General: Normal range of motion.      Cervical back: Normal range of motion.   Skin:     General: Skin is warm and dry.   Neurological:      General: No focal deficit present.      Mental Status: He is alert and oriented to person, place, and time. Mental status is at baseline.   Psychiatric:         Mood and Affect: Mood normal.         Behavior: Behavior normal.       Result Review: I independently reviewed the following data.  -- NM Lu177-PSMA Therapy (09/12/2024 11:30)   -- MRI Thoracic Spine With & Without Contrast (06/26/2024 14:09)   -- NM PET/CT Skull Base to Mid Thigh (06/13/2024 15:23)   -- PSA DIAGNOSTIC (05/03/2024  10:25)   -- NM Bone Scan Whole Body (04/24/2024 13:33)   -- CT Chest With Contrast Diagnostic (04/24/2024 11:56)   -- CT Abdomen Pelvis With Contrast (04/24/2024 11:56)   -- SCANNED PATHOLOGY (01/30/2024)   -- SCANNED PATHOLOGY (01/30/2024)   -- CT Abdomen Pelvis With Contrast (01/23/2024 16:30)   -- CT Chest With Contrast Diagnostic (01/23/2024 16:30)   -- NM Bone Scan Whole Body (01/23/2024 14:56)     Imaging: No radiology results for the last 90 days.     Labs:   WBC   Date Value Ref Range Status   10/11/2024 4.16 3.40 - 10.80 10*3/mm3 Final   12/11/2020 6.61 4.80 - 10.80 10*3/uL Final     Hemoglobin   Date Value Ref Range Status   10/11/2024 11.6 (L) 13.0 - 17.7 g/dL Final     Hematocrit   Date Value Ref Range Status   10/11/2024 34.5 (L) 37.5 - 51.0 % Final     Platelets   Date Value Ref Range Status   10/11/2024 338 140 - 450 10*3/mm3 Final     TSH   Date Value Ref Range Status   05/16/2019 1.350 0.270 - 4.200 m[iU]/L Final      PSA   Date Value Ref Range Status   10/11/2024 20.900 (H) 0.000 - 4.000 ng/mL Final   09/20/2024 18.900 (H) 0.000 - 4.000 ng/mL Final   08/23/2024 21.000 (H) 0.000 - 4.000 ng/mL Final   07/17/2024 17.700 (H) 0.000 - 4.000 ng/mL Final   06/13/2024 16.600 (H) 0.000 - 4.000 ng/mL Final   05/03/2024 20.100 (H) 0.000 - 4.000 ng/mL Final   04/05/2024 16.500 (H) 0.000 - 4.000 ng/mL Final   03/08/2024 15.200 (H) 0.000 - 4.000 ng/mL Final   01/26/2024 18.600 (H) 0.000 - 4.000 ng/mL Final   12/19/2023 9.670 (H) 0.000 - 4.000 ng/mL Final   12/15/2023 8.050 (H) 0.000 - 4.000 ng/mL Final   10/11/2023 3.180 0.000 - 4.000 ng/mL Final   08/24/2023 4.570 (H) 0.000 - 4.000 ng/mL Final   08/03/2023 4.830 (H) 0.000 - 4.000 ng/mL Final   06/21/2023 5.580 (H) 0.000 - 4.000 ng/mL Final   04/18/2023 2.850 0.000 - 4.000 ng/mL Final   02/17/2023 1.900 0.000 - 4.000 ng/mL Final   12/06/2022 1.340 0.000 - 4.000 ng/mL Final   09/13/2022 0.697 0.000 - 4.000 ng/mL Final   06/17/2022 0.593 0.000 - 4.000 ng/mL Final    03/18/2022 1.150 0.000 - 4.000 ng/mL Final   01/13/2022 5.470 (H) 0.000 - 4.000 ng/mL Final   12/17/2021 14.200 (H) 0.000 - 4.000 ng/mL Final   10/14/2021 11.900 (H) 0.000 - 4.000 ng/mL Final   09/16/2021 10.200 (H) 0.000 - 4.000 ng/mL Final   06/17/2021 5.200 (H) 0.000 - 4.000 ng/mL Final   03/05/2021 2.25 0.00 - 4.00 ng/mL Final   12/11/2020 6.81 (H) 0.00 - 4.00 ng/mL Final   11/05/2020 7.17 (H) 0.00 - 4.00 ng/mL Final   08/31/2020 17.95 (H) 0.00 - 4.00 ng/mL Final   06/26/2020 12.13 (H) 0.00 - 4.00 ng/mL Final   06/19/2020 11.45 (H) 0.00 - 4.00 ng/mL Final   03/10/2020 4.54 (H) 0.00 - 4.00 ng/mL Final   11/08/2019 2.55 0.00 - 4.00 ng/mL Final   08/05/2019 0.46 0.00 - 4.00 ng/mL Final   05/06/2019 0.35 0.00 - 4.00 ng/mL Final   01/31/2019 0.40 0.00 - 4.00 ng/mL Final     Assessment / Plan      Impression: Semaj Medina is a pleasant 68 y.o. male with initially with diagnosis of prostate cancer, pT3b pN0 [place] 4+3= 7, managed surgically with prostatectomy in 2015. He had positive margins at the bladder neck and received adjuvant radiotherapy at that time with Dr. Stanton. In 2017 his PSA began to rise and he was started on Lupron and in 2020 he was started on Zytiga. He tolerated Zytiga poorly and his dose was reduced multiple times. Restaging imaging in 2021 demonstrated a T10 lesion which was resulting in pain and limited function. He received palliative radiotherapy to vertebral metastases at T9-T11, completed on 12/9/2021 (30 Gy/10 fractions). In May 2023 his PSA was rising. His PSMA PET scan on 5/2/2023 demonstrated metastatic involvement throughout the neck, thorax, abdomen and pelvis. He was started on docetaxel, which he completed 6 cycles in September 2023. NM Bone Scan 1/23/2024 demonstrates an interval enlargement of T10 vertebral body metastasis. CT CAP on 1/23/2024 demonstrates slight worsening appearance of osteoblastic skeletal metastatic disease, notably at T10, T8 and T5. Within the pelvis  there is stable sclerotic lesions. Repeat CT CAP on 4/24/24 demonstrates stable sclerotic osseous metastasis. NM Bone Scan on 4/24/24 demonstrates stable radiotracer uptake involving sclerotic osseous metastasis at left frontal bone above left orbit and at the T10. No findings of osseous progression. He continued to have a rising PSA with PSA of 20.1 ng/mL on 5/3/24. PSMA PET/CT scan on 6/13/24 demonstrates findings of disease progression. There are metabolically active nodes in the right supraclavicular region, within the posterior mediastinum of the chest, within the upper abdomen and within the skeleton. There is a small focus of hypermetabolic activity within L2 vertebral body corresponding to a 4 mm sclerotic focus, new since prior PET. There is a new focus of hypermetabolic activity within the T10 vertebral body located anterior to the dominant T10 sclerotic focus, concerning for new malignancy superimposed site of treated disease, SUV of 23.2. Additionally there is new 8 mm sclerotic focus of hypermetabolic activity within the T5 vertebral body, SUV of 57.3. MRI thoracic spine on 6/26/24 demonstrates new metastatic disease involving the T5, T8 and T12 vertebral bodies. There is stable metastatic lesion at T10 level. Discussed results with Dr. Falcon and radiotherapy can be considered shall he experience pain that is not adequately controlled with pain medication. At this time, his pain remains well controlled so we will hold off on radiation and continue to monitor. He received first Pluvicto treatment on 8/1/2024. Plan is for 6 treatments, given every 6 weeks. PSA on 10/11/24 is 20.9 ng/mL. Discussed significant decline in PSA is not expected during middle of Pluvicto treatment. He continues to receive Lupron, Xgeva, Zytiga and prednisone per Dr. Dick. He is clinically doing well overall with essentially no changes in urinary function. AUA Symptom Score today of 5.     Cancer-related pain: currently well  controlled with Norco 10/325 mg prescribed by Dr. Dick. He infrequently experiences any thoracic spine pain. At visit on 2/19/24 I discussed option of continuing to monitor versus pursuing repeat radiotherapy to thoracic spine lesions, specifically the enlarging T10 lesion, and he opted for continued monitoring. He will contact our office in the event that his thoracic spine pain worsens.      Assessment/Plan:   Diagnoses and all orders for this visit:    1. Secondary malignant neoplasm of bone (Primary)    2. Malignant neoplasm prostate    3. Personal history of radiation therapy    4. History of prostatectomy    5. Androgen deprivation therapy    6. Rising PSA following treatment for malignant neoplasm of prostate    7. Cancer-related pain    8. Primary hypertension      Follow Up:   Return for follow-up in 3 months. He will contact our office in the event that his thoracic spine pain worsens.  PSA being ordered by Dr. Dick.   Pluvicto #3 on 10/31/24 at [facility].   Follow-up with Dr. Dick on 10/18/24.    Return in about 3 months (around 1/15/2025) for Office Visit.  Semaj Medina was encouraged to contact me in the interim with any questions or concerns regarding his care.      CHRIS Juarez  Radiation Oncology  [facility]    This document has been signed by CHRIS Alston on October 15, 2024 15:35 EDT

## 2024-10-16 ENCOUNTER — SPECIALTY PHARMACY (OUTPATIENT)
Dept: PHARMACY | Facility: HOSPITAL | Age: 69
End: 2024-10-16
Payer: MEDICARE

## 2024-10-16 NOTE — PROGRESS NOTES
Specialty Pharmacy Patient Management Program  Oncology Refill Outreach      Semaj is a 68 y.o. male contacted today regarding refills of his medication(s).    Specialty medication(s) and dose(s) confirmed: Completed the refill outreach. [facility] will mail abiraterone acetate (ZYTIGA) 500 MG tablet and predniSONE (DELTASONE) 5 MG tablet once ready to dispense.    Other medications being refilled: N/A    Refill Questions      Flowsheet Row Most Recent Value   Changes to allergies? No   Changes to medications? No   New conditions or infections since last clinic visit No   Unplanned office visit, urgent care, ED, or hospital admission in the last 4 weeks  No   How does patient/caregiver feel medication is working? Very good   Financial problems or insurance changes  No   Since the previous refill, were any specialty medication doses or scheduled injections missed or delayed?  Yes   If yes, please provide the amount 1   Why were doses missed? Went out of town.   Does this patient require a clinical escalation to a pharmacist? No            Delivery Questions      Flowsheet Row Most Recent Value   Delivery method [employer]   Delivery address verified with patient/caregiver? Yes   Delivery address Home   Number of medications in delivery 2   Medication(s) being filled and delivered predniSONE (DELTASONE), Abiraterone Acetate (ZYTIGA)   Doses left of specialty medications 8   Copay verified? Yes   Copay amount $0   Copay form of payment No copayment ($0)   Ship Date 10/16   Delivery Date 10/17   Signature Required No                   Follow-up: 21 days     Oralia Castillo  Care Coordinator, [facility]  10/16/2024  12:11 EDT

## 2024-10-18 ENCOUNTER — APPOINTMENT (OUTPATIENT)
Dept: ONCOLOGY | Facility: HOSPITAL | Age: 69
End: 2024-10-18
Payer: MEDICARE

## 2024-10-18 ENCOUNTER — HOSPITAL ENCOUNTER (OUTPATIENT)
Dept: ONCOLOGY | Facility: HOSPITAL | Age: 69
Discharge: HOME | End: 2024-10-18
Payer: MEDICARE

## 2024-10-18 ENCOUNTER — OFFICE VISIT (OUTPATIENT)
Dept: ONCOLOGY | Facility: HOSPITAL | Age: 69
End: 2024-10-18
Payer: MEDICARE

## 2024-10-18 VITALS
HEART RATE: 68 BPM | TEMPERATURE: 97.1 F | BODY MASS INDEX: 17.71 KG/M2 | OXYGEN SATURATION: 97 % | WEIGHT: 119.6 LBS | HEIGHT: 69 IN | DIASTOLIC BLOOD PRESSURE: 75 MMHG | SYSTOLIC BLOOD PRESSURE: 133 MMHG | RESPIRATION RATE: 16 BRPM

## 2024-10-18 DIAGNOSIS — C61 PROSTATE CANCER METASTATIC TO BONE: Primary | ICD-10-CM

## 2024-10-18 DIAGNOSIS — C79.51 PROSTATE CANCER METASTATIC TO BONE: Primary | ICD-10-CM

## 2024-10-18 DIAGNOSIS — Z79.899 HIGH RISK MEDICATION USE: ICD-10-CM

## 2024-10-18 DIAGNOSIS — G89.3 CANCER RELATED PAIN: ICD-10-CM

## 2024-10-18 DIAGNOSIS — C61 PROSTATE CANCER: Primary | ICD-10-CM

## 2024-10-18 PROCEDURE — 96372 THER/PROPH/DIAG INJ SC/IM: CPT

## 2024-10-18 PROCEDURE — 25010000002 DENOSUMAB 120 MG/1.7ML SOLUTION: Performed by: INTERNAL MEDICINE

## 2024-10-18 RX ADMIN — DENOSUMAB 120 MG: 120 INJECTION SUBCUTANEOUS at 10:06

## 2024-10-18 NOTE — PROGRESS NOTES
Chief Complaint/Care Team   Prostate cancer metastatic to bone    Maryse Monae MD Coffie, Ramona N, MD    History of Present Illness     Diagnosis: Metastatic Castration Resistant Prostate Cancer    Current Treatment: Lupron, Zytiga and Prednisone, pending initiation of Pluvicto at [facility] scheduled to start on 8/1/2024    Previous Treatment:   Metastatic Castration resistant prostate cancer:    Patient was initially diagnosed with prostate cancer in 2015.  On 6/4/2015 he underwent radical prostatectomy and LN dissection which showed a [place] 4+3 = 7 prostate cancer.  There were several high risk features such as extraprostatic extension, seminal vesicle invasion, positive margins at the bladder neck and right and left seminal vesicle.  Lymph vascular invasion was indeterminate.  Perineural invasion was not commented on. Final pathology [id number] R1.  He was treated with adjuvant radiation by Dr. Stanton.    According to records the patient likely started Lupron in December 2017 when his PSA started to rise.  The patient took a break in August 2019.  He states he was not aware he was to continue.  He restarted Lupron on 11/26/2019 after it was noted that his PSA chago from 0.46 up to 2.55 on 11/8/2019.  Unfortunately his PSA continued to rise after that to 4.54 on 3/10/2020.    - restarted Lupron on 11/26/20  - Started Zytiga on 8/27/20.  8/31/2020 PSA 17.95     - Started Xtandi in May 2021 due to intolerance of Zytiga even at the lowest dose  - PSA rising in April 2023 to 2.85, doubling time 3.4 months  - started Docetaxel May 2023    Semaj Medina is a 68 y.o. male who presents to [facility] HEMATOLOGY & ONCOLOGY for Metastatic Castration Resistant Prostate Cancer.    History of Present Illness    The patient was previously evaluated by Dr. Salazar, a radiation oncologist at [facility], who shared pt was not a candidate for [place] 223. However, he would be a suitable candidate for  Pluvicto, he has referred him to nuclear medicine specialist at [facility]. A consultation with a nuclear medicine specialist was scheduled for evaluation for Pluvicto. His current medication regimen includes Zytiga and prednisone.      Patient reports experiencing back and neck pain, which subsides upon administration of his pain medication. He typically takes his pain medication every 3 hours. He is here for evaluation prior to next Xgeva treatment.  He began Pluvicto treatment on August 1, 2024, given every 6 weeks, plan is for 6 treatments. He reports nausea, vomiting after second pluvicto treatment, reports n/v is controlled with zofran.       Review of Systems   Constitutional:  Positive for appetite change, fatigue and unexpected weight loss.   Musculoskeletal:  Positive for back pain (Neck pain).        Oncology/Hematology History Overview Note     Metastatic Castration resistant prostate cancer:    Patient was initially diagnosed with prostate cancer in 2015.  On 6/4/2015 he underwent radical prostatectomy and LN dissection which showed a [place] 4+3 = 7 prostate cancer.  There were several high risk features such as extraprostatic extension, seminal vesicle invasion, positive margins at the bladder neck and right and left seminal vesicle.  Lymph vascular invasion was indeterminate.  Perineural invasion was not commented on. Final pathology [id number] R1.  He was treated with adjuvant radiation by Dr. Stanton.    According to records the patient likely started Lupron in December 2017 when his PSA started to rise.  The patient took a break in August 2019.  He states he was not aware he was to continue.  He restarted Lupron on 11/26/2019 after it was noted that his PSA chago from 0.46 up to 2.55 on 11/8/2019.  Unfortunately his PSA continued to rise after that to 4.54 on 3/10/2020.    - restarted Lupron on 11/26/20  - Started Zytiga on 8/27/20.  8/31/2020 PSA 17.95     - Started Xtandi in May 2021 due to  intolerance of Zytiga even at the lowest dose  - PSA rising in April 2023 to 2.85, doubling time 3.4 months  - started Docetaxel May 2023    PSMA PET 5/2/23:   1.  Radiotracer avid supra clavicular, thoracic and abdominal adenopathy and sclerotic lesion within T10 vertebral body likely representing metastatic disease.  2.  Indeterminate lesion within the left ilium demonstrating mild uptake.   3.  Asymmetric sclerosis within the superior lateral aspect the left orbit which does not demonstrate significant uptake above that of the right orbit. Findings are nonspecific with  differential considerations including but not limited to metastatic disease, fibrous dysplasia versus Paget's disease.   4.  Sub-6 mm nodule within the left lower lobe.  5.  Scattered indeterminate hypodense lesions throughout the liver which do not demonstrate radiotracer uptake above that of the adjacent liver, best best visualized on recent multiphase CT abdomen and pelvis 04/17/2023. Please refer to this dictation for further information.       Prostate cancer metastatic to bone   12/11/2020 -  Chemotherapy    OP SUPPORTIVE Leuprolide 45 mg Q6M     3/23/2021 -  Chemotherapy    OP SUPPORTIVE Denosumab (Xgeva) Q28D     4/15/2021 - 5/13/2021 Chemotherapy    OP PROSTATE Enzalutamide     5/19/2021 Initial Diagnosis    Prostate cancer metastatic to bone (CMS/HCC)     6/2/2023 - 9/15/2023 Biopsy    OP PROSTATE DOCEtaxel  Plan Provider: Natalya Jack MD PhD  Treatment goal: Palliative  Line of treatment: [No plan line of treatment]     12/19/2023 - 12/19/2023 Chemotherapy    OP PROSTATE Apalutamide     1/26/2024 Biopsy    OP PROSTATE Abiraterone / PredniSONE  Plan Provider: Joshua Dick MD  Treatment goal: Control  Line of treatment: [No plan line of treatment]     Prostate cancer   6/16/2021 Initial Diagnosis    Prostate cancer (HCC)     6/2/2023 -  Chemotherapy    OP CENTRAL VENOUS ACCESS DEVICE ACCESS, CARE, AND MAINTENANCE (CVAD)    "  Secondary malignant neoplasm of bone   11/4/2021 Initial Diagnosis    Secondary malignant neoplasm of bone (HCC)     11/23/2021 - 12/9/2021 Radiation    Radiation OncologyTreatment Course:  Semaj Medina received 3000 cGy in 10 fractions to the T9-T11 spine.          Objective     Vitals:    10/18/24 0912   BP: 133/75   Pulse: 68   Resp: 16   Temp: 97.1 °F (36.2 °C)   TempSrc: Temporal   SpO2: 97%   Weight: 54.3 kg (119 lb 9.6 oz)   Height: 175 cm (68.9\")   PainSc:   5   PainLoc: Back         ECOG score: 0         PHQ-9 Total Score:         Physical Exam  Vitals reviewed. Exam conducted with a chaperone present.   Constitutional:       General: He is not in acute distress.  HENT:      Head: Normocephalic and atraumatic.   Eyes:      Conjunctiva/sclera: Conjunctivae normal.      Pupils: Pupils are equal, round, and reactive to light.   Neurological:      Mental Status: He is alert and oriented to person, place, and time.         Physical Exam        Past Medical History     Past Medical History:   Diagnosis Date    Anemia     NO PROBLEMS    Anxiety     Blood disease     Colon polyps     Depression     Diverticulitis     Forgetfulness     Hepatitis C     RESOLVED    Hypertension     Night sweats     Numbness and tingling of left lower extremity     Prostate cancer      Current Outpatient Medications on File Prior to Visit   Medication Sig Dispense Refill    abiraterone acetate (ZYTIGA) 500 MG tablet Take 2 tablets by mouth Daily. 60 tablet 11    Calcium Carbonate-Vitamin D (Calcium Plus Vitamin D) 500-1.25 MG-MCG capsule Take 1 capsule by mouth Daily. 30 capsule 5    dronabinol (Marinol) 5 MG capsule Take 1 capsule by mouth 2 (Two) Times a Day Before Meals. 30 capsule 1    HYDROcodone-acetaminophen (NORCO)  MG per tablet Take 1 tablet by mouth Every 4 (Four) Hours As Needed for Moderate Pain. 180 tablet 0    ibuprofen (ADVIL,MOTRIN) 800 MG tablet TAKE 1 TABLET BY MOUTH EVERY 6 - 8 HOURS AS NEEDED FOR PAIN  "     leuprolide (Lupron [place], 3-Month,) 22.5 MG injection 22.5 mg.      lisinopril (PRINIVIL,ZESTRIL) 40 MG tablet TAKE 1 TABLET BY MOUTH DAILY 90 tablet 0    naloxone (NARCAN) 4 MG/0.1ML nasal spray CALL 911. SPR CONTENTS OF ONE SPRAYER (0.1ML) INTO ONE NOSTRIL. REPEAT IN 2-3 MIN IF SYMPTOMS OF OPIOID EMERGENCY PERSIST, ALTERNATE NOSTRILS      potassium chloride (MICRO-K) 10 MEQ CR capsule TAKE 2 CAPSULES BY MOUTH DAILY 180 capsule 0    predniSONE (DELTASONE) 5 MG tablet Take 1 tablet by mouth 2 (Two) Times a Day. 60 tablet 11    amLODIPine (NORVASC) 5 MG tablet TAKE 1 TABLET BY MOUTH DAILY (Patient not taking: Reported on 10/18/2024) 90 tablet 0    folic acid (FOLVITE) 1 MG tablet  (Patient not taking: Reported on 6/28/2024)      magnesium oxide (MAG-OX) 400 MG tablet Take 1 tablet by mouth Daily. (Patient not taking: Reported on 6/28/2024) 30 tablet 0    ondansetron (ZOFRAN) 8 MG tablet Take 1 tablet by mouth Every 8 (Eight) Hours As Needed for Nausea or Vomiting. (Patient not taking: Reported on 5/24/2024) 90 tablet 3    oxyCODONE (oxyCONTIN) 10 MG 12 hr tablet Take 1 tablet by mouth Every 12 (Twelve) Hours. (Patient not taking: Reported on 10/18/2024) 60 tablet 0    prochlorperazine (COMPAZINE) 10 MG tablet Take 1 tablet by mouth Every 6 (Six) Hours As Needed for Nausea or Vomiting. (Patient not taking: Reported on 5/24/2024) 30 tablet 5     No current facility-administered medications on file prior to visit.      No Known Allergies  Past Surgical History:   Procedure Laterality Date    COLONOSCOPY      PROSTATE BIOPSY      PROSTATECTOMY      ROBOT ASSISTED W PELVIC LYMPH NODE DISSECTION    TRANSURETHRAL RESECTION OF BLADDER TUMOR      VENOUS ACCESS DEVICE (PORT) INSERTION Right 5/22/2023    Procedure: INSERTION VENOUS ACCESS PORT;  Surgeon: Waldemar Iraheta MD;  Location: [facility];  Service: General;  Laterality: Right;     Social History     Socioeconomic History    Marital status:    Tobacco Use     Smoking status: Every Day     Current packs/day: 0.25     Average packs/day: 0.3 packs/day for 54.0 years (13.5 ttl pk-yrs)     Types: Cigarettes     Start date: 10/21/1970     Passive exposure: Past    Smokeless tobacco: Never   Vaping Use    Vaping status: Never Used   Substance and Sexual Activity    Alcohol use: Yes     Alcohol/week: 6.0 standard drinks of alcohol     Types: 6 Cans of beer per week     Comment: 12 pack a week of beer    Drug use: Never    Sexual activity: Defer     Family History   Problem Relation Age of Onset    Cancer Mother     Cancer Father        Results     Result Review   The following data was reviewed by: Joshua Dick MD on 06/28/2024:  Lab Results   Component Value Date    HGB 11.6 (L) 10/11/2024    HCT 34.5 (L) 10/11/2024    MCV 80.4 10/11/2024     10/11/2024    WBC 4.16 10/11/2024    NEUTROABS 2.30 10/11/2024    LYMPHSABS 1.29 10/11/2024    MONOSABS 0.37 10/11/2024    EOSABS 0.17 10/11/2024    BASOSABS 0.02 10/11/2024     Lab Results   Component Value Date    GLUCOSE 89 10/11/2024    BUN 12 10/11/2024    CREATININE 0.83 10/11/2024     10/11/2024    K 4.1 10/11/2024     (H) 10/11/2024    CO2 21.6 (L) 10/11/2024    CALCIUM 8.2 (L) 10/11/2024    PROTEINTOT 6.1 10/11/2024    ALBUMIN 3.5 10/11/2024    BILITOT 0.4 10/11/2024    ALKPHOS 54 10/11/2024    AST 15 10/11/2024    ALT 5 10/11/2024     Lab Results   Component Value Date    MG 1.7 10/11/2024    PHOS 3.5 10/11/2024    TSH 1.350 05/16/2019       No radiology results for the last day       Assessment & Plan     Diagnoses and all orders for this visit:    1. Prostate cancer metastatic to bone (Primary)    2. Cancer related pain    3. High risk medication use    Other orders  -     Cancel: denosumab (XGEVA) injection 120 mg               Semaj Medina is a 68 y.o. male who presents to [facility] HEMATOLOGY & ONCOLOGY for treatment of  metastatic prostate cancer, completed 6 cycles of  docetaxel on 9/15/2023, Lupron Q6 months, Pt also receiving Xgeva and he is here prior to next Xgeva infusion  -next Lupron injection due on 6/18/2024  -PSA from 12/15/23 was 8 up from 3.1 (in 10/2023)  -Discussed result of NM bone scan, CT CAP from 1/24/2024 which revealed interval enlargement T10 vertebral body metastasis, no evidence of intrathoracic metastatic disease, slight worsening appearance of osteoblastic schedule static disease, notable at T10, T8, and T5, no evidence of intra-abdominal or pelvic metastatic disease.  -Patient has undergone radiation therapy 10 fractions to thoracic spine from T9-T11 in 2021.  -Also shared plan to reattempt treatment with Zytiga and prednisone, patient reports some nausea vomiting which we will attempt to manage with antiemetics.  -Orders placed for Zytiga and prednisone, also provided prescriptions for Compazine and Zofran for patient to begin once he begins treatment with these medications.  -pt began zytiga/prednisone on 2/2/2024, pt tolerating it well, no evidence of toxicity on labs, recommend he continue at current dose     -discussed results of restaging imaging from 4/24/2024 which showed stable radiotracer uptake involving sclerotic osseous metastasis of the left frontal bone above left orbit and at T10, no findings of osseous progression, CT chest abdomen pelvis revealed stable sclerotic osseous metastasis, no suspicious adenopathy, postsurgical changes of prostatectomy.          -Given presence of continued osseous metastasis, patient with continued pain in area of metastasis, and since he has previously received radiation in this area, referred him to [facility] school medicine for consideration for treatment with [place] 223 or Pluvicto.  -Shared results of PSMA PET/CT scan from 6/13/2024 which revealed progression of disease, patient has been referred to nuclear medicine specialist at [facility] for consideration for Pluvicto,pt began Pluvicto on 8/1/2024.     -The  patient's PSA levels have shown a increase to 20.9 on 10/11/2024  -Recommend pt continue of Lupron, Xgeva, and Zytiga and prednisone  -OxyContin 10 mg was prescribed for pain management, to be taken twice daily every 12 hours.  -will continue to provide refills of his Anna for his cancer related pain, added OxyContin twice daily given lack of relief with Norco.     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      Hypomagnesemia  -Mag level of 1.7  -pt prescribed mag oxide 400 mg PO QD  -will recheck at next clinic appointment     Hypokalemia  -K of 4.1,   -pt receiving KCL repletion    Low Vit D  -Vit level of 27.7  -prescribed Vit D 50K IU once weekly x 8 weeks     Weight loss  -ordered marinol      Please note that portions of this note were completed with a voice recognition program.      Assessment & Plan        Plan for patient follow-up in 2 month with me and next Xgeva and Lupron treatment, pt to continue with pluvicto and Xgeva treatments in between appointments with me.     Electronically signed by Joshua Dick MD, 10/18/24, 4:52 PM EDT.          Follow Up     I spent 30 minutes caring for Semaj on this date of service. This time includes time spent by me in the following activities:preparing for the visit, reviewing tests, obtaining and/or reviewing a separately obtained history, performing a medically appropriate examination and/or evaluation , counseling and educating the patient/family/caregiver, ordering medications, tests, or procedures, referring and communicating with other health care professionals , documenting information in the medical record, independently interpreting results and communicating that information with the patient/family/caregiver, and care coordination    Any chemotherapy or immunotherapy or other systemic therapy treatment plan  involves a high risk of complications and/or mortality of patient management.    The patient was seen and examined. Work by the provider also included review and/or ordering of lab tests, review and/or ordering of radiology tests, review and/or ordering of medicine tests, discussion with other physicians or providers, independent review of data, obtaining old records, review/summation of old records, and/or other review.    I have reviewed the family history, social history, and past medical history for this patient. Previous information and data has been reviewed and updated as needed. I have reviewed and verified the chief complaint, history, and other documentation. The patient was interviewed and examined in the clinic and the chart reviewed. The previous observations, recommendations, and conclusions were reviewed including those of other providers.     The plan was discussed with the patient and/or family. The patient was given time to ask questions and these questions were answered. At the conclusion of their visit they had no additional questions or concerns and all questions were answered to their satisfaction.    Patient was given instructions and counseling regarding his condition or for health maintenance advice. Please see specific information pulled into the AVS if appropriate.       Patient or patient representative verbalized consent for the use of Ambient Listening during the visit with  Joshua Dick MD for chart documentation. 10/18/2024  17:46 EDT

## 2024-10-21 ENCOUNTER — SPECIALTY PHARMACY (OUTPATIENT)
Dept: PHARMACY | Facility: HOSPITAL | Age: 69
End: 2024-10-21
Payer: MEDICARE

## 2024-11-11 DIAGNOSIS — C79.51 PROSTATE CANCER METASTATIC TO BONE: ICD-10-CM

## 2024-11-11 DIAGNOSIS — C61 PROSTATE CANCER METASTATIC TO BONE: ICD-10-CM

## 2024-11-11 RX ORDER — DRONABINOL 5 MG/1
5 CAPSULE ORAL
Qty: 30 CAPSULE | Refills: 1 | Status: SHIPPED | OUTPATIENT
Start: 2024-11-11

## 2024-11-11 RX ORDER — HYDROCODONE BITARTRATE AND ACETAMINOPHEN 10; 325 MG/1; MG/1
1 TABLET ORAL EVERY 4 HOURS PRN
Qty: 180 TABLET | Refills: 0 | Status: SHIPPED | OUTPATIENT
Start: 2024-11-11

## 2024-11-13 ENCOUNTER — SPECIALTY PHARMACY (OUTPATIENT)
Dept: PHARMACY | Facility: HOSPITAL | Age: 69
End: 2024-11-13
Payer: MEDICARE

## 2024-11-13 NOTE — PROGRESS NOTES
Specialty Pharmacy Patient Management Program  Oncology Refill Outreach      Semaj is a 69 y.o. male contacted today regarding refills of his medication(s).    Specialty medication(s) and dose(s) confirmed: Completed refill outreach. [facility] will mail abiraterone acetate (ZYTIGA) 500 MG tablet once ready to dispense.     Other medications being refilled: N/A    Refill Questions      Flowsheet Row Most Recent Value   Changes to allergies? No   Changes to medications? No   New conditions or infections since last clinic visit No   Unplanned office visit, urgent care, ED, or hospital admission in the last 4 weeks  No   How does patient/caregiver feel medication is working? Good   Financial problems or insurance changes  No   Since the previous refill, were any specialty medication doses or scheduled injections missed or delayed?  No   Does this patient require a clinical escalation to a pharmacist? No            Delivery Questions      Flowsheet Row Most Recent Value   Delivery method [employer]   Delivery address verified with patient/caregiver? Yes   Delivery address Home   Number of medications in delivery 1   Medication(s) being filled and delivered Abiraterone Acetate (ZYTIGA)   Doses left of specialty medications 10   Copay verified? Yes   Copay amount $0   Copay form of payment No copayment ($0)   Ship Date 11/13   Delivery Date 11/14   Signature Required No                   Follow-up: 21 days     Oralia Castillo  Care Coordinator, [facility]  11/13/2024  09:46 EST

## 2024-11-18 RX ORDER — SODIUM CHLORIDE 0.9 % (FLUSH) 0.9 %
20 SYRINGE (ML) INJECTION AS NEEDED
Status: CANCELLED | OUTPATIENT
Start: 2024-11-18

## 2024-11-18 RX ORDER — HEPARIN SODIUM (PORCINE) LOCK FLUSH IV SOLN 100 UNIT/ML 100 UNIT/ML
500 SOLUTION INTRAVENOUS AS NEEDED
Status: CANCELLED | OUTPATIENT
Start: 2024-11-20

## 2024-11-20 ENCOUNTER — HOSPITAL ENCOUNTER (OUTPATIENT)
Dept: ONCOLOGY | Facility: HOSPITAL | Age: 69
Discharge: HOME | End: 2024-11-20
Payer: MEDICARE

## 2024-11-20 VITALS
RESPIRATION RATE: 20 BRPM | DIASTOLIC BLOOD PRESSURE: 75 MMHG | HEART RATE: 70 BPM | OXYGEN SATURATION: 100 % | TEMPERATURE: 98.1 F | SYSTOLIC BLOOD PRESSURE: 111 MMHG

## 2024-11-20 DIAGNOSIS — C61 PROSTATE CANCER: Primary | ICD-10-CM

## 2024-11-20 DIAGNOSIS — C61 PROSTATE CANCER METASTATIC TO BONE: ICD-10-CM

## 2024-11-20 DIAGNOSIS — C79.51 PROSTATE CANCER METASTATIC TO BONE: ICD-10-CM

## 2024-11-20 DIAGNOSIS — C61 PROSTATE CANCER METASTATIC TO BONE: Primary | ICD-10-CM

## 2024-11-20 DIAGNOSIS — C79.51 PROSTATE CANCER METASTATIC TO BONE: Primary | ICD-10-CM

## 2024-11-20 LAB
ALBUMIN SERPL-MCNC: 3.9 G/DL (ref 3.5–5.2)
ALBUMIN/GLOB SERPL: 1.3 G/DL
ALP SERPL-CCNC: 70 U/L (ref 39–117)
ALT SERPL W P-5'-P-CCNC: 5 U/L (ref 1–41)
ANION GAP SERPL CALCULATED.3IONS-SCNC: 11.3 MMOL/L (ref 5–15)
AST SERPL-CCNC: 13 U/L (ref 1–40)
BASOPHILS # BLD AUTO: 0.02 10*3/MM3 (ref 0–0.2)
BASOPHILS NFR BLD AUTO: 0.4 % (ref 0–1.5)
BILIRUB SERPL-MCNC: 0.4 MG/DL (ref 0–1.2)
BUN SERPL-MCNC: 10 MG/DL (ref 8–23)
BUN/CREAT SERPL: 9 (ref 7–25)
CALCIUM SPEC-SCNC: 9.4 MG/DL (ref 8.6–10.5)
CHLORIDE SERPL-SCNC: 107 MMOL/L (ref 98–107)
CO2 SERPL-SCNC: 20.7 MMOL/L (ref 22–29)
CREAT SERPL-MCNC: 1.11 MG/DL (ref 0.76–1.27)
DEPRECATED RDW RBC AUTO: 56 FL (ref 37–54)
EGFRCR SERPLBLD CKD-EPI 2021: 71.9 ML/MIN/1.73
EOSINOPHIL # BLD AUTO: 0.11 10*3/MM3 (ref 0–0.4)
EOSINOPHIL NFR BLD AUTO: 2.3 % (ref 0.3–6.2)
ERYTHROCYTE [DISTWIDTH] IN BLOOD BY AUTOMATED COUNT: 18.1 % (ref 12.3–15.4)
GLOBULIN UR ELPH-MCNC: 3 GM/DL
GLUCOSE SERPL-MCNC: 116 MG/DL (ref 65–99)
HCT VFR BLD AUTO: 35 % (ref 37.5–51)
HGB BLD-MCNC: 11.6 G/DL (ref 13–17.7)
IMM GRANULOCYTES # BLD AUTO: 0.02 10*3/MM3 (ref 0–0.05)
IMM GRANULOCYTES NFR BLD AUTO: 0.4 % (ref 0–0.5)
LYMPHOCYTES # BLD AUTO: 1.16 10*3/MM3 (ref 0.7–3.1)
LYMPHOCYTES NFR BLD AUTO: 24.7 % (ref 19.6–45.3)
MAGNESIUM SERPL-MCNC: 2 MG/DL (ref 1.6–2.4)
MCH RBC QN AUTO: 27.8 PG (ref 26.6–33)
MCHC RBC AUTO-ENTMCNC: 33.1 G/DL (ref 31.5–35.7)
MCV RBC AUTO: 83.9 FL (ref 79–97)
MONOCYTES # BLD AUTO: 0.37 10*3/MM3 (ref 0.1–0.9)
MONOCYTES NFR BLD AUTO: 7.9 % (ref 5–12)
NEUTROPHILS NFR BLD AUTO: 3.01 10*3/MM3 (ref 1.7–7)
NEUTROPHILS NFR BLD AUTO: 64.3 % (ref 42.7–76)
NRBC BLD AUTO-RTO: 0 /100 WBC (ref 0–0.2)
PHOSPHATE SERPL-MCNC: 3.1 MG/DL (ref 2.5–4.5)
PLATELET # BLD AUTO: 278 10*3/MM3 (ref 140–450)
PMV BLD AUTO: 8.5 FL (ref 6–12)
POTASSIUM SERPL-SCNC: 4.5 MMOL/L (ref 3.5–5.2)
PROT SERPL-MCNC: 6.9 G/DL (ref 6–8.5)
PSA SERPL-MCNC: 43.1 NG/ML (ref 0–4)
RBC # BLD AUTO: 4.17 10*6/MM3 (ref 4.14–5.8)
SODIUM SERPL-SCNC: 139 MMOL/L (ref 136–145)
WBC NRBC COR # BLD AUTO: 4.69 10*3/MM3 (ref 3.4–10.8)

## 2024-11-20 PROCEDURE — 84153 ASSAY OF PSA TOTAL: CPT | Performed by: INTERNAL MEDICINE

## 2024-11-20 PROCEDURE — 85025 COMPLETE CBC W/AUTO DIFF WBC: CPT | Performed by: INTERNAL MEDICINE

## 2024-11-20 PROCEDURE — 80053 COMPREHEN METABOLIC PANEL: CPT | Performed by: INTERNAL MEDICINE

## 2024-11-20 PROCEDURE — 36591 DRAW BLOOD OFF VENOUS DEVICE: CPT

## 2024-11-20 PROCEDURE — 25010000002 DENOSUMAB 120 MG/1.7ML SOLUTION: Performed by: INTERNAL MEDICINE

## 2024-11-20 PROCEDURE — 84100 ASSAY OF PHOSPHORUS: CPT | Performed by: INTERNAL MEDICINE

## 2024-11-20 PROCEDURE — 25010000002 HEPARIN LOCK FLUSH PER 10 UNITS: Performed by: INTERNAL MEDICINE

## 2024-11-20 PROCEDURE — 96372 THER/PROPH/DIAG INJ SC/IM: CPT

## 2024-11-20 PROCEDURE — 83735 ASSAY OF MAGNESIUM: CPT | Performed by: INTERNAL MEDICINE

## 2024-11-20 RX ORDER — SODIUM CHLORIDE 0.9 % (FLUSH) 0.9 %
20 SYRINGE (ML) INJECTION AS NEEDED
Status: DISCONTINUED | OUTPATIENT
Start: 2024-11-20 | End: 2024-11-21 | Stop reason: HOSPADM

## 2024-11-20 RX ORDER — HEPARIN SODIUM (PORCINE) LOCK FLUSH IV SOLN 100 UNIT/ML 100 UNIT/ML
500 SOLUTION INTRAVENOUS AS NEEDED
Status: DISCONTINUED | OUTPATIENT
Start: 2024-11-20 | End: 2024-11-21 | Stop reason: HOSPADM

## 2024-11-20 RX ORDER — HEPARIN SODIUM (PORCINE) LOCK FLUSH IV SOLN 100 UNIT/ML 100 UNIT/ML
500 SOLUTION INTRAVENOUS AS NEEDED
OUTPATIENT
Start: 2024-11-20

## 2024-11-20 RX ORDER — SODIUM CHLORIDE 0.9 % (FLUSH) 0.9 %
20 SYRINGE (ML) INJECTION AS NEEDED
OUTPATIENT
Start: 2024-11-20

## 2024-11-20 RX ADMIN — DENOSUMAB 120 MG: 120 INJECTION SUBCUTANEOUS at 14:17

## 2024-11-20 RX ADMIN — HEPARIN 500 UNITS: 100 SYRINGE at 13:20

## 2024-11-20 RX ADMIN — Medication 20 ML: at 13:20

## 2024-11-21 DIAGNOSIS — C61 PROSTATE CANCER: Primary | ICD-10-CM

## 2024-11-21 LAB — HOLD SPECIMEN: NORMAL

## 2024-11-25 RX ORDER — AMLODIPINE BESYLATE 5 MG/1
5 TABLET ORAL DAILY
Qty: 90 TABLET | Refills: 0 | Status: SHIPPED | OUTPATIENT
Start: 2024-11-25

## 2024-12-13 DIAGNOSIS — C79.51 PROSTATE CANCER METASTATIC TO BONE: ICD-10-CM

## 2024-12-13 DIAGNOSIS — C61 PROSTATE CANCER METASTATIC TO BONE: ICD-10-CM

## 2024-12-13 RX ORDER — AMLODIPINE BESYLATE 5 MG/1
5 TABLET ORAL DAILY
Qty: 30 TABLET | Refills: 0 | Status: SHIPPED | OUTPATIENT
Start: 2024-12-13

## 2024-12-13 RX ORDER — HYDROCODONE BITARTRATE AND ACETAMINOPHEN 10; 325 MG/1; MG/1
1 TABLET ORAL EVERY 4 HOURS PRN
Qty: 180 TABLET | Refills: 0 | Status: SHIPPED | OUTPATIENT
Start: 2024-12-13

## 2024-12-17 ENCOUNTER — HOSPITAL ENCOUNTER (OUTPATIENT)
Dept: PET IMAGING | Facility: HOSPITAL | Age: 69
Discharge: HOME | End: 2024-12-17
Payer: MEDICARE

## 2024-12-17 DIAGNOSIS — C61 PROSTATE CANCER: ICD-10-CM

## 2024-12-17 PROCEDURE — A9596 GALLIUM GA 68 GOZETOTIDE 25 MCG KIT: HCPCS | Performed by: INTERNAL MEDICINE

## 2024-12-17 PROCEDURE — 34310000005 GALLIUM GA 68 GOZETOTIDE 25 MCG KIT: Performed by: INTERNAL MEDICINE

## 2024-12-17 PROCEDURE — 78815 PET IMAGE W/CT SKULL-THIGH: CPT

## 2024-12-17 RX ADMIN — KIT FOR THE PREPARATION OF GALLIUM GA 68 GOZETOTIDE INJECTION 1 DOSE: KIT INTRAVENOUS at 14:05

## 2024-12-18 ENCOUNTER — HOSPITAL ENCOUNTER (OUTPATIENT)
Dept: ONCOLOGY | Facility: HOSPITAL | Age: 69
Discharge: HOME | End: 2024-12-18
Payer: MEDICARE

## 2024-12-18 VITALS
OXYGEN SATURATION: 100 % | SYSTOLIC BLOOD PRESSURE: 118 MMHG | DIASTOLIC BLOOD PRESSURE: 78 MMHG | TEMPERATURE: 98.9 F | RESPIRATION RATE: 18 BRPM | HEART RATE: 62 BPM

## 2024-12-18 DIAGNOSIS — C61 PROSTATE CANCER METASTATIC TO BONE: Primary | ICD-10-CM

## 2024-12-18 DIAGNOSIS — C79.51 PROSTATE CANCER METASTATIC TO BONE: Primary | ICD-10-CM

## 2024-12-18 DIAGNOSIS — C61 PROSTATE CANCER: ICD-10-CM

## 2024-12-18 LAB
ALBUMIN SERPL-MCNC: 3.3 G/DL (ref 3.5–5.2)
ALBUMIN/GLOB SERPL: 1.2 G/DL
ALP SERPL-CCNC: 112 U/L (ref 39–117)
ALT SERPL W P-5'-P-CCNC: 7 U/L (ref 1–41)
ANION GAP SERPL CALCULATED.3IONS-SCNC: 9.1 MMOL/L (ref 5–15)
AST SERPL-CCNC: 15 U/L (ref 1–40)
BASOPHILS # BLD AUTO: 0.01 10*3/MM3 (ref 0–0.2)
BASOPHILS NFR BLD AUTO: 0.3 % (ref 0–1.5)
BILIRUB SERPL-MCNC: 0.2 MG/DL (ref 0–1.2)
BUN SERPL-MCNC: 9 MG/DL (ref 8–23)
BUN/CREAT SERPL: 10.1 (ref 7–25)
CALCIUM SPEC-SCNC: 8.2 MG/DL (ref 8.6–10.5)
CHLORIDE SERPL-SCNC: 110 MMOL/L (ref 98–107)
CO2 SERPL-SCNC: 21.9 MMOL/L (ref 22–29)
CREAT SERPL-MCNC: 0.89 MG/DL (ref 0.76–1.27)
DEPRECATED RDW RBC AUTO: 54.9 FL (ref 37–54)
EGFRCR SERPLBLD CKD-EPI 2021: 92.8 ML/MIN/1.73
EOSINOPHIL # BLD AUTO: 0.05 10*3/MM3 (ref 0–0.4)
EOSINOPHIL NFR BLD AUTO: 1.3 % (ref 0.3–6.2)
ERYTHROCYTE [DISTWIDTH] IN BLOOD BY AUTOMATED COUNT: 17.9 % (ref 12.3–15.4)
GLOBULIN UR ELPH-MCNC: 2.7 GM/DL
GLUCOSE SERPL-MCNC: 113 MG/DL (ref 65–99)
HCT VFR BLD AUTO: 31.8 % (ref 37.5–51)
HGB BLD-MCNC: 10.7 G/DL (ref 13–17.7)
IMM GRANULOCYTES # BLD AUTO: 0 10*3/MM3 (ref 0–0.05)
IMM GRANULOCYTES NFR BLD AUTO: 0 % (ref 0–0.5)
LYMPHOCYTES # BLD AUTO: 1.25 10*3/MM3 (ref 0.7–3.1)
LYMPHOCYTES NFR BLD AUTO: 33.2 % (ref 19.6–45.3)
MAGNESIUM SERPL-MCNC: 1.7 MG/DL (ref 1.6–2.4)
MCH RBC QN AUTO: 28.5 PG (ref 26.6–33)
MCHC RBC AUTO-ENTMCNC: 33.6 G/DL (ref 31.5–35.7)
MCV RBC AUTO: 84.6 FL (ref 79–97)
MONOCYTES # BLD AUTO: 0.28 10*3/MM3 (ref 0.1–0.9)
MONOCYTES NFR BLD AUTO: 7.4 % (ref 5–12)
NEUTROPHILS NFR BLD AUTO: 2.18 10*3/MM3 (ref 1.7–7)
NEUTROPHILS NFR BLD AUTO: 57.8 % (ref 42.7–76)
NRBC BLD AUTO-RTO: 0 /100 WBC (ref 0–0.2)
PHOSPHATE SERPL-MCNC: 3.5 MG/DL (ref 2.5–4.5)
PLATELET # BLD AUTO: 262 10*3/MM3 (ref 140–450)
PMV BLD AUTO: 8.7 FL (ref 6–12)
POTASSIUM SERPL-SCNC: 3.8 MMOL/L (ref 3.5–5.2)
PROT SERPL-MCNC: 6 G/DL (ref 6–8.5)
PSA SERPL-MCNC: 51 NG/ML (ref 0–4)
RBC # BLD AUTO: 3.76 10*6/MM3 (ref 4.14–5.8)
SODIUM SERPL-SCNC: 141 MMOL/L (ref 136–145)
WBC NRBC COR # BLD AUTO: 3.77 10*3/MM3 (ref 3.4–10.8)

## 2024-12-18 PROCEDURE — 96372 THER/PROPH/DIAG INJ SC/IM: CPT

## 2024-12-18 PROCEDURE — 36591 DRAW BLOOD OFF VENOUS DEVICE: CPT

## 2024-12-18 PROCEDURE — 80053 COMPREHEN METABOLIC PANEL: CPT | Performed by: INTERNAL MEDICINE

## 2024-12-18 PROCEDURE — 83735 ASSAY OF MAGNESIUM: CPT | Performed by: INTERNAL MEDICINE

## 2024-12-18 PROCEDURE — 25010000002 DENOSUMAB 120 MG/1.7ML SOLUTION: Performed by: INTERNAL MEDICINE

## 2024-12-18 PROCEDURE — 84153 ASSAY OF PSA TOTAL: CPT | Performed by: INTERNAL MEDICINE

## 2024-12-18 PROCEDURE — 85025 COMPLETE CBC W/AUTO DIFF WBC: CPT | Performed by: INTERNAL MEDICINE

## 2024-12-18 PROCEDURE — 84100 ASSAY OF PHOSPHORUS: CPT | Performed by: INTERNAL MEDICINE

## 2024-12-18 PROCEDURE — 25010000002 HEPARIN LOCK FLUSH PER 10 UNITS: Performed by: INTERNAL MEDICINE

## 2024-12-18 RX ORDER — HEPARIN SODIUM (PORCINE) LOCK FLUSH IV SOLN 100 UNIT/ML 100 UNIT/ML
500 SOLUTION INTRAVENOUS AS NEEDED
Status: DISCONTINUED | OUTPATIENT
Start: 2024-12-18 | End: 2024-12-19 | Stop reason: HOSPADM

## 2024-12-18 RX ORDER — HEPARIN SODIUM (PORCINE) LOCK FLUSH IV SOLN 100 UNIT/ML 100 UNIT/ML
500 SOLUTION INTRAVENOUS AS NEEDED
OUTPATIENT
Start: 2024-12-18

## 2024-12-18 RX ORDER — SODIUM CHLORIDE 0.9 % (FLUSH) 0.9 %
20 SYRINGE (ML) INJECTION AS NEEDED
OUTPATIENT
Start: 2024-12-18

## 2024-12-18 RX ORDER — SODIUM CHLORIDE 0.9 % (FLUSH) 0.9 %
20 SYRINGE (ML) INJECTION AS NEEDED
Status: DISCONTINUED | OUTPATIENT
Start: 2024-12-18 | End: 2024-12-19 | Stop reason: HOSPADM

## 2024-12-18 RX ADMIN — HEPARIN 500 UNITS: 100 SYRINGE at 13:02

## 2024-12-18 RX ADMIN — Medication 20 ML: at 13:02

## 2024-12-18 RX ADMIN — DENOSUMAB 120 MG: 120 INJECTION SUBCUTANEOUS at 14:06

## 2024-12-23 NOTE — PROGRESS NOTES
Chief Complaint/Care Team   Prostate cancer metastatic to bone    Maryse Monae MD Coffie, Ramona N, MD    History of Present Illness     Diagnosis: Metastatic Castration Resistant Prostate Cancer    Current Treatment: Lupron, Zytiga and Prednisone, pending initiation of Pluvicto at [facility] scheduled to start on 8/1/2024    Previous Treatment:   Metastatic Castration resistant prostate cancer:    Patient was initially diagnosed with prostate cancer in 2015.  On 6/4/2015 he underwent radical prostatectomy and LN dissection which showed a [place] 4+3 = 7 prostate cancer.  There were several high risk features such as extraprostatic extension, seminal vesicle invasion, positive margins at the bladder neck and right and left seminal vesicle.  Lymph vascular invasion was indeterminate.  Perineural invasion was not commented on. Final pathology [id number] R1.  He was treated with adjuvant radiation by Dr. Stanton.    According to records the patient likely started Lupron in December 2017 when his PSA started to rise.  The patient took a break in August 2019.  He states he was not aware he was to continue.  He restarted Lupron on 11/26/2019 after it was noted that his PSA chago from 0.46 up to 2.55 on 11/8/2019.  Unfortunately his PSA continued to rise after that to 4.54 on 3/10/2020.    - restarted Lupron on 11/26/20  - Started Zytiga on 8/27/20.  8/31/2020 PSA 17.95     - Started Xtandi in May 2021 due to intolerance of Zytiga even at the lowest dose  - PSA rising in April 2023 to 2.85, doubling time 3.4 months  - started Docetaxel May 2023    Semaj Medina is a 69 y.o. male who presents to [facility] HEMATOLOGY & ONCOLOGY for Metastatic Castration Resistant Prostate Cancer.    History of Present Illness    The patient was previously evaluated by Dr. Salazar, a radiation oncologist at [facility], who shared pt was not a candidate for [place] [address]. However, he would be a suitable candidate for  Pluvicto, he has referred him to nuclear medicine specialist at [facility]. A consultation with a nuclear medicine specialist was scheduled for evaluation for Pluvicto. His current medication regimen includes Zytiga and prednisone.      Patient reports experiencing back and neck pain, which subsides upon administration of his pain medication. He typically takes his pain medication every 3 hours. He is here for evaluation prior to next Xgeva treatment.  He began Pluvicto treatment on August 1, 2024, given every 6 weeks, plan is for 6 treatments. He reports nausea, vomiting after second pluvicto treatment, reports n/v is controlled with zofran. Pt underwent PSMA PET CT scan and he is here to discuss those results. He is scheduled for his next Pluvicto treatment in 1/9/2024 at [facility].        Review of Systems   Constitutional:  Positive for appetite change, fatigue and unexpected weight loss.   Musculoskeletal:  Positive for back pain (Neck pain).        Oncology/Hematology History Overview Note     Metastatic Castration resistant prostate cancer:    Patient was initially diagnosed with prostate cancer in 2015.  On 6/4/2015 he underwent radical prostatectomy and LN dissection which showed a [place] 4+3 = 7 prostate cancer.  There were several high risk features such as extraprostatic extension, seminal vesicle invasion, positive margins at the bladder neck and right and left seminal vesicle.  Lymph vascular invasion was indeterminate.  Perineural invasion was not commented on. Final pathology [id number] R1.  He was treated with adjuvant radiation by Dr. Stanton.    According to records the patient likely started Lupron in December 2017 when his PSA started to rise.  The patient took a break in August 2019.  He states he was not aware he was to continue.  He restarted Lupron on 11/26/2019 after it was noted that his PSA chago from 0.46 up to 2.55 on 11/8/2019.  Unfortunately his PSA continued to rise after that to  4.54 on 3/10/2020.    - restarted Lupron on 11/26/20  - Started Zytiga on 8/27/20.  8/31/2020 PSA 17.95     - Started Xtandi in May 2021 due to intolerance of Zytiga even at the lowest dose  - PSA rising in April 2023 to 2.85, doubling time 3.4 months  - started Docetaxel May 2023    PSMA PET 5/2/23:   1.  Radiotracer avid supra clavicular, thoracic and abdominal adenopathy and sclerotic lesion within T10 vertebral body likely representing metastatic disease.  2.  Indeterminate lesion within the left ilium demonstrating mild uptake.   3.  Asymmetric sclerosis within the superior lateral aspect the left orbit which does not demonstrate significant uptake above that of the right orbit. Findings are nonspecific with  differential considerations including but not limited to metastatic disease, fibrous dysplasia versus Paget's disease.   4.  Sub-6 mm nodule within the left lower lobe.  5.  Scattered indeterminate hypodense lesions throughout the liver which do not demonstrate radiotracer uptake above that of the adjacent liver, best best visualized on recent multiphase CT abdomen and pelvis 04/17/2023. Please refer to this dictation for further information.       Prostate cancer metastatic to bone   12/11/2020 -  Chemotherapy    OP SUPPORTIVE Leuprolide 45 mg Q6M     3/23/2021 -  Chemotherapy    OP SUPPORTIVE Denosumab (Xgeva) Q28D     4/15/2021 - 5/13/2021 Chemotherapy    OP PROSTATE Enzalutamide     5/19/2021 Initial Diagnosis    Prostate cancer metastatic to bone (CMS/HCC)     6/2/2023 - 9/15/2023 Biopsy    OP PROSTATE DOCEtaxel  Plan Provider: Natalya Jack MD PhD  Treatment goal: Palliative  Line of treatment: [No plan line of treatment]     12/19/2023 - 12/19/2023 Chemotherapy    OP PROSTATE Apalutamide     1/26/2024 Biopsy    OP PROSTATE Abiraterone / PredniSONE  Plan Provider: Joshua Dick MD  Treatment goal: Control  Line of treatment: [No plan line of treatment]     12/26/2024 -  Chemotherapy    OP  "PROSTATE Cabazitaxel / PredniSONE     Prostate cancer   6/16/2021 Initial Diagnosis    Prostate cancer (HCC)     6/2/2023 -  Chemotherapy    OP CENTRAL VENOUS ACCESS DEVICE ACCESS, CARE, AND MAINTENANCE (CVAD)     Secondary malignant neoplasm of bone   11/4/2021 Initial Diagnosis    Secondary malignant neoplasm of bone (HCC)     11/23/2021 - 12/9/2021 Radiation    Radiation OncologyTreatment Course:  Semaj Medina received 3000 cGy in 10 fractions to the T9-T11 spine.          Objective     Vitals:    12/27/24 0926   BP: 115/73   Pulse: 66   Resp: 18   Temp: 98.3 °F (36.8 °C)   TempSrc: Temporal   SpO2: 100%   Weight: 53.3 kg (117 lb 6.4 oz)   Height: 175 cm (68.9\")   PainSc:   5   PainLoc: Back           ECOG score: 0         PHQ-9 Total Score:         Physical Exam  Vitals reviewed. Exam conducted with a chaperone present.   Constitutional:       General: He is not in acute distress.  HENT:      Head: Normocephalic and atraumatic.   Eyes:      Conjunctiva/sclera: Conjunctivae normal.      Pupils: Pupils are equal, round, and reactive to light.   Neurological:      Mental Status: He is alert and oriented to person, place, and time.         Physical Exam        Past Medical History     Past Medical History:   Diagnosis Date    Anemia     NO PROBLEMS    Anxiety     Blood disease     Colon polyps     Depression     Diverticulitis     Forgetfulness     Hepatitis C     RESOLVED    Hypertension     Night sweats     Numbness and tingling of left lower extremity     Prostate cancer      Current Outpatient Medications on File Prior to Visit   Medication Sig Dispense Refill    abiraterone acetate (ZYTIGA) 500 MG tablet Take 2 tablets by mouth Daily. 60 tablet 11    amLODIPine (NORVASC) 5 MG tablet Take 1 tablet by mouth Daily. 30 tablet 0    Calcium Carbonate-Vitamin D (Calcium Plus Vitamin D) 500-1.25 MG-MCG capsule Take 1 capsule by mouth Daily. 30 capsule 5    dronabinol (Marinol) 5 MG capsule Take 1 capsule by mouth " 2 (Two) Times a Day Before Meals. 30 capsule 1    HYDROcodone-acetaminophen (NORCO)  MG per tablet Take 1 tablet by mouth Every 4 (Four) Hours As Needed for Moderate Pain. 180 tablet 0    ibuprofen (ADVIL,MOTRIN) 800 MG tablet TAKE 1 TABLET BY MOUTH EVERY 6 - 8 HOURS AS NEEDED FOR PAIN      leuprolide (Lupron [place], 3-Month,) 22.5 MG injection 22.5 mg.      lisinopril (PRINIVIL,ZESTRIL) 40 MG tablet TAKE 1 TABLET BY MOUTH DAILY 90 tablet 0    potassium chloride (MICRO-K) 10 MEQ CR capsule TAKE 2 CAPSULES BY MOUTH DAILY 180 capsule 0    predniSONE (DELTASONE) 5 MG tablet Take 1 tablet by mouth 2 (Two) Times a Day. 60 tablet 11    folic acid (FOLVITE) 1 MG tablet  (Patient not taking: Reported on 12/27/2024)      magnesium oxide (MAG-OX) 400 MG tablet Take 1 tablet by mouth Daily. (Patient not taking: Reported on 12/27/2024) 30 tablet 0    naloxone (NARCAN) 4 MG/0.1ML nasal spray CALL 911. SPR CONTENTS OF ONE SPRAYER (0.1ML) INTO ONE NOSTRIL. REPEAT IN 2-3 MIN IF SYMPTOMS OF OPIOID EMERGENCY PERSIST, ALTERNATE NOSTRILS (Patient not taking: Reported on 12/27/2024)      ondansetron (ZOFRAN) 8 MG tablet Take 1 tablet by mouth Every 8 (Eight) Hours As Needed for Nausea or Vomiting. (Patient not taking: Reported on 12/27/2024) 90 tablet 3    oxyCODONE (oxyCONTIN) 10 MG 12 hr tablet Take 1 tablet by mouth Every 12 (Twelve) Hours. (Patient not taking: Reported on 10/15/2024) 60 tablet 0    prochlorperazine (COMPAZINE) 10 MG tablet Take 1 tablet by mouth Every 6 (Six) Hours As Needed for Nausea or Vomiting. (Patient not taking: Reported on 12/27/2024) 30 tablet 5     No current facility-administered medications on file prior to visit.      No Known Allergies  Past Surgical History:   Procedure Laterality Date    COLONOSCOPY      PROSTATE BIOPSY      PROSTATECTOMY      ROBOT ASSISTED W PELVIC LYMPH NODE DISSECTION    TRANSURETHRAL RESECTION OF BLADDER TUMOR      VENOUS ACCESS DEVICE (PORT) INSERTION Right 5/22/2023     Procedure: INSERTION VENOUS ACCESS PORT;  Surgeon: Waldemar Iraheta MD;  [facility];  Service: General;  Laterality: Right;     Social History     Socioeconomic History    Marital status:    Tobacco Use    Smoking status: Every Day     Current packs/day: 0.25     Average packs/day: 0.3 packs/day for 54.2 years (13.5 ttl pk-yrs)     Types: Cigarettes     Start date: 10/21/1970     Passive exposure: Past    Smokeless tobacco: Never   Vaping Use    Vaping status: Never Used   Substance and Sexual Activity    Alcohol use: Yes     Alcohol/week: 6.0 standard drinks of alcohol     Types: 6 Cans of beer per week     Comment: 12 pack a week of beer    Drug use: Never    Sexual activity: Defer     Family History   Problem Relation Age of Onset    Cancer Mother     Cancer Father        Results     Result Review   The following data was reviewed by: Joshua Dick MD on 06/28/2024:  Lab Results   Component Value Date    HGB 10.7 (L) 12/18/2024    HCT 31.8 (L) 12/18/2024    MCV 84.6 12/18/2024     12/18/2024    WBC 3.77 12/18/2024    NEUTROABS 2.18 12/18/2024    LYMPHSABS 1.25 12/18/2024    MONOSABS 0.28 12/18/2024    EOSABS 0.05 12/18/2024    BASOSABS 0.01 12/18/2024     Lab Results   Component Value Date    GLUCOSE 113 (H) 12/18/2024    BUN 9 12/18/2024    CREATININE 0.89 12/18/2024     12/18/2024    K 3.8 12/18/2024     (H) 12/18/2024    CO2 21.9 (L) 12/18/2024    CALCIUM 8.2 (L) 12/18/2024    PROTEINTOT 6.0 12/18/2024    ALBUMIN 3.3 (L) 12/18/2024    BILITOT 0.2 12/18/2024    ALKPHOS 112 12/18/2024    AST 15 12/18/2024    ALT 7 12/18/2024     Lab Results   Component Value Date    MG 1.7 12/18/2024    PHOS 3.5 12/18/2024    TSH 1.350 05/16/2019       No radiology results for the last day  NM PET/CT Skull Base to Mid Thigh    Result Date: 12/20/2024  Impression: Impression: 1. Progression of bony metastatic disease with multiple new sclerotic bone lesions as well as new mild areas of  increased activity predominantly involving the ribs but without corresponding lytic or sclerotic bone lesions. 2. Interval improvement in cervical, mediastinal, and retroperitoneal adenopathy. Electronically Signed: Ben Reyes MD  12/20/2024 9:17 AM EST  Workstation ID: [id number]     Assessment & Plan     Diagnoses and all orders for this visit:    1. Prostate cancer metastatic to bone (Primary)  -     Cancel: PSA; Future    2. Cancer related pain    3. High risk medication use    Other orders  -     Cancel: leuprolide (LUPRON) injection 45 mg                 Semaj Medina is a 69 y.o. male who presents to [facility] HEMATOLOGY [facility] ONCOLOGY for treatment of  metastatic prostate cancer, completed 6 cycles of docetaxel on 9/15/2023, Lupron Q6 months, Pt also receiving Xgeva and he is here prior to next Xgeva infusion  -next Lupron injection due on 6/18/2024  -PSA from 12/15/23 was 8 up from 3.1 (in 10/2023)  -Discussed result of NM bone scan, CT CAP from 1/24/2024 which revealed interval enlargement T10 vertebral body metastasis, no evidence of intrathoracic metastatic disease, slight worsening appearance of osteoblastic schedule static disease, notable at T10, T8, and T5, no evidence of intra-abdominal or pelvic metastatic disease.  -Patient has undergone radiation therapy 10 fractions to thoracic spine from T9-T11 in 2021.  -Also shared plan to reattempt treatment with Zytiga and prednisone, patient reports some nausea vomiting which we will attempt to manage with antiemetics.  -Orders placed for Zytiga and prednisone, also provided prescriptions for Compazine and Zofran for patient to begin once he begins treatment with these medications.  -pt began zytiga/prednisone on 2/2/2024, pt tolerating it well, no evidence of toxicity on labs, recommend he continue at current dose     -discussed results of restaging imaging from 4/24/2024 which showed stable radiotracer uptake involving sclerotic  osseous metastasis of the left frontal bone above left orbit and at T10, no findings of osseous progression, CT chest abdomen pelvis revealed stable sclerotic osseous metastasis, no suspicious adenopathy, postsurgical changes of prostatectomy.          -Given presence of continued osseous metastasis, patient with continued pain in area of metastasis, and since he has previously received radiation in this area, referred him to [facility] school medicine for consideration for treatment with [place] [address] or Pluvicto.  -Shared results of PSMA PET/CT scan from 6/13/2024 which revealed progression of disease, patient has been referred to nuclear medicine specialist at [facility] for consideration for Pluvicto,pt began Pluvicto on 8/1/2024.  -Obtained PSMA PET CT scan in 12/2024, it revealed mixed response, discussed case with [place] physician Dr. Freed on 12/27/24 and we both decided to have pt complete pluvicto treatment to see if osseous areas of disease could be treated and will rescan pt after completion of Pluvicto.    Pt here prior to next Xgeva treatment, labs reviewed plan to proceed with treatment as scheduled.     -Considering switching pt to Cabazitaxel upon progression after Pluvicto treatment.     -Recommend pt continue of Lupron, Xgeva, and Zytiga and prednisone  -OxyContin 10 mg was prescribed for pain management, to be taken twice daily every 12 hours.  -will continue to provide refills of his [place] for his cancer related pain, added OxyContin twice daily given lack of relief with Norco.     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      Hypomagnesemia  -pt prescribed mag oxide 400 mg PO QD  -will recheck at next clinic appointment     Hypokalemia  -pt receiving KCL repletion    Low Vit D  -Vit level of 27.7  -prescribed Vit D 50K IU once weekly x 8 weeks     Weight  loss  -ordered marinol      Please note that portions of this note were completed with a voice recognition program.      Assessment & Plan        Plan for patient follow-up in 1 month with me and next Xgeva treatment, pt to continue with pluvicto and Xgeva treatments in between appointments with me.     Electronically signed by Joshua Dick MD, 12/28/24, 10:01 AM EST.        Follow Up     I spent 30 minutes caring for Semaj on this date of service. This time includes time spent by me in the following activities:preparing for the visit, reviewing tests, obtaining and/or reviewing a separately obtained history, performing a medically appropriate examination and/or evaluation , counseling and educating the patient/family/caregiver, ordering medications, tests, or procedures, referring and communicating with other health care professionals , documenting information in the medical record, independently interpreting results and communicating that information with the patient/family/caregiver, and care coordination    Any chemotherapy or immunotherapy or other systemic therapy treatment plan involves a high risk of complications and/or mortality of patient management.    The patient was seen and examined. Work by the provider also included review and/or ordering of lab tests, review and/or ordering of radiology tests, review and/or ordering of medicine tests, discussion with other physicians or providers, independent review of data, obtaining old records, review/summation of old records, and/or other review.    I have reviewed the family history, social history, and past medical history for this patient. Previous information and data has been reviewed and updated as needed. I have reviewed and verified the chief complaint, history, and other documentation. The patient was interviewed and examined in the clinic and the chart reviewed. The previous observations, recommendations, and conclusions were reviewed including  those of other providers.     The plan was discussed with the patient and/or family. The patient was given time to ask questions and these questions were answered. At the conclusion of their visit they had no additional questions or concerns and all questions were answered to their satisfaction.    Patient was given instructions and counseling regarding his condition or for health maintenance advice. Please see specific information pulled into the AVS if appropriate.       Patient or patient representative verbalized consent for the use of Ambient Listening during the visit with  Joshua Dick MD for chart documentation. 12/28/2024  17:46 EDT

## 2024-12-26 ENCOUNTER — SPECIALTY PHARMACY (OUTPATIENT)
Dept: PHARMACY | Facility: HOSPITAL | Age: 69
End: 2024-12-26
Payer: MEDICARE

## 2024-12-27 ENCOUNTER — HOSPITAL ENCOUNTER (OUTPATIENT)
Dept: ONCOLOGY | Facility: HOSPITAL | Age: 69
Discharge: HOME | End: 2024-12-27
Payer: MEDICARE

## 2024-12-27 ENCOUNTER — DOCUMENTATION (OUTPATIENT)
Dept: PHARMACY | Facility: HOSPITAL | Age: 69
End: 2024-12-27
Payer: MEDICARE

## 2024-12-27 ENCOUNTER — OFFICE VISIT (OUTPATIENT)
Dept: ONCOLOGY | Facility: HOSPITAL | Age: 69
End: 2024-12-27
Payer: MEDICARE

## 2024-12-27 VITALS
HEART RATE: 66 BPM | WEIGHT: 117.4 LBS | BODY MASS INDEX: 17.39 KG/M2 | SYSTOLIC BLOOD PRESSURE: 115 MMHG | OXYGEN SATURATION: 100 % | DIASTOLIC BLOOD PRESSURE: 73 MMHG | RESPIRATION RATE: 18 BRPM | HEIGHT: 69 IN | TEMPERATURE: 98.3 F

## 2024-12-27 DIAGNOSIS — C61 PROSTATE CANCER METASTATIC TO BONE: Primary | ICD-10-CM

## 2024-12-27 DIAGNOSIS — C79.51 PROSTATE CANCER METASTATIC TO BONE: Primary | ICD-10-CM

## 2024-12-27 DIAGNOSIS — Z79.899 HIGH RISK MEDICATION USE: ICD-10-CM

## 2024-12-27 DIAGNOSIS — G89.3 CANCER RELATED PAIN: ICD-10-CM

## 2024-12-27 PROCEDURE — 3074F SYST BP LT 130 MM HG: CPT | Performed by: INTERNAL MEDICINE

## 2024-12-27 PROCEDURE — 3078F DIAST BP <80 MM HG: CPT | Performed by: INTERNAL MEDICINE

## 2024-12-27 PROCEDURE — 25010000002 LEUPROLIDE 45 MG KIT: Performed by: INTERNAL MEDICINE

## 2024-12-27 PROCEDURE — 1125F AMNT PAIN NOTED PAIN PRSNT: CPT | Performed by: INTERNAL MEDICINE

## 2024-12-27 PROCEDURE — 96402 CHEMO HORMON ANTINEOPL SQ/IM: CPT

## 2024-12-27 RX ADMIN — LEUPROLIDE ACETATE 45 MG: KIT at 10:22

## 2024-12-27 NOTE — PROGRESS NOTES
"PHARMACY C1D1 PRE VISIT ASSESSMENT    Patient will present for C1D1 of prednisone + cabazitaxel 20 mg/m2 Q21D    69 y.o. male  Estimated body surface area is 1.65 meters squared as calculated from the following:    Height as of an earlier encounter on 12/27/24: 175 cm (68.9\").    Weight as of an earlier encounter on 12/27/24: 53.3 kg (117 lb 6.4 oz).    Past Medical History:   Diagnosis Date    Anemia     NO PROBLEMS    Anxiety     Blood disease     Colon polyps     Depression     Diverticulitis     Forgetfulness     Hepatitis C     RESOLVED    Hypertension     Night sweats     Numbness and tingling of left lower extremity     Prostate cancer        Oncology/Hematology History Overview Note     Metastatic Castration resistant prostate cancer:    Patient was initially diagnosed with prostate cancer in 2015.  On 6/4/2015 he underwent radical prostatectomy and LN dissection which showed a Zach 4+3 = 7 prostate cancer.  There were several high risk features such as extraprostatic extension, seminal vesicle invasion, positive margins at the bladder neck and right and left seminal vesicle.  Lymph vascular invasion was indeterminate.  Perineural invasion was not commented on. Final pathology [id number] R1.  He was treated with adjuvant radiation by Dr. Stanton.    According to records the patient likely started Lupron in December 2017 when his PSA started to rise.  The patient took a break in August 2019.  He states he was not aware he was to continue.  He restarted Lupron on 11/26/2019 after it was noted that his PSA chago from 0.46 up to 2.55 on 11/8/2019.  Unfortunately his PSA continued to rise after that to 4.54 on 3/10/2020.    - restarted Lupron on 11/26/20  - Started Zytiga on 8/27/20.  8/31/2020 PSA 17.95     - Started Xtandi in May 2021 due to intolerance of Zytiga even at the lowest dose  - PSA rising in April 2023 to 2.85, doubling time 3.4 months  - started Docetaxel May 2023    PSMA PET 5/2/23:   1.  " Radiotracer avid supra clavicular, thoracic and abdominal adenopathy and sclerotic lesion within T10 vertebral body likely representing metastatic disease.  2.  Indeterminate lesion within the left ilium demonstrating mild uptake.   3.  Asymmetric sclerosis within the superior lateral aspect the left orbit which does not demonstrate significant uptake above that of the right orbit. Findings are nonspecific with  differential considerations including but not limited to metastatic disease, fibrous dysplasia versus Paget's disease.   4.  Sub-6 mm nodule within the left lower lobe.  5.  Scattered indeterminate hypodense lesions throughout the liver which do not demonstrate radiotracer uptake above that of the adjacent liver, best best visualized on recent multiphase CT abdomen and pelvis 04/17/2023. Please refer to this dictation for further information.       Prostate cancer metastatic to bone   12/11/2020 -  Chemotherapy    OP SUPPORTIVE Leuprolide 45 mg Q6M     3/23/2021 -  Chemotherapy    OP SUPPORTIVE Denosumab (Xgeva) Q28D     4/15/2021 - 5/13/2021 Chemotherapy    OP PROSTATE Enzalutamide     5/19/2021 Initial Diagnosis    Prostate cancer metastatic to bone (CMS/HCC)     6/2/2023 - 9/15/2023 Biopsy    OP PROSTATE DOCEtaxel  Plan Provider: Natalya Jack MD PhD  Treatment goal: Palliative  Line of treatment: [No plan line of treatment]     12/19/2023 - 12/19/2023 Chemotherapy    OP PROSTATE Apalutamide     1/26/2024 Biopsy    OP PROSTATE Abiraterone / PredniSONE  Plan Provider: Joshua Dick MD  Treatment goal: Control  Line of treatment: [No plan line of treatment]     12/26/2024 -  Chemotherapy    OP PROSTATE Cabazitaxel / PredniSONE     Prostate cancer   6/16/2021 Initial Diagnosis    Prostate cancer (HCC)     6/2/2023 -  Chemotherapy    OP CENTRAL VENOUS ACCESS DEVICE ACCESS, CARE, AND MAINTENANCE (CVAD)     Secondary malignant neoplasm of bone   11/4/2021 Initial Diagnosis    Secondary malignant neoplasm of  bone (HCC)     11/23/2021 - 12/9/2021 Radiation    Radiation OncologyTreatment Course:  Semaj Medina received 3000 cGy in 10 fractions to the T9-T11 spine.          ONC Staging:  Metastatic CRPC    Relevant Pathology:       Potentially Actionable Mutation Present: NO     Relevant Imaging:    NM PET/CT Skull Base to Mid Thigh    Result Date: 12/20/2024  Impression: 1. Progression of bony metastatic disease with multiple new sclerotic bone lesions as well as new mild areas of increased activity predominantly involving the ribs but without corresponding lytic or sclerotic bone lesions. 2. Interval improvement in cervical, mediastinal, and retroperitoneal adenopathy. Electronically Signed: Ben Reyes MD  12/20/2024 9:17 AM EST  Workstation ID: [id number]      Current treatment regimen has insurance authorization approval? YES    Medication treatment plan entered is appropriate per NCCN Guidelines    Pharmacy Follow-up Considerations:   Patient with metastatic CRPC with repeat PET scan showing progression on docetaxel. Plan to switch therapy to cabazitaxel + pednisone. Pharmacy will continue to monitor labs while on treatment and will  patient prior to first infusion on cycle 1 day 1.     Rock Goodman, PharmD, BCPS  12/27/2024  14:28 EST

## 2024-12-30 ENCOUNTER — SPECIALTY PHARMACY (OUTPATIENT)
Dept: PHARMACY | Facility: TELEHEALTH | Age: 69
End: 2024-12-30
Payer: MEDICARE

## 2025-01-01 ENCOUNTER — HOSPITAL ENCOUNTER (EMERGENCY)
Facility: HOSPITAL | Age: 70
End: 2025-08-16
Attending: EMERGENCY MEDICINE
Payer: MEDICARE

## 2025-01-01 ENCOUNTER — APPOINTMENT (OUTPATIENT)
Dept: CT IMAGING | Facility: HOSPITAL | Age: 70
End: 2025-01-01
Payer: MEDICARE

## 2025-01-01 ENCOUNTER — APPOINTMENT (OUTPATIENT)
Dept: GENERAL RADIOLOGY | Facility: HOSPITAL | Age: 70
End: 2025-01-01
Payer: MEDICARE

## 2025-01-01 ENCOUNTER — TELEPHONE (OUTPATIENT)
Dept: ONCOLOGY | Facility: HOSPITAL | Age: 70
End: 2025-01-01
Payer: MEDICARE

## 2025-01-01 DIAGNOSIS — C79.51 PROSTATE CANCER METASTATIC TO BONE: ICD-10-CM

## 2025-01-01 DIAGNOSIS — G89.3 CANCER RELATED PAIN: ICD-10-CM

## 2025-01-01 DIAGNOSIS — C61 PROSTATE CANCER METASTATIC TO BONE: ICD-10-CM

## 2025-01-01 RX ORDER — HYDROCODONE BITARTRATE AND ACETAMINOPHEN 10; 325 MG/1; MG/1
1.5 TABLET ORAL EVERY 4 HOURS PRN
Qty: 135 TABLET | Refills: 0 | Status: SHIPPED | OUTPATIENT
Start: 2025-01-01 | End: 2025-08-18

## 2025-01-03 ENCOUNTER — SPECIALTY PHARMACY (OUTPATIENT)
Dept: PHARMACY | Facility: HOSPITAL | Age: 70
End: 2025-01-03
Payer: MEDICARE

## 2025-01-03 NOTE — PROGRESS NOTES
Specialty Pharmacy Patient Management Program  Oncology Reassessment     Semaj Medina was referred by their provider to the Oncology Patient Management program offered by [facility] for [facility]. A follow-up outreach was conducted, including assessment of continued therapy appropriateness, medication adherence, and side effect incidence and management for abiraterone/prednisone.    Relevant Past Medical History and Comorbidities  Relevant medical history and concomitant health conditions were discussed with the patient. The patient's chart has been reviewed for relevant past medical history and comorbid health conditions and updated as necessary.   Past Medical History:   Diagnosis Date    Anemia     NO PROBLEMS    Anxiety     Blood disease     Colon polyps     Depression     Diverticulitis     Forgetfulness     Hepatitis C     RESOLVED    Hypertension     Night sweats     Numbness and tingling of left lower extremity     Prostate cancer      Social History     Socioeconomic History    Marital status:    Tobacco Use    Smoking status: Every Day     Current packs/day: 0.25     Average packs/day: 0.3 packs/day for 54.2 years (13.6 ttl pk-yrs)     Types: Cigarettes     Start date: 10/21/1970     Passive exposure: Past    Smokeless tobacco: Never   Vaping Use    Vaping status: Never Used   Substance and Sexual Activity    Alcohol use: Yes     Alcohol/week: 6.0 standard drinks of alcohol     Types: 6 Cans of beer per week     Comment: 12 pack a week of beer    Drug use: Never    Sexual activity: Defer     Problem list reviewed by Bina Golden PharmD on 1/3/2025 at  1:37 PM    Allergies  Known allergies and reactions were discussed with the patient. The patient's chart has been reviewed for allergy information and updated as necessary.   No Known Allergies  Allergies reviewed by Bina Golden PharmD on 1/3/2025 at  1:37 PM    Relevant Laboratory Values  Lab Results   Component  Value Date    GLUCOSE 113 (H) 12/18/2024    CALCIUM 8.2 (L) 12/18/2024     12/18/2024    K 3.8 12/18/2024    CO2 21.9 (L) 12/18/2024     (H) 12/18/2024    BUN 9 12/18/2024    CREATININE 0.89 12/18/2024    EGFRIFAFRI 97 02/23/2022    BCR 10.1 12/18/2024    ANIONGAP 9.1 12/18/2024     Lab Results   Component Value Date    WBC 3.77 12/18/2024    RBC 3.76 (L) 12/18/2024    HGB 10.7 (L) 12/18/2024    HCT 31.8 (L) 12/18/2024    MCV 84.6 12/18/2024    MCH 28.5 12/18/2024    MCHC 33.6 12/18/2024    RDW 17.9 (H) 12/18/2024    RDWSD 54.9 (H) 12/18/2024    MPV 8.7 12/18/2024     12/18/2024    NEUTRORELPCT 57.8 12/18/2024    LYMPHORELPCT 33.2 12/18/2024    MONORELPCT 7.4 12/18/2024    EOSRELPCT 1.3 12/18/2024    BASORELPCT 0.3 12/18/2024    AUTOIGPER 0.0 12/18/2024    NEUTROABS 2.18 12/18/2024    LYMPHSABS 1.25 12/18/2024    MONOSABS 0.28 12/18/2024    EOSABS 0.05 12/18/2024    BASOSABS 0.01 12/18/2024    AUTOIGNUM 0.00 12/18/2024    NRBC 0.0 12/18/2024        The above labs have been reviewed. The following labs are outside of normal limits: Ca and Hgb are low, Glucose is high. No dose adjustments are needed for the oral specialty medication(s) based on the labs.    Current Medication List  This medication list has been reviewed with the patient and evaluated for any interactions or necessary modifications/recommendations, and updated to include all prescription medications, OTC medications, and supplements the patient is currently taking.  This list reflects what is contained in the patient's profile, which has also been marked as reviewed to communicate to other providers it is the most up to date version of the patient's current medication therapy.     Current Outpatient Medications:     abiraterone acetate (ZYTIGA) 500 MG tablet, Take 2 tablets by mouth Daily., Disp: 60 tablet, Rfl: 11    amLODIPine (NORVASC) 5 MG tablet, Take 1 tablet by mouth Daily., Disp: 30 tablet, Rfl: 0    Calcium Carbonate-Vitamin  D (Calcium Plus Vitamin D) 500-1.25 MG-MCG capsule, Take 1 capsule by mouth Daily., Disp: 30 capsule, Rfl: 5    dronabinol (Marinol) 5 MG capsule, Take 1 capsule by mouth 2 (Two) Times a Day Before Meals., Disp: 30 capsule, Rfl: 1    folic acid (FOLVITE) 1 MG tablet, , Disp: , Rfl:     HYDROcodone-acetaminophen (NORCO)  MG per tablet, Take 1 tablet by mouth Every 4 (Four) Hours As Needed for Moderate Pain., Disp: 180 tablet, Rfl: 0    ibuprofen (ADVIL,MOTRIN) 800 MG tablet, TAKE 1 TABLET BY MOUTH EVERY 6 - 8 HOURS AS NEEDED FOR PAIN, Disp: , Rfl:     leuprolide (Lupron Depot, 3-Month,) 22.5 MG injection, 22.5 mg., Disp: , Rfl:     lisinopril (PRINIVIL,ZESTRIL) 40 MG tablet, TAKE 1 TABLET BY MOUTH DAILY, Disp: 90 tablet, Rfl: 0    magnesium oxide (MAG-OX) 400 MG tablet, Take 1 tablet by mouth Daily. (Patient not taking: Reported on 12/27/2024), Disp: 30 tablet, Rfl: 0    naloxone (NARCAN) 4 MG/0.1ML nasal spray, CALL 911. SPR CONTENTS OF ONE SPRAYER (0.1ML) INTO ONE NOSTRIL. REPEAT IN 2-3 MIN IF SYMPTOMS OF OPIOID EMERGENCY PERSIST, ALTERNATE NOSTRILS (Patient not taking: Reported on 12/27/2024), Disp: , Rfl:     ondansetron (ZOFRAN) 8 MG tablet, Take 1 tablet by mouth Every 8 (Eight) Hours As Needed for Nausea or Vomiting. (Patient not taking: Reported on 12/27/2024), Disp: 90 tablet, Rfl: 3    oxyCODONE (oxyCONTIN) 10 MG 12 hr tablet, Take 1 tablet by mouth Every 12 (Twelve) Hours. (Patient not taking: Reported on 10/15/2024), Disp: 60 tablet, Rfl: 0    potassium chloride (MICRO-K) 10 MEQ CR capsule, TAKE 2 CAPSULES BY MOUTH DAILY, Disp: 180 capsule, Rfl: 0    predniSONE (DELTASONE) 5 MG tablet, Take 1 tablet by mouth 2 (Two) Times a Day., Disp: 60 tablet, Rfl: 11    prochlorperazine (COMPAZINE) 10 MG tablet, Take 1 tablet by mouth Every 6 (Six) Hours As Needed for Nausea or Vomiting. (Patient not taking: Reported on 12/27/2024), Disp: 30 tablet, Rfl: 5  No current facility-administered medications for this  visit.    Medicines reviewed by Bina Golden, PharmD on 1/3/2025 at  1:37 PM    Drug Interactions  No drug interactions with abiraterone.     Adverse Drug Reactions  Medication tolerability: Tolerating with no to minimal ADRs  Medication plan: Continue therapy with normal follow-up  Adverse Reactions Experienced: Denied ADRs   Plan for ADR Management: Not required     Hospitalizations and Urgent Care Since Last Assessment  ED Visits, Admissions, or Hospitalizations: 0  Urgent Office Visits: 0    Adherence and Self-Administration  Adherence related to the patient's specialty therapy was discussed with the patient. The Adherence segment of this outreach has been reviewed and updated.     Adherence Questions  Linked Medication(s) Assessed: Abiraterone Acetate (ZYTIGA)  On average, how many doses/injections does the patient miss per month?: 0  What are the identified reasons for non-adherence or missed doses? : no problems identified  What is the estimated medication adherence level?: %  Based on the patient/caregiver response and refill history, does this patient require an MTP to track adherence improvements?: no    Approximate Number of Doses Missed Since Last Assessment: 1   Ongoing or New Barriers to Patient Adherence and/or Self-Administration:  no ongoing or new barriers  Methods for Supporting Patient Adherence and/or Self-Administration:  continue current method    Open Medication Therapy Problems  No medication therapy recommendations to display    Goals of Therapy  Goals related to the patient's specialty therapy were discussed with the patient. The Patient Goals segment of this outreach has been reviewed and updated.   Goals Addressed Today        Specialty Pharmacy General Goal      Clinical goal/therapeutic target: stable or decreasing PSA and disease control, per the recent oncology clinic notes and labs. {  PSA   Date Value Ref Range Status   12/18/2024 51.000 (H) 0.000 - 4.000 ng/mL Final    11/20/2024 43.100 (H) 0.000 - 4.000 ng/mL Final   10/11/2024 20.900 (H) 0.000 - 4.000 ng/mL Final   09/20/2024 18.900 (H) 0.000 - 4.000 ng/mL Final   08/23/2024 21.000 (H) 0.000 - 4.000 ng/mL Final   07/17/2024 17.700 (H) 0.000 - 4.000 ng/mL Final   06/13/2024 16.600 (H) 0.000 - 4.000 ng/mL Final   05/03/2024 20.100 (H) 0.000 - 4.000 ng/mL Final   04/05/2024 16.500 (H) 0.000 - 4.000 ng/mL Final   03/08/2024 15.200 (H) 0.000 - 4.000 ng/mL Final   ]   Patient-identified goal of therapy: Patient will adhere to medication regimen by %    Date of Enrollment: 01/26/24 12/18/24: PSA is continuing to increase. Patient has appointment next week at [facility] and will be discussing alternate treatment options.                Quality of Life Assessment   Quality of Life related to the patient's enrollment in the patient management program and services provided was discussed with the patient. The QOL segment of this outreach has been reviewed and updated.  Quality of Life Improvement Scale: 10-Significantly better  Quality of Life Score: 10    Reassessment Plan & Follow-Up  Pharmacist to continue to perform regular reassessments no more than (6) months from the previous assessment.  Care Coordinator to facilitate future refill outreaches, coordinate prescription delivery, and escalate clinical questions to pharmacist.     Additional Plans, Therapy Recommendations or Therapy Problems to Be Addressed: none at this time    Attestation  Therapeutic appropriateness: Appropriate   I attest the patient was actively involved in and has agreed to the above plan of care.  If the prescribed therapy is at any point deemed not appropriate based on the current or future assessments, a consultation will be initiated with the patient's specialty care provider to determine the best course of action. The revised plan of therapy will be documented along with any required assessments and/or additional patient education provided.     Alyssa  Huy Golden, [facility]  Oncology Clinical Pharmacist  1/3/2025  13:39 EST

## 2025-01-08 RX ORDER — AMLODIPINE BESYLATE 5 MG/1
5 TABLET ORAL DAILY
Qty: 90 TABLET | Refills: 0 | Status: SHIPPED | OUTPATIENT
Start: 2025-01-08

## 2025-01-15 ENCOUNTER — HOSPITAL ENCOUNTER (OUTPATIENT)
Dept: ONCOLOGY | Facility: HOSPITAL | Age: 70
Discharge: HOME | End: 2025-01-15
Payer: MEDICARE

## 2025-01-15 ENCOUNTER — OFFICE VISIT (OUTPATIENT)
Dept: ONCOLOGY | Facility: HOSPITAL | Age: 70
End: 2025-01-15
Payer: MEDICARE

## 2025-01-15 VITALS
SYSTOLIC BLOOD PRESSURE: 110 MMHG | BODY MASS INDEX: 17.18 KG/M2 | DIASTOLIC BLOOD PRESSURE: 70 MMHG | WEIGHT: 116 LBS | OXYGEN SATURATION: 100 % | HEART RATE: 69 BPM | TEMPERATURE: 99.2 F | RESPIRATION RATE: 16 BRPM | HEIGHT: 69 IN

## 2025-01-15 DIAGNOSIS — C61 PROSTATE CANCER METASTATIC TO BONE: Primary | ICD-10-CM

## 2025-01-15 DIAGNOSIS — C61 PROSTATE CANCER METASTATIC TO BONE: ICD-10-CM

## 2025-01-15 DIAGNOSIS — C79.51 PROSTATE CANCER METASTATIC TO BONE: Primary | ICD-10-CM

## 2025-01-15 DIAGNOSIS — R63.0 DECREASED APPETITE: Primary | ICD-10-CM

## 2025-01-15 DIAGNOSIS — C79.51 PROSTATE CANCER METASTATIC TO BONE: ICD-10-CM

## 2025-01-15 DIAGNOSIS — C61 PROSTATE CANCER: ICD-10-CM

## 2025-01-15 DIAGNOSIS — E83.39 HYPOPHOSPHATEMIA: ICD-10-CM

## 2025-01-15 DIAGNOSIS — G89.3 CANCER RELATED PAIN: ICD-10-CM

## 2025-01-15 LAB
ALBUMIN SERPL-MCNC: 3.7 G/DL (ref 3.5–5.2)
ALBUMIN/GLOB SERPL: 1.3 G/DL
ALP SERPL-CCNC: 191 U/L (ref 39–117)
ALT SERPL W P-5'-P-CCNC: 5 U/L (ref 1–41)
ANION GAP SERPL CALCULATED.3IONS-SCNC: 7.6 MMOL/L (ref 5–15)
AST SERPL-CCNC: 14 U/L (ref 1–40)
BASOPHILS # BLD AUTO: 0.01 10*3/MM3 (ref 0–0.2)
BASOPHILS NFR BLD AUTO: 0.3 % (ref 0–1.5)
BILIRUB SERPL-MCNC: 0.3 MG/DL (ref 0–1.2)
BUN SERPL-MCNC: 11 MG/DL (ref 8–23)
BUN/CREAT SERPL: 14.1 (ref 7–25)
CALCIUM SPEC-SCNC: 8.2 MG/DL (ref 8.6–10.5)
CHLORIDE SERPL-SCNC: 112 MMOL/L (ref 98–107)
CO2 SERPL-SCNC: 20.4 MMOL/L (ref 22–29)
CREAT SERPL-MCNC: 0.78 MG/DL (ref 0.76–1.27)
DEPRECATED RDW RBC AUTO: 55.6 FL (ref 37–54)
EGFRCR SERPLBLD CKD-EPI 2021: 96.5 ML/MIN/1.73
EOSINOPHIL # BLD AUTO: 0.08 10*3/MM3 (ref 0–0.4)
EOSINOPHIL NFR BLD AUTO: 2.2 % (ref 0.3–6.2)
ERYTHROCYTE [DISTWIDTH] IN BLOOD BY AUTOMATED COUNT: 17.4 % (ref 12.3–15.4)
GLOBULIN UR ELPH-MCNC: 2.9 GM/DL
GLUCOSE SERPL-MCNC: 178 MG/DL (ref 65–99)
HCT VFR BLD AUTO: 33.4 % (ref 37.5–51)
HGB BLD-MCNC: 10.9 G/DL (ref 13–17.7)
IMM GRANULOCYTES # BLD AUTO: 0.02 10*3/MM3 (ref 0–0.05)
IMM GRANULOCYTES NFR BLD AUTO: 0.6 % (ref 0–0.5)
LYMPHOCYTES # BLD AUTO: 1.07 10*3/MM3 (ref 0.7–3.1)
LYMPHOCYTES NFR BLD AUTO: 29.8 % (ref 19.6–45.3)
MAGNESIUM SERPL-MCNC: 1.9 MG/DL (ref 1.6–2.4)
MCH RBC QN AUTO: 28.5 PG (ref 26.6–33)
MCHC RBC AUTO-ENTMCNC: 32.6 G/DL (ref 31.5–35.7)
MCV RBC AUTO: 87.2 FL (ref 79–97)
MONOCYTES # BLD AUTO: 0.24 10*3/MM3 (ref 0.1–0.9)
MONOCYTES NFR BLD AUTO: 6.7 % (ref 5–12)
NEUTROPHILS NFR BLD AUTO: 2.17 10*3/MM3 (ref 1.7–7)
NEUTROPHILS NFR BLD AUTO: 60.4 % (ref 42.7–76)
NRBC BLD AUTO-RTO: 0 /100 WBC (ref 0–0.2)
PHOSPHATE SERPL-MCNC: 2.3 MG/DL (ref 2.5–4.5)
PLATELET # BLD AUTO: 283 10*3/MM3 (ref 140–450)
PMV BLD AUTO: 8.6 FL (ref 6–12)
POTASSIUM SERPL-SCNC: 4.6 MMOL/L (ref 3.5–5.2)
PROT SERPL-MCNC: 6.6 G/DL (ref 6–8.5)
PSA SERPL-MCNC: 93.5 NG/ML (ref 0–4)
RBC # BLD AUTO: 3.83 10*6/MM3 (ref 4.14–5.8)
SODIUM SERPL-SCNC: 140 MMOL/L (ref 136–145)
WBC NRBC COR # BLD AUTO: 3.59 10*3/MM3 (ref 3.4–10.8)

## 2025-01-15 PROCEDURE — 85025 COMPLETE CBC W/AUTO DIFF WBC: CPT | Performed by: INTERNAL MEDICINE

## 2025-01-15 PROCEDURE — 84153 ASSAY OF PSA TOTAL: CPT | Performed by: INTERNAL MEDICINE

## 2025-01-15 PROCEDURE — 1125F AMNT PAIN NOTED PAIN PRSNT: CPT | Performed by: INTERNAL MEDICINE

## 2025-01-15 PROCEDURE — 84100 ASSAY OF PHOSPHORUS: CPT | Performed by: INTERNAL MEDICINE

## 2025-01-15 PROCEDURE — 80053 COMPREHEN METABOLIC PANEL: CPT | Performed by: INTERNAL MEDICINE

## 2025-01-15 PROCEDURE — 99214 OFFICE O/P EST MOD 30 MIN: CPT | Performed by: INTERNAL MEDICINE

## 2025-01-15 PROCEDURE — 25010000002 HEPARIN LOCK FLUSH PER 10 UNITS: Performed by: INTERNAL MEDICINE

## 2025-01-15 PROCEDURE — 3074F SYST BP LT 130 MM HG: CPT | Performed by: INTERNAL MEDICINE

## 2025-01-15 PROCEDURE — 25010000002 DENOSUMAB 120 MG/1.7ML SOLUTION: Performed by: INTERNAL MEDICINE

## 2025-01-15 PROCEDURE — 83735 ASSAY OF MAGNESIUM: CPT | Performed by: INTERNAL MEDICINE

## 2025-01-15 PROCEDURE — 3078F DIAST BP <80 MM HG: CPT | Performed by: INTERNAL MEDICINE

## 2025-01-15 PROCEDURE — 96372 THER/PROPH/DIAG INJ SC/IM: CPT

## 2025-01-15 RX ORDER — HEPARIN SODIUM (PORCINE) LOCK FLUSH IV SOLN 100 UNIT/ML 100 UNIT/ML
500 SOLUTION INTRAVENOUS AS NEEDED
Status: DISCONTINUED | OUTPATIENT
Start: 2025-01-15 | End: 2025-01-16 | Stop reason: HOSPADM

## 2025-01-15 RX ORDER — HYDROCODONE BITARTRATE AND ACETAMINOPHEN 10; 325 MG/1; MG/1
1 TABLET ORAL EVERY 4 HOURS PRN
Qty: 180 TABLET | Refills: 0 | Status: SHIPPED | OUTPATIENT
Start: 2025-01-15

## 2025-01-15 RX ORDER — SODIUM CHLORIDE 0.9 % (FLUSH) 0.9 %
20 SYRINGE (ML) INJECTION AS NEEDED
OUTPATIENT
Start: 2025-01-15

## 2025-01-15 RX ORDER — SODIUM CHLORIDE 0.9 % (FLUSH) 0.9 %
20 SYRINGE (ML) INJECTION AS NEEDED
Status: DISCONTINUED | OUTPATIENT
Start: 2025-01-15 | End: 2025-01-16 | Stop reason: HOSPADM

## 2025-01-15 RX ORDER — DRONABINOL 5 MG/1
10 CAPSULE ORAL DAILY
Qty: 60 CAPSULE | Refills: 1 | Status: SHIPPED | OUTPATIENT
Start: 2025-01-15

## 2025-01-15 RX ORDER — HEPARIN SODIUM (PORCINE) LOCK FLUSH IV SOLN 100 UNIT/ML 100 UNIT/ML
500 SOLUTION INTRAVENOUS AS NEEDED
OUTPATIENT
Start: 2025-01-15

## 2025-01-15 RX ADMIN — DENOSUMAB 120 MG: 120 INJECTION SUBCUTANEOUS at 15:14

## 2025-01-15 RX ADMIN — HEPARIN 500 UNITS: 100 SYRINGE at 13:11

## 2025-01-15 RX ADMIN — Medication 20 ML: at 13:11

## 2025-01-15 NOTE — PROGRESS NOTES
Chief Complaint/Care Team   Prostate Cancer    Maryse Monae MD Coffie, Ramona N, MD    History of Present Illness     Diagnosis: Metastatic Castration Resistant Prostate Cancer    Current Treatment: Lupron, Zytiga and Prednisone, Pluvicto at [facility] which began in  8/1/2024    Previous Treatment:   Metastatic Castration resistant prostate cancer:    Patient was initially diagnosed with prostate cancer in 2015.  On 6/4/2015 he underwent radical prostatectomy and LN dissection which showed a [place] 4+3 = 7 prostate cancer.  There were several high risk features such as extraprostatic extension, seminal vesicle invasion, positive margins at the bladder neck and right and left seminal vesicle.  Lymph vascular invasion was indeterminate.  Perineural invasion was not commented on. Final pathology [id number] R1.  He was treated with adjuvant radiation by Dr. Stanton.    According to records the patient likely started Lupron in December 2017 when his PSA started to rise.  The patient took a break in August 2019.  He states he was not aware he was to continue.  He restarted Lupron on 11/26/2019 after it was noted that his PSA chago from 0.46 up to 2.55 on 11/8/2019.  Unfortunately his PSA continued to rise after that to 4.54 on 3/10/2020.    - restarted Lupron on 11/26/20  - Started Zytiga on 8/27/20.  8/31/2020 PSA 17.95     - Started Xtandi in May 2021 due to intolerance of Zytiga even at the lowest dose  - PSA rising in April 2023 to 2.85, doubling time 3.4 months  - started Docetaxel May 2023    Semaj Medina is a 69 y.o. male who presents to [facility] for Metastatic Castration Resistant Prostate Cancer.    History of Present Illness    The patient was previously evaluated by Dr. Salazar, a radiation oncologist at [facility], who shared pt was not a candidate for [place] 223. However, he would be a suitable candidate for Pluvicto, he has referred him to nuclear medicine  specialist at [facility]. A consultation with a nuclear medicine specialist was scheduled for evaluation for Pluvicto. His current medication regimen includes Zytiga and prednisone.      Patient reports experiencing back and neck pain, which subsides upon administration of his pain medication. He typically takes his pain medication every 4 hours. He is here for evaluation prior to next Xgeva treatment.  He began Pluvicto treatment on August 1, 2024, given every 6 weeks, plan is for 6 treatments. He reports nausea, vomiting after second pluvicto treatment, reports n/v is controlled with zofran. Pt currently receiving Pluvicto treatment at [facility]. He reports continued decrease in appetite, reports increase use of marinol. He is in need of refill of marinol and norco 10.        Review of Systems   Constitutional:  Positive for appetite change, fatigue and unexpected weight loss.   Musculoskeletal:  Positive for back pain (Neck pain).        Oncology/Hematology History Overview Note     Metastatic Castration resistant prostate cancer:    Patient was initially diagnosed with prostate cancer in 2015.  On 6/4/2015 he underwent radical prostatectomy and LN dissection which showed a [place] 4+3 = 7 prostate cancer.  There were several high risk features such as extraprostatic extension, seminal vesicle invasion, positive margins at the bladder neck and right and left seminal vesicle.  Lymph vascular invasion was indeterminate.  Perineural invasion was not commented on. Final pathology [id number] R1.  He was treated with adjuvant radiation by Dr. Stanton.    According to records the patient likely started Lupron in December 2017 when his PSA started to rise.  The patient took a break in August 2019.  He states he was not aware he was to continue.  He restarted Lupron on 11/26/2019 after it was noted that his PSA chago from 0.46 up to 2.55 on 11/8/2019.  Unfortunately his PSA continued to rise after that to 4.54 on  3/10/2020.    - restarted Lupron on 11/26/20  - Started Zytiga on 8/27/20.  8/31/2020 PSA 17.95     - Started Xtandi in May 2021 due to intolerance of Zytiga even at the lowest dose  - PSA rising in April 2023 to 2.85, doubling time 3.4 months  - started Docetaxel May 2023    PSMA PET 5/2/23:   1.  Radiotracer avid supra clavicular, thoracic and abdominal adenopathy and sclerotic lesion within T10 vertebral body likely representing metastatic disease.  2.  Indeterminate lesion within the left ilium demonstrating mild uptake.   3.  Asymmetric sclerosis within the superior lateral aspect the left orbit which does not demonstrate significant uptake above that of the right orbit. Findings are nonspecific with  differential considerations including but not limited to metastatic disease, fibrous dysplasia versus Paget's disease.   4.  Sub-6 mm nodule within the left lower lobe.  5.  Scattered indeterminate hypodense lesions throughout the liver which do not demonstrate radiotracer uptake above that of the adjacent liver, best best visualized on recent multiphase CT abdomen and pelvis 04/17/2023. Please refer to this dictation for further information.       Prostate cancer metastatic to bone   12/11/2020 -  Chemotherapy    OP SUPPORTIVE Leuprolide 45 mg Q6M     3/23/2021 -  Chemotherapy    OP SUPPORTIVE Denosumab (Xgeva) Q28D     4/15/2021 - 5/13/2021 Chemotherapy    OP PROSTATE Enzalutamide     5/19/2021 Initial Diagnosis    Prostate cancer metastatic to bone (CMS/HCC)     6/2/2023 - 9/15/2023 Biopsy    OP PROSTATE DOCEtaxel  Plan Provider: Natalya Jack MD PhD  Treatment goal: Palliative  Line of treatment: [No plan line of treatment]     12/19/2023 - 12/19/2023 Chemotherapy    OP PROSTATE Apalutamide     1/26/2024 Biopsy    OP PROSTATE Abiraterone / PredniSONE  Plan Provider: Joshua Dick MD  Treatment goal: Control  Line of treatment: [No plan line of treatment]     12/26/2024 -  Chemotherapy    OP PROSTATE  "Cabazitaxel / PredniSONE     Prostate cancer   6/16/2021 Initial Diagnosis    Prostate cancer (HCC)     6/2/2023 -  Chemotherapy    OP CENTRAL VENOUS ACCESS DEVICE ACCESS, CARE, AND MAINTENANCE (CVAD)     Secondary malignant neoplasm of bone   11/4/2021 Initial Diagnosis    Secondary malignant neoplasm of bone (HCC)     11/23/2021 - 12/9/2021 Radiation    Radiation OncologyTreatment Course:  Semaj Medina received 3000 cGy in 10 fractions to the T9-T11 spine.          Objective     Vitals:    01/15/25 1413   BP: 110/70   Pulse: 69   Resp: 16   Temp: 99.2 °F (37.3 °C)   TempSrc: Temporal   SpO2: 100%   Weight: 52.6 kg (116 lb)   Height: 175 cm (68.9\")   PainSc:   5   PainLoc: Rib Cage             ECOG score: 0         PHQ-9 Total Score:         Physical Exam  Vitals reviewed. Exam conducted with a chaperone present.   Constitutional:       General: He is not in acute distress.  HENT:      Head: Normocephalic and atraumatic.   Eyes:      Conjunctiva/sclera: Conjunctivae normal.      Pupils: Pupils are equal, round, and reactive to light.   Neurological:      Mental Status: He is alert and oriented to person, place, and time.         Physical Exam        Past Medical History     Past Medical History:   Diagnosis Date    Anemia     NO PROBLEMS    Anxiety     Blood disease     Colon polyps     Depression     Diverticulitis     Forgetfulness     Hepatitis C     RESOLVED    Hypertension     Night sweats     Numbness and tingling of left lower extremity     Prostate cancer      Current Outpatient Medications on File Prior to Visit   Medication Sig Dispense Refill    abiraterone acetate (ZYTIGA) 500 MG tablet Take 2 tablets by mouth Daily. 60 tablet 11    amLODIPine (NORVASC) 5 MG tablet TAKE 1 TABLET BY MOUTH DAILY 90 tablet 0    Calcium Carbonate-Vitamin D (Calcium Plus Vitamin D) 500-1.25 MG-MCG capsule Take 1 capsule by mouth Daily. 30 capsule 5    ibuprofen (ADVIL,MOTRIN) 800 MG tablet TAKE 1 TABLET BY MOUTH EVERY 6 " - 8 HOURS AS NEEDED FOR PAIN      leuprolide (Lupron [place], 3-Month,) 22.5 MG injection 22.5 mg.      lisinopril (PRINIVIL,ZESTRIL) 40 MG tablet TAKE 1 TABLET BY MOUTH DAILY 90 tablet 0    potassium chloride (MICRO-K) 10 MEQ CR capsule TAKE 2 CAPSULES BY MOUTH DAILY 180 capsule 0    predniSONE (DELTASONE) 5 MG tablet Take 1 tablet by mouth 2 (Two) Times a Day. 60 tablet 11    [DISCONTINUED] dronabinol (Marinol) 5 MG capsule Take 1 capsule by mouth 2 (Two) Times a Day Before Meals. 30 capsule 1    [DISCONTINUED] HYDROcodone-acetaminophen (NORCO)  MG per tablet Take 1 tablet by mouth Every 4 (Four) Hours As Needed for Moderate Pain. 180 tablet 0    folic acid (FOLVITE) 1 MG tablet  (Patient not taking: Reported on 6/28/2024)      magnesium oxide (MAG-OX) 400 MG tablet Take 1 tablet by mouth Daily. (Patient not taking: Reported on 6/28/2024) 30 tablet 0    naloxone (NARCAN) 4 MG/0.1ML nasal spray CALL 911. SPR CONTENTS OF ONE SPRAYER (0.1ML) INTO ONE NOSTRIL. REPEAT IN 2-3 MIN IF SYMPTOMS OF OPIOID EMERGENCY PERSIST, ALTERNATE NOSTRILS (Patient not taking: Reported on 1/15/2025)      ondansetron (ZOFRAN) 8 MG tablet Take 1 tablet by mouth Every 8 (Eight) Hours As Needed for Nausea or Vomiting. (Patient not taking: Reported on 5/24/2024) 90 tablet 3    oxyCODONE (oxyCONTIN) 10 MG 12 hr tablet Take 1 tablet by mouth Every 12 (Twelve) Hours. (Patient not taking: Reported on 1/15/2025) 60 tablet 0    prochlorperazine (COMPAZINE) 10 MG tablet Take 1 tablet by mouth Every 6 (Six) Hours As Needed for Nausea or Vomiting. (Patient not taking: Reported on 5/24/2024) 30 tablet 5     Current Facility-Administered Medications on File Prior to Visit   Medication Dose Route Frequency Provider Last Rate Last Admin    heparin injection 500 Units  500 Units Intravenous PRN Joshua Dick MD   500 Units at 01/15/25 1311    sodium chloride 0.9 % flush 20 mL  20 mL Intravenous PRN Joshua Dick MD   20 mL at 01/15/25 1311      No  Known Allergies  Past Surgical History:   Procedure Laterality Date    COLONOSCOPY      PROSTATE BIOPSY      PROSTATECTOMY      ROBOT ASSISTED W PELVIC LYMPH NODE DISSECTION    TRANSURETHRAL RESECTION OF BLADDER TUMOR      VENOUS ACCESS DEVICE (PORT) INSERTION Right 5/22/2023    Procedure: INSERTION VENOUS ACCESS PORT;  Surgeon: Waldemar Iraheta MD;  Location: [facility];  Service: General;  Laterality: Right;     Social History     Socioeconomic History    Marital status:    Tobacco Use    Smoking status: Every Day     Current packs/day: 0.25     Average packs/day: 0.3 packs/day for 54.2 years (13.6 ttl pk-yrs)     Types: Cigarettes     Start date: 10/21/1970     Passive exposure: Past    Smokeless tobacco: Never   Vaping Use    Vaping status: Never Used   Substance and Sexual Activity    Alcohol use: Yes     Alcohol/week: 6.0 standard drinks of alcohol     Types: 6 Cans of beer per week     Comment: 12 pack a week of beer    Drug use: Never    Sexual activity: Defer     Family History   Problem Relation Age of Onset    Cancer Mother     Cancer Father        Results     Result Review   The following data was reviewed by: Joshua Dick MD on 06/28/2024:  Lab Results   Component Value Date    HGB 10.9 (L) 01/15/2025    HCT 33.4 (L) 01/15/2025    MCV 87.2 01/15/2025     01/15/2025    WBC 3.59 01/15/2025    NEUTROABS 2.17 01/15/2025    LYMPHSABS 1.07 01/15/2025    MONOSABS 0.24 01/15/2025    EOSABS 0.08 01/15/2025    BASOSABS 0.01 01/15/2025     Lab Results   Component Value Date    GLUCOSE 178 (H) 01/15/2025    BUN 11 01/15/2025    CREATININE 0.78 01/15/2025     01/15/2025    K 4.6 01/15/2025     (H) 01/15/2025    CO2 20.4 (L) 01/15/2025    CALCIUM 8.2 (L) 01/15/2025    PROTEINTOT 6.6 01/15/2025    ALBUMIN 3.7 01/15/2025    BILITOT 0.3 01/15/2025    ALKPHOS 191 (H) 01/15/2025    AST 14 01/15/2025    ALT 5 01/15/2025     Lab Results   Component Value Date    MG 1.9 01/15/2025    PHOS 2.3  (L) 01/15/2025    TSH 1.350 05/16/2019       No radiology results for the last day  NM PET/CT Skull Base to Mid Thigh    Result Date: 12/20/2024  Impression: Impression: 1. Progression of bony metastatic disease with multiple new sclerotic bone lesions as well as new mild areas of increased activity predominantly involving the ribs but without corresponding lytic or sclerotic bone lesions. 2. Interval improvement in cervical, mediastinal, and retroperitoneal adenopathy. Electronically Signed: Ben Reyes MD  12/20/2024 9:17 AM EST  Workstation ID: [id number]     Assessment & Plan     Diagnoses and all orders for this visit:    1. Decreased appetite (Primary)  -     dronabinol (Marinol) 5 MG capsule; Take 2 capsules by mouth Daily.  Dispense: 60 capsule; Refill: 1    2. Prostate cancer metastatic to bone  -     HYDROcodone-acetaminophen (NORCO)  MG per tablet; Take 1 tablet by mouth Every 4 (Four) Hours As Needed for Moderate Pain.  Dispense: 180 tablet; Refill: 0  -     dronabinol (Marinol) 5 MG capsule; Take 2 capsules by mouth Daily.  Dispense: 60 capsule; Refill: 1    3. Cancer related pain  -     HYDROcodone-acetaminophen (NORCO)  MG per tablet; Take 1 tablet by mouth Every 4 (Four) Hours As Needed for Moderate Pain.  Dispense: 180 tablet; Refill: 0    4. Hypophosphatemia  -     potassium & sodium phosphates (PHOS-NAK) 280-160-250 MG pack packet; Take 1 packet by mouth 3 (Three) Times a Day Before Meals.  Dispense: 120 packet; Refill: 1    Other orders  -     Cancel: denosumab (XGEVA) injection 120 mg  -     OK To Treat                   Semaj Medina is a 69 y.o. male who presents to [facility]AT[facility] for treatment of  metastatic prostate cancer, completed 6 cycles of docetaxel on 9/15/2023, Lupron Q6 months, Pt also receiving Xgeva and he is here prior to next Xgeva infusion  -next Lupron injection due on 6/18/2024  -PSA from 12/15/23 was 8 up from 3.1 (in  10/2023)  -Discussed result of NM bone scan, CT CAP from 1/24/2024 which revealed interval enlargement T10 vertebral body metastasis, no evidence of intrathoracic metastatic disease, slight worsening appearance of osteoblastic schedule static disease, notable at T10, T8, and T5, no evidence of intra-abdominal or pelvic metastatic disease.  -Patient has undergone radiation therapy 10 fractions to thoracic spine from T9-T11 in 2021.  -Also shared plan to reattempt treatment with Zytiga and prednisone, patient reports some nausea vomiting which we will attempt to manage with antiemetics.  -Orders placed for Zytiga and prednisone, also provided prescriptions for Compazine and Zofran for patient to begin once he begins treatment with these medications.  -pt began zytiga/prednisone on 2/2/2024, pt tolerating it well, no evidence of toxicity on labs, recommend he continue at current dose     -discussed results of restaging imaging from 4/24/2024 which showed stable radiotracer uptake involving sclerotic osseous metastasis of the left frontal bone above left orbit and at T10, no findings of osseous progression, CT chest abdomen pelvis revealed stable sclerotic osseous metastasis, no suspicious adenopathy, postsurgical changes of prostatectomy.          -Given presence of continued osseous metastasis, patient with continued pain in area of metastasis, and since he has previously received radiation in this area, referred him to [facility] school medicine for consideration for treatment with [place] or Pluvicto.  -Shared results of PSMA PET/CT scan from 6/13/2024 which revealed progression of disease, patient has been referred to nuclear medicine specialist at [facility] for consideration for Pluvicto,pt began Pluvicto on 8/1/2024.  -Obtained PSMA PET CT scan in 12/2024, it revealed mixed response, discussed case with [place] physician Dr. Freed on 12/27/24 and we both decided to have pt complete pluvicto treatment to see if osseous areas  of disease could be treated and will rescan pt after completion of Pluvicto.    Pt here prior to next Xgeva treatment, labs reviewed plan to proceed with treatment as scheduled.     -Considering switching pt to Cabazitaxel upon progression after Pluvicto treatment.     -Recommend pt continue of Lupron, Xgeva, and Zytiga and prednisone  -OxyContin 10 mg was prescribed for pain management, to be taken twice daily every 12 hours.  -will continue to provide refills of his [place] for his cancer related pain, added OxyContin twice daily given lack of relief with Norco.     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      Hypophosphatemia  -prescribed PhosNaK PO TID    Hypomagnesemia  -pt prescribed mag oxide 400 mg PO QD  -will recheck at next clinic appointment     Hypokalemia  -pt receiving KCL repletion    Low Vit D  -Vit level of 27.7  -prescribed Vit D 50K IU once weekly x 8 weeks     Weight loss  -ordered marinol, increased dose on 1/15/2025  -referred to dietician      Please note that portions of this note were completed with a voice recognition program.      Assessment & Plan        Plan for patient follow-up in 1 month with me and next Xgeva treatment, pt to continue with pluvicto    Electronically signed by Joshua Dick MD, 01/15/25, 4:59 PM EST.          Follow Up     I spent 30 minutes caring for Semaj on this date of service. This time includes time spent by me in the following activities:preparing for the visit, reviewing tests, obtaining and/or reviewing a separately obtained history, performing a medically appropriate examination and/or evaluation , counseling and educating the patient/family/caregiver, ordering medications, tests, or procedures, referring and communicating with other health care professionals , documenting information in the medical  record, independently interpreting results and communicating that information with the patient/family/caregiver, and care coordination    Any chemotherapy or immunotherapy or other systemic therapy treatment plan involves a high risk of complications and/or mortality of patient management.    The patient was seen and examined. Work by the provider also included review and/or ordering of lab tests, review and/or ordering of radiology tests, review and/or ordering of medicine tests, discussion with other physicians or providers, independent review of data, obtaining old records, review/summation of old records, and/or other review.    I have reviewed the family history, social history, and past medical history for this patient. Previous information and data has been reviewed and updated as needed. I have reviewed and verified the chief complaint, history, and other documentation. The patient was interviewed and examined in the clinic and the chart reviewed. The previous observations, recommendations, and conclusions were reviewed including those of other providers.     The plan was discussed with the patient and/or family. The patient was given time to ask questions and these questions were answered. At the conclusion of their visit they had no additional questions or concerns and all questions were answered to their satisfaction.    Patient was given instructions and counseling regarding his condition or for health maintenance advice. Please see specific information pulled into the AVS if appropriate.       Patient or patient representative verbalized consent for the use of Ambient Listening during the visit with  Joshua Dick MD for chart documentation. 1/15/2025  17:46 EDT

## 2025-01-16 ENCOUNTER — SPECIALTY PHARMACY (OUTPATIENT)
Dept: PHARMACY | Facility: HOSPITAL | Age: 70
End: 2025-01-16
Payer: MEDICARE

## 2025-01-16 ENCOUNTER — OFFICE VISIT (OUTPATIENT)
Dept: RADIATION ONCOLOGY | Facility: HOSPITAL | Age: 70
End: 2025-01-16
Payer: MEDICARE

## 2025-01-16 VITALS
BODY MASS INDEX: 16.88 KG/M2 | WEIGHT: 113.98 LBS | OXYGEN SATURATION: 99 % | SYSTOLIC BLOOD PRESSURE: 110 MMHG | TEMPERATURE: 98 F | DIASTOLIC BLOOD PRESSURE: 80 MMHG | RESPIRATION RATE: 16 BRPM | HEART RATE: 82 BPM

## 2025-01-16 DIAGNOSIS — R97.21 RISING PSA FOLLOWING TREATMENT FOR MALIGNANT NEOPLASM OF PROSTATE: ICD-10-CM

## 2025-01-16 DIAGNOSIS — R63.4 WEIGHT LOSS, UNINTENTIONAL: ICD-10-CM

## 2025-01-16 DIAGNOSIS — Z90.79 HISTORY OF PROSTATECTOMY: ICD-10-CM

## 2025-01-16 DIAGNOSIS — Z79.818 ANDROGEN DEPRIVATION THERAPY: ICD-10-CM

## 2025-01-16 DIAGNOSIS — C79.51 SECONDARY MALIGNANT NEOPLASM OF BONE: Primary | ICD-10-CM

## 2025-01-16 DIAGNOSIS — Z92.3 PERSONAL HISTORY OF RADIATION THERAPY: ICD-10-CM

## 2025-01-16 DIAGNOSIS — G89.3 CANCER-RELATED PAIN: ICD-10-CM

## 2025-01-16 DIAGNOSIS — R63.4 WEIGHT LOSS: Primary | ICD-10-CM

## 2025-01-16 DIAGNOSIS — C61 MALIGNANT NEOPLASM PROSTATE: ICD-10-CM

## 2025-01-16 PROCEDURE — G0463 HOSPITAL OUTPT CLINIC VISIT: HCPCS | Performed by: NURSE PRACTITIONER

## 2025-01-16 NOTE — PROGRESS NOTES
Specialty Note: Chart Review    Semaj Medina is a 69 y.o. male with metastatic castrate resistant prostate cancer who presented for lab check and Oncology provider appointment. [facility] reviewed labs and providers note to assess continued therapy appropriateness of  Zytiga (abiraterone).     Regimen: Abiraterone 1000mg by mouth daily + Xgeva q28 (bone mets) + Prednisone 5mg by mouth twice daily + Pluvicto ([facility], [facility])    Oncology Interval History:  6/4/15: Radical prostatectomy/LN dissection s/p adjuvant radiation  [place] 4+3, high risk features  12/2017-8/2019, 11/26/20-now: Lupron  8/27/20-5/2021: Zytiga - intolerance  5/2021-5/2023: Xtandi  6/2/23-9/15/23 : Docetaxel x 6 cycles  2/2/24-now: Zytiga + Prednisone   8/1/24-now: Pluvicto q6w ([facility]) - planning for 6 treatments total    1/15/25: Plan to transition to cabazitaxel/prednisone as next line option if no further response from Pluvicto. 12/2024 PET with mixed response - progression of bony mets, but improvement in adenopathy.     Lab Results   Component Value Date    GLUCOSE 178 (H) 01/15/2025    CALCIUM 8.2 (L) 01/15/2025     01/15/2025    K 4.6 01/15/2025    CO2 20.4 (L) 01/15/2025     (H) 01/15/2025    BUN 11 01/15/2025    CREATININE 0.78 01/15/2025    EGFRIFAFRI 97 02/23/2022    BCR 14.1 01/15/2025    ANIONGAP 7.6 01/15/2025     Lab Results   Component Value Date    WBC 3.59 01/15/2025    RBC 3.83 (L) 01/15/2025    HGB 10.9 (L) 01/15/2025    HCT 33.4 (L) 01/15/2025    MCV 87.2 01/15/2025    MCH 28.5 01/15/2025    MCHC 32.6 01/15/2025    RDW 17.4 (H) 01/15/2025    RDWSD 55.6 (H) 01/15/2025    MPV 8.6 01/15/2025     01/15/2025    NEUTRORELPCT 60.4 01/15/2025    LYMPHORELPCT 29.8 01/15/2025    MONORELPCT 6.7 01/15/2025    EOSRELPCT 2.2 01/15/2025    BASORELPCT 0.3 01/15/2025    AUTOIGPER 0.6 (H) 01/15/2025    NEUTROABS 2.17 01/15/2025    LYMPHSABS 1.07 01/15/2025    MONOSABS 0.24 01/15/2025    EOSABS 0.08  01/15/2025    BASOSABS 0.01 01/15/2025    AUTOIGNUM 0.02 01/15/2025    NRBC 0.0 01/15/2025       PLAN  Proceed with current regimen and complete Pluvicto at [facility].     Betzy Sofia, PharmD  Oncology Clinical Specialty Pharmacist   1/16/2025  07:59 EST

## 2025-01-16 NOTE — PROGRESS NOTES
Follow Up Office Visit      Encounter Date: 01/16/2025   Patient Name: Semaj Medina  YOB: 1955   Medical Record Number: [id number]   Primary Diagnosis: Secondary malignant neoplasm of bone [C79.51]     Radiation Completion Date: 12/9/2021 T9-T11 spine     Chief Complaint:    Chief Complaint   Patient presents with    Follow-up    Prostate Cancer     Prostate cancer metastatic to bone       Oncology/Hematology History Overview Note     Metastatic Castration resistant prostate cancer:    Patient was initially diagnosed with prostate cancer in 2015.  On 6/4/2015 he underwent radical prostatectomy and LN dissection which showed a [place] 4+3 = 7 prostate cancer.  There were several high risk features such as extraprostatic extension, seminal vesicle invasion, positive margins at the bladder neck and right and left seminal vesicle.  Lymph vascular invasion was indeterminate.  Perineural invasion was not commented on. Final pathology [id number] R1.  He was treated with adjuvant radiation by Dr. Stanton.    According to records the patient likely started Lupron in December 2017 when his PSA started to rise.  The patient took a break in August 2019.  He states he was not aware he was to continue.  He restarted Lupron on 11/26/2019 after it was noted that his PSA chago from 0.46 up to 2.55 on 11/8/2019.  Unfortunately his PSA continued to rise after that to 4.54 on 3/10/2020.    - restarted Lupron on 11/26/20  - Started Zytiga on 8/27/20.  8/31/2020 PSA 17.95     - Started Xtandi in May 2021 due to intolerance of Zytiga even at the lowest dose  - PSA rising in April 2023 to 2.85, doubling time 3.4 months  - started Docetaxel May 2023    PSMA PET 5/2/23:   1.  Radiotracer avid supra clavicular, thoracic and abdominal adenopathy and sclerotic lesion within T10 vertebral body likely representing metastatic disease.  2.  Indeterminate lesion within the left ilium demonstrating mild uptake.   3.   Asymmetric sclerosis within the superior lateral aspect the left orbit which does not demonstrate significant uptake above that of the right orbit. Findings are nonspecific with  differential considerations including but not limited to metastatic disease, fibrous dysplasia versus Paget's disease.   4.  Sub-6 mm nodule within the left lower lobe.  5.  Scattered indeterminate hypodense lesions throughout the liver which do not demonstrate radiotracer uptake above that of the adjacent liver, best best visualized on recent multiphase CT abdomen and pelvis 04/17/2023. Please refer to this dictation for further information.       Prostate cancer metastatic to bone   12/11/2020 -  Chemotherapy    OP SUPPORTIVE Leuprolide 45 mg Q6M     3/23/2021 -  Chemotherapy    OP SUPPORTIVE Denosumab (Xgeva) Q28D     4/15/2021 - 5/13/2021 Chemotherapy    OP PROSTATE Enzalutamide     5/19/2021 Initial Diagnosis    Prostate cancer metastatic to bone (CMS/HCC)     6/2/2023 - 9/15/2023 Biopsy    OP PROSTATE DOCEtaxel  Plan Provider: Natalya Jack MD PhD  Treatment goal: Palliative  Line of treatment: [No plan line of treatment]     12/19/2023 - 12/19/2023 Chemotherapy    OP PROSTATE Apalutamide     1/26/2024 Biopsy    OP PROSTATE Abiraterone / PredniSONE  Plan Provider: Joshua Dick MD  Treatment goal: Control  Line of treatment: [No plan line of treatment]     12/26/2024 -  Chemotherapy    OP PROSTATE Cabazitaxel / PredniSONE     Prostate cancer   6/16/2021 Initial Diagnosis    Prostate cancer (HCC)     6/2/2023 -  Chemotherapy    OP CENTRAL VENOUS ACCESS DEVICE ACCESS, CARE, AND MAINTENANCE (CVAD)     Secondary malignant neoplasm of bone   11/4/2021 Initial Diagnosis    Secondary malignant neoplasm of bone (HCC)     11/23/2021 - 12/9/2021 Radiation    Radiation OncologyTreatment Course:  Semaj Medina received 3000 cGy in 10 fractions to the T9-T11 spine.          History of Present Illness: Semaj Medina is a 69 y.o. male  who returns to [facility] for routine 3 month follow-up of his metastatic prostate cancer. His PSA on 1/15/25 is 93.5 ng/mL. After receiving 3 doses of Pluvicto he underwent repeat PSMA PET scan on 12/17/24 which showed new bone metastases. It was decided to proceed with the 6 treatments as planned. He received dose 4 of Pluvicto on 1/9/25. He has a pain rated 5/10 today in right lower rib. He also has neck pain rated 3/10 today. Pain is tolerable with pain medication. He continues to have lower back pain that remains unchanged. Pain is localized to his low back and does not radiate to the legs at this time but it has radiated to bilateral lower legs in the past. He occasionally experiences pain in thoracic spine, stating that when he does it's a dull pain that comes and goes.Denies any change in urinary function. Occasional stress incontinence that is ongoing. Nocturia 2x/night. He reports continued decrease in appetite and dose of marinol was increased yesterday by Dr. Dick.     Subjective      Review of Systems: Review of Systems   Constitutional:  Positive for appetite change (Decreased), fatigue (4/10) and unexpected weight change (down 4lbs since 12/27/24). Negative for chills and fever.   HENT:  Positive for hearing loss (Wears hearing aids.). Negative for sore throat and trouble swallowing.    Eyes:  Positive for visual disturbance (Cataracts removed 2 years ago, decreased vision in right eye, ongoing).   Respiratory:  Positive for cough (dry, nonproductive, ongoing) and shortness of breath (Noted with exertion, ongoing). Negative for chest tightness and wheezing.    Cardiovascular:  Negative for chest pain, palpitations and leg swelling.   Gastrointestinal:  Positive for nausea (Noted after second pluvicto treatment, resolved with antiemetics.). Negative for abdominal distention, abdominal pain, anal bleeding, blood in stool, constipation, diarrhea, rectal pain and vomiting.   Endocrine:  Negative for heat intolerance.   Genitourinary:  Positive for frequency (Ongoing). Negative for decreased urine volume, difficulty urinating, dysuria, hematuria and urgency.        Noc x1-2  Normal stream  Occasional MATEO, ongoing   Musculoskeletal:  Positive for arthralgias (Pain in feet and ankles, ongoing.  Pain in right rib 5/10, ongoing.  Newer pain in neck x1 month 3/10), back pain (Ongoing), gait problem (r/t soa. Ongoing) and neck pain (Noted x1 month, 3/10). Negative for joint swelling and neck stiffness.   Skin:  Negative for color change and rash.   Neurological:  Positive for dizziness (Noted a couple days ago when he was doing yard work, subsided with rest.) and weakness (Noted a couple of days ago, was doing yard work and became weak.  Improved with resting.). Negative for headaches.   Psychiatric/Behavioral:  Negative for sleep disturbance.        The following portions of the patient's history were reviewed and updated as appropriate: allergies, current medications, past family history, past medical history, past social history, past surgical history and problem list.    Medications:     Current Outpatient Medications:     abiraterone acetate (ZYTIGA) 500 MG tablet, Take 2 tablets by mouth Daily., Disp: 60 tablet, Rfl: 11    amLODIPine (NORVASC) 5 MG tablet, TAKE 1 TABLET BY MOUTH DAILY, Disp: 90 tablet, Rfl: 0    Calcium Carbonate-Vitamin D (Calcium Plus Vitamin D) 500-1.25 MG-MCG capsule, Take 1 capsule by mouth Daily., Disp: 30 capsule, Rfl: 5    dronabinol (Marinol) 5 MG capsule, Take 2 capsules by mouth Daily., Disp: 60 capsule, Rfl: 1    HYDROcodone-acetaminophen (NORCO)  MG per tablet, Take 1 tablet by mouth Every 4 (Four) Hours As Needed for Moderate Pain., Disp: 180 tablet, Rfl: 0    ibuprofen (ADVIL,MOTRIN) 800 MG tablet, TAKE 1 TABLET BY MOUTH EVERY 6 - 8 HOURS AS NEEDED FOR PAIN, Disp: , Rfl:     leuprolide (Lupron [place], 3-Month,) 22.5 MG injection, 22.5 mg., Disp: , Rfl:      lisinopril (PRINIVIL,ZESTRIL) 40 MG tablet, TAKE 1 TABLET BY MOUTH DAILY, Disp: 90 tablet, Rfl: 0    potassium & sodium phosphates (PHOS-NAK) 280-160-250 MG pack packet, Take 1 packet by mouth 3 (Three) Times a Day Before Meals., Disp: 120 packet, Rfl: 1    potassium chloride (MICRO-K) 10 MEQ CR capsule, TAKE 2 CAPSULES BY MOUTH DAILY, Disp: 180 capsule, Rfl: 0    predniSONE (DELTASONE) 5 MG tablet, Take 1 tablet by mouth 2 (Two) Times a Day., Disp: 60 tablet, Rfl: 11    folic acid (FOLVITE) 1 MG tablet, , Disp: , Rfl:     magnesium oxide (MAG-OX) 400 MG tablet, Take 1 tablet by mouth Daily. (Patient not taking: Reported on 1/16/2025), Disp: 30 tablet, Rfl: 0    naloxone (NARCAN) 4 MG/0.1ML nasal spray, CALL 911. SPR CONTENTS OF ONE SPRAYER (0.1ML) INTO ONE NOSTRIL. REPEAT IN 2-3 MIN IF SYMPTOMS OF OPIOID EMERGENCY PERSIST, ALTERNATE NOSTRILS (Patient not taking: Reported on 12/27/2024), Disp: , Rfl:     ondansetron (ZOFRAN) 8 MG tablet, Take 1 tablet by mouth Every 8 (Eight) Hours As Needed for Nausea or Vomiting. (Patient not taking: Reported on 1/16/2025), Disp: 90 tablet, Rfl: 3    oxyCODONE (oxyCONTIN) 10 MG 12 hr tablet, Take 1 tablet by mouth Every 12 (Twelve) Hours. (Patient not taking: Reported on 10/15/2024), Disp: 60 tablet, Rfl: 0    prochlorperazine (COMPAZINE) 10 MG tablet, Take 1 tablet by mouth Every 6 (Six) Hours As Needed for Nausea or Vomiting. (Patient not taking: Reported on 1/16/2025), Disp: 30 tablet, Rfl: 5  No current facility-administered medications for this visit.    Allergies:   No Known Allergies    Patient Smoking History:   Social History     Tobacco Use   Smoking Status Every Day    Current packs/day: 0.25    Average packs/day: 0.3 packs/day for 54.2 years (13.6 ttl pk-yrs)    Types: Cigarettes    Start date: 10/21/1970    Passive exposure: Past   Smokeless Tobacco Never       Measures:  PHQ-9 Total Score: 9  Quality of Life: 100 - Full Activity   ECOG score: 0  ECOG: (0) Fully  active, able to carry on all predisease performance without restriction  Pain: (on a scale of 0-10)   Pain Score    01/16/25 1346   PainSc:   5   PainLoc: Rib Cage  Comment: RIght side rib pain     Semaj Medina reports a pain score of 5.  Given his pain assessment as noted, treatment options were discussed and the following options were decided upon as a follow-up plan to address the patient's pain: continuation of current treatment plan for pain.     Objective     Physical Exam:   Vital Signs:   Vitals:    01/16/25 1346   BP: 110/80   Pulse: 82   Resp: 16   Temp: 98 °F (36.7 °C)   TempSrc: Temporal   SpO2: 99%   Weight: 51.7 kg (113 lb 15.7 oz)   PainSc:   5   PainLoc: Rib Cage  Comment: RIght side rib pain     Body mass index is 16.88 kg/m².   Wt Readings from Last 3 Encounters:   01/16/25 51.7 kg (113 lb 15.7 oz)   01/15/25 52.6 kg (116 lb)   12/27/24 53.3 kg (117 lb 6.4 oz)       Physical Exam  Vitals reviewed.   Constitutional:       General: He is not in acute distress.     Appearance: Normal appearance. He is normal weight. He is not ill-appearing.       HENT:      Head: Normocephalic and atraumatic.      Mouth/Throat:      Mouth: Mucous membranes are moist.      Pharynx: Oropharynx is clear. No oropharyngeal exudate or posterior oropharyngeal erythema.   Eyes:      Conjunctiva/sclera: Conjunctivae normal.      Pupils: Pupils are equal, round, and reactive to light.   Cardiovascular:      Rate and Rhythm: Normal rate and regular rhythm.      Pulses: Normal pulses.      Heart sounds: Normal heart sounds.   Pulmonary:      Effort: Pulmonary effort is normal. No respiratory distress.      Breath sounds: Normal breath sounds.   Musculoskeletal:         General: Normal range of motion.      Cervical back: Normal range of motion.   Skin:     General: Skin is warm and dry.   Neurological:      General: No focal deficit present.      Mental Status: He is alert and oriented to person, place, and time. Mental  status is at baseline.   Psychiatric:         Mood and Affect: Mood normal.         Behavior: Behavior normal.       Result Review: I independently reviewed the following data.  -- PSA DIAGNOSTIC (01/15/2025 13:10)   -- NM Lu177-PSMA Therapy (01/09/2025 10:20)   -- NM PET/CT Skull Base to Mid Thigh (12/17/2024 15:21)   -- NM RADPHARM LOC INFLAM SPECT W/CT (11/01/2024 13:00)   -- NM Lu177-PSMA Therapy (10/31/2024 10:35)   -- NM Lu177-PSMA Therapy (09/12/2024 11:30)     Imaging: NM PET/CT Skull Base to Mid Thigh    Result Date: 12/20/2024  Impression: 1. Progression of bony metastatic disease with multiple new sclerotic bone lesions as well as new mild areas of increased activity predominantly involving the ribs but without corresponding lytic or sclerotic bone lesions. 2. Interval improvement in cervical, mediastinal, and retroperitoneal adenopathy. Electronically Signed: Ben Reyes MD  12/20/2024 9:17 AM EST  Workstation ID: [id number]      Labs:   WBC   Date Value Ref Range Status   01/15/2025 3.59 3.40 - 10.80 10*3/mm3 Final   12/11/2020 6.61 4.80 - 10.80 10*3/uL Final     Hemoglobin   Date Value Ref Range Status   01/15/2025 10.9 (L) 13.0 - 17.7 g/dL Final     Hematocrit   Date Value Ref Range Status   01/15/2025 33.4 (L) 37.5 - 51.0 % Final     Platelets   Date Value Ref Range Status   01/15/2025 283 140 - 450 10*3/mm3 Final     TSH   Date Value Ref Range Status   05/16/2019 1.350 0.270 - 4.200 m[iU]/L Final      PSA   Date Value Ref Range Status   01/15/2025 93.500 (H) 0.000 - 4.000 ng/mL Final   12/18/2024 51.000 (H) 0.000 - 4.000 ng/mL Final   11/20/2024 43.100 (H) 0.000 - 4.000 ng/mL Final   10/11/2024 20.900 (H) 0.000 - 4.000 ng/mL Final   09/20/2024 18.900 (H) 0.000 - 4.000 ng/mL Final   08/23/2024 21.000 (H) 0.000 - 4.000 ng/mL Final   07/17/2024 17.700 (H) 0.000 - 4.000 ng/mL Final   06/13/2024 16.600 (H) 0.000 - 4.000 ng/mL Final   05/03/2024 20.100 (H) 0.000 - 4.000 ng/mL Final   04/05/2024 16.500  (H) 0.000 - 4.000 ng/mL Final   03/08/2024 15.200 (H) 0.000 - 4.000 ng/mL Final   01/26/2024 18.600 (H) 0.000 - 4.000 ng/mL Final   12/19/2023 9.670 (H) 0.000 - 4.000 ng/mL Final   12/15/2023 8.050 (H) 0.000 - 4.000 ng/mL Final   10/11/2023 3.180 0.000 - 4.000 ng/mL Final   08/24/2023 4.570 (H) 0.000 - 4.000 ng/mL Final   08/03/2023 4.830 (H) 0.000 - 4.000 ng/mL Final   06/21/2023 5.580 (H) 0.000 - 4.000 ng/mL Final   04/18/2023 2.850 0.000 - 4.000 ng/mL Final   02/17/2023 1.900 0.000 - 4.000 ng/mL Final   12/06/2022 1.340 0.000 - 4.000 ng/mL Final   09/13/2022 0.697 0.000 - 4.000 ng/mL Final   06/17/2022 0.593 0.000 - 4.000 ng/mL Final   03/18/2022 1.150 0.000 - 4.000 ng/mL Final   01/13/2022 5.470 (H) 0.000 - 4.000 ng/mL Final   12/17/2021 14.200 (H) 0.000 - 4.000 ng/mL Final   10/14/2021 11.900 (H) 0.000 - 4.000 ng/mL Final   09/16/2021 10.200 (H) 0.000 - 4.000 ng/mL Final   06/17/2021 5.200 (H) 0.000 - 4.000 ng/mL Final   03/05/2021 2.25 0.00 - 4.00 ng/mL Final   12/11/2020 6.81 (H) 0.00 - 4.00 ng/mL Final   11/05/2020 7.17 (H) 0.00 - 4.00 ng/mL Final   08/31/2020 17.95 (H) 0.00 - 4.00 ng/mL Final   06/26/2020 12.13 (H) 0.00 - 4.00 ng/mL Final   06/19/2020 11.45 (H) 0.00 - 4.00 ng/mL Final   03/10/2020 4.54 (H) 0.00 - 4.00 ng/mL Final   11/08/2019 2.55 0.00 - 4.00 ng/mL Final   08/05/2019 0.46 0.00 - 4.00 ng/mL Final   05/06/2019 0.35 0.00 - 4.00 ng/mL Final   01/31/2019 0.40 0.00 - 4.00 ng/mL Final     Assessment / Plan      Impression: Semaj Medina is a pleasant 69 y.o. male with initially with diagnosis of prostate cancer, pT3b pN0 [place] 4+3= 7, managed surgically with prostatectomy in 2015. He had positive margins at the bladder neck and received adjuvant radiotherapy at that time with Dr. Stanton. In 2017 his PSA began to rise and he was started on Lupron and in 2020 he was started on Zytiga. He tolerated Zytiga poorly and his dose was reduced multiple times. Restaging imaging in 2021 demonstrated a  T10 lesion which was resulting in pain and limited function. He received palliative radiotherapy to vertebral metastases at T9-T11, completed on 12/9/2021 (30 Gy/10 fractions). In May 2023 his PSA was rising. His PSMA PET scan on 5/2/2023 demonstrated metastatic involvement throughout the neck, thorax, abdomen and pelvis. He was started on docetaxel, which he completed 6 cycles in September 2023. NM Bone Scan 1/23/2024 demonstrates an interval enlargement of T10 vertebral body metastasis. CT CAP on 1/23/2024 demonstrates slight worsening appearance of osteoblastic skeletal metastatic disease, notably at T10, T8 and T5. Within the pelvis there is stable sclerotic lesions. Repeat CT CAP on 4/24/24 demonstrates stable sclerotic osseous metastasis. NM Bone Scan on 4/24/24 demonstrates stable radiotracer uptake involving sclerotic osseous metastasis at left frontal bone above left orbit and at the T10. No findings of osseous progression. He continued to have a rising PSA with PSA of 20.1 ng/mL on 5/3/24. PSMA PET/CT scan on 6/13/24 demonstrates findings of disease progression. There are metabolically active nodes in the right supraclavicular region, within the posterior mediastinum of the chest, within the upper abdomen and within the skeleton. There is a small focus of hypermetabolic activity within L2 vertebral body corresponding to a 4 mm sclerotic focus, new since prior PET. There is a new focus of hypermetabolic activity within the T10 vertebral body located anterior to the dominant T10 sclerotic focus, concerning for new malignancy superimposed site of treated disease, SUV of 23.2. Additionally there is new 8 mm sclerotic focus of hypermetabolic activity within the T5 vertebral body, SUV of 57.3. MRI thoracic spine on 6/26/24 demonstrates new metastatic disease involving the T5, T8 and T12 vertebral bodies. There is stable metastatic lesion at T10 level. Discussed results with Dr. Falcon and radiotherapy can be  considered shall he experience pain that is not adequately controlled with pain medication. At this time, his pain remains well controlled so we will hold off on radiation and continue to monitor. He is currently receiving Pluvicto treatment at [facility]. He received first Pluvicto on 8/1/2024. Plan is for 6 treatments, given every 6 weeks. He received 3 doses of Pluvicto therapy and then PSMA PET/CT scan was performed on 12/17/2024 which showed marked improvement of all previously seen anjali lesions but progression of bony metastatic disease with multiple new sclerotic bone lesions throughout the spine, sacrum and right humerus. Decision was made to complete Pluvicto treatment to see if osseous areas of disease could be treated and plan is to rescan after completion of Pluvicto. He received dose 4 of Pluvicto on 1/9/25. His PSA on 1/15/25 is 93.5 ng/mL. Discussed that his interval rise in PSA may be related to recent prolonged period of time between Pluvicto treatments and new bone metastases. Dr. Dick is considering switching to Cabazitaxel upon progression after Pluvicto. He continues to receive Lupron, Xgeva, Zytiga and prednisone per Dr. Dick. Will continue to monitor his PSA and monitor his pain. Discussed that upon completion of Pluvicto treatment we will consider palliative radiotherapy to any bone metastases causing pain. Discussed his neck pain today and presented option of ordering MRI cervical spine today or waiting until next visit when he has completed Pluvicto. He has agreed to hold off on MRI for now. Will consider ordering MRI cervical spine at next visit if neck pain persists. AUA Symptom Score today of 7.     Cancer-related pain: currently well controlled with OxyContin and Norco prescribed by Dr. Dick. He infrequently experiences any thoracic spine pain. At visit on 2/19/24 I discussed option of continuing to monitor versus pursuing repeat radiotherapy to thoracic spine lesions, specifically the  enlarging T10 lesion, and he opted for continued monitoring. He will contact our office in the event that his thoracic spine pain worsens.      Weight loss: dose of marinol increased and referral placed to dietician per Dr. Dick on 1/15/25.     Assessment/Plan:   Diagnoses and all orders for this visit:    1. Secondary malignant neoplasm of bone (Primary)    2. Malignant neoplasm prostate    3. Personal history of radiation therapy    4. History of prostatectomy    5. Androgen deprivation therapy    6. Rising PSA following treatment for malignant neoplasm of prostate    7. Cancer-related pain    8. Weight loss, unintentional      Follow Up:   Return for follow-up in 3 months. He will contact our office in the event that his pain worsens. PSA being ordered by Dr. Dick.   Pluvicto treatment at [facility] as scheduled.   Follow-up with Dr. Dick on 2/12/25.     Return in about 3 months (around 4/16/2025) for Office Visit.  Semaj Medina was encouraged to contact me in the interim with any questions or concerns regarding his care.      CHRIS Juarez  Radiation Oncology  [facility]    This document has been signed by CHRIS Alston on January 16, 2025 15:01 EST

## 2025-01-22 ENCOUNTER — TELEPHONE (OUTPATIENT)
Dept: RADIATION ONCOLOGY | Facility: HOSPITAL | Age: 70
End: 2025-01-22
Payer: MEDICARE

## 2025-01-22 NOTE — TELEPHONE ENCOUNTER
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      Type of Cancer Treatment Pluvicto         Reason for Assessment  Physician consult       Current Problems:   Patient Active Problem List   Diagnosis Code    Prostate cancer metastatic to bone C61, C79.51    Anemia D64.9    Anxiety F41.9    Blood disease D75.9    Colon polyps K63.5    Depression F32.A    Diverticulitis K57.92    Forgetfulness R68.89    Hepatitis C B19.20    Night sweats R61    Numbness and tingling of left lower extremity R20.0, R20.2    Prostate cancer C61    Secondary malignant neoplasm of bone C79.51    Cancer related pain G89.3    Primary hypertension I10    Colon cancer screening Z12.11         Weight Hx  Wt Readings from Last 30 Encounters:   01/16/25 1346 51.7 kg (113 lb 15.7 oz)   01/15/25 1413 52.6 kg (116 lb)   12/27/24 0926 53.3 kg (117 lb 6.4 oz)   10/18/24 0912 54.3 kg (119 lb 9.6 oz)   10/15/24 1452 54 kg (119 lb 0.8 oz)   09/20/24 0956 54.7 kg (120 lb 9.6 oz)   07/26/24 0902 55.4 kg (122 lb 3.2 oz)   06/28/24 1123 57.2 kg (126 lb 1.7 oz)   06/28/24 0926 57.2 kg (126 lb 3.2 oz)   05/24/24 1400 57.2 kg (126 lb 1.7 oz)   05/03/24 1059 56.2 kg (123 lb 14.4 oz)   03/08/24 0927 56.6 kg (124 lb 12.5 oz)   02/19/24 1403 55.8 kg (123 lb 0.3 oz)   01/26/24 0902 55 kg (121 lb 4.1 oz)   12/19/23 1035 56.3 kg (124 lb 1.9 oz)   11/17/23 1144 55.1 kg (121 lb 7.6 oz)   10/13/23 1030 56.6 kg (124 lb 12.5 oz)   09/15/23 0948 57.6 kg (126 lb 15.8 oz)   09/14/23 1020 57.3 kg (126 lb 5.2 oz)   08/25/23 0946 58.6 kg (129 lb 3 oz)   08/24/23 1017 56.4 kg (124 lb 5.4 oz)   08/16/23 1407 54.1 kg (119 lb 4.3 oz)   08/04/23 0939 56.7 kg (125 lb)   08/03/23 1431 56.2 kg (124 lb)   07/14/23 0927 56.4 kg (124 lb 5.4 oz)   06/23/23 0942 57.7 kg (127 lb 3.3 oz)   06/21/23 1056 56.5 kg (124 lb  9 oz)   06/09/23 1343 55.9 kg (123 lb 3.2 oz)   06/02/23 0845 57.2 kg (126 lb 1.7 oz)   05/22/23 0846 57.1 kg (125 lb 14.1 oz)   05/18/23 1410 55.3 kg (122 lb)        Labs/Medications        Pertinent Labs Reviewed.   Results from last 7 days   Lab Units 01/15/25  1310   SODIUM mmol/L 140   POTASSIUM mmol/L 4.6   CHLORIDE mmol/L 112*   CO2 mmol/L 20.4*   BUN mg/dL 11   CREATININE mg/dL 0.78   CALCIUM mg/dL 8.2*   BILIRUBIN mg/dL 0.3   ALK PHOS U/L 191*   ALT (SGPT) U/L 5   AST (SGOT) U/L 14   GLUCOSE mg/dL 178*     Results from last 7 days   Lab Units 01/15/25  1310   MAGNESIUM mg/dL 1.9   PHOSPHORUS mg/dL 2.3*   HEMOGLOBIN g/dL 10.9*   HEMATOCRIT % 33.4*       Pertinent Medications Calcium Carbonate-Vitamin D, HYDROcodone-acetaminophen, abiraterone acetate, amLODIPine, dronabinol, folic acid, ibuprofen, leuprolide, lisinopril, magnesium oxide, naloxone, ondansetron, oxyCODONE, potassium & sodium phosphates, potassium chloride, predniSONE, and prochlorperazine     Current Nutrition Orders & Evaluation of Intake       Oral Nutrition     Current PO Diet Small, frequent meals or snacks throughout the day  Eats good protein, high kcal foods   Supplement Boost BID     Nutrition Diagnosis        Nutrition Dx Problem 1 Unintentional weight loss related to Inability to consume sufficient energy as evidenced by physiological causes increasing nutrient needs., hypermetabolic state., and decreased appetite.       Nutrition Intervention       RD Action Nutrition assessment by review of pt's medical record + interview of pt (reviewed and discussed):  Hx of weight changes  Current PO intake, schedule, ONS  Current nutrition-related concerns    Discussed:  Basic ways to maximize pt's nutritional intake  MNT for poor appetite     Nutrition Recommendations       Recommendations Continue small, frequent meals as above  Boost Plus or Boost VHC BID if wt not maintained at next appointment 2/12/25.     Monitor/Evaluation       Monitor  "Per oncology nutrition protocol.     Comments:    Nutrition referral per MD request.  Pt with UWL of 3-4% body wt in the past month.  He is S/P several treatments for prostate Ca and metastatic disease since 2015.  Pt recently started a new tx:  Pluvicto at [facility].      Spoke to pt over the phone.  He reports experiencing decreased appetite after starting most recent tx.  He reports that \"loss of appetite\" is a side effect of this tx.  Pt tries to eat often and makes a good effort to eat high kcal, high protein foods.  He drinks Boost shakes as well.  Pt given Marinol Rx 1/15/25 and pn medications were also adjusted on this date.  Pt reports he functions fairly well and moves around during the day- encouraged to continue as tolerated since this may help with appetite signals.  Discussed above information with pt.  He v/u.    Will monitor pt's wt- next appointment with Medical Oncologist 2/12/25.    Electronically signed by:  Natalya Miller RD  01/22/25 12:58 EST   "

## 2025-01-24 DIAGNOSIS — C61 PROSTATE CANCER METASTATIC TO BONE: ICD-10-CM

## 2025-01-24 DIAGNOSIS — C79.51 PROSTATE CANCER METASTATIC TO BONE: ICD-10-CM

## 2025-01-24 RX ORDER — ABIRATERONE 500 MG/1
1000 TABLET ORAL DAILY
Qty: 60 TABLET | Refills: 11 | Status: CANCELLED | OUTPATIENT
Start: 2025-01-24

## 2025-01-29 ENCOUNTER — TRANSCRIBE ORDERS (OUTPATIENT)
Dept: ADMINISTRATIVE | Facility: HOSPITAL | Age: 70
End: 2025-01-29
Payer: MEDICARE

## 2025-01-29 DIAGNOSIS — Z19.2 METASTATIC CASTRATION-RESISTANT ADENOCARCINOMA OF PROSTATE: Primary | ICD-10-CM

## 2025-01-29 DIAGNOSIS — C61 METASTATIC CASTRATION-RESISTANT ADENOCARCINOMA OF PROSTATE: Primary | ICD-10-CM

## 2025-02-03 ENCOUNTER — LAB (OUTPATIENT)
Facility: HOSPITAL | Age: 70
End: 2025-02-03
Payer: MEDICARE

## 2025-02-03 DIAGNOSIS — Z19.2 METASTATIC CASTRATION-RESISTANT ADENOCARCINOMA OF PROSTATE: ICD-10-CM

## 2025-02-03 DIAGNOSIS — C61 METASTATIC CASTRATION-RESISTANT ADENOCARCINOMA OF PROSTATE: ICD-10-CM

## 2025-02-03 LAB
ALBUMIN SERPL-MCNC: 3.7 G/DL (ref 3.5–5.2)
ALBUMIN/GLOB SERPL: 1.4 G/DL
ALP SERPL-CCNC: 227 U/L (ref 39–117)
ALT SERPL W P-5'-P-CCNC: 7 U/L (ref 1–41)
ANION GAP SERPL CALCULATED.3IONS-SCNC: 7.9 MMOL/L (ref 5–15)
AST SERPL-CCNC: 17 U/L (ref 1–40)
BASOPHILS # BLD AUTO: 0.01 10*3/MM3 (ref 0–0.2)
BASOPHILS NFR BLD AUTO: 0.2 % (ref 0–1.5)
BILIRUB SERPL-MCNC: <0.2 MG/DL (ref 0–1.2)
BUN SERPL-MCNC: 11 MG/DL (ref 8–23)
BUN/CREAT SERPL: 12.9 (ref 7–25)
CALCIUM SPEC-SCNC: 8.4 MG/DL (ref 8.6–10.5)
CHLORIDE SERPL-SCNC: 113 MMOL/L (ref 98–107)
CO2 SERPL-SCNC: 21.1 MMOL/L (ref 22–29)
CREAT SERPL-MCNC: 0.85 MG/DL (ref 0.76–1.27)
DEPRECATED RDW RBC AUTO: 51.4 FL (ref 37–54)
EGFRCR SERPLBLD CKD-EPI 2021: 94.1 ML/MIN/1.73
EOSINOPHIL # BLD AUTO: 0.09 10*3/MM3 (ref 0–0.4)
EOSINOPHIL NFR BLD AUTO: 1.9 % (ref 0.3–6.2)
ERYTHROCYTE [DISTWIDTH] IN BLOOD BY AUTOMATED COUNT: 15.7 % (ref 12.3–15.4)
GLOBULIN UR ELPH-MCNC: 2.6 GM/DL
GLUCOSE SERPL-MCNC: 97 MG/DL (ref 65–99)
HCT VFR BLD AUTO: 32.7 % (ref 37.5–51)
HGB BLD-MCNC: 11.2 G/DL (ref 13–17.7)
IMM GRANULOCYTES # BLD AUTO: 0.02 10*3/MM3 (ref 0–0.05)
IMM GRANULOCYTES NFR BLD AUTO: 0.4 % (ref 0–0.5)
LYMPHOCYTES # BLD AUTO: 0.92 10*3/MM3 (ref 0.7–3.1)
LYMPHOCYTES NFR BLD AUTO: 19.6 % (ref 19.6–45.3)
MCH RBC QN AUTO: 30.4 PG (ref 26.6–33)
MCHC RBC AUTO-ENTMCNC: 34.3 G/DL (ref 31.5–35.7)
MCV RBC AUTO: 88.9 FL (ref 79–97)
MONOCYTES # BLD AUTO: 0.32 10*3/MM3 (ref 0.1–0.9)
MONOCYTES NFR BLD AUTO: 6.8 % (ref 5–12)
NEUTROPHILS NFR BLD AUTO: 3.33 10*3/MM3 (ref 1.7–7)
NEUTROPHILS NFR BLD AUTO: 71.1 % (ref 42.7–76)
NRBC BLD AUTO-RTO: 0 /100 WBC (ref 0–0.2)
PLATELET # BLD AUTO: 211 10*3/MM3 (ref 140–450)
PMV BLD AUTO: 9.4 FL (ref 6–12)
POTASSIUM SERPL-SCNC: 4.5 MMOL/L (ref 3.5–5.2)
PROT SERPL-MCNC: 6.3 G/DL (ref 6–8.5)
PSA SERPL-MCNC: 185 NG/ML (ref 0–4)
RBC # BLD AUTO: 3.68 10*6/MM3 (ref 4.14–5.8)
SODIUM SERPL-SCNC: 142 MMOL/L (ref 136–145)
WBC NRBC COR # BLD AUTO: 4.69 10*3/MM3 (ref 3.4–10.8)

## 2025-02-03 PROCEDURE — 80053 COMPREHEN METABOLIC PANEL: CPT

## 2025-02-03 PROCEDURE — 36415 COLL VENOUS BLD VENIPUNCTURE: CPT

## 2025-02-03 PROCEDURE — 84153 ASSAY OF PSA TOTAL: CPT

## 2025-02-03 PROCEDURE — 85025 COMPLETE CBC W/AUTO DIFF WBC: CPT

## 2025-02-06 ENCOUNTER — SPECIALTY PHARMACY (OUTPATIENT)
Dept: PHARMACY | Facility: HOSPITAL | Age: 70
End: 2025-02-06
Payer: MEDICARE

## 2025-02-06 DIAGNOSIS — C79.51 PROSTATE CANCER METASTATIC TO BONE: ICD-10-CM

## 2025-02-06 DIAGNOSIS — C61 PROSTATE CANCER METASTATIC TO BONE: ICD-10-CM

## 2025-02-06 RX ORDER — PREDNISONE 5 MG/1
5 TABLET ORAL 2 TIMES DAILY
Qty: 60 TABLET | Refills: 11 | Status: SHIPPED | OUTPATIENT
Start: 2025-02-06

## 2025-02-06 RX ORDER — ABIRATERONE 500 MG/1
1000 TABLET ORAL DAILY
Qty: 60 TABLET | Refills: 11 | Status: SHIPPED | OUTPATIENT
Start: 2025-02-06

## 2025-02-06 NOTE — PROGRESS NOTES
Specialty Pharmacy Patient Management Program  Per Protocol Prescription Order or Refill     Patient will be filling or currently fills medications at  [facility] and is enrolled in the Patient Management Program. Mr. Medina is unable to afford his current copay of ~$800. Sending prescriptions to [facility] as patient would like to use is  insurance to see if this alleviates the financial burden.     Requested Prescriptions     Signed Prescriptions Disp Refills    abiraterone acetate (ZYTIGA) 500 MG tablet 60 tablet 11     Sig: Take 2 tablets by mouth Daily.    predniSONE (DELTASONE) 5 MG tablet 60 tablet 11     Sig: Take 1 tablet by mouth 2 (Two) Times a Day.     Prescription orders above were sent to the pharmacy per Collaborative Care Agreement Protocol.     Last Office Visit: 1/15/25  Next Office Visit: 2/12/25    Drug-Drug Interactions: The current medication list was reviewed and there are no relevant drug-drug interactions with the specialty medication.  Medication Allergies: The patient has NKDA  Review of Labs/Dose Adjustments: NO DOSE CHANGE - I reviewed the most recent note and labs and the patient will continue without any dose changes.  I sent refills as described below.    Betzy Sofia PharmD  Oncology Clinical Specialty Pharmacist   2/6/2025  13:29 EST

## 2025-02-11 ENCOUNTER — TELEPHONE (OUTPATIENT)
Dept: ONCOLOGY | Facility: HOSPITAL | Age: 70
End: 2025-02-11
Payer: MEDICARE

## 2025-02-11 RX ORDER — ONDANSETRON 8 MG/1
8 TABLET, FILM COATED ORAL EVERY 8 HOURS PRN
Qty: 90 TABLET | Refills: 3 | Status: SHIPPED | OUTPATIENT
Start: 2025-02-11

## 2025-02-11 NOTE — TELEPHONE ENCOUNTER
Called and discussed with pt to switch him to cabazitaxel given continued rise in PSA and lack of response to Pluvicto, plan to begin tomorrow 2/12/2025, signed order for pt to  zofran and recommended he continue prednisone daily which he is taking with zytiga. Recommend pt follow up tomorrow as scheduled for cycle 1 of cabazitaxel.    Please note that portions of this note were completed with a voice recognition program.    Electronically signed by Joshua Dick MD, 02/11/25, 11:51 AM EST.

## 2025-02-11 NOTE — PROGRESS NOTES
Chief Complaint/Care Team   Prostate Cancer    Maryse Monae MD Coffie, Ramona N, MD    History of Present Illness     Diagnosis: Metastatic Castration Resistant Prostate Cancer    Current Treatment: Lupron, Zytiga and Prednisone, Cabazitaxel    Previous Treatment:   Metastatic Castration resistant prostate cancer:    Patient was initially diagnosed with prostate cancer in 2015.  On 6/4/2015 he underwent radical prostatectomy and LN dissection which showed a [place] 4+3 = 7 prostate cancer.  There were several high risk features such as extraprostatic extension, seminal vesicle invasion, positive margins at the bladder neck and right and left seminal vesicle.  Lymph vascular invasion was indeterminate.  Perineural invasion was not commented on. Final pathology [id number] R1.  He was treated with adjuvant radiation by Dr. Stanton.    According to records the patient likely started Lupron in December 2017 when his PSA started to rise.  The patient took a break in August 2019.  He states he was not aware he was to continue.  He restarted Lupron on 11/26/2019 after it was noted that his PSA chago from 0.46 up to 2.55 on 11/8/2019.  Unfortunately his PSA continued to rise after that to 4.54 on 3/10/2020.    - restarted Lupron on 11/26/20  - Started Zytiga on 8/27/20.  8/31/2020 PSA 17.95     - Started Xtandi in May 2021 due to intolerance of Zytiga even at the lowest dose  - PSA rising in April 2023 to 2.85, doubling time 3.4 months  - started Docetaxel May 2023  -[facility]o at [facility] which began in  8/1/2024- stopped in 2/2025 due to increase in PSA    Semaj Medina is a 69 y.o. male who presents to [facility] HEMATOLOGY & ONCOLOGY for Metastatic Castration Resistant Prostate Cancer.    History of Present Illness    The patient was previously evaluated by Dr. Salazar, a radiation oncologist at [facility], who shared pt was not a candidate for [place] 223. However, he would be a suitable candidate for  Pluvicto, he has referred him to nuclear medicine specialist at [facility]. A consultation with a nuclear medicine specialist was scheduled for evaluation for Pluvicto. His current medication regimen includes Zytiga and prednisone.      Patient reports experiencing back and neck pain, which subsides upon administration of his pain medication. He typically takes his pain medication every 4 hours. He is here for evaluation prior to next Xgeva treatment.  He began Pluvicto treatment on August 1, 2024, given every 6 weeks, but was stopped in 2/2025 due to progression of disease. Pt here with his dtr to discuss treatment with cabazitaxel. Pt continues to lose weight despite his attempts to increase caloric intake and with increased dose of marinol.        Review of Systems   Constitutional:  Positive for appetite change, fatigue and unexpected weight loss.   Musculoskeletal:  Positive for back pain (Neck pain).        Oncology/Hematology History Overview Note     Metastatic Castration resistant prostate cancer:    Patient was initially diagnosed with prostate cancer in 2015.  On 6/4/2015 he underwent radical prostatectomy and LN dissection which showed a [place] 4+3 = 7 prostate cancer.  There were several high risk features such as extraprostatic extension, seminal vesicle invasion, positive margins at the bladder neck and right and left seminal vesicle.  Lymph vascular invasion was indeterminate.  Perineural invasion was not commented on. Final pathology [id number] R1.  He was treated with adjuvant radiation by Dr. Stanton.    According to records the patient likely started Lupron in December 2017 when his PSA started to rise.  The patient took a break in August 2019.  He states he was not aware he was to continue.  He restarted Lupron on 11/26/2019 after it was noted that his PSA chago from 0.46 up to 2.55 on 11/8/2019.  Unfortunately his PSA continued to rise after that to 4.54 on 3/10/2020.    - restarted Lupron on  11/26/20  - Started Zytiga on 8/27/20.  8/31/2020 PSA 17.95     - Started Xtandi in May 2021 due to intolerance of Zytiga even at the lowest dose  - PSA rising in April 2023 to 2.85, doubling time 3.4 months  - started Docetaxel May 2023    PSMA PET 5/2/23:   1.  Radiotracer avid supra clavicular, thoracic and abdominal adenopathy and sclerotic lesion within T10 vertebral body likely representing metastatic disease.  2.  Indeterminate lesion within the left ilium demonstrating mild uptake.   3.  Asymmetric sclerosis within the superior lateral aspect the left orbit which does not demonstrate significant uptake above that of the right orbit. Findings are nonspecific with  differential considerations including but not limited to metastatic disease, fibrous dysplasia versus Paget's disease.   4.  Sub-6 mm nodule within the left lower lobe.  5.  Scattered indeterminate hypodense lesions throughout the liver which do not demonstrate radiotracer uptake above that of the adjacent liver, best best visualized on recent multiphase CT abdomen and pelvis 04/17/2023. Please refer to this dictation for further information.       Prostate cancer metastatic to bone   12/11/2020 -  Chemotherapy    OP SUPPORTIVE Leuprolide 45 mg Q6M     3/23/2021 -  Chemotherapy    OP SUPPORTIVE Denosumab (Xgeva) Q28D     4/15/2021 - 5/13/2021 Chemotherapy    OP PROSTATE Enzalutamide     5/19/2021 Initial Diagnosis    Prostate cancer metastatic to bone (CMS/HCC)     6/2/2023 - 9/15/2023 Biopsy    OP PROSTATE DOCEtaxel  Plan Provider: Natalya Jack MD PhD  Treatment goal: Palliative  Line of treatment: [No plan line of treatment]     12/19/2023 - 12/19/2023 Chemotherapy    OP PROSTATE Apalutamide     1/26/2024 Biopsy    OP PROSTATE Abiraterone / PredniSONE  Plan Provider: Joshua Dick MD  Treatment goal: Control  Line of treatment: [No plan line of treatment]     2/12/2025 -  Chemotherapy    OP PROSTATE Cabazitaxel / PredniSONE     Prostate  "cancer   6/16/2021 Initial Diagnosis    Prostate cancer (HCC)     6/2/2023 -  Chemotherapy    OP CENTRAL VENOUS ACCESS DEVICE ACCESS, CARE, AND MAINTENANCE (CVAD)     Secondary malignant neoplasm of bone   11/4/2021 Initial Diagnosis    Secondary malignant neoplasm of bone (HCC)     11/23/2021 - 12/9/2021 Radiation    Radiation OncologyTreatment Course:  Semaj Medina received 3000 cGy in 10 fractions to the T9-T11 spine.          Objective     Vitals:    02/12/25 0835   BP: 112/85   Pulse: 70   Resp: 16   Temp: 98 °F (36.7 °C)   TempSrc: Temporal   SpO2: 99%   Weight: 51.8 kg (114 lb 3.2 oz)   Height: 175 cm (68.9\")   PainSc:   4   PainLoc: Rib Cage  Comment: back       ECOG score: 0         PHQ-9 Total Score:         Physical Exam  Vitals reviewed. Exam conducted with a chaperone present.   Constitutional:       General: He is not in acute distress.  HENT:      Head: Normocephalic and atraumatic.   Eyes:      Conjunctiva/sclera: Conjunctivae normal.      Pupils: Pupils are equal, round, and reactive to light.   Neurological:      Mental Status: He is alert and oriented to person, place, and time.         Physical Exam        Past Medical History     Past Medical History:   Diagnosis Date    Anemia     NO PROBLEMS    Anxiety     Blood disease     Colon polyps     Depression     Diverticulitis     Forgetfulness     Hepatitis C     RESOLVED    Hypertension     Night sweats     Numbness and tingling of left lower extremity     Prostate cancer      Current Outpatient Medications on File Prior to Visit   Medication Sig Dispense Refill    abiraterone acetate (ZYTIGA) 500 MG tablet Take 2 tablets by mouth Daily. 60 tablet 11    amLODIPine (NORVASC) 5 MG tablet TAKE 1 TABLET BY MOUTH DAILY 90 tablet 0    Calcium Carbonate-Vitamin D (Calcium Plus Vitamin D) 500-1.25 MG-MCG capsule Take 1 capsule by mouth Daily. 30 capsule 5    dronabinol (Marinol) 5 MG capsule Take 2 capsules by mouth Daily. 60 capsule 1    ibuprofen " (ADVIL,MOTRIN) 800 MG tablet TAKE 1 TABLET BY MOUTH EVERY 6 - 8 HOURS AS NEEDED FOR PAIN      leuprolide (Lupron [place], 3-Month,) 22.5 MG injection 22.5 mg.      lisinopril (PRINIVIL,ZESTRIL) 40 MG tablet TAKE 1 TABLET BY MOUTH DAILY 90 tablet 0    potassium & sodium phosphates (PHOS-NAK) 280-160-250 MG pack packet Take 1 packet by mouth 3 (Three) Times a Day Before Meals. 120 packet 1    potassium chloride (MICRO-K) 10 MEQ CR capsule TAKE 2 CAPSULES BY MOUTH DAILY 180 capsule 0    predniSONE (DELTASONE) 5 MG tablet Take 1 tablet by mouth 2 (Two) Times a Day. 60 tablet 11    [DISCONTINUED] HYDROcodone-acetaminophen (NORCO)  MG per tablet Take 1 tablet by mouth Every 4 (Four) Hours As Needed for Moderate Pain. 180 tablet 0    folic acid (FOLVITE) 1 MG tablet  (Patient not taking: Reported on 6/28/2024)      magnesium oxide (MAG-OX) 400 MG tablet Take 1 tablet by mouth Daily. (Patient not taking: Reported on 6/28/2024) 30 tablet 0    naloxone (NARCAN) 4 MG/0.1ML nasal spray CALL 911. SPR CONTENTS OF ONE SPRAYER (0.1ML) INTO ONE NOSTRIL. REPEAT IN 2-3 MIN IF SYMPTOMS OF OPIOID EMERGENCY PERSIST, ALTERNATE NOSTRILS (Patient not taking: Reported on 2/12/2025)      oxyCODONE (oxyCONTIN) 10 MG 12 hr tablet Take 1 tablet by mouth Every 12 (Twelve) Hours. (Patient not taking: Reported on 2/12/2025) 60 tablet 0    prochlorperazine (COMPAZINE) 10 MG tablet Take 1 tablet by mouth Every 6 (Six) Hours As Needed for Nausea or Vomiting. (Patient not taking: Reported on 5/24/2024) 30 tablet 5     No current facility-administered medications on file prior to visit.      No Known Allergies  Past Surgical History:   Procedure Laterality Date    COLONOSCOPY      PROSTATE BIOPSY      PROSTATECTOMY      ROBOT ASSISTED W PELVIC LYMPH NODE DISSECTION    TRANSURETHRAL RESECTION OF BLADDER TUMOR      VENOUS ACCESS DEVICE (PORT) INSERTION Right 5/22/2023    Procedure: INSERTION VENOUS ACCESS PORT;  Surgeon: Waldemar Iraheta MD;   Location: [facility] OR [facility];  Service: General;  Laterality: Right;     Social History     Socioeconomic History    Marital status:    Tobacco Use    Smoking status: Every Day     Current packs/day: 0.25     Average packs/day: 0.3 packs/day for 54.3 years (13.6 ttl pk-yrs)     Types: Cigarettes     Start date: 10/21/1970     Passive exposure: Past    Smokeless tobacco: Never   Vaping Use    Vaping status: Never Used   Substance and Sexual Activity    Alcohol use: Yes     Alcohol/week: 6.0 standard drinks of alcohol     Types: 6 Cans of beer per week     Comment: 12 pack a week of beer    Drug use: Never    Sexual activity: Defer     Family History   Problem Relation Age of Onset    Cancer Mother     Cancer Father        Results     Result Review   The following data was reviewed by: Joshua Dick MD on 06/28/2024:  Lab Results   Component Value Date    HGB 10.9 (L) 02/12/2025    HCT 32.8 (L) 02/12/2025    MCV 90.1 02/12/2025     02/12/2025    WBC 3.30 (L) 02/12/2025    NEUTROABS 1.86 02/12/2025    LYMPHSABS 1.14 02/12/2025    MONOSABS 0.18 02/12/2025    EOSABS 0.10 02/12/2025    BASOSABS 0.01 02/12/2025     Lab Results   Component Value Date    GLUCOSE 161 (H) 02/12/2025    BUN 16 02/12/2025    CREATININE 0.99 02/12/2025     02/12/2025    K 4.7 02/12/2025     02/12/2025    CO2 21.7 (L) 02/12/2025    CALCIUM 8.6 02/12/2025    PROTEINTOT 6.6 02/12/2025    ALBUMIN 3.7 02/12/2025    BILITOT 0.2 02/12/2025    ALKPHOS 268 (H) 02/12/2025    AST 14 02/12/2025    ALT 6 02/12/2025     Lab Results   Component Value Date    MG 2.0 02/12/2025    PHOS 2.5 02/12/2025    TSH 1.350 05/16/2019       No radiology results for the last day       Assessment & Plan     Diagnoses and all orders for this visit:    1. Weight loss (Primary)  -     Ambulatory Referral to Nutrition Services    2. Prostate cancer metastatic to bone  -     ondansetron (ZOFRAN) 8 MG tablet; Take 1 tablet by mouth Every 8 (Eight) Hours As  Needed for Nausea or Vomiting.  Dispense: 90 tablet; Refill: 3  -     Ambulatory Referral to General Surgery  -     HYDROcodone-acetaminophen (NORCO)  MG per tablet; Take 1.5 tablets by mouth Every 4 (Four) Hours As Needed for Moderate Pain.  Dispense: 270 tablet; Refill: 0    3. Cancer related pain  -     HYDROcodone-acetaminophen (NORCO)  MG per tablet; Take 1.5 tablets by mouth Every 4 (Four) Hours As Needed for Moderate Pain.  Dispense: 270 tablet; Refill: 0    Other orders  -     Cancel: sodium chloride 0.9 % infusion  -     Cancel: famotidine (PEPCID) injection 20 mg  -     Cancel: diphenhydrAMINE (BENADRYL) IVPB 25 mg  -     Cancel: dexAMETHasone (DECADRON) 12 mg in sodium chloride 0.9 % IVPB  -     Cancel: cabazitaxel (JEVTANA) 34 mg in sodium chloride 0.9 % 253.4 mL chemo IVPB  -     Cancel: denosumab (XGEVA) injection 120 mg                     Semaj Medina is a 69 y.o. male who presents to [facility] HEMATOLOGY & ONCOLOGY for treatment of  metastatic prostate cancer, completed 6 cycles of docetaxel on 9/15/2023, Lupron Q6 months, Pt also receiving Xgeva and he is here prior to next Xgeva infusion  -next Lupron injection due on 6/18/2024  -PSA from 12/15/23 was 8 up from 3.1 (in 10/2023)  -Discussed result of [place] bone scan, CT CAP from 1/24/2024 which revealed interval enlargement T10 vertebral body metastasis, no evidence of intrathoracic metastatic disease, slight worsening appearance of osteoblastic schedule static disease, notable at T10, T8, and T5, no evidence of intra-abdominal or pelvic metastatic disease.  -Patient has undergone radiation therapy 10 fractions to thoracic spine from T9-T11 in 2021.  -Also shared plan to reattempt treatment with Zytiga and prednisone, patient reports some nausea vomiting which we will attempt to manage with antiemetics.  -Orders placed for Zytiga and prednisone, also provided prescriptions for Compazine and Zofran for patient to  begin once he begins treatment with these medications.  -pt began zytiga/prednisone on 2/2/2024, pt tolerating it well, no evidence of toxicity on labs, recommend he continue at current dose     -discussed results of restaging imaging from 4/24/2024 which showed stable radiotracer uptake involving sclerotic osseous metastasis of the left frontal bone above left orbit and at T10, no findings of osseous progression, CT chest abdomen pelvis revealed stable sclerotic osseous metastasis, no suspicious adenopathy, postsurgical changes of prostatectomy.          -Given presence of continued osseous metastasis, patient with continued pain in area of metastasis, and since he has previously received radiation in this area, referred him to [facility] school medicine for consideration for treatment with [place] 223 or Pluvicto.  -Shared results of PSMA PET/CT scan from 6/13/2024 which revealed progression of disease, patient has been referred to nuclear medicine specialist at [facility] for consideration for Pluvicto,pt began Pluvicto on 8/1/2024.  -Obtained PSMA PET CT scan in 12/2024, it revealed mixed response, discussed case with [place] physician Dr. Freed on 12/27/24 and we both decided to have pt complete pluvicto treatment to see if osseous areas of disease could be treated and will rescan pt after completion of Pluvicto.    2/12/2025: Given continued rise of PSA patient case discussed with Dr. Freed who agreed patient is not responding Pluvicto treatment thus plan switch patient to cabazitaxel cycle 1 day 1 on 2/12/2025, labs reviewed plan to proceed as scheduled today. Pt also with continued weight loss despite use of protein supplements and marinol, pt shares he has has an appetite but has difficulty eating due to change in his taste buds. Thus, discussed referral for feeding tube placement. Tube feedings will be managed by dietician Natalya.     Pt here prior to next Xgeva treatment, labs reviewed plan to proceed with treatment as  scheduled.     -Considering switching pt to Cabazitaxel upon progression after Pluvicto treatment.     -Recommend pt continue of Lupron, Xgeva, and Zytiga and prednisone  -OxyContin 10 mg was prescribed for pain management, to be taken twice daily every 12 hours but due to lack of response, discontinued it  -will continue to provide refills of his [place] for his cancer related pain, increase dose to 1.5 tablet of Norco 10mg PO Q4 hours prn pain.     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      Hypophosphatemia  -normal on check today    Hypomagnesemia  -pt prescribed mag oxide 400 mg PO QD  -will recheck at next clinic appointment     Hypokalemia  -pt receiving KCL repletion    Low Vit D  -Vit level of 27.7  -prescribed Vit D 50K IU once weekly x 8 weeks     Weight loss  -ordered marinol, increased dose on 1/15/2025  -referred to dietician      Please note that portions of this note were completed with a voice recognition program.      Assessment & Plan        Plan for patient follow-up with cycle 2 day 1 of cabazitaxel and next Xgeva treatment    Electronically signed by Joshua Dick MD, 02/12/25, 1:34 PM EST.        Follow Up     I spent 30 minutes caring for Semaj on this date of service. This time includes time spent by me in the following activities:preparing for the visit, reviewing tests, obtaining and/or reviewing a separately obtained history, performing a medically appropriate examination and/or evaluation , counseling and educating the patient/family/caregiver, ordering medications, tests, or procedures, referring and communicating with other health care professionals , documenting information in the medical record, independently interpreting results and communicating that information with the patient/family/caregiver, and care coordination    Any  chemotherapy or immunotherapy or other systemic therapy treatment plan involves a high risk of complications and/or mortality of patient management.    The patient was seen and examined. Work by the provider also included review and/or ordering of lab tests, review and/or ordering of radiology tests, review and/or ordering of medicine tests, discussion with other physicians or providers, independent review of data, obtaining old records, review/summation of old records, and/or other review.    I have reviewed the family history, social history, and past medical history for this patient. Previous information and data has been reviewed and updated as needed. I have reviewed and verified the chief complaint, history, and other documentation. The patient was interviewed and examined in the clinic and the chart reviewed. The previous observations, recommendations, and conclusions were reviewed including those of other providers.     The plan was discussed with the patient and/or family. The patient was given time to ask questions and these questions were answered. At the conclusion of their visit they had no additional questions or concerns and all questions were answered to their satisfaction.    Patient was given instructions and counseling regarding his condition or for health maintenance advice. Please see specific information pulled into the AVS if appropriate.       Patient or patient representative verbalized consent for the use of Ambient Listening during the visit with  Joshua Dick MD for chart documentation. 2/12/2025  17:46 EDT

## 2025-02-12 ENCOUNTER — SPECIALTY PHARMACY (OUTPATIENT)
Dept: PHARMACY | Facility: HOSPITAL | Age: 70
End: 2025-02-12
Payer: MEDICARE

## 2025-02-12 ENCOUNTER — APPOINTMENT (OUTPATIENT)
Dept: ONCOLOGY | Facility: HOSPITAL | Age: 70
End: 2025-02-12
Payer: MEDICARE

## 2025-02-12 ENCOUNTER — DOCUMENTATION (OUTPATIENT)
Dept: RADIATION ONCOLOGY | Facility: HOSPITAL | Age: 70
End: 2025-02-12
Payer: MEDICARE

## 2025-02-12 ENCOUNTER — HOSPITAL ENCOUNTER (OUTPATIENT)
Dept: ONCOLOGY | Facility: HOSPITAL | Age: 70
Discharge: HOME | End: 2025-02-12
Payer: MEDICARE

## 2025-02-12 ENCOUNTER — TELEPHONE (OUTPATIENT)
Dept: ONCOLOGY | Facility: HOSPITAL | Age: 70
End: 2025-02-12

## 2025-02-12 ENCOUNTER — OFFICE VISIT (OUTPATIENT)
Dept: ONCOLOGY | Facility: HOSPITAL | Age: 70
End: 2025-02-12
Payer: MEDICARE

## 2025-02-12 ENCOUNTER — HOSPITAL ENCOUNTER (OUTPATIENT)
Dept: ONCOLOGY | Facility: HOSPITAL | Age: 70
Discharge: HOME | End: 2025-02-12

## 2025-02-12 VITALS
SYSTOLIC BLOOD PRESSURE: 112 MMHG | BODY MASS INDEX: 16.91 KG/M2 | RESPIRATION RATE: 16 BRPM | TEMPERATURE: 98 F | DIASTOLIC BLOOD PRESSURE: 85 MMHG | HEIGHT: 69 IN | HEART RATE: 70 BPM | OXYGEN SATURATION: 99 % | WEIGHT: 114.2 LBS

## 2025-02-12 VITALS
WEIGHT: 114.2 LBS | OXYGEN SATURATION: 98 % | BODY MASS INDEX: 16.91 KG/M2 | SYSTOLIC BLOOD PRESSURE: 112 MMHG | DIASTOLIC BLOOD PRESSURE: 85 MMHG | TEMPERATURE: 98 F | HEART RATE: 70 BPM | HEIGHT: 69 IN | RESPIRATION RATE: 16 BRPM

## 2025-02-12 DIAGNOSIS — C61 PROSTATE CANCER METASTATIC TO BONE: ICD-10-CM

## 2025-02-12 DIAGNOSIS — C79.51 PROSTATE CANCER METASTATIC TO BONE: Primary | ICD-10-CM

## 2025-02-12 DIAGNOSIS — C79.51 PROSTATE CANCER METASTATIC TO BONE: ICD-10-CM

## 2025-02-12 DIAGNOSIS — C61 PROSTATE CANCER METASTATIC TO BONE: Primary | ICD-10-CM

## 2025-02-12 DIAGNOSIS — R63.4 WEIGHT LOSS: Primary | ICD-10-CM

## 2025-02-12 DIAGNOSIS — G89.3 CANCER RELATED PAIN: ICD-10-CM

## 2025-02-12 DIAGNOSIS — C61 PROSTATE CANCER: Primary | ICD-10-CM

## 2025-02-12 DIAGNOSIS — C61 PROSTATE CANCER: ICD-10-CM

## 2025-02-12 LAB
ALBUMIN SERPL-MCNC: 3.7 G/DL (ref 3.5–5.2)
ALBUMIN/GLOB SERPL: 1.3 G/DL
ALP SERPL-CCNC: 268 U/L (ref 39–117)
ALT SERPL W P-5'-P-CCNC: 6 U/L (ref 1–41)
ANION GAP SERPL CALCULATED.3IONS-SCNC: 9.3 MMOL/L (ref 5–15)
AST SERPL-CCNC: 14 U/L (ref 1–40)
BASOPHILS # BLD AUTO: 0.01 10*3/MM3 (ref 0–0.2)
BASOPHILS NFR BLD AUTO: 0.3 % (ref 0–1.5)
BILIRUB SERPL-MCNC: 0.2 MG/DL (ref 0–1.2)
BUN SERPL-MCNC: 16 MG/DL (ref 8–23)
BUN/CREAT SERPL: 16.2 (ref 7–25)
CALCIUM SPEC-SCNC: 8.6 MG/DL (ref 8.6–10.5)
CHLORIDE SERPL-SCNC: 107 MMOL/L (ref 98–107)
CO2 SERPL-SCNC: 21.7 MMOL/L (ref 22–29)
CREAT SERPL-MCNC: 0.99 MG/DL (ref 0.76–1.27)
DEPRECATED RDW RBC AUTO: 52.5 FL (ref 37–54)
EGFRCR SERPLBLD CKD-EPI 2021: 82.5 ML/MIN/1.73
EOSINOPHIL # BLD AUTO: 0.1 10*3/MM3 (ref 0–0.4)
EOSINOPHIL NFR BLD AUTO: 3 % (ref 0.3–6.2)
ERYTHROCYTE [DISTWIDTH] IN BLOOD BY AUTOMATED COUNT: 15.8 % (ref 12.3–15.4)
GLOBULIN UR ELPH-MCNC: 2.9 GM/DL
GLUCOSE SERPL-MCNC: 161 MG/DL (ref 65–99)
HCT VFR BLD AUTO: 32.8 % (ref 37.5–51)
HGB BLD-MCNC: 10.9 G/DL (ref 13–17.7)
IMM GRANULOCYTES # BLD AUTO: 0.01 10*3/MM3 (ref 0–0.05)
IMM GRANULOCYTES NFR BLD AUTO: 0.3 % (ref 0–0.5)
LYMPHOCYTES # BLD AUTO: 1.14 10*3/MM3 (ref 0.7–3.1)
LYMPHOCYTES NFR BLD AUTO: 34.5 % (ref 19.6–45.3)
MAGNESIUM SERPL-MCNC: 2 MG/DL (ref 1.6–2.4)
MCH RBC QN AUTO: 29.9 PG (ref 26.6–33)
MCHC RBC AUTO-ENTMCNC: 33.2 G/DL (ref 31.5–35.7)
MCV RBC AUTO: 90.1 FL (ref 79–97)
MONOCYTES # BLD AUTO: 0.18 10*3/MM3 (ref 0.1–0.9)
MONOCYTES NFR BLD AUTO: 5.5 % (ref 5–12)
NEUTROPHILS NFR BLD AUTO: 1.86 10*3/MM3 (ref 1.7–7)
NEUTROPHILS NFR BLD AUTO: 56.4 % (ref 42.7–76)
NRBC BLD AUTO-RTO: 0 /100 WBC (ref 0–0.2)
PHOSPHATE SERPL-MCNC: 2.5 MG/DL (ref 2.5–4.5)
PLATELET # BLD AUTO: 167 10*3/MM3 (ref 140–450)
PMV BLD AUTO: 9.5 FL (ref 6–12)
POTASSIUM SERPL-SCNC: 4.7 MMOL/L (ref 3.5–5.2)
PROT SERPL-MCNC: 6.6 G/DL (ref 6–8.5)
PSA SERPL-MCNC: 293 NG/ML (ref 0–4)
RBC # BLD AUTO: 3.64 10*6/MM3 (ref 4.14–5.8)
SODIUM SERPL-SCNC: 138 MMOL/L (ref 136–145)
WBC NRBC COR # BLD AUTO: 3.3 10*3/MM3 (ref 3.4–10.8)

## 2025-02-12 PROCEDURE — 25010000002 DIPHENHYDRAMINE PER 50 MG: Performed by: INTERNAL MEDICINE

## 2025-02-12 PROCEDURE — 83735 ASSAY OF MAGNESIUM: CPT | Performed by: INTERNAL MEDICINE

## 2025-02-12 PROCEDURE — 25010000002 CABAZITAXEL 10 MG/1ML SOLUTION 6 ML VIAL: Performed by: INTERNAL MEDICINE

## 2025-02-12 PROCEDURE — 25010000002 HEPARIN LOCK FLUSH PER 10 UNITS: Performed by: INTERNAL MEDICINE

## 2025-02-12 PROCEDURE — 80053 COMPREHEN METABOLIC PANEL: CPT | Performed by: INTERNAL MEDICINE

## 2025-02-12 PROCEDURE — 25010000002 DEXAMETHASONE SODIUM PHOSPHATE 120 MG/30ML SOLUTION: Performed by: INTERNAL MEDICINE

## 2025-02-12 PROCEDURE — 84100 ASSAY OF PHOSPHORUS: CPT | Performed by: INTERNAL MEDICINE

## 2025-02-12 PROCEDURE — 25810000003 SODIUM CHLORIDE 0.9 % SOLUTION: Performed by: INTERNAL MEDICINE

## 2025-02-12 PROCEDURE — 25810000003 SODIUM CHLORIDE 0.9 % SOLUTION 250 ML FLEX CONT: Performed by: INTERNAL MEDICINE

## 2025-02-12 PROCEDURE — 84153 ASSAY OF PSA TOTAL: CPT | Performed by: INTERNAL MEDICINE

## 2025-02-12 PROCEDURE — 96375 TX/PRO/DX INJ NEW DRUG ADDON: CPT

## 2025-02-12 PROCEDURE — 96413 CHEMO IV INFUSION 1 HR: CPT

## 2025-02-12 PROCEDURE — 25010000002 DENOSUMAB 120 MG/1.7ML SOLUTION: Performed by: INTERNAL MEDICINE

## 2025-02-12 PROCEDURE — 96372 THER/PROPH/DIAG INJ SC/IM: CPT

## 2025-02-12 PROCEDURE — 85025 COMPLETE CBC W/AUTO DIFF WBC: CPT | Performed by: INTERNAL MEDICINE

## 2025-02-12 RX ORDER — SODIUM CHLORIDE 0.9 % (FLUSH) 0.9 %
20 SYRINGE (ML) INJECTION AS NEEDED
Status: DISCONTINUED | OUTPATIENT
Start: 2025-02-12 | End: 2025-02-13 | Stop reason: HOSPADM

## 2025-02-12 RX ORDER — SODIUM CHLORIDE 9 MG/ML
20 INJECTION, SOLUTION INTRAVENOUS ONCE
Status: CANCELLED | OUTPATIENT
Start: 2025-02-12

## 2025-02-12 RX ORDER — SODIUM CHLORIDE 0.9 % (FLUSH) 0.9 %
20 SYRINGE (ML) INJECTION AS NEEDED
Status: CANCELLED | OUTPATIENT
Start: 2025-02-12

## 2025-02-12 RX ORDER — SODIUM CHLORIDE 9 MG/ML
20 INJECTION, SOLUTION INTRAVENOUS ONCE
Status: COMPLETED | OUTPATIENT
Start: 2025-02-12 | End: 2025-02-12

## 2025-02-12 RX ORDER — HEPARIN SODIUM (PORCINE) LOCK FLUSH IV SOLN 100 UNIT/ML 100 UNIT/ML
500 SOLUTION INTRAVENOUS AS NEEDED
OUTPATIENT
Start: 2025-02-12

## 2025-02-12 RX ORDER — HEPARIN SODIUM (PORCINE) LOCK FLUSH IV SOLN 100 UNIT/ML 100 UNIT/ML
500 SOLUTION INTRAVENOUS AS NEEDED
Status: CANCELLED | OUTPATIENT
Start: 2025-02-12

## 2025-02-12 RX ORDER — HEPARIN SODIUM (PORCINE) LOCK FLUSH IV SOLN 100 UNIT/ML 100 UNIT/ML
500 SOLUTION INTRAVENOUS AS NEEDED
Status: DISCONTINUED | OUTPATIENT
Start: 2025-02-12 | End: 2025-02-13 | Stop reason: HOSPADM

## 2025-02-12 RX ORDER — FAMOTIDINE 10 MG/ML
20 INJECTION, SOLUTION INTRAVENOUS ONCE
Status: CANCELLED | OUTPATIENT
Start: 2025-02-12

## 2025-02-12 RX ORDER — SODIUM CHLORIDE 0.9 % (FLUSH) 0.9 %
20 SYRINGE (ML) INJECTION AS NEEDED
OUTPATIENT
Start: 2025-02-12

## 2025-02-12 RX ORDER — FAMOTIDINE 10 MG/ML
20 INJECTION, SOLUTION INTRAVENOUS ONCE
Status: COMPLETED | OUTPATIENT
Start: 2025-02-12 | End: 2025-02-12

## 2025-02-12 RX ORDER — HYDROCODONE BITARTRATE AND ACETAMINOPHEN 10; 325 MG/1; MG/1
1.5 TABLET ORAL EVERY 4 HOURS PRN
Qty: 270 TABLET | Refills: 0 | Status: SHIPPED | OUTPATIENT
Start: 2025-02-12

## 2025-02-12 RX ADMIN — HEPARIN 500 UNITS: 100 SYRINGE at 12:00

## 2025-02-12 RX ADMIN — FAMOTIDINE 20 MG: 10 INJECTION INTRAVENOUS at 09:36

## 2025-02-12 RX ADMIN — SODIUM CHLORIDE 20 ML/HR: 9 INJECTION, SOLUTION INTRAVENOUS at 09:37

## 2025-02-12 RX ADMIN — SODIUM CHLORIDE 34 MG: 9 INJECTION, SOLUTION INTRAVENOUS at 10:37

## 2025-02-12 RX ADMIN — Medication 20 ML: at 12:00

## 2025-02-12 RX ADMIN — DENOSUMAB 120 MG: 120 INJECTION SUBCUTANEOUS at 10:36

## 2025-02-12 RX ADMIN — DIPHENHYDRAMINE HYDROCHLORIDE 25 MG: 50 INJECTION, SOLUTION INTRAMUSCULAR; INTRAVENOUS at 09:58

## 2025-02-12 RX ADMIN — DEXAMETHASONE SODIUM PHOSPHATE 12 MG: 4 INJECTION, SOLUTION INTRA-ARTICULAR; INTRALESIONAL; INTRAMUSCULAR; INTRAVENOUS; SOFT TISSUE at 09:39

## 2025-02-12 NOTE — PROGRESS NOTES
"Presents for C1D1 of cabazitaxel.    Patient was educated on information about each medication including dose, route, frequency, and common adverse effects. Patient was provided both verbal and written counseling. UpToDate Lexidrug adult patient education printed and provided to patient and key information verbally highlighted including: Overview of regimen including but not limited to infusion times; recognition and management of allergic/infusion reactions; \"call your doctor right away\" parameters.     All of the patient's questions were answered and patient expressed verbal understanding.    Hortencia Quintero, PharmD    "

## 2025-02-12 NOTE — PROGRESS NOTES
Outpatient Nutrition Oncology Documentation    Patient Name: Semaj Medina  YOB: 1955  MRN: [id number]      Consult or Intervention:  Nutrition referral received and consult completed 1/22/25.  Pt receiving infusion today.  Wt noted to be up 1# since mid-January 2025.  Pt requested samples of ONS therefore provided samples of Ensure Complete (no Boost samples available) along with Ensure coupons.    Plan: Will follow up per oncology nutrition protocol

## 2025-02-12 NOTE — TELEPHONE ENCOUNTER
Caller: [id number] - [place], [place] - [address] AT [facility] OF [address] [facility] & [place] - [phone]  - [phone] FX    Relationship: Pharmacy    Best call back number: [phone]    Who are you requesting to speak with (clinical staff, provider,  specific staff member): CLINICAL      What was the call regarding: PHARMACY CALLING IN REGARDS TO HIS HYDROCODONE SCRIPT. DOUBLE CHECKING THE QUANTITY TO BE DISPENSED AND WHAT PAIN PLAN IS WITH THE MORPHINE.

## 2025-02-12 NOTE — NURSING NOTE
Chemotherapy therapy regimen discussed: Cabazitaxel    Consent signed: Yes    Oriented to facility: Orientated to lobby, registration, nourishment area, restrooms, treatment area.    Teaching: Reviewed typical treatment day process and visitor policy. Discussed importance of continuing previously prescribed medications unless otherwise directed by Dr Dick. Pt informed of clinic resources and contact information. Chemotherapy regimen and side effects discussed.    Materials Given: Written materials provided from pharmacy.    Prescriptions and Pharmacy Verified:  All symptom management prescriptions have been reviewed and received by the pt. Pt and family v/u.    Upcoming Appointments Reviewed: AVS provided and reviewed.      Pt and family v/u of all education and information provided and deny further questions or concerns. RN advised pt/family to call as needed for any questions or concerns that may arise in the future. Pt and family v/u.

## 2025-02-13 ENCOUNTER — TELEPHONE (OUTPATIENT)
Dept: ONCOLOGY | Facility: HOSPITAL | Age: 70
End: 2025-02-13
Payer: MEDICARE

## 2025-02-13 NOTE — TELEPHONE ENCOUNTER
Caller: [id number] - [place], [place] - [address] AT [facility] OF [address] [facility] & [place] - [phone] [facility] [phone] FX    Relationship: Pharmacy  DELPHINE Valdivia call back number: [phone]    What was the call regarding: PHARMACY NEEDS CLARIFICATION ON THE PAIN MEDICATION

## 2025-02-13 NOTE — TELEPHONE ENCOUNTER
[facility] called and stated that the qty of the script is very large and takes a lot of there stock. [facility] is asking if Dr Dick could prescribe a different dose or drug next time the pt needs pain medication.

## 2025-02-17 ENCOUNTER — PREP FOR SURGERY (OUTPATIENT)
Dept: OTHER | Facility: HOSPITAL | Age: 70
End: 2025-02-17
Payer: MEDICARE

## 2025-02-17 ENCOUNTER — TELEPHONE (OUTPATIENT)
Dept: SURGERY | Facility: CLINIC | Age: 70
End: 2025-02-17

## 2025-02-17 DIAGNOSIS — C61 PROSTATE CANCER METASTATIC TO BONE: Primary | ICD-10-CM

## 2025-02-17 DIAGNOSIS — R63.4 UNINTENTIONAL WEIGHT LOSS: ICD-10-CM

## 2025-02-17 DIAGNOSIS — C79.51 PROSTATE CANCER METASTATIC TO BONE: Primary | ICD-10-CM

## 2025-02-17 NOTE — TELEPHONE ENCOUNTER
Caller: Semaj Medina Osborne    Relationship:  Self    Best call back number: [phone] (home)       PATIENT CALLED REQUESTING TO CANCEL SAME DAY APPT.    Did the patient call AFTER the start time of their scheduled appointment?  []YES  [x]NO    Was the patient's appointment rescheduled? [x]YES  []NO    Any additional information: RESCHEDULE TO 2/18/25  AT 11A

## 2025-02-18 ENCOUNTER — TELEPHONE (OUTPATIENT)
Dept: ONCOLOGY | Facility: HOSPITAL | Age: 70
End: 2025-02-18
Payer: MEDICARE

## 2025-02-18 ENCOUNTER — OFFICE VISIT (OUTPATIENT)
Dept: SURGERY | Facility: CLINIC | Age: 70
End: 2025-02-18
Payer: MEDICARE

## 2025-02-18 VITALS — WEIGHT: 115 LBS | RESPIRATION RATE: 16 BRPM | HEIGHT: 70 IN | BODY MASS INDEX: 16.46 KG/M2

## 2025-02-18 DIAGNOSIS — R63.4 UNINTENTIONAL WEIGHT LOSS: ICD-10-CM

## 2025-02-18 DIAGNOSIS — C79.51 PROSTATE CANCER METASTATIC TO BONE: Primary | ICD-10-CM

## 2025-02-18 DIAGNOSIS — C61 PROSTATE CANCER METASTATIC TO BONE: Primary | ICD-10-CM

## 2025-02-18 RX ORDER — ABIRATERONE ACETATE 250 MG/1
TABLET ORAL
COMMUNITY
Start: 2025-02-10 | End: 2025-02-21

## 2025-02-18 NOTE — TELEPHONE ENCOUNTER
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      Type of Cancer Treatment Cabazitaxel / Prednisone          Reason for Assessment  Assessment, EN       Current Problems:   Patient Active Problem List   Diagnosis Code    Prostate cancer metastatic to bone C61, C79.51    Anemia D64.9    Anxiety F41.9    Blood disease D75.9    Colon polyps K63.5    Depression F32.A    Diverticulitis K57.92    Forgetfulness R68.89    Hepatitis C B19.20    Night sweats R61    Numbness and tingling of left lower extremity R20.0, R20.2    Prostate cancer C61    Secondary malignant neoplasm of bone C79.51    Cancer related pain G89.3    Primary hypertension I10    Colon cancer screening Z12.11    Unintentional weight loss R63.4                Weight Hx  Wt Readings from Last 30 Encounters:   02/18/25 1058 52.2 kg (115 lb)   02/12/25 0925 51.8 kg (114 lb 3.2 oz)   02/12/25 0835 51.8 kg (114 lb 3.2 oz)   01/16/25 1346 51.7 kg (113 lb 15.7 oz)   01/15/25 1413 52.6 kg (116 lb)   12/27/24 0926 53.3 kg (117 lb 6.4 oz)   10/18/24 0912 54.3 kg (119 lb 9.6 oz)   10/15/24 1452 54 kg (119 lb 0.8 oz)   09/20/24 0956 54.7 kg (120 lb 9.6 oz)   07/26/24 0902 55.4 kg (122 lb 3.2 oz)   06/28/24 1123 57.2 kg (126 lb 1.7 oz)   06/28/24 0926 57.2 kg (126 lb 3.2 oz)   05/24/24 1400 57.2 kg (126 lb 1.7 oz)   05/03/24 1059 56.2 kg (123 lb 14.4 oz)   03/08/24 0927 56.6 kg (124 lb 12.5 oz)   02/19/24 1403 55.8 kg (123 lb 0.3 oz)   01/26/24 0902 55 kg (121 lb 4.1 oz)   12/19/23 1035 56.3 kg (124 lb 1.9 oz)   11/17/23 1144 55.1 kg (121 lb 7.6 oz)   10/13/23 1030 56.6 kg (124 lb 12.5 oz)   09/15/23 0948 57.6 kg (126 lb 15.8 oz)   09/14/23 1020 57.3 kg (126 lb 5.2 oz)   08/25/23 0946 58.6 kg (129 lb 3 oz)   08/24/23 1017 56.4 kg (124 lb 5.4 oz)   08/16/23 1407 54.1 kg (119 lb 4.3 oz)    08/04/23 0939 56.7 kg (125 lb)   08/03/23 1431 56.2 kg (124 lb)   07/14/23 0927 56.4 kg (124 lb 5.4 oz)   06/23/23 0942 57.7 kg (127 lb 3.3 oz)   06/21/23 1056 56.5 kg (124 lb 9 oz)        Labs/Medications        Pertinent Labs Reviewed.   Results from last 7 days   Lab Units 02/12/25  0817   SODIUM mmol/L 138   POTASSIUM mmol/L 4.7   CHLORIDE mmol/L 107   CO2 mmol/L 21.7*   BUN mg/dL 16   CREATININE mg/dL 0.99   CALCIUM mg/dL 8.6   BILIRUBIN mg/dL 0.2   ALK PHOS U/L 268*   ALT (SGPT) U/L 6   AST (SGOT) U/L 14   GLUCOSE mg/dL 161*     Results from last 7 days   Lab Units 02/12/25  0817   MAGNESIUM mg/dL 2.0   PHOSPHORUS mg/dL 2.5   HEMOGLOBIN g/dL 10.9*   HEMATOCRIT % 32.8*       Pertinent Medications Calcium Carbonate-Vitamin D, HYDROcodone-acetaminophen, abiraterone acetate, amLODIPine, dronabinol, folic acid, ibuprofen, leuprolide, lisinopril, magnesium oxide, naloxone, ondansetron, oxyCODONE, potassium & sodium phosphates, potassium chloride, predniSONE, and prochlorperazine     Current Nutrition Orders & Evaluation of Intake       Oral Nutrition     Current PO Diet Can tolerate regular food consistency, but due to severely altered taste & early satiety pt can only eat a few bites at a time   Supplement Some Ensure, Boost     Nutrition Diagnosis        Nutrition Dx Problem 1 Moderate malnutrition related to Inability to consume sufficient energy as evidenced by physiological causes increasing nutrient needs., hypermetabolic state., inadequate energy intake., decreased appetite., patient report., and 6% wt decline in 4 months, <50% EEE X 1 month.       Nutrition Intervention       RD Action Nutrition assessment by review of medical record + pt interview (including discussion on):  Wt changes since 1/22/25  Ongoing nutrition-related concerns  Recent PO intake (amounts consumed, consistency tolerated, ONS intake)  Oncology RD role in managing EN  Plan for trial of formulas prior to setting up vendor  service    Discussed:  Proper oral care and provided homemade mouth rinse recipe    Will provide to pt:  Samples of both Peptamen 1.5 and Fab[employer] [place] Peptide 1.5 to determine best tolerance.     Nutrition Recommendations       Recommendations Will provide further recommendations after trial of formulas.     Monitor/Evaluation       Monitor Per oncology nutrition protocol.     Estimated nutritional needs (daily):  2225-6182 kcals   gm protein  9264-3018 mL fluid    Comments:  (Phone consult completed 1/22/25 and ONS samples provided at pt's last visit 2/12/25)    Evaluation for EN:  PEG tube placement scheduled for 2/24/25 due to ongoing UWL.  Pt had reported several nutrition-related issues with previous tx (Pluvicto), including nausea and no appetite despite Marinol ordered and dose adjusted.  Treatment was changed to Cabazitaxel recently.      Spoke to pt over the phone.  Current PO intake is minimal (a few bites in 1 setting).  All foods have a severely altered taste and decreased appetite continues.  Discussed proper oral care and provided homemade mouth rinse recipe to help with taste changes.  Per pt diet recall, it is estimated that pt is consuming ~25% of his nutritional intake daily.  Explained role of Oncology RD in managing EN feeds.  Pt denies N/V/D/C at this time.  Pt reports he has diarrhea with milk and notes a lactose intolerance, but has no trouble digesting ice cream or other dairy products.  Pt was agreeable for samples of EN formula to be given to him as a trial to determine best tolerance.      For documentation purposes:    For Peptamen 1.5, pt will need 4 cans daily + 600 mL total FWF daily (or 150 mL 4 X day).  For [employer] Peptide 1.5, pt will need 3 cans daily + 650 mL total FWF daily (or 160-170 mL 4 X day.      Electronically signed by:  Natalya Miller RD  02/18/25 13:48 EST

## 2025-02-18 NOTE — H&P (VIEW-ONLY)
Chief Complaint:  PEG tube consult    Primary Care Provider: Maryse Monae MD    Referring Provider: Joshua Dick MD    History of Present Illness  Semaj Medina is a 69 y.o. male referred by Joshua Dick MD to have a PEG placed.  Patient is known to me as I placed a port on 5/22/23.  The patient has metastatic castration resistant prostate cancer.  The patient has lost quite a bit of weight despite attempts to increase caloric intake, protein supplements, and Marinol.  Gastrostomy tube placement is needed.  No prior abdominal surgeries.    Allergies: Patient has no known allergies.    Outpatient Medications Marked as Taking for the 2/18/25 encounter (Office Visit) with Waldemar Iraheta MD   Medication Sig Dispense Refill    abiraterone acetate (ZYTIGA) 250 MG tablet       abiraterone acetate (ZYTIGA) 500 MG tablet Take 2 tablets by mouth Daily. 60 tablet 11    amLODIPine (NORVASC) 5 MG tablet TAKE 1 TABLET BY MOUTH DAILY 90 tablet 0    Calcium Carbonate-Vitamin D (Calcium Plus Vitamin D) 500-1.25 MG-MCG capsule Take 1 capsule by mouth Daily. 30 capsule 5    dronabinol (Marinol) 5 MG capsule Take 2 capsules by mouth Daily. 60 capsule 1    HYDROcodone-acetaminophen (NORCO)  MG per tablet Take 1.5 tablets by mouth Every 4 (Four) Hours As Needed for Moderate Pain. 270 tablet 0    ibuprofen (ADVIL,MOTRIN) 800 MG tablet TAKE 1 TABLET BY MOUTH EVERY 6 - 8 HOURS AS NEEDED FOR PAIN      leuprolide (Lupron Depot, 3-Month,) 22.5 MG injection 22.5 mg.      lisinopril (PRINIVIL,ZESTRIL) 40 MG tablet TAKE 1 TABLET BY MOUTH DAILY 90 tablet 0    ondansetron (ZOFRAN) 8 MG tablet Take 1 tablet by mouth Every 8 (Eight) Hours As Needed for Nausea or Vomiting. 90 tablet 3    potassium & sodium phosphates (PHOS-NAK) 280-160-250 MG pack packet Take 1 packet by mouth 3 (Three) Times a Day Before Meals. 120 packet 1    potassium chloride (MICRO-K) 10 MEQ CR capsule TAKE 2 CAPSULES BY MOUTH DAILY 180 capsule 0    predniSONE  "(DELTASONE) 5 MG tablet Take 1 tablet by mouth 2 (Two) Times a Day. 60 tablet 11       Past Medical History:    Anemia    NO PROBLEMS    Anxiety    Blood disease    Colon polyps    Depression    Diverticulitis    Forgetfulness    Hepatitis C    RESOLVED    Hypertension    Night sweats    Numbness and tingling of left lower extremity    Prostate cancer        Past Surgical History:    COLONOSCOPY    PROSTATE BIOPSY    PROSTATECTOMY    ROBOT ASSISTED W PELVIC LYMPH NODE DISSECTION    TRANSURETHRAL RESECTION OF BLADDER TUMOR    VENOUS ACCESS DEVICE (PORT) INSERTION    Procedure: INSERTION VENOUS ACCESS PORT;  Surgeon: Waldemar Iraheta MD;  [place] [facility] [place] [facility] [place]  Service: General;  Laterality: Right;       Family History:   Family History   Problem Relation Age of Onset    Cancer Mother     Cancer Father         Social History:  Social History     Tobacco Use    Smoking status: Every Day     Current packs/day: 0.25     Average packs/day: 0.3 packs/day for 54.3 years (13.6 ttl pk-yrs)     Types: Cigarettes     Start date: 10/21/1970     Passive exposure: Past    Smokeless tobacco: Never   Substance Use Topics    Alcohol use: Yes     Alcohol/week: 6.0 standard drinks of alcohol     Types: 6 Cans of beer per week     Comment: 12 pack a week of beer       Objective     Vital Signs:  Resp 16   Ht 176.5 cm (69.5\")   Wt 52.2 kg (115 lb)   BMI 16.74 kg/m²   Respiratory:  breathing not labored, respiratory effort appears normal  Cardiovascular:  heart regular rate  Musculoskeletal: moving all extremities symmetrically and purposefully  Neurologic:  no obvious motor or sensory deficits, speech clear  Here alone      Assessment:  Diagnoses and all orders for this visit:    1. Prostate cancer metastatic to bone (Primary)    2. Unintentional weight loss        Plan:  Percutaneous endoscopic gastrostomy tube placement    Discussion: Indications, options, risks, benefits, and expected outcomes of planned surgery were discussed " with the patient and he agrees to proceed.    Waldemar Iraheta MD  02/18/2025    Electronically signed by Waldemar Iraheta MD, 02/18/25, 11:11 AM EST.

## 2025-02-18 NOTE — PROGRESS NOTES
Chief Complaint:  PEG tube consult    Primary Care Provider: Maryse Monae MD    Referring Provider: Joshua Dick MD    History of Present Illness  Semaj Medina is a 69 y.o. male referred by Joshua Dick MD to have a PEG placed.  Patient is known to me as I placed a port on 5/22/23.  The patient has metastatic castration resistant prostate cancer.  The patient has lost quite a bit of weight despite attempts to increase caloric intake, protein supplements, and Marinol.  Gastrostomy tube placement is needed.  No prior abdominal surgeries.    Allergies: Patient has no known allergies.    Outpatient Medications Marked as Taking for the 2/18/25 encounter (Office Visit) with Waldemar Iraheta MD   Medication Sig Dispense Refill    abiraterone acetate (ZYTIGA) 250 MG tablet       abiraterone acetate (ZYTIGA) 500 MG tablet Take 2 tablets by mouth Daily. 60 tablet 11    amLODIPine (NORVASC) 5 MG tablet TAKE 1 TABLET BY MOUTH DAILY 90 tablet 0    Calcium Carbonate-Vitamin D (Calcium Plus Vitamin D) 500-1.25 MG-MCG capsule Take 1 capsule by mouth Daily. 30 capsule 5    dronabinol (Marinol) 5 MG capsule Take 2 capsules by mouth Daily. 60 capsule 1    HYDROcodone-acetaminophen (NORCO)  MG per tablet Take 1.5 tablets by mouth Every 4 (Four) Hours As Needed for Moderate Pain. 270 tablet 0    ibuprofen (ADVIL,MOTRIN) 800 MG tablet TAKE 1 TABLET BY MOUTH EVERY 6 - 8 HOURS AS NEEDED FOR PAIN      leuprolide (Lupron Depot, 3-Month,) 22.5 MG injection 22.5 mg.      lisinopril (PRINIVIL,ZESTRIL) 40 MG tablet TAKE 1 TABLET BY MOUTH DAILY 90 tablet 0    ondansetron (ZOFRAN) 8 MG tablet Take 1 tablet by mouth Every 8 (Eight) Hours As Needed for Nausea or Vomiting. 90 tablet 3    potassium & sodium phosphates (PHOS-NAK) 280-160-250 MG pack packet Take 1 packet by mouth 3 (Three) Times a Day Before Meals. 120 packet 1    potassium chloride (MICRO-K) 10 MEQ CR capsule TAKE 2 CAPSULES BY MOUTH DAILY 180 capsule 0    predniSONE  "(DELTASONE) 5 MG tablet Take 1 tablet by mouth 2 (Two) Times a Day. 60 tablet 11       Past Medical History:    Anemia    NO PROBLEMS    Anxiety    Blood disease    Colon polyps    Depression    Diverticulitis    Forgetfulness    Hepatitis C    RESOLVED    Hypertension    Night sweats    Numbness and tingling of left lower extremity    Prostate cancer        Past Surgical History:    COLONOSCOPY    PROSTATE BIOPSY    PROSTATECTOMY    ROBOT ASSISTED W PELVIC LYMPH NODE DISSECTION    TRANSURETHRAL RESECTION OF BLADDER TUMOR    VENOUS ACCESS DEVICE (PORT) INSERTION    Procedure: INSERTION VENOUS ACCESS PORT;  Surgeon: Waldemar Iraheta MD;  [place] [facility] [place] [facility] [place];  Service: General;  Laterality: Right;       Family History:   Family History   Problem Relation Age of Onset    Cancer Mother     Cancer Father         Social History:  Social History     Tobacco Use    Smoking status: Every Day     Current packs/day: 0.25     Average packs/day: 0.3 packs/day for 54.3 years (13.6 ttl pk-yrs)     Types: Cigarettes     Start date: 10/21/1970     Passive exposure: Past    Smokeless tobacco: Never   Substance Use Topics    Alcohol use: Yes     Alcohol/week: 6.0 standard drinks of alcohol     Types: 6 Cans of beer per week     Comment: 12 pack a week of beer       Objective     Vital Signs:  Resp 16   Ht 176.5 cm (69.5\")   Wt 52.2 kg (115 lb)   BMI 16.74 kg/m²   Respiratory:  breathing not labored, respiratory effort appears normal  Cardiovascular:  heart regular rate  Musculoskeletal: moving all extremities symmetrically and purposefully  Neurologic:  no obvious motor or sensory deficits, speech clear  Here alone      Assessment:  Diagnoses and all orders for this visit:    1. Prostate cancer metastatic to bone (Primary)    2. Unintentional weight loss        Plan:  Percutaneous endoscopic gastrostomy tube placement    Discussion: Indications, options, risks, benefits, and expected outcomes of planned surgery were discussed " with the patient and he agrees to proceed.    Waldemar Iraheta MD  02/18/2025    Electronically signed by Waldemar Iraheta MD, 02/18/25, 11:11 AM EST.

## 2025-02-20 ENCOUNTER — TELEPHONE (OUTPATIENT)
Dept: ONCOLOGY | Facility: HOSPITAL | Age: 70
End: 2025-02-20
Payer: MEDICARE

## 2025-02-20 RX ORDER — AMLODIPINE BESYLATE 5 MG/1
5 TABLET ORAL DAILY
Qty: 90 TABLET | OUTPATIENT
Start: 2025-02-20

## 2025-02-20 RX ORDER — LISINOPRIL 40 MG/1
40 TABLET ORAL DAILY
Qty: 30 TABLET | Refills: 0 | Status: SHIPPED | OUTPATIENT
Start: 2025-02-20 | End: 2025-02-21

## 2025-02-20 RX ORDER — LISINOPRIL 40 MG/1
40 TABLET ORAL DAILY
Qty: 90 TABLET | OUTPATIENT
Start: 2025-02-20

## 2025-02-20 RX ORDER — AMLODIPINE BESYLATE 5 MG/1
5 TABLET ORAL DAILY
Qty: 30 TABLET | Refills: 0 | Status: SHIPPED | OUTPATIENT
Start: 2025-02-20

## 2025-02-20 NOTE — TELEPHONE ENCOUNTER
Caller: Semaj Medina Osborne    Relationship: Self    Best call back number: [phone]    What is the best time to reach you: ANYTIME    Who are you requesting to speak with (clinical staff, provider,  specific staff member): OFFICE        What was the call regarding: MISSED CALL FROM OFFICE TODAY, NO VOICEMAIL LEFT , RETURNING THE CALL     Is it okay if the provider responds through [employer]: YES

## 2025-02-21 ENCOUNTER — TELEPHONE (OUTPATIENT)
Dept: ONCOLOGY | Facility: HOSPITAL | Age: 70
End: 2025-02-21
Payer: MEDICARE

## 2025-02-21 ENCOUNTER — SPECIALTY PHARMACY (OUTPATIENT)
Dept: PHARMACY | Facility: HOSPITAL | Age: 70
End: 2025-02-21
Payer: MEDICARE

## 2025-02-21 DIAGNOSIS — C79.51 PROSTATE CANCER METASTATIC TO BONE: ICD-10-CM

## 2025-02-21 DIAGNOSIS — C61 PROSTATE CANCER METASTATIC TO BONE: ICD-10-CM

## 2025-02-21 DIAGNOSIS — R63.4 WEIGHT LOSS: Primary | ICD-10-CM

## 2025-02-21 DIAGNOSIS — R63.0 DECREASED APPETITE: ICD-10-CM

## 2025-02-21 NOTE — TELEPHONE ENCOUNTER
Spoke with patient about arrangements for [facility] home [facility] to come out to give instruction on tube feeding and PEG tube care. Patient is getting his tube on Monday morning. He asked us to call his daughter Mis at [phone] if we cannot reach him on Monday due to his being in surgery.

## 2025-02-21 NOTE — TELEPHONE ENCOUNTER
Outpatient Nutrition Oncology Follow Up    Patient Name: Semaj Medina  YOB: 1955  MRN: [id number]      Recommendation(s):   Recreational PO feeds.  Peptamen 1.5 as supplemental EN feeds.  1.) If pt consumes >25% of a standard plate of food 3 times per day, administer 1 can of formula 4 X day.  2.) If PO intake is <25% of meals or no PO intake, administer a total of 5 cans of formula daily.  H20 flushes:    For option 1, give 200 mL of water flush with each feeding 4 X day.    For option 2, give 130 mL water flush 5 X day (or additional 650 mL of FWF daily divided up as preferred).      Reason for Consult:  Scheduled for PEG tube placement 2/24/25    Consult or Intervention:  Oncology RD inquired with [facility] (vendor) if pt's insurance ([employer]) would provide coverage for EN formula / supplies- and they communicated that his insurance will not provide coverage.  Per Medicare's guidelines, they require specific dx's (dysphagia, etc) before they will provide coverage for formula or supplies.  Called pt to communicate this information.  [facility] and [facility].  [facility] and they explained that a community care visit must occur for an evaluation, then the MD can write an order for appropriate supplies if deemed medically necessary.  [facility] pharmacy instructed Oncology RD to send appropriate medical documentation and an order for EN formula / supplies.  All appropriate items were faxed to the [facility] pharmacy.  Called pt to explain and he reports he [facility] of [facility]    Regardless of above, pt wants to proceed with the feeding tube placement Monday 2/24/25.  He reports he has only consumed 1 Boost today and consumed almost 2 Boosts yesterday.  Otherwise he is struggling and knows the  importance of acquiring adequate nutrition. Oncology RD will provide samples of formula and syringes to pt on 2/24, along with instructions on feeds.  Pt understands that if [facility] does not provide coverage, there are other options for obtaining formula (assistance), however at some point he may have to pay out of pocket for these items.    Communicated all of the above information to oncology staff and MD.    Plan: Will follow up per oncology nutrition protocol

## 2025-02-21 NOTE — PRE-PROCEDURE INSTRUCTIONS
PATIENT INSTRUCTED TO BE:    - NOTHING TO EAT AFTER MIDNIGHT OR CHEW, EXCEPT CAN HAVE SIPS OF WATER WITH MEDICATIONS   - TO HOLD ALL VITAMINS, SUPPLEMENTS, NSAIDS FOR ONE WEEK PRIOR TO THEIR SURGICAL PROCEDURE    - DO NOT TAKE __N/A____________________ 7 DAYS PRIOR TO PROCEDURE PER ANESTHESIA RECOMMENDATIONS/INSTRUCTIONS     - INSTRUCTED PT TO USE SURGICAL SOAP 1 TIME THE NIGHT PRIOR TO SURGERY __3-31-33_________ OR THE AM OF SURGERY __8-93-23___________   USE THE SOAP FROM NECK TO TOES, AVOID THEIR FACE, HAIR, AND PRIVATE PARTS. IF USE THE SOAP THE NIGHT PRIOR TO SURGERY, CHANGE BED LINENS AND NO PETS IN THE BED.     INSTRUCTED NO LOTIONS, JEWELRY, PIERCINGS,  NAIL POLISH, OR DEODORANT DAY OF SURGERY    - IF DIABETIC, CHECK BLOOD GLUCOSE IF LESS THAN 70 OR HAVING SYMPTOMS CALL THE PREOP AREA FOR INSTRUCTIONS ON AM OF SURGERY ( [phone])    -INSTRUCTED TO TAKE THE FOLLOWING MEDICATIONS THE DAY OF SURGERY WITH SIPS OF WATER:       ZYTIGA, NORVASC, NORCO PRN, MARINOL, ZOFRAN PRN, PREDNISONE, POTASSIUM       - DO NOT BRING ANY MEDICATIONS WITH YOU TO THE HOSPITAL THE DAY OF SURGERY, EXCEPT IF USE INHALERS. BRING INHALERS DAY OF SURGERY       - BRING CPAP OR BIPAP TO THE HOSPITAL ONLY IF YOU ARE SPENDING THE NIGHT    - DO NOT SMOKE OR VAPE 24 HOURS PRIOR TO PROCEDURE PER ANESTHESIA REQUEST     -MAKE SURE YOU HAVE A RIDE HOME OR SOMEONE TO STAY WITH YOU THE DAY OF THE PROCEDURE AFTER YOU GO HOME     - FOLLOW ANY OTHER INSTRUCTIONS GIVEN TO YOU BY YOUR SURGEON'S OFFICE.     - DAY OF SURGERY ____6-33-79________, COME TO [facility] ([facility]  PARK IN THE OPEN LOT, COME TO [facility]/ [facility], FIRST FLOOR, CHECK IN AT THE DESK FOR REGISTRATION/ SURGERY      - YOU WILL RECEIVE A PHONE CALL THE DAY PRIOR TO SURGERY BETWEEN 1PM AND 4 PM WITH ARRIVAL TIME, IF YOUR SURGERY IS ON A MONDAY YOU WILL RECEIVE A CALL THE FRIDAY PRIOR TO SURGERY DATE    - BRING CASH OR CREDIT CARD FOR COPAYMENT OF MEDICATIONS  AFTER SURGERY IF YOU USE THE HOSPITAL PHARMACY (MED[facility] TO BED)    - PREADMISSION TESTING NURSE ZEE OLIVARES -[phone] IF HAVE ANY QUESTIONS     -PATIENT PROVIDED THE NUMBER FOR PREOP SURGICAL DEPT IF HAD QUESTIONS AFTER HOURS PRIOR TO SURGERY ( [phone]).  INFORMED PT IF NO ANSWER, LEAVE A MESSAGE AND SOMEONE WILL RETURN THEIR CALL       PATIENT VERBALIZED UNDERSTANDING

## 2025-02-22 ENCOUNTER — ANESTHESIA EVENT (OUTPATIENT)
Dept: PERIOP | Facility: HOSPITAL | Age: 70
End: 2025-02-22
Payer: MEDICARE

## 2025-02-24 ENCOUNTER — HOSPITAL ENCOUNTER (OUTPATIENT)
Facility: HOSPITAL | Age: 70
Setting detail: HOSPITAL OUTPATIENT SURGERY
Discharge: HOME | End: 2025-02-24
Attending: SURGERY | Admitting: SURGERY
Payer: MEDICARE

## 2025-02-24 ENCOUNTER — DOCUMENTATION (OUTPATIENT)
Dept: ONCOLOGY | Facility: HOSPITAL | Age: 70
End: 2025-02-24
Payer: MEDICARE

## 2025-02-24 ENCOUNTER — APPOINTMENT (OUTPATIENT)
Dept: ONCOLOGY | Facility: HOSPITAL | Age: 70
End: 2025-02-24
Payer: MEDICARE

## 2025-02-24 ENCOUNTER — ANESTHESIA (OUTPATIENT)
Dept: PERIOP | Facility: HOSPITAL | Age: 70
End: 2025-02-24
Payer: MEDICARE

## 2025-02-24 VITALS
RESPIRATION RATE: 15 BRPM | HEIGHT: 70 IN | WEIGHT: 109.79 LBS | SYSTOLIC BLOOD PRESSURE: 101 MMHG | BODY MASS INDEX: 15.72 KG/M2 | OXYGEN SATURATION: 100 % | HEART RATE: 55 BPM | TEMPERATURE: 97 F | DIASTOLIC BLOOD PRESSURE: 65 MMHG

## 2025-02-24 DIAGNOSIS — C79.51 PROSTATE CANCER METASTATIC TO BONE: ICD-10-CM

## 2025-02-24 DIAGNOSIS — R63.4 UNINTENTIONAL WEIGHT LOSS: ICD-10-CM

## 2025-02-24 DIAGNOSIS — C61 PROSTATE CANCER METASTATIC TO BONE: ICD-10-CM

## 2025-02-24 LAB
QT INTERVAL: 447 MS
QTC INTERVAL: 443 MS

## 2025-02-24 PROCEDURE — 25810000003 LACTATED RINGERS PER 1000 ML: Performed by: ANESTHESIOLOGY

## 2025-02-24 PROCEDURE — 25010000002 LIDOCAINE PF 1% 1 % SOLUTION: Performed by: SURGERY

## 2025-02-24 PROCEDURE — 25010000002 MIDAZOLAM PER 1MG: Performed by: ANESTHESIOLOGY

## 2025-02-24 PROCEDURE — 25010000002 PROPOFOL 10 MG/ML EMULSION: Performed by: NURSE ANESTHETIST, CERTIFIED REGISTERED

## 2025-02-24 PROCEDURE — 43239 EGD BIOPSY SINGLE/MULTIPLE: CPT | Performed by: SURGERY

## 2025-02-24 PROCEDURE — 25010000002 LIDOCAINE PF 2% 2 % SOLUTION: Performed by: NURSE ANESTHETIST, CERTIFIED REGISTERED

## 2025-02-24 PROCEDURE — 88305 TISSUE EXAM BY PATHOLOGIST: CPT | Performed by: SURGERY

## 2025-02-24 PROCEDURE — 43246 EGD PLACE GASTROSTOMY TUBE: CPT | Performed by: SURGERY

## 2025-02-24 PROCEDURE — 93005 ELECTROCARDIOGRAM TRACING: CPT | Performed by: ANESTHESIOLOGY

## 2025-02-24 PROCEDURE — 25010000002 CEFAZOLIN PER 500 MG: Performed by: SURGERY

## 2025-02-24 RX ORDER — MEPERIDINE HYDROCHLORIDE 25 MG/ML
12.5 INJECTION INTRAMUSCULAR; INTRAVENOUS; SUBCUTANEOUS
Status: DISCONTINUED | OUTPATIENT
Start: 2025-02-24 | End: 2025-02-24 | Stop reason: HOSPADM

## 2025-02-24 RX ORDER — LIDOCAINE HYDROCHLORIDE 20 MG/ML
INJECTION, SOLUTION EPIDURAL; INFILTRATION; INTRACAUDAL; PERINEURAL AS NEEDED
Status: DISCONTINUED | OUTPATIENT
Start: 2025-02-24 | End: 2025-02-24 | Stop reason: SURG

## 2025-02-24 RX ORDER — LIDOCAINE HYDROCHLORIDE 10 MG/ML
INJECTION, SOLUTION EPIDURAL; INFILTRATION; INTRACAUDAL; PERINEURAL AS NEEDED
Status: DISCONTINUED | OUTPATIENT
Start: 2025-02-24 | End: 2025-02-24 | Stop reason: HOSPADM

## 2025-02-24 RX ORDER — PROPOFOL 10 MG/ML
VIAL (ML) INTRAVENOUS AS NEEDED
Status: DISCONTINUED | OUTPATIENT
Start: 2025-02-24 | End: 2025-02-24 | Stop reason: SURG

## 2025-02-24 RX ORDER — MIDAZOLAM HYDROCHLORIDE 2 MG/2ML
1 INJECTION, SOLUTION INTRAMUSCULAR; INTRAVENOUS ONCE
Status: COMPLETED | OUTPATIENT
Start: 2025-02-24 | End: 2025-02-24

## 2025-02-24 RX ORDER — OXYCODONE HYDROCHLORIDE 5 MG/1
5 TABLET ORAL
Status: DISCONTINUED | OUTPATIENT
Start: 2025-02-24 | End: 2025-02-24 | Stop reason: HOSPADM

## 2025-02-24 RX ORDER — PROMETHAZINE HYDROCHLORIDE 25 MG/1
25 SUPPOSITORY RECTAL ONCE AS NEEDED
Status: DISCONTINUED | OUTPATIENT
Start: 2025-02-24 | End: 2025-02-24 | Stop reason: HOSPADM

## 2025-02-24 RX ORDER — ONDANSETRON 2 MG/ML
4 INJECTION INTRAMUSCULAR; INTRAVENOUS ONCE AS NEEDED
Status: DISCONTINUED | OUTPATIENT
Start: 2025-02-24 | End: 2025-02-24 | Stop reason: HOSPADM

## 2025-02-24 RX ORDER — PROMETHAZINE HYDROCHLORIDE 12.5 MG/1
25 TABLET ORAL ONCE AS NEEDED
Status: DISCONTINUED | OUTPATIENT
Start: 2025-02-24 | End: 2025-02-24 | Stop reason: HOSPADM

## 2025-02-24 RX ORDER — SODIUM CHLORIDE, SODIUM LACTATE, POTASSIUM CHLORIDE, CALCIUM CHLORIDE 600; 310; 30; 20 MG/100ML; MG/100ML; MG/100ML; MG/100ML
9 INJECTION, SOLUTION INTRAVENOUS CONTINUOUS PRN
Status: DISCONTINUED | OUTPATIENT
Start: 2025-02-24 | End: 2025-02-24 | Stop reason: HOSPADM

## 2025-02-24 RX ORDER — ACETAMINOPHEN 500 MG
1000 TABLET ORAL ONCE
Status: COMPLETED | OUTPATIENT
Start: 2025-02-24 | End: 2025-02-24

## 2025-02-24 RX ORDER — HYDROCODONE BITARTRATE AND ACETAMINOPHEN 5; 325 MG/1; MG/1
1 TABLET ORAL EVERY 4 HOURS PRN
Qty: 6 TABLET | Refills: 0 | Status: SHIPPED | OUTPATIENT
Start: 2025-02-24

## 2025-02-24 RX ADMIN — PROPOFOL 175 MCG/KG/MIN: 10 INJECTION, EMULSION INTRAVENOUS at 10:49

## 2025-02-24 RX ADMIN — SODIUM CHLORIDE 2000 MG: 9 INJECTION, SOLUTION INTRAVENOUS at 10:50

## 2025-02-24 RX ADMIN — SODIUM CHLORIDE, SODIUM LACTATE, POTASSIUM CHLORIDE, CALCIUM CHLORIDE 9 ML/HR: 20; 30; 600; 310 INJECTION, SOLUTION INTRAVENOUS at 09:32

## 2025-02-24 RX ADMIN — ACETAMINOPHEN 1000 MG: 500 TABLET ORAL at 09:32

## 2025-02-24 RX ADMIN — LIDOCAINE HYDROCHLORIDE 60 MG: 20 INJECTION, SOLUTION EPIDURAL; INFILTRATION; INTRACAUDAL; PERINEURAL at 10:48

## 2025-02-24 RX ADMIN — PROPOFOL 60 MG: 10 INJECTION, EMULSION INTRAVENOUS at 10:48

## 2025-02-24 RX ADMIN — MIDAZOLAM HYDROCHLORIDE 1 MG: 1 INJECTION, SOLUTION INTRAMUSCULAR; INTRAVENOUS at 10:22

## 2025-02-24 NOTE — OP NOTE
Operative Report    Patient Name:  Semaj Medina  YOB: 1955    Date of Surgery:  2/24/2025     Pre-op Diagnosis:   Prostate cancer metastatic to bone [C61, C79.51]  Unintentional weight loss [R63.4]       Post-op Diagnosis:   Post-Op Diagnosis Codes:     * Prostate cancer metastatic to bone [C61, C79.51]     * Unintentional weight loss [R63.4]    Procedure:  PERCUTANEOUS ENDOSCOPIC GASTROSTOMY TUBE INSERTION  COLD FORCEP BIOPSIES OF PREPYLORIC MUCOSAL ABNORMALITY    Staff:  Surgeon(s):  Waldemar Iraheta MD    Assistant:  OR nursing staff    Anesthesia: General    Estimated Blood Loss: minimal    Complications:  None    Drains:  None    Packing:  None    Implants:  None    Specimen:          Specimens       ID Source Type Tests Collected By Collected At Frozen?    A Gastric, Body Tissue TISSUE PATHOLOGY EXAM   Waldemar Iraheta MD 2/24/25 1056     Description: gastric mucosal abnormality            Indications:  See my preop H&P note.     Findings:  20 Russian button type gastrostomy tube placed with top of skin bolster located at about the 3.5 cm vic on the tube.  The mucosa at the prepyloric area was thematous and the gastric folds were enlarged and there was a possible mucosal mass.  Multiple biopsies were taken with cold forceps.  I will have the hospital staff scan photos taken into the media section of the electronic medical record.  Will repeat upper endoscopy in 6 months unless indicated otherwise based on pathology result.      Addendum, 2/26/25:  Pathology showed hyperplastic polyp.  Will change repeat EGD from 6 months to 1 year.    Description of Procedure: Patient was taken to the operating room and placed supine on the operating table.  Timeout verification was performed. MAC anesthesia was administered.  A gastroscope was advanced into the oropharynx and down the esophagus into the stomach. The scope was advanced through the pylorus and the duodenum was visualized.  The scope was  then pulled back into the stomach.  The mucosa at the prepyloric area was abnormal appearing with very enlarged and erythematous gastric folds and a possible mucosal mass.  Multiple biopsies were taken with cold forceps.  Minimal blood loss from the biopsies.  The stomach was then transilluminated and an optimal position for the PEG tube was identified using the single finger palpation. The skin was infiltrated with local and the needle and sheath were inserted through the abdomen into the stomach under direct visualization. The needle was removed and a guidewire was inserted through the sheath. The guidewire was grasped from above with a snare.  It was removed completely and the Ponsky PEG tube was secured to the guidewire.  The guidewire and PEG tube were then pulled through the mouth and esophagus and snug to the abdominal wall. The skin bolster was placed on the PEG and was positioned with the top of the bolster at about the 3.5 cm vic on the tube.  An appropriate dressing was placed.  No complications.  Sponge needle and instrument counts were correct.  Patient was transported to the recovery area in stable condition.      Waldemar Iraheta MD     Date: 2/24/2025  Time: 11:12 EST    Electronically signed by Waldemar Iraheta MD, 02/24/25, 11:12 AM EST.

## 2025-02-24 NOTE — ANESTHESIA POSTPROCEDURE EVALUATION
Patient: Semaj Medina    Procedure Summary       Date: 02/24/25 Room / Location: [facility]  /  [facility]    Anesthesia Start: 1045 Anesthesia Stop: 1105    Procedure: PERCUTANEOUS ENDOSCOPIC GASTROSTOMY TUBE INSERTION and biopsy (Abdomen) Diagnosis:       Prostate cancer metastatic to bone      Unintentional weight loss      (Prostate cancer metastatic to bone [C61, C79.51])      (Unintentional weight loss [R63.4])    Surgeons: Waldemar Iraheta MD Provider: Chester Barton MD    Anesthesia Type: general, MAC ASA Status: 3            Anesthesia Type: general, MAC    Vitals  Vitals Value Taken Time   /65 02/24/25 1145   Temp 36.1 °C (97 °F) 02/24/25 1104   Pulse 53 02/24/25 1145   Resp 15 02/24/25 1145   SpO2 100 % 02/24/25 1145           Post Anesthesia Care and Evaluation    Patient location during evaluation: bedside  Patient participation: complete - patient participated  Level of consciousness: awake  Pain management: adequate    Airway patency: patent  PONV Status: none  Cardiovascular status: acceptable and stable  Respiratory status: acceptable  Hydration status: acceptable

## 2025-02-24 NOTE — DISCHARGE INSTRUCTIONS
"  Dr. Iraheta's Instructions       DIET  Resume your normal diet.        HOME MEDICATIONS  Resume all of your normal home medications.       PAIN CONTROL    You will receive a narcotic pain medicine prescription before you are discharged home.   Do not drive while you are taking the prescription pain medication.  Take Tylenol or Motrin for mild pain.       FEEDING TUBE  The tube can be used for tube feeding and medicines beginning tomorrow morning.   You can flush the tube with water beginning now.  The tube should be flushed with about 60 ml of water every 12 hours and also just before and after each time the tube is used to give medicines or tube feeds.  Sit upright or at least a 45 degree angle during tube feeding and for one hour following the end of the feeding.     You can remove the dressing and take showers 48 hours after the tube was placed.   To remove drainage, crusts, or blood from the skin around the tube, use a solution of half hydrogen peroxide and half water.  Clean underneath the skin disk with cotton tip applicator.  After you use the hydrogen peroxide-half water solution, clean the area with antibacterial soap and water.  Cleanse in a spiral pattern, beginning at the tube and moving outward.  Rinse & pat dry.  Some drainage around the site is normal.  It is important to keep your skin around the tube clean and dry.     THE TOP OF THE SKIN DISK ON THE TUBE SHOULD REMAIN AT ABOUT THE 3.5 CM KIRSTEN.  CHECK THE TUBE EACH DAY TO MAKE SURE THE TOP OF THE SKIN DISK REMAINS AT ABOUT THE 3.5 CM KIRSTEN.    Gentle manipulations of tube are not harmful, but take care not to push or pull the tube out of position.       Avoid a dressing in most cases as dressings may promote skin breakdown and infection.  If a dressing is used, change it immediately if it becomes wet.  Secure the tube underneath your clothing with tape or with \"stretch netting\" to prevent movement.  Excess tension where the tube exits the skin can " cause damage to the stomach lining or enlarge the opening in the stomach.  Make sure the tube does not lay flat against the skin all the time.     I recommend you purchase a gastrostomy tube belt from [employer].  They work very well.      FOLLOW-UP VISIT & QUESTIONS/CONCERNS  Call Dr. Iraheta´s office at [phone] and schedule a follow-up appointment for about 2 weeks after your surgery date.  However, if you are doing fine & don´t have any concerns then simply cancel the follow-up appointment.  The appointment is not mandatory.  Contact Dr. Iraheta immediately or go to the emergency room if your tube falls out.   The tube must be replaced within a few hours or the opening leading to your stomach may close.  If the tube does fall out, bring it with you to the emergency room or the doctor's office and cover the skin opening with a dry gauze or cloth.     You will need to get a follow-up upper scope in 6 months.  My office staff will contact you when it is time to have that done.

## 2025-02-24 NOTE — PROGRESS NOTES
Outpatient Nutrition Oncology Follow Up    Patient Name: Semaj Medina  YOB: 1955  MRN: [id number]      Consult or Intervention:   Pt received PEG tube today.  Provided DA with samples of Peptamen 1.5 and syringes.  Provided written explanation on how many cans to give depending on pt's PO intake.  Oncology RD business card also provided.    Plan: Will follow up per oncology nutrition protocol

## 2025-02-24 NOTE — ANESTHESIA PREPROCEDURE EVALUATION
Anesthesia Evaluation     Patient summary reviewed and Nursing notes reviewed   no history of anesthetic complications:   NPO Solid Status: > 8 hours  NPO Liquid Status: > 2 hours           Airway   Mallampati: II  TM distance: >3 FB  Neck ROM: full  No difficulty expected  Dental    (+) edentulous    Pulmonary - normal exam    breath sounds clear to auscultation  (+) a smoker (0.5 ppd) Current,  Cardiovascular - normal exam  Exercise tolerance: good (4-7 METS)    ECG reviewed  Rhythm: regular  Rate: normal    (+) hypertension, hyperlipidemia      Neuro/Psych- negative ROS  GI/Hepatic/Renal/Endo    (+) hepatitis C, liver disease    Musculoskeletal (-) negative ROS    Abdominal    Substance History - negative use     OB/GYN negative ob/gyn ROS         Other   blood dyscrasia,   history of cancer (prostate with mets to bones)    ROS/Med Hx Other: PAT Nursing Notes unavailable.    EKG SR, first degree block                  Anesthesia Plan    ASA 3     general and MAC     (Patient understands anesthesia not responsible for dental damage.)  intravenous induction     Anesthetic plan, risks, benefits, and alternatives have been provided, discussed and informed consent has been obtained with: patient.    Use of blood products discussed with patient .    Plan discussed with CRNA.    CODE STATUS:

## 2025-02-25 ENCOUNTER — TELEPHONE (OUTPATIENT)
Dept: ONCOLOGY | Facility: HOSPITAL | Age: 70
End: 2025-02-25
Payer: MEDICARE

## 2025-02-25 NOTE — TELEPHONE ENCOUNTER
Prophylactic Placement    - Did you receive education and teaching from the post-op staff? N/A  -  - Were you made aware you need to flush your PEG tube with 60ml BID while not in use? N/A  -   - Did you receive an irrigation tray/kit for these water flushes? N/A  -  - Do you feel comfortable and confident in providing your PEG tube care at this time? N/A  -  - Do you need additional education and support arranged through home health? N/A  -  - if yes, do you have a preferred home health agency?      Immediate Use Placement       - Did you receive education and teaching from the post-op staff? N/A  -  - Did you receive an irrigation tray/kit for these water flushes? N/A  -  - Has home health been in contact with you to provide additional education and support? N/A  -  - Has the Oncology RD been in contact with you to discuss enteral nutrition recommendations? N/A  -  - Has the nutrition vendor been in contact with you to deliver enteral nutrition and supplies? N/A    To not cause confusion this nurse did not call the pt. The Med ONC Dietician is currently working with the pt and trying to get tube feed formula approved. The pt is also being set up with .   -

## 2025-02-25 NOTE — TELEPHONE ENCOUNTER
Caller: [facility]    Relationship:     Best call back number: [phone]    Do you know the name of the person who called: [employer]    What was the call regarding: NEED FOR YOU SEND A RSF FORM OVER REGARDING HOME HEALTH GIVING PATIENT THE PEPTAMINE AND ANY SUPPLIES NEEDED.     FAX THE RSF FORM [phone]

## 2025-02-25 NOTE — TELEPHONE ENCOUNTER
Outpatient Nutrition Oncology Telephone Encounter    Patient Name: Semaj Medina  YOB: 1955  MRN: [id number]      Consult or Intervention:  Received a phone call from pt asking if [facility] has given [facility] more information.  Explained there has been no further communication from [facility].  Pt made a 2nd phone call today due to [facility] is requesting further documentation and an order.  All medical documentation and EN order was faxed successfully on 2/21/25.  Faxed the information again, according to pt's phone call information and fax number provided.  Also called number left with hub- left VM requesting a call back for clarification.    Plan: Will follow up per oncology nutrition protocol      Addendum 2/25/25:  [facility]ed [facility] at phone# given during previous phone encounter to the hub.  Staff answered and stated they are waiting for the home health referral from our facility- then they can begin the process of setting pt up with home health services.

## 2025-02-26 LAB
CYTO UR: NORMAL
LAB AP CASE REPORT: NORMAL
LAB AP CLINICAL INFORMATION: NORMAL
PATH REPORT.FINAL DX SPEC: NORMAL
PATH REPORT.GROSS SPEC: NORMAL

## 2025-03-04 ENCOUNTER — TELEPHONE (OUTPATIENT)
Dept: ONCOLOGY | Facility: HOSPITAL | Age: 70
End: 2025-03-04
Payer: MEDICARE

## 2025-03-04 NOTE — TELEPHONE ENCOUNTER
Outpatient Nutrition Oncology Follow Up    Patient Name: Semaj Medina  YOB: 1955  MRN: [id number]      Reason for Consult:  F/U to check on EN vendor / supplies    Consult or Intervention:  Called pt to check on status of [facility] supplying pt's formula and tube feeding supplies.  Pt reports [facility] was able to manage the situation and he received several boxes of supplies and formulas today.  Pt had no further concerns or questions, but encouraged pt to reach out to Oncology RD for any future concerns.    Oncology RD remains available per protocol.

## 2025-03-05 ENCOUNTER — HOSPITAL ENCOUNTER (OUTPATIENT)
Dept: ONCOLOGY | Facility: HOSPITAL | Age: 70
Discharge: HOME | End: 2025-03-05
Payer: MEDICARE

## 2025-03-05 ENCOUNTER — OFFICE VISIT (OUTPATIENT)
Dept: ONCOLOGY | Facility: HOSPITAL | Age: 70
End: 2025-03-05
Payer: MEDICARE

## 2025-03-05 VITALS
BODY MASS INDEX: 15.98 KG/M2 | DIASTOLIC BLOOD PRESSURE: 66 MMHG | HEIGHT: 69 IN | TEMPERATURE: 98.6 F | WEIGHT: 107.9 LBS | HEART RATE: 91 BPM | RESPIRATION RATE: 18 BRPM | SYSTOLIC BLOOD PRESSURE: 98 MMHG | OXYGEN SATURATION: 98 %

## 2025-03-05 VITALS
SYSTOLIC BLOOD PRESSURE: 98 MMHG | HEIGHT: 69 IN | TEMPERATURE: 98.6 F | HEART RATE: 91 BPM | DIASTOLIC BLOOD PRESSURE: 66 MMHG | WEIGHT: 107.81 LBS | BODY MASS INDEX: 15.97 KG/M2 | OXYGEN SATURATION: 98 % | RESPIRATION RATE: 18 BRPM

## 2025-03-05 DIAGNOSIS — C61 PROSTATE CANCER METASTATIC TO BONE: Primary | ICD-10-CM

## 2025-03-05 DIAGNOSIS — C79.51 PROSTATE CANCER METASTATIC TO BONE: Primary | ICD-10-CM

## 2025-03-05 DIAGNOSIS — C61 PROSTATE CANCER: ICD-10-CM

## 2025-03-05 LAB
ALBUMIN SERPL-MCNC: 3.6 G/DL (ref 3.5–5.2)
ALBUMIN/GLOB SERPL: 1.1 G/DL
ALP SERPL-CCNC: 305 U/L (ref 39–117)
ALT SERPL W P-5'-P-CCNC: 9 U/L (ref 1–41)
ANION GAP SERPL CALCULATED.3IONS-SCNC: 10.2 MMOL/L (ref 5–15)
AST SERPL-CCNC: 19 U/L (ref 1–40)
BASOPHILS # BLD AUTO: 0.03 10*3/MM3 (ref 0–0.2)
BASOPHILS NFR BLD AUTO: 0.8 % (ref 0–1.5)
BILIRUB SERPL-MCNC: 0.2 MG/DL (ref 0–1.2)
BUN SERPL-MCNC: 25 MG/DL (ref 8–23)
BUN/CREAT SERPL: 22.1 (ref 7–25)
CALCIUM SPEC-SCNC: 8.2 MG/DL (ref 8.6–10.5)
CHLORIDE SERPL-SCNC: 106 MMOL/L (ref 98–107)
CO2 SERPL-SCNC: 18.8 MMOL/L (ref 22–29)
CREAT SERPL-MCNC: 1.13 MG/DL (ref 0.76–1.27)
DEPRECATED RDW RBC AUTO: 45.4 FL (ref 37–54)
EGFRCR SERPLBLD CKD-EPI 2021: 70.4 ML/MIN/1.73
EOSINOPHIL # BLD AUTO: 0.01 10*3/MM3 (ref 0–0.4)
EOSINOPHIL NFR BLD AUTO: 0.3 % (ref 0.3–6.2)
ERYTHROCYTE [DISTWIDTH] IN BLOOD BY AUTOMATED COUNT: 14.4 % (ref 12.3–15.4)
GLOBULIN UR ELPH-MCNC: 3.3 GM/DL
GLUCOSE SERPL-MCNC: 156 MG/DL (ref 65–99)
HCT VFR BLD AUTO: 28.4 % (ref 37.5–51)
HGB BLD-MCNC: 9.5 G/DL (ref 13–17.7)
IMM GRANULOCYTES # BLD AUTO: 0.08 10*3/MM3 (ref 0–0.05)
IMM GRANULOCYTES NFR BLD AUTO: 2.1 % (ref 0–0.5)
LYMPHOCYTES # BLD AUTO: 1.13 10*3/MM3 (ref 0.7–3.1)
LYMPHOCYTES NFR BLD AUTO: 30.2 % (ref 19.6–45.3)
MCH RBC QN AUTO: 29.2 PG (ref 26.6–33)
MCHC RBC AUTO-ENTMCNC: 33.5 G/DL (ref 31.5–35.7)
MCV RBC AUTO: 87.4 FL (ref 79–97)
MONOCYTES # BLD AUTO: 0.48 10*3/MM3 (ref 0.1–0.9)
MONOCYTES NFR BLD AUTO: 12.8 % (ref 5–12)
NEUTROPHILS NFR BLD AUTO: 2.01 10*3/MM3 (ref 1.7–7)
NEUTROPHILS NFR BLD AUTO: 53.8 % (ref 42.7–76)
NRBC BLD AUTO-RTO: 0 /100 WBC (ref 0–0.2)
PLATELET # BLD AUTO: 252 10*3/MM3 (ref 140–450)
PMV BLD AUTO: 9.3 FL (ref 6–12)
POTASSIUM SERPL-SCNC: 4.9 MMOL/L (ref 3.5–5.2)
PROT SERPL-MCNC: 6.9 G/DL (ref 6–8.5)
PSA SERPL-MCNC: 688 NG/ML (ref 0–4)
RBC # BLD AUTO: 3.25 10*6/MM3 (ref 4.14–5.8)
SODIUM SERPL-SCNC: 135 MMOL/L (ref 136–145)
WBC NRBC COR # BLD AUTO: 3.74 10*3/MM3 (ref 3.4–10.8)

## 2025-03-05 PROCEDURE — 84153 ASSAY OF PSA TOTAL: CPT | Performed by: INTERNAL MEDICINE

## 2025-03-05 PROCEDURE — 80053 COMPREHEN METABOLIC PANEL: CPT | Performed by: INTERNAL MEDICINE

## 2025-03-05 PROCEDURE — 25010000002 DIPHENHYDRAMINE PER 50 MG: Performed by: INTERNAL MEDICINE

## 2025-03-05 PROCEDURE — 25810000003 SODIUM CHLORIDE 0.9 % SOLUTION 250 ML FLEX CONT: Performed by: INTERNAL MEDICINE

## 2025-03-05 PROCEDURE — 25010000002 CABAZITAXEL 10 MG/1ML SOLUTION 6 ML VIAL: Performed by: INTERNAL MEDICINE

## 2025-03-05 PROCEDURE — 25810000003 SODIUM CHLORIDE 0.9 % SOLUTION: Performed by: INTERNAL MEDICINE

## 2025-03-05 PROCEDURE — 25010000002 DEXAMETHASONE SODIUM PHOSPHATE 120 MG/30ML SOLUTION: Performed by: INTERNAL MEDICINE

## 2025-03-05 PROCEDURE — 96413 CHEMO IV INFUSION 1 HR: CPT

## 2025-03-05 PROCEDURE — 96375 TX/PRO/DX INJ NEW DRUG ADDON: CPT

## 2025-03-05 PROCEDURE — 85025 COMPLETE CBC W/AUTO DIFF WBC: CPT | Performed by: INTERNAL MEDICINE

## 2025-03-05 PROCEDURE — 25010000002 HEPARIN LOCK FLUSH PER 10 UNITS: Performed by: INTERNAL MEDICINE

## 2025-03-05 RX ORDER — HEPARIN SODIUM (PORCINE) LOCK FLUSH IV SOLN 100 UNIT/ML 100 UNIT/ML
500 SOLUTION INTRAVENOUS AS NEEDED
OUTPATIENT
Start: 2025-03-05

## 2025-03-05 RX ORDER — SODIUM CHLORIDE 9 MG/ML
20 INJECTION, SOLUTION INTRAVENOUS ONCE
Status: COMPLETED | OUTPATIENT
Start: 2025-03-05 | End: 2025-03-05

## 2025-03-05 RX ORDER — FAMOTIDINE 10 MG/ML
20 INJECTION, SOLUTION INTRAVENOUS ONCE
Status: COMPLETED | OUTPATIENT
Start: 2025-03-05 | End: 2025-03-05

## 2025-03-05 RX ORDER — SODIUM CHLORIDE 9 MG/ML
20 INJECTION, SOLUTION INTRAVENOUS ONCE
Status: CANCELLED | OUTPATIENT
Start: 2025-03-05

## 2025-03-05 RX ORDER — SODIUM CHLORIDE 0.9 % (FLUSH) 0.9 %
20 SYRINGE (ML) INJECTION AS NEEDED
Status: DISCONTINUED | OUTPATIENT
Start: 2025-03-05 | End: 2025-03-06 | Stop reason: HOSPADM

## 2025-03-05 RX ORDER — SODIUM CHLORIDE 0.9 % (FLUSH) 0.9 %
20 SYRINGE (ML) INJECTION AS NEEDED
OUTPATIENT
Start: 2025-03-05

## 2025-03-05 RX ORDER — FAMOTIDINE 10 MG/ML
20 INJECTION, SOLUTION INTRAVENOUS ONCE
Status: CANCELLED | OUTPATIENT
Start: 2025-03-05

## 2025-03-05 RX ORDER — HEPARIN SODIUM (PORCINE) LOCK FLUSH IV SOLN 100 UNIT/ML 100 UNIT/ML
500 SOLUTION INTRAVENOUS AS NEEDED
Status: DISCONTINUED | OUTPATIENT
Start: 2025-03-05 | End: 2025-03-06 | Stop reason: HOSPADM

## 2025-03-05 RX ADMIN — DEXAMETHASONE SODIUM PHOSPHATE 12 MG: 4 INJECTION, SOLUTION INTRA-ARTICULAR; INTRALESIONAL; INTRAMUSCULAR; INTRAVENOUS; SOFT TISSUE at 10:49

## 2025-03-05 RX ADMIN — FAMOTIDINE 20 MG: 10 INJECTION INTRAVENOUS at 11:18

## 2025-03-05 RX ADMIN — DIPHENHYDRAMINE HYDROCHLORIDE 25 MG: 50 INJECTION INTRAMUSCULAR; INTRAVENOUS at 11:03

## 2025-03-05 RX ADMIN — SODIUM CHLORIDE 20 ML/HR: 9 INJECTION, SOLUTION INTRAVENOUS at 10:47

## 2025-03-05 RX ADMIN — HEPARIN 500 UNITS: 100 SYRINGE at 12:57

## 2025-03-05 RX ADMIN — SODIUM CHLORIDE 34 MG: 0.9 INJECTION, SOLUTION INTRAVENOUS at 11:45

## 2025-03-05 RX ADMIN — Medication 20 ML: at 12:57

## 2025-03-05 NOTE — PROGRESS NOTES
Chief Complaint/Care Team   Prostate Cancer    Maryse Monae MD Coffie, Ramona N, MD    History of Present Illness     Diagnosis: Metastatic Castration Resistant Prostate Cancer    Current Treatment: Lupron, Zytiga and Prednisone, Cabazitaxel    Previous Treatment:   Metastatic Castration resistant prostate cancer:    Patient was initially diagnosed with prostate cancer in 2015.  On 6/4/2015 he underwent radical prostatectomy and LN dissection which showed a [place] 4+3 = 7 prostate cancer.  There were several high risk features such as extraprostatic extension, seminal vesicle invasion, positive margins at the bladder neck and right and left seminal vesicle.  Lymph vascular invasion was indeterminate.  Perineural invasion was not commented on. Final pathology [id number] R1.  He was treated with adjuvant radiation by Dr. Stanton.    According to records the patient likely started Lupron in December 2017 when his PSA started to rise.  The patient took a break in August 2019.  He states he was not aware he was to continue.  He restarted Lupron on 11/26/2019 after it was noted that his PSA chago from 0.46 up to 2.55 on 11/8/2019.  Unfortunately his PSA continued to rise after that to 4.54 on 3/10/2020.    - restarted Lupron on 11/26/20  - Started Zytiga on 8/27/20.  8/31/2020 PSA 17.95     - Started Xtandi in May 2021 due to intolerance of Zytiga even at the lowest dose  - PSA rising in April 2023 to 2.85, doubling time 3.4 months  - started Docetaxel May 2023  -Pluvicto at [facility] which began in  8/1/2024- stopped in 2/2025 due to increase in PSA    Semaj Medina is a 69 y.o. male who presents to [facility] HEMATOLOGY & ONCOLOGY for Metastatic Castration Resistant Prostate Cancer.    History of Present Illness    The patient was previously evaluated by Dr. Salazar, a [place] [facility] who shared [place] was not a candidate for [place] 223. However, he would be a suitable candidate for  Pluvicto, he has referred him to nuclear medicine specialist at [facility]. A consultation with a nuclear medicine specialist was scheduled for evaluation for Pluvicto. His current medication regimen includes Zytiga and prednisone.      Patient reports experiencing back and neck pain, which subsides upon administration of his pain medication. He typically takes his pain medication every 4 hours. He is here for evaluation prior to next Xgeva treatment.  He began Pluvicto treatment on August 1, 2024, given every 6 weeks, but was stopped in 2/2025 due to progression of disease. Pt here with his dtr prior to next cycle of cabazitaxel. Pt continues to lose weight despite his attempts to increase caloric intake and with increased dose of marinol.  Patient had feeding tube placed after last clinic appointment, he is only tolerating 3 cans of feeding,  he continues follow-up with dietitian.       Review of Systems   Constitutional:  Positive for appetite change, fatigue and unexpected weight loss.   Musculoskeletal:  Positive for back pain (Neck pain).        Oncology/Hematology History Overview Note     Metastatic Castration resistant prostate cancer:    Patient was initially diagnosed with prostate cancer in 2015.  On 6/4/2015 he underwent radical prostatectomy and LN dissection which showed a [place] 4+3 = 7 prostate cancer.  There were several high risk features such as extraprostatic extension, seminal vesicle invasion, positive margins at the bladder neck and right and left seminal vesicle.  Lymph vascular invasion was indeterminate.  Perineural invasion was not commented on. Final pathology [id number] R1.  He was treated with adjuvant radiation by Dr. Stanton.    According to records the patient likely started Lupron in December 2017 when his PSA started to rise.  The patient took a break in August 2019.  He states he was not aware he was to continue.  He restarted Lupron on 11/26/2019 after it was noted that his  PSA chago from 0.46 up to 2.55 on 11/8/2019.  Unfortunately his PSA continued to rise after that to 4.54 on 3/10/2020.    - restarted Lupron on 11/26/20  - Started Zytiga on 8/27/20.  8/31/2020 PSA 17.95     - Started Xtandi in May 2021 due to intolerance of Zytiga even at the lowest dose  - PSA rising in April 2023 to 2.85, doubling time 3.4 months  - started Docetaxel May 2023    PSMA PET 5/2/23:   1.  Radiotracer avid supra clavicular, thoracic and abdominal adenopathy and sclerotic lesion within T10 vertebral body likely representing metastatic disease.  2.  Indeterminate lesion within the left ilium demonstrating mild uptake.   3.  Asymmetric sclerosis within the superior lateral aspect the left orbit which does not demonstrate significant uptake above that of the right orbit. Findings are nonspecific with  differential considerations including but not limited to metastatic disease, fibrous dysplasia versus Paget's disease.   4.  Sub-6 mm nodule within the left lower lobe.  5.  Scattered indeterminate hypodense lesions throughout the liver which do not demonstrate radiotracer uptake above that of the adjacent liver, best best visualized on recent multiphase CT abdomen and pelvis 04/17/2023. Please refer to this dictation for further information.       Prostate cancer metastatic to bone   12/11/2020 -  Chemotherapy    OP SUPPORTIVE Leuprolide 45 mg Q6M     3/23/2021 -  Chemotherapy    OP SUPPORTIVE Denosumab (Xgeva) Q28D     4/15/2021 - 5/13/2021 Chemotherapy    OP PROSTATE Enzalutamide     5/19/2021 Initial Diagnosis    Prostate cancer metastatic to bone (CMS/HCC)     6/2/2023 - 9/15/2023 Biopsy    OP PROSTATE DOCEtaxel  Plan Provider: Natalya Jack MD PhD  Treatment goal: Palliative  Line of treatment: [No plan line of treatment]     12/19/2023 - 12/19/2023 Chemotherapy    OP PROSTATE Apalutamide     1/26/2024 Biopsy    OP PROSTATE Abiraterone / PredniSONE  Plan Provider: Jsohua Dick MD  Treatment goal:  "Control  Line of treatment: [No plan line of treatment]     2/12/2025 -  Chemotherapy    OP PROSTATE Cabazitaxel / PredniSONE     Prostate cancer   6/16/2021 Initial Diagnosis    Prostate cancer (HCC)     6/2/2023 -  Chemotherapy    OP CENTRAL VENOUS ACCESS DEVICE ACCESS, CARE, AND MAINTENANCE (CVAD)     Secondary malignant neoplasm of bone   11/4/2021 Initial Diagnosis    Secondary malignant neoplasm of bone (HCC)     11/23/2021 - 12/9/2021 Radiation    Radiation OncologyTreatment Course:  Semaj Medina received 3000 cGy in 10 fractions to the T9-T11 spine.          Objective     Vitals:    03/05/25 0924   BP: 98/66   Pulse: 91   Resp: 18   Temp: 98.6 °F (37 °C)   TempSrc: Temporal   SpO2: 98%   Weight: 48.9 kg (107 lb 12.9 oz)   Height: 175 cm (68.9\")   PainSc: 4    PainLoc: Back  Comment: legs         ECOG score: 0         PHQ-9 Total Score:         Physical Exam  Vitals reviewed. Exam conducted with a chaperone present.   Constitutional:       General: He is not in acute distress.  HENT:      Head: Normocephalic and atraumatic.   Eyes:      Conjunctiva/sclera: Conjunctivae normal.      Pupils: Pupils are equal, round, and reactive to light.   Neurological:      Mental Status: He is alert and oriented to person, place, and time.         Physical Exam        Past Medical History     Past Medical History:   Diagnosis Date    Anemia     NO PROBLEMS    Anxiety     Blood disease     Colon polyps     Depression     Diverticulitis     Forgetfulness     Hepatitis C     RESOLVED    Hypertension     Night sweats     Numbness and tingling of left lower extremity     Prostate cancer      Current Outpatient Medications on File Prior to Visit   Medication Sig Dispense Refill    abiraterone acetate (ZYTIGA) 500 MG tablet Take 2 tablets by mouth Daily. 60 tablet 11    amLODIPine (NORVASC) 5 MG tablet Take 1 tablet by mouth Daily. 30 tablet 0    Calcium Carbonate-Vitamin D (Calcium Plus Vitamin D) 500-1.25 MG-MCG capsule Take " 1 capsule by mouth Daily. 30 capsule 5    dronabinol (Marinol) 5 MG capsule Take 2 capsules by mouth Daily. 60 capsule 1    HYDROcodone-acetaminophen (NORCO)  MG per tablet Take 1.5 tablets by mouth Every 4 (Four) Hours As Needed for Moderate Pain. 270 tablet 0    ibuprofen (ADVIL,MOTRIN) 800 MG tablet TAKE 1 TABLET BY MOUTH EVERY 6 - 8 HOURS AS NEEDED FOR PAIN      leuprolide (Lupron [place], 3-Month,) 22.5 MG injection Inject 22.5 mg into the appropriate muscle as directed by prescriber Every 3 (Three) Months.      naloxone (NARCAN) 4 MG/0.1ML nasal spray       ondansetron (ZOFRAN) 8 MG tablet Take 1 tablet by mouth Every 8 (Eight) Hours As Needed for Nausea or Vomiting. 90 tablet 3    potassium chloride (MICRO-K) 10 MEQ CR capsule TAKE 2 CAPSULES BY MOUTH DAILY 180 capsule 0    predniSONE (DELTASONE) 5 MG tablet Take 1 tablet by mouth 2 (Two) Times a Day. 60 tablet 11    HYDROcodone-acetaminophen (Norco) 5-325 MG per tablet Take 1 tablet by mouth Every 4 (Four) Hours As Needed for Moderate Pain or Severe Pain. (Patient not taking: Reported on 3/5/2025) 6 tablet 0     Current Facility-Administered Medications on File Prior to Visit   Medication Dose Route Frequency Provider Last Rate Last Admin    cabazitaxel (JEVTANA) 34 mg in sodium chloride 0.9 % 278.4 mL chemo IVPB  20 mg/m2 (Treatment Plan Recorded) Intravenous Once Joshua Dick MD        [COMPLETED] dexAMETHasone (DECADRON) IVPB 12 mg  12 mg Intravenous Once Joshua Dick MD   Stopped at 03/05/25 1101    [COMPLETED] diphenhydrAMINE (BENADRYL) IVPB 25 mg  25 mg Intravenous Once Joshua Dick MD   25 mg at 03/05/25 1103    famotidine (PEPCID) injection 20 mg  20 mg Intravenous Once Joshua Dick MD        heparin injection 500 Units  500 Units Intravenous PRN Joshua Dick MD        sodium chloride 0.9 % flush 20 mL  20 mL Intravenous PRN Joshua Dick MD        [COMPLETED] sodium chloride 0.9 % infusion  20 mL/hr Intravenous Once Joshua Dick MD  20 mL/hr at 03/05/25 1047 20 mL/hr at 03/05/25 1047      No Known Allergies  Past Surgical History:   Procedure Laterality Date    COLONOSCOPY      PEG TUBE INSERTION N/A 2/24/2025    Procedure: PERCUTANEOUS ENDOSCOPIC GASTROSTOMY TUBE INSERTION and biopsy;  Surgeon: Waldemar Iraheta MD;  Location: [facility];  Service: General;  Laterality: N/A;  gastric musocal abnormality    PROSTATE BIOPSY      PROSTATECTOMY      ROBOT ASSISTED W PELVIC LYMPH NODE DISSECTION    TRANSURETHRAL RESECTION OF BLADDER TUMOR      VENOUS ACCESS DEVICE (PORT) INSERTION Right 5/22/2023    Procedure: INSERTION VENOUS ACCESS PORT;  Surgeon: Waldemar Iraheta MD;  Location: [facility];  Service: General;  Laterality: Right;     Social History     Socioeconomic History    Marital status:    Tobacco Use    Smoking status: Every Day     Current packs/day: 0.25     Average packs/day: 0.3 packs/day for 54.4 years (13.6 ttl pk-yrs)     Types: Cigarettes     Start date: 10/21/1970     Passive exposure: Past    Smokeless tobacco: Never    Tobacco comments:     INSTRUCTED NO SMOKING 24 HR PRIOR TO SURGERY PER ANESTHESIA      1000 PM 02-23-25   Vaping Use    Vaping status: Never Used   Substance and Sexual Activity    Alcohol use: Yes     Alcohol/week: 6.0 standard drinks of alcohol     Types: 6 Cans of beer per week     Comment: 12 pack a week of beer    Drug use: Never    Sexual activity: Defer     Family History   Problem Relation Age of Onset    Cancer Mother     Cancer Father        Results     Result Review   The following data was reviewed by: Joshua Dick MD on 06/28/2024:  Lab Results   Component Value Date    HGB 9.5 (L) 03/05/2025    HCT 28.4 (L) 03/05/2025    MCV 87.4 03/05/2025     03/05/2025    WBC 3.74 03/05/2025    NEUTROABS 2.01 03/05/2025    LYMPHSABS 1.13 03/05/2025    MONOSABS 0.48 03/05/2025    EOSABS 0.01 03/05/2025    BASOSABS 0.03 03/05/2025     Lab Results   Component Value Date    GLUCOSE 156 (H)  03/05/2025    BUN 25 (H) 03/05/2025    CREATININE 1.13 03/05/2025     (L) 03/05/2025    K 4.9 03/05/2025     03/05/2025    CO2 18.8 (L) 03/05/2025    CALCIUM 8.2 (L) 03/05/2025    PROTEINTOT 6.9 03/05/2025    ALBUMIN 3.6 03/05/2025    BILITOT 0.2 03/05/2025    ALKPHOS 305 (H) 03/05/2025    AST 19 03/05/2025    ALT 9 03/05/2025     Lab Results   Component Value Date    MG 2.0 02/12/2025    PHOS 2.5 02/12/2025    TSH 1.350 05/16/2019       No radiology results for the last day       Assessment & Plan     Diagnoses and all orders for this visit:    1. Prostate cancer metastatic to bone (Primary)  -     PSA DIAGNOSTIC; Future  -     [id number] - Blood, Blood, Venous; Future    Other orders  -     Cancel: sodium chloride 0.9 % infusion  -     Cancel: famotidine (PEPCID) injection 20 mg  -     Cancel: diphenhydrAMINE (BENADRYL) IVPB 25 mg  -     Cancel: dexAMETHasone (DECADRON) 12 mg in sodium chloride 0.9 % IVPB  -     Cancel: cabazitaxel (JEVTANA) 34 mg in sodium chloride 0.9 % 253.4 mL chemo IVPB        Semaj Medina is a 69 y.o. male who presents to [facility] HEMATOLOGY & ONCOLOGY for treatment of  metastatic prostate cancer, completed 6 cycles of docetaxel on 9/15/2023, Lupron Q6 months, Pt also receiving Xgeva and he is here prior to next Xgeva infusion  -next Lupron injection due on 6/18/2024  -PSA from 12/15/23 was 8 up from 3.1 (in 10/2023)  -Discussed result of [place] bone scan, CT CAP from 1/24/2024 which revealed interval enlargement T10 vertebral body metastasis, no evidence of intrathoracic metastatic disease, slight worsening appearance of osteoblastic schedule static disease, notable at T10, T8, and T5, no evidence of intra-abdominal or pelvic metastatic disease.  -Patient has undergone radiation therapy 10 fractions to thoracic spine from T9-T11 in 2021.  -Also shared plan to reattempt treatment with Zytiga and prednisone, patient reports some nausea vomiting which we will  attempt to manage with antiemetics.  -Orders placed for Zytiga and prednisone, also provided prescriptions for Compazine and Zofran for patient to begin once he begins treatment with these medications.  -pt began zytiga/prednisone on 2/2/2024, pt tolerating it well, no evidence of toxicity on labs, recommend he continue at current dose     -discussed results of restaging imaging from 4/24/2024 which showed stable radiotracer uptake involving sclerotic osseous metastasis of the left frontal bone above left orbit and at T10, no findings of osseous progression, CT chest abdomen pelvis revealed stable sclerotic osseous metastasis, no suspicious adenopathy, postsurgical changes of prostatectomy.          -Given presence of continued osseous metastasis, patient with continued pain in area of metastasis, and since he has previously received radiation in this area, referred him to [facility] school medicine for consideration for treatment with [place] 223 or Pluvicto.  -Shared results of PSMA PET/CT scan from 6/13/2024 which revealed progression of disease, patient has been referred to nuclear medicine specialist at [facility] for consideration for Pluvicto,pt began Pluvicto on 8/1/2024.  -Obtained PSMA PET CT scan in 12/2024, it revealed mixed response, discussed case with [place] physician Dr. Freed on 12/27/24 and we both decided to have pt complete pluvicto treatment to see if osseous areas of disease could be treated and will rescan pt after completion of Pluvicto.    2/12/2025: Given continued rise of PSA patient case discussed with Dr. Freed who agreed patient is not responding Pluvicto treatment thus plan switch patient to cabazitaxel cycle 1 day 1 on 2/12/2025, labs reviewed plan to proceed as scheduled today. Pt also with continued weight loss despite use of protein supplements and marinol, pt shares he has has an appetite but has difficulty eating due to change in his taste buds. Thus, discussed referral for feeding tube  placement. Tube feedings will be managed by dietician Natalya.     3/5/2025: Patient here prior to cycle 2 of cabazitaxel, he continues to take Zytiga prednisone, Xgeva and Lupron, patient has lost 2 additional pounds, patient had feeding tube placed in February 2025, tolerating 3 cans of tube feeding per day, PSA pending from today.  However also order guardant 360 today to look for other treatment options in case PSA continues to rise.    Pt here prior to next Cabazitaxel treatment, labs reviewed plan to proceed with treatment as scheduled.        -Recommend pt continue of Lupron, Xgeva, and Zytiga and prednisone  -OxyContin 10 mg was prescribed for pain management, to be taken twice daily every 12 hours but due to lack of response, discontinued it  -will continue to provide refills of his [place] for his cancer related pain, increase dose to 1.5 tablet of Norco 10mg PO Q4 hours prn pain.     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      Hypophosphatemia  -normal on check today    Hypomagnesemia  -pt prescribed mag oxide 400 mg PO QD  -will recheck at next clinic appointment     Hypokalemia  -pt receiving KCL repletion    Low Vit D  -Vit level of 27.7  -prescribed Vit D 50K IU once weekly x 8 weeks     Weight loss  -ordered marinol, increased dose on 1/15/2025  -referred to dietician      Please note that portions of this note were completed with a voice recognition program.      Assessment & Plan        Plan for patient follow-up with cycle 3 day 1 of cabazitaxel    Electronically signed by Joshua Dick MD, 03/05/25, 11:15 AM EST.      Addendum from 3/6/2025: Called and discussed results of most recent PSA which showed significant increase of PSA up to 688, shared with patient via phone today my concern he is not responding to cabazitaxel, offered to refer him to  [facility] for second opinion and consideration clinical trial, also shared plan to discuss results of [id number] NGS testing ordered at his next clinic appointment. Pt agreed with plan.    Please note that portions of this note were completed with a voice recognition program.    Electronically signed by Joshua Dick MD, 03/06/25, 12:59 PM EST.      Follow Up     I spent 30 minutes caring for Semaj on this date of service. This time includes time spent by me in the following activities:preparing for the visit, reviewing tests, obtaining and/or reviewing a separately obtained history, performing a medically appropriate examination and/or evaluation , counseling and educating the patient/family/caregiver, ordering medications, tests, or procedures, referring and communicating with other health care professionals , documenting information in the medical record, independently interpreting results and communicating that information with the patient/family/caregiver, and care coordination    Any chemotherapy or immunotherapy or other systemic therapy treatment plan involves a high risk of complications and/or mortality of patient management.    The patient was seen and examined. Work by the provider also included review and/or ordering of lab tests, review and/or ordering of radiology tests, review and/or ordering of medicine tests, discussion with other physicians or providers, independent review of data, obtaining old records, review/summation of old records, and/or other review.    I have reviewed the family history, social history, and past medical history for this patient. Previous information and data has been reviewed and updated as needed. I have reviewed and verified the chief complaint, history, and other documentation. The patient was interviewed and examined in the clinic and the chart reviewed. The previous observations, recommendations, and conclusions were reviewed including those of other  providers.     The plan was discussed with the patient and/or family. The patient was given time to ask questions and these questions were answered. At the conclusion of their visit they had no additional questions or concerns and all questions were answered to their satisfaction.    Patient was given instructions and counseling regarding his condition or for health maintenance advice. Please see specific information pulled into the AVS if appropriate.       Patient or patient representative verbalized consent for the use of Ambient Listening during the visit with  Joshua Dick MD for chart documentation. 3/5/2025  17:46 EDT

## 2025-03-06 ENCOUNTER — TELEPHONE (OUTPATIENT)
Dept: ONCOLOGY | Facility: HOSPITAL | Age: 70
End: 2025-03-06
Payer: MEDICARE

## 2025-03-06 DIAGNOSIS — C61 PROSTATE CANCER METASTATIC TO BONE: Primary | ICD-10-CM

## 2025-03-06 DIAGNOSIS — C79.51 PROSTATE CANCER METASTATIC TO BONE: Primary | ICD-10-CM

## 2025-03-06 LAB
QT INTERVAL: 447 MS
QTC INTERVAL: 443 MS

## 2025-03-06 NOTE — TELEPHONE ENCOUNTER
Attempted to reach pt to discuss results of PSA lab and potential referral [facility].  No answer, message left to return our call.

## 2025-03-10 ENCOUNTER — OFFICE VISIT (OUTPATIENT)
Dept: SURGERY | Facility: CLINIC | Age: 70
End: 2025-03-10
Payer: MEDICARE

## 2025-03-10 VITALS — HEIGHT: 69 IN | WEIGHT: 110 LBS | BODY MASS INDEX: 16.29 KG/M2 | TEMPERATURE: 97.7 F

## 2025-03-10 DIAGNOSIS — Z09 ENCOUNTER FOR FOLLOW-UP: Primary | ICD-10-CM

## 2025-03-10 PROCEDURE — 99024 POSTOP FOLLOW-UP VISIT: CPT | Performed by: SURGERY

## 2025-03-10 PROCEDURE — 1159F MED LIST DOCD IN RCRD: CPT | Performed by: SURGERY

## 2025-03-10 PROCEDURE — 1160F RVW MEDS BY RX/DR IN RCRD: CPT | Performed by: SURGERY

## 2025-03-10 NOTE — PROGRESS NOTES
Patient is here for follow-up after PEG placement on 2/24/2025.  The top of the skin bolster was positioned at the 3.5 cm vic on the tube.  A possible mass was identified in the prepyloric area.  Biopsies were done and showed hyperplastic polyp and plan repeat EGD in one year.    Patient is doing well.  I answered all of his questions.  He does not have any problems or concerns with the feeding tube.  He knows he needs to get a follow-up EGD in 1 year.  He has been placed on a callback list.  No new issues to address today.  Patient will see me PRN

## 2025-03-11 ENCOUNTER — TELEPHONE (OUTPATIENT)
Dept: ONCOLOGY | Facility: HOSPITAL | Age: 70
End: 2025-03-11
Payer: MEDICARE

## 2025-03-11 NOTE — TELEPHONE ENCOUNTER
LEFT MESSAGE FOR PATIENT IN REGARDS TO INJECTION APPOINTMENT WITH US TOMORROW, HE IS NEEDING MORE LABS PRIOR TO SHOT BEING GIVEN, WE ASKED FOR PATIENT TO ARRIVE AT 8:15 AM FOR  LAB DRAW. I ASKED FOR PATIENT TO CALL OFFICE BACK TO CONFIRM HE RECEIVED MY MESSAGE AND CONFIRM APPOINTMENT TIME FOR TOMORROW.

## 2025-03-11 NOTE — TELEPHONE ENCOUNTER
Caller: Semaj Medina Osborne    Relationship: Self    Who are you requesting to speak with (clinical staff, provider,  specific staff member): JHOANA    What was the call regarding: PT CALLED BACK AND HAS CONFIRMED MESSAGE AND APPT TIME FOR 8:15 TOMORROW.

## 2025-03-12 ENCOUNTER — HOSPITAL ENCOUNTER (OUTPATIENT)
Dept: ONCOLOGY | Facility: HOSPITAL | Age: 70
Discharge: HOME | End: 2025-03-12
Payer: MEDICARE

## 2025-03-12 VITALS
HEART RATE: 70 BPM | TEMPERATURE: 98.2 F | DIASTOLIC BLOOD PRESSURE: 63 MMHG | SYSTOLIC BLOOD PRESSURE: 98 MMHG | RESPIRATION RATE: 20 BRPM | OXYGEN SATURATION: 100 %

## 2025-03-12 DIAGNOSIS — C79.51 PROSTATE CANCER METASTATIC TO BONE: ICD-10-CM

## 2025-03-12 DIAGNOSIS — C79.51 PROSTATE CANCER METASTATIC TO BONE: Primary | ICD-10-CM

## 2025-03-12 DIAGNOSIS — E87.6 HYPOKALEMIA: ICD-10-CM

## 2025-03-12 DIAGNOSIS — C61 PROSTATE CANCER: Primary | ICD-10-CM

## 2025-03-12 DIAGNOSIS — C61 PROSTATE CANCER METASTATIC TO BONE: Primary | ICD-10-CM

## 2025-03-12 DIAGNOSIS — C61 PROSTATE CANCER METASTATIC TO BONE: ICD-10-CM

## 2025-03-12 DIAGNOSIS — E87.6 HYPOKALEMIA: Primary | ICD-10-CM

## 2025-03-12 DIAGNOSIS — C61 PROSTATE CANCER: ICD-10-CM

## 2025-03-12 LAB
ALBUMIN SERPL-MCNC: 3.7 G/DL (ref 3.5–5.2)
ALBUMIN/GLOB SERPL: 1.2 G/DL
ALP SERPL-CCNC: 215 U/L (ref 39–117)
ALT SERPL W P-5'-P-CCNC: 8 U/L (ref 1–41)
ANION GAP SERPL CALCULATED.3IONS-SCNC: 10.1 MMOL/L (ref 5–15)
AST SERPL-CCNC: 15 U/L (ref 1–40)
BASOPHILS # BLD AUTO: 0.03 10*3/MM3 (ref 0–0.2)
BASOPHILS NFR BLD AUTO: 1 % (ref 0–1.5)
BILIRUB SERPL-MCNC: 0.2 MG/DL (ref 0–1.2)
BUN SERPL-MCNC: 22 MG/DL (ref 8–23)
BUN/CREAT SERPL: 25.3 (ref 7–25)
CALCIUM SPEC-SCNC: 7.9 MG/DL (ref 8.6–10.5)
CHLORIDE SERPL-SCNC: 109 MMOL/L (ref 98–107)
CO2 SERPL-SCNC: 16.9 MMOL/L (ref 22–29)
CREAT SERPL-MCNC: 0.87 MG/DL (ref 0.76–1.27)
DEPRECATED RDW RBC AUTO: 47.1 FL (ref 37–54)
EGFRCR SERPLBLD CKD-EPI 2021: 93.4 ML/MIN/1.73
EOSINOPHIL # BLD AUTO: 0.01 10*3/MM3 (ref 0–0.4)
EOSINOPHIL NFR BLD AUTO: 0.3 % (ref 0.3–6.2)
ERYTHROCYTE [DISTWIDTH] IN BLOOD BY AUTOMATED COUNT: 14.4 % (ref 12.3–15.4)
GLOBULIN UR ELPH-MCNC: 3.2 GM/DL
GLUCOSE SERPL-MCNC: 155 MG/DL (ref 65–99)
HCT VFR BLD AUTO: 28.9 % (ref 37.5–51)
HGB BLD-MCNC: 9.7 G/DL (ref 13–17.7)
IMM GRANULOCYTES # BLD AUTO: 0.02 10*3/MM3 (ref 0–0.05)
IMM GRANULOCYTES NFR BLD AUTO: 0.7 % (ref 0–0.5)
LYMPHOCYTES # BLD AUTO: 1.27 10*3/MM3 (ref 0.7–3.1)
LYMPHOCYTES NFR BLD AUTO: 42.6 % (ref 19.6–45.3)
MAGNESIUM SERPL-MCNC: 2 MG/DL (ref 1.6–2.4)
MCH RBC QN AUTO: 30 PG (ref 26.6–33)
MCHC RBC AUTO-ENTMCNC: 33.6 G/DL (ref 31.5–35.7)
MCV RBC AUTO: 89.5 FL (ref 79–97)
MONOCYTES # BLD AUTO: 0.16 10*3/MM3 (ref 0.1–0.9)
MONOCYTES NFR BLD AUTO: 5.4 % (ref 5–12)
NEUTROPHILS NFR BLD AUTO: 1.49 10*3/MM3 (ref 1.7–7)
NEUTROPHILS NFR BLD AUTO: 50 % (ref 42.7–76)
NRBC BLD AUTO-RTO: 0 /100 WBC (ref 0–0.2)
PHOSPHATE SERPL-MCNC: 2.2 MG/DL (ref 2.5–4.5)
PLATELET # BLD AUTO: 280 10*3/MM3 (ref 140–450)
PMV BLD AUTO: 9.1 FL (ref 6–12)
POTASSIUM SERPL-SCNC: 5.4 MMOL/L (ref 3.5–5.2)
POTASSIUM SERPL-SCNC: 5.7 MMOL/L (ref 3.5–5.2)
PROT SERPL-MCNC: 6.9 G/DL (ref 6–8.5)
RBC # BLD AUTO: 3.23 10*6/MM3 (ref 4.14–5.8)
SODIUM SERPL-SCNC: 136 MMOL/L (ref 136–145)
WBC NRBC COR # BLD AUTO: 2.98 10*3/MM3 (ref 3.4–10.8)

## 2025-03-12 PROCEDURE — 96372 THER/PROPH/DIAG INJ SC/IM: CPT

## 2025-03-12 PROCEDURE — 36591 DRAW BLOOD OFF VENOUS DEVICE: CPT

## 2025-03-12 PROCEDURE — 84132 ASSAY OF SERUM POTASSIUM: CPT | Performed by: INTERNAL MEDICINE

## 2025-03-12 PROCEDURE — 83735 ASSAY OF MAGNESIUM: CPT | Performed by: INTERNAL MEDICINE

## 2025-03-12 PROCEDURE — 80053 COMPREHEN METABOLIC PANEL: CPT | Performed by: INTERNAL MEDICINE

## 2025-03-12 PROCEDURE — 25010000002 HEPARIN LOCK FLUSH PER 10 UNITS: Performed by: INTERNAL MEDICINE

## 2025-03-12 PROCEDURE — 25010000002 DENOSUMAB 120 MG/1.7ML SOLUTION: Performed by: INTERNAL MEDICINE

## 2025-03-12 PROCEDURE — 85025 COMPLETE CBC W/AUTO DIFF WBC: CPT | Performed by: INTERNAL MEDICINE

## 2025-03-12 PROCEDURE — 84100 ASSAY OF PHOSPHORUS: CPT | Performed by: INTERNAL MEDICINE

## 2025-03-12 RX ORDER — SODIUM CHLORIDE 0.9 % (FLUSH) 0.9 %
20 SYRINGE (ML) INJECTION AS NEEDED
OUTPATIENT
Start: 2025-03-12

## 2025-03-12 RX ORDER — SODIUM CHLORIDE 0.9 % (FLUSH) 0.9 %
20 SYRINGE (ML) INJECTION AS NEEDED
Status: DISCONTINUED | OUTPATIENT
Start: 2025-03-12 | End: 2025-03-13 | Stop reason: HOSPADM

## 2025-03-12 RX ORDER — HEPARIN SODIUM (PORCINE) LOCK FLUSH IV SOLN 100 UNIT/ML 100 UNIT/ML
500 SOLUTION INTRAVENOUS AS NEEDED
Status: CANCELLED | OUTPATIENT
Start: 2025-03-12

## 2025-03-12 RX ORDER — HEPARIN SODIUM (PORCINE) LOCK FLUSH IV SOLN 100 UNIT/ML 100 UNIT/ML
500 SOLUTION INTRAVENOUS AS NEEDED
Status: DISCONTINUED | OUTPATIENT
Start: 2025-03-12 | End: 2025-03-13 | Stop reason: HOSPADM

## 2025-03-12 RX ORDER — HEPARIN SODIUM (PORCINE) LOCK FLUSH IV SOLN 100 UNIT/ML 100 UNIT/ML
500 SOLUTION INTRAVENOUS AS NEEDED
OUTPATIENT
Start: 2025-03-12

## 2025-03-12 RX ORDER — SODIUM CHLORIDE 0.9 % (FLUSH) 0.9 %
20 SYRINGE (ML) INJECTION AS NEEDED
Status: CANCELLED | OUTPATIENT
Start: 2025-03-12

## 2025-03-12 RX ADMIN — HEPARIN 500 UNITS: 100 SYRINGE at 08:28

## 2025-03-12 RX ADMIN — DENOSUMAB 120 MG: 120 INJECTION SUBCUTANEOUS at 09:41

## 2025-03-12 RX ADMIN — Medication 20 ML: at 08:28

## 2025-03-12 RX ADMIN — HEPARIN 500 UNITS: 100 SYRINGE at 09:41

## 2025-03-17 DIAGNOSIS — C79.51 PROSTATE CANCER METASTATIC TO BONE: ICD-10-CM

## 2025-03-17 DIAGNOSIS — C61 PROSTATE CANCER METASTATIC TO BONE: ICD-10-CM

## 2025-03-17 DIAGNOSIS — G89.3 CANCER RELATED PAIN: ICD-10-CM

## 2025-03-18 DIAGNOSIS — C79.51 PROSTATE CANCER METASTATIC TO BONE: ICD-10-CM

## 2025-03-18 DIAGNOSIS — G89.3 CANCER RELATED PAIN: ICD-10-CM

## 2025-03-18 DIAGNOSIS — C61 PROSTATE CANCER METASTATIC TO BONE: ICD-10-CM

## 2025-03-18 RX ORDER — HYDROCODONE BITARTRATE AND ACETAMINOPHEN 10; 325 MG/1; MG/1
1.5 TABLET ORAL EVERY 4 HOURS PRN
Qty: 126 TABLET | Refills: 0 | Status: CANCELLED | OUTPATIENT
Start: 2025-03-18

## 2025-03-18 RX ORDER — LISINOPRIL 40 MG/1
40 TABLET ORAL DAILY
Qty: 90 TABLET | OUTPATIENT
Start: 2025-03-18

## 2025-03-18 RX ORDER — HYDROCODONE BITARTRATE AND ACETAMINOPHEN 10; 325 MG/1; MG/1
1.5 TABLET ORAL EVERY 4 HOURS PRN
Qty: 135 TABLET | Refills: 0 | Status: SHIPPED | OUTPATIENT
Start: 2025-03-18

## 2025-03-18 RX ORDER — HYDROCODONE BITARTRATE AND ACETAMINOPHEN 10; 325 MG/1; MG/1
1.5 TABLET ORAL EVERY 4 HOURS PRN
Qty: 270 TABLET | Refills: 0 | Status: SHIPPED | OUTPATIENT
Start: 2025-03-18 | End: 2025-03-18 | Stop reason: SDUPTHER

## 2025-03-18 NOTE — TELEPHONE ENCOUNTER
This nurse called [facility]. They are willing to fill a lower qty, half the current qty. Please review and sign updated script.

## 2025-03-18 NOTE — TELEPHONE ENCOUNTER
Pilar KELLEY from [facility] in [place] called and left a vm. Pilar states that they will not fill the script due to qty. The script strains and exhaust their supply. [facility] is requesting a change in dose or med.

## 2025-03-19 ENCOUNTER — TELEPHONE (OUTPATIENT)
Dept: FAMILY MEDICINE CLINIC | Facility: CLINIC | Age: 70
End: 2025-03-19

## 2025-03-19 NOTE — TELEPHONE ENCOUNTER
Caller: RADHA RYAN    Relationship:     Best call back number: [phone]     Requested Prescriptions:   LISINOPRIL 40 MG      Pharmacy where request should be sent: [id number] - [place], [place] - [address] [facility] AT [facility] [address] [facility] [address] [place] - [phone]  - [phone]     Last office visit with prescribing clinician: 8/3/2023   Last telemedicine visit with prescribing clinician: Visit date not found   Next office visit with prescribing clinician: Visit date not found     Additional details provided by patient: SHE[facility]BY [facility] [employer] CALLED IN REGARDING THIS MEDICATION FOR THE PATIENT. SHELBY STATED THE PATIENT NEEDS REFILLS ON THIS MEDICATION, PLEASE ADVISE         Nena Swanson Rep   03/19/25 13:19 EDT

## 2025-03-21 ENCOUNTER — TELEPHONE (OUTPATIENT)
Dept: ONCOLOGY | Facility: HOSPITAL | Age: 70
End: 2025-03-21

## 2025-03-21 NOTE — TELEPHONE ENCOUNTER
Caller: AdamIsrael Osborne    Relationship: Self    Best call back number: [phone]    What is the best time to reach you: ANY    Who are you requesting to speak with (clinical staff, provider,  specific staff member): CLINICAL       What was the call regarding: ISRAEL IS CALLING STATES THE PHARMACY TOLD HIM HE NEEDS A PA ON MEDICATION HYDROcodone-acetaminophen (NORCO)  MG   HE IS RUNNING LOW ON MEDICATION AND IS AFRAID HE WONT MAKE IT THRU THE WEEKEND      PLEASE ADVISE

## 2025-03-26 ENCOUNTER — OFFICE VISIT (OUTPATIENT)
Dept: ONCOLOGY | Facility: HOSPITAL | Age: 70
End: 2025-03-26
Payer: MEDICARE

## 2025-03-26 ENCOUNTER — HOSPITAL ENCOUNTER (OUTPATIENT)
Dept: ONCOLOGY | Facility: HOSPITAL | Age: 70
Discharge: HOME | End: 2025-03-26
Payer: MEDICARE

## 2025-03-26 ENCOUNTER — DOCUMENTATION (OUTPATIENT)
Dept: RADIATION ONCOLOGY | Facility: HOSPITAL | Age: 70
End: 2025-03-26
Payer: MEDICARE

## 2025-03-26 VITALS
HEIGHT: 69 IN | BODY MASS INDEX: 16.07 KG/M2 | SYSTOLIC BLOOD PRESSURE: 90 MMHG | OXYGEN SATURATION: 94 % | TEMPERATURE: 98 F | RESPIRATION RATE: 18 BRPM | WEIGHT: 108.47 LBS | DIASTOLIC BLOOD PRESSURE: 70 MMHG | HEART RATE: 65 BPM

## 2025-03-26 VITALS
WEIGHT: 108.47 LBS | TEMPERATURE: 98 F | OXYGEN SATURATION: 94 % | HEART RATE: 65 BPM | RESPIRATION RATE: 18 BRPM | BODY MASS INDEX: 16.06 KG/M2 | DIASTOLIC BLOOD PRESSURE: 70 MMHG | SYSTOLIC BLOOD PRESSURE: 90 MMHG

## 2025-03-26 DIAGNOSIS — Z79.899 HIGH RISK MEDICATION USE: ICD-10-CM

## 2025-03-26 DIAGNOSIS — C79.51 PROSTATE CANCER METASTATIC TO BONE: Primary | ICD-10-CM

## 2025-03-26 DIAGNOSIS — C61 PROSTATE CANCER: ICD-10-CM

## 2025-03-26 DIAGNOSIS — R63.4 WEIGHT LOSS: ICD-10-CM

## 2025-03-26 DIAGNOSIS — C61 PROSTATE CANCER METASTATIC TO BONE: Primary | ICD-10-CM

## 2025-03-26 LAB
ALBUMIN SERPL-MCNC: 3.7 G/DL (ref 3.5–5.2)
ALBUMIN/GLOB SERPL: 1.3 G/DL
ALP SERPL-CCNC: 314 U/L (ref 39–117)
ALT SERPL W P-5'-P-CCNC: 9 U/L (ref 1–41)
ANION GAP SERPL CALCULATED.3IONS-SCNC: 10.5 MMOL/L (ref 5–15)
AST SERPL-CCNC: 25 U/L (ref 1–40)
BASOPHILS # BLD AUTO: 0.01 10*3/MM3 (ref 0–0.2)
BASOPHILS NFR BLD AUTO: 0.2 % (ref 0–1.5)
BILIRUB SERPL-MCNC: 0.2 MG/DL (ref 0–1.2)
BUN SERPL-MCNC: 20 MG/DL (ref 8–23)
BUN/CREAT SERPL: 21.5 (ref 7–25)
CALCIUM SPEC-SCNC: 8.8 MG/DL (ref 8.6–10.5)
CHLORIDE SERPL-SCNC: 106 MMOL/L (ref 98–107)
CO2 SERPL-SCNC: 18.5 MMOL/L (ref 22–29)
CREAT SERPL-MCNC: 0.93 MG/DL (ref 0.76–1.27)
DEPRECATED RDW RBC AUTO: 48.1 FL (ref 37–54)
EGFRCR SERPLBLD CKD-EPI 2021: 88.9 ML/MIN/1.73
EOSINOPHIL # BLD AUTO: 0.04 10*3/MM3 (ref 0–0.4)
EOSINOPHIL NFR BLD AUTO: 0.9 % (ref 0.3–6.2)
ERYTHROCYTE [DISTWIDTH] IN BLOOD BY AUTOMATED COUNT: 15 % (ref 12.3–15.4)
GLOBULIN UR ELPH-MCNC: 2.9 GM/DL
GLUCOSE SERPL-MCNC: 141 MG/DL (ref 65–99)
HCT VFR BLD AUTO: 26.3 % (ref 37.5–51)
HGB BLD-MCNC: 9 G/DL (ref 13–17.7)
IMM GRANULOCYTES # BLD AUTO: 0.05 10*3/MM3 (ref 0–0.05)
IMM GRANULOCYTES NFR BLD AUTO: 1.2 % (ref 0–0.5)
LYMPHOCYTES # BLD AUTO: 1.02 10*3/MM3 (ref 0.7–3.1)
LYMPHOCYTES NFR BLD AUTO: 23.7 % (ref 19.6–45.3)
MCH RBC QN AUTO: 30.3 PG (ref 26.6–33)
MCHC RBC AUTO-ENTMCNC: 34.2 G/DL (ref 31.5–35.7)
MCV RBC AUTO: 88.6 FL (ref 79–97)
MONOCYTES # BLD AUTO: 0.57 10*3/MM3 (ref 0.1–0.9)
MONOCYTES NFR BLD AUTO: 13.2 % (ref 5–12)
NEUTROPHILS NFR BLD AUTO: 2.62 10*3/MM3 (ref 1.7–7)
NEUTROPHILS NFR BLD AUTO: 60.8 % (ref 42.7–76)
NRBC BLD AUTO-RTO: 0 /100 WBC (ref 0–0.2)
PLATELET # BLD AUTO: 281 10*3/MM3 (ref 140–450)
PMV BLD AUTO: 8.6 FL (ref 6–12)
POTASSIUM SERPL-SCNC: 5.1 MMOL/L (ref 3.5–5.2)
PROT SERPL-MCNC: 6.6 G/DL (ref 6–8.5)
PSA SERPL-MCNC: 966 NG/ML (ref 0–4)
RBC # BLD AUTO: 2.97 10*6/MM3 (ref 4.14–5.8)
SODIUM SERPL-SCNC: 135 MMOL/L (ref 136–145)
WBC NRBC COR # BLD AUTO: 4.31 10*3/MM3 (ref 3.4–10.8)

## 2025-03-26 PROCEDURE — 25010000002 DEXAMETHASONE SODIUM PHOSPHATE 120 MG/30ML SOLUTION: Performed by: INTERNAL MEDICINE

## 2025-03-26 PROCEDURE — 25010000002 DIPHENHYDRAMINE PER 50 MG: Performed by: INTERNAL MEDICINE

## 2025-03-26 PROCEDURE — 25810000003 SODIUM CHLORIDE 0.9 % SOLUTION 250 ML FLEX CONT: Performed by: INTERNAL MEDICINE

## 2025-03-26 PROCEDURE — 96413 CHEMO IV INFUSION 1 HR: CPT

## 2025-03-26 PROCEDURE — 96375 TX/PRO/DX INJ NEW DRUG ADDON: CPT

## 2025-03-26 PROCEDURE — 25010000002 HEPARIN LOCK FLUSH PER 10 UNITS: Performed by: INTERNAL MEDICINE

## 2025-03-26 PROCEDURE — 80053 COMPREHEN METABOLIC PANEL: CPT | Performed by: INTERNAL MEDICINE

## 2025-03-26 PROCEDURE — 25810000003 SODIUM CHLORIDE 0.9 % SOLUTION: Performed by: INTERNAL MEDICINE

## 2025-03-26 PROCEDURE — 25010000002 CABAZITAXEL 10 MG/1ML SOLUTION 6 ML VIAL: Performed by: INTERNAL MEDICINE

## 2025-03-26 PROCEDURE — 85025 COMPLETE CBC W/AUTO DIFF WBC: CPT | Performed by: INTERNAL MEDICINE

## 2025-03-26 PROCEDURE — 84153 ASSAY OF PSA TOTAL: CPT | Performed by: INTERNAL MEDICINE

## 2025-03-26 RX ORDER — SODIUM CHLORIDE 9 MG/ML
20 INJECTION, SOLUTION INTRAVENOUS ONCE
Status: CANCELLED | OUTPATIENT
Start: 2025-03-26

## 2025-03-26 RX ORDER — FAMOTIDINE 10 MG/ML
20 INJECTION, SOLUTION INTRAVENOUS ONCE
Status: COMPLETED | OUTPATIENT
Start: 2025-03-26 | End: 2025-03-26

## 2025-03-26 RX ORDER — FAMOTIDINE 10 MG/ML
20 INJECTION, SOLUTION INTRAVENOUS ONCE
Status: CANCELLED | OUTPATIENT
Start: 2025-03-26

## 2025-03-26 RX ORDER — SODIUM CHLORIDE 9 MG/ML
20 INJECTION, SOLUTION INTRAVENOUS ONCE
Status: COMPLETED | OUTPATIENT
Start: 2025-03-26 | End: 2025-03-26

## 2025-03-26 RX ORDER — LISINOPRIL 40 MG/1
1 TABLET ORAL DAILY
COMMUNITY
Start: 2025-03-20 | End: 2025-03-26 | Stop reason: SDUPTHER

## 2025-03-26 RX ORDER — SODIUM CHLORIDE 0.9 % (FLUSH) 0.9 %
20 SYRINGE (ML) INJECTION AS NEEDED
Status: DISCONTINUED | OUTPATIENT
Start: 2025-03-26 | End: 2025-03-27 | Stop reason: HOSPADM

## 2025-03-26 RX ORDER — HEPARIN SODIUM (PORCINE) LOCK FLUSH IV SOLN 100 UNIT/ML 100 UNIT/ML
500 SOLUTION INTRAVENOUS AS NEEDED
OUTPATIENT
Start: 2025-03-26

## 2025-03-26 RX ORDER — HEPARIN SODIUM (PORCINE) LOCK FLUSH IV SOLN 100 UNIT/ML 100 UNIT/ML
500 SOLUTION INTRAVENOUS AS NEEDED
Status: DISCONTINUED | OUTPATIENT
Start: 2025-03-26 | End: 2025-03-27 | Stop reason: HOSPADM

## 2025-03-26 RX ORDER — LISINOPRIL 40 MG/1
40 TABLET ORAL DAILY
Qty: 90 TABLET | Refills: 0 | Status: SHIPPED | OUTPATIENT
Start: 2025-03-26

## 2025-03-26 RX ORDER — SODIUM CHLORIDE 0.9 % (FLUSH) 0.9 %
20 SYRINGE (ML) INJECTION AS NEEDED
OUTPATIENT
Start: 2025-03-26

## 2025-03-26 RX ADMIN — Medication 20 ML: at 13:14

## 2025-03-26 RX ADMIN — SODIUM CHLORIDE 25 MG: 9 INJECTION, SOLUTION INTRAVENOUS at 11:34

## 2025-03-26 RX ADMIN — HEPARIN 500 UNITS: 100 SYRINGE at 13:15

## 2025-03-26 RX ADMIN — SODIUM CHLORIDE 34 MG: 9 INJECTION, SOLUTION INTRAVENOUS at 12:00

## 2025-03-26 RX ADMIN — SODIUM CHLORIDE 20 ML/HR: 9 INJECTION, SOLUTION INTRAVENOUS at 11:15

## 2025-03-26 RX ADMIN — FAMOTIDINE 20 MG: 10 INJECTION INTRAVENOUS at 11:15

## 2025-03-26 RX ADMIN — DEXAMETHASONE SODIUM PHOSPHATE 12 MG: 4 INJECTION, SOLUTION INTRA-ARTICULAR; INTRALESIONAL; INTRAMUSCULAR; INTRAVENOUS; SOFT TISSUE at 11:19

## 2025-03-26 NOTE — PROGRESS NOTES
Outpatient Nutrition Oncology Follow Up    Patient Name: Semaj Medina  YOB: 1955  MRN: [id number]    Recommendation(s):   Change formula to Vital 1.5:  syringe feeds of 4.5-5 containers daily.  With above formula change, suggest additional 700 mL total FWF daily.  If to remain on Vital 1.2:  advance to goal of 6-6.5 containers daily + additional 300-400 mL total FWF daily.  PO / recreational feeds as tolerated.      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       Current Treatment:  Cabazitaxel    Today's Weight:   49.2 kg    Weight Change:    1.2% wt decline in the past month  10% wt decline in 3 months    Estimated nutritional needs (daily):  5817-3794 kcals  74-98 gm protein  7978-4325 mL fluid    Nutrition-related concerns: Anorexia, occasional nausea, having soft BM's but not diarrhea (reports when can eat some bites of solid food his stool is more formed)    Current PO intake:  takes bites of food at times    Current EN Schedule:  Vital 1.2, bolus feeds; just advanced to 4 total cans daily  Water flushes:  120 mL h20 flush 4 X day    EN Goal:  [facility].  See Oncology RD recommendations above.    Consult or Intervention:  S/P PEG placement 2/24/25.  [facility].  Pt just advanced from 3 containers of formula to 4 containers yesterday.  3 containers provides less than 50% of nutritional needs.  4 containers provides 60-70% of nutritional needs.  Infusion RN notes pt's BP was low today- questioned if pt getting adequate fluids.  Considering the increase to 4 containers, pt is now meeting only 50% of hydration needs through water flushes.  Due to stomach atrophy pt unable to tolerate large volumes at once.  Changing formula to Vital 1.5 (vs Vital 1.2) would be more beneficial due to higher kcal / protein content per container.  Provided recommendations to pt and daughter in written form (as above).  Encouraged pt to contact Oncology  RD if unable to reach [facility] or further issues occur- contact information provided on written materials.    Nutrition Diagnosis: Moderate malnutrition related to increased nutrient needs due to catabolic disease as evidenced by physiological causes increasing nutrient needs., hypermetabolic state., muscle wasting., fat loss., and inadequate energy intake.    Plan: Will follow up per oncology nutrition protocol

## 2025-03-26 NOTE — PROGRESS NOTES
Chief Complaint/Care Team   Prostate Cancer    Maryse Monae MD Coffie, Ramona N, MD    History of Present Illness     Diagnosis: Metastatic Castration Resistant Prostate Cancer    Current Treatment: Lupron, Zytiga and Prednisone, Cabazitaxel    Previous Treatment:   Metastatic Castration resistant prostate cancer:    Patient was initially diagnosed with prostate cancer in 2015.  On 6/4/2015 he underwent radical prostatectomy and LN dissection which showed a Sproul 4+3 = 7 prostate cancer.  There were several high risk features such as extraprostatic extension, seminal vesicle invasion, positive margins at the bladder neck and right and left seminal vesicle.  Lymph vascular invasion was indeterminate.  Perineural invasion was not commented on. Final pathology [id number] R1.  He was treated with adjuvant radiation by Dr. Stanton.    According to records the patient likely started Lupron in December 2017 when his PSA started to rise.  The patient took a break in August 2019.  He states he was not aware he was to continue.  He restarted Lupron on 11/26/2019 after it was noted that his PSA chago from 0.46 up to 2.55 on 11/8/2019.  Unfortunately his PSA continued to rise after that to 4.54 on 3/10/2020.    - restarted Lupron on 11/26/20  - Started Zytiga on 8/27/20.  8/31/2020 PSA 17.95     - Started Xtandi in May 2021 due to intolerance of Zytiga even at the lowest dose  - PSA rising in April 2023 to 2.85, doubling time 3.4 months  - started Docetaxel May 2023  -Pluvicto at [facility] which began in  8/1/2024- stopped in 2/2025 due to increase in PSA    Semaj Medina is a 69 y.o. male who presents to [facility] HEMATOLOGY [facility] ONCOLOGY for Metastatic Castration Resistant Prostate Cancer.    History of Present Illness    The patient was previously [place]. Salazar[place] [facility] shared pt was not a candidate for [place]. However, he would be a suitable candidate for  Pluvicto, he has referred him to nuclear medicine specialist at [facility]. A consultation with a nuclear medicine specialist was scheduled for evaluation for Pluvicto. His current medication regimen includes Zytiga and prednisone.      Patient reports experiencing back and neck pain, which subsides upon administration of his pain medication. He typically takes his pain medication every 4 hours. He is here for evaluation prior to next Xgeva treatment.  He began Pluvicto treatment on August 1, 2024, given every 6 weeks, but was stopped in 2/2025 due to progression of disease. Pt here with his dtr prior to next cycle of cabazitaxel. Pt continues to lose weight despite his attempts to increase caloric intake and with increased dose of marinol.  Patient had feeding tube placed, he is tolerating 4 cans of feeding,  he continues follow-up with dietitian.  Patient was evaluated at [facility] with Dr. Chow, who recommended continuation of cabazitaxel, and ordered PET/CT scan to be obtained in April 2025, patient follow-up with him after the PET scan.  Patient also underwent [id number] testing and he is here to discuss results results.       Review of Systems   Constitutional:  Positive for appetite change, fatigue and unexpected weight loss.   Musculoskeletal:  Positive for back pain (Neck pain).        Oncology/Hematology History Overview Note     Metastatic Castration resistant prostate cancer:    Patient was initially diagnosed with prostate cancer in 2015.  On 6/4/2015 he underwent radical prostatectomy and LN dissection which showed a Zach 4+3 = 7 prostate cancer.  There were several high risk features such as extraprostatic extension, seminal vesicle invasion, positive margins at the bladder neck and right and left seminal vesicle.  Lymph vascular invasion was indeterminate.  Perineural invasion was not commented on. Final pathology [id number] R1.  He was treated with adjuvant radiation by   Romy.    According to records the patient likely started Lupron in December 2017 when his PSA started to rise.  The patient took a break in August 2019.  He states he was not aware he was to continue.  He restarted Lupron on 11/26/2019 after it was noted that his PSA chago from 0.46 up to 2.55 on 11/8/2019.  Unfortunately his PSA continued to rise after that to 4.54 on 3/10/2020.    - restarted Lupron on 11/26/20  - Started Zytiga on 8/27/20.  8/31/2020 PSA 17.95     - Started Xtandi in May 2021 due to intolerance of Zytiga even at the lowest dose  - PSA rising in April 2023 to 2.85, doubling time 3.4 months  - started Docetaxel May 2023    PSMA PET 5/2/23:   1.  Radiotracer avid supra clavicular, thoracic and abdominal adenopathy and sclerotic lesion within T10 vertebral body likely representing metastatic disease.  2.  Indeterminate lesion within the left ilium demonstrating mild uptake.   3.  Asymmetric sclerosis within the superior lateral aspect the left orbit which does not demonstrate significant uptake above that of the right orbit. Findings are nonspecific with  differential considerations including but not limited to metastatic disease, fibrous dysplasia versus Paget's disease.   4.  Sub-6 mm nodule within the left lower lobe.  5.  Scattered indeterminate hypodense lesions throughout the liver which do not demonstrate radiotracer uptake above that of the adjacent liver, best best visualized on recent multiphase CT abdomen and pelvis 04/17/2023. Please refer to this dictation for further information.       Prostate cancer metastatic to bone   12/11/2020 -  Chemotherapy    OP SUPPORTIVE Leuprolide 45 mg Q6M     3/23/2021 -  Chemotherapy    OP SUPPORTIVE Denosumab (Xgeva) Q28D     4/15/2021 - 5/13/2021 Chemotherapy    OP PROSTATE Enzalutamide     5/19/2021 Initial Diagnosis    Prostate cancer metastatic to bone (CMS/HCC)     6/2/2023 - 9/15/2023 Biopsy    OP PROSTATE DOCEtaxel  Plan Provider: Natalya FRAUSTO  "MD Marcie PhD  Treatment goal: Palliative  Line of treatment: [No plan line of treatment]     12/19/2023 - 12/19/2023 Chemotherapy    OP PROSTATE Apalutamide     1/26/2024 Biopsy    OP PROSTATE Abiraterone / PredniSONE  Plan Provider: Joshua Dick MD  Treatment goal: Control  Line of treatment: [No plan line of treatment]     2/12/2025 -  Chemotherapy    OP PROSTATE Cabazitaxel / PredniSONE     Prostate cancer   6/16/2021 Initial Diagnosis    Prostate cancer (HCC)     6/2/2023 -  Chemotherapy    OP CENTRAL VENOUS ACCESS DEVICE ACCESS, CARE, AND MAINTENANCE (CVAD)     Secondary malignant neoplasm of bone   11/4/2021 Initial Diagnosis    Secondary malignant neoplasm of bone (HCC)     11/23/2021 - 12/9/2021 Radiation    Radiation OncologyTreatment Course:  Semaj Medina received 3000 cGy in 10 fractions to the T9-T11 spine.          Objective     Vitals:    03/26/25 0923   BP: 90/70   Pulse: 65   Resp: 18   Temp: 98 °F (36.7 °C)   TempSrc: Temporal   SpO2: 94%   Weight: 49.2 kg (108 lb 7.5 oz)   Height: 175 cm (68.9\")   PainSc: 6    PainLoc: Neck  Comment: back also           ECOG score: 0         PHQ-9 Total Score:         Physical Exam  Vitals reviewed. Exam conducted with a chaperone present.   Constitutional:       General: He is not in acute distress.  HENT:      Head: Normocephalic and atraumatic.   Eyes:      Conjunctiva/sclera: Conjunctivae normal.      Pupils: Pupils are equal, round, and reactive to light.   Neurological:      Mental Status: He is alert and oriented to person, place, and time.         Physical Exam        Past Medical History     Past Medical History:   Diagnosis Date    Anemia     NO PROBLEMS    Anxiety     Blood disease     Colon polyps     Depression     Diverticulitis     Forgetfulness     Hepatitis C     RESOLVED    Hypertension     Night sweats     Numbness and tingling of left lower extremity     Prostate cancer      Current Outpatient Medications on File Prior to Visit "   Medication Sig Dispense Refill    abiraterone acetate (ZYTIGA) 500 MG tablet Take 2 tablets by mouth Daily. 60 tablet 11    amLODIPine (NORVASC) 5 MG tablet Take 1 tablet by mouth Daily. 30 tablet 0    Calcium Carbonate-Vitamin D (Calcium Plus Vitamin D) 500-1.25 MG-MCG capsule Take 1 capsule by mouth Daily. 30 capsule 5    dronabinol (Marinol) 5 MG capsule Take 2 capsules by mouth Daily. 60 capsule 1    HYDROcodone-acetaminophen (NORCO)  MG per tablet Take 1.5 tablets by mouth Every 4 (Four) Hours As Needed for Moderate Pain. 135 tablet 0    ibuprofen (ADVIL,MOTRIN) 800 MG tablet TAKE 1 TABLET BY MOUTH EVERY 6 - 8 HOURS AS NEEDED FOR PAIN      leuprolide (Lupron [place], 3-Month,) 22.5 MG injection Inject 22.5 mg into the appropriate muscle as directed by prescriber Every 3 (Three) Months.      lisinopril (PRINIVIL,ZESTRIL) 40 MG tablet Take 1 tablet by mouth Daily.      naloxone (NARCAN) 4 MG/0.1ML nasal spray       ondansetron (ZOFRAN) 8 MG tablet Take 1 tablet by mouth Every 8 (Eight) Hours As Needed for Nausea or Vomiting. 90 tablet 3    potassium & sodium phosphates (PHOS-NAK) 280-160-250 MG pack packet Take 1 packet by mouth 3 (Three) Times a Day. 120 packet 3    potassium chloride (MICRO-K) 10 MEQ CR capsule TAKE 2 CAPSULES BY MOUTH DAILY 180 capsule 0    predniSONE (DELTASONE) 5 MG tablet Take 1 tablet by mouth 2 (Two) Times a Day. 60 tablet 11     Current Facility-Administered Medications on File Prior to Visit   Medication Dose Route Frequency Provider Last Rate Last Admin    [COMPLETED] cabazitaxel (JEVTANA) 34 mg in sodium chloride 0.9 % 278.4 mL chemo IVPB  20 mg/m2 (Treatment Plan Recorded) Intravenous Once Joshua Dick MD   Stopped at 03/26/25 1300    [COMPLETED] dexAMETHasone (DECADRON) IVPB 12 mg  12 mg Intravenous Once Joshua Dick MD   Stopped at 03/26/25 1134    [COMPLETED] diphenhydrAMINE (BENADRYL) IVPB 25 mg  25 mg Intravenous Once Joshua Dick MD   Stopped at 03/26/25 1145     [COMPLETED] famotidine (PEPCID) injection 20 mg  20 mg Intravenous Once Joshua Dick MD   20 mg at 03/26/25 1115    heparin injection 500 Units  500 Units Intravenous PRN Joshua Dick MD   500 Units at 03/26/25 1315    sodium chloride 0.9 % flush 20 mL  20 mL Intravenous PRN Joshua Dick MD   20 mL at 03/26/25 1314    [COMPLETED] sodium chloride 0.9 % infusion  20 mL/hr Intravenous Once Joshua Dick MD   Stopped at 03/26/25 1314      No Known Allergies  Past Surgical History:   Procedure Laterality Date    COLONOSCOPY      PEG TUBE INSERTION N/A 2/24/2025    Procedure: PERCUTANEOUS ENDOSCOPIC GASTROSTOMY TUBE INSERTION and biopsy;  Surgeon: Waldemar Iraheta MD;  Location: [facility];  Service: General;  Laterality: N/A;  gastric musocal abnormality    PROSTATE BIOPSY      PROSTATECTOMY      ROBOT ASSISTED W PELVIC LYMPH NODE DISSECTION    TRANSURETHRAL RESECTION OF BLADDER TUMOR      VENOUS ACCESS DEVICE (PORT) INSERTION Right 5/22/2023    Procedure: INSERTION VENOUS ACCESS PORT;  Surgeon: Waldemar Iraheta MD;  Location: [facility];  Service: General;  Laterality: Right;     Social History     Socioeconomic History    Marital status:    Tobacco Use    Smoking status: Former     Current packs/day: 0.25     Average packs/day: 0.3 packs/day for 54.4 years (13.6 ttl pk-yrs)     Types: Cigarettes     Start date: 10/21/1970     Passive exposure: Past    Smokeless tobacco: Never    Tobacco comments:     INSTRUCTED NO SMOKING 24 HR PRIOR TO SURGERY PER ANESTHESIA      1000 PM 02-23-25   Vaping Use    Vaping status: Never Used   Substance and Sexual Activity    Alcohol use: Not Currently     Alcohol/week: 6.0 standard drinks of alcohol     Types: 6 Cans of beer per week     Comment: 12 pack a week of beer    Drug use: Never    Sexual activity: Defer     Family History   Problem Relation Age of Onset    Cancer Mother     Cancer Father        Results     Result Review   The following data was reviewed by:  Joshua Dick MD on 06/28/2024:  Lab Results   Component Value Date    HGB 9.0 (L) 03/26/2025    HCT 26.3 (L) 03/26/2025    MCV 88.6 03/26/2025     03/26/2025    WBC 4.31 03/26/2025    NEUTROABS 2.62 03/26/2025    LYMPHSABS 1.02 03/26/2025    MONOSABS 0.57 03/26/2025    EOSABS 0.04 03/26/2025    BASOSABS 0.01 03/26/2025     Lab Results   Component Value Date    GLUCOSE 141 (H) 03/26/2025    BUN 20 03/26/2025    CREATININE 0.93 03/26/2025     (L) 03/26/2025    K 5.1 03/26/2025     03/26/2025    CO2 18.5 (L) 03/26/2025    CALCIUM 8.8 03/26/2025    PROTEINTOT 6.6 03/26/2025    ALBUMIN 3.7 03/26/2025    BILITOT 0.2 03/26/2025    ALKPHOS 314 (H) 03/26/2025    AST 25 03/26/2025    ALT 9 03/26/2025     Lab Results   Component Value Date    MG 2.0 03/12/2025    PHOS 2.2 (L) 03/12/2025    TSH 1.350 05/16/2019       No radiology results for the last day       Assessment & Plan     Diagnoses and all orders for this visit:    1. Prostate cancer metastatic to bone (Primary)    2. High risk medication use    3. Weight loss    Other orders  -     Cancel: sodium chloride 0.9 % infusion  -     Cancel: famotidine (PEPCID) injection 20 mg  -     Cancel: diphenhydrAMINE (BENADRYL) IVPB 25 mg  -     Cancel: dexAMETHasone (DECADRON) 12 mg in sodium chloride 0.9 % IVPB  -     Cancel: cabazitaxel (JEVTANA) 34 mg in sodium chloride 0.9 % 253.4 mL chemo IVPB          Semaj Medina is a 69 y.o. male who presents to [facility] HEMATOLOGY & ONCOLOGY for treatment of  metastatic prostate cancer, completed 6 cycles of docetaxel on 9/15/2023, Lupron Q6 months, Pt also receiving Xgeva and he is here prior to next Xgeva infusion  -next Lupron injection due on 6/18/2024  -PSA from 12/15/23 was 8 up from 3.1 (in 10/2023)  -Discussed result of [place] bone scan, CT CAP from 1/24/2024 which revealed interval enlargement T10 vertebral body metastasis, no evidence of intrathoracic metastatic disease, slight worsening  appearance of osteoblastic schedule static disease, notable at T10, T8, and T5, no evidence of intra-abdominal or pelvic metastatic disease.  -Patient has undergone radiation therapy 10 fractions to thoracic spine from T9-T11 in 2021.  -Also shared plan to reattempt treatment with Zytiga and prednisone, patient reports some nausea vomiting which we will attempt to manage with antiemetics.  -Orders placed for Zytiga and prednisone, also provided prescriptions for Compazine and Zofran for patient to begin once he begins treatment with these medications.  -pt began zytiga/prednisone on 2/2/2024, pt tolerating it well, no evidence of toxicity on labs, recommend he continue at current dose     -discussed results of restaging imaging from 4/24/2024 which showed stable radiotracer uptake involving sclerotic osseous metastasis of the left frontal bone above left orbit and at T10, no findings of osseous progression, CT chest abdomen pelvis revealed stable sclerotic osseous metastasis, no suspicious adenopathy, postsurgical changes of prostatectomy.          -Given presence of continued osseous metastasis, patient with continued pain in area of metastasis, and since he has previously received radiation in this area, referred him to [facility] school medicine for consideration for treatment with [place] or Pluvicto.  -Shared results of PSMA PET/CT scan from 6/13/2024 which revealed progression of disease, patient has been referred to nuclear medicine specialist at [facility] for consideration for Pluvicto,pt began Pluvicto on 8/1/2024.  -Obtained PSMA PET CT scan in 12/2024, it revealed mixed response, discussed case with [place] physician Dr. Freed on 12/27/24 and we both decided to have pt complete pluvicto treatment to see if osseous areas of disease could be treated and will rescan pt after completion of Pluvicto.    2/12/2025: Given continued rise of PSA patient case discussed with Dr. Freed who agreed patient is not responding  Pluvicto treatment thus plan switch patient to cabazitaxel cycle 1 day 1 on 2/12/2025, labs reviewed plan to proceed as scheduled today. Pt also with continued weight loss despite use of protein supplements and marinol, pt shares he has has an appetite but has difficulty eating due to change in his taste buds. Thus, discussed referral for feeding tube placement. Tube feedings will be managed by dietician Natalya.     3/5/2025: Patient here prior to cycle 2 of cabazitaxel, he continues to take Zytiga prednisone, Xgeva and Lupron, patient has lost 2 additional pounds, patient had feeding tube placed in February 2025, tolerating 3 cans of tube feeding per day, PSA pending from today.  However also order guardant 360 today to look for other treatment options in case PSA continues to rise.    3/26/2025: Patient had prior cycle 3 of cabazitaxel, labs reviewed plan to proceed as scheduled today, he continues to take Zytiga prednisone, Xgeva Lupron.  Patient has lost additional 2 pounds, he continues to tolerate approximately 4 cans of tube feeding per day, patient underwent [id number] testing which was negative for any actionable mutations, patient with low TMB, MSI was not high.  Patient pending PET/CT scan ordered by Dr. Chow at [facility] he plans to follow-up with Dr. Chow after this imaging test in April 2025.  In the meantime, Dr. Chow's agreement with continuing current therapy with cabazitaxel and other therapies listed above  Pt here prior to next Cabazitaxel treatment, labs reviewed plan to proceed with treatment as scheduled.        -Recommend pt continue of Lupron, Xgeva, and Zytiga and prednisone  -OxyContin 10 mg was prescribed for pain management, to be taken twice daily every 12 hours but due to lack of response, discontinued it  -will continue to provide refills of his [place] for his cancer related pain, increase dose to 1.5 tablet of Norco 10mg PO Q4 hours prn pain.     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      Hypophosphatemia  -normal on check today    Hypomagnesemia  -pt prescribed mag oxide 400 mg PO QD  -will recheck at next clinic appointment     Hypokalemia  -pt receiving KCL repletion    Low Vit D  -Vit level of 27.7  -prescribed Vit D 50K IU once weekly x 8 weeks     Weight loss  -ordered marinol, increased dose on 1/15/2025  -referred to dietician      Please note that portions of this note were completed with a voice recognition program.      Assessment & Plan        Plan for patient follow-up with cycle 4 day 1 of cabazitaxel    Electronically signed by Joshua Dick MD, 03/26/25, 2:30 PM EDT.      Follow Up     I spent 30 minutes caring for Semaj on this date of service. This time includes time spent by me in the following activities:preparing for the visit, reviewing tests, obtaining and/or reviewing a separately obtained history, performing a medically appropriate examination and/or evaluation , counseling and educating the patient/family/caregiver, ordering medications, tests, or procedures, referring and communicating with other health care professionals , documenting information in the medical record, independently interpreting results and communicating that information with the patient/family/caregiver, and care coordination    Any chemotherapy or immunotherapy or other systemic therapy treatment plan involves a high risk of complications and/or mortality of patient management.    The patient was seen and examined. Work by the provider also included review and/or ordering of lab tests, review and/or ordering of radiology tests, review and/or ordering of medicine tests, discussion with other physicians or providers, independent review of data, obtaining old records, review/summation of old records, and/or other review.    I have  reviewed the family history, social history, and past medical history for this patient. Previous information and data has been reviewed and updated as needed. I have reviewed and verified the chief complaint, history, and other documentation. The patient was interviewed and examined in the clinic and the chart reviewed. The previous observations, recommendations, and conclusions were reviewed including those of other providers.     The plan was discussed with the patient and/or family. The patient was given time to ask questions and these questions were answered. At the conclusion of their visit they had no additional questions or concerns and all questions were answered to their satisfaction.    Patient was given instructions and counseling regarding his condition or for health maintenance advice. Please see specific information pulled into the AVS if appropriate.       Patient or patient representative verbalized consent for the use of Ambient Listening during the visit with  Joshua Dick MD for chart documentation. 3/26/2025  17:46 EDT

## 2025-04-02 ENCOUNTER — HOSPITAL ENCOUNTER (OUTPATIENT)
Dept: OTHER | Facility: HOSPITAL | Age: 70
Discharge: HOME | End: 2025-04-02

## 2025-04-07 ENCOUNTER — SPECIALTY PHARMACY (OUTPATIENT)
Dept: PHARMACY | Facility: HOSPITAL | Age: 70
End: 2025-04-07
Payer: MEDICARE

## 2025-04-07 DIAGNOSIS — G89.3 CANCER RELATED PAIN: ICD-10-CM

## 2025-04-07 DIAGNOSIS — C61 PROSTATE CANCER METASTATIC TO BONE: ICD-10-CM

## 2025-04-07 DIAGNOSIS — C79.51 PROSTATE CANCER METASTATIC TO BONE: ICD-10-CM

## 2025-04-09 RX ORDER — HYDROCODONE BITARTRATE AND ACETAMINOPHEN 10; 325 MG/1; MG/1
1.5 TABLET ORAL EVERY 4 HOURS PRN
Qty: 135 TABLET | Refills: 0 | Status: SHIPPED | OUTPATIENT
Start: 2025-04-09

## 2025-04-11 ENCOUNTER — HOSPITAL ENCOUNTER (OUTPATIENT)
Dept: ONCOLOGY | Facility: HOSPITAL | Age: 70
Discharge: HOME | End: 2025-04-11
Payer: MEDICARE

## 2025-04-11 VITALS
TEMPERATURE: 98.5 F | HEART RATE: 78 BPM | DIASTOLIC BLOOD PRESSURE: 68 MMHG | OXYGEN SATURATION: 100 % | RESPIRATION RATE: 18 BRPM | SYSTOLIC BLOOD PRESSURE: 110 MMHG

## 2025-04-11 DIAGNOSIS — C79.51 PROSTATE CANCER METASTATIC TO BONE: ICD-10-CM

## 2025-04-11 DIAGNOSIS — C61 PROSTATE CANCER METASTATIC TO BONE: ICD-10-CM

## 2025-04-11 DIAGNOSIS — C61 PROSTATE CANCER: Primary | ICD-10-CM

## 2025-04-11 DIAGNOSIS — C61 PROSTATE CANCER METASTATIC TO BONE: Primary | ICD-10-CM

## 2025-04-11 DIAGNOSIS — C79.51 PROSTATE CANCER METASTATIC TO BONE: Primary | ICD-10-CM

## 2025-04-11 DIAGNOSIS — E83.39 HYPOPHOSPHATEMIA: Primary | ICD-10-CM

## 2025-04-11 LAB
ALBUMIN SERPL-MCNC: 3.7 G/DL (ref 3.5–5.2)
ALBUMIN/GLOB SERPL: 1.2 G/DL
ALP SERPL-CCNC: 250 U/L (ref 39–117)
ALT SERPL W P-5'-P-CCNC: 6 U/L (ref 1–41)
ANION GAP SERPL CALCULATED.3IONS-SCNC: 11.4 MMOL/L (ref 5–15)
AST SERPL-CCNC: 15 U/L (ref 1–40)
BASOPHILS # BLD AUTO: 0.02 10*3/MM3 (ref 0–0.2)
BASOPHILS NFR BLD AUTO: 0.6 % (ref 0–1.5)
BILIRUB SERPL-MCNC: 0.2 MG/DL (ref 0–1.2)
BUN SERPL-MCNC: 16 MG/DL (ref 8–23)
BUN/CREAT SERPL: 19.5 (ref 7–25)
CALCIUM SPEC-SCNC: 7.8 MG/DL (ref 8.6–10.5)
CHLORIDE SERPL-SCNC: 108 MMOL/L (ref 98–107)
CO2 SERPL-SCNC: 17.6 MMOL/L (ref 22–29)
CREAT SERPL-MCNC: 0.82 MG/DL (ref 0.76–1.27)
DEPRECATED RDW RBC AUTO: 52.1 FL (ref 37–54)
EGFRCR SERPLBLD CKD-EPI 2021: 95.1 ML/MIN/1.73
EOSINOPHIL # BLD AUTO: 0.07 10*3/MM3 (ref 0–0.4)
EOSINOPHIL NFR BLD AUTO: 2.2 % (ref 0.3–6.2)
ERYTHROCYTE [DISTWIDTH] IN BLOOD BY AUTOMATED COUNT: 15.9 % (ref 12.3–15.4)
GLOBULIN UR ELPH-MCNC: 3 GM/DL
GLUCOSE SERPL-MCNC: 175 MG/DL (ref 65–99)
HCT VFR BLD AUTO: 27.2 % (ref 37.5–51)
HGB BLD-MCNC: 8.9 G/DL (ref 13–17.7)
IMM GRANULOCYTES # BLD AUTO: 0.02 10*3/MM3 (ref 0–0.05)
IMM GRANULOCYTES NFR BLD AUTO: 0.6 % (ref 0–0.5)
LYMPHOCYTES # BLD AUTO: 1.09 10*3/MM3 (ref 0.7–3.1)
LYMPHOCYTES NFR BLD AUTO: 34.4 % (ref 19.6–45.3)
MAGNESIUM SERPL-MCNC: 1.9 MG/DL (ref 1.6–2.4)
MCH RBC QN AUTO: 29.6 PG (ref 26.6–33)
MCHC RBC AUTO-ENTMCNC: 32.7 G/DL (ref 31.5–35.7)
MCV RBC AUTO: 90.4 FL (ref 79–97)
MONOCYTES # BLD AUTO: 0.42 10*3/MM3 (ref 0.1–0.9)
MONOCYTES NFR BLD AUTO: 13.2 % (ref 5–12)
NEUTROPHILS NFR BLD AUTO: 1.55 10*3/MM3 (ref 1.7–7)
NEUTROPHILS NFR BLD AUTO: 49 % (ref 42.7–76)
NRBC BLD AUTO-RTO: 0 /100 WBC (ref 0–0.2)
PHOSPHATE SERPL-MCNC: 1.9 MG/DL (ref 2.5–4.5)
PLATELET # BLD AUTO: 253 10*3/MM3 (ref 140–450)
PMV BLD AUTO: 8.9 FL (ref 6–12)
POTASSIUM SERPL-SCNC: 4.9 MMOL/L (ref 3.5–5.2)
PROT SERPL-MCNC: 6.7 G/DL (ref 6–8.5)
RBC # BLD AUTO: 3.01 10*6/MM3 (ref 4.14–5.8)
SODIUM SERPL-SCNC: 137 MMOL/L (ref 136–145)
WBC NRBC COR # BLD AUTO: 3.17 10*3/MM3 (ref 3.4–10.8)

## 2025-04-11 PROCEDURE — 85025 COMPLETE CBC W/AUTO DIFF WBC: CPT | Performed by: INTERNAL MEDICINE

## 2025-04-11 PROCEDURE — 25010000002 DENOSUMAB 120 MG/1.7ML SOLUTION: Performed by: INTERNAL MEDICINE

## 2025-04-11 PROCEDURE — 80053 COMPREHEN METABOLIC PANEL: CPT | Performed by: INTERNAL MEDICINE

## 2025-04-11 PROCEDURE — 83735 ASSAY OF MAGNESIUM: CPT | Performed by: INTERNAL MEDICINE

## 2025-04-11 PROCEDURE — 84100 ASSAY OF PHOSPHORUS: CPT | Performed by: INTERNAL MEDICINE

## 2025-04-11 PROCEDURE — 96372 THER/PROPH/DIAG INJ SC/IM: CPT

## 2025-04-11 PROCEDURE — 36591 DRAW BLOOD OFF VENOUS DEVICE: CPT

## 2025-04-11 PROCEDURE — 25010000002 HEPARIN LOCK FLUSH PER 10 UNITS: Performed by: INTERNAL MEDICINE

## 2025-04-11 RX ORDER — HEPARIN SODIUM (PORCINE) LOCK FLUSH IV SOLN 100 UNIT/ML 100 UNIT/ML
500 SOLUTION INTRAVENOUS AS NEEDED
Status: DISCONTINUED | OUTPATIENT
Start: 2025-04-11 | End: 2025-04-12 | Stop reason: HOSPADM

## 2025-04-11 RX ORDER — SODIUM CHLORIDE 0.9 % (FLUSH) 0.9 %
20 SYRINGE (ML) INJECTION AS NEEDED
OUTPATIENT
Start: 2025-04-11

## 2025-04-11 RX ORDER — SODIUM CHLORIDE 0.9 % (FLUSH) 0.9 %
20 SYRINGE (ML) INJECTION AS NEEDED
Status: DISCONTINUED | OUTPATIENT
Start: 2025-04-11 | End: 2025-04-12 | Stop reason: HOSPADM

## 2025-04-11 RX ORDER — HEPARIN SODIUM (PORCINE) LOCK FLUSH IV SOLN 100 UNIT/ML 100 UNIT/ML
500 SOLUTION INTRAVENOUS AS NEEDED
OUTPATIENT
Start: 2025-04-11

## 2025-04-11 RX ADMIN — DENOSUMAB 120 MG: 120 INJECTION SUBCUTANEOUS at 10:04

## 2025-04-11 RX ADMIN — HEPARIN 500 UNITS: 100 SYRINGE at 08:54

## 2025-04-11 RX ADMIN — Medication 20 ML: at 08:54

## 2025-04-16 ENCOUNTER — TELEPHONE (OUTPATIENT)
Dept: ONCOLOGY | Facility: HOSPITAL | Age: 70
End: 2025-04-16
Payer: MEDICARE

## 2025-04-16 NOTE — TELEPHONE ENCOUNTER
LEFT MESSAGE FOR PATIENT IN REGARDS TO APPOINTMENTS FOR LABS/FOLLOW UP/INFUSION FOR TODAY, ASKED FOR PATIENT TO CALL OFFICE BACK TO LET US KNOW IF HE NEEDS TO RESCHEDULE.

## 2025-04-17 ENCOUNTER — SPECIALTY PHARMACY (OUTPATIENT)
Dept: PHARMACY | Facility: HOSPITAL | Age: 70
End: 2025-04-17
Payer: MEDICARE

## 2025-04-22 ENCOUNTER — TELEPHONE (OUTPATIENT)
Dept: ONCOLOGY | Facility: HOSPITAL | Age: 70
End: 2025-04-22
Payer: MEDICARE

## 2025-04-22 NOTE — TELEPHONE ENCOUNTER
Caller: Semaj Medina Osborne    Relationship: Self    Best call back number: [phone]    Who are you requesting to speak with (clinical staff, provider,  specific staff member): CLINICAL    What was the call regarding: PT ASKING FOR CALLBACK FROM DR AYALA'S NURSE REGARDING SOME QUESTIONS ABOUT HIS 4/24 APPT

## 2025-04-23 ENCOUNTER — TELEPHONE (OUTPATIENT)
Dept: ONCOLOGY | Facility: HOSPITAL | Age: 70
End: 2025-04-23
Payer: MEDICARE

## 2025-04-23 NOTE — PROGRESS NOTES
Patient called regarding questions of continuing his cabazitaxel scheduled for tomorrow, I spoke with Dr. Chow at [facility] who recommended discontinuing cabazitaxel and Zytiga prednisone but continuing Lupron and Xgeva.  Thus I removed those plans from patient's chart.  Patient will continue with Xgeva due on May 9, 2025 we will work on getting patient rescheduled for that treatment.  Dr. Chow is going refer him to Dr. Salazar for Xofigo treatment at [facility]. Pt to follow up with me on 5/9/2025. I called and updated pt on plan and pt agreed with this plan.     Please note that portions of this note were completed with a voice recognition program.    Electronically signed by Joshua Dick MD, 04/23/25, 4:05 PM EDT.

## 2025-04-23 NOTE — TELEPHONE ENCOUNTER
Returned Mr. Medina' call regarding his appointment tomorrow.  Per pt, he was instructed by Dr. Chow at [facility] to stop current treatments and that he was going to be starting treatment there on Friday.  This RN informed Dr. Dick of this. Dr. Dick will verify and we will call pt back and let him know if he needs to keep his appt here tomorrow.

## 2025-04-24 ENCOUNTER — SPECIALTY PHARMACY (OUTPATIENT)
Dept: PHARMACY | Facility: HOSPITAL | Age: 70
End: 2025-04-24
Payer: MEDICARE

## 2025-04-24 NOTE — PROGRESS NOTES
Specialty Pharmacy Patient Management Program  Program Disenrollment      Semaj Medina is seen by their provider for Castrate Resistant Prostate Cancer. Patient was previously enrolled in the Oncology Patient Management program offered by [facility] for Zytiga (abiraterone acetate).      Disenrolling patient today because patient is discontinuing Cabazitaxel, Zytiga, and prednisone. He is being referred to [facility] for treatment with Xofigo.     Betzy Sofia PharmD  Oncology Clinical Specialty Pharmacist   4/24/2025  11:38 EDT

## 2025-04-29 DIAGNOSIS — C79.51 PROSTATE CANCER METASTATIC TO BONE: ICD-10-CM

## 2025-04-29 DIAGNOSIS — C61 PROSTATE CANCER METASTATIC TO BONE: ICD-10-CM

## 2025-04-29 DIAGNOSIS — G89.3 CANCER RELATED PAIN: ICD-10-CM

## 2025-04-30 RX ORDER — HYDROCODONE BITARTRATE AND ACETAMINOPHEN 10; 325 MG/1; MG/1
1.5 TABLET ORAL EVERY 4 HOURS PRN
Qty: 135 TABLET | Refills: 0 | Status: SHIPPED | OUTPATIENT
Start: 2025-04-30

## 2025-05-09 ENCOUNTER — RESULTS FOLLOW-UP (OUTPATIENT)
Dept: ONCOLOGY | Facility: HOSPITAL | Age: 70
End: 2025-05-09
Payer: MEDICARE

## 2025-05-09 ENCOUNTER — OFFICE VISIT (OUTPATIENT)
Dept: ONCOLOGY | Facility: HOSPITAL | Age: 70
End: 2025-05-09
Payer: MEDICARE

## 2025-05-09 ENCOUNTER — HOSPITAL ENCOUNTER (OUTPATIENT)
Dept: ONCOLOGY | Facility: HOSPITAL | Age: 70
Discharge: HOME | End: 2025-05-09
Payer: MEDICARE

## 2025-05-09 VITALS
DIASTOLIC BLOOD PRESSURE: 78 MMHG | OXYGEN SATURATION: 100 % | HEART RATE: 87 BPM | HEIGHT: 69 IN | WEIGHT: 100.8 LBS | BODY MASS INDEX: 14.93 KG/M2 | TEMPERATURE: 97.6 F | RESPIRATION RATE: 18 BRPM | SYSTOLIC BLOOD PRESSURE: 114 MMHG

## 2025-05-09 DIAGNOSIS — C61 PROSTATE CANCER: ICD-10-CM

## 2025-05-09 DIAGNOSIS — D64.9 ANEMIA, UNSPECIFIED TYPE: ICD-10-CM

## 2025-05-09 DIAGNOSIS — C79.51 PROSTATE CANCER METASTATIC TO BONE: Primary | ICD-10-CM

## 2025-05-09 DIAGNOSIS — C61 PROSTATE CANCER METASTATIC TO BONE: Primary | ICD-10-CM

## 2025-05-09 DIAGNOSIS — D50.9 IRON DEFICIENCY ANEMIA, UNSPECIFIED IRON DEFICIENCY ANEMIA TYPE: ICD-10-CM

## 2025-05-09 LAB
ALBUMIN SERPL-MCNC: 3.2 G/DL (ref 3.5–5.2)
ALBUMIN/GLOB SERPL: 0.8 G/DL
ALP SERPL-CCNC: 753 U/L (ref 39–117)
ALT SERPL W P-5'-P-CCNC: 75 U/L (ref 1–41)
ANION GAP SERPL CALCULATED.3IONS-SCNC: 12.4 MMOL/L (ref 5–15)
ANISOCYTOSIS BLD QL: NORMAL
AST SERPL-CCNC: 83 U/L (ref 1–40)
BASOPHILS # BLD AUTO: 0.02 10*3/MM3 (ref 0–0.2)
BASOPHILS NFR BLD AUTO: 0.4 % (ref 0–1.5)
BILIRUB SERPL-MCNC: 0.2 MG/DL (ref 0–1.2)
BUN SERPL-MCNC: 33 MG/DL (ref 8–23)
BUN/CREAT SERPL: 37.5 (ref 7–25)
BURR CELLS BLD QL SMEAR: NORMAL
CALCIUM SPEC-SCNC: 7 MG/DL (ref 8.6–10.5)
CHLORIDE SERPL-SCNC: 105 MMOL/L (ref 98–107)
CO2 SERPL-SCNC: 16.6 MMOL/L (ref 22–29)
CREAT SERPL-MCNC: 0.88 MG/DL (ref 0.76–1.27)
DEPRECATED RDW RBC AUTO: 49.1 FL (ref 37–54)
EGFRCR SERPLBLD CKD-EPI 2021: 93.1 ML/MIN/1.73
EOSINOPHIL # BLD AUTO: 0.21 10*3/MM3 (ref 0–0.4)
EOSINOPHIL NFR BLD AUTO: 3.7 % (ref 0.3–6.2)
ERYTHROCYTE [DISTWIDTH] IN BLOOD BY AUTOMATED COUNT: 16.4 % (ref 12.3–15.4)
FERRITIN SERPL-MCNC: 376.4 NG/ML (ref 30–400)
GLOBULIN UR ELPH-MCNC: 3.8 GM/DL
GLUCOSE SERPL-MCNC: 140 MG/DL (ref 65–99)
HCT VFR BLD AUTO: 23.9 % (ref 37.5–51)
HGB BLD-MCNC: 8.1 G/DL (ref 13–17.7)
IMM GRANULOCYTES # BLD AUTO: 0.05 10*3/MM3 (ref 0–0.05)
IMM GRANULOCYTES NFR BLD AUTO: 0.9 % (ref 0–0.5)
IRON 24H UR-MRATE: 20 MCG/DL (ref 59–158)
IRON SATN MFR SERPL: 11 % (ref 20–50)
LYMPHOCYTES # BLD AUTO: 0.63 10*3/MM3 (ref 0.7–3.1)
LYMPHOCYTES NFR BLD AUTO: 11.2 % (ref 19.6–45.3)
MAGNESIUM SERPL-MCNC: 2.3 MG/DL (ref 1.6–2.4)
MCH RBC QN AUTO: 27.6 PG (ref 26.6–33)
MCHC RBC AUTO-ENTMCNC: 33.9 G/DL (ref 31.5–35.7)
MCV RBC AUTO: 81.6 FL (ref 79–97)
MONOCYTES # BLD AUTO: 0.45 10*3/MM3 (ref 0.1–0.9)
MONOCYTES NFR BLD AUTO: 8 % (ref 5–12)
NEUTROPHILS NFR BLD AUTO: 4.27 10*3/MM3 (ref 1.7–7)
NEUTROPHILS NFR BLD AUTO: 75.8 % (ref 42.7–76)
NRBC BLD AUTO-RTO: 0 /100 WBC (ref 0–0.2)
PHOSPHATE SERPL-MCNC: 2 MG/DL (ref 2.5–4.5)
PLATELET # BLD AUTO: 293 10*3/MM3 (ref 140–450)
PMV BLD AUTO: 10.5 FL (ref 6–12)
POLYCHROMASIA BLD QL SMEAR: NORMAL
POTASSIUM SERPL-SCNC: 5.2 MMOL/L (ref 3.5–5.2)
PROT SERPL-MCNC: 7 G/DL (ref 6–8.5)
RBC # BLD AUTO: 2.93 10*6/MM3 (ref 4.14–5.8)
SMALL PLATELETS BLD QL SMEAR: ADEQUATE
SODIUM SERPL-SCNC: 134 MMOL/L (ref 136–145)
TARGETS BLD QL SMEAR: NORMAL
TIBC SERPL-MCNC: 186 MCG/DL (ref 298–536)
TRANSFERRIN SERPL-MCNC: 125 MG/DL (ref 200–360)
WBC MORPH BLD: NORMAL
WBC NRBC COR # BLD AUTO: 5.63 10*3/MM3 (ref 3.4–10.8)

## 2025-05-09 PROCEDURE — 83540 ASSAY OF IRON: CPT | Performed by: INTERNAL MEDICINE

## 2025-05-09 PROCEDURE — 25010000002 HEPARIN LOCK FLUSH PER 10 UNITS: Performed by: INTERNAL MEDICINE

## 2025-05-09 PROCEDURE — 80053 COMPREHEN METABOLIC PANEL: CPT | Performed by: INTERNAL MEDICINE

## 2025-05-09 PROCEDURE — 25010000002 DENOSUMAB 120 MG/1.7ML SOLUTION: Performed by: INTERNAL MEDICINE

## 2025-05-09 PROCEDURE — 96372 THER/PROPH/DIAG INJ SC/IM: CPT

## 2025-05-09 PROCEDURE — 82728 ASSAY OF FERRITIN: CPT | Performed by: INTERNAL MEDICINE

## 2025-05-09 PROCEDURE — 85007 BL SMEAR W/DIFF WBC COUNT: CPT | Performed by: INTERNAL MEDICINE

## 2025-05-09 PROCEDURE — 85025 COMPLETE CBC W/AUTO DIFF WBC: CPT | Performed by: INTERNAL MEDICINE

## 2025-05-09 PROCEDURE — 83735 ASSAY OF MAGNESIUM: CPT | Performed by: INTERNAL MEDICINE

## 2025-05-09 PROCEDURE — 84100 ASSAY OF PHOSPHORUS: CPT | Performed by: INTERNAL MEDICINE

## 2025-05-09 PROCEDURE — 36591 DRAW BLOOD OFF VENOUS DEVICE: CPT

## 2025-05-09 PROCEDURE — 84466 ASSAY OF TRANSFERRIN: CPT | Performed by: INTERNAL MEDICINE

## 2025-05-09 RX ORDER — SODIUM CHLORIDE 9 MG/ML
250 INJECTION, SOLUTION INTRAVENOUS AS NEEDED
OUTPATIENT
Start: 2025-05-09 | End: 2025-05-09

## 2025-05-09 RX ORDER — SODIUM CHLORIDE 0.9 % (FLUSH) 0.9 %
20 SYRINGE (ML) INJECTION AS NEEDED
OUTPATIENT
Start: 2025-05-09

## 2025-05-09 RX ORDER — HEPARIN SODIUM (PORCINE) LOCK FLUSH IV SOLN 100 UNIT/ML 100 UNIT/ML
500 SOLUTION INTRAVENOUS AS NEEDED
OUTPATIENT
Start: 2025-05-09

## 2025-05-09 RX ORDER — HEPARIN SODIUM (PORCINE) LOCK FLUSH IV SOLN 100 UNIT/ML 100 UNIT/ML
500 SOLUTION INTRAVENOUS AS NEEDED
Status: DISCONTINUED | OUTPATIENT
Start: 2025-05-09 | End: 2025-05-10 | Stop reason: HOSPADM

## 2025-05-09 RX ORDER — SODIUM CHLORIDE 0.9 % (FLUSH) 0.9 %
20 SYRINGE (ML) INJECTION AS NEEDED
Status: DISCONTINUED | OUTPATIENT
Start: 2025-05-09 | End: 2025-05-10 | Stop reason: HOSPADM

## 2025-05-09 RX ADMIN — DENOSUMAB 120 MG: 120 INJECTION SUBCUTANEOUS at 12:05

## 2025-05-09 RX ADMIN — HEPARIN 500 UNITS: 100 SYRINGE at 10:48

## 2025-05-09 RX ADMIN — Medication 20 ML: at 10:48

## 2025-05-09 NOTE — PROGRESS NOTES
Chief Complaint/Care Team   Prostate Cancer    Maryse Monae MD Coffie, Ramona N, MD    History of Present Illness     Diagnosis: Metastatic Castration Resistant Prostate Cancer    Current Treatment: Lupron, Zytiga and Prednisone, Cabazitaxel    Previous Treatment:   Metastatic Castration resistant prostate cancer:    Patient was initially diagnosed with prostate cancer in 2015.  On 6/4/2015 he underwent radical prostatectomy and LN dissection which showed a [place] 4+3 = 7 prostate cancer.  There were several high risk features such as extraprostatic extension, seminal vesicle invasion, positive margins at the bladder neck and right and left seminal vesicle.  Lymph vascular invasion was indeterminate.  Perineural invasion was not commented on. Final pathology [id number] R1.  He was treated with adjuvant radiation by Dr. Stanton.    According to records the patient likely started Lupron in December 2017 when his PSA started to rise.  The patient took a break in August 2019.  He states he was not aware he was to continue.  He restarted Lupron on 11/26/2019 after it was noted that his PSA chago from 0.46 up to 2.55 on 11/8/2019.  Unfortunately his PSA continued to rise after that to 4.54 on 3/10/2020.    - restarted Lupron on 11/26/20  - Started Zytiga on 8/27/20.  8/31/2020 PSA 17.95     - Started Xtandi in May 2021 due to intolerance of Zytiga even at the lowest dose  - PSA rising in April 2023 to 2.85, doubling time 3.4 months  - started Docetaxel May 2023  -Pluvicto at [facility] which began in  8/1/2024- stopped in 2/2025 due to increase in PSA    Semaj Medina is a 69 y.o. male who presents to [facility] HEMATOLOGY & ONCOLOGY for Metastatic Castration Resistant Prostate Cancer.    History of Present Illness    The patient was previously evaluated by Dr. Salazar, a radiation oncologist at [facility], who shared pt was not a candidate for [place] 223. However, he would be a suitable candidate for  Pluvicto, he has referred him to nuclear medicine specialist at [facility]. A consultation with a nuclear medicine specialist was scheduled for evaluation for Pluvicto. His current medication regimen includes Zytiga and prednisone.      Patient reports experiencing back and neck pain, which subsides upon administration of his pain medication. He typically takes his pain medication every 4 hours. He is here for evaluation prior to next Xgeva treatment.  He began Pluvicto treatment on August 1, 2024, given every 6 weeks, but was stopped in 2/2025 due to progression of disease. Pt here with his dtr prior to next cycle of cabazitaxel. Pt continues to lose weight despite his attempts to increase caloric intake and with increased dose of marinol.  Patient had feeding tube placed, he is tolerating 4 cans of feeding,  he continues follow-up with dietitian.  Patient was evaluated at [facility] with Dr. Chow, who recommended stopping cabazitaxel, zytiga/prednisone but continue Xgeva and Lupron along, pt is pending start of [place] 223 at [facility] with Dr. Salazar, pt here also for lab check.        Review of Systems   Constitutional:  Positive for appetite change, fatigue and unexpected weight loss.   Respiratory:  Positive for shortness of breath.    Musculoskeletal:  Positive for back pain (Neck pain).        Oncology/Hematology History Overview Note     Metastatic Castration resistant prostate cancer:    Patient was initially diagnosed with prostate cancer in 2015.  On 6/4/2015 he underwent radical prostatectomy and LN dissection which showed a [place] 4+3 = 7 prostate cancer.  There were several high risk features such as extraprostatic extension, seminal vesicle invasion, positive margins at the bladder neck and right and left seminal vesicle.  Lymph vascular invasion was indeterminate.  Perineural invasion was not commented on. Final pathology [id number] R1.  He was treated with adjuvant radiation by   Romy.    According to records the patient likely started Lupron in December 2017 when his PSA started to rise.  The patient took a break in August 2019.  He states he was not aware he was to continue.  He restarted Lupron on 11/26/2019 after it was noted that his PSA chago from 0.46 up to 2.55 on 11/8/2019.  Unfortunately his PSA continued to rise after that to 4.54 on 3/10/2020.    - restarted Lupron on 11/26/20  - Started Zytiga on 8/27/20.  8/31/2020 PSA 17.95     - Started Xtandi in May 2021 due to intolerance of Zytiga even at the lowest dose  - PSA rising in April 2023 to 2.85, doubling time 3.4 months  - started Docetaxel May 2023    PSMA PET 5/2/23:   1.  Radiotracer avid supra clavicular, thoracic and abdominal adenopathy and sclerotic lesion within T10 vertebral body likely representing metastatic disease.  2.  Indeterminate lesion within the left ilium demonstrating mild uptake.   3.  Asymmetric sclerosis within the superior lateral aspect the left orbit which does not demonstrate significant uptake above that of the right orbit. Findings are nonspecific with  differential considerations including but not limited to metastatic disease, fibrous dysplasia versus Paget's disease.   4.  Sub-6 mm nodule within the left lower lobe.  5.  Scattered indeterminate hypodense lesions throughout the liver which do not demonstrate radiotracer uptake above that of the adjacent liver, best best visualized on recent multiphase CT abdomen and pelvis 04/17/2023. Please refer to this dictation for further information.       Prostate cancer metastatic to bone   12/11/2020 -  Chemotherapy    OP SUPPORTIVE Leuprolide 45 mg Q6M     3/23/2021 -  Chemotherapy    OP SUPPORTIVE Denosumab (Xgeva) Q28D     4/15/2021 - 5/13/2021 Chemotherapy    OP PROSTATE Enzalutamide     5/19/2021 Initial Diagnosis    Prostate cancer metastatic to bone (CMS/HCC)     6/2/2023 - 9/15/2023 Biopsy    OP PROSTATE DOCEtaxel  Plan Provider: Natalya FRAUSTO  "MD Marcie PhD  Treatment goal: Palliative  Line of treatment: [No plan line of treatment]     12/19/2023 - 12/19/2023 Chemotherapy    OP PROSTATE Apalutamide     1/26/2024 - 1/26/2024 Biopsy    OP PROSTATE Abiraterone / PredniSONE  Plan Provider: Joshua Dick MD  Treatment goal: Control  Line of treatment: [No plan line of treatment]     2/12/2025 - 3/26/2025 Chemotherapy    OP PROSTATE Cabazitaxel / PredniSONE     Prostate cancer   6/16/2021 Initial Diagnosis    Prostate cancer (HCC)     6/2/2023 -  Chemotherapy    OP CENTRAL VENOUS ACCESS DEVICE ACCESS, CARE, AND MAINTENANCE (CVAD)     Secondary malignant neoplasm of bone   11/4/2021 Initial Diagnosis    Secondary malignant neoplasm of bone (HCC)     11/23/2021 - 12/9/2021 Radiation    Radiation OncologyTreatment Course:  Semaj Medina received 3000 cGy in 10 fractions to the T9-T11 spine.          Objective     Vitals:    05/09/25 1121   BP: 114/78   Pulse: 87   Resp: 18   Temp: 97.6 °F (36.4 °C)   TempSrc: Temporal   SpO2: 100%   Weight: 45.7 kg (100 lb 12.8 oz)   Height: 175 cm (68.9\")   PainSc: 6    PainLoc: Back  Comment: hips, legs             ECOG score: 0         PHQ-9 Total Score:         Physical Exam  Vitals reviewed. Exam conducted with a chaperone present.   Constitutional:       General: He is not in acute distress.  HENT:      Head: Normocephalic and atraumatic.   Eyes:      Conjunctiva/sclera: Conjunctivae normal.      Pupils: Pupils are equal, round, and reactive to light.   Neurological:      Mental Status: He is alert and oriented to person, place, and time.         Physical Exam        Past Medical History     Past Medical History:   Diagnosis Date    Anemia     NO PROBLEMS    Anxiety     Blood disease     Colon polyps     Depression     Diverticulitis     Forgetfulness     Hepatitis C     RESOLVED    Hypertension     Night sweats     Numbness and tingling of left lower extremity     Prostate cancer      Current Outpatient Medications on " File Prior to Visit   Medication Sig Dispense Refill    amLODIPine (NORVASC) 5 MG tablet Take 1 tablet by mouth Daily. 30 tablet 0    Calcium Carbonate-Vitamin D (Calcium Plus Vitamin D) 500-1.25 MG-MCG capsule Take 1 capsule by mouth Daily. 30 capsule 5    dronabinol (Marinol) 5 MG capsule Take 2 capsules by mouth Daily. 60 capsule 1    HYDROcodone-acetaminophen (NORCO)  MG per tablet Take 1.5 tablets by mouth Every 4 (Four) Hours As Needed for Moderate Pain. 135 tablet 0    ibuprofen (ADVIL,MOTRIN) 800 MG tablet TAKE 1 TABLET BY MOUTH EVERY 6 - 8 HOURS AS NEEDED FOR PAIN      leuprolide (Lupron Depot, 3-Month,) 22.5 MG injection Inject 22.5 mg into the appropriate muscle as directed by prescriber Every 3 (Three) Months.      lisinopril (PRINIVIL,ZESTRIL) 40 MG tablet Take 1 tablet by mouth Daily. 90 tablet 0    ondansetron (ZOFRAN) 8 MG tablet Take 1 tablet by mouth Every 8 (Eight) Hours As Needed for Nausea or Vomiting. 90 tablet 3    potassium & sodium phosphates (PHOS-NAK) 280-160-250 MG pack packet Take 2 packets by mouth 3 (Three) Times a Day. 120 packet 4    potassium chloride (MICRO-K) 10 MEQ CR capsule TAKE 2 CAPSULES BY MOUTH DAILY 180 capsule 0    abiraterone acetate (ZYTIGA) 500 MG tablet Take 2 tablets by mouth Daily. (Patient not taking: Reported on 5/9/2025) 60 tablet 11    naloxone (NARCAN) 4 MG/0.1ML nasal spray  (Patient not taking: Reported on 5/9/2025)      predniSONE (DELTASONE) 5 MG tablet Take 1 tablet by mouth 2 (Two) Times a Day. (Patient not taking: Reported on 5/9/2025) 60 tablet 11     Current Facility-Administered Medications on File Prior to Visit   Medication Dose Route Frequency Provider Last Rate Last Admin    heparin injection 500 Units  500 Units Intravenous PRN Joshua Dick MD   500 Units at 05/09/25 1048    sodium chloride 0.9 % flush 20 mL  20 mL Intravenous PRN Joshua Dick MD   20 mL at 05/09/25 1048      No Known Allergies  Past Surgical History:   Procedure  Laterality Date    COLONOSCOPY      PEG TUBE INSERTION N/A 2/24/2025    Procedure: PERCUTANEOUS ENDOSCOPIC GASTROSTOMY TUBE INSERTION and biopsy;  Surgeon: Waldemar Iraheta MD;  [facility];  Service: General;  Laterality: N/A;  gastric musocal abnormality    PROSTATE BIOPSY      PROSTATECTOMY      ROBOT ASSISTED W PELVIC LYMPH NODE DISSECTION    TRANSURETHRAL RESECTION OF BLADDER TUMOR      VENOUS ACCESS DEVICE (PORT) INSERTION Right 5/22/2023    Procedure: INSERTION VENOUS ACCESS PORT;  Surgeon: Waldemar Iraheta MD;  Location: [facility];  Service: General;  Laterality: Right;     Social History     Socioeconomic History    Marital status:    Tobacco Use    Smoking status: Former     Current packs/day: 0.25     Average packs/day: 0.3 packs/day for 54.5 years (13.6 ttl pk-yrs)     Types: Cigarettes     Start date: 10/21/1970     Passive exposure: Past    Smokeless tobacco: Never    Tobacco comments:     INSTRUCTED NO SMOKING 24 HR PRIOR TO SURGERY PER ANESTHESIA      1000 PM 02-23-25   Vaping Use    Vaping status: Never Used   Substance and Sexual Activity    Alcohol use: Not Currently     Alcohol/week: 6.0 standard drinks of alcohol     Types: 6 Cans of beer per week     Comment: 12 pack a week of beer    Drug use: Never    Sexual activity: Defer     Family History   Problem Relation Age of Onset    Cancer Mother     Cancer Father        Results     Result Review   The following data was reviewed by: Joshua Dick MD on 06/28/2024:  Lab Results   Component Value Date    HGB 8.1 (L) 05/09/2025    HCT 23.9 (L) 05/09/2025    MCV 81.6 05/09/2025     05/09/2025    WBC 5.63 05/09/2025    NEUTROABS 4.27 05/09/2025    LYMPHSABS 0.63 (L) 05/09/2025    MONOSABS 0.45 05/09/2025    EOSABS 0.21 05/09/2025    BASOSABS 0.02 05/09/2025     Lab Results   Component Value Date    GLUCOSE 140 (H) 05/09/2025    BUN 33 (H) 05/09/2025    CREATININE 0.88 05/09/2025     (L) 05/09/2025    K 5.2 05/09/2025      05/09/2025    CO2 16.6 (L) 05/09/2025    CALCIUM 7.0 (L) 05/09/2025    PROTEINTOT 7.0 05/09/2025    ALBUMIN 3.2 (L) 05/09/2025    BILITOT 0.2 05/09/2025    ALKPHOS 753 (H) 05/09/2025    AST 83 (H) 05/09/2025    ALT 75 (H) 05/09/2025     Lab Results   Component Value Date    MG 2.3 05/09/2025    PHOS 2.0 (L) 05/09/2025    TSH 1.350 05/16/2019       No radiology results for the last day       Assessment & Plan     Diagnoses and all orders for this visit:    1. Anemia, unspecified type (Primary)  -     Hold Transfusion and Notify Physician; Standing  -     Nursing communication: Transfusion Reaction Management; Standing  -     Verify Informed Consent for Transfusion; Standing  -     Prepare RBC, 1 Units; Standing  -     Transfuse RBC, 1 Units Infuse Each Unit Over: 2H; Standing  -     sodium chloride 0.9 % infusion 250 mL  -     Type and screen; Future  -     CBC & Differential; Future  -     Comprehensive Metabolic Panel; Future  -     Phosphorus; Future  -     Iron Profile; Future  -     Ferritin; Future  -     Vitamin B12; Future  -     Folate; Future    Other orders  -     Cancel: denosumab (XGEVA) injection 120 mg  -     OK To Treat      Semaj Medina is a 69 y.o. male who presents to [facility] HEMATOLOGY & ONCOLOGY for treatment of  metastatic prostate cancer, completed 6 cycles of docetaxel on 9/15/2023, Lupron Q6 months, Pt also receiving Xgeva and he is here prior to next Xgeva infusion  -next Lupron injection due on 6/18/2024  -PSA from 12/15/23 was 8 up from 3.1 (in 10/2023)  -Discussed result of [place] bone scan, CT CAP from 1/24/2024 which revealed interval enlargement T10 vertebral body metastasis, no evidence of intrathoracic metastatic disease, slight worsening appearance of osteoblastic schedule static disease, notable at T10, T8, and T5, no evidence of intra-abdominal or pelvic metastatic disease.  -Patient has undergone radiation therapy 10 fractions to thoracic spine  from T9-T11 in 2021.  -Also shared plan to reattempt treatment with Zytiga and prednisone, patient reports some nausea vomiting which we will attempt to manage with antiemetics.  -Orders placed for Zytiga and prednisone, also provided prescriptions for Compazine and Zofran for patient to begin once he begins treatment with these medications.  -pt began zytiga/prednisone on 2/2/2024, pt tolerating it well, no evidence of toxicity on labs, recommend he continue at current dose     -discussed results of restaging imaging from 4/24/2024 which showed stable radiotracer uptake involving sclerotic osseous metastasis of the left frontal bone above left orbit and at T10, no findings of osseous progression, CT chest abdomen pelvis revealed stable sclerotic osseous metastasis, no suspicious adenopathy, postsurgical changes of prostatectomy.          -Given presence of continued osseous metastasis, patient with continued pain in area of metastasis, and since he has previously received radiation in this area, referred him to [facility] for consideration for treatment with [place] 223 or Pluvicto.  -Shared results of PSMA PET/CT scan from 6/13/2024 which revealed progression of disease, patient has been referred to nuclear medicine specialist at [facility] for consideration for Pluvicto,pt began Pluvicto on 8/1/2024.  -Obtained PSMA PET CT scan in 12/2024, it revealed mixed response, discussed case with [place] physician Dr. Freed on 12/27/24 and we both decided to have pt complete pluvicto treatment to see if osseous areas of disease could be treated and will rescan pt after completion of Pluvicto.    2/12/2025: Given continued rise of PSA patient case discussed with Dr. Freed who agreed patient is not responding Pluvicto treatment thus plan switch patient to cabazitaxel cycle 1 day 1 on 2/12/2025, labs reviewed plan to proceed as scheduled today. Pt also with continued weight loss despite use of protein supplements and marinol,  pt shares he has has an appetite but has difficulty eating due to change in his taste buds. Thus, discussed referral for feeding tube placement. Tube feedings will be managed by dietician Natalya.     3/5/2025: Patient here prior to cycle 2 of cabazitaxel, he continues to take Zytiga prednisone, Xgeva and Lupron, patient has lost 2 additional pounds, patient had feeding tube placed in February 2025, tolerating 3 cans of tube feeding per day, PSA pending from today.  However also order guardant 360 today to look for other treatment options in case PSA continues to rise.    3/26/2025: Patient had prior cycle 3 of cabazitaxel, labs reviewed plan to proceed as scheduled today, he continues to take Zytiga prednisone, Xgeva Lupron.  Patient has lost additional 2 pounds, he continues to tolerate approximately 4 cans of tube feeding per day, patient underwent [id number] testing which was negative for any actionable mutations, patient with low TMB, MSI was not high.  Patient pending PET/CT scan ordered by Dr. Chow at [facility] he plans to follow-up with Dr. Chow after this imaging test in April 2025.  In the meantime, Dr. Chow's agreement with continuing current therapy with cabazitaxel and other therapies listed above  Pt here prior to next Cabazitaxel treatment, labs reviewed plan to proceed with treatment as scheduled.     5/9/2025 patient here prior to next Xgeva treatment and also lab check prior to beginning radium 223 at [facility] medicine with Dr. Salazar, patient reports increased shortness of breath, continued fatigue, patient found to have anemia of 8, no report of blood loss or melena, ordered iron studies, patient found to have iron deficiency, recommend ferrous gluconate 324 mg p.o. once daily, given symptomatic anemia ordered 1 unit PRBC to be administered next week anticipation to begin radiation treatment with Dr. Salazar,  Patient to prior to next Xgeva treatment, last few plan proceed as  scheduled, recommend increase calcium over-the-counter, and recommendation continue Phos-Nak supplementation for hypophosphatemia     -Recommend pt continue of Lupron, Xgeva, and Zytiga and prednisone  -OxyContin 10 mg was prescribed for pain management, to be taken twice daily every 12 hours but due to lack of response, discontinued it  -will continue to provide refills of his [place] for his cancer related pain, increase dose to 1.5 tablet of Norco 10mg PO Q4 hours prn pain.     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      Hypophosphatemia  -normal on check today    Hypomagnesemia  -pt prescribed mag oxide 400 mg PO QD  -will recheck at next clinic appointment     Hypokalemia  -pt receiving KCL repletion    Low Vit D  -Vit level of 27.7  -prescribed Vit D 50K IU once weekly x 8 weeks     Weight loss  -ordered marinol, increased dose on 1/15/2025  -referred to dietician      Please note that portions of this note were completed with a voice recognition program.      Assessment & Plan        Plan for patient follow-up in 1 month with next Xgeva treatment    Electronically signed by Joshua Dick MD, 05/09/25, 5:04 PM EDT.        Follow Up     I spent 30 minutes caring for Semaj on this date of service. This time includes time spent by me in the following activities:preparing for the visit, reviewing tests, obtaining and/or reviewing a separately obtained history, performing a medically appropriate examination and/or evaluation , counseling and educating the patient/family/caregiver, ordering medications, tests, or procedures, referring and communicating with other health care professionals , documenting information in the medical record, independently interpreting results and communicating that information with the patient/family/caregiver, and care  coordination    Any chemotherapy or immunotherapy or other systemic therapy treatment plan involves a high risk of complications and/or mortality of patient management.    The patient was seen and examined. Work by the provider also included review and/or ordering of lab tests, review and/or ordering of radiology tests, review and/or ordering of medicine tests, discussion with other physicians or providers, independent review of data, obtaining old records, review/summation of old records, and/or other review.    I have reviewed the family history, social history, and past medical history for this patient. Previous information and data has been reviewed and updated as needed. I have reviewed and verified the chief complaint, history, and other documentation. The patient was interviewed and examined in the clinic and the chart reviewed. The previous observations, recommendations, and conclusions were reviewed including those of other providers.     The plan was discussed with the patient and/or family. The patient was given time to ask questions and these questions were answered. At the conclusion of their visit they had no additional questions or concerns and all questions were answered to their satisfaction.    Patient was given instructions and counseling regarding his condition or for health maintenance advice. Please see specific information pulled into the AVS if appropriate.       Patient or patient representative verbalized consent for the use of Ambient Listening during the visit with  Joshua Dick MD for chart documentation. 5/9/2025  17:46 EDT

## 2025-05-12 DIAGNOSIS — D50.9 IRON DEFICIENCY ANEMIA, UNSPECIFIED IRON DEFICIENCY ANEMIA TYPE: Primary | ICD-10-CM

## 2025-05-12 RX ORDER — FERROUS GLUCONATE 324(38)MG
324 TABLET ORAL
Qty: 90 TABLET | Refills: 1 | Status: SHIPPED | OUTPATIENT
Start: 2025-05-12

## 2025-05-14 ENCOUNTER — HOSPITAL ENCOUNTER (OUTPATIENT)
Dept: ONCOLOGY | Facility: HOSPITAL | Age: 70
Discharge: HOME | End: 2025-05-14
Payer: MEDICARE

## 2025-05-14 ENCOUNTER — OFFICE VISIT (OUTPATIENT)
Dept: ONCOLOGY | Facility: HOSPITAL | Age: 70
End: 2025-05-14
Payer: MEDICARE

## 2025-05-14 ENCOUNTER — APPOINTMENT (OUTPATIENT)
Dept: ONCOLOGY | Facility: HOSPITAL | Age: 70
End: 2025-05-14
Payer: MEDICARE

## 2025-05-14 VITALS
HEIGHT: 69 IN | DIASTOLIC BLOOD PRESSURE: 60 MMHG | BODY MASS INDEX: 15.17 KG/M2 | SYSTOLIC BLOOD PRESSURE: 98 MMHG | TEMPERATURE: 98.1 F | RESPIRATION RATE: 16 BRPM | WEIGHT: 102.4 LBS | OXYGEN SATURATION: 98 % | HEART RATE: 97 BPM

## 2025-05-14 VITALS
HEART RATE: 70 BPM | SYSTOLIC BLOOD PRESSURE: 120 MMHG | DIASTOLIC BLOOD PRESSURE: 79 MMHG | TEMPERATURE: 99.1 F | RESPIRATION RATE: 18 BRPM | OXYGEN SATURATION: 100 %

## 2025-05-14 DIAGNOSIS — C79.51 SECONDARY MALIGNANT NEOPLASM OF BONE: ICD-10-CM

## 2025-05-14 DIAGNOSIS — C79.51 PROSTATE CANCER METASTATIC TO BONE: Primary | ICD-10-CM

## 2025-05-14 DIAGNOSIS — D50.9 IRON DEFICIENCY ANEMIA, UNSPECIFIED IRON DEFICIENCY ANEMIA TYPE: Primary | ICD-10-CM

## 2025-05-14 DIAGNOSIS — D50.9 IRON DEFICIENCY ANEMIA, UNSPECIFIED IRON DEFICIENCY ANEMIA TYPE: ICD-10-CM

## 2025-05-14 DIAGNOSIS — C61 PROSTATE CANCER METASTATIC TO BONE: Primary | ICD-10-CM

## 2025-05-14 DIAGNOSIS — C61 PROSTATE CANCER METASTATIC TO BONE: ICD-10-CM

## 2025-05-14 DIAGNOSIS — C79.51 PROSTATE CANCER METASTATIC TO BONE: ICD-10-CM

## 2025-05-14 DIAGNOSIS — C61 PROSTATE CANCER: Primary | ICD-10-CM

## 2025-05-14 DIAGNOSIS — D64.9 ANEMIA, UNSPECIFIED TYPE: ICD-10-CM

## 2025-05-14 DIAGNOSIS — G89.3 CANCER RELATED PAIN: ICD-10-CM

## 2025-05-14 LAB
ABO GROUP BLD: NORMAL
ABO GROUP BLD: NORMAL
ALBUMIN SERPL-MCNC: 3.5 G/DL (ref 3.5–5.2)
ALBUMIN/GLOB SERPL: 1 G/DL
ALP SERPL-CCNC: 638 U/L (ref 39–117)
ALT SERPL W P-5'-P-CCNC: 36 U/L (ref 1–41)
ANION GAP SERPL CALCULATED.3IONS-SCNC: 12.3 MMOL/L (ref 5–15)
AST SERPL-CCNC: 25 U/L (ref 1–40)
BASOPHILS # BLD AUTO: 0.03 10*3/MM3 (ref 0–0.2)
BASOPHILS NFR BLD AUTO: 0.5 % (ref 0–1.5)
BB HOLD TUBE: NORMAL
BILIRUB SERPL-MCNC: <0.2 MG/DL (ref 0–1.2)
BLD GP AB SCN SERPL QL: NEGATIVE
BUN SERPL-MCNC: 25 MG/DL (ref 8–23)
BUN/CREAT SERPL: 25.8 (ref 7–25)
CALCIUM SPEC-SCNC: 7.4 MG/DL (ref 8.6–10.5)
CHLORIDE SERPL-SCNC: 105 MMOL/L (ref 98–107)
CO2 SERPL-SCNC: 16.7 MMOL/L (ref 22–29)
CREAT SERPL-MCNC: 0.97 MG/DL (ref 0.76–1.27)
DEPRECATED RDW RBC AUTO: 50.9 FL (ref 37–54)
EGFRCR SERPLBLD CKD-EPI 2021: 84.5 ML/MIN/1.73
EOSINOPHIL # BLD AUTO: 0.27 10*3/MM3 (ref 0–0.4)
EOSINOPHIL NFR BLD AUTO: 4.7 % (ref 0.3–6.2)
ERYTHROCYTE [DISTWIDTH] IN BLOOD BY AUTOMATED COUNT: 16.9 % (ref 12.3–15.4)
FOLATE SERPL-MCNC: 4.42 NG/ML (ref 4.78–24.2)
GLOBULIN UR ELPH-MCNC: 3.6 GM/DL
GLUCOSE SERPL-MCNC: 151 MG/DL (ref 65–99)
HCT VFR BLD AUTO: 26 % (ref 37.5–51)
HCT VFR BLD AUTO: 26.6 % (ref 37.5–51)
HGB BLD-MCNC: 8.5 G/DL (ref 13–17.7)
HGB BLD-MCNC: 8.7 G/DL (ref 13–17.7)
IMM GRANULOCYTES # BLD AUTO: 0.1 10*3/MM3 (ref 0–0.05)
IMM GRANULOCYTES NFR BLD AUTO: 1.7 % (ref 0–0.5)
LYMPHOCYTES # BLD AUTO: 1.04 10*3/MM3 (ref 0.7–3.1)
LYMPHOCYTES NFR BLD AUTO: 18 % (ref 19.6–45.3)
MCH RBC QN AUTO: 26.9 PG (ref 26.6–33)
MCHC RBC AUTO-ENTMCNC: 32.7 G/DL (ref 31.5–35.7)
MCV RBC AUTO: 82.3 FL (ref 79–97)
MONOCYTES # BLD AUTO: 0.47 10*3/MM3 (ref 0.1–0.9)
MONOCYTES NFR BLD AUTO: 8.1 % (ref 5–12)
NEUTROPHILS NFR BLD AUTO: 3.88 10*3/MM3 (ref 1.7–7)
NEUTROPHILS NFR BLD AUTO: 67 % (ref 42.7–76)
NRBC BLD AUTO-RTO: 0 /100 WBC (ref 0–0.2)
PHOSPHATE SERPL-MCNC: 2.2 MG/DL (ref 2.5–4.5)
PLATELET # BLD AUTO: 293 10*3/MM3 (ref 140–450)
PMV BLD AUTO: 9.9 FL (ref 6–12)
POTASSIUM SERPL-SCNC: 5.3 MMOL/L (ref 3.5–5.2)
PROT SERPL-MCNC: 7.1 G/DL (ref 6–8.5)
RBC # BLD AUTO: 3.16 10*6/MM3 (ref 4.14–5.8)
RH BLD: POSITIVE
RH BLD: POSITIVE
SODIUM SERPL-SCNC: 134 MMOL/L (ref 136–145)
T&S EXPIRATION DATE: NORMAL
VIT B12 BLD-MCNC: 600 PG/ML (ref 211–946)
WBC NRBC COR # BLD AUTO: 5.79 10*3/MM3 (ref 3.4–10.8)

## 2025-05-14 PROCEDURE — 80053 COMPREHEN METABOLIC PANEL: CPT | Performed by: INTERNAL MEDICINE

## 2025-05-14 PROCEDURE — 85025 COMPLETE CBC W/AUTO DIFF WBC: CPT | Performed by: INTERNAL MEDICINE

## 2025-05-14 PROCEDURE — 84100 ASSAY OF PHOSPHORUS: CPT | Performed by: INTERNAL MEDICINE

## 2025-05-14 PROCEDURE — 86850 RBC ANTIBODY SCREEN: CPT | Performed by: INTERNAL MEDICINE

## 2025-05-14 PROCEDURE — 82746 ASSAY OF FOLIC ACID SERUM: CPT | Performed by: INTERNAL MEDICINE

## 2025-05-14 PROCEDURE — 25010000002 HEPARIN LOCK FLUSH PER 10 UNITS: Performed by: INTERNAL MEDICINE

## 2025-05-14 PROCEDURE — 25810000003 SODIUM CHLORIDE 0.9 % SOLUTION: Performed by: PHYSICIAN ASSISTANT

## 2025-05-14 PROCEDURE — 86901 BLOOD TYPING SEROLOGIC RH(D): CPT | Performed by: INTERNAL MEDICINE

## 2025-05-14 PROCEDURE — 85014 HEMATOCRIT: CPT | Performed by: PHYSICIAN ASSISTANT

## 2025-05-14 PROCEDURE — 82607 VITAMIN B-12: CPT | Performed by: INTERNAL MEDICINE

## 2025-05-14 PROCEDURE — 86900 BLOOD TYPING SEROLOGIC ABO: CPT | Performed by: INTERNAL MEDICINE

## 2025-05-14 PROCEDURE — 85018 HEMOGLOBIN: CPT | Performed by: PHYSICIAN ASSISTANT

## 2025-05-14 RX ORDER — HEPARIN SODIUM (PORCINE) LOCK FLUSH IV SOLN 100 UNIT/ML 100 UNIT/ML
500 SOLUTION INTRAVENOUS AS NEEDED
Status: CANCELLED | OUTPATIENT
Start: 2025-05-16

## 2025-05-14 RX ORDER — SODIUM CHLORIDE 9 MG/ML
250 INJECTION, SOLUTION INTRAVENOUS ONCE
Status: COMPLETED | OUTPATIENT
Start: 2025-05-14 | End: 2025-05-14

## 2025-05-14 RX ORDER — HEPARIN SODIUM (PORCINE) LOCK FLUSH IV SOLN 100 UNIT/ML 100 UNIT/ML
500 SOLUTION INTRAVENOUS AS NEEDED
Status: DISCONTINUED | OUTPATIENT
Start: 2025-05-14 | End: 2025-05-15 | Stop reason: HOSPADM

## 2025-05-14 RX ORDER — SODIUM CHLORIDE 0.9 % (FLUSH) 0.9 %
20 SYRINGE (ML) INJECTION AS NEEDED
Status: DISCONTINUED | OUTPATIENT
Start: 2025-05-14 | End: 2025-05-15 | Stop reason: HOSPADM

## 2025-05-14 RX ORDER — SODIUM CHLORIDE 0.9 % (FLUSH) 0.9 %
20 SYRINGE (ML) INJECTION AS NEEDED
Status: CANCELLED | OUTPATIENT
Start: 2025-05-14

## 2025-05-14 RX ORDER — SODIUM CHLORIDE 9 MG/ML
250 INJECTION, SOLUTION INTRAVENOUS ONCE
Status: CANCELLED | OUTPATIENT
Start: 2025-05-14

## 2025-05-14 RX ORDER — HEPARIN SODIUM (PORCINE) LOCK FLUSH IV SOLN 100 UNIT/ML 100 UNIT/ML
500 SOLUTION INTRAVENOUS AS NEEDED
Status: CANCELLED | OUTPATIENT
Start: 2025-05-14

## 2025-05-14 RX ADMIN — Medication 20 ML: at 11:00

## 2025-05-14 RX ADMIN — SODIUM CHLORIDE 250 ML: 9 INJECTION, SOLUTION INTRAVENOUS at 12:40

## 2025-05-14 RX ADMIN — HEPARIN 500 UNITS: 100 SYRINGE at 15:42

## 2025-05-14 RX ADMIN — Medication 20 ML: at 15:41

## 2025-05-14 NOTE — PROGRESS NOTES
Chief Complaint/Reason for Referral:  Prostate Cancer (1 week follow up )    Maryse Monae MD Coffie, Ramona N, MD    Records Obtained:  Records of the patients history including those obtained from [facility] EMR were reviewed and summarized in detail.    Subjective    History of Present Illness    Semaj Medina 69 y.o. presents to [facility] for Prostate Cancer    History of Present Illness  The patient presents for evaluation of anemia.    He reports experiencing fatigue, which he attributes to inadequate sleep the previous night. He also notes symptoms of shortness of breath and tachycardia. He has a history of blood transfusions, although not in the recent past.     Patient scheduled for treatment at [facility] tomorrow May 15, 2025 they are requesting patient's hemoglobin be at least 10.0 patient's current hemoglobin is 8.5 we will transfuse 1 unit of packed red blood cells we will obtain H&H posttransfusion.  Patient will have a repeat CBC performed on Friday, May 16, 2025 per Lizett BENITEZ request.    Cancer-related family history includes Cancer in his father and mother.     Oncology/Hematology History Overview Note     Metastatic Castration resistant prostate cancer:    Patient was initially diagnosed with prostate cancer in 2015.  On 6/4/2015 he underwent radical prostatectomy and LN dissection which showed a Oxford 4+3 = 7 prostate cancer.  There were several high risk features such as extraprostatic extension, seminal vesicle invasion, positive margins at the bladder neck and right and left seminal vesicle.  Lymph vascular invasion was indeterminate.  Perineural invasion was not commented on. Final pathology [id number].  He was treated with adjuvant radiation by Dr. Satnton.    According to records the patient likely started Lupron in December 2017 when his PSA started to rise.  The patient took a break in August 2019.  He states he was not aware  he was to continue.  He restarted Lupron on 11/26/2019 after it was noted that his PSA chago from 0.46 up to 2.55 on 11/8/2019.  Unfortunately his PSA continued to rise after that to 4.54 on 3/10/2020.    - restarted Lupron on 11/26/20  - Started Zytiga on 8/27/20.  8/31/2020 PSA 17.95     - Started Xtandi in May 2021 due to intolerance of Zytiga even at the lowest dose  - PSA rising in April 2023 to 2.85, doubling time 3.4 months  - started Docetaxel May 2023    PSMA PET 5/2/23:   1.  Radiotracer avid supra clavicular, thoracic and abdominal adenopathy and sclerotic lesion within T10 vertebral body likely representing metastatic disease.  2.  Indeterminate lesion within the left ilium demonstrating mild uptake.   3.  Asymmetric sclerosis within the superior lateral aspect the left orbit which does not demonstrate significant uptake above that of the right orbit. Findings are nonspecific with  differential considerations including but not limited to metastatic disease, fibrous dysplasia versus Paget's disease.   4.  Sub-6 mm nodule within the left lower lobe.  5.  Scattered indeterminate hypodense lesions throughout the liver which do not demonstrate radiotracer uptake above that of the adjacent liver, best best visualized on recent multiphase CT abdomen and pelvis 04/17/2023. Please refer to this dictation for further information.       Prostate cancer metastatic to bone   12/11/2020 -  Chemotherapy    OP SUPPORTIVE Leuprolide 45 mg Q6M     3/23/2021 -  Chemotherapy    OP SUPPORTIVE Denosumab (Xgeva) Q28D     4/15/2021 - 5/13/2021 Chemotherapy    OP PROSTATE Enzalutamide     5/19/2021 Initial Diagnosis    Prostate cancer metastatic to bone (CMS/HCC)     6/2/2023 - 9/15/2023 Biopsy    OP PROSTATE DOCEtaxel  Plan Provider: Natalya Jack MD PhD  Treatment goal: Palliative  Line of treatment: [No plan line of treatment]     12/19/2023 - 12/19/2023 Chemotherapy    OP PROSTATE Apalutamide     1/26/2024 - 1/26/2024  Biopsy    OP PROSTATE Abiraterone / PredniSONE  Plan Provider: Joshua Dick MD  Treatment goal: Control  Line of treatment: [No plan line of treatment]     2/12/2025 - 3/26/2025 Chemotherapy    OP PROSTATE Cabazitaxel / PredniSONE     Prostate cancer   6/16/2021 Initial Diagnosis    Prostate cancer (HCC)     6/2/2023 -  Chemotherapy    OP CENTRAL VENOUS ACCESS DEVICE ACCESS, CARE, AND MAINTENANCE (CVAD)     Secondary malignant neoplasm of bone   11/4/2021 Initial Diagnosis    Secondary malignant neoplasm of bone (HCC)     11/23/2021 - 12/9/2021 Radiation    Radiation OncologyTreatment Course:  Semaj Medina received 3000 cGy in 10 fractions to the T9-T11 spine.        Review of Systems   Constitutional:  Positive for fatigue.   Musculoskeletal:  Positive for back pain.   Neurological:  Positive for weakness.   All other systems reviewed and are negative.    Current Outpatient Medications on File Prior to Visit   Medication Sig Dispense Refill    amLODIPine (NORVASC) 5 MG tablet Take 1 tablet by mouth Daily. 30 tablet 0    Calcium Carbonate-Vitamin D (Calcium Plus Vitamin D) 500-1.25 MG-MCG capsule Take 1 capsule by mouth Daily. 30 capsule 5    HYDROcodone-acetaminophen (NORCO)  MG per tablet Take 1.5 tablets by mouth Every 4 (Four) Hours As Needed for Moderate Pain. 135 tablet 0    ibuprofen (ADVIL,MOTRIN) 800 MG tablet TAKE 1 TABLET BY MOUTH EVERY 6 - 8 HOURS AS NEEDED FOR PAIN      leuprolide (Lupron Depot, 3-Month,) 22.5 MG injection Inject 22.5 mg into the appropriate muscle as directed by prescriber Every 3 (Three) Months.      lisinopril (PRINIVIL,ZESTRIL) 40 MG tablet Take 1 tablet by mouth Daily. 90 tablet 0    ondansetron (ZOFRAN) 8 MG tablet Take 1 tablet by mouth Every 8 (Eight) Hours As Needed for Nausea or Vomiting. 90 tablet 3    abiraterone acetate (ZYTIGA) 500 MG tablet Take 2 tablets by mouth Daily. (Patient not taking: Reported on 5/14/2025) 60 tablet 11    dronabinol (Marinol) 5 MG  capsule Take 2 capsules by mouth Daily. (Patient not taking: Reported on 5/14/2025) 60 capsule 1    ferrous gluconate (FERGON) 324 MG tablet Take 1 tablet by mouth Daily With Breakfast. (Patient not taking: Reported on 5/14/2025) 90 tablet 1    naloxone (NARCAN) 4 MG/0.1ML nasal spray  (Patient not taking: Reported on 5/14/2025)      potassium & sodium phosphates (PHOS-NAK) 280-160-250 MG pack packet Take 2 packets by mouth 3 (Three) Times a Day. (Patient not taking: Reported on 5/14/2025) 120 packet 4    potassium chloride (MICRO-K) 10 MEQ CR capsule TAKE 2 CAPSULES BY MOUTH DAILY (Patient not taking: Reported on 5/14/2025) 180 capsule 0    predniSONE (DELTASONE) 5 MG tablet Take 1 tablet by mouth 2 (Two) Times a Day. (Patient not taking: Reported on 5/14/2025) 60 tablet 11     Current Facility-Administered Medications on File Prior to Visit   Medication Dose Route Frequency Provider Last Rate Last Admin    heparin injection 500 Units  500 Units Intravenous PRN Joshua Dick MD        sodium chloride 0.9 % flush 20 mL  20 mL Intravenous PRN Joshua Dick MD   20 mL at 05/14/25 1100     No Known Allergies  Past Medical History:   Diagnosis Date    Anemia     NO PROBLEMS    Anxiety     Blood disease     Colon polyps     Depression     Diverticulitis     Forgetfulness     Hepatitis C     RESOLVED    Hypertension     Night sweats     Numbness and tingling of left lower extremity     Prostate cancer      Past Surgical History:   Procedure Laterality Date    COLONOSCOPY      PEG TUBE INSERTION N/A 2/24/2025    Procedure: PERCUTANEOUS ENDOSCOPIC GASTROSTOMY TUBE INSERTION and biopsy;  Surgeon: Waldemar Iraheta MD;  Location: [facility];  Service: General;  Laterality: N/A;  gastric musocal abnormality    PROSTATE BIOPSY      PROSTATECTOMY      ROBOT ASSISTED W PELVIC LYMPH NODE DISSECTION    TRANSURETHRAL RESECTION OF BLADDER TUMOR      VENOUS ACCESS DEVICE (PORT) INSERTION Right 5/22/2023    Procedure: INSERTION  "VENOUS ACCESS PORT;  Surgeon: Waldemar Iraheta MD;  Location: [facility] [place];  Service: General;  Laterality: Right;     Social History     Socioeconomic History    Marital status:    Tobacco Use    Smoking status: Former     Current packs/day: 0.25     Average packs/day: 0.3 packs/day for 54.6 years (13.6 ttl pk-yrs)     Types: Cigarettes     Start date: 10/21/1970     Passive exposure: Past    Smokeless tobacco: Never    Tobacco comments:     INSTRUCTED NO SMOKING 24 HR PRIOR TO SURGERY PER ANESTHESIA      1000 PM 02-23-25   Vaping Use    Vaping status: Never Used   Substance and Sexual Activity    Alcohol use: Not Currently     Alcohol/week: 6.0 standard drinks of alcohol     Types: 6 Cans of beer per week     Comment: 12 pack a week of beer    Drug use: Never    Sexual activity: Defer     Family History   Problem Relation Age of Onset    Cancer Mother     Cancer Father      Immunization History   Administered Date(s) Administered    COVID-19 (PFIZER) Purple Cap Monovalent 03/07/2021, 04/04/2021, 12/14/2021    Flu Vaccine Quad PF >36MO 11/13/2018    Flu Vaccine Split Quad 10/21/2019    Fluzone (or Fluarix & Flulaval for VFC) >6mos 11/13/2018    Influenza, Unspecified 08/22/2022    Pneumococcal Conjugate 20-Valent (PCV20) 07/25/2022    Shingrix 08/23/2022, 02/06/2023    Tdap 08/23/2022     Tobacco Use: Medium Risk (5/14/2025)    Patient History     Smoking Tobacco Use: Former     Smokeless Tobacco Use: Never     Passive Exposure: Past     Objective   Vitals:    05/14/25 1116   BP: 98/60   Pulse: 97   Resp: 16   Temp: 98.1 °F (36.7 °C)   TempSrc: Temporal   SpO2: 98%   Weight: 46.4 kg (102 lb 6.4 oz)   Height: 175 cm (68.9\")   PainSc: 6    PainLoc: Generalized      Physical Exam  Vitals and nursing note reviewed.   Constitutional:       General: He is not in acute distress.     Appearance: Normal appearance. He is not ill-appearing.   HENT:      Head: Normocephalic.   Eyes:      Conjunctiva/sclera: " Conjunctivae normal.   Cardiovascular:      Rate and Rhythm: Normal rate and regular rhythm.      Heart sounds: Normal heart sounds.   Pulmonary:      Effort: Pulmonary effort is normal.      Breath sounds: Normal breath sounds.   Skin:     Coloration: Skin is not jaundiced.   Neurological:      Mental Status: He is alert.   Psychiatric:         Mood and Affect: Mood normal.         Behavior: Behavior normal. Behavior is cooperative.         Thought Content: Thought content normal.         Judgment: Judgment normal.       Wt Readings from Last 3 Encounters:   05/14/25 46.4 kg (102 lb 6.4 oz)   05/09/25 45.7 kg (100 lb 12.8 oz)   03/26/25 49.2 kg (108 lb 7.5 oz)      Body mass index is 15.17 kg/m².    ECOG score: 1       ECOG: (1) Restricted in Physically Strenuous Activity, Ambulatory & Able to Do Work of Light Nature  Fall Risk Assessment was completed, and patient is at moderate risk for falls.  PHQ-9 Total Score:         The patient is  experiencing fatigue. Fatigue score: 7    PT/OT Functional Screening:   Speech Functional Screening:   Rehab to be ordered:     Vitals:    05/14/25 1116   PainSc: 6    PainLoc: Generalized           Semaj Dylon Medina reports a pain score of 6.  Given his pain assessment as noted, treatment options were discussed and the following options were decided upon as a follow-up plan to address the patient's pain: continuation of current treatment plan for pain.     PHQ-2 Depression Screening  Little interest or pleasure in doing things? Not at all   Feeling down, depressed, or hopeless? Not at all   PHQ-2 Total Score 0     Result Review :  The following data was reviewed by: Albert Ojeda PA-C on 05/14/2025:  RED BLOOD CELLs:  Lab Results   Component Value Date    RBC 3.16 (L) 05/14/2025    HGB 8.5 (L) 05/14/2025    HCT 26.0 (L) 05/14/2025    MCV 82.3 05/14/2025     05/14/2025      WHITE BLOOD CELLs:  Lab Results   Component Value Date    WBC 5.79 05/14/2025    NEUTROABS 3.88  05/14/2025    LYMPHSABS 1.04 05/14/2025    MONOABS 0.5 05/01/2025    EOSABS 0.27 05/14/2025    BASOSABS 0.03 05/14/2025     RBC EVALUATION (MICROCYTOSIS/NORMOCYTOSIS):  Lab Results   Component Value Date    MCV 82.3 05/14/2025    IRON 20 (L) 05/09/2025    FERRITIN 376.40 05/09/2025    LABIRON 11 (L) 05/09/2025    TIBC 186 (L) 05/09/2025    TRANSFERRIN 125 (L) 05/09/2025     HEMOLYSIS EVALUATION:  Lab Results   Component Value Date    MCV 82.3 05/14/2025     MACROCYTOSIS EVALUATION:  Lab Results   Component Value Date    MCV 82.3 05/14/2025    [id number] 424 03/20/2025    FOLATE 6.9 03/20/2025    METHLMALAC 223 05/16/2019     RENAL FUNCTION TESTs:  Lab Results   Component Value Date    EGFR 84.5 05/14/2025    BUN 25 (H) 05/14/2025     LIVER FUNCTION TESTs:  Lab Results   Component Value Date    ALT 36 05/14/2025    AST 25 05/14/2025    BILITOT <0.2 05/14/2025    ALKPHOS 638 (H) 05/14/2025    PROTEINTOT 7.1 05/14/2025    ALBUMIN 3.5 05/14/2025     GLUCOSE EVALUATION:  Lab Results   Component Value Date    GLUCOSE 151 (H) 05/14/2025     SERUM CHEMISTRIES:  Lab Results   Component Value Date     (L) 05/14/2025    K 5.3 (H) 05/14/2025     05/14/2025    CO2 16.7 (L) 05/14/2025    CALCIUM 7.4 (L) 05/14/2025     THYROID FUNCTION TESTs:  TSH   Date Value Ref Range Status   05/16/2019 1.350 0.270 - 4.200 m[iU]/L Final     TUMOR MARKERS:  Lab Results   Component Value Date    PSA >1,500.00 (H) 05/01/2025     HEMATOLOGY SPECIAL STUDIEs:  Lab Results   Component Value Date    PLTCITRATED 442 (H) 12/11/2020     No Images in the past 120 days found..    Results         Assessment and Plan:  Diagnoses and all orders for this visit:    1. Prostate cancer metastatic to bone (Primary)  -     Cancel: Hold Transfusion and Notify Physician; Standing  -     Cancel: Nursing communication: Transfusion Reaction Management; Standing  -     Cancel: Verify Informed Consent for Transfusion; Standing  -     Cancel: Prepare RBC, 1  Units; Standing  -     Cancel: Transfuse RBC, 1 Units Infuse Each Unit Over: 2H; Standing  -     Cancel: sodium chloride 0.9 % infusion 250 mL  -     Type and screen; Future  -     Hemoglobin & Hematocrit, Blood  -     CBC & Differential; Future    2. Cancer related pain    3. Iron deficiency anemia, unspecified iron deficiency anemia type  -     Cancel: Hold Transfusion and Notify Physician; Standing  -     Cancel: Nursing communication: Transfusion Reaction Management; Standing  -     Cancel: Verify Informed Consent for Transfusion; Standing  -     Cancel: Prepare RBC, 1 Units; Standing  -     Cancel: Transfuse RBC, 1 Units Infuse Each Unit Over: 2H; Standing  -     Cancel: sodium chloride 0.9 % infusion 250 mL  -     Type and screen; Future  -     Hemoglobin & Hematocrit, Blood  -     CBC & Differential; Future    4. Secondary malignant neoplasm of bone  -     Cancel: Hold Transfusion and Notify Physician; Standing  -     Cancel: Nursing communication: Transfusion Reaction Management; Standing  -     Cancel: Verify Informed Consent for Transfusion; Standing  -     Cancel: Prepare RBC, 1 Units; Standing  -     Cancel: Transfuse RBC, 1 Units Infuse Each Unit Over: 2H; Standing  -     Cancel: sodium chloride 0.9 % infusion 250 mL  -     Type and screen; Future      Assessment & Plan  Anemia  - Reports feeling very tired, short of breath, and having a fast heart rate  - Administer a unit of blood today to address anemia  - Post-transfusion, assess hemoglobin levels  - Communicate hemoglobin levels to [facility] to ensure adequacy for scheduled procedure at 0000 hours    -CBC on Friday per [facility]    -Encouraged patient to remain up to date on all recommended preventative medicine services specific for their sex and age.  Some examples include:  Diabetes screening, Hypertensive screening, Immunizations, Lipid screenings (cholesterol), Depression screenings, Colorectal cancer screening, HIV screening, Cervical cancer screening,  Breast cancer screening, Osteoporosis screening, Alcohol screening, Dental screening, Tobacco Use screening and Dietary screening    Patient Follow Up: 1-2 weeks with Dr. Dick    I spent 30 minutes caring for Semaj on this date of service. This time includes time spent by me in the following activities:preparing for the visit, reviewing tests, obtaining and/or reviewing a separately obtained history, performing a medically appropriate examination and/or evaluation , counseling and educating the patient/family/caregiver, ordering medications, tests, or procedures, documenting information in the medical record, independently interpreting results and communicating that information with the patient/family/caregiver, and care coordination    Patient was given instructions and counseling regarding his condition or for health maintenance advice. Please see specific information pulled into the AVS if appropriate.     Patient or patient representative verbalized consent for the use of Ambient Listening during the visit with  Albert Ojeda PA-C for chart documentation. 5/14/2025  13:10 EDT

## 2025-05-14 NOTE — PROGRESS NOTES
Chief Complaint/Care Team   No chief complaint on file.    Maryse Monae MD Coffie, Ramona N, MD    History of Present Illness     Diagnosis: Metastatic Castration Resistant Prostate Cancer    Current Treatment: Lupron, Zytiga and Prednisone, Cabazitaxel    Previous Treatment:   Metastatic Castration resistant prostate cancer:    Patient was initially diagnosed with prostate cancer in 2015.  On 6/4/2015 he underwent radical prostatectomy and LN dissection which showed a Star 4+3 = 7 prostate cancer.  There were several high risk features such as extraprostatic extension, seminal vesicle invasion, positive margins at the bladder neck and right and left seminal vesicle.  Lymph vascular invasion was indeterminate.  Perineural invasion was not commented on. Final pathology [id number] R1.  He was treated with adjuvant radiation by Dr. Stanton.    According to records the patient likely started Lupron in December 2017 when his PSA started to rise.  The patient took a break in August 2019.  He states he was not aware he was to continue.  He restarted Lupron on 11/26/2019 after it was noted that his PSA chago from 0.46 up to 2.55 on 11/8/2019.  Unfortunately his PSA continued to rise after that to 4.54 on 3/10/2020.    - restarted Lupron on 11/26/20  - Started Zytiga on 8/27/20.  8/31/2020 PSA 17.95     - Started Xtandi in May 2021 due to intolerance of Zytiga even at the lowest dose  - PSA rising in April 2023 to 2.85, doubling time 3.4 months  - started Docetaxel May 2023  -Pluvicto at [facility] which began in  8/1/2024- stopped in 2/2025 due to increase in PSA    Semaj Medina is a 69 y.o. male who presents to [facility]EMATOLOGY [facility] ONCOLOGY for Metastatic Castration Resistant Prostate Cancer.    History of Present Illness    The patient was previously [place]. Salazar[place] [facility] shared [place] was not a candidate for [place]. However, he would be a suitable  candidate for Pluvicto, he has referred him to nuclear medicine specialist at [facility]. A consultation with a nuclear medicine specialist was scheduled for evaluation for Pluvicto. His current medication regimen includes Zytiga and prednisone.      Patient reports experiencing back and neck pain, which subsides upon administration of his pain medication. He typically takes his pain medication every 4 hours. He is here for evaluation prior to next Xgeva treatment.  He began Pluvicto treatment on August 1, 2024, given every 6 weeks, but was stopped in 2/2025 due to progression of disease. Pt here with his dtr prior to next cycle of cabazitaxel. Pt continues to lose weight despite his attempts to increase caloric intake and with increased dose of marinol.  Patient had feeding tube placed, he is tolerating 4 cans of feeding,  he continues follow-up with dietitian.  Patient was evaluated at [facility] with Dr. Chow, who recommended stopping cabazitaxel, zytiga/prednisone but continue Xgeva and Lupron along, pt is pending start of [place] 223 at [facility] with Dr. Salazar, pt here also for lab check.      Review of Systems     Oncology/Hematology History Overview Note     Metastatic Castration resistant prostate cancer:    Patient was initially diagnosed with prostate cancer in 2015.  On 6/4/2015 he underwent radical prostatectomy and LN dissection which showed a Zach 4+3 = 7 prostate cancer.  There were several high risk features such as extraprostatic extension, seminal vesicle invasion, positive margins at the bladder neck and right and left seminal vesicle.  Lymph vascular invasion was indeterminate.  Perineural invasion was not commented on. Final pathology [id number] R1.  He was treated with adjuvant radiation by Dr. Stanton.    According to records the patient likely started Lupron in December 2017 when his PSA started to rise.  The patient took a break in August 2019.  He states he was not aware he was to  continue.  He restarted Lupron on 11/26/2019 after it was noted that his PSA chago from 0.46 up to 2.55 on 11/8/2019.  Unfortunately his PSA continued to rise after that to 4.54 on 3/10/2020.    - restarted Lupron on 11/26/20  - Started Zytiga on 8/27/20.  8/31/2020 PSA 17.95     - Started Xtandi in May 2021 due to intolerance of Zytiga even at the lowest dose  - PSA rising in April 2023 to 2.85, doubling time 3.4 months  - started Docetaxel May 2023    PSMA PET 5/2/23:   1.  Radiotracer avid supra clavicular, thoracic and abdominal adenopathy and sclerotic lesion within T10 vertebral body likely representing metastatic disease.  2.  Indeterminate lesion within the left ilium demonstrating mild uptake.   3.  Asymmetric sclerosis within the superior lateral aspect the left orbit which does not demonstrate significant uptake above that of the right orbit. Findings are nonspecific with  differential considerations including but not limited to metastatic disease, fibrous dysplasia versus Paget's disease.   4.  Sub-6 mm nodule within the left lower lobe.  5.  Scattered indeterminate hypodense lesions throughout the liver which do not demonstrate radiotracer uptake above that of the adjacent liver, best best visualized on recent multiphase CT abdomen and pelvis 04/17/2023. Please refer to this dictation for further information.       Prostate cancer metastatic to bone   12/11/2020 -  Chemotherapy    OP SUPPORTIVE Leuprolide 45 mg Q6M     3/23/2021 -  Chemotherapy    OP SUPPORTIVE Denosumab (Xgeva) Q28D     4/15/2021 - 5/13/2021 Chemotherapy    OP PROSTATE Enzalutamide     5/19/2021 Initial Diagnosis    Prostate cancer metastatic to bone (CMS/HCC)     6/2/2023 - 9/15/2023 Biopsy    OP PROSTATE DOCEtaxel  Plan Provider: Natalya Jack MD PhD  Treatment goal: Palliative  Line of treatment: [No plan line of treatment]     12/19/2023 - 12/19/2023 Chemotherapy    OP PROSTATE Apalutamide     1/26/2024 - 1/26/2024 Biopsy    OP  PROSTATE Abiraterone / PredniSONE  Plan Provider: Joshua Dick MD  Treatment goal: Control  Line of treatment: [No plan line of treatment]     2/12/2025 - 3/26/2025 Chemotherapy    OP PROSTATE Cabazitaxel / PredniSONE     Prostate cancer   6/16/2021 Initial Diagnosis    Prostate cancer (HCC)     6/2/2023 -  Chemotherapy    OP CENTRAL VENOUS ACCESS DEVICE ACCESS, CARE, AND MAINTENANCE (CVAD)     Secondary malignant neoplasm of bone   11/4/2021 Initial Diagnosis    Secondary malignant neoplasm of bone (HCC)     11/23/2021 - 12/9/2021 Radiation    Radiation OncologyTreatment Course:  Semaj Medina received 3000 cGy in 10 fractions to the T9-T11 spine.          Objective     There were no vitals filed for this visit.                      PHQ-9 Total Score:         Physical Exam  Vitals and nursing note reviewed.   Constitutional:       General: He is not in acute distress.     Appearance: Normal appearance. He is not ill-appearing.   HENT:      Head: Normocephalic.   Eyes:      Conjunctiva/sclera: Conjunctivae normal.   Cardiovascular:      Rate and Rhythm: Normal rate and regular rhythm.      Heart sounds: Normal heart sounds.   Pulmonary:      Effort: Pulmonary effort is normal.      Breath sounds: Normal breath sounds.   Skin:     Coloration: Skin is not jaundiced.   Neurological:      Mental Status: He is alert.   Psychiatric:         Mood and Affect: Mood normal.         Behavior: Behavior normal. Behavior is cooperative.         Thought Content: Thought content normal.         Judgment: Judgment normal.         Physical Exam        Past Medical History     Past Medical History:   Diagnosis Date    Anemia     NO PROBLEMS    Anxiety     Blood disease     Colon polyps     Depression     Diverticulitis     Forgetfulness     Hepatitis C     RESOLVED    Hypertension     Night sweats     Numbness and tingling of left lower extremity     Prostate cancer      Current Outpatient Medications on File Prior to Visit    Medication Sig Dispense Refill    abiraterone acetate (ZYTIGA) 500 MG tablet Take 2 tablets by mouth Daily. (Patient not taking: Reported on 5/9/2025) 60 tablet 11    amLODIPine (NORVASC) 5 MG tablet Take 1 tablet by mouth Daily. 30 tablet 0    Calcium Carbonate-Vitamin D (Calcium Plus Vitamin D) 500-1.25 MG-MCG capsule Take 1 capsule by mouth Daily. 30 capsule 5    dronabinol (Marinol) 5 MG capsule Take 2 capsules by mouth Daily. 60 capsule 1    ferrous gluconate (FERGON) 324 MG tablet Take 1 tablet by mouth Daily With Breakfast. 90 tablet 1    HYDROcodone-acetaminophen (NORCO)  MG per tablet Take 1.5 tablets by mouth Every 4 (Four) Hours As Needed for Moderate Pain. 135 tablet 0    ibuprofen (ADVIL,MOTRIN) 800 MG tablet TAKE 1 TABLET BY MOUTH EVERY 6 - 8 HOURS AS NEEDED FOR PAIN      leuprolide (Lupron [place], 3-Month,) 22.5 MG injection Inject 22.5 mg into the appropriate muscle as directed by prescriber Every 3 (Three) Months.      lisinopril (PRINIVIL,ZESTRIL) 40 MG tablet Take 1 tablet by mouth Daily. 90 tablet 0    naloxone (NARCAN) 4 MG/0.1ML nasal spray  (Patient not taking: Reported on 5/9/2025)      ondansetron (ZOFRAN) 8 MG tablet Take 1 tablet by mouth Every 8 (Eight) Hours As Needed for Nausea or Vomiting. 90 tablet 3    potassium & sodium phosphates (PHOS-NAK) 280-160-250 MG pack packet Take 2 packets by mouth 3 (Three) Times a Day. 120 packet 4    potassium chloride (MICRO-K) 10 MEQ CR capsule TAKE 2 CAPSULES BY MOUTH DAILY 180 capsule 0    predniSONE (DELTASONE) 5 MG tablet Take 1 tablet by mouth 2 (Two) Times a Day. (Patient not taking: Reported on 5/9/2025) 60 tablet 11     No current facility-administered medications on file prior to visit.      No Known Allergies  Past Surgical History:   Procedure Laterality Date    COLONOSCOPY      PEG TUBE INSERTION N/A 2/24/2025    Procedure: PERCUTANEOUS ENDOSCOPIC GASTROSTOMY TUBE INSERTION and biopsy;  Surgeon: Waldemar Iraheta MD;  Location:   [facility];  Service: General;  Laterality: N/A;  gastric musocal abnormality    PROSTATE BIOPSY      PROSTATECTOMY      ROBOT ASSISTED W PELVIC LYMPH NODE DISSECTION    TRANSURETHRAL RESECTION OF BLADDER TUMOR      VENOUS ACCESS DEVICE (PORT) INSERTION Right 5/22/2023    Procedure: INSERTION VENOUS ACCESS PORT;  Surgeon: Waldemar Iraheta MD;  Location:  [facility];  Service: General;  Laterality: Right;     Social History     Socioeconomic History    Marital status:    Tobacco Use    Smoking status: Former     Current packs/day: 0.25     Average packs/day: 0.3 packs/day for 54.6 years (13.6 ttl pk-yrs)     Types: Cigarettes     Start date: 10/21/1970     Passive exposure: Past    Smokeless tobacco: Never    Tobacco comments:     INSTRUCTED NO SMOKING 24 HR PRIOR TO SURGERY PER ANESTHESIA      1000 PM 02-23-25   Vaping Use    Vaping status: Never Used   Substance and Sexual Activity    Alcohol use: Not Currently     Alcohol/week: 6.0 standard drinks of alcohol     Types: 6 Cans of beer per week     Comment: 12 pack a week of beer    Drug use: Never    Sexual activity: Defer     Family History   Problem Relation Age of Onset    Cancer Mother     Cancer Father        Results     Result Review   The following data was reviewed by: Albert Ojeda PA-C on 06/28/2024:  Lab Results   Component Value Date    HGB 8.1 (L) 05/09/2025    HCT 23.9 (L) 05/09/2025    MCV 81.6 05/09/2025     05/09/2025    WBC 5.63 05/09/2025    NEUTROABS 4.27 05/09/2025    LYMPHSABS 0.63 (L) 05/09/2025    MONOSABS 0.45 05/09/2025    EOSABS 0.21 05/09/2025    BASOSABS 0.02 05/09/2025     Lab Results   Component Value Date    GLUCOSE 140 (H) 05/09/2025    BUN 33 (H) 05/09/2025    CREATININE 0.88 05/09/2025     (L) 05/09/2025    K 5.2 05/09/2025     05/09/2025    CO2 16.6 (L) 05/09/2025    CALCIUM 7.0 (L) 05/09/2025    PROTEINTOT 7.0 05/09/2025    ALBUMIN 3.2 (L) 05/09/2025    BILITOT 0.2 05/09/2025    ALKPHOS 753 (H)  05/09/2025    AST 83 (H) 05/09/2025    ALT 75 (H) 05/09/2025     Lab Results   Component Value Date    MG 2.3 05/09/2025    PHOS 2.0 (L) 05/09/2025    TSH 1.350 05/16/2019       No radiology results for the last day       Assessment & Plan     Diagnoses and all orders for this visit:    1. Iron deficiency anemia, unspecified iron deficiency anemia type (Primary)    2. Prostate cancer metastatic to bone    3. Secondary malignant neoplasm of bone        Semaj Medina is a 69 y.o. male who presents to [facility]EMATOLOGY [facility] ONCOL[facility]Y for treatment of  metastatic prostate cancer, completed 6 cycles of docetaxel on 9/15/2023, Lupron Q6 months, Pt also receiving Xgeva and he is here prior to next Xgeva infusion  -next Lupron injection due on 6/18/2024  -PSA from 12/15/23 was 8 up from 3.1 (in 10/2023)  -Discussed result of [place] bone scan, CT CAP from 1/24/2024 which revealed interval enlargement T10 vertebral body metastasis, no evidence of intrathoracic metastatic disease, slight worsening appearance of osteoblastic schedule static disease, notable at T10, T8, and T5, no evidence of intra-abdominal or pelvic metastatic disease.  -Patient has undergone radiation therapy 10 fractions to thoracic spine from T9-T11 in 2021.  -Also shared plan to reattempt treatment with Zytiga and prednisone, patient reports some nausea vomiting which we will attempt to manage with antiemetics.  -Orders placed for Zytiga and prednisone, also provided prescriptions for Compazine and Zofran for patient to begin once he begins treatment with these medications.  -pt began zytiga/prednisone on 2/2/2024, pt tolerating it well, no evidence of toxicity on labs, recommend he continue at current dose     -discussed results of restaging imaging from 4/24/2024 which showed stable radiotracer uptake involving sclerotic osseous metastasis of the left frontal bone above left orbit and at T10, no findings of osseous progression, CT chest  abdomen pelvis revealed stable sclerotic osseous metastasis, no suspicious adenopathy, postsurgical changes of prostatectomy.     -Given presence of continued osseous metastasis, patient with continued pain in area of metastasis, and since he has previously received radiation in this area, referred him to [facility] school medicine for consideration for treatment with [place] 223 or Pluvicto.  -Shared results of PSMA PET/CT scan from 6/13/2024 which revealed progression of disease, patient has been referred to nuclear medicine specialist at [facility] for consideration for Pluvicto,pt began Pluvicto on 8/1/2024.  -Obtained PSMA PET CT scan in 12/2024, it revealed mixed response, discussed case with [place] physician Dr. Freed on 12/27/24 and we both decided to have pt complete pluvicto treatment to see if osseous areas of disease could be treated and will rescan pt after completion of Pluvicto.    2/12/2025: Given continued rise of PSA patient case discussed with Dr. Freed who agreed patient is not responding Pluvicto treatment thus plan switch patient to cabazitaxel cycle 1 day 1 on 2/12/2025, labs reviewed plan to proceed as scheduled today. Pt also with continued weight loss despite use of protein supplements and marinol, pt shares he has has an appetite but has difficulty eating due to change in his taste buds. Thus, discussed referral for feeding tube placement. Tube feedings will be managed by dietician Natalya.     3/5/2025: Patient here prior to cycle 2 of cabazitaxel, he continues to take Zytiga prednisone, Xgeva and Lupron, patient has lost 2 additional pounds, patient had feeding tube placed in February 2025, tolerating 3 cans of tube feeding per day, PSA pending from today.  However also order guardant 360 today to look for other treatment options in case PSA continues to rise.    3/26/2025: Patient had prior cycle 3 of cabazitaxel, labs reviewed plan to proceed as scheduled today, he continues to take Zytiga prednisone,  Xgeva Lupron.  Patient has lost additional 2 pounds, he continues to tolerate approximately 4 cans of tube feeding per day, patient underwent [id number] testing which was negative for any actionable mutations, patient with low TMB, MSI was not high.  Patient pending PET/CT scan ordered by Dr. Chow at [facility] he plans to follow-up with Dr. Chow after this imaging test in April 2025.  In the meantime, Dr. Chow's agreement with continuing current therapy with cabazitaxel and other therapies listed above  Pt here prior to next Cabazitaxel treatment, labs reviewed plan to proceed with treatment as scheduled.     5/9/2025 patient here prior to next Xgeva treatment and also lab check prior to beginning radium 223 at [facility] with Dr. Salazar, patient reports increased shortness of breath, continued fatigue, patient found to have anemia of 8, no report of blood loss or melena, ordered iron studies, patient found to have iron deficiency, recommend ferrous gluconate 324 mg p.o. once daily, given symptomatic anemia ordered 1 unit PRBC to be administered next week anticipation to begin radiation treatment with Dr. Salazar,  Patient to prior to next Xgeva treatment, last few plan proceed as scheduled, recommend increase calcium over-the-counter, and recommendation continue Phos-Nak supplementation for hypophosphatemia     -Recommend pt continue of Lupron, Xgeva, and Zytiga and prednisone  -OxyContin 10 mg was prescribed for pain management, to be taken twice daily every 12 hours but due to lack of response, discontinued it  -will continue to provide refills of his [place] for his cancer related pain, increase dose to 1.5 tablet of Norco 10mg PO Q4 hours prn pain.     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      Hypophosphatemia  -normal on check  today    Hypomagnesemia  -pt prescribed mag oxide 400 mg PO QD  -will recheck at next clinic appointment     Hypokalemia  -pt receiving KCL repletion    Low Vit D  -Vit level of 27.7  -prescribed Vit D 50K IU once weekly x 8 weeks     Weight loss  -ordered marinol, increased dose on 1/15/2025  -referred to dietician      Please note that portions of this note were completed with a voice recognition program.      Assessment & Plan        Plan for patient follow-up in 1 month with next Xgeva treatment        Follow Up     I spent 30 minutes caring for Semaj on this date of service. This time includes time spent by me in the following activities:preparing for the visit, reviewing tests, obtaining and/or reviewing a separately obtained history, performing a medically appropriate examination and/or evaluation , counseling and educating the patient/family/caregiver, ordering medications, tests, or procedures, referring and communicating with other health care professionals , documenting information in the medical record, independently interpreting results and communicating that information with the patient/family/caregiver, and care coordination    Patient was given instructions and counseling regarding his condition or for health maintenance advice. Please see specific information pulled into the AVS if appropriate.

## 2025-05-15 LAB
BH BB BLOOD EXPIRATION DATE: NORMAL
BH BB BLOOD TYPE BARCODE: 5100
BH BB DISPENSE STATUS: NORMAL
BH BB PRODUCT CODE: NORMAL
BH BB UNIT NUMBER: NORMAL
CROSSMATCH INTERPRETATION: NORMAL
UNIT  ABO: NORMAL
UNIT  RH: NORMAL

## 2025-05-19 ENCOUNTER — HOSPITAL ENCOUNTER (OUTPATIENT)
Dept: ONCOLOGY | Facility: HOSPITAL | Age: 70
Discharge: HOME | End: 2025-05-19
Admitting: INTERNAL MEDICINE
Payer: MEDICARE

## 2025-05-19 DIAGNOSIS — C61 PROSTATE CANCER METASTATIC TO BONE: ICD-10-CM

## 2025-05-19 DIAGNOSIS — D64.9 ANEMIA, UNSPECIFIED TYPE: Primary | ICD-10-CM

## 2025-05-19 DIAGNOSIS — C79.51 PROSTATE CANCER METASTATIC TO BONE: ICD-10-CM

## 2025-05-19 DIAGNOSIS — C61 PROSTATE CANCER: Primary | ICD-10-CM

## 2025-05-19 DIAGNOSIS — G89.3 CANCER RELATED PAIN: ICD-10-CM

## 2025-05-19 DIAGNOSIS — D50.9 IRON DEFICIENCY ANEMIA, UNSPECIFIED IRON DEFICIENCY ANEMIA TYPE: ICD-10-CM

## 2025-05-19 DIAGNOSIS — C79.51 SECONDARY MALIGNANT NEOPLASM OF BONE: ICD-10-CM

## 2025-05-19 LAB
ABO GROUP BLD: NORMAL
BASOPHILS # BLD AUTO: 0.03 10*3/MM3 (ref 0–0.2)
BASOPHILS NFR BLD AUTO: 0.6 % (ref 0–1.5)
BLD GP AB SCN SERPL QL: NEGATIVE
DEPRECATED RDW RBC AUTO: 50.1 FL (ref 37–54)
EOSINOPHIL # BLD AUTO: 0.26 10*3/MM3 (ref 0–0.4)
EOSINOPHIL NFR BLD AUTO: 5 % (ref 0.3–6.2)
ERYTHROCYTE [DISTWIDTH] IN BLOOD BY AUTOMATED COUNT: 17 % (ref 12.3–15.4)
HCT VFR BLD AUTO: 30.3 % (ref 37.5–51)
HGB BLD-MCNC: 9.8 G/DL (ref 13–17.7)
IMM GRANULOCYTES # BLD AUTO: 0.06 10*3/MM3 (ref 0–0.05)
IMM GRANULOCYTES NFR BLD AUTO: 1.1 % (ref 0–0.5)
LYMPHOCYTES # BLD AUTO: 0.78 10*3/MM3 (ref 0.7–3.1)
LYMPHOCYTES NFR BLD AUTO: 14.9 % (ref 19.6–45.3)
MCH RBC QN AUTO: 26.2 PG (ref 26.6–33)
MCHC RBC AUTO-ENTMCNC: 32.3 G/DL (ref 31.5–35.7)
MCV RBC AUTO: 81 FL (ref 79–97)
MONOCYTES # BLD AUTO: 0.45 10*3/MM3 (ref 0.1–0.9)
MONOCYTES NFR BLD AUTO: 8.6 % (ref 5–12)
NEUTROPHILS NFR BLD AUTO: 3.64 10*3/MM3 (ref 1.7–7)
NEUTROPHILS NFR BLD AUTO: 69.8 % (ref 42.7–76)
NRBC BLD AUTO-RTO: 0 /100 WBC (ref 0–0.2)
PLATELET # BLD AUTO: 259 10*3/MM3 (ref 140–450)
PMV BLD AUTO: 9.9 FL (ref 6–12)
RBC # BLD AUTO: 3.74 10*6/MM3 (ref 4.14–5.8)
RH BLD: POSITIVE
T&S EXPIRATION DATE: NORMAL
WBC NRBC COR # BLD AUTO: 5.22 10*3/MM3 (ref 3.4–10.8)

## 2025-05-19 PROCEDURE — 36591 DRAW BLOOD OFF VENOUS DEVICE: CPT

## 2025-05-19 PROCEDURE — 86901 BLOOD TYPING SEROLOGIC RH(D): CPT | Performed by: PHYSICIAN ASSISTANT

## 2025-05-19 PROCEDURE — 25010000002 HEPARIN LOCK FLUSH PER 10 UNITS: Performed by: INTERNAL MEDICINE

## 2025-05-19 PROCEDURE — 86850 RBC ANTIBODY SCREEN: CPT | Performed by: PHYSICIAN ASSISTANT

## 2025-05-19 PROCEDURE — 86900 BLOOD TYPING SEROLOGIC ABO: CPT | Performed by: PHYSICIAN ASSISTANT

## 2025-05-19 PROCEDURE — 85025 COMPLETE CBC W/AUTO DIFF WBC: CPT | Performed by: PHYSICIAN ASSISTANT

## 2025-05-19 RX ORDER — HEPARIN SODIUM (PORCINE) LOCK FLUSH IV SOLN 100 UNIT/ML 100 UNIT/ML
500 SOLUTION INTRAVENOUS AS NEEDED
Status: DISCONTINUED | OUTPATIENT
Start: 2025-05-19 | End: 2025-05-20 | Stop reason: HOSPADM

## 2025-05-19 RX ORDER — HEPARIN SODIUM (PORCINE) LOCK FLUSH IV SOLN 100 UNIT/ML 100 UNIT/ML
500 SOLUTION INTRAVENOUS AS NEEDED
OUTPATIENT
Start: 2025-05-19

## 2025-05-19 RX ORDER — SODIUM CHLORIDE 0.9 % (FLUSH) 0.9 %
20 SYRINGE (ML) INJECTION AS NEEDED
Status: DISCONTINUED | OUTPATIENT
Start: 2025-05-19 | End: 2025-05-20 | Stop reason: HOSPADM

## 2025-05-19 RX ORDER — FOLIC ACID 1 MG/1
1 TABLET ORAL DAILY
Qty: 90 TABLET | Refills: 1 | Status: SHIPPED | OUTPATIENT
Start: 2025-05-19

## 2025-05-19 RX ORDER — SODIUM CHLORIDE 0.9 % (FLUSH) 0.9 %
20 SYRINGE (ML) INJECTION AS NEEDED
OUTPATIENT
Start: 2025-05-19

## 2025-05-19 RX ADMIN — HEPARIN 500 UNITS: 100 SYRINGE at 09:53

## 2025-05-19 RX ADMIN — Medication 20 ML: at 09:52

## 2025-05-20 DIAGNOSIS — C79.51 PROSTATE CANCER METASTATIC TO BONE: Primary | ICD-10-CM

## 2025-05-20 DIAGNOSIS — C61 PROSTATE CANCER METASTATIC TO BONE: Primary | ICD-10-CM

## 2025-05-21 ENCOUNTER — TELEPHONE (OUTPATIENT)
Dept: ONCOLOGY | Facility: HOSPITAL | Age: 70
End: 2025-05-21
Payer: MEDICARE

## 2025-05-23 RX ORDER — HYDROCODONE BITARTRATE AND ACETAMINOPHEN 10; 325 MG/1; MG/1
1.5 TABLET ORAL EVERY 4 HOURS PRN
Qty: 135 TABLET | Refills: 0 | Status: SHIPPED | OUTPATIENT
Start: 2025-05-23

## 2025-05-27 DIAGNOSIS — D64.9 ANEMIA, UNSPECIFIED TYPE: Primary | ICD-10-CM

## 2025-05-28 ENCOUNTER — TELEPHONE (OUTPATIENT)
Dept: ONCOLOGY | Facility: HOSPITAL | Age: 70
End: 2025-05-28
Payer: MEDICARE

## 2025-05-28 NOTE — TELEPHONE ENCOUNTER
LEFT MESSAGE FOR PATIENT IN REGARDS TO LAB/FOLLOW UP SCHEDULED FOR TODAY, ASKED FOR PATIENT TO CALL OFFICE BACK TO RESCHEDULE.

## 2025-05-28 NOTE — TELEPHONE ENCOUNTER
I was notified by LEON hernandez that patient did not want to come in for labs today I called patient via phone emphasize importance of him undergoing lab work for additional treatment of Xpovio at [facility], patient agreeable to have labs rescheduled for Friday, 5/30/2025, have messaged staff to fax lab results to [facility] on Friday after they have resulted, also discussed hospice but patient declined that referral at this time.    Please note that portions of this note were completed with a voice recognition program.    Electronically signed by Joshua Dick MD, 05/28/25, 3:10 PM EDT.

## 2025-05-28 NOTE — TELEPHONE ENCOUNTER
Caller: Semaj Medina Osborne    Relationship:  Self    Best call back number: [phone]    PATIENT CALLED REQUESTING TO CANCEL SAME DAY APPT.    Did the patient call AFTER the start time of their scheduled appointment?  []YES  [x]NO    Was the patient's appointment rescheduled? []YES  [x]NO    Any additional information: PT NOT FEELING WELL & NEEDS TO R/S

## 2025-05-29 NOTE — PROGRESS NOTES
Chief Complaint/Reason for Referral:  Prostate Cancer    Maryse Monae MD Coffie, Ramona N, MD    Records Obtained:  Records of the patients history including those obtained from [facility] EMR were reviewed and summarized in detail.    Subjective    History of Present Illness    Semaj Medina 69 y.o. presents to [facility] HEMATOLOGY & ONCOLOGY for Prostate Cancer    History of Present Illness  The patient presents for evaluation of anemia.    He reports experiencing fatigue, which he attributes to inadequate sleep the previous night. He also notes symptoms of shortness of breath and tachycardia. He has a history of blood transfusions, although not in the recent past.      The patient is a 69-year-old male who presents to the hematology/oncology department for follow-up on his prostate cancer. He needs a hemoglobin level of 10.0 to receive treatment at the [facility] but has been struggling to achieve this level.     Patient scheduled for treatment at [facility] tomorrow May 15, 2025 they are requesting patient's hemoglobin be at least 10.0 patient's current hemoglobin is 8.5 we will transfuse 1 unit of packed red blood cells we will obtain H&H posttransfusion.  Patient will have a repeat CBC performed on Friday, May 16, 2025 per Lizett BENITEZ request.    Cancer-related family history includes Cancer in his father and mother.     Oncology/Hematology History Overview Note     Metastatic Castration resistant prostate cancer:    Patient was initially diagnosed with prostate cancer in 2015.  On 6/4/2015 he underwent radical prostatectomy and LN dissection which showed a Zach 4+3 = 7 prostate cancer.  There were several high risk features such as extraprostatic extension, seminal vesicle invasion, positive margins at the bladder neck and right and left seminal vesicle.  Lymph vascular invasion was indeterminate.  Perineural invasion was not commented on. Final pathology  [id number] R1.  He was treated with adjuvant radiation by Dr. Stanton.    According to records the patient likely started Lupron in December 2017 when his PSA started to rise.  The patient took a break in August 2019.  He states he was not aware he was to continue.  He restarted Lupron on 11/26/2019 after it was noted that his PSA chago from 0.46 up to 2.55 on 11/8/2019.  Unfortunately his PSA continued to rise after that to 4.54 on 3/10/2020.    - restarted Lupron on 11/26/20  - Started Zytiga on 8/27/20.  8/31/2020 PSA 17.95     - Started Xtandi in May 2021 due to intolerance of Zytiga even at the lowest dose  - PSA rising in April 2023 to 2.85, doubling time 3.4 months  - started Docetaxel May 2023    PSMA PET 5/2/23:   1.  Radiotracer avid supra clavicular, thoracic and abdominal adenopathy and sclerotic lesion within T10 vertebral body likely representing metastatic disease.  2.  Indeterminate lesion within the left ilium demonstrating mild uptake.   3.  Asymmetric sclerosis within the superior lateral aspect the left orbit which does not demonstrate significant uptake above that of the right orbit. Findings are nonspecific with  differential considerations including but not limited to metastatic disease, fibrous dysplasia versus Paget's disease.   4.  Sub-6 mm nodule within the left lower lobe.  5.  Scattered indeterminate hypodense lesions throughout the liver which do not demonstrate radiotracer uptake above that of the adjacent liver, best best visualized on recent multiphase CT abdomen and pelvis 04/17/2023. Please refer to this dictation for further information.       Prostate cancer metastatic to bone   12/11/2020 -  Chemotherapy    OP SUPPORTIVE Leuprolide 45 mg Q6M     3/23/2021 -  Chemotherapy    OP SUPPORTIVE Denosumab (Xgeva) Q28D     4/15/2021 - 5/13/2021 Chemotherapy    OP PROSTATE Enzalutamide     5/19/2021 Initial Diagnosis    Prostate cancer metastatic to bone (CMS/HCC)     6/2/2023 - 9/15/2023  Biopsy    OP PROSTATE DOCEtaxel  Plan Provider: Natalya Jack MD PhD  Treatment goal: Palliative  Line of treatment: [No plan line of treatment]     12/19/2023 - 12/19/2023 Chemotherapy    OP PROSTATE Apalutamide     1/26/2024 - 1/26/2024 Biopsy    OP PROSTATE Abiraterone / PredniSONE  Plan Provider: Joshua Dick MD  Treatment goal: Control  Line of treatment: [No plan line of treatment]     2/12/2025 - 3/26/2025 Chemotherapy    OP PROSTATE Cabazitaxel / PredniSONE     Prostate cancer   6/16/2021 Initial Diagnosis    Prostate cancer (HCC)     6/2/2023 -  Chemotherapy    OP CENTRAL VENOUS ACCESS DEVICE ACCESS, CARE, AND MAINTENANCE (CVAD)     Secondary malignant neoplasm of bone   11/4/2021 Initial Diagnosis    Secondary malignant neoplasm of bone (HCC)     11/23/2021 - 12/9/2021 Radiation    Radiation OncologyTreatment Course:  Semaj Medina received 3000 cGy in 10 fractions to the T9-T11 spine.        Review of Systems   Constitutional:  Positive for fatigue.   Respiratory:  Positive for shortness of breath.    Musculoskeletal:  Positive for back pain.   Neurological:  Positive for weakness and headache.   All other systems reviewed and are negative.    Current Outpatient Medications on File Prior to Visit   Medication Sig Dispense Refill    amLODIPine (NORVASC) 5 MG tablet Take 1 tablet by mouth Daily. 30 tablet 0    Calcium Carbonate-Vitamin D (Calcium Plus Vitamin D) 500-1.25 MG-MCG capsule Take 1 capsule by mouth Daily. 30 capsule 5    ferrous gluconate (FERGON) 324 MG tablet Take 1 tablet by mouth Daily With Breakfast. 90 tablet 1    folic acid (FOLVITE) 1 MG tablet Take 1 tablet by mouth Daily. 90 tablet 1    HYDROcodone-acetaminophen (NORCO)  MG per tablet Take 1.5 tablets by mouth Every 4 (Four) Hours As Needed for Moderate Pain. 135 tablet 0    ibuprofen (ADVIL,MOTRIN) 800 MG tablet TAKE 1 TABLET BY MOUTH EVERY 6 - 8 HOURS AS NEEDED FOR PAIN      leuprolide (Lupron Depot, 3-Month,) 22.5 MG  injection Inject 22.5 mg into the appropriate muscle as directed by prescriber Every 3 (Three) Months.      lisinopril (PRINIVIL,ZESTRIL) 40 MG tablet Take 1 tablet by mouth Daily. 90 tablet 0    ondansetron (ZOFRAN) 8 MG tablet Take 1 tablet by mouth Every 8 (Eight) Hours As Needed for Nausea or Vomiting. 90 tablet 3    abiraterone acetate (ZYTIGA) 500 MG tablet Take 2 tablets by mouth Daily. (Patient not taking: Reported on 5/9/2025) 60 tablet 11    dronabinol (Marinol) 5 MG capsule Take 2 capsules by mouth Daily. (Patient not taking: Reported on 5/30/2025) 60 capsule 1    naloxone (NARCAN) 4 MG/0.1ML nasal spray  (Patient not taking: Reported on 5/9/2025)      potassium & sodium phosphates (PHOS-NAK) 280-160-250 MG pack packet Take 2 packets by mouth 3 (Three) Times a Day. (Patient not taking: Reported on 5/30/2025) 120 packet 4    potassium chloride (MICRO-K) 10 MEQ CR capsule TAKE 2 CAPSULES BY MOUTH DAILY (Patient not taking: Reported on 5/30/2025) 180 capsule 0    predniSONE (DELTASONE) 5 MG tablet Take 1 tablet by mouth 2 (Two) Times a Day. (Patient not taking: Reported on 5/9/2025) 60 tablet 11     Current Facility-Administered Medications on File Prior to Visit   Medication Dose Route Frequency Provider Last Rate Last Admin    heparin injection 500 Units  500 Units Intravenous PRN Joshua Dick MD   500 Units at 05/30/25 1356    sodium chloride 0.9 % flush 20 mL  20 mL Intravenous PRN Joshua Dick MD   20 mL at 05/30/25 1356     No Known Allergies  Past Medical History:   Diagnosis Date    Anemia     NO PROBLEMS    Anxiety     Blood disease     Colon polyps     Depression     Diverticulitis     Forgetfulness     Hepatitis C     RESOLVED    Hypertension     Night sweats     Numbness and tingling of left lower extremity     Prostate cancer      Past Surgical History:   Procedure Laterality Date    COLONOSCOPY      PEG TUBE INSERTION N/A 2/24/2025    Procedure: PERCUTANEOUS ENDOSCOPIC GASTROSTOMY TUBE  INSERTION and biopsy;  Surgeon: Waldemar Iraheta MD;  [place] [facility] [place] [facility] [place]  Service: General;  Laterality: N/A;  gastric musocal abnormality    PROSTATE BIOPSY      PROSTATECTOMY      ROBOT ASSISTED W PELVIC LYMPH NODE DISSECTION    TRANSURETHRAL RESECTION OF BLADDER TUMOR      VENOUS ACCESS DEVICE (PORT) INSERTION Right 5/22/2023    Procedure: INSERTION VENOUS ACCESS PORT;  Surgeon: Waldemar Iraheta MD;  [place] [facility] [place] [facility] [place]  Service: General;  Laterality: Right;     Social History     Socioeconomic History    Marital status:    Tobacco Use    Smoking status: Former     Current packs/day: 0.25     Average packs/day: 0.3 packs/day for 54.6 years (13.7 ttl pk-yrs)     Types: Cigarettes     Start date: 10/21/1970     Passive exposure: Past    Smokeless tobacco: Never    Tobacco comments:     INSTRUCTED NO SMOKING 24 HR PRIOR TO SURGERY PER ANESTHESIA      1000 PM 02-23-25   Vaping Use    Vaping status: Never Used   Substance and Sexual Activity    Alcohol use: Not Currently     Alcohol/week: 6.0 standard drinks of alcohol     Types: 6 Cans of beer per week     Comment: 12 pack a week of beer    Drug use: Never    Sexual activity: Defer     Family History   Problem Relation Age of Onset    Cancer Mother     Cancer Father      Immunization History   Administered Date(s) Administered    COVID-19 (PFIZER) Purple Cap Monovalent 03/07/2021, 04/04/2021, 12/14/2021    Flu Vaccine Quad PF >36MO 11/13/2018    Flu Vaccine Split Quad 10/21/2019    Fluzone (or Fluarix & Flulaval for VFC) >6mos 11/13/2018    Influenza, Unspecified 08/22/2022    Pneumococcal Conjugate 20-Valent (PCV20) 07/25/2022    Shingrix 08/23/2022, 02/06/2023    Tdap 08/23/2022     Tobacco Use: Medium Risk (5/30/2025)    Patient History     Smoking Tobacco Use: Former     Smokeless Tobacco Use: Never     Passive Exposure: Past     Objective   Vitals:    05/30/25 1409   BP: 100/80   Pulse: 99   Resp: 18   Temp: 97.9 °F (36.6 °C)   TempSrc:  "Temporal   SpO2: 99%   Weight: 44.9 kg (99 lb)   Height: 175 cm (68.9\")   PainSc: 7    PainLoc: Back        Physical Exam  Vitals and nursing note reviewed.   Constitutional:       General: He is not in acute distress.     Appearance: Normal appearance. He is not ill-appearing.   HENT:      Head: Normocephalic.   Eyes:      Conjunctiva/sclera: Conjunctivae normal.   Cardiovascular:      Rate and Rhythm: Normal rate and regular rhythm.      Heart sounds: Normal heart sounds.   Pulmonary:      Effort: Pulmonary effort is normal.      Breath sounds: Normal breath sounds.   Skin:     Coloration: Skin is not jaundiced.   Neurological:      Mental Status: He is alert.   Psychiatric:         Mood and Affect: Mood normal.         Behavior: Behavior normal. Behavior is cooperative.         Thought Content: Thought content normal.         Judgment: Judgment normal.       Wt Readings from Last 3 Encounters:   05/30/25 44.9 kg (99 lb)   05/14/25 46.4 kg (102 lb 6.4 oz)   05/09/25 45.7 kg (100 lb 12.8 oz)      Body mass index is 14.66 kg/m².    ECOG score: 1       ECOG: (1) Restricted in Physically Strenuous Activity, Ambulatory & Able to Do Work of Light Nature  Fall Risk Assessment was completed, and patient is at moderate risk for falls.  PHQ-9 Total Score:         The patient is  experiencing fatigue. Fatigue score: 7    PT/OT Functional Screening:   Speech Functional Screening:   Rehab to be ordered:     Vitals:    05/30/25 1409   PainSc: 7    PainLoc: Back             Semaj Medina reports a pain score of 7.  Given his pain assessment as noted, treatment options were discussed and the following options were decided upon as a follow-up plan to address the patient's pain: continuation of current treatment plan for pain.     PHQ-2 Depression Screening  Little interest or pleasure in doing things? Not at all   Feeling down, depressed, or hopeless? Not at all   PHQ-2 Total Score 0     Result Review :  The following data was " reviewed by: Albert Ojeda PA-C on 05/14/2025:  RED BLOOD CELLs:  Lab Results   Component Value Date    RBC 3.15 (L) 05/30/2025    HGB 8.3 (L) 05/30/2025    HCT 25.0 (L) 05/30/2025    MCV 79.4 05/30/2025     05/30/2025      WHITE BLOOD CELLs:  Lab Results   Component Value Date    WBC 7.71 05/30/2025    NEUTROABS 6.40 05/30/2025    LYMPHSABS 0.62 (L) 05/30/2025    MONOABS 0.5 05/01/2025    EOSABS 0.02 05/30/2025    BASOSABS 0.01 05/30/2025     RBC EVALUATION (MICROCYTOSIS/NORMOCYTOSIS):  Lab Results   Component Value Date    MCV 79.4 05/30/2025    IRON 20 (L) 05/09/2025    FERRITIN 376.40 05/09/2025    LABIRON 11 (L) 05/09/2025    TIBC 186 (L) 05/09/2025    TRANSFERRIN 125 (L) 05/09/2025     HEMOLYSIS EVALUATION:  Lab Results   Component Value Date    MCV 79.4 05/30/2025     MACROCYTOSIS EVALUATION:  Lab Results   Component Value Date    MCV 79.4 05/30/2025    RDSTJBLC87 600 05/14/2025    FOLATE 4.42 (L) 05/14/2025    METHLMALAC 223 05/16/2019     RENAL FUNCTION TESTs:  Lab Results   Component Value Date    EGFR 71.1 05/30/2025    BUN 31.9 (H) 05/30/2025     LIVER FUNCTION TESTs:  Lab Results   Component Value Date    ALT 21 05/30/2025    AST 28 05/30/2025    BILITOT 0.5 05/30/2025    ALKPHOS 1,058 (H) 05/30/2025    PROTEINTOT 7.0 05/30/2025    ALBUMIN 3.2 (L) 05/30/2025     GLUCOSE EVALUATION:  Lab Results   Component Value Date    GLUCOSE 170 (H) 05/30/2025     SERUM CHEMISTRIES:  Lab Results   Component Value Date     (L) 05/30/2025    K 4.7 05/30/2025     05/30/2025    CO2 13.5 (L) 05/30/2025    CALCIUM 5.8 (C) 05/30/2025     THYROID FUNCTION TESTs:  TSH   Date Value Ref Range Status   05/16/2019 1.350 0.270 - 4.200 m[iU]/L Final     TUMOR MARKERS:  Lab Results   Component Value Date    PSA >1,500.00 (H) 05/01/2025     HEMATOLOGY SPECIAL STUDIEs:  Lab Results   Component Value Date    PLTCITRATED 442 (H) 12/11/2020     No Images in the past 120 days found..    Results         Assessment and  Plan:  Diagnoses and all orders for this visit:    1. Iron deficiency anemia, unspecified iron deficiency anemia type (Primary)  -     Ambulatory Referral to [facility] For Infusion Treatment; Future  -     Hold Transfusion and Notify Physician; Standing  -     Nursing communication: Transfusion Reaction Management; Standing  -     Verify Informed Consent for Transfusion; Standing  -     Prepare RBC, 2 Units; Standing  -     Transfuse RBC, 2 Units Infuse Each Unit Over: 2H; Standing  -     sodium chloride 0.9 % infusion 250 mL  -     Type and screen; Future  -     Hemoglobin & Hematocrit, Blood; Future  -     Ambulatory Referral to [facility] For Infusion Treatment  -     Calcium; Future    2. Prostate cancer  -     Ambulatory Referral to [facility] For Infusion Treatment; Future  -     Hold Transfusion and Notify Physician; Standing  -     Nursing communication: Transfusion Reaction Management; Standing  -     Verify Informed Consent for Transfusion; Standing  -     Prepare RBC, 2 Units; Standing  -     Transfuse RBC, 2 Units Infuse Each Unit Over: 2H; Standing  -     sodium chloride 0.9 % infusion 250 mL  -     Type and screen; Future  -     Ambulatory Referral to [facility] For Infusion Treatment  -     Calcium; Future    3. Prostate cancer metastatic to bone  -     Ambulatory Referral to [facility] For Infusion Treatment; Future  -     Hold Transfusion and Notify Physician; Standing  -     Nursing communication: Transfusion Reaction Management; Standing  -     Verify Informed Consent for Transfusion; Standing  -     Prepare RBC, 2 Units; Standing  -     Transfuse RBC, 2 Units Infuse Each Unit Over: 2H; Standing  -     sodium chloride 0.9 % infusion 250 mL  -     Type and screen; Future  -     Ambulatory Referral to [facility] For Infusion Treatment  -     Calcium; Future    4. Hypocalcemia  Comments:  Patient will begin Tums (4-6 tabs per day for next 3 days); recheck Calcium on Monday with 2 units PRBCs  Orders:  -     Calcium; Future    5. Elevated  alkaline phosphatase level  Comments:  Very sharp increase in pt alk phos - likely secondary to bone mets      Assessment & Plan  Anemia  - Reports feeling very tired, short of breath, and having a fast heart rate  - Administer a unit of blood today to address anemia  - Post-transfusion, assess hemoglobin levels  - Communicate hemoglobin levels to [facility] to ensure adequacy for scheduled procedure at 0000 hours    Transfuse two units of blood on Monday, obtain post H&H and Calcium levels to fax to [facility].  Hgb goal 10.0 or greater for patient to get his treatment.    -Encouraged patient to remain up to date on all recommended preventative medicine services specific for their sex and age.  Some examples include:  Diabetes screening, Hypertensive screening, Immunizations, Lipid screenings (cholesterol), Depression screenings, Colorectal cancer screening, HIV screening, Cervical cancer screening, Breast cancer screening, Osteoporosis screening, Alcohol screening, Dental screening, Tobacco Use screening and Dietary screening    Patient Follow Up: 1 week with Dr. Dick    I spent 30 minutes caring for Semaj on this date of service. This time includes time spent by me in the following activities:preparing for the visit, reviewing tests, obtaining and/or reviewing a separately obtained history, performing a medically appropriate examination and/or evaluation , counseling and educating the patient/family/caregiver, ordering medications, tests, or procedures, documenting information in the medical record, independently interpreting results and communicating that information with the patient/family/caregiver, and care coordination    Patient was given instructions and counseling regarding his condition or for health maintenance advice. Please see specific information pulled into the AVS if appropriate.

## 2025-05-30 ENCOUNTER — TELEPHONE (OUTPATIENT)
Dept: ONCOLOGY | Facility: HOSPITAL | Age: 70
End: 2025-05-30
Payer: MEDICARE

## 2025-05-30 ENCOUNTER — OFFICE VISIT (OUTPATIENT)
Dept: ONCOLOGY | Facility: HOSPITAL | Age: 70
End: 2025-05-30
Payer: MEDICARE

## 2025-05-30 ENCOUNTER — HOSPITAL ENCOUNTER (OUTPATIENT)
Dept: ONCOLOGY | Facility: HOSPITAL | Age: 70
Discharge: HOME | End: 2025-05-30
Payer: MEDICARE

## 2025-05-30 ENCOUNTER — DOCUMENTATION (OUTPATIENT)
Dept: ONCOLOGY | Facility: HOSPITAL | Age: 70
End: 2025-05-30

## 2025-05-30 VITALS
HEIGHT: 69 IN | BODY MASS INDEX: 14.66 KG/M2 | WEIGHT: 99 LBS | HEART RATE: 99 BPM | TEMPERATURE: 97.9 F | DIASTOLIC BLOOD PRESSURE: 80 MMHG | SYSTOLIC BLOOD PRESSURE: 100 MMHG | OXYGEN SATURATION: 99 % | RESPIRATION RATE: 18 BRPM

## 2025-05-30 DIAGNOSIS — E83.51 HYPOCALCEMIA: ICD-10-CM

## 2025-05-30 DIAGNOSIS — R74.8 ELEVATED ALKALINE PHOSPHATASE LEVEL: ICD-10-CM

## 2025-05-30 DIAGNOSIS — C61 PROSTATE CANCER METASTATIC TO BONE: ICD-10-CM

## 2025-05-30 DIAGNOSIS — C79.51 PROSTATE CANCER METASTATIC TO BONE: ICD-10-CM

## 2025-05-30 DIAGNOSIS — D64.9 ANEMIA, UNSPECIFIED TYPE: ICD-10-CM

## 2025-05-30 DIAGNOSIS — D50.9 IRON DEFICIENCY ANEMIA, UNSPECIFIED IRON DEFICIENCY ANEMIA TYPE: Primary | ICD-10-CM

## 2025-05-30 DIAGNOSIS — C61 PROSTATE CANCER: ICD-10-CM

## 2025-05-30 DIAGNOSIS — C61 PROSTATE CANCER: Primary | ICD-10-CM

## 2025-05-30 LAB
ABO GROUP BLD: NORMAL
ALBUMIN SERPL-MCNC: 3.2 G/DL (ref 3.5–5.2)
ALBUMIN/GLOB SERPL: 0.8 G/DL
ALP SERPL-CCNC: 1058 U/L (ref 39–117)
ALT SERPL W P-5'-P-CCNC: 21 U/L (ref 1–41)
ANION GAP SERPL CALCULATED.3IONS-SCNC: 14.5 MMOL/L (ref 5–15)
AST SERPL-CCNC: 28 U/L (ref 1–40)
BASOPHILS # BLD AUTO: 0.01 10*3/MM3 (ref 0–0.2)
BASOPHILS NFR BLD AUTO: 0.1 % (ref 0–1.5)
BB HOLD TUBE: NORMAL
BILIRUB SERPL-MCNC: 0.5 MG/DL (ref 0–1.2)
BLD GP AB SCN SERPL QL: NEGATIVE
BUN SERPL-MCNC: 31.9 MG/DL (ref 8–23)
BUN/CREAT SERPL: 28.5 (ref 7–25)
CALCIUM SPEC-SCNC: 5.8 MG/DL (ref 8.6–10.5)
CHLORIDE SERPL-SCNC: 101 MMOL/L (ref 98–107)
CO2 SERPL-SCNC: 13.5 MMOL/L (ref 22–29)
CREAT SERPL-MCNC: 1.12 MG/DL (ref 0.76–1.27)
DEPRECATED RDW RBC AUTO: 49.7 FL (ref 37–54)
EGFRCR SERPLBLD CKD-EPI 2021: 71.1 ML/MIN/1.73
EOSINOPHIL # BLD AUTO: 0.02 10*3/MM3 (ref 0–0.4)
EOSINOPHIL NFR BLD AUTO: 0.3 % (ref 0.3–6.2)
ERYTHROCYTE [DISTWIDTH] IN BLOOD BY AUTOMATED COUNT: 17.3 % (ref 12.3–15.4)
GLOBULIN UR ELPH-MCNC: 3.8 GM/DL
GLUCOSE SERPL-MCNC: 170 MG/DL (ref 65–99)
HCT VFR BLD AUTO: 25 % (ref 37.5–51)
HGB BLD-MCNC: 8.3 G/DL (ref 13–17.7)
IMM GRANULOCYTES # BLD AUTO: 0.06 10*3/MM3 (ref 0–0.05)
IMM GRANULOCYTES NFR BLD AUTO: 0.8 % (ref 0–0.5)
LYMPHOCYTES # BLD AUTO: 0.62 10*3/MM3 (ref 0.7–3.1)
LYMPHOCYTES NFR BLD AUTO: 8 % (ref 19.6–45.3)
MCH RBC QN AUTO: 26.3 PG (ref 26.6–33)
MCHC RBC AUTO-ENTMCNC: 33.2 G/DL (ref 31.5–35.7)
MCV RBC AUTO: 79.4 FL (ref 79–97)
MONOCYTES # BLD AUTO: 0.6 10*3/MM3 (ref 0.1–0.9)
MONOCYTES NFR BLD AUTO: 7.8 % (ref 5–12)
NEUTROPHILS NFR BLD AUTO: 6.4 10*3/MM3 (ref 1.7–7)
NEUTROPHILS NFR BLD AUTO: 83 % (ref 42.7–76)
NRBC BLD AUTO-RTO: 0 /100 WBC (ref 0–0.2)
PLATELET # BLD AUTO: 230 10*3/MM3 (ref 140–450)
PMV BLD AUTO: 9.6 FL (ref 6–12)
POTASSIUM SERPL-SCNC: 4.7 MMOL/L (ref 3.5–5.2)
PROT SERPL-MCNC: 7 G/DL (ref 6–8.5)
RBC # BLD AUTO: 3.15 10*6/MM3 (ref 4.14–5.8)
RH BLD: POSITIVE
SODIUM SERPL-SCNC: 129 MMOL/L (ref 136–145)
T&S EXPIRATION DATE: NORMAL
WBC NRBC COR # BLD AUTO: 7.71 10*3/MM3 (ref 3.4–10.8)

## 2025-05-30 PROCEDURE — 85025 COMPLETE CBC W/AUTO DIFF WBC: CPT | Performed by: INTERNAL MEDICINE

## 2025-05-30 PROCEDURE — 86900 BLOOD TYPING SEROLOGIC ABO: CPT | Performed by: INTERNAL MEDICINE

## 2025-05-30 PROCEDURE — 25010000002 HEPARIN LOCK FLUSH PER 10 UNITS: Performed by: INTERNAL MEDICINE

## 2025-05-30 PROCEDURE — 86850 RBC ANTIBODY SCREEN: CPT | Performed by: INTERNAL MEDICINE

## 2025-05-30 PROCEDURE — 36591 DRAW BLOOD OFF VENOUS DEVICE: CPT

## 2025-05-30 PROCEDURE — 80053 COMPREHEN METABOLIC PANEL: CPT | Performed by: INTERNAL MEDICINE

## 2025-05-30 PROCEDURE — 86901 BLOOD TYPING SEROLOGIC RH(D): CPT | Performed by: INTERNAL MEDICINE

## 2025-05-30 RX ORDER — SODIUM CHLORIDE 0.9 % (FLUSH) 0.9 %
20 SYRINGE (ML) INJECTION AS NEEDED
Status: DISCONTINUED | OUTPATIENT
Start: 2025-05-30 | End: 2025-05-31 | Stop reason: HOSPADM

## 2025-05-30 RX ORDER — HEPARIN SODIUM (PORCINE) LOCK FLUSH IV SOLN 100 UNIT/ML 100 UNIT/ML
500 SOLUTION INTRAVENOUS AS NEEDED
Status: DISCONTINUED | OUTPATIENT
Start: 2025-05-30 | End: 2025-05-31 | Stop reason: HOSPADM

## 2025-05-30 RX ORDER — HEPARIN SODIUM (PORCINE) LOCK FLUSH IV SOLN 100 UNIT/ML 100 UNIT/ML
500 SOLUTION INTRAVENOUS AS NEEDED
OUTPATIENT
Start: 2025-05-30

## 2025-05-30 RX ORDER — SODIUM CHLORIDE 0.9 % (FLUSH) 0.9 %
20 SYRINGE (ML) INJECTION AS NEEDED
OUTPATIENT
Start: 2025-05-30

## 2025-05-30 RX ORDER — SODIUM CHLORIDE 9 MG/ML
250 INJECTION, SOLUTION INTRAVENOUS ONCE
Status: CANCELLED | OUTPATIENT
Start: 2025-05-30

## 2025-05-30 RX ADMIN — Medication 20 ML: at 13:56

## 2025-05-30 RX ADMIN — HEPARIN 500 UNITS: 100 SYRINGE at 13:56

## 2025-05-30 NOTE — TELEPHONE ENCOUNTER
I SPOKE TO PATIENT IN REGARDS TO GETTING BLOOD TRANSFUSION, HE IS SCHEDULED FOR MONDAY 06/02/2025 AT 8:00 AM, AT THE [facility]. PATIENT IS AWARE HE MUST KEEP RED BAND ON AND TRANSFUSION IS ON AT THE [facility]. PATIENT VERBALLY CONFIRMED APPOINTMENT DATE/TIME.

## 2025-06-02 ENCOUNTER — HOSPITAL ENCOUNTER (OUTPATIENT)
Dept: INFUSION THERAPY | Facility: HOSPITAL | Age: 70
Discharge: HOME | End: 2025-06-02
Admitting: PHYSICIAN ASSISTANT
Payer: MEDICARE

## 2025-06-02 VITALS
DIASTOLIC BLOOD PRESSURE: 87 MMHG | RESPIRATION RATE: 18 BRPM | SYSTOLIC BLOOD PRESSURE: 138 MMHG | HEART RATE: 67 BPM | TEMPERATURE: 98.1 F | OXYGEN SATURATION: 100 %

## 2025-06-02 DIAGNOSIS — C61 PROSTATE CANCER: ICD-10-CM

## 2025-06-02 DIAGNOSIS — D50.9 IRON DEFICIENCY ANEMIA, UNSPECIFIED IRON DEFICIENCY ANEMIA TYPE: ICD-10-CM

## 2025-06-02 DIAGNOSIS — C61 PROSTATE CANCER METASTATIC TO BONE: ICD-10-CM

## 2025-06-02 DIAGNOSIS — C79.51 PROSTATE CANCER METASTATIC TO BONE: ICD-10-CM

## 2025-06-02 DIAGNOSIS — D64.9 ANEMIA, UNSPECIFIED TYPE: ICD-10-CM

## 2025-06-02 PROCEDURE — 36430 TRANSFUSION BLD/BLD COMPNT: CPT

## 2025-06-02 PROCEDURE — 86900 BLOOD TYPING SEROLOGIC ABO: CPT

## 2025-06-02 PROCEDURE — 86923 COMPATIBILITY TEST ELECTRIC: CPT

## 2025-06-02 PROCEDURE — 25010000002 HEPARIN LOCK FLUSH PER 10 UNITS: Performed by: INTERNAL MEDICINE

## 2025-06-02 PROCEDURE — P9016 RBC LEUKOCYTES REDUCED: HCPCS

## 2025-06-02 RX ORDER — SODIUM CHLORIDE 9 MG/ML
250 INJECTION, SOLUTION INTRAVENOUS ONCE
Status: DISCONTINUED | OUTPATIENT
Start: 2025-06-02 | End: 2025-06-03 | Stop reason: HOSPADM

## 2025-06-02 RX ORDER — HEPARIN SODIUM (PORCINE) LOCK FLUSH IV SOLN 100 UNIT/ML 100 UNIT/ML
5 SOLUTION INTRAVENOUS AS NEEDED
Status: DISCONTINUED | OUTPATIENT
Start: 2025-06-02 | End: 2025-06-03 | Stop reason: HOSPADM

## 2025-06-02 RX ADMIN — HEPARIN 500 UNITS: 100 SYRINGE at 15:34

## 2025-06-02 NOTE — NURSING NOTE
Patient here for an outpatient blood administration. Consent obtained. 2 units of blood received with no signs of adverse reaction. Patient wheeled out to private vehicle with family.

## 2025-06-03 LAB
BH BB BLOOD EXPIRATION DATE: NORMAL
BH BB BLOOD EXPIRATION DATE: NORMAL
BH BB BLOOD TYPE BARCODE: 5100
BH BB BLOOD TYPE BARCODE: 5100
BH BB DISPENSE STATUS: NORMAL
BH BB DISPENSE STATUS: NORMAL
BH BB PRODUCT CODE: NORMAL
BH BB PRODUCT CODE: NORMAL
BH BB UNIT NUMBER: NORMAL
BH BB UNIT NUMBER: NORMAL
CROSSMATCH INTERPRETATION: NORMAL
CROSSMATCH INTERPRETATION: NORMAL
UNIT  ABO: NORMAL
UNIT  ABO: NORMAL
UNIT  RH: NORMAL
UNIT  RH: NORMAL

## 2025-06-06 ENCOUNTER — HOSPITAL ENCOUNTER (OUTPATIENT)
Dept: ONCOLOGY | Facility: HOSPITAL | Age: 70
Discharge: HOME | End: 2025-06-06
Payer: MEDICARE

## 2025-06-06 ENCOUNTER — OFFICE VISIT (OUTPATIENT)
Dept: ONCOLOGY | Facility: HOSPITAL | Age: 70
End: 2025-06-06
Payer: MEDICARE

## 2025-06-06 VITALS
TEMPERATURE: 98.2 F | RESPIRATION RATE: 16 BRPM | OXYGEN SATURATION: 97 % | DIASTOLIC BLOOD PRESSURE: 91 MMHG | BODY MASS INDEX: 14.66 KG/M2 | HEART RATE: 95 BPM | SYSTOLIC BLOOD PRESSURE: 127 MMHG | HEIGHT: 69 IN | WEIGHT: 99 LBS

## 2025-06-06 DIAGNOSIS — C79.51 PROSTATE CANCER METASTATIC TO BONE: Primary | ICD-10-CM

## 2025-06-06 DIAGNOSIS — C61 PROSTATE CANCER: ICD-10-CM

## 2025-06-06 DIAGNOSIS — C61 PROSTATE CANCER METASTATIC TO BONE: Primary | ICD-10-CM

## 2025-06-06 DIAGNOSIS — E83.51 HYPOCALCEMIA: ICD-10-CM

## 2025-06-06 DIAGNOSIS — Z79.899 HIGH RISK MEDICATION USE: ICD-10-CM

## 2025-06-06 DIAGNOSIS — C61 PROSTATE CANCER: Primary | ICD-10-CM

## 2025-06-06 LAB
ALBUMIN SERPL-MCNC: 3.4 G/DL (ref 3.5–5.2)
ALBUMIN/GLOB SERPL: 1 G/DL
ALP SERPL-CCNC: 606 U/L (ref 39–117)
ALT SERPL W P-5'-P-CCNC: 10 U/L (ref 1–41)
ANION GAP SERPL CALCULATED.3IONS-SCNC: 15.2 MMOL/L (ref 5–15)
AST SERPL-CCNC: 19 U/L (ref 1–40)
BASOPHILS # BLD AUTO: 0.03 10*3/MM3 (ref 0–0.2)
BASOPHILS NFR BLD AUTO: 0.5 % (ref 0–1.5)
BILIRUB SERPL-MCNC: 0.3 MG/DL (ref 0–1.2)
BUN SERPL-MCNC: 16 MG/DL (ref 8–23)
BUN/CREAT SERPL: 22.9 (ref 7–25)
CALCIUM SPEC-SCNC: 6.3 MG/DL (ref 8.6–10.5)
CHLORIDE SERPL-SCNC: 105 MMOL/L (ref 98–107)
CO2 SERPL-SCNC: 14.8 MMOL/L (ref 22–29)
CREAT SERPL-MCNC: 0.7 MG/DL (ref 0.76–1.27)
DEPRECATED RDW RBC AUTO: 51.8 FL (ref 37–54)
EGFRCR SERPLBLD CKD-EPI 2021: 99.7 ML/MIN/1.73
EOSINOPHIL # BLD AUTO: 0.13 10*3/MM3 (ref 0–0.4)
EOSINOPHIL NFR BLD AUTO: 2 % (ref 0.3–6.2)
ERYTHROCYTE [DISTWIDTH] IN BLOOD BY AUTOMATED COUNT: 17.7 % (ref 12.3–15.4)
GLOBULIN UR ELPH-MCNC: 3.4 GM/DL
GLUCOSE SERPL-MCNC: 169 MG/DL (ref 65–99)
HCT VFR BLD AUTO: 40.7 % (ref 37.5–51)
HGB BLD-MCNC: 13.5 G/DL (ref 13–17.7)
IMM GRANULOCYTES # BLD AUTO: 0.11 10*3/MM3 (ref 0–0.05)
IMM GRANULOCYTES NFR BLD AUTO: 1.7 % (ref 0–0.5)
LYMPHOCYTES # BLD AUTO: 0.79 10*3/MM3 (ref 0.7–3.1)
LYMPHOCYTES NFR BLD AUTO: 12.3 % (ref 19.6–45.3)
MAGNESIUM SERPL-MCNC: 1.8 MG/DL (ref 1.6–2.4)
MCH RBC QN AUTO: 27.1 PG (ref 26.6–33)
MCHC RBC AUTO-ENTMCNC: 33.2 G/DL (ref 31.5–35.7)
MCV RBC AUTO: 81.6 FL (ref 79–97)
MONOCYTES # BLD AUTO: 0.35 10*3/MM3 (ref 0.1–0.9)
MONOCYTES NFR BLD AUTO: 5.4 % (ref 5–12)
NEUTROPHILS NFR BLD AUTO: 5.03 10*3/MM3 (ref 1.7–7)
NEUTROPHILS NFR BLD AUTO: 78.1 % (ref 42.7–76)
NRBC BLD AUTO-RTO: 0 /100 WBC (ref 0–0.2)
PHOSPHATE SERPL-MCNC: 2.2 MG/DL (ref 2.5–4.5)
PLATELET # BLD AUTO: 181 10*3/MM3 (ref 140–450)
PMV BLD AUTO: 9.3 FL (ref 6–12)
POTASSIUM SERPL-SCNC: 4.3 MMOL/L (ref 3.5–5.2)
PROT SERPL-MCNC: 6.8 G/DL (ref 6–8.5)
RBC # BLD AUTO: 4.99 10*6/MM3 (ref 4.14–5.8)
SODIUM SERPL-SCNC: 135 MMOL/L (ref 136–145)
WBC NRBC COR # BLD AUTO: 6.44 10*3/MM3 (ref 3.4–10.8)

## 2025-06-06 PROCEDURE — 36591 DRAW BLOOD OFF VENOUS DEVICE: CPT

## 2025-06-06 PROCEDURE — 83735 ASSAY OF MAGNESIUM: CPT | Performed by: INTERNAL MEDICINE

## 2025-06-06 PROCEDURE — 25810000003 SODIUM CHLORIDE 0.9 % SOLUTION: Performed by: INTERNAL MEDICINE

## 2025-06-06 PROCEDURE — 25010000002 CALCIUM GLUCONATE-NACL 1-0.675 GM/50ML-% SOLUTION: Performed by: INTERNAL MEDICINE

## 2025-06-06 PROCEDURE — 85025 COMPLETE CBC W/AUTO DIFF WBC: CPT | Performed by: INTERNAL MEDICINE

## 2025-06-06 PROCEDURE — 84100 ASSAY OF PHOSPHORUS: CPT | Performed by: INTERNAL MEDICINE

## 2025-06-06 PROCEDURE — 25010000002 HEPARIN LOCK FLUSH PER 10 UNITS: Performed by: INTERNAL MEDICINE

## 2025-06-06 PROCEDURE — 96365 THER/PROPH/DIAG IV INF INIT: CPT

## 2025-06-06 PROCEDURE — 80053 COMPREHEN METABOLIC PANEL: CPT | Performed by: INTERNAL MEDICINE

## 2025-06-06 RX ORDER — HEPARIN SODIUM (PORCINE) LOCK FLUSH IV SOLN 100 UNIT/ML 100 UNIT/ML
500 SOLUTION INTRAVENOUS AS NEEDED
Status: DISCONTINUED | OUTPATIENT
Start: 2025-06-06 | End: 2025-06-07 | Stop reason: HOSPADM

## 2025-06-06 RX ORDER — SODIUM CHLORIDE 0.9 % (FLUSH) 0.9 %
20 SYRINGE (ML) INJECTION AS NEEDED
Status: DISCONTINUED | OUTPATIENT
Start: 2025-06-06 | End: 2025-06-07 | Stop reason: HOSPADM

## 2025-06-06 RX ORDER — CALCIUM GLUCONATE 20 MG/ML
1 INJECTION, SOLUTION INTRAVENOUS
Status: COMPLETED | OUTPATIENT
Start: 2025-06-06 | End: 2025-06-06

## 2025-06-06 RX ORDER — CALCIUM GLUCONATE 20 MG/ML
1 INJECTION, SOLUTION INTRAVENOUS
Status: CANCELLED | OUTPATIENT
Start: 2025-06-06

## 2025-06-06 RX ORDER — HEPARIN SODIUM (PORCINE) LOCK FLUSH IV SOLN 100 UNIT/ML 100 UNIT/ML
500 SOLUTION INTRAVENOUS AS NEEDED
OUTPATIENT
Start: 2025-06-06

## 2025-06-06 RX ORDER — SODIUM CHLORIDE 0.9 % (FLUSH) 0.9 %
20 SYRINGE (ML) INJECTION AS NEEDED
OUTPATIENT
Start: 2025-06-06

## 2025-06-06 RX ORDER — SODIUM CHLORIDE 9 MG/ML
20 INJECTION, SOLUTION INTRAVENOUS ONCE
Status: COMPLETED | OUTPATIENT
Start: 2025-06-06 | End: 2025-06-06

## 2025-06-06 RX ADMIN — SODIUM CHLORIDE 20 ML/HR: 9 INJECTION, SOLUTION INTRAVENOUS at 14:14

## 2025-06-06 RX ADMIN — Medication 20 ML: at 15:17

## 2025-06-06 RX ADMIN — Medication 20 ML: at 13:03

## 2025-06-06 RX ADMIN — HEPARIN 500 UNITS: 100 SYRINGE at 13:03

## 2025-06-06 RX ADMIN — CALCIUM GLUCONATE 1 G: 20 INJECTION, SOLUTION INTRAVENOUS at 14:14

## 2025-06-06 RX ADMIN — CALCIUM GLUCONATE 1 G: 20 INJECTION, SOLUTION INTRAVENOUS at 14:45

## 2025-06-06 RX ADMIN — HEPARIN 500 UNITS: 100 SYRINGE at 15:17

## 2025-06-06 NOTE — PROGRESS NOTES
Chief Complaint/Care Team   Prostate Cancer    Maryse Monae MD Coffie, Ramona N, MD    History of Present Illness     Diagnosis: Metastatic Castration Resistant Prostate Cancer    Current Treatment: Lupron, Xgeva    Previous Treatment:   Metastatic Castration resistant prostate cancer:    Patient was initially diagnosed with prostate cancer in 2015.  On 6/4/2015 he underwent radical prostatectomy and LN dissection which showed a Zach 4+3 = 7 prostate cancer.  There were several high risk features such as extraprostatic extension, seminal vesicle invasion, positive margins at the bladder neck and right and left seminal vesicle.  Lymph vascular invasion was indeterminate.  Perineural invasion was not commented on. Final pathology [id number] R1.  He was treated with adjuvant radiation by Dr. Stanton.    According to records the patient likely started Lupron in December 2017 when his PSA started to rise.  The patient took a break in August 2019.  He states he was not aware he was to continue.  He restarted Lupron on 11/26/2019 after it was noted that his PSA chago from 0.46 up to 2.55 on 11/8/2019.  Unfortunately his PSA continued to rise after that to 4.54 on 3/10/2020.    - restarted Lupron on 11/26/20  - Started Zytiga on 8/27/20.  8/31/2020 PSA 17.95     - Started Xtandi in May 2021 due to intolerance of Zytiga even at the lowest dose  - PSA rising in April 2023 to 2.85, doubling time 3.4 months  - started Docetaxel May 2023  -Pluvicto at [facility] which began in  8/1/2024- stopped in 2/2025 due to increase in PSA    Semaj Medina is a 69 y.o. male who presents to [facility] HEMATOLOGY & ONCOLOGY for Metastatic Castration Resistant Prostate Cancer.    History of Present Illness    The patient was previously evaluated by Dr. Salazar, a radiation oncologist at [facility], who shared pt was not a candidate for [place] 223. However, he would be a suitable candidate for Pluvicto, he has referred  him to nuclear medicine specialist at [facility]. A consultation with a nuclear medicine specialist was scheduled for evaluation for Pluvicto. His current medication regimen includes Zytiga and prednisone.      Patient reports experiencing back and neck pain, which subsides upon administration of his pain medication. He typically takes his pain medication every 4 hours. He is here for evaluation prior to next Xgeva treatment.  He began Pluvicto treatment on August 1, 2024, given every 6 weeks, but was stopped in 2/2025 due to progression of disease. Pt here with his dtr prior to next cycle of cabazitaxel. Pt continues to lose weight despite his attempts to increase caloric intake and with increased dose of marinol.  Patient had feeding tube placed, he is tolerating 4 cans of feeding,  he continues follow-up with dietitian.  Patient was evaluated at [facility] with Dr. Chow, who recommended stopping cabazitaxel, zytiga/prednisone but continue Xgeva and Lupron along, pt is pending start of [place] 223 at [facility] with Dr. Salazar, pt here also for lab check.  Patient received 2 units PRBC earlier this week, patient reports compliance with taking oral calcium pills twice a day.       Review of Systems   Constitutional:  Positive for appetite change, fatigue and unexpected weight loss.   Respiratory:  Positive for shortness of breath.    Musculoskeletal:  Positive for back pain (Neck pain).        Oncology/Hematology History Overview Note     Metastatic Castration resistant prostate cancer:    Patient was initially diagnosed with prostate cancer in 2015.  On 6/4/2015 he underwent radical prostatectomy and LN dissection which showed a Zach 4+3 = 7 prostate cancer.  There were several high risk features such as extraprostatic extension, seminal vesicle invasion, positive margins at the bladder neck and right and left seminal vesicle.  Lymph vascular invasion was indeterminate.  Perineural invasion was not  commented on. Final pathology [id number] R1.  He was treated with adjuvant radiation by Dr. Stanton.    According to records the patient likely started Lupron in December 2017 when his PSA started to rise.  The patient took a break in August 2019.  He states he was not aware he was to continue.  He restarted Lupron on 11/26/2019 after it was noted that his PSA chago from 0.46 up to 2.55 on 11/8/2019.  Unfortunately his PSA continued to rise after that to 4.54 on 3/10/2020.    - restarted Lupron on 11/26/20  - Started Zytiga on 8/27/20.  8/31/2020 PSA 17.95     - Started Xtandi in May 2021 due to intolerance of Zytiga even at the lowest dose  - PSA rising in April 2023 to 2.85, doubling time 3.4 months  - started Docetaxel May 2023    PSMA PET 5/2/23:   1.  Radiotracer avid supra clavicular, thoracic and abdominal adenopathy and sclerotic lesion within T10 vertebral body likely representing metastatic disease.  2.  Indeterminate lesion within the left ilium demonstrating mild uptake.   3.  Asymmetric sclerosis within the superior lateral aspect the left orbit which does not demonstrate significant uptake above that of the right orbit. Findings are nonspecific with  differential considerations including but not limited to metastatic disease, fibrous dysplasia versus Paget's disease.   4.  Sub-6 mm nodule within the left lower lobe.  5.  Scattered indeterminate hypodense lesions throughout the liver which do not demonstrate radiotracer uptake above that of the adjacent liver, best best visualized on recent multiphase CT abdomen and pelvis 04/17/2023. Please refer to this dictation for further information.       Prostate cancer metastatic to bone   12/11/2020 -  Chemotherapy    OP SUPPORTIVE Leuprolide 45 mg Q6M     3/23/2021 -  Chemotherapy    OP SUPPORTIVE Denosumab (Xgeva) Q28D     4/15/2021 - 5/13/2021 Chemotherapy    OP PROSTATE Enzalutamide     5/19/2021 Initial Diagnosis    Prostate cancer metastatic to bone  "(CMS/HCC)     6/2/2023 - 9/15/2023 Biopsy    OP PROSTATE DOCEtaxel  Plan Provider: Natalya Jack MD PhD  Treatment goal: Palliative  Line of treatment: [No plan line of treatment]     12/19/2023 - 12/19/2023 Chemotherapy    OP PROSTATE Apalutamide     1/26/2024 - 1/26/2024 Biopsy    OP PROSTATE Abiraterone / PredniSONE  Plan Provider: Joshua Dick MD  Treatment goal: Control  Line of treatment: [No plan line of treatment]     2/12/2025 - 3/26/2025 Chemotherapy    OP PROSTATE Cabazitaxel / PredniSONE     Prostate cancer   6/16/2021 Initial Diagnosis    Prostate cancer (HCC)     6/2/2023 -  Chemotherapy    OP CENTRAL VENOUS ACCESS DEVICE ACCESS, CARE, AND MAINTENANCE (CVAD)     6/6/2025 -  Chemotherapy    OP SUPPORTIVE ELECTROLYTE REPLACEMENT     Secondary malignant neoplasm of bone   11/4/2021 Initial Diagnosis    Secondary malignant neoplasm of bone (HCC)     11/23/2021 - 12/9/2021 Radiation    Radiation OncologyTreatment Course:  Semaj Medina received 3000 cGy in 10 fractions to the T9-T11 spine.          Objective     Vitals:    06/06/25 1318   BP: 127/91   Pulse: 95   Resp: 16   Temp: 98.2 °F (36.8 °C)   TempSrc: Temporal   SpO2: 97%   Weight: 44.9 kg (99 lb)   Height: 175 cm (68.9\")   PainSc: 6    PainLoc: Back       ECOG score: 0         PHQ-9 Total Score:         Physical Exam  Vitals reviewed. Exam conducted with a chaperone present.   Constitutional:       General: He is not in acute distress.  HENT:      Head: Normocephalic and atraumatic.   Eyes:      Conjunctiva/sclera: Conjunctivae normal.      Pupils: Pupils are equal, round, and reactive to light.   Neurological:      Mental Status: He is alert and oriented to person, place, and time.         Physical Exam        Past Medical History     Past Medical History:   Diagnosis Date    Anemia     NO PROBLEMS    Anxiety     Blood disease     Colon polyps     Depression     Diverticulitis     Forgetfulness     Hepatitis C     RESOLVED    Hypertension  "    Night sweats     Numbness and tingling of left lower extremity     Prostate cancer      Current Outpatient Medications on File Prior to Visit   Medication Sig Dispense Refill    amLODIPine (NORVASC) 5 MG tablet Take 1 tablet by mouth Daily. 30 tablet 0    Calcium Carbonate-Vitamin D (Calcium Plus Vitamin D) 500-1.25 MG-MCG capsule Take 1 capsule by mouth Daily. 30 capsule 5    ferrous gluconate (FERGON) 324 MG tablet Take 1 tablet by mouth Daily With Breakfast. 90 tablet 1    folic acid (FOLVITE) 1 MG tablet Take 1 tablet by mouth Daily. 90 tablet 1    HYDROcodone-acetaminophen (NORCO)  MG per tablet Take 1.5 tablets by mouth Every 4 (Four) Hours As Needed for Moderate Pain. 135 tablet 0    ibuprofen (ADVIL,MOTRIN) 800 MG tablet TAKE 1 TABLET BY MOUTH EVERY 6 - 8 HOURS AS NEEDED FOR PAIN      leuprolide (Lupron Depot, 3-Month,) 22.5 MG injection Inject 22.5 mg into the appropriate muscle as directed by prescriber Every 3 (Three) Months.      lisinopril (PRINIVIL,ZESTRIL) 40 MG tablet Take 1 tablet by mouth Daily. 90 tablet 0    ondansetron (ZOFRAN) 8 MG tablet Take 1 tablet by mouth Every 8 (Eight) Hours As Needed for Nausea or Vomiting. 90 tablet 3    abiraterone acetate (ZYTIGA) 500 MG tablet Take 2 tablets by mouth Daily. (Patient not taking: Reported on 6/6/2025) 60 tablet 11    dronabinol (Marinol) 5 MG capsule Take 2 capsules by mouth Daily. (Patient not taking: Reported on 5/14/2025) 60 capsule 1    naloxone (NARCAN) 4 MG/0.1ML nasal spray  (Patient not taking: Reported on 6/6/2025)      potassium & sodium phosphates (PHOS-NAK) 280-160-250 MG pack packet Take 2 packets by mouth 3 (Three) Times a Day. (Patient not taking: Reported on 5/14/2025) 120 packet 4    potassium chloride (MICRO-K) 10 MEQ CR capsule TAKE 2 CAPSULES BY MOUTH DAILY (Patient not taking: Reported on 5/14/2025) 180 capsule 0    predniSONE (DELTASONE) 5 MG tablet Take 1 tablet by mouth 2 (Two) Times a Day. (Patient not taking:  Reported on 6/6/2025) 60 tablet 11     Current Facility-Administered Medications on File Prior to Visit   Medication Dose Route Frequency Provider Last Rate Last Admin    heparin injection 500 Units  500 Units Intravenous PRN Joshua Dick MD   500 Units at 06/06/25 1303    sodium chloride 0.9 % flush 20 mL  20 mL Intravenous PRN Joshua Dick MD   20 mL at 06/06/25 1303      No Known Allergies  Past Surgical History:   Procedure Laterality Date    COLONOSCOPY      PEG TUBE INSERTION N/A 2/24/2025    Procedure: PERCUTANEOUS ENDOSCOPIC GASTROSTOMY TUBE INSERTION and biopsy;  Surgeon: Waldemar Iraheta MD;  Location: [facility];  Service: General;  Laterality: N/A;  gastric musocal abnormality    PROSTATE BIOPSY      PROSTATECTOMY      ROBOT ASSISTED W PELVIC LYMPH NODE DISSECTION    TRANSURETHRAL RESECTION OF BLADDER TUMOR      VENOUS ACCESS DEVICE (PORT) INSERTION Right 5/22/2023    Procedure: INSERTION VENOUS ACCESS PORT;  Surgeon: Waldemar Iraheta MD;  Location: [facility];  Service: General;  Laterality: Right;     Social History     Socioeconomic History    Marital status:    Tobacco Use    Smoking status: Former     Current packs/day: 0.25     Average packs/day: 0.3 packs/day for 54.6 years (13.7 ttl pk-yrs)     Types: Cigarettes     Start date: 10/21/1970     Passive exposure: Past    Smokeless tobacco: Never    Tobacco comments:     INSTRUCTED NO SMOKING 24 HR PRIOR TO SURGERY PER ANESTHESIA      1000 PM 02-23-25   Vaping Use    Vaping status: Never Used   Substance and Sexual Activity    Alcohol use: Not Currently     Alcohol/week: 6.0 standard drinks of alcohol     Types: 6 Cans of beer per week     Comment: 12 pack a week of beer    Drug use: Never    Sexual activity: Defer     Family History   Problem Relation Age of Onset    Cancer Mother     Cancer Father        Results     Result Review   The following data was reviewed by: Joshua Dick MD on 06/28/2024:  Lab Results   Component Value  Date    HGB 13.5 06/06/2025    HCT 40.7 06/06/2025    MCV 81.6 06/06/2025     06/06/2025    WBC 6.44 06/06/2025    NEUTROABS 5.03 06/06/2025    LYMPHSABS 0.79 06/06/2025    MONOSABS 0.35 06/06/2025    EOSABS 0.13 06/06/2025    BASOSABS 0.03 06/06/2025     Lab Results   Component Value Date    GLUCOSE 169 (H) 06/06/2025    BUN 16.0 06/06/2025    CREATININE 0.70 (L) 06/06/2025     (L) 06/06/2025    K 4.3 06/06/2025     06/06/2025    CO2 14.8 (L) 06/06/2025    CALCIUM 6.3 (C) 06/06/2025    PROTEINTOT 6.8 06/06/2025    ALBUMIN 3.4 (L) 06/06/2025    BILITOT 0.3 06/06/2025    ALKPHOS 606 (H) 06/06/2025    AST 19 06/06/2025    ALT 10 06/06/2025     Lab Results   Component Value Date    MG 1.8 06/06/2025    PHOS 2.2 (L) 06/06/2025    TSH 1.350 05/16/2019       No radiology results for the last day       Assessment & Plan     Diagnoses and all orders for this visit:    1. Prostate cancer metastatic to bone (Primary)    2. Hypocalcemia    3. High risk medication use        Semaj Medina is a 69 y.o. male who presents to [facility] HEMATOLOGY & ONCOLOGY for treatment of  metastatic prostate cancer, completed 6 cycles of docetaxel on 9/15/2023, Lupron Q6 months, Pt also receiving Xgeva and he is here prior to next Xgeva infusion  -next Lupron injection due on 6/18/2024  -PSA from 12/15/23 was 8 up from 3.1 (in 10/2023)  -Discussed result of [place] bone scan, CT CAP from 1/24/2024 which revealed interval enlargement T10 vertebral body metastasis, no evidence of intrathoracic metastatic disease, slight worsening appearance of osteoblastic schedule static disease, notable at T10, T8, and T5, no evidence of intra-abdominal or pelvic metastatic disease.  -Patient has undergone radiation therapy 10 fractions to thoracic spine from T9-T11 in 2021.  -Also shared plan to reattempt treatment with Zytiga and prednisone, patient reports some nausea vomiting which we will attempt to manage with  antiemetics.  -Orders placed for Zytiga and prednisone, also provided prescriptions for Compazine and Zofran for patient to begin once he begins treatment with these medications.  -pt began zytiga/prednisone on 2/2/2024, pt tolerating it well, no evidence of toxicity on labs, recommend he continue at current dose     -discussed results of restaging imaging from 4/24/2024 which showed stable radiotracer uptake involving sclerotic osseous metastasis of the left frontal bone above left orbit and at T10, no findings of osseous progression, CT chest abdomen pelvis revealed stable sclerotic osseous metastasis, no suspicious adenopathy, postsurgical changes of prostatectomy.          -Given presence of continued osseous metastasis, patient with continued pain in area of metastasis, and since he has previously received radiation in this area, referred him to [facility] medicine for consideration for treatment with [place] 223 or Pluvicto.  -Shared results of PSMA PET/CT scan from 6/13/2024 which revealed progression of disease, patient has been referred to nuclear medicine specialist at [facility] for consideration for Pluvicto,pt began Pluvicto on 8/1/2024.  -Obtained PSMA PET CT scan in 12/2024, it revealed mixed response, discussed case with [place] physician Dr. Freed on 12/27/24 and we both decided to have pt complete pluvicto treatment to see if osseous areas of disease could be treated and will rescan pt after completion of Pluvicto.    2/12/2025: Given continued rise of PSA patient case discussed with Dr. Freed who agreed patient is not responding Pluvicto treatment thus plan switch patient to cabazitaxel cycle 1 day 1 on 2/12/2025, labs reviewed plan to proceed as scheduled today. Pt also with continued weight loss despite use of protein supplements and marinol, pt shares he has has an appetite but has difficulty eating due to change in his taste buds. Thus, discussed referral for feeding tube placement. Tube feedings will be  managed by dietalicia Hoang.     3/5/2025: Patient here prior to cycle 2 of cabazitaxel, he continues to take Zytiga prednisone, Xgeva and Lupron, patient has lost 2 additional pounds, patient had feeding tube placed in February 2025, tolerating 3 cans of tube feeding per day, PSA pending from today.  However also order guardant 360 today to look for other treatment options in case PSA continues to rise.    3/26/2025: Patient had prior cycle 3 of cabazitaxel, labs reviewed plan to proceed as scheduled today, he continues to take Zytiga prednisone, Xgeva Lupron.  Patient has lost additional 2 pounds, he continues to tolerate approximately 4 cans of tube feeding per day, patient underwent [id number] testing which was negative for any actionable mutations, patient with low TMB, MSI was not high.  Patient pending PET/CT scan ordered by Dr. Chow at [facility] he plans to follow-up with Dr. Chow after this imaging test in April 2025.  In the meantime, Dr. Chow's agreement with continuing current therapy with cabazitaxel and other therapies listed above  Pt here prior to next Cabazitaxel treatment, labs reviewed plan to proceed with treatment as scheduled.     5/9/2025 patient here prior to next Xgeva treatment and also lab check prior to beginning radium 223 at [facility] medicine with Dr. Salazar, patient reports increased shortness of breath, continued fatigue, patient found to have anemia of 8, no report of blood loss or melena, ordered iron studies, patient found to have iron deficiency, recommend ferrous gluconate 324 mg p.o. once daily, given symptomatic anemia ordered 1 unit PRBC to be administered next week anticipation to begin radiation treatment with Dr. Salazar,  Patient to prior to next Xgeva treatment, last few plan proceed as scheduled, recommend increase calcium over-the-counter, and recommendation continue Phos-Nak supplementation for hypophosphatemia    6/6/2025: Patient prior to next Xgeva  treatment, patient found to have low calcium level of 6.3, ordered IV calcium and recommend pt continue oral calcium level, I have discussed patient case with Dr. Salazar today who plans to consider starting Xpovio treatment next week at [facility].  While patient return in 1 week for lab check consideration for additional IV calcium if needed and resuming Xgeva at that time.     -Recommend pt continue of Lupron, Xgeva  -OxyContin 10 mg was prescribed for pain management, to be taken twice daily every 12 hours but due to lack of response, discontinued it  -will continue to provide refills of his Galax for his cancer related pain, increase dose to 1.5 tablet of Norco 10mg PO Q4 hours prn pain.     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      Hypophosphatemia  -ordered phos repletion    Hypomagnesemia  -pt prescribed mag oxide 400 mg PO QD  -will recheck at next clinic appointment     Hypokalemia  -pt receiving KCL repletion    Low Vit D  -Vit level of 27.7  -prescribed Vit D 50K IU once weekly x 8 weeks     Weight loss  -ordered marinol, increased dose on 1/15/2025  -referred to dietician      Please note that portions of this note were completed with a voice recognition program.      Assessment & Plan        Plan for patient follow-up in 1 week to consider resuming Xgeva treatment    Electronically signed by Joshua Dick MD, 06/06/25, 2:51 PM EDT.        Follow Up     I spent 30 minutes caring for Semaj on this date of service. This time includes time spent by me in the following activities:preparing for the visit, reviewing tests, obtaining and/or reviewing a separately obtained history, performing a medically appropriate examination and/or evaluation , counseling and educating the patient/family/caregiver, ordering medications, tests, or procedures, referring and  communicating with other health care professionals , documenting information in the medical record, independently interpreting results and communicating that information with the patient/family/caregiver, and care coordination    Any chemotherapy or immunotherapy or other systemic therapy treatment plan involves a high risk of complications and/or mortality of patient management.    The patient was seen and examined. Work by the provider also included review and/or ordering of lab tests, review and/or ordering of radiology tests, review and/or ordering of medicine tests, discussion with other physicians or providers, independent review of data, obtaining old records, review/summation of old records, and/or other review.    I have reviewed the family history, social history, and past medical history for this patient. Previous information and data has been reviewed and updated as needed. I have reviewed and verified the chief complaint, history, and other documentation. The patient was interviewed and examined in the clinic and the chart reviewed. The previous observations, recommendations, and conclusions were reviewed including those of other providers.     The plan was discussed with the patient and/or family. The patient was given time to ask questions and these questions were answered. At the conclusion of their visit they had no additional questions or concerns and all questions were answered to their satisfaction.    Patient was given instructions and counseling regarding his condition or for health maintenance advice. Please see specific information pulled into the AVS if appropriate.       Patient or patient representative verbalized consent for the use of Ambient Listening during the visit with  Joshua Dick MD for chart documentation. 6/6/2025  17:46 EDT

## 2025-06-09 DIAGNOSIS — G89.3 CANCER RELATED PAIN: ICD-10-CM

## 2025-06-09 DIAGNOSIS — C79.51 PROSTATE CANCER METASTATIC TO BONE: ICD-10-CM

## 2025-06-09 DIAGNOSIS — C61 PROSTATE CANCER METASTATIC TO BONE: ICD-10-CM

## 2025-06-09 NOTE — TELEPHONE ENCOUNTER
Outpatient Nutrition Oncology Follow Up    Patient Name: Semaj Medina  YOB: 1955  MRN: [id number]    Recommendation(s):   [facility] managing [facility] feeds.  Further intervention deferred to Medical Oncology.      Reason for Consult:  Nutrition referral due to reduced oral intake and pt on tube feedings    Diagnosis:  metastatic prostate Ca to the bone       Current Treatment:   Lupron, Xgeva  Begins a new tx with [facility] radiation oncology Friday 6/13/25    Most Recent Weights:     45.7 kg 5/9/25  46.4 kg 5/15/25  44.9 kg 5/30/25  44.9 kg 6/6/25    Weight Change:  1.8% wt decline in 3 weeks (from 5/9-5/30/25).    Wt has been stable at 44.9 kg since 5/30/25.    Estimated nutritional needs (daily):  3331-1499 kcals (40-45 kcals/kg)   gm protein (2-2.5 gm pro/kg)  1572 mL    Nutrition-related concerns:  appetite changes, UWL, mild loose BM's (ongoing), reports new concern of green-colored stools, occasionally greasy stools, and     Current PO intake:  pt continues to nibble on foods all day (slice of taste, Pop Tarts)    Current EN Schedule:  (formula changed at some point since March 2025)  Pt now receiving Nutren 2.0, 4 feedings daily by bolus administration  (EN provides 2000 kcals, 96 gm protein)    EN Goal:  [facility] per [facility].  [facility]'s [facility] feeds.     Consult or Intervention:  Spoke with pt over the phone.  He reports a change in EN formula recently and since that time he has experienced new nutrition-related concerns with his stool (note above).  New EN formula contains higher fat content as compared to previous formulas.  Rx for Fergon given 5/12/25- likely reason for green colored stool.  Other symptoms- question the reason.  Securechat sent to MD's nurse to communicate these symptoms.  No changes in PO intake.  Continues to try and snack on foods throughout the day.     Nutrition Diagnosis: Moderate malnutrition related to increased nutrient needs due to catabolic disease as evidenced by  physiological causes increasing nutrient needs., hypermetabolic state., patient report., and ongoing UWL.    Plan: Will follow up per oncology nutrition protocol      Addendum 6/10/25:  Collaborated with MD and clinical staff.  Suggestion was to change pt to a formula with lower fat content to help with greasy and floating stools.  Called pt and recommended this, however pt wants to stay on his current formula as above.  He wants to remain on this for another month and see if he can tolerate it better and possibly gain some weight.  Recommended pt consider giving an additional 2 oz every other day or every 3rd day to promote wt gain.  Encouraged pt to reach out if he experiences worsening bowel issues (greasy, floating, or increased loose stool) and / or if he wishes to change the formula or method of administration- discussed nocturnal pump feeds).  Pt v/u.  Communicated the above information to clinical staff and MD.

## 2025-06-10 RX ORDER — HYDROCODONE BITARTRATE AND ACETAMINOPHEN 10; 325 MG/1; MG/1
1.5 TABLET ORAL EVERY 4 HOURS PRN
Qty: 135 TABLET | Refills: 0 | Status: SHIPPED | OUTPATIENT
Start: 2025-06-10

## 2025-06-16 DIAGNOSIS — C79.51 PROSTATE CANCER METASTATIC TO BONE: Primary | ICD-10-CM

## 2025-06-16 DIAGNOSIS — C61 PROSTATE CANCER METASTATIC TO BONE: Primary | ICD-10-CM

## 2025-06-16 NOTE — PROGRESS NOTES
Chief Complaint/Care Team   Prostate Cancer    Maryse Monae MD Coffie, Ramona N, MD    History of Present Illness     Diagnosis: Metastatic Castration Resistant Prostate Cancer    Current Treatment: Lupron, Xgeva    Previous Treatment:   Metastatic Castration resistant prostate cancer:    Patient was initially diagnosed with prostate cancer in 2015.  On 6/4/2015 he underwent radical prostatectomy and LN dissection which showed a Zach 4+3 = 7 prostate cancer.  There were several high risk features such as extraprostatic extension, seminal vesicle invasion, positive margins at the bladder neck and right and left seminal vesicle.  Lymph vascular invasion was indeterminate.  Perineural invasion was not commented on. Final pathology [id number] R1.  He was treated with adjuvant radiation by Dr. Stanton.    According to records the patient likely started Lupron in December 2017 when his PSA started to rise.  The patient took a break in August 2019.  He states he was not aware he was to continue.  He restarted Lupron on 11/26/2019 after it was noted that his PSA chago from 0.46 up to 2.55 on 11/8/2019.  Unfortunately his PSA continued to rise after that to 4.54 on 3/10/2020.    - restarted Lupron on 11/26/20  - Started Zytiga on 8/27/20.  8/31/2020 PSA 17.95     - Started Xtandi in May 2021 due to intolerance of Zytiga even at the lowest dose  - PSA rising in April 2023 to 2.85, doubling time 3.4 months  - started Docetaxel May 2023  -Pluvicto at [facility] which began in  8/1/2024- stopped in 2/2025 due to increase in PSA    Semaj Medina is a 69 y.o. male who presents to [facility] HEMATOLOGY & ONCOLOGY for Metastatic Castration Resistant Prostate Cancer.    History of Present Illness    The patient was previously evaluated by Dr. Salazar, a radiation oncologist at [facility], who shared pt was not a candidate for [place] 223. However, he would be a suitable candidate for Pluvicto, he has referred  him to nuclear medicine specialist at [facility]. A consultation with a nuclear medicine specialist was scheduled for evaluation for Pluvicto. His current medication regimen includes Zytiga and prednisone.      Patient reports experiencing back and neck pain, which subsides upon administration of his pain medication. He typically takes his pain medication every 4 hours. He is here for evaluation prior to next Xgeva treatment.  He began Pluvicto treatment on August 1, 2024, given every 6 weeks, but was stopped in 2/2025 due to progression of disease. Pt here with his dtr prior to next cycle of cabazitaxel. Pt continues to lose weight despite his attempts to increase caloric intake and with increased dose of marinol.  Patient had feeding tube placed, he is tolerating 4 cans of feeding,  he continues follow-up with dietitian.  Patient was evaluated at [facility] with Dr. Chow, who recommended stopping cabazitaxel, zytiga/prednisone but continue Xgeva and Lupron along, pt is pending start of [place] 223 at [facility] with Dr. Salazar, pt here also for lab check.  Patient received 2 units PRBC earlier this week, patient reports compliance with taking oral calcium pills twice a day.       History of Present Illness  The patient is a 69-year-old male who presents to the hematology oncology department for follow-up on his metastatic castration-resistant prostate cancer. He is status post one treatment at the [facility].    He reports no adverse effects from the treatment and has been receiving monthly injections. His next appointment is scheduled for 07/16/2025.    Recently, he has been experiencing hypocalcemia. Dr. Dick administered intravenous calcium during a previous visit. Currently, he feels fatigued and has a diminished appetite, with no consumption of solid food for an extended period. He had a consultation with a dietitian last week and is currently on a 2.0 formula diet. He was previously  prescribed Marinol but has not taken it recently due to running out of the medication.  If his calcium is normal he will restart Xgeva today.    Review of Systems   Constitutional:  Positive for fatigue.   Respiratory:          Rib pain    Musculoskeletal:  Positive for neck pain.   All other systems reviewed and are negative.     Oncology/Hematology History Overview Note     Metastatic Castration resistant prostate cancer:    Patient was initially diagnosed with prostate cancer in 2015.  On 6/4/2015 he underwent radical prostatectomy and LN dissection which showed a Zach 4+3 = 7 prostate cancer.  There were several high risk features such as extraprostatic extension, seminal vesicle invasion, positive margins at the bladder neck and right and left seminal vesicle.  Lymph vascular invasion was indeterminate.  Perineural invasion was not commented on. Final pathology [id number] R1.  He was treated with adjuvant radiation by Dr. Stanton.    According to records the patient likely started Lupron in December 2017 when his PSA started to rise.  The patient took a break in August 2019.  He states he was not aware he was to continue.  He restarted Lupron on 11/26/2019 after it was noted that his PSA chago from 0.46 up to 2.55 on 11/8/2019.  Unfortunately his PSA continued to rise after that to 4.54 on 3/10/2020.    - restarted Lupron on 11/26/20  - Started Zytiga on 8/27/20.  8/31/2020 PSA 17.95     - Started Xtandi in May 2021 due to intolerance of Zytiga even at the lowest dose  - PSA rising in April 2023 to 2.85, doubling time 3.4 months  - started Docetaxel May 2023    PSMA PET 5/2/23:   1.  Radiotracer avid supra clavicular, thoracic and abdominal adenopathy and sclerotic lesion within T10 vertebral body likely representing metastatic disease.  2.  Indeterminate lesion within the left ilium demonstrating mild uptake.   3.  Asymmetric sclerosis within the superior lateral aspect the left orbit which does not  demonstrate significant uptake above that of the right orbit. Findings are nonspecific with  differential considerations including but not limited to metastatic disease, fibrous dysplasia versus Paget's disease.   4.  Sub-6 mm nodule within the left lower lobe.  5.  Scattered indeterminate hypodense lesions throughout the liver which do not demonstrate radiotracer uptake above that of the adjacent liver, best best visualized on recent multiphase CT abdomen and pelvis 04/17/2023. Please refer to this dictation for further information.       Prostate cancer metastatic to bone   12/11/2020 -  Chemotherapy    OP SUPPORTIVE Leuprolide 45 mg Q6M     3/23/2021 -  Chemotherapy    OP SUPPORTIVE Denosumab (Xgeva) Q28D     4/15/2021 - 5/13/2021 Chemotherapy    OP PROSTATE Enzalutamide     5/19/2021 Initial Diagnosis    Prostate cancer metastatic to bone (CMS/HCC)     6/2/2023 - 9/15/2023 Biopsy    OP PROSTATE DOCEtaxel  Plan Provider: Natalya Jack MD PhD  Treatment goal: Palliative  Line of treatment: [No plan line of treatment]     12/19/2023 - 12/19/2023 Chemotherapy    OP PROSTATE Apalutamide     1/26/2024 - 1/26/2024 Biopsy    OP PROSTATE Abiraterone / PredniSONE  Plan Provider: Jsohua Dick MD  Treatment goal: Control  Line of treatment: [No plan line of treatment]     2/12/2025 - 3/26/2025 Chemotherapy    OP PROSTATE Cabazitaxel / PredniSONE     Prostate cancer   6/16/2021 Initial Diagnosis    Prostate cancer (HCC)     6/2/2023 -  Chemotherapy    OP CENTRAL VENOUS ACCESS DEVICE ACCESS, CARE, AND MAINTENANCE (CVAD)     6/6/2025 -  Chemotherapy    OP SUPPORTIVE ELECTROLYTE REPLACEMENT     Secondary malignant neoplasm of bone   11/4/2021 Initial Diagnosis    Secondary malignant neoplasm of bone (HCC)     11/23/2021 - 12/9/2021 Radiation    Radiation OncologyTreatment Course:  Semaj Medina received 3000 cGy in 10 fractions to the T9-T11 spine.        Objective     Vitals:    06/17/25 1302   BP: 123/94   Pulse: 93  "  Resp: 18   Temp: 98.6 °F (37 °C)   TempSrc: Temporal   SpO2: 96%   Weight: 44.8 kg (98 lb 12.8 oz)   Height: 175 cm (68.9\")   PainSc: 6    PainLoc: Neck  Comment: ribs and neck     ECOG score: 0         PHQ-9 Total Score:         Physical Exam  Vitals and nursing note reviewed.   Constitutional:       General: He is not in acute distress.     Appearance: Normal appearance. He is not ill-appearing.   HENT:      Head: Normocephalic.   Eyes:      Conjunctiva/sclera: Conjunctivae normal.   Cardiovascular:      Rate and Rhythm: Normal rate and regular rhythm.      Heart sounds: Normal heart sounds.   Pulmonary:      Effort: Pulmonary effort is normal.      Breath sounds: Normal breath sounds.   Skin:     Coloration: Skin is not jaundiced.   Neurological:      Mental Status: He is alert.   Psychiatric:         Mood and Affect: Mood normal.         Behavior: Behavior normal. Behavior is cooperative.         Thought Content: Thought content normal.         Judgment: Judgment normal.       Past Medical History     Past Medical History:   Diagnosis Date    Anemia     NO PROBLEMS    Anxiety     Blood disease     Colon polyps     Depression     Diverticulitis     Forgetfulness     Hepatitis C     RESOLVED    Hypertension     Night sweats     Numbness and tingling of left lower extremity     Prostate cancer      Current Outpatient Medications on File Prior to Visit   Medication Sig Dispense Refill    amLODIPine (NORVASC) 5 MG tablet Take 1 tablet by mouth Daily. 30 tablet 0    Calcium Carbonate-Vitamin D (Calcium Plus Vitamin D) 500-1.25 MG-MCG capsule Take 1 capsule by mouth Daily. 30 capsule 5    Calcium Citrate 250 MG tablet mg Tab, Oral, BID, 0 Refill(s)      ferrous gluconate (FERGON) 324 MG tablet Take 1 tablet by mouth Daily With Breakfast. 90 tablet 1    folic acid (FOLVITE) 1 MG tablet Take 1 tablet by mouth Daily. 90 tablet 1    HYDROcodone-acetaminophen (NORCO)  MG per tablet Take 1.5 tablets by mouth Every 4 " (Four) Hours As Needed for Moderate Pain. 135 tablet 0    ibuprofen (ADVIL,MOTRIN) 800 MG tablet TAKE 1 TABLET BY MOUTH EVERY 6 - 8 HOURS AS NEEDED FOR PAIN      leuprolide (Lupron Depot, 3-Month,) 22.5 MG injection Inject 22.5 mg into the appropriate muscle as directed by prescriber Every 3 (Three) Months.      lisinopril (PRINIVIL,ZESTRIL) 40 MG tablet Take 1 tablet by mouth Daily. 90 tablet 0    ondansetron (ZOFRAN) 8 MG tablet Take 1 tablet by mouth Every 8 (Eight) Hours As Needed for Nausea or Vomiting. 90 tablet 3    abiraterone acetate (ZYTIGA) 500 MG tablet Take 2 tablets by mouth Daily. (Patient not taking: Reported on 5/9/2025) 60 tablet 11    clotrimazole (MYCELEX) 10 MG w[place]ka TAKE 1 LOZENGE ORALLY 5 TIMES DAILY      naloxone (NARCAN) 4 MG/0.1ML nasal spray  (Patient not taking: Reported on 5/9/2025)      potassium & sodium phosphates (PHOS-NAK) 280-160-250 MG pack packet Take 2 packets by mouth 3 (Three) Times a Day. (Patient not taking: Reported on 6/17/2025) 120 packet 4    potassium chloride (MICRO-K) 10 MEQ CR capsule TAKE 2 CAPSULES BY MOUTH DAILY (Patient not taking: Reported on 6/17/2025) 180 capsule 0    predniSONE (DELTASONE) 5 MG tablet Take 1 tablet by mouth 2 (Two) Times a Day. (Patient not taking: Reported on 5/9/2025) 60 tablet 11    [DISCONTINUED] dronabinol (Marinol) 5 MG capsule Take 2 capsules by mouth Daily. (Patient not taking: Reported on 6/17/2025) 60 capsule 1     Current Facility-Administered Medications on File Prior to Visit   Medication Dose Route Frequency Provider Last Rate Last Admin    heparin injection 500 Units  500 Units Intravenous PRN Joshua Dick MD   500 Units at 06/17/25 1255    sodium chloride 0.9 % flush 20 mL  20 mL Intravenous PRN Joshua Dick MD   20 mL at 06/17/25 1254      No Known Allergies  Past Surgical History:   Procedure Laterality Date    COLONOSCOPY      PEG TUBE INSERTION N/A 2/24/2025    Procedure: PERCUTANEOUS ENDOSCOPIC GASTROSTOMY TUBE  INSERTION and biopsy;  Surgeon: Waldemar Iraheta MD;  Location: [facility] OR [facility];  Service: General;  Laterality: N/A;  gastric musocal abnormality    PROSTATE BIOPSY      PROSTATECTOMY      ROBOT ASSISTED W PELVIC LYMPH NODE DISSECTION    TRANSURETHRAL RESECTION OF BLADDER TUMOR      VENOUS ACCESS DEVICE (PORT) INSERTION Right 5/22/2023    Procedure: INSERTION VENOUS ACCESS PORT;  Surgeon: Waldemar Iraheta MD;  Location: [facility] OR [facility];  Service: General;  Laterality: Right;     Social History     Socioeconomic History    Marital status:    Tobacco Use    Smoking status: Former     Current packs/day: 0.25     Average packs/day: 0.3 packs/day for 54.7 years (13.7 ttl pk-yrs)     Types: Cigarettes     Start date: 10/21/1970     Passive exposure: Past    Smokeless tobacco: Never    Tobacco comments:     INSTRUCTED NO SMOKING 24 HR PRIOR TO SURGERY PER ANESTHESIA      1000 PM 02-23-25   Vaping Use    Vaping status: Never Used   Substance and Sexual Activity    Alcohol use: Not Currently     Alcohol/week: 6.0 standard drinks of alcohol     Types: 6 Cans of beer per week     Comment: 12 pack a week of beer    Drug use: Never    Sexual activity: Defer     Family History   Problem Relation Age of Onset    Cancer Mother     Cancer Father      Results     Result Review   The following data was reviewed by: Albert Ojeda PA-C on 06/28/2024:  Lab Results   Component Value Date    HGB 11.8 (L) 06/17/2025    HCT 36.6 (L) 06/17/2025    MCV 84.3 06/17/2025     06/17/2025    WBC 6.07 06/17/2025    NEUTROABS 5.03 06/06/2025    LYMPHSABS 0.79 06/06/2025    MONOSABS 0.35 06/06/2025    EOSABS 0.13 06/06/2025    BASOSABS 0.03 06/06/2025     Lab Results   Component Value Date    GLUCOSE 127 (H) 06/17/2025    BUN 18.1 06/17/2025    CREATININE 0.72 (L) 06/17/2025     (L) 06/17/2025    K 5.2 06/17/2025     06/17/2025    CO2 16.7 (L) 06/17/2025    CALCIUM 7.4 (L) 06/17/2025    PROTEINTOT 6.8 06/17/2025    ALBUMIN 3.5  06/17/2025    BILITOT 0.3 06/17/2025    ALKPHOS 658 (H) 06/17/2025    AST 28 06/17/2025    ALT 6 06/17/2025     Lab Results   Component Value Date    MG 1.9 06/17/2025    PHOS 2.1 (L) 06/17/2025    TSH 1.350 05/16/2019     No radiology results for the last day       Assessment & Plan     Diagnoses and all orders for this visit:    1. Prostate cancer metastatic to bone (Primary)  -     dronabinol (Marinol) 5 MG capsule; Take 2 capsules by mouth Daily.  Dispense: 60 capsule; Refill: 0    2. Cancer related pain    3. Hypocalcemia    4. High risk medication use    5. Iron deficiency anemia, unspecified iron deficiency anemia type    6. Elevated alkaline phosphatase level    7. Hypokalemia    8. Decreased appetite  -     dronabinol (Marinol) 5 MG capsule; Take 2 capsules by mouth Daily.  Dispense: 60 capsule; Refill: 0    Other orders  -     Cancel: denosumab (XGEVA) injection 120 mg      Semaj Medina is a 69 y.o. male who presents to [facility] HEMATOLOGY & ONCOLOGY for treatment of  metastatic prostate cancer, completed 6 cycles of docetaxel on 9/15/2023, Lupron Q6 months, Pt also receiving Xgeva and he is here prior to next Xgeva infusion  -next Lupron injection due on 6/18/2024  -PSA from 12/15/23 was 8 up from 3.1 (in 10/2023)  -Discussed result of [place] bone scan, CT CAP from 1/24/2024 which revealed interval enlargement T10 vertebral body metastasis, no evidence of intrathoracic metastatic disease, slight worsening appearance of osteoblastic schedule static disease, notable at T10, T8, and T5, no evidence of intra-abdominal or pelvic metastatic disease.  -Patient has undergone radiation therapy 10 fractions to thoracic spine from T9-T11 in 2021.  -Also shared plan to reattempt treatment with Zytiga and prednisone, patient reports some nausea vomiting which we will attempt to manage with antiemetics.  -Orders placed for Zytiga and prednisone, also provided prescriptions for Compazine and Zofran  for patient to begin once he begins treatment with these medications.  -pt began zytiga/prednisone on 2/2/2024, pt tolerating it well, no evidence of toxicity on labs, recommend he continue at current dose     -discussed results of restaging imaging from 4/24/2024 which showed stable radiotracer uptake involving sclerotic osseous metastasis of the left frontal bone above left orbit and at T10, no findings of osseous progression, CT chest abdomen pelvis revealed stable sclerotic osseous metastasis, no suspicious adenopathy, postsurgical changes of prostatectomy.     -Given presence of continued osseous metastasis, patient with continued pain in area of metastasis, and since he has previously received radiation in this area, referred him to [facility] school medicine for consideration for treatment with [place] [address] or Pluvicto.  -Shared results of PSMA PET/CT scan from 6/13/2024 which revealed progression of disease, patient has been referred to nuclear medicine specialist at [facility] for consideration for Pluvicto,pt began Pluvicto on 8/1/2024.  -Obtained PSMA PET CT scan in 12/2024, it revealed mixed response, discussed case with [place] physician Dr. Freed on 12/27/24 and we both decided to have pt complete pluvicto treatment to see if osseous areas of disease could be treated and will rescan pt after completion of Pluvicto.    2/12/2025: Given continued rise of PSA patient case discussed with Dr. Freed who agreed patient is not responding Pluvicto treatment thus plan switch patient to cabazitaxel cycle 1 day 1 on 2/12/2025, labs reviewed plan to proceed as scheduled today. Pt also with continued weight loss despite use of protein supplements and marinol, pt shares he has has an appetite but has difficulty eating due to change in his taste buds. Thus, discussed referral for feeding tube placement. Tube feedings will be managed by dietician Natalya.     3/5/2025: Patient here prior to cycle 2 of cabazitaxel, he continues to take  Zytiga prednisone, Xgeva and Lupron, patient has lost 2 additional pounds, patient had feeding tube placed in February 2025, tolerating 3 cans of tube feeding per day, PSA pending from today.  However also order guardant 360 today to look for other treatment options in case PSA continues to rise.    3/26/2025: Patient had prior cycle 3 of cabazitaxel, labs reviewed plan to proceed as scheduled today, he continues to take Zytiga prednisone, Xgeva Lupron.  Patient has lost additional 2 pounds, he continues to tolerate approximately 4 cans of tube feeding per day, patient underwent [id number] testing which was negative for any actionable mutations, patient with low TMB, MSI was not high.  Patient pending PET/CT scan ordered by Dr. Chow at [facility] he plans to follow-up with Dr. Chow after this imaging test in April 2025.  In the meantime, Dr. Chow's agreement with continuing current therapy with cabazitaxel and other therapies listed above  Pt here prior to next Cabazitaxel treatment, labs reviewed plan to proceed with treatment as scheduled.     5/9/2025 patient here prior to next Xgeva treatment and also lab check prior to beginning radium 223 at [facility] with Dr. Salazar, patient reports increased shortness of breath, continued fatigue, patient found to have anemia of 8, no report of blood loss or melena, ordered iron studies, patient found to have iron deficiency, recommend ferrous gluconate 324 mg p.o. once daily, given symptomatic anemia ordered 1 unit PRBC to be administered next week anticipation to begin radiation treatment with Dr. Salazar,  Patient to prior to next Xgeva treatment, last few plan proceed as scheduled, recommend increase calcium over-the-counter, and recommendation continue Phos-Nak supplementation for hypophosphatemia    6/6/2025: Patient prior to next Xgeva treatment, patient found to have low calcium level of 6.3, ordered IV calcium and recommend pt continue oral  calcium level, I have discussed patient case with Dr. Salazar today who plans to consider starting Xpovio treatment next week at [facility].  While patient return in 1 week for lab check consideration for additional IV calcium if needed and resuming Xgeva at that time.     -Recommend pt continue of Lupron, Xgeva  -OxyContin 10 mg was prescribed for pain management, to be taken twice daily every 12 hours but due to lack of response, discontinued it  -will continue to provide refills of his [place] for his cancer related pain, increase dose to 1.5 tablet of Norco 10mg PO Q4 hours prn pain.     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      Hypophosphatemia  -ordered phos repletion    Hypomagnesemia  -pt prescribed mag oxide 400 mg PO QD  -will recheck at next clinic appointment     Hypokalemia  -pt receiving KCL repletion    Low Vit D  -Vit level of 27.7  -prescribed Vit D 50K IU once weekly x 8 weeks     Weight loss  -ordered marinol, increased dose on 1/15/2025  -referred to dietician    Please note that portions of this note were completed with a voice recognition program.    Assessment & Plan      Metastatic castration-resistant prostate cancer  - Status post one treatment at [facility]    Patient restarted his xgeva today, continue his home phos pack and calcium.  Hgb 11.8      Follow Up     I spent 30 minutes caring for Semaj on this date of service. This time includes time spent by me in the following activities:preparing for the visit, reviewing tests, obtaining and/or reviewing a separately obtained history, performing a medically appropriate examination and/or evaluation , counseling and educating the patient/family/caregiver, ordering medications, tests, or procedures, referring and communicating with other health care professionals , documenting  information in the medical record, independently interpreting results and communicating that information with the patient/family/caregiver, and care coordination    Any chemotherapy or immunotherapy or other systemic therapy treatment plan involves a high risk of complications and/or mortality of patient management.    Patient was given instructions and counseling regarding his condition or for health maintenance advice. Please see specific information pulled into the AVS if appropriate.

## 2025-06-17 ENCOUNTER — HOSPITAL ENCOUNTER (OUTPATIENT)
Dept: ONCOLOGY | Facility: HOSPITAL | Age: 70
Discharge: HOME | End: 2025-06-17
Payer: MEDICARE

## 2025-06-17 ENCOUNTER — OFFICE VISIT (OUTPATIENT)
Dept: ONCOLOGY | Facility: HOSPITAL | Age: 70
End: 2025-06-17
Payer: MEDICARE

## 2025-06-17 VITALS
BODY MASS INDEX: 14.63 KG/M2 | DIASTOLIC BLOOD PRESSURE: 94 MMHG | RESPIRATION RATE: 18 BRPM | WEIGHT: 98.8 LBS | TEMPERATURE: 98.6 F | HEIGHT: 69 IN | HEART RATE: 93 BPM | OXYGEN SATURATION: 96 % | SYSTOLIC BLOOD PRESSURE: 123 MMHG

## 2025-06-17 DIAGNOSIS — C79.51 PROSTATE CANCER METASTATIC TO BONE: ICD-10-CM

## 2025-06-17 DIAGNOSIS — C79.51 PROSTATE CANCER METASTATIC TO BONE: Primary | ICD-10-CM

## 2025-06-17 DIAGNOSIS — G89.3 CANCER RELATED PAIN: ICD-10-CM

## 2025-06-17 DIAGNOSIS — C61 PROSTATE CANCER METASTATIC TO BONE: ICD-10-CM

## 2025-06-17 DIAGNOSIS — C61 PROSTATE CANCER METASTATIC TO BONE: Primary | ICD-10-CM

## 2025-06-17 DIAGNOSIS — E87.6 HYPOKALEMIA: ICD-10-CM

## 2025-06-17 DIAGNOSIS — D50.9 IRON DEFICIENCY ANEMIA, UNSPECIFIED IRON DEFICIENCY ANEMIA TYPE: ICD-10-CM

## 2025-06-17 DIAGNOSIS — E83.51 HYPOCALCEMIA: ICD-10-CM

## 2025-06-17 DIAGNOSIS — C61 PROSTATE CANCER: Primary | ICD-10-CM

## 2025-06-17 DIAGNOSIS — Z79.899 HIGH RISK MEDICATION USE: ICD-10-CM

## 2025-06-17 DIAGNOSIS — R63.0 DECREASED APPETITE: ICD-10-CM

## 2025-06-17 DIAGNOSIS — R74.8 ELEVATED ALKALINE PHOSPHATASE LEVEL: ICD-10-CM

## 2025-06-17 LAB
ALBUMIN SERPL-MCNC: 3.5 G/DL (ref 3.5–5.2)
ALBUMIN/GLOB SERPL: 1.1 G/DL
ALP SERPL-CCNC: 658 U/L (ref 39–117)
ALT SERPL W P-5'-P-CCNC: 6 U/L (ref 1–41)
ANION GAP SERPL CALCULATED.3IONS-SCNC: 11.3 MMOL/L (ref 5–15)
ANISOCYTOSIS BLD QL: NORMAL
AST SERPL-CCNC: 28 U/L (ref 1–40)
BASOPHILS # BLD AUTO: 0.02 10*3/MM3 (ref 0–0.2)
BASOPHILS NFR BLD AUTO: 0.3 % (ref 0–1.5)
BILIRUB SERPL-MCNC: 0.3 MG/DL (ref 0–1.2)
BUN SERPL-MCNC: 18.1 MG/DL (ref 8–23)
BUN/CREAT SERPL: 25.1 (ref 7–25)
BURR CELLS BLD QL SMEAR: NORMAL
C3 FRG RBC-MCNC: NORMAL
CALCIUM SPEC-SCNC: 7.4 MG/DL (ref 8.6–10.5)
CHLORIDE SERPL-SCNC: 106 MMOL/L (ref 98–107)
CLUMPED PLATELETS: PRESENT
CO2 SERPL-SCNC: 16.7 MMOL/L (ref 22–29)
CREAT SERPL-MCNC: 0.72 MG/DL (ref 0.76–1.27)
DEPRECATED RDW RBC AUTO: 55.9 FL (ref 37–54)
EGFRCR SERPLBLD CKD-EPI 2021: 98.9 ML/MIN/1.73
EOSINOPHIL # BLD AUTO: 0.09 10*3/MM3 (ref 0–0.4)
EOSINOPHIL NFR BLD AUTO: 1.5 % (ref 0.3–6.2)
ERYTHROCYTE [DISTWIDTH] IN BLOOD BY AUTOMATED COUNT: 18.2 % (ref 12.3–15.4)
GLOBULIN UR ELPH-MCNC: 3.3 GM/DL
GLUCOSE SERPL-MCNC: 127 MG/DL (ref 65–99)
HCT VFR BLD AUTO: 36.6 % (ref 37.5–51)
HGB BLD-MCNC: 11.8 G/DL (ref 13–17.7)
HYPOCHROMIA BLD QL: NORMAL
IMM GRANULOCYTES # BLD AUTO: 0.05 10*3/MM3 (ref 0–0.05)
IMM GRANULOCYTES NFR BLD AUTO: 0.8 % (ref 0–0.5)
LYMPHOCYTES # BLD AUTO: 0.94 10*3/MM3 (ref 0.7–3.1)
LYMPHOCYTES NFR BLD AUTO: 15.5 % (ref 19.6–45.3)
MAGNESIUM SERPL-MCNC: 1.9 MG/DL (ref 1.6–2.4)
MCH RBC QN AUTO: 27.2 PG (ref 26.6–33)
MCHC RBC AUTO-ENTMCNC: 32.2 G/DL (ref 31.5–35.7)
MCV RBC AUTO: 84.3 FL (ref 79–97)
MONOCYTES # BLD AUTO: 0.45 10*3/MM3 (ref 0.1–0.9)
MONOCYTES NFR BLD AUTO: 7.4 % (ref 5–12)
NEUTROPHILS NFR BLD AUTO: 4.52 10*3/MM3 (ref 1.7–7)
NEUTROPHILS NFR BLD AUTO: 74.5 % (ref 42.7–76)
NRBC BLD AUTO-RTO: 0 /100 WBC (ref 0–0.2)
PHOSPHATE SERPL-MCNC: 2.1 MG/DL (ref 2.5–4.5)
PLATELET # BLD AUTO: 172 10*3/MM3 (ref 140–450)
PMV BLD AUTO: 9.9 FL (ref 6–12)
POIKILOCYTOSIS BLD QL SMEAR: NORMAL
POTASSIUM SERPL-SCNC: 5.2 MMOL/L (ref 3.5–5.2)
PROT SERPL-MCNC: 6.8 G/DL (ref 6–8.5)
RBC # BLD AUTO: 4.34 10*6/MM3 (ref 4.14–5.8)
SMALL PLATELETS BLD QL SMEAR: NORMAL
SODIUM SERPL-SCNC: 134 MMOL/L (ref 136–145)
WBC MORPH BLD: NORMAL
WBC NRBC COR # BLD AUTO: 6.07 10*3/MM3 (ref 3.4–10.8)

## 2025-06-17 PROCEDURE — 36591 DRAW BLOOD OFF VENOUS DEVICE: CPT

## 2025-06-17 PROCEDURE — 96372 THER/PROPH/DIAG INJ SC/IM: CPT

## 2025-06-17 PROCEDURE — 83735 ASSAY OF MAGNESIUM: CPT | Performed by: INTERNAL MEDICINE

## 2025-06-17 PROCEDURE — 85007 BL SMEAR W/DIFF WBC COUNT: CPT | Performed by: INTERNAL MEDICINE

## 2025-06-17 PROCEDURE — 80053 COMPREHEN METABOLIC PANEL: CPT | Performed by: INTERNAL MEDICINE

## 2025-06-17 PROCEDURE — 25010000002 DENOSUMAB 120 MG/1.7ML SOLUTION: Performed by: PHYSICIAN ASSISTANT

## 2025-06-17 PROCEDURE — 85025 COMPLETE CBC W/AUTO DIFF WBC: CPT | Performed by: INTERNAL MEDICINE

## 2025-06-17 PROCEDURE — 25010000002 HEPARIN LOCK FLUSH PER 10 UNITS: Performed by: INTERNAL MEDICINE

## 2025-06-17 PROCEDURE — 84100 ASSAY OF PHOSPHORUS: CPT | Performed by: INTERNAL MEDICINE

## 2025-06-17 RX ORDER — DRONABINOL 5 MG/1
10 CAPSULE ORAL DAILY
Qty: 60 CAPSULE | Refills: 0 | Status: SHIPPED | OUTPATIENT
Start: 2025-06-17

## 2025-06-17 RX ORDER — SODIUM CHLORIDE 0.9 % (FLUSH) 0.9 %
20 SYRINGE (ML) INJECTION AS NEEDED
OUTPATIENT
Start: 2025-06-17

## 2025-06-17 RX ORDER — CLOTRIMAZOLE 10 MG/1
LOZENGE ORAL
COMMUNITY
Start: 2025-05-01

## 2025-06-17 RX ORDER — PSEUDOEPHEDRINE HCL 30 MG
TABLET ORAL
COMMUNITY
Start: 2025-05-01

## 2025-06-17 RX ORDER — SODIUM CHLORIDE 0.9 % (FLUSH) 0.9 %
20 SYRINGE (ML) INJECTION AS NEEDED
Status: DISCONTINUED | OUTPATIENT
Start: 2025-06-17 | End: 2025-06-18 | Stop reason: HOSPADM

## 2025-06-17 RX ORDER — HEPARIN SODIUM (PORCINE) LOCK FLUSH IV SOLN 100 UNIT/ML 100 UNIT/ML
500 SOLUTION INTRAVENOUS AS NEEDED
Status: DISCONTINUED | OUTPATIENT
Start: 2025-06-17 | End: 2025-06-18 | Stop reason: HOSPADM

## 2025-06-17 RX ORDER — HEPARIN SODIUM (PORCINE) LOCK FLUSH IV SOLN 100 UNIT/ML 100 UNIT/ML
500 SOLUTION INTRAVENOUS AS NEEDED
OUTPATIENT
Start: 2025-06-17

## 2025-06-17 RX ADMIN — Medication 20 ML: at 12:54

## 2025-06-17 RX ADMIN — DENOSUMAB 120 MG: 120 INJECTION SUBCUTANEOUS at 14:52

## 2025-06-17 RX ADMIN — HEPARIN 500 UNITS: 100 SYRINGE at 12:55

## 2025-06-17 NOTE — ADDENDUM NOTE
Encounter addended by: Ezekiel Goodman [facility] on: 6/17/2025 2:51 PM   Actions taken: i-Vent created or edited

## 2025-06-17 NOTE — ADDENDUM NOTE
Encounter addended by: Murphy Foster, NICANOR on: 6/17/2025 2:54 PM   Actions taken: Order list changed, Diagnosis association updated, MAR administration accepted, Flowsheet accepted, Treatment plan modified, Charge Capture section accepted

## 2025-06-27 ENCOUNTER — HOSPITAL ENCOUNTER (OUTPATIENT)
Dept: INFUSION THERAPY | Facility: HOSPITAL | Age: 70
Discharge: HOME | End: 2025-06-27
Payer: MEDICARE

## 2025-06-27 ENCOUNTER — HOSPITAL ENCOUNTER (OUTPATIENT)
Dept: ONCOLOGY | Facility: HOSPITAL | Age: 70
Discharge: HOME | End: 2025-06-27
Payer: MEDICARE

## 2025-06-27 ENCOUNTER — DOCUMENTATION (OUTPATIENT)
Dept: ONCOLOGY | Facility: HOSPITAL | Age: 70
End: 2025-06-27
Payer: MEDICARE

## 2025-06-27 VITALS
OXYGEN SATURATION: 100 % | DIASTOLIC BLOOD PRESSURE: 78 MMHG | HEART RATE: 101 BPM | SYSTOLIC BLOOD PRESSURE: 109 MMHG | TEMPERATURE: 97.5 F | BODY MASS INDEX: 13.29 KG/M2 | WEIGHT: 89.73 LBS | RESPIRATION RATE: 14 BRPM

## 2025-06-27 DIAGNOSIS — C61 PROSTATE CANCER METASTATIC TO BONE: ICD-10-CM

## 2025-06-27 DIAGNOSIS — C61 PROSTATE CANCER: Primary | ICD-10-CM

## 2025-06-27 DIAGNOSIS — E83.51 HYPOCALCEMIA: Primary | ICD-10-CM

## 2025-06-27 DIAGNOSIS — C79.51 PROSTATE CANCER METASTATIC TO BONE: ICD-10-CM

## 2025-06-27 LAB
ALBUMIN SERPL-MCNC: 3.2 G/DL (ref 3.5–5.2)
ALBUMIN/GLOB SERPL: 1 G/DL
ALP SERPL-CCNC: 908 U/L (ref 39–117)
ALT SERPL W P-5'-P-CCNC: 12 U/L (ref 1–41)
ANION GAP SERPL CALCULATED.3IONS-SCNC: 16.3 MMOL/L (ref 5–15)
AST SERPL-CCNC: 26 U/L (ref 1–40)
BASOPHILS # BLD AUTO: 0.01 10*3/MM3 (ref 0–0.2)
BASOPHILS NFR BLD AUTO: 0.2 % (ref 0–1.5)
BILIRUB SERPL-MCNC: 0.5 MG/DL (ref 0–1.2)
BUN SERPL-MCNC: 36.7 MG/DL (ref 8–23)
BUN/CREAT SERPL: 49.6 (ref 7–25)
CALCIUM SPEC-SCNC: 6.7 MG/DL (ref 8.6–10.5)
CHLORIDE SERPL-SCNC: 102 MMOL/L (ref 98–107)
CO2 SERPL-SCNC: 15.7 MMOL/L (ref 22–29)
CREAT SERPL-MCNC: 0.74 MG/DL (ref 0.76–1.27)
DEPRECATED RDW RBC AUTO: 54.7 FL (ref 37–54)
EGFRCR SERPLBLD CKD-EPI 2021: 98.1 ML/MIN/1.73
EOSINOPHIL # BLD AUTO: 0.03 10*3/MM3 (ref 0–0.4)
EOSINOPHIL NFR BLD AUTO: 0.7 % (ref 0.3–6.2)
ERYTHROCYTE [DISTWIDTH] IN BLOOD BY AUTOMATED COUNT: 18.7 % (ref 12.3–15.4)
GLOBULIN UR ELPH-MCNC: 3.3 GM/DL
GLUCOSE SERPL-MCNC: 141 MG/DL (ref 65–99)
HCT VFR BLD AUTO: 33 % (ref 37.5–51)
HGB BLD-MCNC: 11 G/DL (ref 13–17.7)
IMM GRANULOCYTES # BLD AUTO: 0.05 10*3/MM3 (ref 0–0.05)
IMM GRANULOCYTES NFR BLD AUTO: 1.2 % (ref 0–0.5)
LYMPHOCYTES # BLD AUTO: 0.46 10*3/MM3 (ref 0.7–3.1)
LYMPHOCYTES NFR BLD AUTO: 10.7 % (ref 19.6–45.3)
MCH RBC QN AUTO: 26.6 PG (ref 26.6–33)
MCHC RBC AUTO-ENTMCNC: 33.3 G/DL (ref 31.5–35.7)
MCV RBC AUTO: 79.7 FL (ref 79–97)
MONOCYTES # BLD AUTO: 0.42 10*3/MM3 (ref 0.1–0.9)
MONOCYTES NFR BLD AUTO: 9.7 % (ref 5–12)
NEUTROPHILS NFR BLD AUTO: 3.34 10*3/MM3 (ref 1.7–7)
NEUTROPHILS NFR BLD AUTO: 77.5 % (ref 42.7–76)
NRBC BLD AUTO-RTO: 0 /100 WBC (ref 0–0.2)
PLATELET # BLD AUTO: 151 10*3/MM3 (ref 140–450)
PMV BLD AUTO: 9.8 FL (ref 6–12)
POTASSIUM SERPL-SCNC: 4.4 MMOL/L (ref 3.5–5.2)
PROT SERPL-MCNC: 6.5 G/DL (ref 6–8.5)
PSA SERPL-MCNC: >5000 NG/ML (ref 0–4)
RBC # BLD AUTO: 4.14 10*6/MM3 (ref 4.14–5.8)
SODIUM SERPL-SCNC: 134 MMOL/L (ref 136–145)
WBC NRBC COR # BLD AUTO: 4.31 10*3/MM3 (ref 3.4–10.8)

## 2025-06-27 PROCEDURE — 25010000002 CALCIUM GLUCONATE-NACL 1-0.675 GM/50ML-% SOLUTION: Performed by: INTERNAL MEDICINE

## 2025-06-27 PROCEDURE — 36591 DRAW BLOOD OFF VENOUS DEVICE: CPT

## 2025-06-27 PROCEDURE — 96365 THER/PROPH/DIAG IV INF INIT: CPT

## 2025-06-27 PROCEDURE — 80053 COMPREHEN METABOLIC PANEL: CPT | Performed by: INTERNAL MEDICINE

## 2025-06-27 PROCEDURE — 96366 THER/PROPH/DIAG IV INF ADDON: CPT

## 2025-06-27 PROCEDURE — 84153 ASSAY OF PSA TOTAL: CPT | Performed by: INTERNAL MEDICINE

## 2025-06-27 PROCEDURE — 85025 COMPLETE CBC W/AUTO DIFF WBC: CPT | Performed by: INTERNAL MEDICINE

## 2025-06-27 PROCEDURE — 25010000002 HEPARIN LOCK FLUSH PER 10 UNITS: Performed by: INTERNAL MEDICINE

## 2025-06-27 RX ORDER — SODIUM CHLORIDE 9 MG/ML
20 INJECTION, SOLUTION INTRAVENOUS ONCE
Status: CANCELLED | OUTPATIENT
Start: 2025-07-04

## 2025-06-27 RX ORDER — SODIUM CHLORIDE 0.9 % (FLUSH) 0.9 %
20 SYRINGE (ML) INJECTION AS NEEDED
Status: DISCONTINUED | OUTPATIENT
Start: 2025-06-27 | End: 2025-06-28 | Stop reason: HOSPADM

## 2025-06-27 RX ORDER — HEPARIN SODIUM (PORCINE) LOCK FLUSH IV SOLN 100 UNIT/ML 100 UNIT/ML
500 SOLUTION INTRAVENOUS AS NEEDED
Status: DISCONTINUED | OUTPATIENT
Start: 2025-06-27 | End: 2025-06-29 | Stop reason: HOSPADM

## 2025-06-27 RX ORDER — SODIUM CHLORIDE 0.9 % (FLUSH) 0.9 %
20 SYRINGE (ML) INJECTION AS NEEDED
Status: CANCELLED | OUTPATIENT
Start: 2025-06-27

## 2025-06-27 RX ORDER — HEPARIN SODIUM (PORCINE) LOCK FLUSH IV SOLN 100 UNIT/ML 100 UNIT/ML
500 SOLUTION INTRAVENOUS AS NEEDED
OUTPATIENT
Start: 2025-06-27

## 2025-06-27 RX ORDER — SODIUM CHLORIDE 9 MG/ML
20 INJECTION, SOLUTION INTRAVENOUS ONCE
Status: DISCONTINUED | OUTPATIENT
Start: 2025-06-27 | End: 2025-06-29 | Stop reason: HOSPADM

## 2025-06-27 RX ORDER — SODIUM CHLORIDE 0.9 % (FLUSH) 0.9 %
20 SYRINGE (ML) INJECTION AS NEEDED
OUTPATIENT
Start: 2025-06-27

## 2025-06-27 RX ORDER — SODIUM CHLORIDE 0.9 % (FLUSH) 0.9 %
20 SYRINGE (ML) INJECTION AS NEEDED
Status: DISCONTINUED | OUTPATIENT
Start: 2025-06-27 | End: 2025-06-29 | Stop reason: HOSPADM

## 2025-06-27 RX ORDER — CALCIUM GLUCONATE 20 MG/ML
1 INJECTION, SOLUTION INTRAVENOUS
Status: COMPLETED | OUTPATIENT
Start: 2025-06-27 | End: 2025-06-27

## 2025-06-27 RX ORDER — HEPARIN SODIUM (PORCINE) LOCK FLUSH IV SOLN 100 UNIT/ML 100 UNIT/ML
500 SOLUTION INTRAVENOUS AS NEEDED
Status: CANCELLED | OUTPATIENT
Start: 2025-06-27

## 2025-06-27 RX ORDER — HEPARIN SODIUM (PORCINE) LOCK FLUSH IV SOLN 100 UNIT/ML 100 UNIT/ML
500 SOLUTION INTRAVENOUS AS NEEDED
Status: DISCONTINUED | OUTPATIENT
Start: 2025-06-27 | End: 2025-06-28 | Stop reason: HOSPADM

## 2025-06-27 RX ADMIN — CALCIUM GLUCONATE 1 G: 20 INJECTION, SOLUTION INTRAVENOUS at 13:13

## 2025-06-27 RX ADMIN — HEPARIN 500 UNITS: 100 SYRINGE at 10:47

## 2025-06-27 RX ADMIN — Medication 20 ML: at 10:47

## 2025-06-27 RX ADMIN — HEPARIN 500 UNITS: 100 SYRINGE at 14:35

## 2025-06-27 RX ADMIN — CALCIUM GLUCONATE 1 G: 20 INJECTION, SOLUTION INTRAVENOUS at 12:37

## 2025-06-29 DIAGNOSIS — G89.3 CANCER RELATED PAIN: ICD-10-CM

## 2025-06-29 DIAGNOSIS — C79.51 PROSTATE CANCER METASTATIC TO BONE: ICD-10-CM

## 2025-06-29 DIAGNOSIS — C61 PROSTATE CANCER METASTATIC TO BONE: ICD-10-CM

## 2025-07-01 RX ORDER — HYDROCODONE BITARTRATE AND ACETAMINOPHEN 10; 325 MG/1; MG/1
1.5 TABLET ORAL EVERY 4 HOURS PRN
Qty: 135 TABLET | Refills: 0 | Status: SHIPPED | OUTPATIENT
Start: 2025-07-01

## 2025-07-02 DIAGNOSIS — C61 PROSTATE CANCER METASTATIC TO BONE: Primary | ICD-10-CM

## 2025-07-02 DIAGNOSIS — C79.51 PROSTATE CANCER METASTATIC TO BONE: Primary | ICD-10-CM

## 2025-07-03 ENCOUNTER — HOSPITAL ENCOUNTER (OUTPATIENT)
Dept: ONCOLOGY | Facility: HOSPITAL | Age: 70
Discharge: HOME | End: 2025-07-03
Payer: MEDICARE

## 2025-07-03 ENCOUNTER — OFFICE VISIT (OUTPATIENT)
Dept: ONCOLOGY | Facility: HOSPITAL | Age: 70
End: 2025-07-03
Payer: MEDICARE

## 2025-07-03 VITALS
HEIGHT: 69 IN | BODY MASS INDEX: 13.6 KG/M2 | SYSTOLIC BLOOD PRESSURE: 119 MMHG | WEIGHT: 91.8 LBS | DIASTOLIC BLOOD PRESSURE: 93 MMHG | OXYGEN SATURATION: 100 % | HEART RATE: 89 BPM | RESPIRATION RATE: 16 BRPM | TEMPERATURE: 97.7 F

## 2025-07-03 DIAGNOSIS — R63.4 WEIGHT LOSS: ICD-10-CM

## 2025-07-03 DIAGNOSIS — C61 PROSTATE CANCER METASTATIC TO BONE: Primary | ICD-10-CM

## 2025-07-03 DIAGNOSIS — Z79.899 HIGH RISK MEDICATION USE: ICD-10-CM

## 2025-07-03 DIAGNOSIS — C79.51 PROSTATE CANCER METASTATIC TO BONE: Primary | ICD-10-CM

## 2025-07-03 DIAGNOSIS — C61 PROSTATE CANCER METASTATIC TO BONE: ICD-10-CM

## 2025-07-03 DIAGNOSIS — C61 PROSTATE CANCER: Primary | ICD-10-CM

## 2025-07-03 DIAGNOSIS — C79.51 PROSTATE CANCER METASTATIC TO BONE: ICD-10-CM

## 2025-07-03 LAB
ALBUMIN SERPL-MCNC: 3.2 G/DL (ref 3.5–5.2)
ALBUMIN/GLOB SERPL: 1 G/DL
ALP SERPL-CCNC: 592 U/L (ref 39–117)
ALT SERPL W P-5'-P-CCNC: 12 U/L (ref 1–41)
ANION GAP SERPL CALCULATED.3IONS-SCNC: 9.5 MMOL/L (ref 5–15)
AST SERPL-CCNC: 30 U/L (ref 1–40)
BASOPHILS # BLD AUTO: 0.03 10*3/MM3 (ref 0–0.2)
BASOPHILS NFR BLD AUTO: 0.6 % (ref 0–1.5)
BB HOLD TUBE: NORMAL
BILIRUB SERPL-MCNC: 0.3 MG/DL (ref 0–1.2)
BUN SERPL-MCNC: 17.7 MG/DL (ref 8–23)
BUN/CREAT SERPL: 22.7 (ref 7–25)
CALCIUM SPEC-SCNC: 7.7 MG/DL (ref 8.6–10.5)
CHLORIDE SERPL-SCNC: 107 MMOL/L (ref 98–107)
CO2 SERPL-SCNC: 19.5 MMOL/L (ref 22–29)
CREAT SERPL-MCNC: 0.78 MG/DL (ref 0.76–1.27)
DEPRECATED RDW RBC AUTO: 56.3 FL (ref 37–54)
EGFRCR SERPLBLD CKD-EPI 2021: 96.5 ML/MIN/1.73
EOSINOPHIL # BLD AUTO: 0.04 10*3/MM3 (ref 0–0.4)
EOSINOPHIL NFR BLD AUTO: 0.8 % (ref 0.3–6.2)
ERYTHROCYTE [DISTWIDTH] IN BLOOD BY AUTOMATED COUNT: 19.2 % (ref 12.3–15.4)
GLOBULIN UR ELPH-MCNC: 3.1 GM/DL
GLUCOSE SERPL-MCNC: 125 MG/DL (ref 65–99)
HCT VFR BLD AUTO: 32.9 % (ref 37.5–51)
HGB BLD-MCNC: 10.8 G/DL (ref 13–17.7)
IMM GRANULOCYTES # BLD AUTO: 0.25 10*3/MM3 (ref 0–0.05)
IMM GRANULOCYTES NFR BLD AUTO: 4.8 % (ref 0–0.5)
LYMPHOCYTES # BLD AUTO: 0.96 10*3/MM3 (ref 0.7–3.1)
LYMPHOCYTES NFR BLD AUTO: 18.3 % (ref 19.6–45.3)
MCH RBC QN AUTO: 26.6 PG (ref 26.6–33)
MCHC RBC AUTO-ENTMCNC: 32.8 G/DL (ref 31.5–35.7)
MCV RBC AUTO: 81 FL (ref 79–97)
MONOCYTES # BLD AUTO: 0.56 10*3/MM3 (ref 0.1–0.9)
MONOCYTES NFR BLD AUTO: 10.7 % (ref 5–12)
NEUTROPHILS NFR BLD AUTO: 3.41 10*3/MM3 (ref 1.7–7)
NEUTROPHILS NFR BLD AUTO: 64.8 % (ref 42.7–76)
NRBC BLD AUTO-RTO: 0 /100 WBC (ref 0–0.2)
PLATELET # BLD AUTO: 139 10*3/MM3 (ref 140–450)
PMV BLD AUTO: 10.8 FL (ref 6–12)
POTASSIUM SERPL-SCNC: 5 MMOL/L (ref 3.5–5.2)
PROT SERPL-MCNC: 6.3 G/DL (ref 6–8.5)
RBC # BLD AUTO: 4.06 10*6/MM3 (ref 4.14–5.8)
SODIUM SERPL-SCNC: 136 MMOL/L (ref 136–145)
WBC NRBC COR # BLD AUTO: 5.25 10*3/MM3 (ref 3.4–10.8)

## 2025-07-03 PROCEDURE — 25010000002 HEPARIN LOCK FLUSH PER 10 UNITS: Performed by: INTERNAL MEDICINE

## 2025-07-03 PROCEDURE — 80053 COMPREHEN METABOLIC PANEL: CPT | Performed by: INTERNAL MEDICINE

## 2025-07-03 PROCEDURE — 25010000002 LEUPROLIDE 45 MG KIT: Performed by: INTERNAL MEDICINE

## 2025-07-03 PROCEDURE — 96402 CHEMO HORMON ANTINEOPL SQ/IM: CPT

## 2025-07-03 PROCEDURE — 85025 COMPLETE CBC W/AUTO DIFF WBC: CPT | Performed by: INTERNAL MEDICINE

## 2025-07-03 PROCEDURE — 36591 DRAW BLOOD OFF VENOUS DEVICE: CPT

## 2025-07-03 RX ORDER — SODIUM CHLORIDE 0.9 % (FLUSH) 0.9 %
20 SYRINGE (ML) INJECTION AS NEEDED
OUTPATIENT
Start: 2025-07-03

## 2025-07-03 RX ORDER — SODIUM CHLORIDE 0.9 % (FLUSH) 0.9 %
20 SYRINGE (ML) INJECTION AS NEEDED
Status: DISCONTINUED | OUTPATIENT
Start: 2025-07-03 | End: 2025-07-04 | Stop reason: HOSPADM

## 2025-07-03 RX ORDER — HEPARIN SODIUM (PORCINE) LOCK FLUSH IV SOLN 100 UNIT/ML 100 UNIT/ML
500 SOLUTION INTRAVENOUS AS NEEDED
OUTPATIENT
Start: 2025-07-03

## 2025-07-03 RX ORDER — HEPARIN SODIUM (PORCINE) LOCK FLUSH IV SOLN 100 UNIT/ML 100 UNIT/ML
500 SOLUTION INTRAVENOUS AS NEEDED
Status: DISCONTINUED | OUTPATIENT
Start: 2025-07-03 | End: 2025-07-04 | Stop reason: HOSPADM

## 2025-07-03 RX ADMIN — Medication 20 ML: at 08:54

## 2025-07-03 RX ADMIN — LEUPROLIDE ACETATE 45 MG: KIT at 10:27

## 2025-07-03 RX ADMIN — HEPARIN 500 UNITS: 100 SYRINGE at 08:54

## 2025-07-03 NOTE — PROGRESS NOTES
Chief Complaint/Care Team   Pt here to follow up regarding prostate cancer    Primary Care Physician:  Maryse Monae MD  Referring Physician: Maryse Monae MD  History of Present Illness     Diagnosis: Metastatic Castration Resistant Prostate Cancer     Current Treatment: Lupron, Xgeva, [place] 223 at [facility]      Hem/onc history/Previous Treatment:   Metastatic Castration resistant prostate cancer:     Patient was initially diagnosed with prostate cancer in 2015.  On 6/4/2015 he underwent radical prostatectomy and LN dissection which showed a [place] 4+3 = 7 prostate cancer.  There were several high risk features such as extraprostatic extension, seminal vesicle invasion, positive margins at the bladder neck and right and left seminal vesicle.  Lymph vascular invasion was indeterminate.  Perineural invasion was not commented on. Final pathology [id number] R1.  He was treated with adjuvant radiation by Dr. Stanton.     According to records the patient likely started Lupron in December 2017 when his PSA started to rise.  The patient took a break in August 2019.  He states he was not aware he was to continue.  He restarted Lupron on 11/26/2019 after it was noted that his PSA chago from 0.46 up to 2.55 on 11/8/2019.  Unfortunately his PSA continued to rise after that to 4.54 on 3/10/2020.     - restarted Lupron on 11/26/20  - Started Zytiga on 8/27/20.  8/31/2020 PSA 17.95     - Started Xtandi in May 2021 due to intolerance of Zytiga even at the lowest dose  - PSA rising in April 2023 to 2.85, doubling time 3.4 months  - started Docetaxel May 2023  -Pluvicto at [facility] which began in  8/1/2024- stopped in 2/2025 due to increase in PSA  -started on carbazitaxel started in 2/2025- stopped on 3/26/2025 due to lack of response  -started on [place] 223 with Dr. Salazar at [facility] in 6/2025      Hematology & Oncology History/ Previous Treatment:   Oncology/Hematology History Overview Note     Metastatic Castration  resistant prostate cancer:    Patient was initially diagnosed with prostate cancer in 2015.  On 6/4/2015 he underwent radical prostatectomy and LN dissection which showed a [place] 4+3 = 7 prostate cancer.  There were several high risk features such as extraprostatic extension, seminal vesicle invasion, positive margins at the bladder neck and right and left seminal vesicle.  Lymph vascular invasion was indeterminate.  Perineural invasion was not commented on. Final pathology [id number] R1.  He was treated with adjuvant radiation by Dr. Stanton.    According to records the patient likely started Lupron in December 2017 when his PSA started to rise.  The patient took a break in August 2019.  He states he was not aware he was to continue.  He restarted Lupron on 11/26/2019 after it was noted that his PSA chago from 0.46 up to 2.55 on 11/8/2019.  Unfortunately his PSA continued to rise after that to 4.54 on 3/10/2020.    - restarted Lupron on 11/26/20  - Started Zytiga on 8/27/20.  8/31/2020 PSA 17.95     - Started Xtandi in May 2021 due to intolerance of Zytiga even at the lowest dose  - PSA rising in April 2023 to 2.85, doubling time 3.4 months  - started Docetaxel May 2023    [facility] PET 5/2/23:   1.  Radiotracer avid supra clavicular, thoracic and abdominal adenopathy and sclerotic lesion within T10 vertebral body likely representing metastatic disease.  2.  Indeterminate lesion within the left ilium demonstrating mild uptake.   3.  Asymmetric sclerosis within the superior lateral aspect the left orbit which does not demonstrate significant uptake above that of the right orbit. Findings are nonspecific with  differential considerations including but not limited to metastatic disease, fibrous dysplasia versus Paget's disease.   4.  Sub-6 mm nodule within the left lower lobe.  5.  Scattered indeterminate hypodense lesions throughout the liver which do not demonstrate radiotracer uptake above that of the adjacent liver,  best best visualized on recent multiphase CT abdomen and pelvis 04/17/2023. Please refer to this dictation for further information.       Prostate cancer metastatic to bone   12/11/2020 -  Chemotherapy    OP SUPPORTIVE Leuprolide 45 mg Q6M     3/23/2021 -  Chemotherapy    OP SUPPORTIVE Denosumab (Xgeva) Q28D     4/15/2021 - 5/13/2021 Chemotherapy    OP PROSTATE Enzalutamide     5/19/2021 Initial Diagnosis    Prostate cancer metastatic to bone (CMS/HCC)     6/2/2023 - 9/15/2023 Biopsy    OP PROSTATE DOCEtaxel  Plan Provider: Natalya Jack MD PhD  Treatment goal: Palliative  Line of treatment: [No plan line of treatment]     12/19/2023 - 12/19/2023 Chemotherapy    OP PROSTATE Apalutamide     1/26/2024 - 1/26/2024 Biopsy    OP PROSTATE Abiraterone / PredniSONE  Plan Provider: Joshua Dick MD  Treatment goal: Control  Line of treatment: [No plan line of treatment]     2/12/2025 - 3/26/2025 Chemotherapy    OP PROSTATE Cabazitaxel / PredniSONE     Prostate cancer   6/16/2021 Initial Diagnosis    Prostate cancer (HCC)     6/2/2023 -  Chemotherapy    OP CENTRAL VENOUS ACCESS DEVICE ACCESS, CARE, AND MAINTENANCE (CVAD)     6/6/2025 -  Chemotherapy    OP SUPPORTIVE ELECTROLYTE REPLACEMENT     Secondary malignant neoplasm of bone   11/4/2021 Initial Diagnosis    Secondary malignant neoplasm of bone (HCC)     11/23/2021 - 12/9/2021 Radiation    Radiation OncologyTreatment Course:  Semaj Medina received 3000 cGy in 10 fractions to the T9-T11 spine.          Interval History:  Semaj Medina is a 69 y.o. male who presents to [facility] for follow up regarding prostate cancer. Pt currently receiving radium 223 (Xpovio) at [facility] with Dr. Salazar, next treatment scheduled for 7/11/2025 he is here for lab check prior to that treatment.  Patient missed last dose of Lupron secondary to critical low calcium level requiring IV calcium administration.  He is here for lab check to  "consider resuming Lupron. He reports compliance with use of calcium supplementation OTC.     History of Present Illness         Review of Systems   Constitutional:  Positive for appetite change, fatigue and unexpected weight loss.   Cardiovascular:  Positive for chest pain (rib cage pain).   Musculoskeletal:  Positive for back pain and neck pain.        Objective     Vitals:    07/03/25 0858   BP: 119/93   Pulse: 89   Resp: 16   Temp: 97.7 °F (36.5 °C)   TempSrc: Oral   SpO2: 100%   Weight: 41.6 kg (91 lb 12.8 oz)   Height: 175 cm (68.9\")   PainSc: 6    PainLoc: Rib Cage     ECOG score: 0         PHQ-9 Total Score:         Physical Exam  Vitals reviewed. Exam conducted with a chaperone present.   Constitutional:       General: He is not in acute distress.  HENT:      Head: Normocephalic and atraumatic.   Eyes:      Extraocular Movements: Extraocular movements intact.      Conjunctiva/sclera: Conjunctivae normal.   Skin:     General: Skin is warm and dry.      Findings: No rash.   Neurological:      Mental Status: He is alert and oriented to person, place, and time.       Physical Exam        Past Medical History     Past Medical History:   Diagnosis Date    Anemia     NO PROBLEMS    Anxiety     Blood disease     Colon polyps     Depression     Diverticulitis     Forgetfulness     Hepatitis C     RESOLVED    Hypertension     Night sweats     Numbness and tingling of left lower extremity     Prostate cancer      Current Outpatient Medications   Medication Instructions    abiraterone acetate (ZYTIGA) 1,000 mg, Oral, Daily    amLODIPine (NORVASC) 5 mg, Oral, Daily    Calcium Carbonate-Vitamin D (Calcium Plus Vitamin D) 500-1.25 MG-MCG capsule 1 capsule, Oral, Daily    Calcium Citrate 250 MG tablet mg Tab, Oral, BID, 0 Refill(s)    clotrimazole (MYCELEX) 10 MG [place] TAKE 1 LOZENGE ORALLY 5 TIMES DAILY    dronabinol (MARINOL) 10 mg, Oral, Daily    ferrous gluconate (FERGON) 324 mg, Oral, Daily With Breakfast    folic " acid (FOLVITE) 1 mg, Oral, Daily    HYDROcodone-acetaminophen (NORCO)  MG per tablet 1.5 tablets, Oral, Every 4 Hours PRN    ibuprofen (ADVIL,MOTRIN) 800 MG tablet TAKE 1 TABLET BY MOUTH EVERY 6 - 8 HOURS AS NEEDED FOR PAIN    lisinopril (PRINIVIL,ZESTRIL) 40 mg, Oral, Daily    Lupron Depot (3-Month) 22.5 mg, Every 3 Months    naloxone (NARCAN) 4 MG/0.1ML nasal spray     ondansetron (ZOFRAN) 8 mg, Oral, Every 8 Hours PRN    potassium & sodium phosphates (PHOS-NAK) 280-160-250 MG pack packet 2 packets, Oral, 3 Times Daily    potassium chloride (MICRO-K) 10 MEQ CR capsule 20 mEq, Oral, Daily    predniSONE (DELTASONE) 5 mg, Oral, 2 Times Daily     No Known Allergies  Past Surgical History:   Procedure Laterality Date    COLONOSCOPY      PEG TUBE INSERTION N/A 2/24/2025    Procedure: PERCUTANEOUS ENDOSCOPIC GASTROSTOMY TUBE INSERTION and biopsy;  Surgeon: Waldemar Iraheta MD;  Location: [facility] [place];  Service: General;  Laterality: N/A;  gastric musocal abnormality    PROSTATE BIOPSY      PROSTATECTOMY      ROBOT ASSISTED W PELVIC LYMPH NODE DISSECTION    TRANSURETHRAL RESECTION OF BLADDER TUMOR      VENOUS ACCESS DEVICE (PORT) INSERTION Right 5/22/2023    Procedure: INSERTION VENOUS ACCESS PORT;  Surgeon: Waldemar Iraheta MD;  Location: [facility];  Service: General;  Laterality: Right;     Social History     Socioeconomic History    Marital status:    Tobacco Use    Smoking status: Former     Current packs/day: 0.25     Average packs/day: 0.3 packs/day for 54.7 years (13.7 ttl pk-yrs)     Types: Cigarettes     Start date: 10/21/1970     Passive exposure: Past    Smokeless tobacco: Never    Tobacco comments:     INSTRUCTED NO SMOKING 24 HR PRIOR TO SURGERY PER ANESTHESIA      1000 PM 02-23-25   Vaping Use    Vaping status: Never Used   Substance and Sexual Activity    Alcohol use: Not Currently     Alcohol/week: 6.0 standard drinks of alcohol     Types: 6 Cans of beer per week     Comment: 12 pack a  week of beer    Drug use: Never    Sexual activity: Defer       Family Cancer History: Pt with h/o cancer in his mother and father.  Results     Result Review   The following data was reviewed by: Joshua Dick MD   Lab Results   Component Value Date    HGB 11.0 (L) 06/27/2025    HCT 33.0 (L) 06/27/2025    MCV 79.7 06/27/2025     06/27/2025    WBC 4.31 06/27/2025    NEUTROABS 3.34 06/27/2025    LYMPHSABS 0.46 (L) 06/27/2025    MONOSABS 0.42 06/27/2025    EOSABS 0.03 06/27/2025    BASOSABS 0.01 06/27/2025     Lab Results   Component Value Date    GLUCOSE 141 (H) 06/27/2025    BUN 36.7 (H) 06/27/2025    CREATININE 0.74 (L) 06/27/2025     (L) 06/27/2025    K 4.4 06/27/2025     06/27/2025    CO2 15.7 (L) 06/27/2025    CALCIUM 6.7 (C) 06/27/2025    PROTEINTOT 6.5 06/27/2025    ALBUMIN 3.2 (L) 06/27/2025    BILITOT 0.5 06/27/2025    ALKPHOS 908 (H) 06/27/2025    AST 26 06/27/2025    ALT 12 06/27/2025     Lab Results   Component Value Date    MG 1.9 06/17/2025    PHOS 2.1 (L) 06/17/2025    TSH 1.350 05/16/2019       No radiology results for the last day       Assessment & Plan     Diagnoses and all orders for this visit:    1. Prostate cancer metastatic to bone (Primary)    2. High risk medication use    3. Weight loss         Semaj Medina is a 69 y.o. male who presents to [facility]AT[facility] for follow up regarding prostate cancer. See treatment history above.    7/3/2025: Pt currently receiving radium 223 (Xpovio) at [facility] with Dr. Salazar, next treatment scheduled for 7/11/2025 he is here for lab check prior to that treatment.   -Lupron treatment scheduled 6/27/2025 but pt missed it due to critically low Calcium, labs reviewed from today and plan to proceed as scheduled today      Hypocalcemia  -calcium improved, recommend he continue 2 tums OTC by mouth daily    Weight loss/Malnutrition  -currently has feeding tube in place, recommend he continue tube  feedings    Please note that portions of this note were completed with a voice recognition program.    Electronically signed by Joshua Dick MD, 07/04/25, 1:09 PM EDT.    Assessment & Plan      Follow Up     I spent 30 minutes caring for Semaj on this date of service. This time includes time spent by me in the following activities: preparing for the visit, reviewing tests, performing a medically appropriate examination and/or evaluation, counseling and educating the patient/family/caregiver, referring and communicating with other health care professionals, documenting information in the medical record, independently interpreting results and communicating that information with the patient/family/caregiver, care coordination, ordering medications, and ordering test(s).     The plan was discussed with the patient and/or family. The patient was given time to ask questions and these questions were answered. At the conclusion of their visit they had no additional questions or concerns and all questions were answered to their satisfaction.    Patient was given instructions and counseling regarding his condition or for health maintenance advice. Please see specific information pulled into the AVS if appropriate.       Patient or patient representative verbalized consent for the use of Ambient Listening during the visit with  Joshua Dick MD for chart documentation. 7/4/2025  13:09 EDT

## 2025-07-10 ENCOUNTER — HOSPITAL ENCOUNTER (OUTPATIENT)
Dept: ONCOLOGY | Facility: HOSPITAL | Age: 70
Discharge: HOME | End: 2025-07-10
Payer: MEDICARE

## 2025-07-10 VITALS
TEMPERATURE: 98.2 F | OXYGEN SATURATION: 98 % | SYSTOLIC BLOOD PRESSURE: 116 MMHG | RESPIRATION RATE: 16 BRPM | DIASTOLIC BLOOD PRESSURE: 83 MMHG | HEART RATE: 85 BPM

## 2025-07-10 DIAGNOSIS — C79.51 PROSTATE CANCER METASTATIC TO BONE: Primary | ICD-10-CM

## 2025-07-10 DIAGNOSIS — C61 PROSTATE CANCER METASTATIC TO BONE: Primary | ICD-10-CM

## 2025-07-10 DIAGNOSIS — C61 PROSTATE CANCER: ICD-10-CM

## 2025-07-10 LAB
ALBUMIN SERPL-MCNC: 3.4 G/DL (ref 3.5–5.2)
ALBUMIN/GLOB SERPL: 1.2 G/DL
ALP SERPL-CCNC: 576 U/L (ref 39–117)
ALT SERPL W P-5'-P-CCNC: 19 U/L (ref 1–41)
ANION GAP SERPL CALCULATED.3IONS-SCNC: 10.3 MMOL/L (ref 5–15)
AST SERPL-CCNC: 44 U/L (ref 1–40)
BASOPHILS # BLD AUTO: 0.02 10*3/MM3 (ref 0–0.2)
BASOPHILS NFR BLD AUTO: 0.4 % (ref 0–1.5)
BILIRUB SERPL-MCNC: 0.2 MG/DL (ref 0–1.2)
BUN SERPL-MCNC: 18 MG/DL (ref 8–23)
BUN/CREAT SERPL: 28.6 (ref 7–25)
CALCIUM SPEC-SCNC: 7.4 MG/DL (ref 8.6–10.5)
CHLORIDE SERPL-SCNC: 108 MMOL/L (ref 98–107)
CO2 SERPL-SCNC: 17.7 MMOL/L (ref 22–29)
CREAT SERPL-MCNC: 0.63 MG/DL (ref 0.76–1.27)
DEPRECATED RDW RBC AUTO: 60.2 FL (ref 37–54)
EGFRCR SERPLBLD CKD-EPI 2021: 103 ML/MIN/1.73
EOSINOPHIL # BLD AUTO: 0.02 10*3/MM3 (ref 0–0.4)
EOSINOPHIL NFR BLD AUTO: 0.4 % (ref 0.3–6.2)
ERYTHROCYTE [DISTWIDTH] IN BLOOD BY AUTOMATED COUNT: 20.1 % (ref 12.3–15.4)
GLOBULIN UR ELPH-MCNC: 2.9 GM/DL
GLUCOSE SERPL-MCNC: 117 MG/DL (ref 65–99)
HCT VFR BLD AUTO: 31.5 % (ref 37.5–51)
HGB BLD-MCNC: 10.3 G/DL (ref 13–17.7)
IMM GRANULOCYTES # BLD AUTO: 0.04 10*3/MM3 (ref 0–0.05)
IMM GRANULOCYTES NFR BLD AUTO: 0.8 % (ref 0–0.5)
LYMPHOCYTES # BLD AUTO: 0.74 10*3/MM3 (ref 0.7–3.1)
LYMPHOCYTES NFR BLD AUTO: 14.5 % (ref 19.6–45.3)
MAGNESIUM SERPL-MCNC: 1.8 MG/DL (ref 1.6–2.4)
MCH RBC QN AUTO: 26.9 PG (ref 26.6–33)
MCHC RBC AUTO-ENTMCNC: 32.7 G/DL (ref 31.5–35.7)
MCV RBC AUTO: 82.2 FL (ref 79–97)
MONOCYTES # BLD AUTO: 0.33 10*3/MM3 (ref 0.1–0.9)
MONOCYTES NFR BLD AUTO: 6.4 % (ref 5–12)
NEUTROPHILS NFR BLD AUTO: 3.97 10*3/MM3 (ref 1.7–7)
NEUTROPHILS NFR BLD AUTO: 77.5 % (ref 42.7–76)
NRBC BLD AUTO-RTO: 0 /100 WBC (ref 0–0.2)
PHOSPHATE SERPL-MCNC: 2.3 MG/DL (ref 2.5–4.5)
PLATELET # BLD AUTO: 119 10*3/MM3 (ref 140–450)
PMV BLD AUTO: 9.3 FL (ref 6–12)
POTASSIUM SERPL-SCNC: 5.2 MMOL/L (ref 3.5–5.2)
PROT SERPL-MCNC: 6.3 G/DL (ref 6–8.5)
RBC # BLD AUTO: 3.83 10*6/MM3 (ref 4.14–5.8)
SODIUM SERPL-SCNC: 136 MMOL/L (ref 136–145)
WBC NRBC COR # BLD AUTO: 5.12 10*3/MM3 (ref 3.4–10.8)

## 2025-07-10 PROCEDURE — 83735 ASSAY OF MAGNESIUM: CPT | Performed by: INTERNAL MEDICINE

## 2025-07-10 PROCEDURE — 36591 DRAW BLOOD OFF VENOUS DEVICE: CPT

## 2025-07-10 PROCEDURE — 84100 ASSAY OF PHOSPHORUS: CPT | Performed by: INTERNAL MEDICINE

## 2025-07-10 PROCEDURE — 85025 COMPLETE CBC W/AUTO DIFF WBC: CPT | Performed by: INTERNAL MEDICINE

## 2025-07-10 PROCEDURE — 80053 COMPREHEN METABOLIC PANEL: CPT | Performed by: INTERNAL MEDICINE

## 2025-07-10 PROCEDURE — 25010000002 HEPARIN LOCK FLUSH PER 10 UNITS: Performed by: INTERNAL MEDICINE

## 2025-07-10 RX ORDER — HEPARIN SODIUM (PORCINE) LOCK FLUSH IV SOLN 100 UNIT/ML 100 UNIT/ML
500 SOLUTION INTRAVENOUS AS NEEDED
Status: DISCONTINUED | OUTPATIENT
Start: 2025-07-10 | End: 2025-07-11 | Stop reason: HOSPADM

## 2025-07-10 RX ORDER — HEPARIN SODIUM (PORCINE) LOCK FLUSH IV SOLN 100 UNIT/ML 100 UNIT/ML
500 SOLUTION INTRAVENOUS AS NEEDED
OUTPATIENT
Start: 2025-07-15

## 2025-07-10 RX ORDER — SODIUM CHLORIDE 0.9 % (FLUSH) 0.9 %
20 SYRINGE (ML) INJECTION AS NEEDED
Status: DISCONTINUED | OUTPATIENT
Start: 2025-07-10 | End: 2025-07-11 | Stop reason: HOSPADM

## 2025-07-10 RX ORDER — SODIUM CHLORIDE 0.9 % (FLUSH) 0.9 %
20 SYRINGE (ML) INJECTION AS NEEDED
OUTPATIENT
Start: 2025-07-10

## 2025-07-10 RX ADMIN — Medication 20 ML: at 13:30

## 2025-07-10 RX ADMIN — HEPARIN 500 UNITS: 100 SYRINGE at 13:31

## 2025-07-14 DIAGNOSIS — C61 PROSTATE CANCER METASTATIC TO BONE: Primary | ICD-10-CM

## 2025-07-14 DIAGNOSIS — C79.51 PROSTATE CANCER METASTATIC TO BONE: Primary | ICD-10-CM

## 2025-07-15 ENCOUNTER — HOSPITAL ENCOUNTER (OUTPATIENT)
Dept: ONCOLOGY | Facility: HOSPITAL | Age: 70
Discharge: HOME | End: 2025-07-15
Payer: MEDICARE

## 2025-07-15 DIAGNOSIS — C61 PROSTATE CANCER METASTATIC TO BONE: Primary | ICD-10-CM

## 2025-07-15 DIAGNOSIS — C79.51 PROSTATE CANCER METASTATIC TO BONE: Primary | ICD-10-CM

## 2025-07-17 DIAGNOSIS — C61 PROSTATE CANCER METASTATIC TO BONE: ICD-10-CM

## 2025-07-17 DIAGNOSIS — G89.3 CANCER RELATED PAIN: ICD-10-CM

## 2025-07-17 DIAGNOSIS — C79.51 PROSTATE CANCER METASTATIC TO BONE: ICD-10-CM

## 2025-07-18 ENCOUNTER — HOSPITAL ENCOUNTER (EMERGENCY)
Facility: HOSPITAL | Age: 70
Discharge: HOME | End: 2025-07-18
Attending: EMERGENCY MEDICINE
Payer: MEDICARE

## 2025-07-18 ENCOUNTER — HOSPITAL ENCOUNTER (OUTPATIENT)
Dept: ONCOLOGY | Facility: HOSPITAL | Age: 70
Discharge: HOME | End: 2025-07-18
Payer: MEDICARE

## 2025-07-18 VITALS
HEART RATE: 72 BPM | DIASTOLIC BLOOD PRESSURE: 78 MMHG | SYSTOLIC BLOOD PRESSURE: 120 MMHG | OXYGEN SATURATION: 100 % | TEMPERATURE: 97.8 F | RESPIRATION RATE: 14 BRPM | BODY MASS INDEX: 13.42 KG/M2 | HEIGHT: 69 IN | WEIGHT: 90.61 LBS

## 2025-07-18 DIAGNOSIS — C61 PROSTATE CANCER METASTATIC TO BONE: ICD-10-CM

## 2025-07-18 DIAGNOSIS — E83.39 HYPOPHOSPHATEMIA: ICD-10-CM

## 2025-07-18 DIAGNOSIS — C79.51 PROSTATE CANCER METASTATIC TO BONE: Primary | ICD-10-CM

## 2025-07-18 DIAGNOSIS — C61 PROSTATE CANCER: Primary | ICD-10-CM

## 2025-07-18 DIAGNOSIS — C79.51 PROSTATE CANCER METASTATIC TO BONE: ICD-10-CM

## 2025-07-18 DIAGNOSIS — C61 PROSTATE CANCER METASTATIC TO BONE: Primary | ICD-10-CM

## 2025-07-18 DIAGNOSIS — E83.51 HYPOCALCEMIA: Primary | ICD-10-CM

## 2025-07-18 LAB
ALBUMIN SERPL-MCNC: 3.5 G/DL (ref 3.5–5.2)
ALBUMIN SERPL-MCNC: 3.6 G/DL (ref 3.5–5.2)
ALBUMIN/GLOB SERPL: 1.3 G/DL
ALBUMIN/GLOB SERPL: 1.5 G/DL
ALP SERPL-CCNC: 529 U/L (ref 39–117)
ALP SERPL-CCNC: 565 U/L (ref 39–117)
ALT SERPL W P-5'-P-CCNC: 22 U/L (ref 1–41)
ALT SERPL W P-5'-P-CCNC: 25 U/L (ref 1–41)
ANION GAP SERPL CALCULATED.3IONS-SCNC: 11 MMOL/L (ref 5–15)
ANION GAP SERPL CALCULATED.3IONS-SCNC: 12 MMOL/L (ref 5–15)
AST SERPL-CCNC: 49 U/L (ref 1–40)
AST SERPL-CCNC: 54 U/L (ref 1–40)
BASOPHILS # BLD AUTO: 0.01 10*3/MM3 (ref 0–0.2)
BASOPHILS # BLD AUTO: 0.01 10*3/MM3 (ref 0–0.2)
BASOPHILS NFR BLD AUTO: 0.2 % (ref 0–1.5)
BASOPHILS NFR BLD AUTO: 0.3 % (ref 0–1.5)
BILIRUB SERPL-MCNC: 0.3 MG/DL (ref 0–1.2)
BILIRUB SERPL-MCNC: 0.3 MG/DL (ref 0–1.2)
BUN SERPL-MCNC: 16.7 MG/DL (ref 8–23)
BUN SERPL-MCNC: 17.8 MG/DL (ref 8–23)
BUN/CREAT SERPL: 24.9 (ref 7–25)
BUN/CREAT SERPL: 25.8 (ref 7–25)
CALCIUM SPEC-SCNC: 6.9 MG/DL (ref 8.6–10.5)
CALCIUM SPEC-SCNC: 7 MG/DL (ref 8.6–10.5)
CHLORIDE SERPL-SCNC: 105 MMOL/L (ref 98–107)
CHLORIDE SERPL-SCNC: 105 MMOL/L (ref 98–107)
CO2 SERPL-SCNC: 17 MMOL/L (ref 22–29)
CO2 SERPL-SCNC: 17 MMOL/L (ref 22–29)
CREAT SERPL-MCNC: 0.67 MG/DL (ref 0.76–1.27)
CREAT SERPL-MCNC: 0.69 MG/DL (ref 0.76–1.27)
DEPRECATED RDW RBC AUTO: 60.1 FL (ref 37–54)
DEPRECATED RDW RBC AUTO: 62.7 FL (ref 37–54)
EGFRCR SERPLBLD CKD-EPI 2021: 100.2 ML/MIN/1.73
EGFRCR SERPLBLD CKD-EPI 2021: 101.1 ML/MIN/1.73
EOSINOPHIL # BLD AUTO: 0.02 10*3/MM3 (ref 0–0.4)
EOSINOPHIL # BLD AUTO: 0.03 10*3/MM3 (ref 0–0.4)
EOSINOPHIL NFR BLD AUTO: 0.5 % (ref 0.3–6.2)
EOSINOPHIL NFR BLD AUTO: 0.7 % (ref 0.3–6.2)
ERYTHROCYTE [DISTWIDTH] IN BLOOD BY AUTOMATED COUNT: 20.4 % (ref 12.3–15.4)
ERYTHROCYTE [DISTWIDTH] IN BLOOD BY AUTOMATED COUNT: 20.8 % (ref 12.3–15.4)
GLOBULIN UR ELPH-MCNC: 2.4 GM/DL
GLOBULIN UR ELPH-MCNC: 2.7 GM/DL
GLUCOSE SERPL-MCNC: 121 MG/DL (ref 65–99)
GLUCOSE SERPL-MCNC: 132 MG/DL (ref 65–99)
HCT VFR BLD AUTO: 30 % (ref 37.5–51)
HCT VFR BLD AUTO: 30.1 % (ref 37.5–51)
HGB BLD-MCNC: 10.1 G/DL (ref 13–17.7)
HGB BLD-MCNC: 9.8 G/DL (ref 13–17.7)
HOLD SPECIMEN: NORMAL
HOLD SPECIMEN: NORMAL
IMM GRANULOCYTES # BLD AUTO: 0.03 10*3/MM3 (ref 0–0.05)
IMM GRANULOCYTES # BLD AUTO: 0.04 10*3/MM3 (ref 0–0.05)
IMM GRANULOCYTES NFR BLD AUTO: 0.8 % (ref 0–0.5)
IMM GRANULOCYTES NFR BLD AUTO: 1 % (ref 0–0.5)
LYMPHOCYTES # BLD AUTO: 0.57 10*3/MM3 (ref 0.7–3.1)
LYMPHOCYTES # BLD AUTO: 0.61 10*3/MM3 (ref 0.7–3.1)
LYMPHOCYTES NFR BLD AUTO: 14.3 % (ref 19.6–45.3)
LYMPHOCYTES NFR BLD AUTO: 14.5 % (ref 19.6–45.3)
MAGNESIUM SERPL-MCNC: 1.6 MG/DL (ref 1.6–2.4)
MAGNESIUM SERPL-MCNC: 1.6 MG/DL (ref 1.6–2.4)
MCH RBC QN AUTO: 26.8 PG (ref 26.6–33)
MCH RBC QN AUTO: 27.3 PG (ref 26.6–33)
MCHC RBC AUTO-ENTMCNC: 32.6 G/DL (ref 31.5–35.7)
MCHC RBC AUTO-ENTMCNC: 33.7 G/DL (ref 31.5–35.7)
MCV RBC AUTO: 81.1 FL (ref 79–97)
MCV RBC AUTO: 82.2 FL (ref 79–97)
MONOCYTES # BLD AUTO: 0.37 10*3/MM3 (ref 0.1–0.9)
MONOCYTES # BLD AUTO: 0.4 10*3/MM3 (ref 0.1–0.9)
MONOCYTES NFR BLD AUTO: 10.1 % (ref 5–12)
MONOCYTES NFR BLD AUTO: 8.8 % (ref 5–12)
NEUTROPHILS NFR BLD AUTO: 2.95 10*3/MM3 (ref 1.7–7)
NEUTROPHILS NFR BLD AUTO: 3.14 10*3/MM3 (ref 1.7–7)
NEUTROPHILS NFR BLD AUTO: 74 % (ref 42.7–76)
NEUTROPHILS NFR BLD AUTO: 74.8 % (ref 42.7–76)
NRBC BLD AUTO-RTO: 0 /100 WBC (ref 0–0.2)
NRBC BLD AUTO-RTO: 0 /100 WBC (ref 0–0.2)
PHOSPHATE SERPL-MCNC: 1.8 MG/DL (ref 2.5–4.5)
PHOSPHATE SERPL-MCNC: 1.9 MG/DL (ref 2.5–4.5)
PLATELET # BLD AUTO: 86 10*3/MM3 (ref 140–450)
PLATELET # BLD AUTO: 93 10*3/MM3 (ref 140–450)
PMV BLD AUTO: 9.1 FL (ref 6–12)
PMV BLD AUTO: ABNORMAL FL
POTASSIUM SERPL-SCNC: 4.7 MMOL/L (ref 3.5–5.2)
POTASSIUM SERPL-SCNC: 4.7 MMOL/L (ref 3.5–5.2)
PROT SERPL-MCNC: 5.9 G/DL (ref 6–8.5)
PROT SERPL-MCNC: 6.3 G/DL (ref 6–8.5)
RBC # BLD AUTO: 3.66 10*6/MM3 (ref 4.14–5.8)
RBC # BLD AUTO: 3.7 10*6/MM3 (ref 4.14–5.8)
SODIUM SERPL-SCNC: 133 MMOL/L (ref 136–145)
SODIUM SERPL-SCNC: 134 MMOL/L (ref 136–145)
WBC NRBC COR # BLD AUTO: 3.98 10*3/MM3 (ref 3.4–10.8)
WBC NRBC COR # BLD AUTO: 4.2 10*3/MM3 (ref 3.4–10.8)
WHOLE BLOOD HOLD COAG: NORMAL
WHOLE BLOOD HOLD SPECIMEN: NORMAL

## 2025-07-18 PROCEDURE — 36591 DRAW BLOOD OFF VENOUS DEVICE: CPT

## 2025-07-18 PROCEDURE — 85025 COMPLETE CBC W/AUTO DIFF WBC: CPT | Performed by: EMERGENCY MEDICINE

## 2025-07-18 PROCEDURE — 25010000002 HEPARIN LOCK FLUSH PER 10 UNITS: Performed by: INTERNAL MEDICINE

## 2025-07-18 PROCEDURE — 96365 THER/PROPH/DIAG IV INF INIT: CPT

## 2025-07-18 PROCEDURE — 96366 THER/PROPH/DIAG IV INF ADDON: CPT

## 2025-07-18 PROCEDURE — 83735 ASSAY OF MAGNESIUM: CPT | Performed by: EMERGENCY MEDICINE

## 2025-07-18 PROCEDURE — 25010000002 CALCIUM GLUCONATE-NACL 1-0.675 GM/50ML-% SOLUTION: Performed by: EMERGENCY MEDICINE

## 2025-07-18 PROCEDURE — 80053 COMPREHEN METABOLIC PANEL: CPT | Performed by: INTERNAL MEDICINE

## 2025-07-18 PROCEDURE — 84100 ASSAY OF PHOSPHORUS: CPT | Performed by: EMERGENCY MEDICINE

## 2025-07-18 PROCEDURE — 80053 COMPREHEN METABOLIC PANEL: CPT | Performed by: EMERGENCY MEDICINE

## 2025-07-18 PROCEDURE — 85025 COMPLETE CBC W/AUTO DIFF WBC: CPT | Performed by: INTERNAL MEDICINE

## 2025-07-18 PROCEDURE — 83735 ASSAY OF MAGNESIUM: CPT | Performed by: INTERNAL MEDICINE

## 2025-07-18 PROCEDURE — 84100 ASSAY OF PHOSPHORUS: CPT | Performed by: INTERNAL MEDICINE

## 2025-07-18 PROCEDURE — 36415 COLL VENOUS BLD VENIPUNCTURE: CPT

## 2025-07-18 PROCEDURE — 99283 EMERGENCY DEPT VISIT LOW MDM: CPT

## 2025-07-18 RX ORDER — CALCIUM GLUCONATE 20 MG/ML
1000 INJECTION, SOLUTION INTRAVENOUS ONCE
Status: COMPLETED | OUTPATIENT
Start: 2025-07-18 | End: 2025-07-18

## 2025-07-18 RX ORDER — SODIUM CHLORIDE 0.9 % (FLUSH) 0.9 %
20 SYRINGE (ML) INJECTION AS NEEDED
OUTPATIENT
Start: 2025-07-18

## 2025-07-18 RX ORDER — SODIUM CHLORIDE 0.9 % (FLUSH) 0.9 %
20 SYRINGE (ML) INJECTION AS NEEDED
Status: DISCONTINUED | OUTPATIENT
Start: 2025-07-18 | End: 2025-07-19 | Stop reason: HOSPADM

## 2025-07-18 RX ORDER — HEPARIN SODIUM (PORCINE) LOCK FLUSH IV SOLN 100 UNIT/ML 100 UNIT/ML
500 SOLUTION INTRAVENOUS AS NEEDED
Status: DISCONTINUED | OUTPATIENT
Start: 2025-07-18 | End: 2025-07-19 | Stop reason: HOSPADM

## 2025-07-18 RX ORDER — HEPARIN SODIUM (PORCINE) LOCK FLUSH IV SOLN 100 UNIT/ML 100 UNIT/ML
500 SOLUTION INTRAVENOUS AS NEEDED
OUTPATIENT
Start: 2025-07-18

## 2025-07-18 RX ADMIN — HEPARIN 500 UNITS: 100 SYRINGE at 13:38

## 2025-07-18 RX ADMIN — CALCIUM GLUCONATE 1000 MG: 20 INJECTION, SOLUTION INTRAVENOUS at 17:59

## 2025-07-18 RX ADMIN — Medication 20 ML: at 13:38

## 2025-07-18 RX ADMIN — POTASSIUM & SODIUM PHOSPHATES POWDER PACK 280-160-250 MG 2 PACKET: 280-160-250 PACK at 20:28

## 2025-07-19 NOTE — DISCHARGE INSTRUCTIONS
Follow-up with your oncologist and primary care provider first thing Monday morning to discuss any changes to your electrolyte replacements.

## 2025-07-19 NOTE — ED PROVIDER NOTES
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 year old male who presents to the emergency department for evaluation of low calcium.  The patient has no complaints and states he is here only because he was told to come to the emergency department for the lab finding.  Appears to be a chronic issue related to his cancer.      Patient Care Team  Primary Care Provider: Maryse Monae MD    Past Medical History:     No Known Allergies  Past Medical History:   Diagnosis Date    Anemia     NO PROBLEMS    Anxiety     Blood disease     Colon polyps     Depression     Diverticulitis     Forgetfulness     Hepatitis C     RESOLVED    Hypertension     Night sweats     Numbness and tingling of left lower extremity     Prostate cancer      Past Surgical History:   Procedure Laterality Date    COLONOSCOPY      PEG TUBE INSERTION N/A 2/24/2025    Procedure: PERCUTANEOUS ENDOSCOPIC GASTROSTOMY TUBE INSERTION and biopsy;  Surgeon: Waldemar Iraheta MD;  Location: [facility] OR [facility] [place];  Service: General;  Laterality: N/A;  gastric musocal abnormality    PROSTATE BIOPSY      PROSTATECTOMY      ROBOT ASSISTED W PELVIC LYMPH NODE DISSECTION    TRANSURETHRAL RESECTION OF BLADDER TUMOR      VENOUS ACCESS DEVICE (PORT) INSERTION Right 5/22/2023    Procedure: INSERTION VENOUS ACCESS PORT;  Surgeon: Waldemar Iraheta MD;  Location: [facility] OR [facility] [place];  Service: General;  Laterality: Right;     Family History   Problem Relation Age of Onset    Cancer Mother     Cancer Father        Home Medications:  Prior to Admission medications    Medication Sig Start Date End Date Taking? Authorizing Provider   amLODIPine (NORVASC) 5 MG tablet Take 1 tablet by mouth Daily. 2/20/25  Yes Maryse Monae  MD   Calcium Carbonate-Vitamin D (Calcium Plus Vitamin D) 500-1.25 MG-MCG capsule Take 1 capsule by mouth Daily. 3/8/24  Yes Joshua Dick MD   Calcium Citrate 250 MG tablet mg Tab, Oral, BID, 0 Refill(s) 5/1/25  Yes Sean Chong MD   HYDROcodone-acetaminophen (NORCO)  MG per tablet Take 1.5 tablets by mouth Every 4 (Four) Hours As Needed for Moderate Pain. 7/1/25  Yes Joshua Dick MD   ibuprofen (ADVIL,MOTRIN) 800 MG tablet TAKE 1 TABLET BY MOUTH EVERY 6 - 8 HOURS AS NEEDED FOR PAIN 3/1/23  Yes Sean Chong MD   leuprolide (Lupron Depot, 3-Month,) 22.5 MG injection Inject 22.5 mg into the appropriate muscle as directed by prescriber Every 3 (Three) Months.   Yes Sean Chong MD   lisinopril (PRINIVIL,ZESTRIL) 40 MG tablet Take 1 tablet by mouth Daily. 3/26/25  Yes Maryse Monae MD   abiraterone acetate (ZYTIGA) 500 MG tablet Take 2 tablets by mouth Daily.  Patient not taking: Reported on 7/3/2025 2/6/25   Joshua Dick MD   clotrimazole (MYCELEX) 10 MG [place] TAKE 1 LOZENGE ORALLY 5 TIMES DAILY 5/1/25   Sean Chong MD   dronabinol (Marinol) 5 MG capsule Take 2 capsules by mouth Daily. 6/17/25   Albert Ojeda PA-C   ferrous gluconate (FERGON) 324 MG tablet Take 1 tablet by mouth Daily With Breakfast. 5/12/25   Joshua Dick MD   folic acid (FOLVITE) 1 MG tablet Take 1 tablet by mouth Daily. 5/19/25   Joshua Dick MD   naloxone (NARCAN) 4 MG/0.1ML nasal spray  6/18/24   Sean Chong MD   ondansetron (ZOFRAN) 8 MG tablet Take 1 tablet by mouth Every 8 (Eight) Hours As Needed for Nausea or Vomiting. 2/11/25   Joshua Dick MD   potassium & sodium phosphates (PHOS-NAK) 280-160-250 MG pack packet Take 2 packets by mouth 3 (Three) Times a Day.  Patient not taking: Reported on 5/14/2025 4/11/25   Joshua Dick MD   potassium & sodium phosphates (PHOS-NAK) 280-160-250 MG pack packet Take 1 packet by mouth 3 (Three) Times a Day With Meals for 5 days. 7/18/25  "7/23/25  Anthony Mon MD   potassium chloride (MICRO-K) 10 MEQ CR capsule TAKE 2 CAPSULES BY MOUTH DAILY  Patient not taking: Reported on 5/14/2025 10/14/24   Joshua Dick MD   predniSONE (DELTASONE) 5 MG tablet Take 1 tablet by mouth 2 (Two) Times a Day.  Patient not taking: Reported on 7/3/2025 2/6/25   Joshua Dick MD        Social History:   Social History     Tobacco Use    Smoking status: Former     Current packs/day: 0.25     Average packs/day: 0.3 packs/day for 54.7 years (13.7 ttl pk-yrs)     Types: Cigarettes     Start date: 10/21/1970     Passive exposure: Past    Smokeless tobacco: Never    Tobacco comments:     INSTRUCTED NO SMOKING 24 HR PRIOR TO SURGERY PER ANESTHESIA      1000 PM 02-23-25   Vaping Use    Vaping status: Never Used   Substance Use Topics    Alcohol use: Not Currently     Alcohol/week: 6.0 standard drinks of alcohol     Types: 6 Cans of beer per week     Comment: 12 pack a week of beer    Drug use: Never         Review of Systems:  Review of Systems     Physical Exam:  /78   Pulse 72   Temp 97.8 °F (36.6 °C) (Oral)   Resp 14   Ht 175.3 cm (69\")   Wt 41.1 kg (90 lb 9.7 oz)   SpO2 100%   BMI 13.38 kg/m²     Physical Exam  Vitals and nursing note reviewed.   Constitutional:       General: He is awake.      Appearance: Normal appearance.   HENT:      Head: Normocephalic and atraumatic.      Nose: Nose normal.      Mouth/Throat:      Mouth: Mucous membranes are moist.   Eyes:      Extraocular Movements: Extraocular movements intact.      Pupils: Pupils are equal, round, and reactive to light.   Cardiovascular:      Rate and Rhythm: Normal rate and regular rhythm.      Heart sounds: Normal heart sounds.   Pulmonary:      Effort: Pulmonary effort is normal. No respiratory distress.      Breath sounds: Normal breath sounds. No wheezing, rhonchi or rales.   Abdominal:      General: Bowel sounds are normal.      Palpations: Abdomen is soft.      Tenderness: There is no " abdominal tenderness. There is no guarding or rebound.      Comments: No rigidity   Musculoskeletal:         General: No tenderness. Normal range of motion.      Cervical back: Normal range of motion and neck supple.   Skin:     General: Skin is warm and dry.      Coloration: Skin is not jaundiced.   Neurological:      General: No focal deficit present.      Mental Status: Mental status is at baseline.   Psychiatric:         Mood and Affect: Mood normal.                  Medical Decision Making:      Comorbidities that affect care:    Anxiety, depression, hepatitis    External Notes reviewed:    Previous Clinic Note: Oncology office visit 7/3/2025.  Description: Prostate cancer metastatic to bone      The following orders were placed and all results were independently analyzed by me:  Orders Placed This Encounter   Procedures    Kinde Draw    Comprehensive Metabolic Panel    CBC Auto Differential    Magnesium    Phosphorus    CBC & Differential    Green Top (Gel)    Lavender Top    Gold Top - SST    Light Blue Top       Medications Given in the Emergency Department:  Medications   Phosphorus Replacement - Follow Nurse / BPA Driven Protocol (has no administration in time range)   calcium gluconate 1000 Mg/50ml 0.675% NaCl IV SOLN (0 mg Intravenous Stopped 7/18/25 1930)   potassium & sodium phosphates (PHOS-NAK) 280-160-250 MG packet 2 packet (2 packets Oral Given 7/18/25 2028)        ED Course:         Labs:    Lab Results (last 24 hours)       Procedure Component Value Units Date/Time    Comprehensive metabolic panel [[id number]]  (Abnormal) Collected: 07/18/25 1344    Specimen: Blood Updated: 07/18/25 1505     Glucose 132 mg/dL      BUN 16.7 mg/dL      Creatinine 0.67 mg/dL      Sodium 133 mmol/L      Potassium 4.7 mmol/L      Chloride 105 mmol/L      CO2 17.0 mmol/L      Calcium 7.0 mg/dL      Total Protein 6.3 g/dL      Albumin 3.6 g/dL      ALT (SGPT) 25 U/L      AST (SGOT) 54 U/L      Alkaline Phosphatase 565  U/L      Total Bilirubin 0.3 mg/dL      Globulin 2.7 gm/dL      A/G Ratio 1.3 g/dL      BUN/Creatinine Ratio 24.9     Anion Gap 11.0 mmol/L      eGFR 101.1 mL/min/1.73     Narrative:      GFR Categories in Chronic Kidney Disease (CKD)              GFR Category          GFR (mL/min/1.73)    Interpretation  G1                    90 or greater        Normal or high (1)  G2                    60-89                Mild decrease (1)  G3a                   45-59                Mild to moderate decrease  G3b                   30-44                Moderate to severe decrease  G4                    15-29                Severe decrease  G5                    14 or less           Kidney failure    (1)In the absence of evidence of kidney disease, neither GFR category G1 or G2 fulfill the criteria for CKD.    eGFR calculation 2021 CKD-EPI creatinine equation, which does not include race as a factor    CBC & Differential [[id number]]  (Abnormal) Collected: 07/18/25 1344    Specimen: Blood Updated: 07/18/25 1408    Narrative:      The following orders were created for panel order CBC & Differential.  Procedure                               Abnormality         Status                     ---------                               -----------         ------                     CBC Auto Differential[[id number]]        Abnormal            Final result               Scan Slide[[id number]]                                                                    Please view results for these tests on the individual orders.    Magnesium [[id number]]  (Normal) Collected: 07/18/25 1344    Specimen: Blood Updated: 07/18/25 1425     Magnesium 1.6 mg/dL     Phosphorus [[id number]]  (Abnormal) Collected: 07/18/25 1344    Specimen: Blood Updated: 07/18/25 1455     Phosphorus 1.8 mg/dL     CBC Auto Differential [[id number]]  (Abnormal) Collected: 07/18/25 1344    Specimen: Blood Updated: 07/18/25 1408     WBC 4.20 10*3/mm3      RBC 3.70 10*6/mm3      Hemoglobin  10.1 g/dL      Hematocrit 30.0 %      MCV 81.1 fL      MCH 27.3 pg      MCHC 33.7 g/dL      RDW 20.4 %      RDW-SD 60.1 fl      MPV --     Comment: Unable to determine due to specimen interference.        Platelets 93 10*3/mm3      Neutrophil % 74.8 %      Lymphocyte % 14.5 %      Monocyte % 8.8 %      Eosinophil % 0.7 %      Basophil % 0.2 %      Immature Grans % 1.0 %      Neutrophils, Absolute 3.14 10*3/mm3      Lymphocytes, Absolute 0.61 10*3/mm3      Monocytes, Absolute 0.37 10*3/mm3      Eosinophils, Absolute 0.03 10*3/mm3      Basophils, Absolute 0.01 10*3/mm3      Immature Grans, Absolute 0.04 10*3/mm3      nRBC 0.0 /100 WBC     CBC & Differential [[id number]]  (Abnormal) Collected: 07/18/25 1622    Specimen: Blood Updated: 07/18/25 1628    Narrative:      The following orders were created for panel order CBC & Differential.  Procedure                               Abnormality         Status                     ---------                               -----------         ------                     CBC Auto Differential[[id number]]        Abnormal            Final result               Scan Slide[[id number]]                                                                    Please view results for these tests on the individual orders.    Comprehensive Metabolic Panel [[id number]]  (Abnormal) Collected: 07/18/25 1622    Specimen: Blood Updated: 07/18/25 1650     Glucose 121 mg/dL      BUN 17.8 mg/dL      Creatinine 0.69 mg/dL      Sodium 134 mmol/L      Potassium 4.7 mmol/L      Chloride 105 mmol/L      CO2 17.0 mmol/L      Calcium 6.9 mg/dL      Total Protein 5.9 g/dL      Albumin 3.5 g/dL      ALT (SGPT) 22 U/L      AST (SGOT) 49 U/L      Alkaline Phosphatase 529 U/L      Total Bilirubin 0.3 mg/dL      Globulin 2.4 gm/dL      A/G Ratio 1.5 g/dL      BUN/Creatinine Ratio 25.8     Anion Gap 12.0 mmol/L      eGFR 100.2 mL/min/1.73     Narrative:      GFR Categories in Chronic Kidney Disease (CKD)              GFR  Category          GFR (mL/min/1.73)    Interpretation  G1                    90 or greater        Normal or high (1)  G2                    60-89                Mild decrease (1)  G3a                   45-59                Mild to moderate decrease  G3b                   30-44                Moderate to severe decrease  G4                    15-29                Severe decrease  G5                    14 or less           Kidney failure    (1)In the absence of evidence of kidney disease, neither GFR category G1 or G2 fulfill the criteria for CKD.    eGFR calculation 2021 CKD-EPI creatinine equation, which does not include race as a factor    CBC Auto Differential [[id number]]  (Abnormal) Collected: 07/18/25 1622    Specimen: Blood Updated: 07/18/25 1628     WBC 3.98 10*3/mm3      RBC 3.66 10*6/mm3      Hemoglobin 9.8 g/dL      Hematocrit 30.1 %      MCV 82.2 fL      MCH 26.8 pg      MCHC 32.6 g/dL      RDW 20.8 %      RDW-SD 62.7 fl      MPV 9.1 fL      Platelets 86 10*3/mm3      Neutrophil % 74.0 %      Lymphocyte % 14.3 %      Monocyte % 10.1 %      Eosinophil % 0.5 %      Basophil % 0.3 %      Immature Grans % 0.8 %      Neutrophils, Absolute 2.95 10*3/mm3      Lymphocytes, Absolute 0.57 10*3/mm3      Monocytes, Absolute 0.40 10*3/mm3      Eosinophils, Absolute 0.02 10*3/mm3      Basophils, Absolute 0.01 10*3/mm3      Immature Grans, Absolute 0.03 10*3/mm3      nRBC 0.0 /100 WBC     Magnesium [[id number]]  (Normal) Collected: 07/18/25 1622    Specimen: Blood Updated: 07/18/25 1650     Magnesium 1.6 mg/dL     Phosphorus [[id number]]  (Abnormal) Collected: 07/18/25 1622    Specimen: Blood Updated: 07/18/25 1919     Phosphorus 1.9 mg/dL              Imaging:    No Radiology Exams Resulted Within Past 24 Hours      Differential Diagnosis and Discussion:    Metabolic: Differential diagnosis includes but is not limited to hypertension, hyperglycemia, hyperkalemia, hypocalcemia, metabolic acidosis, hypokalemia,  hypoglycemia, malnutrition, hypothyroidism, hyperthyroidism, and adrenal insufficiency.     PROCEDURES:    Labs were collected in the emergency department and all labs were reviewed and interpreted by me.    No orders to display       Procedures    MDM     Amount and/or Complexity of Data Reviewed  Decide to obtain previous medical records or to obtain history from someone other than the patient: yes                       Patient Care Considerations:    SEPSIS was considered but is NOT present in the emergency department as SIRS criteria is not present.      Consultants/Shared Management Plan:    None    Social Determinants of Health:    Patient is independent, reliable, and has access to care.       Disposition and Care Coordination:    Discharged: I considered escalation of care by admitting this patient to the hospital, however patient is asymptomatic        Final diagnoses:   Hypocalcemia   Hypophosphatemia        ED Disposition       ED Disposition   Discharge    Condition   Stable    Comment   --               This medical record created using voice recognition software.             Anthony Mon MD  07/18/25 [id number]

## 2025-07-21 DIAGNOSIS — C61 PROSTATE CANCER METASTATIC TO BONE: ICD-10-CM

## 2025-07-21 DIAGNOSIS — G89.3 CANCER RELATED PAIN: ICD-10-CM

## 2025-07-21 DIAGNOSIS — C79.51 PROSTATE CANCER METASTATIC TO BONE: ICD-10-CM

## 2025-07-21 RX ORDER — HYDROCODONE BITARTRATE AND ACETAMINOPHEN 10; 325 MG/1; MG/1
1.5 TABLET ORAL EVERY 4 HOURS PRN
Qty: 135 TABLET | Refills: 0 | Status: SHIPPED | OUTPATIENT
Start: 2025-07-21 | End: 2025-07-21 | Stop reason: SDUPTHER

## 2025-07-23 RX ORDER — HYDROCODONE BITARTRATE AND ACETAMINOPHEN 10; 325 MG/1; MG/1
1.5 TABLET ORAL EVERY 4 HOURS PRN
Qty: 135 TABLET | Refills: 0 | Status: SHIPPED | OUTPATIENT
Start: 2025-07-23

## 2025-08-01 ENCOUNTER — TELEPHONE (OUTPATIENT)
Dept: ONCOLOGY | Facility: HOSPITAL | Age: 70
End: 2025-08-01
Payer: MEDICARE

## 2025-08-07 RX ORDER — LISINOPRIL 40 MG/1
40 TABLET ORAL DAILY
Qty: 90 TABLET | Refills: 0 | Status: SHIPPED | OUTPATIENT
Start: 2025-08-07

## 2025-08-16 PROBLEM — N17.9 AKI (ACUTE KIDNEY INJURY): Status: ACTIVE | Noted: 2025-01-01

## 2025-08-16 PROBLEM — A41.9 SEPTIC SHOCK: Status: ACTIVE | Noted: 2025-01-01

## 2025-08-16 PROBLEM — R65.21 SEPTIC SHOCK: Status: ACTIVE | Noted: 2025-01-01

## (undated) DEVICE — COVER,MAYO STAND,STERILE: Brand: MEDLINE

## (undated) DEVICE — SHEET,DRAPE,70X85,STERILE: Brand: MEDLINE

## (undated) DEVICE — ELECTRD BLD EDGE/STD 1P 2.4X6.35MM STRL

## (undated) DEVICE — SUT MNCRYL 4/0 PS2 18 IN

## (undated) DEVICE — CVR PROB ULTRASND CIVFLEX GEN/PURP TELESCP/FOLD 5.5X58IN LF

## (undated) DEVICE — GOWN,REINFORCE,POLY,SIRUS,BREATH SLV,XLG: Brand: MEDLINE

## (undated) DEVICE — GLV SURG BIOGEL LTX PF 7 1/2

## (undated) DEVICE — THE STERILE LIGHT HANDLE COVER IS USED WITH STERIS SURGICAL LIGHTING AND VISUALIZATION SYSTEMS.

## (undated) DEVICE — INTENDED FOR TISSUE SEPARATION, AND OTHER PROCEDURES THAT REQUIRE A SHARP SURGICAL BLADE TO PUNCTURE OR CUT.: Brand: BARD-PARKER ® CARBON RIB-BACK BLADES

## (undated) DEVICE — SOL IRR NACL 0.9PCT BO 1000ML

## (undated) DEVICE — KT PEG ENDOVIVE ENFIT SFTY PULL 20F 1P/U

## (undated) DEVICE — VASCULAR ACCESS-LF: Brand: MEDLINE INDUSTRIES, INC.

## (undated) DEVICE — SLV SCD KN/LEN ADJ EXPRSS BLENDED MD 1P/U

## (undated) DEVICE — SOL IRR NACL 0.9PCT BT 1000ML

## (undated) DEVICE — 3M(TM) MEDIPORE(TM) H SOFT CLOTH TAPE 2866: Brand: 3M™ MEDIPORE™

## (undated) DEVICE — ADHS SKIN SURG TISS VISC PREMIERPRO EXOFIN HI/VISC FAST/DRY

## (undated) DEVICE — SUT PERMAHAND SILK 0 FSL 30IN BLK

## (undated) DEVICE — PENCL E/S SMOKEEVAC W/TELESCP CANN

## (undated) DEVICE — BITEBLOCK OMNI BLOC

## (undated) DEVICE — ANTIBACTERIAL VIOLET BRAIDED (POLYGLACTIN 910), SYNTHETIC ABSORBABLE SUTURE: Brand: COATED VICRYL

## (undated) DEVICE — FRCP BX RADIALJAW4 LG/CAP 2.4MM 240CM ORNG